# Patient Record
Sex: FEMALE | Race: WHITE | NOT HISPANIC OR LATINO | Employment: FULL TIME | URBAN - METROPOLITAN AREA
[De-identification: names, ages, dates, MRNs, and addresses within clinical notes are randomized per-mention and may not be internally consistent; named-entity substitution may affect disease eponyms.]

---

## 2017-01-30 ENCOUNTER — ALLSCRIPTS OFFICE VISIT (OUTPATIENT)
Dept: OTHER | Facility: OTHER | Age: 57
End: 2017-01-30

## 2017-02-08 DIAGNOSIS — E11.65 TYPE 2 DIABETES MELLITUS WITH HYPERGLYCEMIA (HCC): ICD-10-CM

## 2017-03-24 ENCOUNTER — ALLSCRIPTS OFFICE VISIT (OUTPATIENT)
Dept: OTHER | Facility: OTHER | Age: 57
End: 2017-03-24

## 2017-03-24 DIAGNOSIS — E78.5 HYPERLIPIDEMIA: ICD-10-CM

## 2017-05-25 ENCOUNTER — GENERIC CONVERSION - ENCOUNTER (OUTPATIENT)
Dept: OTHER | Facility: OTHER | Age: 57
End: 2017-05-25

## 2018-01-10 NOTE — MISCELLANEOUS
History of Present Illness  Communication  A message was left on voicemail requesting a return phone call  A call was made to the patient/patient's family  Future Appointments    Date/Time Provider Specialty Site   01/20/2017 02:30 PM [facility]     Signatures   Electronically signed by : Daniel Beltrán, ; Dec 21 2016  4:00PM EST                       (Author)

## 2018-01-12 VITALS
HEART RATE: 84 BPM | BODY MASS INDEX: 50.02 KG/M2 | RESPIRATION RATE: 16 BRPM | TEMPERATURE: 98.4 F | HEIGHT: 64 IN | SYSTOLIC BLOOD PRESSURE: 120 MMHG | WEIGHT: 293 LBS | DIASTOLIC BLOOD PRESSURE: 80 MMHG

## 2018-01-16 NOTE — MISCELLANEOUS
Provider Comments  Provider Comments:   Patient NO SHOW on 5/25/17 at 2:45pm letter sent to patient        Signatures   Electronically signed by : SHAHID Tobar ; Jun 1 2017  4:31PM EST                       (Author)

## 2018-01-16 NOTE — MISCELLANEOUS
History of Present Illness  Communication  There was no answer and no voicemail was available  Future Appointments    Date/Time Provider Specialty Site   12/12/2016 01:40 PM SHAHID Ball  Cardiology  CARDIOLOGY  [place]   12/09/2016 03:15 PM SHAHID Gaston   Internal Medicine [facility]     Signatures   Electronically signed by : Luis Welch, ; Dec  2 2016  2:48PM EST                       (Author)

## 2018-01-17 NOTE — MISCELLANEOUS
Assessment    1  Chronic obstructive pulmonary disease (496) (J44 9)   2  Atrial fibrillation (427 31) (I48 91)   3  Type 2 diabetes mellitus with hyperglycemia (250 00) (E11 65)   4  Hypertension (401 9) (I10)   5  Peripheral neuropathy (356 9) (G62 9)   6  Chronic diastolic CHF (congestive heart failure) (428 32,428 0) (I50 32)   7  Obesity (278 00) (E66 9)    Plan  Need for pneumococcal vaccination    · Prevnar 13 Intramuscular Suspension   For: Need for pneumococcal vaccination; Ordered By:Ame Hart; Effective Date:09Dec2016; Administered by: Fran Oar: 12/9/2016 4:00:00 PM  Peripheral neuropathy    · Follow-up visit in 6 weeks Evaluation and Treatment  Follow-up  Status: Complete  Done:  29HMV2437   Ordered; For: Peripheral neuropathy; Ordered By: Shaun Cunha Performed:  Due: 18EXM3931; Last Updated By: Naz Hernandez; 12/9/2016 4:38:45 PM    Discussion/Summary  Discussion Summary:   The patient is much improved after her hospitalization  Her heart failure and respiratory status are much better compensated  Her blood pressure is stable  She is on rate control and anticoagulation for atrial fibrillation  I urged her to start back on furosemide  She has an appointment with cardiology in the near future  She will continue with her other medications as they are  We did talk at some length about her weight  I don't believe that there are any weight loss drugs that would be appropriate for her right at the moment  I did broach the topic bariatric surgery with her  At the moment she is not enthusiastic  I told her that in any case, her cardiac and respiratory status would require further optimization before this could proceed  I asked her to return here in 6 weeks        Chief Complaint  Chief Complaint Free Text Note Form: d/c [facility] 11/29/16  Toprol XL increased to 100 mg q12h, magnesium 2 daily, Furosemide 20mg 3 daily  c/o low back pain, Tramadol 50mg called to pharmacy  foot exam due, received Prevnar today  forgot to take Lasix for 1 week  to [facility] for eye exam recently      History of Present Illness  TCM Communication [facility]: The patient is being contacted for 12/9/16  She was hospitalized [facility]  The date of admission: 11/18/16, date of discharge: 11/29/16  Diagnosis: CHF, hypoxic & hypercarbic respirator failure  She was discharged to home  Medications reviewed and updated today  She scheduled a follow up appointment  Communication performed and completed by Denae Houston   HPI: The patient returned to the office after recent hospitalization at [facility] for acute diastolic congestive heart failure and respiratory failure  She underwent extensive diuresis and lost approximately 50 pounds  With this, her respiratory status improved significantly  However, she likely has sleep apnea and obesity hypoventilation syndrome  Since she's been home she's been doing reasonably well  She returned to work  Her breathing is much better  She does not have much edema  She has had no chest pain  Unfortunately, she forgot to take furosemide for the past week  Review of Systems  Complete-Female:   Constitutional: No fever, no chills, feels well, no tiredness, no recent weight gain or weight loss  Eyes: No complaints of eye pain, no red eyes, no eyesight problems, no discharge, no dry eyes, no itching of eyes  ENT: no complaints of earache, no loss of hearing, no nose bleeds, no nasal discharge, no sore throat, no hoarseness  Cardiovascular: as noted in HPI  Respiratory: No complaints of shortness of breath, no wheezing, no cough, no SOB on exertion, no orthopnea, no PND  Gastrointestinal: No complaints of abdominal pain, no constipation, no nausea or vomiting, no diarrhea, no bloody stools  Genitourinary: No complaints of dysuria, no incontinence, no pelvic pain, no dysmenorrhea, no vaginal discharge or bleeding  Musculoskeletal: as noted in HPI     Neurological: No complaints of headache, no confusion, no convulsions, no numbness, no dizziness or fainting, no tingling, no limb weakness, no difficulty walking  Psychiatric: Not suicidal, no sleep disturbance, no anxiety or depression, no change in personality, no emotional problems  Endocrine: no muscle weakness  Hematologic/Lymphatic: no tendency for easy bruising  Active Problems     1  Cervical cancer screening (V76 2) (Z12 4)   2  Chronic obstructive pulmonary disease (496) (J44 9)   3  Cutaneous candidiasis (112 3) (B37 2)   4  Depression (311) (F32 9)   5  Dyslipidemia (272 4) (E78 5)   6  Dyspnea on exertion (786 09) (R06 09)   7  Encounter for screening mammogram for malignant neoplasm of breast (V76 12)   (Z12 31)   8  Left ventricular hypertrophy (429 3) (I51 7)   9  Need for diphtheria-tetanus-pertussis (Tdap) vaccine (V06 1) (Z23)   10  Need for prophylactic vaccination and inoculation against influenza (V04 81) (Z23)   11  Obesity (278 00) (E66 9)   12  Peripheral neuropathy (356 9) (G62 9)   13  Primary 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) (F51 11)   14  Rhinitis (472 0) (J31 0)   15  Screening for colon cancer (V76 51) (Z12 11)   16  Screening for diabetic retinopathy (V80 2) (Z13 5)   17  Type 2 diabetes mellitus with hyperglycemia (250 00) (E11 65)   18  Varicose Veins Of Lower Extremities (454 9)   19  Venous insufficiency (459 81) (I87 2)   20  Vitamin D deficiency (268 9) (E55 9)    Hypertension (401 9) (I10)       Shortness of breath (786 05) (R06 02)       Congestive heart failure (428 0) (I50 9)       Atrial fibrillation (427 31) (I48 91)       Mitral regurgitation (424 0) (I34 0)          Past Medical History    1  History of Cellulitis (682 9) (L03 90)   2  History of Varicose Veins Of Lower Extremities With Ulcer (454 0)   3  History of Ventricular tachycardia (427 1) (I47 2)    Surgical History    1  History of Cholecystectomy Laparoscopic   2  History of Hernia Repair   3   History of Hysterectomy    Family History  Family History 1  Family history of Cancer   2  Family history of Diabetes Mellitus (V18 0)   3  Family history of Hypertension (V17 49)   4  Family history of Stroke Syndrome (V17 1)  Family History Reviewed: The family history was reviewed and updated today  Social History     · Working Full Time  Social History Reviewed: The social history was reviewed and is unchanged  Cigarette smoker (305 1) (F17 210)             Current Meds   1  BuPROPion HCl ER (XL) 150 MG Oral Tablet Extended Release 24 Hour; TAKE 1   TABLET DAILY; Therapy: 62IYH0144 to (Evaluate:30Oct2016)  Requested for: 47HJQ3297; Last   Rx:30Sep2016 Ordered   2  Clotrimazole 1 % External Cream; APPLY SPARINGLY TO AFFECTED AREA(S) TWICE   DAILY; Therapy: 33HGI8535 to (Last Rx:30Sep2016)  Requested for: 30VCE8433 Ordered   3  Eliquis 5 MG Oral Tablet; Take 1 tablet twice daily; Therapy: 45AZH0027 to (Evaluate:11Nov2016); Last Rx:14Oct2016 Ordered   4  Furosemide 20 MG Oral Tablet; TAKE 3 TABLET Daily or as directed; Therapy: (Recorded:30Nov2016) to Recorded   5  Furosemide 40 MG Oral Tablet; TAKE 1 TABLET TWICE DAILY; Therapy: 07Tkn3735 to (Evaluate:12Apr2017)  Requested for: 68FRA7698; Last   Rx:14Oct2016 Ordered   6  Lisinopril 40 MG Oral Tablet; take 1 tablet every day; Therapy: 02IUZ1675 to (Evaluate:30Oct2016)  Requested for: 25FKB5919; Last   Rx:36Pts1880 Ordered   7  Magnesium CAPS; 2 tabs daily; Therapy: (Recorded:30Nov2016) to Recorded   8  MetFORMIN HCl  MG Oral Tablet Extended Release 24 Hour; TAKE 1 TABLET   ONCE DAILY WITH THE EVENING MEAL; Therapy: 64FSV6465 to ([place])  Requested for: 62XSC0327; Last   Rx:67Bvh9309 Ordered   9  Metoprolol Succinate  MG Oral Tablet Extended Release 24 Hour; Take 1 tablet   every 12 hours; Therapy: (Recorded:30Nov2016) to Recorded   10  Metoprolol Succinate ER 50 MG Oral Tablet Extended Release 24 Hour; TAKE 1 TABLET    DAILY;     Therapy: 46XTW1693 to (Evaluate:13Mar2017)  Requested for: 54GHM2179; Last    Rx:49Nss3275 Ordered   11  TraZODone HCl - 50 MG Oral Tablet; TAKE 1 TABLET AT BEDTIME; Therapy: 85ICX4680 to (Last Rx:70Yfh1932)  Requested for: 80RYG0738 Ordered   12  Ventolin  (90 Base) MCG/ACT Inhalation Aerosol Solution; INHALE 1 TO 2 PUFFS    EVERY 4 TO 6 HOURS AS NEEDED; Therapy: 46PEI7786 to (Last Rx:23Lhp0152)  Requested for: 60LFF8982 Ordered   13  Vitamin D (Ergocalciferol) 12503 UNIT Oral Capsule; TAKE 1 CAPSULE WEEKLY; Therapy: 52JYZ1883 to (Evaluate:18Sep2015)  Requested for: 26KSK6036; Last    Rx:20Sqv6454 Ordered    Allergies    1  No Known Drug Allergies    Vitals  Signs   Recorded: 97FOB4145 03:54PM   Temperature: 98 1 F  Heart Rate: 88  Respiration: 16  Systolic: 849  Diastolic: 165  Height: 5 ft 4 in  Weight: 293 lb 4 00 oz  BMI Calculated: 50 34  BSA Calculated: 2 31    Physical Exam    Constitutional   General appearance: No acute distress, well appearing and well nourished  Ears, Nose, Mouth, and Throat   External inspection of ears and nose: Normal     Otoscopic examination: Tympanic membranes translucent with normal light reflex  Canals patent without erythema  Nasal mucosa, septum, and turbinates: Abnormal     Pulmonary   Respiratory effort: No increased work of breathing or signs of respiratory distress  Auscultation of lungs: Clear to auscultation  Cardiovascular   Palpation of heart: Normal PMI, no thrills  Auscultation of heart: Normal rate and rhythm, normal S1 and S2, without murmurs  Examination of extremities for edema and/or varicosities: Abnormal   +1 edema was noted on theright,+2 edema left  Carotid pulses: Normal     Abdomen   Abdomen: Non-tender, no masses  Liver and spleen: No hepatomegaly or splenomegaly  Lymphatic   Palpation of lymph nodes in neck: No lymphadenopathy      Musculoskeletal   Gait and station: Normal     Inspection/palpation of joints, bones, and muscles: Normal  Skin   Skin and subcutaneous tissue: Abnormal   There is some redness of the skin of the calves consistent with venous stasis  During her hospitalization she had some shallow ulcerations which are now healing  Psychiatric   Orientation to person, place, and time: Normal     Mood and affect: Normal          Health Management  Cervical cancer screening   (1) THIN PREP PAP WITH IMAGING; every 3 years; Next Due: 06Kby9904; Overdue  Chronic obstructive pulmonary disease   Spirometry w/out Bronchodilation; every 1 year; Next Due: 89VKI8827; Overdue  Encounter for screening mammogram for malignant neoplasm of breast   Digital Bilateral Screening Mammogram With CAD; every 1 year; Next Due: 12Bhm5603; Overdue  Screening for colon cancer   COLONOSCOPY; every 10 years; Next Due: 13Gag7292; Overdue  Screening for diabetic retinopathy   *VB - Eye Exam; every 1 year; Next Due: 17Hba3722; Overdue  Type 2 diabetes mellitus with hyperglycemia   (1) HEMOGLOBIN A1C; every 6 months; Last 44ZFI3577; Next Due: 29Xto9338; Overdue  (1) MICROALBUMIN CREATININE RATIO, RANDOM URINE; every 1 year; Last 85QPX0634; Next Due:  O1696112; Overdue  *VB - Foot Exam; every 1 year; Last 83QUX6220; Next Due: 81PAI1886; Overdue    Future Appointments    Date/Time Provider Specialty Site   01/20/2017 02:30 PM SHAHID Diaz   Internal Medicine [facility]     Signatures   Electronically signed by : SHAHID Pritchett ; Dec 12 2016  5:54PM EST                       (Author)

## 2018-02-13 ENCOUNTER — APPOINTMENT (OUTPATIENT)
Dept: LAB | Facility: HOSPITAL | Age: 58
End: 2018-02-13
Attending: INTERNAL MEDICINE
Payer: COMMERCIAL

## 2018-02-13 ENCOUNTER — OFFICE VISIT (OUTPATIENT)
Dept: INTERNAL MEDICINE CLINIC | Facility: CLINIC | Age: 58
End: 2018-02-13
Payer: COMMERCIAL

## 2018-02-13 VITALS
TEMPERATURE: 98.4 F | HEART RATE: 104 BPM | BODY MASS INDEX: 47.77 KG/M2 | WEIGHT: 279.8 LBS | HEIGHT: 64 IN | SYSTOLIC BLOOD PRESSURE: 128 MMHG | DIASTOLIC BLOOD PRESSURE: 82 MMHG | RESPIRATION RATE: 16 BRPM

## 2018-02-13 DIAGNOSIS — F32.89 OTHER DEPRESSION: ICD-10-CM

## 2018-02-13 DIAGNOSIS — J44.9 CHRONIC OBSTRUCTIVE PULMONARY DISEASE, UNSPECIFIED COPD TYPE (HCC): Primary | ICD-10-CM

## 2018-02-13 DIAGNOSIS — I50.33 ACUTE ON CHRONIC DIASTOLIC CONGESTIVE HEART FAILURE (HCC): ICD-10-CM

## 2018-02-13 DIAGNOSIS — I10 ESSENTIAL HYPERTENSION: ICD-10-CM

## 2018-02-13 DIAGNOSIS — E78.5 HYPERLIPIDEMIA: ICD-10-CM

## 2018-02-13 DIAGNOSIS — I48.91 ATRIAL FIBRILLATION WITH RVR (HCC): ICD-10-CM

## 2018-02-13 DIAGNOSIS — I10 HYPERTENSION, UNSPECIFIED TYPE: ICD-10-CM

## 2018-02-13 DIAGNOSIS — IMO0001 UNCONTROLLED TYPE 2 DIABETES MELLITUS WITHOUT COMPLICATION, WITHOUT LONG-TERM CURRENT USE OF INSULIN: ICD-10-CM

## 2018-02-13 LAB
CHOLEST SERPL-MCNC: 141 MG/DL (ref 50–200)
HDLC SERPL-MCNC: 36 MG/DL (ref 40–60)
LDLC SERPL CALC-MCNC: 86 MG/DL (ref 0–100)
TRIGL SERPL-MCNC: 93 MG/DL

## 2018-02-13 PROCEDURE — 80061 LIPID PANEL: CPT

## 2018-02-13 PROCEDURE — 36415 COLL VENOUS BLD VENIPUNCTURE: CPT

## 2018-02-13 PROCEDURE — 99214 OFFICE O/P EST MOD 30 MIN: CPT | Performed by: INTERNAL MEDICINE

## 2018-02-13 RX ORDER — ALBUTEROL SULFATE 90 UG/1
2 AEROSOL, METERED RESPIRATORY (INHALATION) EVERY 4 HOURS PRN
Qty: 1 INHALER | Refills: 3 | Status: SHIPPED | OUTPATIENT
Start: 2018-02-13 | End: 2018-03-28 | Stop reason: SDUPTHER

## 2018-02-13 RX ORDER — METOPROLOL SUCCINATE 100 MG/1
TABLET, EXTENDED RELEASE ORAL
COMMUNITY
Start: 2017-12-10 | End: 2018-02-19 | Stop reason: SDUPTHER

## 2018-02-13 RX ORDER — APIXABAN 5 MG/1
5 TABLET, FILM COATED ORAL 2 TIMES DAILY
COMMUNITY
Start: 2018-01-29 | End: 2018-02-19 | Stop reason: SDUPTHER

## 2018-02-18 PROBLEM — I34.0 NON-RHEUMATIC MITRAL REGURGITATION: Status: ACTIVE | Noted: 2018-02-18

## 2018-02-19 ENCOUNTER — OFFICE VISIT (OUTPATIENT)
Dept: CARDIOLOGY CLINIC | Facility: CLINIC | Age: 58
End: 2018-02-19
Payer: COMMERCIAL

## 2018-02-19 VITALS
DIASTOLIC BLOOD PRESSURE: 88 MMHG | HEIGHT: 64 IN | BODY MASS INDEX: 48.49 KG/M2 | WEIGHT: 284 LBS | RESPIRATION RATE: 18 BRPM | HEART RATE: 92 BPM | SYSTOLIC BLOOD PRESSURE: 152 MMHG

## 2018-02-19 DIAGNOSIS — I34.0 NON-RHEUMATIC MITRAL REGURGITATION: ICD-10-CM

## 2018-02-19 DIAGNOSIS — I48.21 PERMANENT ATRIAL FIBRILLATION (HCC): Primary | ICD-10-CM

## 2018-02-19 DIAGNOSIS — I10 ESSENTIAL HYPERTENSION: ICD-10-CM

## 2018-02-19 DIAGNOSIS — I50.32 CHRONIC DIASTOLIC CHF (CONGESTIVE HEART FAILURE) (HCC): ICD-10-CM

## 2018-02-19 PROCEDURE — 99214 OFFICE O/P EST MOD 30 MIN: CPT | Performed by: INTERNAL MEDICINE

## 2018-02-19 PROCEDURE — 4010F ACE/ARB THERAPY RXD/TAKEN: CPT | Performed by: INTERNAL MEDICINE

## 2018-02-19 RX ORDER — METOPROLOL SUCCINATE 100 MG/1
100 TABLET, EXTENDED RELEASE ORAL EVERY 12 HOURS
Qty: 60 TABLET | Refills: 11 | Status: SHIPPED | OUTPATIENT
Start: 2018-02-19 | End: 2018-06-25 | Stop reason: SDUPTHER

## 2018-02-19 RX ORDER — FUROSEMIDE 40 MG/1
40 TABLET ORAL DAILY
Qty: 30 TABLET | Refills: 11 | Status: SHIPPED | OUTPATIENT
Start: 2018-02-19 | End: 2018-03-28 | Stop reason: SDUPTHER

## 2018-02-19 RX ORDER — DILTIAZEM HYDROCHLORIDE 120 MG/1
120 CAPSULE, COATED, EXTENDED RELEASE ORAL DAILY
Qty: 30 CAPSULE | Refills: 11 | Status: SHIPPED | OUTPATIENT
Start: 2018-02-19 | End: 2018-06-25 | Stop reason: SDUPTHER

## 2018-02-19 RX ORDER — APIXABAN 5 MG/1
5 TABLET, FILM COATED ORAL 2 TIMES DAILY
Qty: 60 TABLET | Refills: 11 | Status: SHIPPED | OUTPATIENT
Start: 2018-02-19 | End: 2019-11-05 | Stop reason: SDUPTHER

## 2018-02-19 RX ORDER — LISINOPRIL 40 MG/1
40 TABLET ORAL DAILY
Qty: 30 TABLET | Refills: 11 | Status: SHIPPED | OUTPATIENT
Start: 2018-02-19 | End: 2019-03-19 | Stop reason: SDUPTHER

## 2018-02-19 NOTE — PROGRESS NOTES
Cardiology Follow Up    [facility]  1960  [id number]  [address] [address]    Reason for visit: overdue for 4 months visit for chronic AFIB, chronic diastolic CHF, HTN and moderate MR    1  [facility] )     2  Chronic diastolic CHF (congestive heart failure) ([facility] )     3  Essential hypertension     4  Non-rheumatic mitral regurgitation         Interval History: The patient is way overdue for FU  She does have ROMERO  She denies edema  She denies palpitations  She does get chest tightness  She is very depressed  She does get lightheadedness at times  She is only taking her BID meds once daily  Patient Active Problem List   Diagnosis    COPD (chronic obstructive pulmonary disease) ([place] [address] )    Essential hypertension    Permanent atrial fibrillation (HCC)    Chronic diastolic CHF (congestive heart failure) ([place] [address] )    Acute respiratory failure with hypoxia (HCC)    Hypoalbuminemia    Diabetes type 2, uncontrolled ([place] [address] )    Non-rheumatic mitral regurgitation     Past Medical History:   Diagnosis Date    Atrial fibrillation with RVR ([place] [address] )     COPD (chronic obstructive pulmonary disease) ([place] [address] )     Diabetes type 2, uncontrolled ([place] [address] )     History of cellulitis     History of varicose veins of lower extremity     WITH ULCER    History of ventricular tachycardia     Hypertension      Social History     Social History    Marital status:       Spouse name: N/A    Number of children: N/A    Years of education: N/A     Occupational History     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          WORKING FULL TIME     Social History Main Topics    Smoking status: Current Every Day Smoker     Packs/day: 1 00     Years: 43 00     Types: Cigarettes    Smokeless tobacco: Never Used      Comment: Currently half a pack a day, 6 CIGARETTES PER DAY     Alcohol use Yes      Comment: socially, NO ALCOHOL USE    Drug use: Yes     Types: Marijuana    Sexual activity: Not on file     Other Topics Concern    Not on file     Social History Narrative    NO LIVING WILL          Family History   Problem Relation Age of Onset    Diabetes type II Mother     No Known Problems Father      She reports not knowing much about her father   Cancer Family     Diabetes Family     Hypertension Family     Stroke Family      Past Surgical History:   Procedure Laterality Date    HERNIA REPAIR      HYSTERECTOMY      LAPAROSCOPIC CHOLECYSTECTOMY         Current Outpatient Prescriptions:     albuterol (PROVENTIL HFA,VENTOLIN HFA) 90 mcg/act inhaler, Inhale 2 puffs every 4 (four) hours as needed for wheezing, Disp: 1 Inhaler, Rfl: 3    diltiazem (CARDIZEM CD) 120 mg 24 hr capsule, 120 mg daily, Disp: , Rfl: 5    ELIQUIS 5 MG, Take 5 mg by mouth 2 (two) times a day , Disp: , Rfl:     furosemide (LASIX) 40 mg tablet, Take 40 mg by mouth daily, Disp: , Rfl: 0    lisinopril (ZESTRIL) 40 mg tablet, Take 40 mg by mouth daily, Disp: , Rfl:     metoprolol succinate (TOPROL-XL) 100 mg 24 hr tablet, , Disp: , Rfl:     sertraline (ZOLOFT) 50 mg tablet, Take 1 tablet (50 mg total) by mouth daily, Disp: 30 tablet, Rfl: 1  No Known Allergies      Review of Systems:  Review of Systems   Constitutional: Positive for fatigue  Negative for appetite change, fever and unexpected weight change  Respiratory: Positive for chest tightness and shortness of breath  Negative for cough, choking and wheezing  Cardiovascular: Positive for chest pain  Negative for palpitations and leg swelling  Gastrointestinal: Positive for constipation  Negative for abdominal pain, blood in stool, diarrhea and vomiting  Genitourinary: Positive for frequency  Negative for dysuria, hematuria and urgency  Musculoskeletal: Positive for arthralgias and back pain  Negative for gait problem and joint swelling     Neurological: Positive for weakness and light-headedness  Negative for syncope and headaches  Psychiatric/Behavioral: Positive for dysphoric mood  Negative for agitation, behavioral problems, confusion and decreased concentration  Physical Exam:  Vitals:    02/19/18 1414   BP: 152/88   BP Location: Left arm   Patient Position: Sitting   Cuff Size: Large   Pulse: 92   Resp: 18   Weight: 129 kg (284 lb)   Height: 5' 4" (1 626 m)     Physical Exam   Constitutional: She appears well-developed and well-nourished  No distress  Morbidly obese   HENT:   Head: Normocephalic and atraumatic  Mouth/Throat: No oropharyngeal exudate  Eyes: Conjunctivae are normal  No scleral icterus  Neck: Neck supple  Normal carotid pulses and no JVD present  Carotid bruit is not present  No thyromegaly present  Cardiovascular: Normal heart sounds  An irregularly irregular rhythm present  Tachycardia present  Exam reveals no gallop and no friction rub  No murmur heard  Pulses:       Dorsalis pedis pulses are 0 on the right side, and 0 on the left side  Posterior tibial pulses are 1+ on the right side, and 1+ on the left side  Pulmonary/Chest: She has wheezes (faint exp wheeze)  She has no rales  She exhibits no tenderness  Abdominal: Soft  There is no hepatosplenomegaly  There is no tenderness  There is no guarding  Musculoskeletal: She exhibits edema (2+ LE edema bilaterally)  She exhibits no tenderness or deformity  Neurological: She is alert  She has normal strength  No cranial nerve deficit or sensory deficit  Skin: Skin is warm and dry  No rash noted  No erythema  No pallor  Psychiatric: Her behavior is normal  Judgment and thought content normal    Somewhat depressed         Discussion/Summary:  1  Permanent atrial fibrillation  Rate is somewhat accelerated  She has only been taking metoprolol 100 mg daily  I increased this to 100 mg every 12 hours as previously prescribed  Continue diltiazem for this as well      2  Chronic diastolic heart failure  Perhaps slightly more edema although weight is not all that different from last year  Continue furosemide 40 mg daily  She had missed her dosages in the past and hopefully with daily dosing she will stay out of heart failure  3  Hypertension  Somewhat elevated today  Metoprolol increased  Continue diltiazem and lisinopril  4    Mitral regurgitation deemed to be moderate on echo in 2016  Not audible on exam today  Do not expect this to be problematic going forward      FU 6 months      Johnson Tran MD

## 2018-03-28 ENCOUNTER — OFFICE VISIT (OUTPATIENT)
Dept: INTERNAL MEDICINE CLINIC | Facility: CLINIC | Age: 58
End: 2018-03-28
Payer: COMMERCIAL

## 2018-03-28 VITALS
HEART RATE: 100 BPM | HEIGHT: 64 IN | SYSTOLIC BLOOD PRESSURE: 138 MMHG | DIASTOLIC BLOOD PRESSURE: 80 MMHG | WEIGHT: 293 LBS | RESPIRATION RATE: 18 BRPM | TEMPERATURE: 97.8 F | BODY MASS INDEX: 50.02 KG/M2

## 2018-03-28 DIAGNOSIS — J44.9 CHRONIC OBSTRUCTIVE PULMONARY DISEASE, UNSPECIFIED COPD TYPE (HCC): ICD-10-CM

## 2018-03-28 DIAGNOSIS — R60.9 EDEMA, UNSPECIFIED TYPE: ICD-10-CM

## 2018-03-28 DIAGNOSIS — I50.32 CHRONIC DIASTOLIC CHF (CONGESTIVE HEART FAILURE) (HCC): ICD-10-CM

## 2018-03-28 DIAGNOSIS — I48.21 PERMANENT ATRIAL FIBRILLATION (HCC): ICD-10-CM

## 2018-03-28 DIAGNOSIS — I10 ESSENTIAL HYPERTENSION: ICD-10-CM

## 2018-03-28 DIAGNOSIS — IMO0001 UNCONTROLLED TYPE 2 DIABETES MELLITUS WITHOUT COMPLICATION, WITHOUT LONG-TERM CURRENT USE OF INSULIN: Primary | ICD-10-CM

## 2018-03-28 PROCEDURE — 3079F DIAST BP 80-89 MM HG: CPT | Performed by: INTERNAL MEDICINE

## 2018-03-28 PROCEDURE — 3075F SYST BP GE 130 - 139MM HG: CPT | Performed by: INTERNAL MEDICINE

## 2018-03-28 PROCEDURE — 99214 OFFICE O/P EST MOD 30 MIN: CPT | Performed by: INTERNAL MEDICINE

## 2018-03-28 PROCEDURE — 3008F BODY MASS INDEX DOCD: CPT | Performed by: INTERNAL MEDICINE

## 2018-03-28 RX ORDER — ALBUTEROL SULFATE 90 UG/1
2 AEROSOL, METERED RESPIRATORY (INHALATION) EVERY 4 HOURS PRN
Qty: 1 INHALER | Refills: 3 | Status: SHIPPED | OUTPATIENT
Start: 2018-03-28 | End: 2019-11-05 | Stop reason: SDUPTHER

## 2018-03-28 RX ORDER — FUROSEMIDE 40 MG/1
40 TABLET ORAL 2 TIMES DAILY
Qty: 60 TABLET | Refills: 3 | Status: SHIPPED | OUTPATIENT
Start: 2018-03-28 | End: 2019-02-21 | Stop reason: HOSPADM

## 2018-03-28 RX ORDER — METOLAZONE 5 MG/1
5 TABLET ORAL DAILY
Qty: 30 TABLET | Refills: 0 | Status: SHIPPED | OUTPATIENT
Start: 2018-03-28 | End: 2018-06-25 | Stop reason: SDUPTHER

## 2018-03-28 NOTE — PROGRESS NOTES
[facility] [place]      NAME: Nevin Valdivia  AGE: 65 y o  SEX: female  : 1960   MRN: [id number]    DATE: 3/28/2018  TIME: 2:50 PM    Assessment and Plan   1  Chronic diastolic CHF (congestive heart failure) (HCC)  The patient's weight has gone up 30 lb since her last visit  Fortunately, she is in no respiratory distress  Her diuretic regimen will be intensified   - metolazone (ZAROXOLYN) 5 mg tablet; Take 1 tablet (5 mg total) by mouth daily  Dispense: 30 tablet; Refill: 0  - furosemide (LASIX) 40 mg tablet; Take 1 tablet (40 mg total) by mouth 2 (two) times a day  Dispense: 60 tablet; Refill: 3  - Basic metabolic panel; Future  - Magnesium; Future    2  Chronic obstructive pulmonary disease, unspecified COPD type ([facility] )    Stable at present  - albuterol (PROVENTIL HFA,VENTOLIN HFA) 90 mcg/act inhaler; Inhale 2 puffs every 4 (four) hours as needed for wheezing  Dispense: 1 Inhaler; Refill: 3    3  Uncontrolled type 2 diabetes mellitus without complication, without long-term current use of insulin ([facility])    Currently asymptomatic  Remains on diet therapy alone  4  Essential hypertension    Adequately controlled  5  Permanent atrial fibrillation ([facility] )    On rate control and anticoagulation  6  Edema, unspecified type    This is a manifestation of the patient's fluid retention  The patient is significantly edematous  She does not appear to be in overt heart failure  Furosemide will be increased and metolazone will be added  Her electrolytes will be Re checked in a week  The possibility of sleep apnea or obesity hypoventilation contributing to the patient's fluid retention was discussed with her  She does not wish to go for sleep study at the present time  She does not think that she could tolerate CPAP  Return to office in:   Two weeks  Chief Complaint     Edema      History of Present Illness       The patient came to the office for evaluation of increasing edema   Since her last visit her weight has gone up by 30 lb  Her legs and belly are swollen  She does not notice much deterioration in her breathing  She has little cough or wheezing  She has no sputum  She has no chest pain  She denies orthopnea or PND  The following portions of the patient's history were reviewed and updated as appropriate: allergies, current medications, past family history, past medical history, past social history, past surgical history and problem list     Review of Systems   Review of Systems   Constitutional: Negative  HENT: Negative for congestion, ear pain, postnasal drip, rhinorrhea, sore throat and trouble swallowing  Eyes: Negative for pain, discharge, redness and visual disturbance  Respiratory: Negative for cough, shortness of breath and wheezing  Cardiovascular: Positive for leg swelling  Gastrointestinal: Negative  Endocrine: Negative  Genitourinary: Negative for difficulty urinating, dysuria, frequency, hematuria and urgency  Musculoskeletal: Negative for arthralgias, gait problem, joint swelling and myalgias  Skin: Negative for rash  Neurological: Negative for dizziness, speech difficulty, weakness, light-headedness, numbness and headaches  Hematological: Negative  Psychiatric/Behavioral: Negative for confusion, decreased concentration, dysphoric mood and sleep disturbance  The patient is not nervous/anxious          Active Problem List     Patient Active Problem List   Diagnosis    COPD (chronic obstructive pulmonary disease) ([place] [address] )    Hypertension    Permanent atrial fibrillation ([facility])    Chronic diastolic CHF (congestive heart failure) ([place] [address] )    Acute respiratory failure with hypoxia ([facility])    Hypoalbuminemia    Diabetes type 2, uncontrolled ([place] [address] )    Non-rheumatic mitral regurgitation    Nonsustained ventricular tachycardia (HCC)    Obesity    Vitamin D deficiency    Edema    Depression    Dyslipidemia    Left ventricular hypertrophy       Objective   /80 (BP Location: Right arm, Patient Position: Sitting, Cuff Size: Standard)   Pulse 100   Temp 97 8 °F (36 6 °C) (Tympanic)   Resp 18   Ht 5' 4" (1 626 m)   Wt (!) 141 kg (310 lb)   BMI 53 21 kg/m²     Physical Exam   Constitutional: She is oriented to person, place, and time  She appears well-developed  No distress  Obese   HENT:   Head: Normocephalic and atraumatic  Neck: Neck supple  No JVD present  No tracheal deviation present  No thyromegaly present  Cardiovascular: Normal rate, regular rhythm, normal heart sounds and intact distal pulses  Exam reveals no gallop and no friction rub  No murmur heard  Pulmonary/Chest: Effort normal and breath sounds normal  She has no wheezes  She has no rales  She exhibits no tenderness  Abdominal: Soft  Bowel sounds are normal  She exhibits no distension and no mass  There is no tenderness  There is no rebound  Musculoskeletal: Normal range of motion  She exhibits edema  She exhibits no tenderness  4+ edema of the legs is noted  There is edema of the abdominal wall  Lymphadenopathy:     She has no cervical adenopathy  Neurological: She is alert and oriented to person, place, and time  Skin: Skin is warm and dry  There is erythema  There is erythema of both calves related to the significant edema that is present  Psychiatric: She has a normal mood and affect         Pertinent Laboratory/Diagnostic Studies:  Appointment on 02/13/2018   Component Date Value Ref Range Status    Cholesterol 02/13/2018 141  50 - 200 mg/dL Final    Triglycerides 02/13/2018 93  <=150 mg/dL Final    HDL, Direct 02/13/2018 36* 40 - 60 mg/dL Final    LDL Calculated 02/13/2018 86  0 - 100 mg/dL Final           Current Medications     Current Outpatient Prescriptions:     albuterol (PROVENTIL HFA,VENTOLIN HFA) 90 mcg/act inhaler, Inhale 2 puffs every 4 (four) hours as needed for wheezing, Disp: 1 Inhaler, Rfl: 3    diltiazem (CARDIZEM CD) 120 mg 24 hr capsule, Take 1 capsule (120 mg total) by mouth daily, Disp: 30 capsule, Rfl: 11    ELIQUIS 5 MG, Take 1 tablet (5 mg total) by mouth 2 (two) times a day, Disp: 60 tablet, Rfl: 11    furosemide (LASIX) 40 mg tablet, Take 1 tablet (40 mg total) by mouth 2 (two) times a day, Disp: 60 tablet, Rfl: 3    lisinopril (ZESTRIL) 40 mg tablet, Take 1 tablet (40 mg total) by mouth daily, Disp: 30 tablet, Rfl: 11    metoprolol succinate (TOPROL-XL) 100 mg 24 hr tablet, Take 1 tablet (100 mg total) by mouth every 12 (twelve) hours, Disp: 60 tablet, Rfl: 11    metolazone (ZAROXOLYN) 5 mg tablet, Take 1 tablet (5 mg total) by mouth daily, Disp: 30 tablet, Rfl: 0    sertraline (ZOLOFT) 50 mg tablet, Take 1 tablet (50 mg total) by mouth daily, Disp: 30 tablet, Rfl: 1    Shamika Lopez MD

## 2018-06-25 DIAGNOSIS — I10 ESSENTIAL HYPERTENSION: ICD-10-CM

## 2018-06-25 DIAGNOSIS — I50.32 CHRONIC DIASTOLIC CHF (CONGESTIVE HEART FAILURE) (HCC): ICD-10-CM

## 2018-06-25 DIAGNOSIS — I48.21 PERMANENT ATRIAL FIBRILLATION (HCC): ICD-10-CM

## 2018-06-26 RX ORDER — METOLAZONE 5 MG/1
5 TABLET ORAL DAILY
Qty: 30 TABLET | Refills: 1 | Status: SHIPPED | OUTPATIENT
Start: 2018-06-26 | End: 2018-10-17 | Stop reason: SDUPTHER

## 2018-06-26 RX ORDER — METOPROLOL SUCCINATE 100 MG/1
100 TABLET, EXTENDED RELEASE ORAL EVERY 12 HOURS
Qty: 60 TABLET | Refills: 1 | Status: SHIPPED | OUTPATIENT
Start: 2018-06-26 | End: 2019-12-10 | Stop reason: SDUPTHER

## 2018-06-26 RX ORDER — DILTIAZEM HYDROCHLORIDE 120 MG/1
120 CAPSULE, COATED, EXTENDED RELEASE ORAL DAILY
Qty: 30 CAPSULE | Refills: 4 | Status: SHIPPED | OUTPATIENT
Start: 2018-06-26 | End: 2019-04-11 | Stop reason: SDUPTHER

## 2018-10-17 DIAGNOSIS — I50.32 CHRONIC DIASTOLIC CHF (CONGESTIVE HEART FAILURE) (HCC): ICD-10-CM

## 2018-10-17 RX ORDER — METOLAZONE 5 MG/1
TABLET ORAL
Qty: 30 TABLET | Refills: 1 | Status: ON HOLD | OUTPATIENT
Start: 2018-10-17 | End: 2019-02-21 | Stop reason: SDUPTHER

## 2019-02-07 ENCOUNTER — OFFICE VISIT (OUTPATIENT)
Dept: INTERNAL MEDICINE CLINIC | Facility: CLINIC | Age: 59
End: 2019-02-07
Payer: COMMERCIAL

## 2019-02-07 VITALS
DIASTOLIC BLOOD PRESSURE: 88 MMHG | TEMPERATURE: 97.3 F | HEIGHT: 64 IN | HEART RATE: 100 BPM | OXYGEN SATURATION: 86 % | WEIGHT: 293 LBS | SYSTOLIC BLOOD PRESSURE: 142 MMHG | BODY MASS INDEX: 50.02 KG/M2 | RESPIRATION RATE: 24 BRPM

## 2019-02-07 DIAGNOSIS — E88.09 HYPOALBUMINEMIA: ICD-10-CM

## 2019-02-07 DIAGNOSIS — E11.65 UNCONTROLLED TYPE 2 DIABETES MELLITUS WITH HYPERGLYCEMIA (HCC): ICD-10-CM

## 2019-02-07 DIAGNOSIS — N18.30 STAGE 3 CHRONIC KIDNEY DISEASE (HCC): ICD-10-CM

## 2019-02-07 DIAGNOSIS — I50.33 ACUTE ON CHRONIC DIASTOLIC (CONGESTIVE) HEART FAILURE (HCC): Primary | ICD-10-CM

## 2019-02-07 DIAGNOSIS — J44.9 CHRONIC OBSTRUCTIVE PULMONARY DISEASE, UNSPECIFIED COPD TYPE (HCC): ICD-10-CM

## 2019-02-07 DIAGNOSIS — I50.32 CHRONIC DIASTOLIC CHF (CONGESTIVE HEART FAILURE) (HCC): ICD-10-CM

## 2019-02-07 DIAGNOSIS — I48.21 PERMANENT ATRIAL FIBRILLATION (HCC): ICD-10-CM

## 2019-02-07 DIAGNOSIS — I10 ESSENTIAL HYPERTENSION: ICD-10-CM

## 2019-02-07 PROCEDURE — 99215 OFFICE O/P EST HI 40 MIN: CPT | Performed by: INTERNAL MEDICINE

## 2019-02-07 PROCEDURE — 3066F NEPHROPATHY DOC TX: CPT | Performed by: INTERNAL MEDICINE

## 2019-02-07 NOTE — PROGRESS NOTES
Assessment/Plan:  I discussed with her in detail and her son  She is tachypneic, tachycardic with a pulse ox 86% on room air  Last blood work in November of 2016 showed GFR of 37  Given her intensity of symptoms and lack of medication compliance, I recommended evaluation in the emergency room and admission to the hospital for treatment of acute diastolic heart failure  She was last admitted to the hospital in November of 2016 with similar symptoms and treated with IV diuretics with diuresis of 50  lb  She declined to go to the hospital today since she has to take care of her dogs and arrange for some other things  She is asking me if I could just prescribe her a muscle relaxant and stronger water pills she will be fine  I explained to her that I do not know her kidney function, her electrolyte status  She would need close monitoring while on diuretics  It is also unclear if she would be able to  her medications today because she relies on the pharmacy to deliver them  She does not think she can go for blood work today either due to lack of transportation  Advised her to take Tylenol, advised against using ibuprofen or Aleve    She agrees to go to the ER tomorrow for evaluation and admission to the hospital       Total time spent on the visit today was 50 minutes with greater than 50% time counseling with the patient and son       Diagnoses and all orders for this visit:    Acute on chronic diastolic (congestive) heart failure (HCC)  -     CBC and differential  -     Comprehensive metabolic panel  -     Microalbumin / creatinine urine ratio  -     Magnesium  -     TSH, 3rd generation with Free T4 reflex  -     NT-BNP PRO    Permanent atrial fibrillation (HCC)  -     CBC and differential  -     Comprehensive metabolic panel  -     Microalbumin / creatinine urine ratio  -     Magnesium  -     TSH, 3rd generation with Free T4 reflex  -     NT-BNP PRO    Essential hypertension  -     Magnesium  -     TSH, 3rd generation with Free T4 reflex  -     NT-BNP PRO    Chronic obstructive pulmonary disease, unspecified COPD type ([facility] )    Uncontrolled type 2 diabetes mellitus with hyperglycemia (HCC)  -     Hemoglobin A1C    Hypoalbuminemia    Stage 3 chronic kidney disease (HCC)  -     Microalbumin / creatinine urine ratio    Chronic diastolic CHF (congestive heart failure) ([facility])          Subjective:   Chief Complaint   Patient presents with    Back Pain     stopped Furosemide, fluid retention since mid january        Patient ID: Raudel Murdock is a 63 y o  female  She comes in for an acute appointment  She has had increased back pain, fluid retention for the last several weeks to months  She notes that her back has been hurting since January  She stopped taking her water pills because it was uncomfortable for her to go the bathroom  She has had some difficulty breathing and mobilizing  No chest pain  When asked about her Eliquis and other medications, she states that she takes it when she takes her pills  She was not able to give me an exact date when she last took her medications  I discussed with her that her Lasix was last filled in March of 2018 and she has not seen Dr Luisa Obrien or got any blood work since then  Back Pain   Pertinent negatives include no abdominal pain, chest pain, dysuria, fever or headaches  The following portions of the patient's history were reviewed and updated as appropriate: current medications, past medical history, past social history and past surgical history      PHQ-9 Depression Screening    PHQ-9:    Frequency of the following problems over the past two weeks:                Current Outpatient Prescriptions:     ELIQUIS 5 MG, Take 1 tablet (5 mg total) by mouth 2 (two) times a day, Disp: 60 tablet, Rfl: 11    lisinopril (ZESTRIL) 40 mg tablet, Take 1 tablet (40 mg total) by mouth daily, Disp: 30 tablet, Rfl: 11    metoprolol succinate (TOPROL-XL) 100 mg 24 hr tablet, Take 1 tablet (100 mg total) by mouth every 12 (twelve) hours, Disp: 60 tablet, Rfl: 1    albuterol (PROVENTIL HFA,VENTOLIN HFA) 90 mcg/act inhaler, Inhale 2 puffs every 4 (four) hours as needed for wheezing, Disp: 1 Inhaler, Rfl: 3    diltiazem (CARDIZEM CD) 120 mg 24 hr capsule, Take 1 capsule (120 mg total) by mouth daily, Disp: 30 capsule, Rfl: 4    furosemide (LASIX) 40 mg tablet, Take 1 tablet (40 mg total) by mouth 2 (two) times a day (Patient not taking: Reported on 2/7/2019 ), Disp: 60 tablet, Rfl: 3    metolazone (ZAROXOLYN) 5 mg tablet, TAKE 1 TABLET BY MOUTH EVERY DAY (Patient not taking: Reported on 2/7/2019), Disp: 30 tablet, Rfl: 1    sertraline (ZOLOFT) 50 mg tablet, Take 1 tablet (50 mg total) by mouth daily (Patient not taking: Reported on 2/7/2019 ), Disp: 30 tablet, Rfl: 1    Review of Systems   Constitutional: Positive for fatigue  Negative for fever and unexpected weight change  HENT: Negative for ear pain, hearing loss and sore throat  Eyes: Negative for pain and discharge  Respiratory: Positive for cough and shortness of breath  Negative for chest tightness  Cardiovascular: Positive for leg swelling  Negative for chest pain and palpitations  Gastrointestinal: Negative for abdominal pain, blood in stool, constipation, diarrhea and nausea  Genitourinary: Negative for dysuria, frequency and hematuria  Musculoskeletal: Positive for back pain  Negative for arthralgias and joint swelling  Skin: Negative for rash  Allergic/Immunologic: Negative for immunocompromised state  Neurological: Negative for dizziness and headaches  Hematological: Negative for adenopathy  Psychiatric/Behavioral: Negative for confusion and sleep disturbance           Objective:  /88 (BP Location: Left arm, Patient Position: Sitting, Cuff Size: Large)   Pulse 100   Temp (!) 97 3 °F (36 3 °C) (Tympanic)   Resp (!) 24   Ht 5' 4" (1 626 m)   Wt (!) 147 kg (323 lb)   SpO2 (!) 86% BMI 55 44 kg/m²      Physical Exam   Constitutional: She appears well-developed and well-nourished  HENT:   Head: Normocephalic and atraumatic  Right Ear: Tympanic membrane normal    Left Ear: Tympanic membrane normal    Nose: Nose normal    Mouth/Throat: Oropharynx is clear and moist  No posterior oropharyngeal edema or posterior oropharyngeal erythema  Eyes: Pupils are equal, round, and reactive to light  Conjunctivae are normal  Right eye exhibits no discharge  Neck: Normal range of motion  Neck supple  No thyromegaly present  Cardiovascular: S1 normal, S2 normal and normal heart sounds  An irregular rhythm present  Tachycardia present  PMI is not displaced  No murmur heard  Pulmonary/Chest: No accessory muscle usage  Tachypnea noted  No apnea  She is in respiratory distress  She has no rhonchi  She has rales in the right lower field and the left lower field  Abdominal: Soft  She exhibits distension  She exhibits no shifting dullness  There is no hepatosplenomegaly  There is no tenderness  There is no rebound and no CVA tenderness  Musculoskeletal: Normal range of motion  She exhibits no edema  Lumbar back: She exhibits tenderness  Lymphadenopathy:     She has no cervical adenopathy  Neurological: She is alert  Skin: Skin is warm and intact  No rash noted  Psychiatric: She has a normal mood and affect  Her speech is normal    Nursing note and vitals reviewed  No results found for this or any previous visit (from the past 1008 hour(s))  ]    No results found

## 2019-02-08 ENCOUNTER — APPOINTMENT (EMERGENCY)
Dept: ULTRASOUND IMAGING | Facility: HOSPITAL | Age: 59
DRG: 291 | End: 2019-02-08
Payer: COMMERCIAL

## 2019-02-08 ENCOUNTER — HOSPITAL ENCOUNTER (INPATIENT)
Facility: HOSPITAL | Age: 59
LOS: 13 days | Discharge: HOME | DRG: 291 | End: 2019-02-21
Attending: EMERGENCY MEDICINE | Admitting: INTERNAL MEDICINE
Payer: COMMERCIAL

## 2019-02-08 ENCOUNTER — APPOINTMENT (EMERGENCY)
Dept: RADIOLOGY | Facility: HOSPITAL | Age: 59
DRG: 291 | End: 2019-02-08
Payer: COMMERCIAL

## 2019-02-08 DIAGNOSIS — E16.2 HYPOGLYCEMIA: ICD-10-CM

## 2019-02-08 DIAGNOSIS — J96.21 ACUTE ON CHRONIC RESPIRATORY FAILURE WITH HYPOXIA AND HYPERCAPNIA (HCC): ICD-10-CM

## 2019-02-08 DIAGNOSIS — I48.21 PERMANENT ATRIAL FIBRILLATION (HCC): ICD-10-CM

## 2019-02-08 DIAGNOSIS — M53.3 SACRAL LESION: ICD-10-CM

## 2019-02-08 DIAGNOSIS — J44.9 CHRONIC OBSTRUCTIVE PULMONARY DISEASE, UNSPECIFIED COPD TYPE (HCC): ICD-10-CM

## 2019-02-08 DIAGNOSIS — S81.801D MULTIPLE OPEN WOUNDS OF RIGHT LOWER EXTREMITY, SUBSEQUENT ENCOUNTER: ICD-10-CM

## 2019-02-08 DIAGNOSIS — I50.33 ACUTE ON CHRONIC DIASTOLIC CONGESTIVE HEART FAILURE (HCC): ICD-10-CM

## 2019-02-08 DIAGNOSIS — L03.90 CELLULITIS: ICD-10-CM

## 2019-02-08 DIAGNOSIS — J96.22 ACUTE ON CHRONIC RESPIRATORY FAILURE WITH HYPOXIA AND HYPERCAPNIA (HCC): ICD-10-CM

## 2019-02-08 DIAGNOSIS — G89.29 CHRONIC LOW BACK PAIN: ICD-10-CM

## 2019-02-08 DIAGNOSIS — I50.32 CHRONIC DIASTOLIC CHF (CONGESTIVE HEART FAILURE) (HCC): ICD-10-CM

## 2019-02-08 DIAGNOSIS — S81.809A MULTIPLE OPENS WOUND OF LOWER EXTREMITY, UNSPECIFIED LATERALITY, INITIAL ENCOUNTER: ICD-10-CM

## 2019-02-08 DIAGNOSIS — J96.11 CHRONIC RESPIRATORY FAILURE WITH HYPOXIA AND HYPERCAPNIA (HCC): ICD-10-CM

## 2019-02-08 DIAGNOSIS — M54.50 CHRONIC LOW BACK PAIN: ICD-10-CM

## 2019-02-08 DIAGNOSIS — Z72.0 TOBACCO USE: ICD-10-CM

## 2019-02-08 DIAGNOSIS — J96.12 CHRONIC RESPIRATORY FAILURE WITH HYPOXIA AND HYPERCAPNIA (HCC): ICD-10-CM

## 2019-02-08 DIAGNOSIS — I10 ESSENTIAL HYPERTENSION: ICD-10-CM

## 2019-02-08 DIAGNOSIS — I50.9 CONGESTIVE HEART FAILURE (CHF) (HCC): Primary | ICD-10-CM

## 2019-02-08 LAB
ALBUMIN SERPL BCP-MCNC: 2.9 G/DL (ref 3.5–5)
ALP SERPL-CCNC: 124 U/L (ref 46–116)
ALT SERPL W P-5'-P-CCNC: 29 U/L (ref 12–78)
ANION GAP SERPL CALCULATED.3IONS-SCNC: 10 MMOL/L (ref 4–13)
APTT PPP: 33 SECONDS (ref 26–38)
AST SERPL W P-5'-P-CCNC: 39 U/L (ref 5–45)
BASOPHILS # BLD AUTO: 0.1 THOUSANDS/ΜL (ref 0–0.1)
BASOPHILS NFR BLD AUTO: 1 % (ref 0–1)
BILIRUB DIRECT SERPL-MCNC: 1.65 MG/DL (ref 0–0.2)
BILIRUB SERPL-MCNC: 4.12 MG/DL (ref 0.2–1)
BUN SERPL-MCNC: 41 MG/DL (ref 5–25)
CALCIUM SERPL-MCNC: 8.7 MG/DL (ref 8.3–10.1)
CHLORIDE SERPL-SCNC: 102 MMOL/L (ref 100–108)
CO2 SERPL-SCNC: 31 MMOL/L (ref 21–32)
CREAT SERPL-MCNC: 1.98 MG/DL (ref 0.6–1.3)
CREAT UR-MCNC: 37 MG/DL
EOSINOPHIL # BLD AUTO: 0.09 THOUSAND/ΜL (ref 0–0.61)
EOSINOPHIL NFR BLD AUTO: 1 % (ref 0–6)
ERYTHROCYTE [DISTWIDTH] IN BLOOD BY AUTOMATED COUNT: 17.2 % (ref 11.6–15.1)
GFR SERPL CREATININE-BSD FRML MDRD: 27 ML/MIN/1.73SQ M
GLUCOSE SERPL-MCNC: 118 MG/DL (ref 65–140)
GLUCOSE SERPL-MCNC: 87 MG/DL (ref 65–140)
HCT VFR BLD AUTO: 58.6 % (ref 34.8–46.1)
HGB BLD-MCNC: 18.1 G/DL (ref 11.5–15.4)
IMM GRANULOCYTES # BLD AUTO: 0.04 THOUSAND/UL (ref 0–0.2)
IMM GRANULOCYTES NFR BLD AUTO: 1 % (ref 0–2)
INR PPP: 1.37 (ref 0.86–1.17)
LACTATE SERPL-SCNC: 1.6 MMOL/L (ref 0.5–2)
LYMPHOCYTES # BLD AUTO: 0.62 THOUSANDS/ΜL (ref 0.6–4.47)
LYMPHOCYTES NFR BLD AUTO: 7 % (ref 14–44)
MCH RBC QN AUTO: 31.1 PG (ref 26.8–34.3)
MCHC RBC AUTO-ENTMCNC: 30.9 G/DL (ref 31.4–37.4)
MCV RBC AUTO: 101 FL (ref 82–98)
MICROALBUMIN UR-MCNC: 65.3 MG/L (ref 0–20)
MICROALBUMIN/CREAT 24H UR: 176 MG/G CREATININE (ref 0–30)
MONOCYTES # BLD AUTO: 1.36 THOUSAND/ΜL (ref 0.17–1.22)
MONOCYTES NFR BLD AUTO: 16 % (ref 4–12)
NEUTROPHILS # BLD AUTO: 6.16 THOUSANDS/ΜL (ref 1.85–7.62)
NEUTS SEG NFR BLD AUTO: 74 % (ref 43–75)
NRBC BLD AUTO-RTO: 0 /100 WBCS
NT-PROBNP SERPL-MCNC: 4595 PG/ML
PLATELET # BLD AUTO: 164 THOUSANDS/UL (ref 149–390)
PMV BLD AUTO: 11.1 FL (ref 8.9–12.7)
POTASSIUM SERPL-SCNC: 3.3 MMOL/L (ref 3.5–5.3)
PROT SERPL-MCNC: 6.9 G/DL (ref 6.4–8.2)
PROTHROMBIN TIME: 17 SECONDS (ref 11.8–14.2)
RBC # BLD AUTO: 5.82 MILLION/UL (ref 3.81–5.12)
SODIUM SERPL-SCNC: 143 MMOL/L (ref 136–145)
TROPONIN I SERPL-MCNC: 0.09 NG/ML
TSH SERPL DL<=0.05 MIU/L-ACNC: 1.52 UIU/ML (ref 0.36–3.74)
WBC # BLD AUTO: 8.37 THOUSAND/UL (ref 4.31–10.16)

## 2019-02-08 PROCEDURE — 80053 COMPREHEN METABOLIC PANEL: CPT | Performed by: PHYSICIAN ASSISTANT

## 2019-02-08 PROCEDURE — 84443 ASSAY THYROID STIM HORMONE: CPT | Performed by: EMERGENCY MEDICINE

## 2019-02-08 PROCEDURE — 3060F POS MICROALBUMINURIA REV: CPT | Performed by: INTERNAL MEDICINE

## 2019-02-08 PROCEDURE — 82570 ASSAY OF URINE CREATININE: CPT | Performed by: INTERNAL MEDICINE

## 2019-02-08 PROCEDURE — 83735 ASSAY OF MAGNESIUM: CPT | Performed by: INTERNAL MEDICINE

## 2019-02-08 PROCEDURE — 80076 HEPATIC FUNCTION PANEL: CPT | Performed by: EMERGENCY MEDICINE

## 2019-02-08 PROCEDURE — 96374 THER/PROPH/DIAG INJ IV PUSH: CPT

## 2019-02-08 PROCEDURE — 82043 UR ALBUMIN QUANTITATIVE: CPT | Performed by: INTERNAL MEDICINE

## 2019-02-08 PROCEDURE — 93005 ELECTROCARDIOGRAM TRACING: CPT

## 2019-02-08 PROCEDURE — 99285 EMERGENCY DEPT VISIT HI MDM: CPT

## 2019-02-08 PROCEDURE — 85730 THROMBOPLASTIN TIME PARTIAL: CPT | Performed by: EMERGENCY MEDICINE

## 2019-02-08 PROCEDURE — 83605 ASSAY OF LACTIC ACID: CPT | Performed by: EMERGENCY MEDICINE

## 2019-02-08 PROCEDURE — 80048 BASIC METABOLIC PNL TOTAL CA: CPT | Performed by: EMERGENCY MEDICINE

## 2019-02-08 PROCEDURE — 85610 PROTHROMBIN TIME: CPT | Performed by: EMERGENCY MEDICINE

## 2019-02-08 PROCEDURE — 99223 1ST HOSP IP/OBS HIGH 75: CPT | Performed by: INTERNAL MEDICINE

## 2019-02-08 PROCEDURE — 85025 COMPLETE CBC W/AUTO DIFF WBC: CPT | Performed by: EMERGENCY MEDICINE

## 2019-02-08 PROCEDURE — 82948 REAGENT STRIP/BLOOD GLUCOSE: CPT

## 2019-02-08 PROCEDURE — 83880 ASSAY OF NATRIURETIC PEPTIDE: CPT | Performed by: EMERGENCY MEDICINE

## 2019-02-08 PROCEDURE — 76705 ECHO EXAM OF ABDOMEN: CPT

## 2019-02-08 PROCEDURE — 36415 COLL VENOUS BLD VENIPUNCTURE: CPT | Performed by: EMERGENCY MEDICINE

## 2019-02-08 PROCEDURE — 71045 X-RAY EXAM CHEST 1 VIEW: CPT

## 2019-02-08 PROCEDURE — 84484 ASSAY OF TROPONIN QUANT: CPT | Performed by: EMERGENCY MEDICINE

## 2019-02-08 RX ORDER — POTASSIUM CHLORIDE 20 MEQ/1
40 TABLET, EXTENDED RELEASE ORAL 2 TIMES DAILY
Status: DISCONTINUED | OUTPATIENT
Start: 2019-02-09 | End: 2019-02-12

## 2019-02-08 RX ORDER — LIDOCAINE 50 MG/G
1 PATCH TOPICAL DAILY
Status: DISCONTINUED | OUTPATIENT
Start: 2019-02-09 | End: 2019-02-21 | Stop reason: HOSPADM

## 2019-02-08 RX ORDER — METFORMIN HYDROCHLORIDE 750 MG/1
750 TABLET, EXTENDED RELEASE ORAL
COMMUNITY
End: 2019-04-11 | Stop reason: SINTOL

## 2019-02-08 RX ORDER — METOPROLOL SUCCINATE 50 MG/1
100 TABLET, EXTENDED RELEASE ORAL 2 TIMES DAILY
Status: DISCONTINUED | OUTPATIENT
Start: 2019-02-08 | End: 2019-02-21 | Stop reason: HOSPADM

## 2019-02-08 RX ORDER — ONDANSETRON 2 MG/ML
4 INJECTION INTRAMUSCULAR; INTRAVENOUS EVERY 8 HOURS PRN
Status: DISCONTINUED | OUTPATIENT
Start: 2019-02-08 | End: 2019-02-21 | Stop reason: HOSPADM

## 2019-02-08 RX ORDER — TRAMADOL HYDROCHLORIDE 50 MG/1
50 TABLET ORAL EVERY 6 HOURS PRN
Status: DISCONTINUED | OUTPATIENT
Start: 2019-02-08 | End: 2019-02-09

## 2019-02-08 RX ORDER — FUROSEMIDE 10 MG/ML
40 INJECTION INTRAMUSCULAR; INTRAVENOUS ONCE
Status: COMPLETED | OUTPATIENT
Start: 2019-02-08 | End: 2019-02-08

## 2019-02-08 RX ORDER — POTASSIUM CHLORIDE 20 MEQ/1
40 TABLET, EXTENDED RELEASE ORAL ONCE
Status: COMPLETED | OUTPATIENT
Start: 2019-02-08 | End: 2019-02-08

## 2019-02-08 RX ORDER — ACETAMINOPHEN 325 MG/1
650 TABLET ORAL EVERY 4 HOURS PRN
Status: DISCONTINUED | OUTPATIENT
Start: 2019-02-08 | End: 2019-02-09

## 2019-02-08 RX ORDER — NICOTINE 21 MG/24HR
1 PATCH, TRANSDERMAL 24 HOURS TRANSDERMAL DAILY
Status: DISCONTINUED | OUTPATIENT
Start: 2019-02-09 | End: 2019-02-21 | Stop reason: HOSPADM

## 2019-02-08 RX ORDER — DILTIAZEM HYDROCHLORIDE 120 MG/1
120 CAPSULE, COATED, EXTENDED RELEASE ORAL DAILY
Status: DISCONTINUED | OUTPATIENT
Start: 2019-02-09 | End: 2019-02-21 | Stop reason: HOSPADM

## 2019-02-08 RX ORDER — ACETAMINOPHEN 325 MG/1
975 TABLET ORAL ONCE
Status: COMPLETED | OUTPATIENT
Start: 2019-02-08 | End: 2019-02-08

## 2019-02-08 RX ORDER — TRAZODONE HYDROCHLORIDE 50 MG/1
50 TABLET ORAL
Status: ON HOLD | COMMUNITY
End: 2019-02-08 | Stop reason: CLARIF

## 2019-02-08 RX ORDER — METOPROLOL TARTRATE 5 MG/5ML
5 INJECTION INTRAVENOUS ONCE
Status: COMPLETED | OUTPATIENT
Start: 2019-02-08 | End: 2019-02-08

## 2019-02-08 RX ORDER — FUROSEMIDE 10 MG/ML
60 INJECTION INTRAMUSCULAR; INTRAVENOUS
Status: DISCONTINUED | OUTPATIENT
Start: 2019-02-09 | End: 2019-02-09

## 2019-02-08 RX ORDER — ALBUTEROL SULFATE 90 UG/1
2 AEROSOL, METERED RESPIRATORY (INHALATION) EVERY 4 HOURS PRN
Status: DISCONTINUED | OUTPATIENT
Start: 2019-02-08 | End: 2019-02-21 | Stop reason: HOSPADM

## 2019-02-08 RX ADMIN — APIXABAN 5 MG: 5 TABLET, FILM COATED ORAL at 19:04

## 2019-02-08 RX ADMIN — CEFAZOLIN SODIUM 1000 MG: 10 INJECTION, POWDER, FOR SOLUTION INTRAVENOUS at 20:00

## 2019-02-08 RX ADMIN — FUROSEMIDE 40 MG: 10 INJECTION, SOLUTION INTRAMUSCULAR; INTRAVENOUS at 15:20

## 2019-02-08 RX ADMIN — POTASSIUM CHLORIDE 40 MEQ: 1500 TABLET, EXTENDED RELEASE ORAL at 21:21

## 2019-02-08 RX ADMIN — METOPROLOL TARTRATE 5 MG: 5 INJECTION, SOLUTION INTRAVENOUS at 21:20

## 2019-02-08 RX ADMIN — ACETAMINOPHEN 975 MG: 325 TABLET, FILM COATED ORAL at 21:21

## 2019-02-08 RX ADMIN — METOPROLOL SUCCINATE 100 MG: 50 TABLET, EXTENDED RELEASE ORAL at 19:04

## 2019-02-08 NOTE — H&P
History and Physical - [age] [facility] Internal Medicine    Patient Information: Tammy Thompson 63 y o  female MRN: [id number]  Unit/Bed#: E4 -01 Encounter: [id number]  Admitting Physician: [facility] IOAAN Isabel  PCP: Lalita Fernandez MD  Date of Admission:  02/08/19    Assessment/Plan:    Hospital Problem List:     Principal Problem:    Acute on chronic diastolic congestive heart failure ([facility] )  Active Problems:    COPD (chronic obstructive pulmonary disease) ([place] [address] )    Hypertension    Permanent atrial fibrillation ([place] [address] )    Acute respiratory failure with hypoxia ([place] [address] )    Depression    Dyslipidemia    Stage 3 chronic kidney disease ([place] [address] )      Primary Problem(s):  · Acute on chronic diastolic CHF  · Known to Dr Tuyet Lovelace of Cardiology  · Presenting with elevated BNP 4,595, increasing lower extremity edema, and tachypnea  · Chest x-ray with chronic moderate CHF with central fullness of pulmonary vasculature  · Echocardiogram from 11/2016 reveals mildly dilated left ventricle with systolic function normal LVEF 55%  · Pre-hospital home regimen of Lasix 40 mg twice daily and metolazone 5 mg daily  · Patient reports stopping these 3 weeks ago for unclear reasons  · Received IV Lasix 40 mg in ED  · Continue with IV Lasix 60 mg b i d   · Monitor I&Os and daily weights  · Consult Cardiology    Additional Problems:   · Acute respiratory failure with hypoxia:  Presenting with O2 saturation of 88% on room air  Subsequently placed on 2 L oxygen nasal cannula with improvement of saturations in the mid 90s  Secondary to acute volume overload  Denies any chest pain, chest pressure, pleuritic chest pain  · Elevated troponin:  Troponin 0 09  Likely secondary to acute volume overload  No chest pain or pressure  No further need to trend  · Open lower extremity wounds:  Open wound to right lower extremity with nausea and purulent material   Given increased warmth concern for lower extremity cellulitis    Will start on IV cefazolin q 8h     · GI:  Bilirubin elevated at 4 12 with alk-phos being elevated at 124  Received right upper quadrant ultrasound in ED which revealed a common bile duct measuring up to 8 mm however tapers to 4 mm distally  No choledocholithiasis present  Cholecystectomy noted  Will continue to trend levels  · Possible SAPNA on CKD stage 3:  Creatinine 1 98  Baseline appears to be slowly worsening  Hold lisinopril while on IV diuresis    · Atrial fibrillation:  Anticoagulated on Eliquis  Rate controlled on metoprolol and diltiazem  · Type 2 diabetes:  Hemoglobin A1c 7 4 from 2016  Obtain more recent here  Was suppose to be on metformin but she is currently not taking it  Place on sliding scale  · Essential hypertension:  Home regimen of metoprolol succinate 100 mg twice daily, diltiazem 120 mg daily, lisinopril 40 mg daily  Hold ACE in the setting of diuretics  · Anxiety/depression: Was suppose to be on zoloft but states she is not taking it  · Insomnia:  Continue home trazodone    · COPD:  Only has albuterol at home  No acute exacerbation  · Morbid obesity:  Would benefit from weight loss      VTE Prophylaxis: Apixaban (Eliquis)  / sequential compression device   Code Status: full code  Anticipated Length of Stay:  Patient will be admitted on an Inpatient basis with an anticipated length of stay of  Greater than 2 midnights  Justification for Hospital Stay: Acute on chronic chf with need for IV diuresis    Chief Complaint:   Fluid retention / shortness of breath    History of Present Illness:    Agustín Sagastume is a 63 y o  female with a PMH of chronic diastolic chf, hypertension, CKD stage 3, chronic atrial fibrillation anticoagulated on Eliquis, anxiety/depression, COPD, insomnia, type 2 diabetes, morbid obesity  She presents with a complaint of increasing lower extremity edema    Patient saw her PCP yesterday for low back pain and there was noted to have fluid retention, tachypnea, and increasing shortness of breath  It was recommended she go to the ER however patient decided she did not feel like going  Today her breathing became significantly more labored and she decided to go to the hospital   She reports having a weight gain of about 50 lbs since December  She is supposed to be on diuretics consisting of Lasix 40 mg twice daily and Zaroxolyn 5 mg daily  She reports still stopping these 3 weeks ago for unclear reasons  She also eats a diet that consists of salty foods  She will have fast food 3 times a week and admits to eating TV dinners, lunch meat, cans of soup-states she buys low-sodium  Denies any orthopnea, chest pain, chest pressure, cough  Not currently on any home O2  Additionally she is supposed to be taking metformin for her diabetes but does not  He also supposed to be on trazodone and Zoloft but is not taking these either  In the ER patient presented tachypneic, hypoxic with O2 sat of 88% on room air, elevated blood pressure of 171/96, elevated BNP 4595, increased vascular congestion on chest x-ray, and increased lower extremity edema  Her bilirubin was elevated at 4 12, potassium 3 3, creatinine 1 98, troponin 0 09  Lives at home alone with her 3 dogs  At times she will get assistance from her son  Still currently smoking 1 pack every 3 days  Denies alcohol consumption or other drug use  No assistive devices ambulate  Review of Systems:    General:   No Fever or chills; + weight gain   EENT:   No ear pain, facial swelling; No sneezing, sore throat  Skin:   No rashes, color changes  Respiratory:     +shortness of breath, cough, wheezing   Cardiovascular:     No chest pain, palpitations  Gastrointestinal:    No nausea, vomiting, diarrhea; No abdominal pain  Musculoskeletal:     No arthralgias, myalgias, + LE swelling  Neurologic:   No dizziness, numbness, weakness  No speech difficulties     Psych:   No agitation,    Otherwise, All other twelve-point review of systems normal      Past Medical and Surgical History:     Past Medical History:   Diagnosis Date    Atrial fibrillation with RVR ([address] )     CHF (congestive heart failure) (HCC)     COPD (chronic obstructive pulmonary disease) (HCC)     Diabetes type 2, uncontrolled ([facility])     History of cellulitis     History of varicose veins of lower extremity     WITH ULCER    History of ventricular tachycardia     Hypertension        Past Surgical History:   Procedure Laterality Date    HERNIA REPAIR      HYSTERECTOMY      LAPAROSCOPIC CHOLECYSTECTOMY         Meds/Allergies:    No current facility-administered medications for this encounter  No Known Allergies    Allergies: No Known Allergies    Social History:     Marital Status:      Substance Use History:   History   Alcohol Use    Yes     Comment: socially, NO ALCOHOL USE     History   Smoking Status    Current Every Day Smoker    Packs/day: 0 00    Years: 43 00    Types: Cigarettes   Smokeless Tobacco    Never Used     Comment: Currently half a pack a day, 6 CIGARETTES PER DAY      History   Drug Use    Types: Marijuana       Family History:    non-contributory    Physical Exam:     Vitals:   Blood Pressure: 145/80 (02/08/19 1630)  Pulse: 105 (02/08/19 1630)  Temperature: 98 °F (36 7 °C) (02/08/19 1215)  Temp Source: Temporal (02/08/19 1215)  Respirations: 22 (02/08/19 1630)  Height: 5' 4" (162 6 cm) (02/08/19 1352)  Weight - Scale: (!) 147 kg (324 lb 1 2 oz) (02/08/19 1352)  SpO2: 96 % (02/08/19 1630)    Physical Exam   Constitutional: She is oriented to person, place, and time  She appears well-developed  No distress  HENT:   Head: Normocephalic and atraumatic  Eyes: Conjunctivae are normal    Cardiovascular: Normal rate and normal heart sounds  An irregularly irregular rhythm present  Pulmonary/Chest: Effort normal  No respiratory distress  She has rales  She exhibits no tenderness  On 2 L NC   Scattered wheeze, crackles in bases bilaterally   Abdominal: Soft  Bowel sounds are normal  She exhibits no distension and no mass  There is no tenderness  There is no rebound and no guarding  Neurological: She is alert and oriented to person, place, and time  Skin: Skin is warm and dry  She is not diaphoretic  Erythema to lower extremities bilaterally  Open draining wound on the right lower extremity that has pus present in it  Also with scattered scabbed lesions throughout her lower extremities  Psychiatric: She has a normal mood and affect  Her behavior is normal    Nursing note and vitals reviewed  Additional Data:     Lab Results: I have personally reviewed pertinent reports  Results from last 7 days  Lab Units 02/08/19  1249   WBC Thousand/uL 8 37   HEMOGLOBIN g/dL 18 1*   HEMATOCRIT % 58 6*   PLATELETS Thousands/uL 164   NEUTROS PCT % 74   LYMPHS PCT % 7*   MONOS PCT % 16*   EOS PCT % 1       Results from last 7 days  Lab Units 02/08/19  1249   POTASSIUM mmol/L 3 3*   CHLORIDE mmol/L 102   CO2 mmol/L 31   BUN mg/dL 41*   CREATININE mg/dL 1 98*   CALCIUM mg/dL 8 7   ALK PHOS U/L 124*   ALT U/L 29   AST U/L 39       Results from last 7 days  Lab Units 02/08/19  1249   INR  1 37*       Imaging: I have personally reviewed pertinent reports  Xr Chest 1 View Portable    Result Date: 2/8/2019  Narrative: CHEST INDICATION:   SOB  COMPARISON:  Chest x-ray 11/18/2016 EXAM PERFORMED/VIEWS:  XR CHEST PORTABLE FINDINGS: There is stable cardiomegaly  Central fullness noted of the pulmonary vasculature similar to the prior exam  There is diffuse interstitial prominence similar to the prior exam   Findings suggest chronic moderate CHF  No pneumothorax or pleural effusion  Osseous structures appear within normal limits for patient age       Impression: Findings suggest chronic moderate CHF similar to the prior exam  Workstation performed: [id number]     Us Right Upper Quadrant    Result Date: 2019  Narrative: RIGHT UPPER QUADRANT ULTRASOUND INDICATION:     SOB, pain Pain, SOB  COMPARISON:  CT 16 TECHNIQUE:   Real-time ultrasound of the right upper quadrant was performed with a curvilinear transducer with both volumetric sweeps and still imaging techniques  FINDINGS: PANCREAS:  Portions of the pancreas are obscured by bowel gas  Visualized portions of the pancreas are unremarkable  AORTA AND IVC:  Visualized portions are normal for patient age  LIVER: Size:  Within normal range  The liver measures 21 1 cm in the midclavicular line  Contour:  Surface contour is smooth  Parenchyma:  Echogenicity and echotexture are within normal limits  No evidence of suspicious mass  Limited imaging of the main portal vein shows it to be patent and hepatopetal   BILIARY: Patient has undergone prior cholecystectomy  No intrahepatic biliary dilatation  CBD measures 8 mm proximally and tapers down to 4 4 mm distally  The distal most CBD is not visualized  No choledocholithiasis  KIDNEY: Right kidney measures 11 5 cm  Within normal limits  ASCITES:   None  Impression: Common bile duct measures up to 8 mm, however tapers to 4 mm distally  Workstation performed: [id number]       EKG, Pathology, and Other Studies Reviewed on Admission:   · EK atrial fibrillation    Allscripts Records Reviewed: Yes     Total Time for Visit, including Counseling / Coordination of Care: 45 minutes  Greater than 50% of this total time spent on direct patient counseling and coordination of care  ** Please Note: This note has been constructed using a voice recognition system   **

## 2019-02-08 NOTE — ED NOTES
Dr Dwayne Way at bedside   Pt placed on 2L via nasal cannula due to o2 sats between 89-90%     Bell Vu RN  02/08/19 5913

## 2019-02-08 NOTE — ED PROVIDER NOTES
History  Chief Complaint   Patient presents with    Edema     pt reports CHF hx taking diuretics, since January pt has had weightgain, BLE edema, abdominal distention  denies SOB, but appears SOB     64 YO female presents with worsening shortness of breath for the last 2-3 weeks  States this has been progressive, feels like she can't catch her breath  Pt denies chest pain, states she has had swelling in the B/L LE's, she has chronic edema but notes this seems to be worse  Pt denies fevers or chills, admits she does often feel cold  She has been having increased difficulty with ambulation  Pt saw her PCP yesterday, she was found to be tachypneic, hypoxic  Pt was instructed to go to the ED yesterday  Pt does take furosemide, states she has not been taking this lately as she has back pain at baseline and she has been having more difficulty getting to the bathroom  Pt states symptoms have not improved  Pt denies CP/F/C/N/V/D/C, no dysuria, burning on urination or blood in urine  History provided by:  Patient   used: No    Shortness of Breath   Severity:  Moderate  Onset quality:  Gradual  Duration: 2-3 weeks  Timing:  Constant  Progression:  Worsening  Chronicity:  Chronic  Relieved by:  Rest  Worsened by:  Exertion  Ineffective treatments:  None tried  Associated symptoms: no abdominal pain, no chest pain, no cough, no fever, no rash, no vomiting and no wheezing        Prior to Admission Medications   Prescriptions Last Dose Informant Patient Reported? Taking?    ELIQUIS 5 MG   No Yes   Sig: Take 1 tablet (5 mg total) by mouth 2 (two) times a day   albuterol (PROVENTIL HFA,VENTOLIN HFA) 90 mcg/act inhaler   No Yes   Sig: Inhale 2 puffs every 4 (four) hours as needed for wheezing   diltiazem (CARDIZEM CD) 120 mg 24 hr capsule   No Yes   Sig: Take 1 capsule (120 mg total) by mouth daily   furosemide (LASIX) 40 mg tablet   No Yes   Sig: Take 1 tablet (40 mg total) by mouth 2 (two) times a day   lisinopril (ZESTRIL) 40 mg tablet   No No   Sig: Take 1 tablet (40 mg total) by mouth daily   metFORMIN (GLUCOPHAGE-XR) 750 mg 24 hr tablet Not Taking at Unknown time  Yes No   Sig: Take 750 mg by mouth daily after dinner   metolazone (ZAROXOLYN) 5 mg tablet   No Yes   Sig: TAKE 1 TABLET BY MOUTH EVERY DAY   metoprolol succinate (TOPROL-XL) 100 mg 24 hr tablet   No Yes   Sig: Take 1 tablet (100 mg total) by mouth every 12 (twelve) hours   Patient taking differently: Take 100 mg by mouth 2 (two) times a day        Facility-Administered Medications: None       Past Medical History:   Diagnosis Date    Atrial fibrillation with RVR (HCC)     COPD (chronic obstructive pulmonary disease) ([facility] )     Diabetes type 2, uncontrolled ([facility] )     Hypertension        Past Surgical History:   Procedure Laterality Date    HERNIA REPAIR      HYSTERECTOMY      LAPAROSCOPIC CHOLECYSTECTOMY         Family History   Problem Relation Age of Onset    Diabetes type II Mother     No Known Problems Father         She reports not knowing much about her father   Cancer Family     Diabetes Family     Hypertension Family     Stroke Family      I have reviewed and agree with the history as documented  Social History   Substance Use Topics    Smoking status: Current Every Day Smoker     Packs/day: 0 00     Years: 43 00     Types: Cigarettes    Smokeless tobacco: Never Used      Comment: Currently half a pack a day, 6 CIGARETTES PER DAY     Alcohol use Yes      Comment: socially, NO ALCOHOL USE        Review of Systems   Constitutional: Negative for chills, fatigue and fever  HENT: Negative for dental problem  Eyes: Negative for visual disturbance  Respiratory: Positive for shortness of breath  Negative for cough and wheezing  Cardiovascular: Negative for chest pain  Gastrointestinal: Negative for abdominal pain, diarrhea and vomiting  Genitourinary: Negative for dysuria and frequency     Musculoskeletal: Negative for arthralgias  Skin: Negative for rash  Neurological: Negative for dizziness, weakness and light-headedness  Psychiatric/Behavioral: Negative for agitation, behavioral problems and confusion  All other systems reviewed and are negative  Physical Exam  Physical Exam   Constitutional: She is oriented to person, place, and time  She appears well-developed and well-nourished  HENT:   Head: Normocephalic and atraumatic  Eyes: EOM are normal    Neck: Normal range of motion  Cardiovascular: Normal rate, regular rhythm and normal heart sounds  Pulmonary/Chest:   Tachypnea, increase work of breathing, decreased breath sounds B/L  Abdominal: Soft  Musculoskeletal: Normal range of motion  Neurological: She is alert and oriented to person, place, and time  Skin: Skin is warm and dry  Psychiatric: She has a normal mood and affect  Her behavior is normal  Thought content normal    Nursing note and vitals reviewed        Vital Signs  ED Triage Vitals   Temperature Pulse Respirations Blood Pressure SpO2   02/08/19 1215 02/08/19 1215 02/08/19 1215 02/08/19 1215 02/08/19 1215   98 °F (36 7 °C) 102 (!) 24 (!) 171/96 (!) 88 %      Temp Source Heart Rate Source Patient Position - Orthostatic VS BP Location FiO2 (%)   02/08/19 1215 02/08/19 1215 02/08/19 1352 02/08/19 1352 --   Temporal Monitor Lying Left arm       Pain Score       02/08/19 1215       No Pain           Vitals:    02/08/19 2300 02/09/19 0223 02/09/19 0600 02/09/19 0745   BP: 118/76  133/98 116/81   Pulse: (!) 115 100 102 98   Patient Position - Orthostatic VS: Lying  Lying Sitting       Visual Acuity      ED Medications  Medications   influenza vaccine, recombinant, quadrivalent (FLUBLOK) IM injection 0 5 mL (not administered)   albuterol (PROVENTIL HFA,VENTOLIN HFA) inhaler 2 puff (not administered)   diltiazem (CARDIZEM CD) 24 hr capsule 120 mg (120 mg Oral Given 2/9/19 0833)   apixaban (ELIQUIS) tablet 5 mg (5 mg Oral Given 2/9/19 0834)   metoprolol succinate (TOPROL-XL) 24 hr tablet 100 mg (100 mg Oral Given 2/9/19 0833)   ondansetron (ZOFRAN) injection 4 mg (not administered)   nicotine (NICODERM CQ) 14 mg/24hr TD 24 hr patch 1 patch (1 patch Transdermal Not Given 2/9/19 0835)   acetaminophen (TYLENOL) tablet 650 mg (not administered)   ceFAZolin (ANCEF) 1 g in sodium chloride 0 9% 50 ml IVPB (0 mg Intravenous Stopped 2/9/19 0310)   insulin lispro (HumaLOG) 100 units/mL subcutaneous injection 2-12 Units (2 Units Subcutaneous Not Given 2/9/19 0803)   insulin lispro (HumaLOG) 100 units/mL subcutaneous injection 2-12 Units (2 Units Subcutaneous Not Given 2/8/19 2115)   potassium chloride (K-DUR,KLOR-CON) CR tablet 40 mEq (40 mEq Oral Given 2/9/19 0835)   lidocaine (LIDODERM) 5 % patch 1 patch (1 patch Topical Medication Applied 2/9/19 0835)   furosemide (LASIX) injection 80 mg (80 mg Intravenous Given 2/9/19 0834)   furosemide (LASIX) injection 40 mg (40 mg Intravenous Given 2/8/19 1520)   potassium chloride (K-DUR,KLOR-CON) CR tablet 40 mEq (40 mEq Oral Given 2/8/19 2121)   acetaminophen (TYLENOL) tablet 975 mg (975 mg Oral Given 2/8/19 2121)   metoprolol (LOPRESSOR) injection 5 mg (5 mg Intravenous Given 2/8/19 2120)       Diagnostic Studies  Results Reviewed     Procedure Component Value Units Date/Time    Hepatic function panel [[id number]]  (Abnormal) Collected:  02/08/19 1249    Lab Status:  Final result Specimen:  Blood from Arm, Right Updated:  02/08/19 1341     Total Bilirubin 4 12 (H) mg/dL      Bilirubin, Direct 1 65 (H) mg/dL      Alkaline Phosphatase 124 (H) U/L      AST 39 U/L      ALT 29 U/L      Total Protein 6 9 g/dL      Albumin 2 9 (L) g/dL     TSH [[id number]]  (Normal) Collected:  02/08/19 1249    Lab Status:  Final result Specimen:  Blood from Arm, Right Updated:  02/08/19 1341     TSH [address] [place] [address] 1 516 uIU/mL     Narrative:         Patients undergoing fluorescein dye angiography may retain small amounts of fluorescein in the body for 48-72 hours post procedure  Samples containing fluorescein can produce falsely depressed TSH values  If the patient had this procedure,a specimen should be resubmitted post fluorescein clearance  The recommended reference ranges for TSH during pregnancy are as follows:  First trimester 0 1 to 2 5 uIU/mL  Second trimester  0 2 to 3 0 uIU/mL  Third trimester 0 3 to 3 0 uIU/m      B-type natriuretic peptide [[id number]]  (Abnormal) Collected:  02/08/19 1249    Lab Status:  Final result Specimen:  Blood from Arm, Right Updated:  02/08/19 1341     NT-proBNP 4,595 (H) pg/mL     Basic metabolic panel [[id number]]  (Abnormal) Collected:  02/08/19 1249    Lab Status:  Final result Specimen:  Blood from Arm, Right Updated:  02/08/19 1329     Sodium 143 mmol/L      Potassium 3 3 (L) mmol/L      Chloride 102 mmol/L      CO2 31 mmol/L      ANION GAP 10 mmol/L      BUN 41 (H) mg/dL      Creatinine 1 98 (H) mg/dL      Glucose 87 mg/dL      Calcium 8 7 mg/dL      eGFR 27 ml/min/1 73sq m     Narrative:         National Kidney Disease Education Program recommendations are as follows:  GFR calculation is accurate only with a steady state creatinine  Chronic Kidney disease less than 60 ml/min/1 73 sq  meters  Kidney failure less than 15 ml/min/1 73 sq  meters  Troponin I [[id number]]  (Abnormal) Collected:  02/08/19 1249    Lab Status:  Final result Specimen:  Blood from Arm, Right Updated:  02/08/19 1323     Troponin I 0 09 (H) ng/mL     Lactic acid, plasma [[id number]]  (Normal) Collected:  02/08/19 1249    Lab Status:  Final result Specimen:  Blood from Arm, Right Updated:  02/08/19 1322     LACTIC ACID 1 6 mmol/L     Narrative:         Result may be elevated if tourniquet was used during collection      Protime-INR [[id number]]  (Abnormal) Collected:  02/08/19 1249    Lab Status:  Final result Specimen:  Blood from Arm, Right Updated:  02/08/19 1318     Protime 17 0 (H) seconds      INR 1 37 (H) APTT [[id number]]  (Normal) Collected:  02/08/19 1249    Lab Status:  Final result Specimen:  Blood from Arm, Right Updated:  02/08/19 1318     PTT 33 seconds     CBC and differential [[id number]]  (Abnormal) Collected:  02/08/19 1249    Lab Status:  Final result Specimen:  Blood from Arm, Right Updated:  02/08/19 1257     WBC 8 37 Thousand/uL      RBC 5 82 (H) Million/uL      Hemoglobin 18 1 (H) g/dL      Hematocrit 58 6 (H) %       (H) fL      MCH 31 1 pg      MCHC 30 9 (L) g/dL      RDW 17 2 (H) %      MPV 11 1 fL      Platelets 768 Thousands/uL      nRBC 0 /100 WBCs      Neutrophils Relative 74 %      Immat GRANS % 1 %      Lymphocytes Relative 7 (L) %      Monocytes Relative 16 (H) %      Eosinophils Relative 1 %      Basophils Relative 1 %      Neutrophils Absolute 6 16 Thousands/µL      Immature Grans Absolute 0 04 Thousand/uL      Lymphocytes Absolute 0 62 Thousands/µL      Monocytes Absolute 1 36 (H) Thousand/µL      Eosinophils Absolute 0 09 Thousand/µL      Basophils Absolute 0 10 Thousands/µL                  XR chest 1 view portable   Final Result by Jeanne Aleman MD (02/08 1556)      Findings suggest chronic moderate CHF similar to the prior exam             Workstation performed: [id number]         US right upper quadrant   Final Result by Sushila Hsu MD (02/08 1500)      Common bile duct measures up to 8 mm, however tapers to 4 mm distally        Workstation performed: [id number]                    Procedures  ECG 12 Lead Documentation  Date/Time: 2/8/2019 12:52 PM  Performed by: [place] by: Scout Kiran     ECG reviewed by me, the ED Provider: yes    Patient location:  ED  Previous ECG:     Previous ECG:  Compared to current    Comparison ECG info:  11/28/2016  Interpretation:     Interpretation: normal    Rate:     ECG rate:  103    ECG rate assessment: normal    Rhythm:     Rhythm: atrial fibrillation    QRS:     QRS axis:  Normal    QRS intervals: Normal  Conduction:     Conduction: normal    ST segments:     ST segments:  Normal  T waves:     T waves: normal             Phone Contacts  ED Phone Contact    ED Course       [id number] SCORE      Most Recent Value   [id number]   Age  0 Filed at: 02/08/2019 1627   Sex  1 Filed at: 02/08/2019 1627   CHF History  1 Filed at: 02/08/2019 1627   HTN History  1 Filed at: 02/08/2019 1627   Stroke or TIA Symptoms Previously  0 Filed at: 02/08/2019 1627   Vascular Disease History  0 Filed at: 02/08/2019 1627   Diabetes History  0 Filed at: 02/08/2019 1627   [id number] Score  3 Filed at: 02/08/2019 1627                              MDM  Number of Diagnoses or Management Options  Congestive heart failure (CHF) ([place] [address] ): new and requires workup  Diagnosis management comments: 1  Shortness of breath - Pt with worsening SOB/ROMERO for the last 2-3 weeks, hypoxic with saturation of ~88%  Will check ECG and troponin to rule out MI, BNP and CXR for CHF, electrolytes for kidney function          Amount and/or Complexity of Data Reviewed  Clinical lab tests: ordered and reviewed  Tests in the radiology section of CPT®: ordered and reviewed  Review and summarize past medical records: yes  Discuss the patient with other providers: yes  Independent visualization of images, tracings, or specimens: yes    Patient Progress  Patient progress: stable      Disposition  Final diagnoses:   Congestive heart failure (CHF) ([place] [address] )     Time reflects when diagnosis was documented in both MDM as applicable and the Disposition within this note     Time User Action Codes Description Comment    2/8/2019  3:18 PM Reza GONZALEZ Add [I50 9] Congestive heart failure (CHF) ([place] [address] )     2/8/2019  6:28 PM Piger, [facility] Add [F17 98] Chronic diastolic CHF (congestive heart failure) ([place] [address] )     2/8/2019  6:28 PM Piger, [facility] Modify [L63 22] Chronic diastolic CHF (congestive heart failure) ([place] [address] )     2/8/2019  6:28 PM Piger, [facility] Remove [E35 20] Chronic diastolic CHF (congestive heart failure) ([place] [address] )     2/8/2019  6:28 PM Piger, Truett Cruise Add [I50 33] Acute on chronic diastolic congestive heart failure ([place] [address] )     2/8/2019  6:28 PM Piger, Truett Cruise Modify [I50 33] Acute on chronic diastolic congestive heart failure ([place] [address] )     2/8/2019  8:48 PM Piger, Truett Cruise Add [S81 809A] Multiple opens wound of lower extremity, unspecified laterality, initial encounter     2/8/2019  8:48 PM Piger, [address] Multiple opens wound of lower extremity, unspecified laterality, initial encounter       ED Disposition     ED Disposition Condition Date/Time Comment    Admit  Fri Feb 8, 2019  3:19 PM Case was discussed with Dr Rudy Muse and the patient's admission status was agreed to be Admission Status: inpatient status to the service of Dr Rudy Muse   Follow-up Information    None         Current Discharge Medication List      CONTINUE these medications which have NOT CHANGED    Details   albuterol (PROVENTIL HFA,VENTOLIN HFA) 90 mcg/act inhaler Inhale 2 puffs every 4 (four) hours as needed for wheezing  Qty: 1 Inhaler, Refills: 3    Associated Diagnoses: Chronic obstructive pulmonary disease, unspecified COPD type ([facility])      diltiazem (CARDIZEM CD) 120 mg 24 hr capsule Take 1 capsule (120 mg total) by mouth daily  Qty: 30 capsule, Refills: 4    Associated Diagnoses: Permanent atrial fibrillation ([place] [address] );  Essential hypertension      ELIQUIS 5 MG Take 1 tablet (5 mg total) by mouth 2 (two) times a day  Qty: 60 tablet, Refills: 11    Associated Diagnoses: Permanent atrial fibrillation ([facility])      furosemide (LASIX) 40 mg tablet Take 1 tablet (40 mg total) by mouth 2 (two) times a day  Qty: 60 tablet, Refills: 3    Associated Diagnoses: Chronic diastolic CHF (congestive heart failure) ([facility])      metolazone (ZAROXOLYN) 5 mg tablet TAKE 1 TABLET BY MOUTH EVERY DAY  Qty: 30 tablet, Refills: 1    Associated Diagnoses: Chronic diastolic CHF (congestive heart failure) ([facility])      metoprolol succinate (TOPROL-XL) 100 mg 24 hr tablet Take 1 tablet (100 mg total) by mouth every 12 (twelve) hours  Qty: 60 tablet, Refills: 1    Associated Diagnoses: Permanent atrial fibrillation ([address] ); Essential hypertension      lisinopril (ZESTRIL) 40 mg tablet Take 1 tablet (40 mg total) by mouth daily  Qty: 30 tablet, Refills: 11    Associated Diagnoses: Essential hypertension      metFORMIN (GLUCOPHAGE-XR) 750 mg 24 hr tablet Take 750 mg by mouth daily after dinner           No discharge procedures on file      ED Provider  Electronically Signed by           Fernando Montes MD  02/09/19 6945

## 2019-02-08 NOTE — ED NOTES
Multiple sores noted throughout pt's body  Sores noted to pt's BLE draining purulent and serous drainage  Blue pads placed under pt's lower extremities  Dried scabs noted to pt's chest that pt states presented 2 weeks ago  Open wound to lower mid abdomen with slough at base of wound bed  All wounds open to air at this time        Agapito Guaman RN  02/08/19 5003

## 2019-02-09 LAB
ALBUMIN SERPL BCP-MCNC: 2.3 G/DL (ref 3.5–5)
ALP SERPL-CCNC: 117 U/L (ref 46–116)
ALT SERPL W P-5'-P-CCNC: 22 U/L (ref 12–78)
ANION GAP SERPL CALCULATED.3IONS-SCNC: 8 MMOL/L (ref 4–13)
AST SERPL W P-5'-P-CCNC: 27 U/L (ref 5–45)
BILIRUB SERPL-MCNC: 2.7 MG/DL (ref 0.2–1)
BUN SERPL-MCNC: 40 MG/DL (ref 5–25)
CALCIUM SERPL-MCNC: 8 MG/DL (ref 8.3–10.1)
CHLORIDE SERPL-SCNC: 107 MMOL/L (ref 100–108)
CHOLEST SERPL-MCNC: 76 MG/DL (ref 50–200)
CO2 SERPL-SCNC: 30 MMOL/L (ref 21–32)
CREAT SERPL-MCNC: 1.78 MG/DL (ref 0.6–1.3)
ERYTHROCYTE [DISTWIDTH] IN BLOOD BY AUTOMATED COUNT: 16.9 % (ref 11.6–15.1)
EST. AVERAGE GLUCOSE BLD GHB EST-MCNC: 134 MG/DL
GFR SERPL CREATININE-BSD FRML MDRD: 31 ML/MIN/1.73SQ M
GLUCOSE SERPL-MCNC: 112 MG/DL (ref 65–140)
GLUCOSE SERPL-MCNC: 112 MG/DL (ref 65–140)
GLUCOSE SERPL-MCNC: 133 MG/DL (ref 65–140)
GLUCOSE SERPL-MCNC: 148 MG/DL (ref 65–140)
GLUCOSE SERPL-MCNC: 84 MG/DL (ref 65–140)
HBA1C MFR BLD: 6.3 % (ref 4.2–6.3)
HCT VFR BLD AUTO: 56 % (ref 34.8–46.1)
HCV AB SER QL: NORMAL
HDLC SERPL-MCNC: 32 MG/DL (ref 40–60)
HGB BLD-MCNC: 16.9 G/DL (ref 11.5–15.4)
LDLC SERPL CALC-MCNC: 32 MG/DL (ref 0–100)
MAGNESIUM SERPL-MCNC: 2 MG/DL (ref 1.6–2.6)
MCH RBC QN AUTO: 31.2 PG (ref 26.8–34.3)
MCHC RBC AUTO-ENTMCNC: 30.2 G/DL (ref 31.4–37.4)
MCV RBC AUTO: 104 FL (ref 82–98)
PLATELET # BLD AUTO: 171 THOUSANDS/UL (ref 149–390)
PMV BLD AUTO: 11.3 FL (ref 8.9–12.7)
POTASSIUM SERPL-SCNC: 3.6 MMOL/L (ref 3.5–5.3)
PROT SERPL-MCNC: 5.9 G/DL (ref 6.4–8.2)
RBC # BLD AUTO: 5.41 MILLION/UL (ref 3.81–5.12)
SODIUM SERPL-SCNC: 145 MMOL/L (ref 136–145)
TRIGL SERPL-MCNC: 61 MG/DL
WBC # BLD AUTO: 9.03 THOUSAND/UL (ref 4.31–10.16)

## 2019-02-09 PROCEDURE — 99232 SBSQ HOSP IP/OBS MODERATE 35: CPT | Performed by: INTERNAL MEDICINE

## 2019-02-09 PROCEDURE — 85027 COMPLETE CBC AUTOMATED: CPT | Performed by: PHYSICIAN ASSISTANT

## 2019-02-09 PROCEDURE — 83036 HEMOGLOBIN GLYCOSYLATED A1C: CPT | Performed by: PHYSICIAN ASSISTANT

## 2019-02-09 PROCEDURE — 82948 REAGENT STRIP/BLOOD GLUCOSE: CPT

## 2019-02-09 PROCEDURE — 80061 LIPID PANEL: CPT | Performed by: PHYSICIAN ASSISTANT

## 2019-02-09 PROCEDURE — 86803 HEPATITIS C AB TEST: CPT | Performed by: PHYSICIAN ASSISTANT

## 2019-02-09 RX ORDER — ACETAMINOPHEN 325 MG/1
975 TABLET ORAL EVERY 8 HOURS PRN
Status: DISCONTINUED | OUTPATIENT
Start: 2019-02-09 | End: 2019-02-09

## 2019-02-09 RX ORDER — ACETAMINOPHEN 325 MG/1
975 TABLET ORAL EVERY 8 HOURS PRN
Status: DISCONTINUED | OUTPATIENT
Start: 2019-02-09 | End: 2019-02-21 | Stop reason: HOSPADM

## 2019-02-09 RX ORDER — FUROSEMIDE 10 MG/ML
80 INJECTION INTRAMUSCULAR; INTRAVENOUS
Status: DISCONTINUED | OUTPATIENT
Start: 2019-02-09 | End: 2019-02-18

## 2019-02-09 RX ADMIN — METOPROLOL SUCCINATE 100 MG: 50 TABLET, EXTENDED RELEASE ORAL at 08:33

## 2019-02-09 RX ADMIN — CEFAZOLIN SODIUM 1000 MG: 10 INJECTION, POWDER, FOR SOLUTION INTRAVENOUS at 02:21

## 2019-02-09 RX ADMIN — LIDOCAINE 1 PATCH: 50 PATCH CUTANEOUS at 08:35

## 2019-02-09 RX ADMIN — CEFAZOLIN SODIUM 1000 MG: 10 INJECTION, POWDER, FOR SOLUTION INTRAVENOUS at 11:59

## 2019-02-09 RX ADMIN — ACETAMINOPHEN 975 MG: 325 TABLET, FILM COATED ORAL at 22:21

## 2019-02-09 RX ADMIN — DILTIAZEM HYDROCHLORIDE 120 MG: 120 CAPSULE, EXTENDED RELEASE ORAL at 08:33

## 2019-02-09 RX ADMIN — CEFAZOLIN SODIUM 1000 MG: 10 INJECTION, POWDER, FOR SOLUTION INTRAVENOUS at 19:13

## 2019-02-09 RX ADMIN — METOPROLOL SUCCINATE 100 MG: 50 TABLET, EXTENDED RELEASE ORAL at 17:41

## 2019-02-09 RX ADMIN — APIXABAN 5 MG: 5 TABLET, FILM COATED ORAL at 17:41

## 2019-02-09 RX ADMIN — FUROSEMIDE 80 MG: 10 INJECTION, SOLUTION INTRAMUSCULAR; INTRAVENOUS at 08:34

## 2019-02-09 RX ADMIN — POTASSIUM CHLORIDE 40 MEQ: 1500 TABLET, EXTENDED RELEASE ORAL at 08:35

## 2019-02-09 RX ADMIN — TRAMADOL HYDROCHLORIDE 50 MG: 50 TABLET, COATED ORAL at 08:33

## 2019-02-09 RX ADMIN — APIXABAN 5 MG: 5 TABLET, FILM COATED ORAL at 08:34

## 2019-02-09 RX ADMIN — FUROSEMIDE 80 MG: 10 INJECTION, SOLUTION INTRAMUSCULAR; INTRAVENOUS at 17:41

## 2019-02-09 RX ADMIN — POTASSIUM CHLORIDE 40 MEQ: 1500 TABLET, EXTENDED RELEASE ORAL at 17:41

## 2019-02-09 NOTE — MALNUTRITION/BMI
This medical record reflects one or more clinical indicators suggestive of malnutrition and/or morbid obesity  Malnutrition Findings:              BMI Findings:  BMI Classifications: Morbid Obesity 50-59 9     Body mass index is 56 08 kg/m²  See Nutrition note dated 02/09/2019 for additional details  Completed nutrition assessment is viewable in the nutrition documentation

## 2019-02-09 NOTE — PROGRESS NOTES
Progress Note - Rosi Watts 63 y o  female MRN: [id number]    Unit/Bed#: E4 -01 Encounter: [id number]      Subjective: The patient feels reasonably well  She slept well  She is not have any chest pain  She does have some wheezing but does not feel particularly short of breath  She denies nausea or vomiting  Physical Exam:   Temp:  [96 9 °F (36 1 °C)-98 2 °F (36 8 °C)] 98 2 °F (36 8 °C)  HR:  [] 98  Resp:  [19-24] 24  BP: (116-171)/() 116/81    Gen:  Well-developed, obese, in no acute distress  Neck:  Supple  No lymphadenopathy, goiter, or bruit  Heart:  Irregular rhythm  I heard no murmur, gallop, or rub  Lungs:  Diminished breath sounds at the bases  Scattered wheezes  No rales or rhonchi were audible  Abd:  Soft with active bowel sounds  No mass, tenderness, or organomegaly  Extremities:  +3 edema with edema of the presacral area and abdominal pannus  Neuro:  Alert and oriented  No focal sign  Skin:  Both legs are wrapped        LABS:   CBC:   Lab Results   Component Value Date    WBC 9 03 02/09/2019    HGB 16 9 (H) 02/09/2019    HCT 56 0 (H) 02/09/2019     (H) 02/09/2019     02/09/2019    MCH 31 2 02/09/2019    MCHC 30 2 (L) 02/09/2019    RDW 16 9 (H) 02/09/2019    MPV 11 3 02/09/2019    NRBC 0 02/08/2019   , CMP:   Lab Results   Component Value Date    SODIUM 145 02/08/2019    K 3 6 02/08/2019     02/08/2019    CO2 30 02/08/2019    BUN 40 (H) 02/08/2019    CREATININE 1 78 (H) 02/08/2019    CALCIUM 8 0 (L) 02/08/2019    AST 27 02/08/2019    ALT 22 02/08/2019    ALKPHOS 117 (H) 02/08/2019    EGFR 31 02/08/2019           Patient Active Problem List   Diagnosis    COPD (chronic obstructive pulmonary disease) ([facility] )    Hypertension    Permanent atrial fibrillation (HCC)    Acute respiratory failure with hypoxia (HCC)    Wounds, multiple open, lower extremity    Hypoalbuminemia    Diabetes type 2, uncontrolled ([address] )    Non-rheumatic mitral regurgitation    Nonsustained ventricular tachycardia (HCC)    Obesity    Vitamin D deficiency    Depression    Dyslipidemia    Left ventricular hypertrophy    Stage 3 chronic kidney disease (HCC)    Acute on chronic diastolic congestive heart failure (HCC)       Assessment/Plan:  1  Acute on chronic diastolic congestive heart failure   2  Acute hypoxic respiratory failure  3  COPD  4  Hypertension   5  Permanent atrial fibrillation  6  Type 2 diabetes  7  Chronic kidney disease stage 3  8  Morbid obesity  9  Nonsustained ventricular tachycardia  10  Dyslipidemia  11  Leg ulcers with cellulitis    The patient did not diurese much over night  Furosemide has been increased  We will carefully follow her potassium and kidney function  Her blood sugars have been good  She is on antibiotics for cellulitis  Echocardiogram is planned        VTE Pharmacologic Prophylaxis:  Eliquis  VTE Mechanical Prophylaxis: reason for no mechanical VTE prophylaxis CHF

## 2019-02-09 NOTE — UTILIZATION REVIEW
Initial Clinical Review    Admission: Date/Time/Statement: 2/8/19 @ 1520   Orders Placed This Encounter   Procedures    Inpatient Admission (expected length of stay for this patient Order details is greater than two midnights)     Standing Status:   Standing     Number of Occurrences:   1     Order Specific Question:   Admitting Physician     Answer:   Macie Richter [[id number]]     Order Specific Question:   Level of Care     Answer:   Med Surg [[id number]]     Order Specific Question:   Estimated length of stay     Answer:   More than 2 Midnights     Order Specific Question:   Certification     Answer:   I certify that inpatient services are medically necessary for this patient for a duration of greater than two midnights  See H&P and MD Progress Notes for additional information about the patient's course of treatment  ED: Date/Time/Mode of Arrival:   ED Arrival Information     Expected Arrival Acuity Means of Arrival Escorted By Service Admission Type    - 2/8/2019 12:08 Emergent Wheelchair Family Member Hospitalist Emergency    Arrival Complaint    Fluid Retention        Chief Complaint:   Chief Complaint   Patient presents with    Edema     pt reports CHF hx taking diuretics, since January pt has had weightgain, BLE edema, abdominal distention  denies SOB, but appears SOB     History of Illness: Wanda Henry is a 63 y o  female with a PMH of chronic diastolic chf, hypertension, CKD stage 3, chronic atrial fibrillation anticoagulated on Eliquis, anxiety/depression, COPD, insomnia, type 2 diabetes, morbid obesity       She presents with a complaint of increasing lower extremity edema  Patient saw her PCP yesterday for low back pain and there was noted to have fluid retention, tachypnea, and increasing shortness of breath  It was recommended she go to the ER however patient decided she did not feel like going    Today her breathing became significantly more labored and she decided to go to the hospital   She reports having a weight gain of about 50 lbs since December  She is supposed to be on diuretics consisting of Lasix 40 mg twice daily and Zaroxolyn 5 mg daily  She reports still stopping these 3 weeks ago for unclear reasons  She also eats a diet that consists of salty foods  She will have fast food 3 times a week and admits to eating TV dinners, lunch meat, cans of soup-states she buys low-sodium  Denies any orthopnea, chest pain, chest pressure, cough  Not currently on any home O2  Additionally she is supposed to be taking metformin for her diabetes but does not  He also supposed to be on trazodone and Zoloft but is not taking these either      In the ER patient presented tachypneic, hypoxic with O2 sat of 88% on room air, elevated blood pressure of 171/96, elevated BNP 4595, increased vascular congestion on chest x-ray, and increased lower extremity edema  Her bilirubin was elevated at 4 12, potassium 3 3, creatinine 1 98, troponin 0 09      Lives at home alone with her 3 dogs  At times she will get assistance from her son  Still currently smoking 1 pack every 3 days  Denies alcohol consumption or other drug use  No assistive devices ambulate        ED Vital Signs:   ED Triage Vitals   Temperature Pulse Respirations Blood Pressure SpO2   02/08/19 1215 02/08/19 1215 02/08/19 1215 02/08/19 1215 02/08/19 1215   98 °F (36 7 °C) 102 (!) 24 (!) 171/96 (!) 88 %      Temp Source Heart Rate Source Patient Position - Orthostatic VS BP Location FiO2 (%)   02/08/19 1215 02/08/19 1215 02/08/19 1352 02/08/19 1352 --   Temporal Monitor Lying Left arm       Pain Score       02/08/19 1215       No Pain        Wt Readings from Last 1 Encounters:   02/09/19 (!) 148 kg (326 lb 11 6 oz)     Vital Signs (abnormal):    above    Pertinent Labs/Diagnostic Test Results:    Total  Bili   4 12  Bili, direct   1 65  Alk phos  124  Albumin   2 9  BNP   4,595  K  3 3  BUN/Creat    41/1 98  Troponin   0 09  PT  17    INR   1 37  H/H 18  1/58 6  U/S   RUQ:    Common bile duct measures up to 8 mm, however tapers to 4 mm distally  CXR:     Findings suggest chronic moderate CHF similar to the prior exam     ED Treatment:   Medication Administration from 02/08/2019 1208 to 02/08/2019 1637       Date/Time Order Dose Route Action Action by Comments     02/08/2019 0100 furosemide (LASIX) injection 40 mg 40 mg Intravenous Given Lynn Cruz RN         Past Medical/Surgical History: Active Ambulatory Problems     Diagnosis Date Noted    COPD (chronic obstructive pulmonary disease) ([place] [address] )     Hypertension     Permanent atrial fibrillation ([place] [address] ) 11/18/2016    Acute respiratory failure with hypoxia ([place] [address] ) 11/18/2016    Wounds, multiple open, lower extremity 11/18/2016    Hypoalbuminemia 11/18/2016    Diabetes type 2, uncontrolled ([place] [address] )     Non-rheumatic mitral regurgitation 02/18/2018    Nonsustained ventricular tachycardia ([place] [address] ) 12/12/2016    Obesity 11/30/2012    Vitamin D deficiency 05/21/2015    Depression 12/03/2012    Dyslipidemia 11/30/2012    Left ventricular hypertrophy 11/30/2012    Stage 3 chronic kidney disease ([place] [address] ) 02/07/2019     Resolved Ambulatory Problems     Diagnosis Date Noted    No Resolved Ambulatory Problems     Past Medical History:   Diagnosis Date    Atrial fibrillation with RVR ([place] [address] )     COPD (chronic obstructive pulmonary disease) ([place] [address] )     Diabetes type 2, uncontrolled ([place] [address] )     Hypertension      Admitting Diagnosis: Edema [R60 9]  Fluid retention [R60 9]  Congestive heart failure (CHF) (HCC) [I50 9]  Age/Sex: 63 y o  female     · Assessment/Plan: Acute on chronic diastolic CHF  ? Known to Dr Alisha Monge of Cardiology  ? Presenting with elevated BNP 4,595, increasing lower extremity edema, and tachypnea  ? Chest x-ray with chronic moderate CHF with central fullness of pulmonary vasculature  ?  Echocardiogram from 11/2016 reveals mildly dilated left ventricle with systolic function normal LVEF 55%  ? Pre-hospital home regimen of Lasix 40 mg twice daily and metolazone 5 mg daily  ? Patient reports stopping these 3 weeks ago for unclear reasons  ? Received IV Lasix 40 mg in ED  ? Continue with IV Lasix 60 mg b i d   ? Monitor I&Os and daily weights  ? Consult Cardiology     Additional Problems:   · Acute respiratory failure with hypoxia:  Presenting with O2 saturation of 88% on room air  Subsequently placed on 2 L oxygen nasal cannula with improvement of saturations in the mid 90s  Secondary to acute volume overload  Denies any chest pain, chest pressure, pleuritic chest pain      · Elevated troponin:  Troponin 0 09  Likely secondary to acute volume overload  No chest pain or pressure  No further need to trend      · Open lower extremity wounds:  Open wound to right lower extremity with nausea and purulent material   Given increased warmth concern for lower extremity cellulitis  Will start on IV cefazolin q 8h      · GI:  Bilirubin elevated at 4 12 with alk-phos being elevated at 124  Received right upper quadrant ultrasound in ED which revealed a common bile duct measuring up to 8 mm however tapers to 4 mm distally  No choledocholithiasis present  Cholecystectomy noted  Will continue to trend levels      · Possible SAPNA on CKD stage 3:  Creatinine 1 98  Baseline appears to be slowly worsening  Hold lisinopril while on IV diuresis     · Atrial fibrillation:  Anticoagulated on Eliquis  Rate controlled on metoprolol and diltiazem      · Type 2 diabetes:  Hemoglobin A1c 7 4 from 2016  Obtain more recent here  Was suppose to be on metformin but she is currently not taking it  Place on sliding scale      · Essential hypertension:  Home regimen of metoprolol succinate 100 mg twice daily, diltiazem 120 mg daily, lisinopril 40 mg daily  Hold ACE in the setting of diuretics      Anxiety/depression: Was suppose to be on zoloft but states she is not taking it    · Insomnia:  Continue home trazodone     · COPD:  Only has albuterol at home  No acute exacerbation      · Morbid obesity:  Would benefit from weight loss        VTE Prophylaxis: Apixaban (Eliquis)  / sequential compression device   Code Status: full code  Anticipated Length of Stay:  Patient will be admitted on an Inpatient basis with an anticipated length of stay of  Greater than 2 midnights  Justification for Hospital Stay: Acute on chronic chf with need for IV diuresis    Admission Orders:   IP  2/8  @  1520  Scheduled Meds:   Current Facility-Administered Medications:  acetaminophen 650 mg Oral Q4H PRN Katherine Dejesus PA-C    albuterol 2 puff Inhalation Q4H PRN Brielle Chanr, PA-C    apixaban 5 mg Oral BID LINDSAY Tafoya-EMERSON    cefazolin 1,000 mg Intravenous Q8H LINDSAY Tafoya-EMERSON Last Rate: 1,000 mg (02/09/19 1159)   diltiazem 120 mg Oral Daily LINDSAY Tafoya-C    furosemide 80 mg Intravenous BID (diuretic) Sanjeev Watts MD    influenza vaccine 0 5 mL Intramuscular Prior to discharge Sanjeev Watts MD    insulin lispro 2-12 Units Subcutaneous TID AC Brielle Yang PA-C    insulin lispro 2-12 Units Subcutaneous HS Brielle Yang PA-C    lidocaine 1 patch Topical Daily LINDSAY Tafoya-EMERSON    metoprolol succinate 100 mg Oral BID Katherine Dejesus PA-C    nicotine 1 patch Transdermal Daily Brielle Yang, PA-EMERSON    ondansetron 4 mg Intravenous Q8H PRN Brielle Yang, PA-C    potassium chloride 40 mEq Oral BID Brielle Yang PA-C      Continuous Infusions:    PRN Meds:   acetaminophen    albuterol    influenza vaccine    ondansetron     Tele  2 DE  CHF  teaching  CCD diet  Cons  Cardiology  Cons wound care  PT/OT  O2  2L    Network Utilization Review Department  Phone: [phone]; Fax [phone]  [email or web address]  ATTENTION: Please call with any questions or concerns to [phone]  and carefully listen to the prompts so that you are directed to the right person     Send all requests for admission clinical reviews, approved or denied determinations and any other requests to fax [phone]   All voicemails are confidential

## 2019-02-09 NOTE — PLAN OF CARE
CARDIOVASCULAR - ADULT     Maintains optimal cardiac output and hemodynamic stability Progressing     Absence of cardiac dysrhythmias or at baseline rhythm Progressing        DISCHARGE PLANNING     Discharge to home or other facility with appropriate resources Progressing        INFECTION - ADULT     Absence or prevention of progression during hospitalization Progressing     Absence of fever/infection during neutropenic period Progressing        Knowledge Deficit     Patient/family/caregiver demonstrates understanding of disease process, treatment plan, medications, and discharge instructions Progressing        METABOLIC, FLUID AND ELECTROLYTES - ADULT     Electrolytes maintained within normal limits Progressing     Fluid balance maintained Progressing     Glucose maintained within target range Progressing        Nutrition/Hydration-ADULT     Nutrient/Hydration intake appropriate for improving, restoring or maintaining nutritional needs Progressing        PAIN - ADULT     Verbalizes/displays adequate comfort level or baseline comfort level Progressing        Potential for Falls     Patient will remain free of falls Progressing        Prexisting or High Potential for Compromised Skin Integrity     Skin integrity is maintained or improved Progressing        SAFETY ADULT     Maintain or return to baseline ADL function Progressing     Maintain or return mobility status to optimal level Progressing        SKIN/TISSUE INTEGRITY - ADULT     Skin integrity remains intact Progressing     Incision(s), wounds(s) or drain site(s) healing without S/S of infection Progressing

## 2019-02-09 NOTE — NURSING NOTE
Pt noted to have a 39 beat run of V-tach on the telemetry monitor  Unable to assess if it was in 2 leads, as Left arm lead disconnected from the pt  Pt appears to be asymptomatic  On telemetry, pt continues to have uncontrolled A-fib with HR between 115 and 120  Nurse on prior shift administered metoprolol PO at 1904  Paged RRNP with no new orders  Will continue to closely monitor pt

## 2019-02-10 ENCOUNTER — APPOINTMENT (INPATIENT)
Dept: RADIOLOGY | Facility: HOSPITAL | Age: 59
DRG: 291 | End: 2019-02-10
Payer: COMMERCIAL

## 2019-02-10 LAB
ANION GAP SERPL CALCULATED.3IONS-SCNC: 7 MMOL/L (ref 4–13)
BUN SERPL-MCNC: 39 MG/DL (ref 5–25)
CALCIUM SERPL-MCNC: 7.8 MG/DL (ref 8.3–10.1)
CHLORIDE SERPL-SCNC: 104 MMOL/L (ref 100–108)
CO2 SERPL-SCNC: 30 MMOL/L (ref 21–32)
CREAT SERPL-MCNC: 1.76 MG/DL (ref 0.6–1.3)
GFR SERPL CREATININE-BSD FRML MDRD: 32 ML/MIN/1.73SQ M
GLUCOSE SERPL-MCNC: 103 MG/DL (ref 65–140)
GLUCOSE SERPL-MCNC: 104 MG/DL (ref 65–140)
GLUCOSE SERPL-MCNC: 111 MG/DL (ref 65–140)
GLUCOSE SERPL-MCNC: 116 MG/DL (ref 65–140)
GLUCOSE SERPL-MCNC: 124 MG/DL (ref 65–140)
POTASSIUM SERPL-SCNC: 3.9 MMOL/L (ref 3.5–5.3)
QRS AXIS: 133 DEGREES
QRSD INTERVAL: 82 MS
QT INTERVAL: 364 MS
QTC INTERVAL: 476 MS
SODIUM SERPL-SCNC: 141 MMOL/L (ref 136–145)
T WAVE AXIS: -19 DEGREES
VENTRICULAR RATE: 103 BPM

## 2019-02-10 PROCEDURE — G8979 MOBILITY GOAL STATUS: HCPCS

## 2019-02-10 PROCEDURE — G8978 MOBILITY CURRENT STATUS: HCPCS

## 2019-02-10 PROCEDURE — 97163 PT EVAL HIGH COMPLEX 45 MIN: CPT

## 2019-02-10 PROCEDURE — G8987 SELF CARE CURRENT STATUS: HCPCS

## 2019-02-10 PROCEDURE — 97166 OT EVAL MOD COMPLEX 45 MIN: CPT

## 2019-02-10 PROCEDURE — 80048 BASIC METABOLIC PNL TOTAL CA: CPT | Performed by: INTERNAL MEDICINE

## 2019-02-10 PROCEDURE — 72100 X-RAY EXAM L-S SPINE 2/3 VWS: CPT

## 2019-02-10 PROCEDURE — 87493 C DIFF AMPLIFIED PROBE: CPT | Performed by: INTERNAL MEDICINE

## 2019-02-10 PROCEDURE — 93010 ELECTROCARDIOGRAM REPORT: CPT | Performed by: INTERNAL MEDICINE

## 2019-02-10 PROCEDURE — 99232 SBSQ HOSP IP/OBS MODERATE 35: CPT | Performed by: INTERNAL MEDICINE

## 2019-02-10 PROCEDURE — G8988 SELF CARE GOAL STATUS: HCPCS

## 2019-02-10 PROCEDURE — 82948 REAGENT STRIP/BLOOD GLUCOSE: CPT

## 2019-02-10 RX ORDER — METHOCARBAMOL 500 MG/1
500 TABLET, FILM COATED ORAL EVERY 6 HOURS PRN
Status: DISCONTINUED | OUTPATIENT
Start: 2019-02-10 | End: 2019-02-11

## 2019-02-10 RX ADMIN — DILTIAZEM HYDROCHLORIDE 120 MG: 120 CAPSULE, EXTENDED RELEASE ORAL at 08:05

## 2019-02-10 RX ADMIN — METHOCARBAMOL 500 MG: 500 TABLET, FILM COATED ORAL at 15:00

## 2019-02-10 RX ADMIN — LIDOCAINE 1 PATCH: 50 PATCH CUTANEOUS at 07:53

## 2019-02-10 RX ADMIN — CEFAZOLIN SODIUM 1000 MG: 10 INJECTION, POWDER, FOR SOLUTION INTRAVENOUS at 02:22

## 2019-02-10 RX ADMIN — ACETAMINOPHEN 975 MG: 325 TABLET, FILM COATED ORAL at 07:51

## 2019-02-10 RX ADMIN — CEFAZOLIN SODIUM 1000 MG: 10 INJECTION, POWDER, FOR SOLUTION INTRAVENOUS at 19:40

## 2019-02-10 RX ADMIN — FUROSEMIDE 80 MG: 10 INJECTION, SOLUTION INTRAMUSCULAR; INTRAVENOUS at 08:05

## 2019-02-10 RX ADMIN — POTASSIUM CHLORIDE 40 MEQ: 1500 TABLET, EXTENDED RELEASE ORAL at 17:05

## 2019-02-10 RX ADMIN — METHOCARBAMOL 500 MG: 500 TABLET, FILM COATED ORAL at 21:09

## 2019-02-10 RX ADMIN — METOPROLOL SUCCINATE 100 MG: 50 TABLET, EXTENDED RELEASE ORAL at 08:05

## 2019-02-10 RX ADMIN — POTASSIUM CHLORIDE 40 MEQ: 1500 TABLET, EXTENDED RELEASE ORAL at 07:54

## 2019-02-10 RX ADMIN — APIXABAN 5 MG: 5 TABLET, FILM COATED ORAL at 07:52

## 2019-02-10 RX ADMIN — CEFAZOLIN SODIUM 1000 MG: 10 INJECTION, POWDER, FOR SOLUTION INTRAVENOUS at 11:58

## 2019-02-10 RX ADMIN — METOPROLOL SUCCINATE 100 MG: 50 TABLET, EXTENDED RELEASE ORAL at 17:04

## 2019-02-10 RX ADMIN — FUROSEMIDE 80 MG: 10 INJECTION, SOLUTION INTRAMUSCULAR; INTRAVENOUS at 16:58

## 2019-02-10 RX ADMIN — APIXABAN 5 MG: 5 TABLET, FILM COATED ORAL at 17:05

## 2019-02-10 NOTE — PROGRESS NOTES
Provided pt and thoroughly reviewed A-fib educational handout  Pt verbalized understanding as well as listed her own personal risk factors for A-fib

## 2019-02-10 NOTE — PLAN OF CARE
Problem: Potential for Falls  Goal: Patient will remain free of falls  Description  INTERVENTIONS:  - Assess patient frequently for physical needs  -  Identify cognitive and physical deficits and behaviors that affect risk of falls  -  [place] fall precautions as indicated by assessment   - Educate patient/family on patient safety including physical limitations  - Instruct patient to call for assistance with activity based on assessment  - Modify environment to reduce risk of injury  - Consider OT/PT consult to assist with strengthening/mobility   Outcome: Progressing     Problem: Nutrition/Hydration-ADULT  Goal: Nutrient/Hydration intake appropriate for improving, restoring or maintaining nutritional needs  Description  Monitor and assess patient's nutrition/hydration status for malnutrition (ex- brittle hair, bruises, dry skin, pale skin and conjunctiva, muscle wasting, smooth red tongue, and disorientation)  Collaborate with interdisciplinary team and initiate plan and interventions as ordered  Monitor patient's weight and dietary intake as ordered or per policy  Utilize nutrition screening tool and intervene per policy  Determine patient's food preferences and provide high-protein, high-caloric foods as appropriate       INTERVENTIONS:  - Monitor oral intake, urinary output, labs, and treatment plans  - Assess nutrition and hydration status and recommend course of action  - Evaluate amount of meals eaten  - Assist patient with eating if necessary   - Allow adequate time for meals  - Recommend/ encourage appropriate diets, oral nutritional supplements, and vitamin/mineral supplements  - Order, calculate, and assess calorie counts as needed  - Recommend, monitor, and adjust tube feedings and TPN/PPN based on assessed needs  - Assess need for intravenous fluids  - Provide specific nutrition/hydration education as appropriate  - Include patient/family/caregiver in decisions related to nutrition   Outcome: Progressing     Problem: Prexisting or High Potential for Compromised Skin Integrity  Goal: Skin integrity is maintained or improved  Description  INTERVENTIONS:  - Identify patients at risk for skin breakdown  - Assess and monitor skin integrity  - Assess and monitor nutrition and hydration status  - Monitor labs (i e  albumin)  - Assess for incontinence   - Turn and reposition patient  - Assist with mobility/ambulation  - Relieve pressure over bony prominences  - Avoid friction and shearing  - Provide appropriate hygiene as needed including keeping skin clean and dry  - Evaluate need for skin moisturizer/barrier cream  - Collaborate with interdisciplinary team (i e  Nutrition, Rehabilitation, etc )   - Patient/family teaching   Outcome: Progressing     Problem: PAIN - ADULT  Goal: Verbalizes/displays adequate comfort level or baseline comfort level  Description  Interventions:  - Encourage patient to monitor pain and request assistance  - Assess pain using appropriate pain scale  - Administer analgesics based on type and severity of pain and evaluate response  - Implement non-pharmacological measures as appropriate and evaluate response  - Consider cultural and social influences on pain and pain management  - Notify physician/advanced practitioner if interventions unsuccessful or patient reports new pain   Outcome: Progressing     Problem: INFECTION - ADULT  Goal: Absence or prevention of progression during hospitalization  Description  INTERVENTIONS:  - Assess and monitor for signs and symptoms of infection  - Monitor lab/diagnostic results  - Monitor all insertion sites, i e  indwelling lines, tubes, and drains  - Monitor endotracheal (as able) and nasal secretions for changes in amount and color  - [place] appropriate cooling/warming therapies per order  - Administer medications as ordered  - Instruct and encourage patient and family to use good hand hygiene technique  - Identify and instruct in appropriate isolation precautions for identified infection/condition   Outcome: Progressing  Goal: Absence of fever/infection during neutropenic period  Description  INTERVENTIONS:  - Monitor WBC  - Implement neutropenic guidelines   Outcome: Progressing     Problem: SAFETY ADULT  Goal: Maintain or return to baseline ADL function  Description  INTERVENTIONS:  -  Assess patient's ability to carry out ADLs; assess patient's baseline for ADL function and identify physical deficits which impact ability to perform ADLs (bathing, care of mouth/teeth, toileting, grooming, dressing, etc )  - Assess/evaluate cause of self-care deficits   - Assess range of motion  - Assess patient's mobility; develop plan if impaired  - Assess patient's need for assistive devices and provide as appropriate  - Encourage maximum independence but intervene and supervise when necessary  ¯ Involve family in performance of ADLs  ¯ Assess for home care needs following discharge   ¯ Request OT consult to assist with ADL evaluation and planning for discharge  ¯ Provide patient education as appropriate   Outcome: Progressing  Goal: Maintain or return mobility status to optimal level  Description  INTERVENTIONS:  - Assess patient's baseline mobility status (ambulation, transfers, stairs, etc )    - Identify cognitive and physical deficits and behaviors that affect mobility  - Identify mobility aids required to assist with transfers and/or ambulation (gait belt, sit-to-stand, lift, walker, cane, etc )  - [place] fall precautions as indicated by assessment  - Record patient progress and toleration of activity level on Mobility SBAR; progress patient to next Phase/Stage  - Instruct patient to call for assistance with activity based on assessment  - Request Rehabilitation consult to assist with strengthening/weightbearing, etc    Outcome: Progressing     Problem: DISCHARGE PLANNING  Goal: Discharge to home or other facility with appropriate resources  Description  INTERVENTIONS:  - Identify barriers to discharge w/patient and caregiver  - Arrange for needed discharge resources and transportation as appropriate  - Identify discharge learning needs (meds, wound care, etc )  - Arrange for interpretive services to assist at discharge as needed  - Refer to Case Management Department for coordinating discharge planning if the patient needs post-hospital services based on physician/advanced practitioner order or complex needs related to functional status, cognitive ability, or social support system   Outcome: Progressing     Problem: Knowledge Deficit  Goal: Patient/family/caregiver demonstrates understanding of disease process, treatment plan, medications, and discharge instructions  Description  Complete learning assessment and assess knowledge base  Interventions:  - Provide teaching at level of understanding  - Provide teaching via preferred learning methods   Outcome: Progressing     Problem: CARDIOVASCULAR - ADULT  Goal: Maintains optimal cardiac output and hemodynamic stability  Description  INTERVENTIONS:  - Monitor I/O, vital signs and rhythm  - Monitor for S/S and trends of decreased cardiac output i e  bleeding, hypotension  - Administer and titrate ordered vasoactive medications to optimize hemodynamic stability  - Assess quality of pulses, skin color and temperature  - Assess for signs of decreased coronary artery perfusion - ex   Angina  - Instruct patient to report change in severity of symptoms   Outcome: Progressing  Goal: Absence of cardiac dysrhythmias or at baseline rhythm  Description  INTERVENTIONS:  - Continuous cardiac monitoring, monitor vital signs, obtain 12 lead EKG if indicated  - Administer antiarrhythmic and heart rate control medications as ordered  - Monitor electrolytes and administer replacement therapy as ordered   Outcome: Progressing     Problem: METABOLIC, FLUID AND ELECTROLYTES - ADULT  Goal: Electrolytes maintained within normal limits  Description  INTERVENTIONS:  - Monitor labs and assess patient for signs and symptoms of electrolyte imbalances  - Administer electrolyte replacement as ordered  - Monitor response to electrolyte replacements, including repeat lab results as appropriate  - Instruct patient on fluid and nutrition as appropriate   Outcome: Progressing  Goal: Fluid balance maintained  Description  INTERVENTIONS:  - Monitor labs and assess for signs and symptoms of volume excess or deficit  - Monitor I/O and WT  - Instruct patient on fluid and nutrition as appropriate   Outcome: Progressing  Goal: Glucose maintained within target range  Description  INTERVENTIONS:  - Monitor Blood Glucose as ordered  - Assess for signs and symptoms of hyperglycemia and hypoglycemia  - Administer ordered medications to maintain glucose within target range  - Assess nutritional intake and initiate nutrition service referral as needed   Outcome: Progressing     Problem: SKIN/TISSUE INTEGRITY - ADULT  Goal: Skin integrity remains intact  Description  INTERVENTIONS  - Identify patients at risk for skin breakdown  - Assess and monitor skin integrity  - Assess and monitor nutrition and hydration status  - Monitor labs (i e  albumin)  - Assess for incontinence   - Turn and reposition patient  - Assist with mobility/ambulation  - Relieve pressure over bony prominences  - Avoid friction and shearing  - Provide appropriate hygiene as needed including keeping skin clean and dry  - Evaluate need for skin moisturizer/barrier cream  - Collaborate with interdisciplinary team (i e  Nutrition, Rehabilitation, etc )   - Patient/family teaching   Outcome: Progressing  Goal: Incision(s), wounds(s) or drain site(s) healing without S/S of infection  Description  INTERVENTIONS  - Assess and document risk factors for skin impairment   - Assess and document dressing, incision, wound bed, drain sites and surrounding tissue  - Initiate Nutrition services consult and/or wound management as needed   Outcome: Progressing

## 2019-02-10 NOTE — NURSING NOTE
Patient having 10/10 back spasms  Dr Mal Alvarez aware, ordered muscle relaxer (Robaxin) and x-ray of spine  Robaxin given  Patient taken down to x-ray but was unfortunately unable to tolerate laying on back or moving into positioning needed for x-ray views

## 2019-02-10 NOTE — NURSING NOTE
B/L leg dressings saturated over calf/ankel area  Dressings removed, legs washed with soap and water  Applied Maxorb Ag to weeping open wounds only  There are multiple dry scabbed areas, those were left open  Covered Maxorb with gauze, ABD and wrapped with gauze and Kerlix

## 2019-02-10 NOTE — NURSING NOTE
Pt has attempted multiple times to get up to use the bathroom without ringing for staff assistance, setting off the bed alarm  Continuously educated pt on importance of ringing to avoid falls, as pt is a high fall risk  Pt also continues to pull oxygen tubing off while attempting to ambulate out of bed  SpO2 on RA 87%  Pt continues to roll her eyes in regards to ringing the call bell as well as leaving the oxygen on

## 2019-02-10 NOTE — PLAN OF CARE
Problem: OCCUPATIONAL THERAPY ADULT  Goal: Performs self-care activities at highest level of function for planned discharge setting  See evaluation for individualized goals  Description  Treatment Interventions: ADL retraining, Functional transfer training, UE strengthening/ROM, Endurance training, Patient/family training, Equipment evaluation/education, Compensatory technique education, Energy conservation, Activityengagement          See flowsheet documentation for full assessment, interventions and recommendations  2/10/2019 0957 by Shante Comer OT  Outcome: Progressing  Note:   Limitation: Decreased ADL status, Decreased UE strength, Decreased Safe judgement during ADL, Decreased endurance, Decreased self-care trans, Decreased high-level ADLs  Prognosis: Good  Assessment: Pt is a 63 y o  female seen for OT evaluation s/p adm to [facility] on 2/8/2019 w/ worsening SOB for 2-3 weeks, B/L LE edema, and fluid retention and dx'd w/ acute on chronic CHF  Comorbidities affecting pt?s functional performance include a significant PMH of A-Fib with RVR, CHF, COPD, Diabetes type 2, Cellulitis, ventricular tachycardia, and HTN  Pt with active OT orders and activity orders for Up as tolerated  Pt lives alone in a two level house with 6 DENYS and FOS to 2nd floor bed/bath, however pt reports she can stay with son at D/C who lives in a two level house with 4 DENYS with rails and 1st floor setup  At baseline, pt was I w/ ADLs, IADLs, and functional mobility/transfers w/o use of AD, (+) , FT employed, and reports 1 fall PTA  Upon evaluation, pt currently requires Mod-Min A for LB ADLs, Mod A for toileting, Supervision for UB ADLs, and Min A for functional mobility/transfers 2* the following deficits impacting occupational performance: weakness, decreased strength, decreased balance, decreased tolerance and decreased safety awareness   These impairments, as well at pt?s steps to enter environment, limited home support, difficulty performing ADLS and difficulty performing IADLS  limit pt?s ability to safely engage in all baseline areas of occupation, including grooming, bathing, dressing, toileting, functional mobility/transfers, community mobility, laundry , house maintenance, meal prep, cleaning and leisure activities   Pt scored overall 55/100 on the Barthel Index  Based on the aforementioned OT evaluation, functional performance deficits, and assessments, pt has been identified as a Moderate complexity evaluation  Pt to continue to benefit from continued acute OT services during hospital stay to address defined deficits and to maximize level of functional independence in the following Occupational Performance areas: grooming, bathing/shower, toilet hygiene, dressing, medication management, socialization, health maintenance, functional mobility, community mobility, clothing management, cleaning, meal prep and household maintenance  From OT standpoint, recommend STR vs Home OT pending progress upon D/C  Pt prefers to return home to son's home  OT will continue to follow pt 3-5x/wk to address the following goals to  w/in 10-14 days:     OT Discharge Recommendation: Short Term Rehab(vs Home OT;  Pt prefers to return home to son's house)  OT - OK to Discharge: (yes to rehab; No to home at this time)    Sherice Guerrero OTR/L    2/10/2019 0947 by Sherice Guerrero OT  Outcome: Progressing  2/10/2019 0946 by Sherice Guerrero OT  Outcome: Progressing

## 2019-02-10 NOTE — PHYSICAL THERAPY NOTE
PT EVALUATION  Time-In: 8359  Time-Out: 0928  Total Time: 23 minutes    63 y o     [id number]    Edema [R60 9]  Fluid retention [R60 9]  Congestive heart failure (CHF) (HCC) [I50 9]    Length of Stay: 2    Past Medical History:   Diagnosis Date    Atrial fibrillation with RVR ([place] [address] )     COPD (chronic obstructive pulmonary disease) ([place] [address] )     Diabetes type 2, uncontrolled ([place] [address] )     Hypertension          Past Surgical History:   Procedure Laterality Date    HERNIA REPAIR      HYSTERECTOMY      LAPAROSCOPIC CHOLECYSTECTOMY            02/10/19 7370   Note Type   Note type Eval only   Pain Assessment   Pain Assessment FLACC   Pain Type Chronic pain   Pain Location Back   Effect of Pain on Daily Activities pt reports to always have back pain; pain in legs only occure with touch; pain limits overall activity   Hospital Pain Intervention(s) Repositioned; Ambulation/increased activity; Elevated; Emotional support; Rest   Pain Rating: FLACC (Rest) - Face 0   Pain Rating: FLACC (Rest) - Legs 0   Pain Rating: FLACC (Rest) - Activity 0   Pain Rating: FLACC (Rest) - Cry 1   Pain Rating: FLACC (Rest) - Consolability 0   Score: FLACC (Rest) 1   Pain Rating: FLACC (Activity) - Face 1   Pain Rating: FLACC (Activity) - Legs 0   Pain Rating: FLACC (Activity) - Activity 0   Pain Rating: FLACC (Activity) - Cry 1   Pain Rating: FLACC (Activity) - Consolability 1   Score: FLACC (Activity) 3   Home Living   Type of Home House   Home Layout Two level;Bed/bath upstairs;Stairs to enter without rails  (6 DENYS no railing; flight of stairs to 2nd floor w/ railing)   Bathroom Shower/Tub Tub/shower unit   Bathroom Toilet Standard   Bathroom Equipment   (no DME per patient report)   Home Equipment   (no DME equipment)   Additional Comments Pt reports that she could go stay with son at discharge who has a first floor set up and 4 DENYS w/ railing     Prior Function   Level of [place] Independent with ADLs and functional mobility   Lives With Alone Receives Help From Family  (local son in [facility])   ADL Assistance Independent   IADLs Independent   Falls in the last 6 months 1 to 4  (1 )   Vocational Full time employment  (Pt is a supervisor of cleaning service )   Comments At baseline pt was (I) w/ ADLS and IADLS  She did not use DME or AD for functional mobility and (+)   Pt does report 1 fall PTA  Restrictions/Precautions   Weight Bearing Precautions Per Order No   Other Precautions Fall Risk;O2;Contact/isolation;Multiple lines;Telemetry  (r/o C-diff)   General   Family/Caregiver Present No   Cognition   Overall Cognitive Status WFL   Arousal/Participation Alert   Orientation Level Oriented X4   Memory Decreased recall of precautions   Following Commands Follows one step commands without difficulty   Comments Pt needs cueing for safety awareness throughout evaluation  RUE Assessment   RUE Assessment WFL   LUE Assessment   LUE Assessment WFL   RLE Assessment   RLE Assessment WFL  (3+/5 limited testing 2* reported pain if touched)   LLE Assessment   LLE Assessment WFL  (3+/5 limited testing 2* reported pain if touched)   Coordination   Movements are Fluid and Coordinated 1   Sensation WFL   Light Touch   RLE Light Touch Grossly intact   LLE Light Touch Grossly intact   Proprioception   RLE Proprioception Grossly intact   LLE Proprioception Grossly Intact   Bed Mobility   Additional Comments Pt received sitting OOB in bedside recliner chair upon arrival; bed mobility not observed  Transfers   Sit to Stand 4  Minimal assistance   Additional items Assist x 1; Armrests; Increased time required;Verbal cues   Stand to Sit 4  Minimal assistance   Additional items Assist x 1; Increased time required;Verbal cues   Additional Comments Pt provided with verbal cueing for safety and appropriate hand placement  Increased time needed to perform transfer  Ambulation/Elevation   Gait pattern Short stride; Forward Flexion;Decreased foot clearance;Poor UE support   Gait Assistance 4  Minimal assist   Additional items Assist x 1;Verbal cues   Assistive Device Rolling walker   Distance Pt ambulated 30ftx2 provided with verbal cueing for safety, stepping technique, and posture/body mechanics  Stair Management Assistance Not tested  (not appropriate at this time 2* decreased safety and weaknes)   Balance   Static Sitting Fair +   Dynamic Sitting Fair   Static Standing Fair -   Dynamic Standing Fair -   Ambulatory Poor +   Endurance Deficit   Endurance Deficit Yes   Endurance Deficit Description pain, fatigue, weakness   Activity Tolerance   Activity Tolerance Patient limited by fatigue;Patient limited by pain   Medical Staff [address] Maryellen Saenz   Nurse Made Aware yes; Skinny Tucker RN   Assessment   Prognosis Fair   Problem List Decreased strength;Decreased endurance; Impaired balance;Decreased mobility; Decreased safety awareness; Obesity; Decreased skin integrity;Pain   Assessment PT consult received and evaluation complete  Pt is 63 y o  Female admitted from home alone for acute on chronic diastolic congestive heart failure  Pt agreeable to participate w/ therapy and identified by 2 patient identifiers: name and birth date  Precautions include: fall risk, pain, contact/isolation, multiple lines, O2 and telemetry  Pt presents sitting in bedside chair with O2 nasal cannula and telemetry monitoring  Pt has orders for up as tolerated  PTA pt was independent w/ all functional mobility w/ no use of DME or AD, lived in multi-level home and had 6 DENYS no railing, independent ADLS, and  independent w/ IADLS, yes driving, yes recent falls 1, and employed  Pt presents w/ RLE MMT 3+/5, LLE MMT 3+/5, limited testing 2* reported pain with any tactile touch, sit<>stand minAx1, gait training minAx1, and FLACC score for back pain  Pt tolerated treatment, but overall limited tolerance to activity 2* pain and fatigue  Pt's O2 sat post gait was reading 92%   At end of PT treatment pt left sitting in bedside recliner chair, lines intact, all needs in reach, call bell and phone in hand, and RN aware of patient status  Pt would benefit from continued skilled PT for deficits in strength, balance, locomotion, stair negotiation, functional endurance, functional mobility and safety awareness with mobility At this time recommend d/c optimally short term 2* decreased strength, decline from baseline function, impaired gait, impaired balance, and decreased cardiopulmonary functional endurance  Pt's preference is to go home to son's house where she would have first floor set up and only 4 DENYS  Continue to assess w/ progress made   History: co-morbidities, fall risk, lives alone, mult-level home, DENYS and multiple lines Exam: strength, balance, locomotion, functional endurance, functional mobility and safety awareness with mobility Barthel Index: 55 Modified Jason:4Clinical: unstable/unpredictable: fall risk, multiple lines, telemetry, ongoing management of medical status, pain, decreased safety awareness, use of AD and contact/isolation Complexity: high   Barriers to Discharge Inaccessible home environment;Decreased caregiver support   Barriers to Discharge Comments lives alone, 2 level home, 6 "scary" DENYS no railing   Goals   Patient Goals "to see her dogs"   LTG Expiration Date 02/24/19   Long Term Goal #1 In 14 days pt will demonstrate: bed mobility (I) for home function OOB, sit<>stand and functional transfers mod (I) w/ RW or least restrictive AD for home function, gait training 150ft mod (I) w/ RW or least restrictive AD for home distances w/ O2 sat >90%, steps mod (I)  least restrictive AD no railing for personal home entrance so she can see her dogs, steps mod (I) w/ railing for negotiation to 2nd floor at personal home and son's home entrance so she can see her dogs, improve BLE by 1/2 grade strength to optimize functional mobility and promote improved functional mobility in order to return home to see her dogs, improve balance by 1 grade to decrease fall risk, improve activity tolerance to >45 minutes w/o rest to optimize participation with PT to progress towards IP PT goals and pt's goals, pt and family education on PT risk, role, benefits, POC, goals, and recommendations to optimize patient outcomes, patient functional, optimize LOS and promote discharge to least restrictive environment  Treatment Day 0   Plan   Treatment/Interventions Functional transfer training;LE strengthening/ROM; Therapeutic exercise; Endurance training;Patient/family training;Equipment eval/education; Bed mobility;Gait training;Spoke to nursing;Family;OT   PT Frequency   (3-5x/wk)   Recommendation   Recommendation   (STR vs  home with son and continued PT pending progress)   Equipment Recommended Walker   PT - OK to Discharge Yes  (yes if rehab, no if home at this time)   Additional Comments Pending progress made with therapy in acute hospital setting PT recommending short term rehab optimally vs  home with son and continued PT  Pt would benefit from short term rehab at this time 2* decreased strength, impaired balance, impaired gait, multi-level home, and lives alone  Pt's preference would be to go to her son's     Modified [place] Scale   Modified Jason Scale 4   Barthel Index   Feeding 10   Bathing 0   Grooming Score 5   Dressing Score 5   Bladder Score 10   Bowels Score 10   Toilet Use Score 5   Transfers (Bed/Chair) Score 10   Mobility (Level Surface) Score 0   Stairs Score 0   Barthel Index Score 55     Calvin Jenkins, PT ,DPT

## 2019-02-10 NOTE — PLAN OF CARE
Problem: Potential for Falls  Goal: Patient will remain free of falls  Description  INTERVENTIONS:  - Assess patient frequently for physical needs  -  Identify cognitive and physical deficits and behaviors that affect risk of falls  -  [place] fall precautions as indicated by assessment   - Educate patient/family on patient safety including physical limitations  - Instruct patient to call for assistance with activity based on assessment  - Modify environment to reduce risk of injury  - Consider OT/PT consult to assist with strengthening/mobility   Outcome: Progressing     Problem: Nutrition/Hydration-ADULT  Goal: Nutrient/Hydration intake appropriate for improving, restoring or maintaining nutritional needs  Description  Monitor and assess patient's nutrition/hydration status for malnutrition (ex- brittle hair, bruises, dry skin, pale skin and conjunctiva, muscle wasting, smooth red tongue, and disorientation)  Collaborate with interdisciplinary team and initiate plan and interventions as ordered  Monitor patient's weight and dietary intake as ordered or per policy  Utilize nutrition screening tool and intervene per policy  Determine patient's food preferences and provide high-protein, high-caloric foods as appropriate       INTERVENTIONS:  - Monitor oral intake, urinary output, labs, and treatment plans  - Assess nutrition and hydration status and recommend course of action  - Evaluate amount of meals eaten  - Assist patient with eating if necessary   - Allow adequate time for meals  - Recommend/ encourage appropriate diets, oral nutritional supplements, and vitamin/mineral supplements  - Order, calculate, and assess calorie counts as needed  - Recommend, monitor, and adjust tube feedings and TPN/PPN based on assessed needs  - Assess need for intravenous fluids  - Provide specific nutrition/hydration education as appropriate  - Include patient/family/caregiver in decisions related to nutrition   Outcome: Progressing     Problem: Prexisting or High Potential for Compromised Skin Integrity  Goal: Skin integrity is maintained or improved  Description  INTERVENTIONS:  - Identify patients at risk for skin breakdown  - Assess and monitor skin integrity  - Assess and monitor nutrition and hydration status  - Monitor labs (i e  albumin)  - Assess for incontinence   - Turn and reposition patient  - Assist with mobility/ambulation  - Relieve pressure over bony prominences  - Avoid friction and shearing  - Provide appropriate hygiene as needed including keeping skin clean and dry  - Evaluate need for skin moisturizer/barrier cream  - Collaborate with interdisciplinary team (i e  Nutrition, Rehabilitation, etc )   - Patient/family teaching   Outcome: Progressing     Problem: PAIN - ADULT  Goal: Verbalizes/displays adequate comfort level or baseline comfort level  Description  Interventions:  - Encourage patient to monitor pain and request assistance  - Assess pain using appropriate pain scale  - Administer analgesics based on type and severity of pain and evaluate response  - Implement non-pharmacological measures as appropriate and evaluate response  - Consider cultural and social influences on pain and pain management  - Notify physician/advanced practitioner if interventions unsuccessful or patient reports new pain   Outcome: Progressing     Problem: INFECTION - ADULT  Goal: Absence or prevention of progression during hospitalization  Description  INTERVENTIONS:  - Assess and monitor for signs and symptoms of infection  - Monitor lab/diagnostic results  - Monitor all insertion sites, i e  indwelling lines, tubes, and drains  - Monitor endotracheal (as able) and nasal secretions for changes in amount and color  - [place] appropriate cooling/warming therapies per order  - Administer medications as ordered  - Instruct and encourage patient and family to use good hand hygiene technique  - Identify and instruct in appropriate isolation precautions for identified infection/condition   Outcome: Progressing  Goal: Absence of fever/infection during neutropenic period  Description  INTERVENTIONS:  - Monitor WBC  - Implement neutropenic guidelines   Outcome: Progressing     Problem: SAFETY ADULT  Goal: Maintain or return to baseline ADL function  Description  INTERVENTIONS:  -  Assess patient's ability to carry out ADLs; assess patient's baseline for ADL function and identify physical deficits which impact ability to perform ADLs (bathing, care of mouth/teeth, toileting, grooming, dressing, etc )  - Assess/evaluate cause of self-care deficits   - Assess range of motion  - Assess patient's mobility; develop plan if impaired  - Assess patient's need for assistive devices and provide as appropriate  - Encourage maximum independence but intervene and supervise when necessary  ¯ Involve family in performance of ADLs  ¯ Assess for home care needs following discharge   ¯ Request OT consult to assist with ADL evaluation and planning for discharge  ¯ Provide patient education as appropriate   Outcome: Progressing  Goal: Maintain or return mobility status to optimal level  Description  INTERVENTIONS:  - Assess patient's baseline mobility status (ambulation, transfers, stairs, etc )    - Identify cognitive and physical deficits and behaviors that affect mobility  - Identify mobility aids required to assist with transfers and/or ambulation (gait belt, sit-to-stand, lift, walker, cane, etc )  - [place] fall precautions as indicated by assessment  - Record patient progress and toleration of activity level on Mobility SBAR; progress patient to next Phase/Stage  - Instruct patient to call for assistance with activity based on assessment  - Request Rehabilitation consult to assist with strengthening/weightbearing, etc    Outcome: Progressing     Problem: DISCHARGE PLANNING  Goal: Discharge to home or other facility with appropriate resources  Description  INTERVENTIONS:  - Identify barriers to discharge w/patient and caregiver  - Arrange for needed discharge resources and transportation as appropriate  - Identify discharge learning needs (meds, wound care, etc )  - Arrange for interpretive services to assist at discharge as needed  - Refer to Case Management Department for coordinating discharge planning if the patient needs post-hospital services based on physician/advanced practitioner order or complex needs related to functional status, cognitive ability, or social support system   Outcome: Progressing     Problem: Knowledge Deficit  Goal: Patient/family/caregiver demonstrates understanding of disease process, treatment plan, medications, and discharge instructions  Description  Complete learning assessment and assess knowledge base  Interventions:  - Provide teaching at level of understanding  - Provide teaching via preferred learning methods   Outcome: Progressing     Problem: CARDIOVASCULAR - ADULT  Goal: Maintains optimal cardiac output and hemodynamic stability  Description  INTERVENTIONS:  - Monitor I/O, vital signs and rhythm  - Monitor for S/S and trends of decreased cardiac output i e  bleeding, hypotension  - Administer and titrate ordered vasoactive medications to optimize hemodynamic stability  - Assess quality of pulses, skin color and temperature  - Assess for signs of decreased coronary artery perfusion - ex   Angina  - Instruct patient to report change in severity of symptoms   Outcome: Progressing  Goal: Absence of cardiac dysrhythmias or at baseline rhythm  Description  INTERVENTIONS:  - Continuous cardiac monitoring, monitor vital signs, obtain 12 lead EKG if indicated  - Administer antiarrhythmic and heart rate control medications as ordered  - Monitor electrolytes and administer replacement therapy as ordered   Outcome: Progressing     Problem: METABOLIC, FLUID AND ELECTROLYTES - ADULT  Goal: Electrolytes maintained within normal limits  Description  INTERVENTIONS:  - Monitor labs and assess patient for signs and symptoms of electrolyte imbalances  - Administer electrolyte replacement as ordered  - Monitor response to electrolyte replacements, including repeat lab results as appropriate  - Instruct patient on fluid and nutrition as appropriate   Outcome: Progressing  Goal: Fluid balance maintained  Description  INTERVENTIONS:  - Monitor labs and assess for signs and symptoms of volume excess or deficit  - Monitor I/O and WT  - Instruct patient on fluid and nutrition as appropriate   Outcome: Progressing  Goal: Glucose maintained within target range  Description  INTERVENTIONS:  - Monitor Blood Glucose as ordered  - Assess for signs and symptoms of hyperglycemia and hypoglycemia  - Administer ordered medications to maintain glucose within target range  - Assess nutritional intake and initiate nutrition service referral as needed   Outcome: Progressing     Problem: SKIN/TISSUE INTEGRITY - ADULT  Goal: Skin integrity remains intact  Description  INTERVENTIONS  - Identify patients at risk for skin breakdown  - Assess and monitor skin integrity  - Assess and monitor nutrition and hydration status  - Monitor labs (i e  albumin)  - Assess for incontinence   - Turn and reposition patient  - Assist with mobility/ambulation  - Relieve pressure over bony prominences  - Avoid friction and shearing  - Provide appropriate hygiene as needed including keeping skin clean and dry  - Evaluate need for skin moisturizer/barrier cream  - Collaborate with interdisciplinary team (i e  Nutrition, Rehabilitation, etc )   - Patient/family teaching   Outcome: Progressing  Goal: Incision(s), wounds(s) or drain site(s) healing without S/S of infection  Description  INTERVENTIONS  - Assess and document risk factors for skin impairment   - Assess and document dressing, incision, wound bed, drain sites and surrounding tissue  - Initiate Nutrition services consult and/or wound management as needed   Outcome: Progressing

## 2019-02-10 NOTE — PLAN OF CARE
Problem: PHYSICAL THERAPY ADULT  Goal: Performs mobility at highest level of function for planned discharge setting  See evaluation for individualized goals  Description  Treatment/Interventions: Functional transfer training, LE strengthening/ROM, Therapeutic exercise, Endurance training, Patient/family training, Equipment eval/education, Bed mobility, Gait training, Spoke to nursing, Family, OT  Equipment Recommended: Prema Manzano       See flowsheet documentation for full assessment, interventions and recommendations  Note:   Prognosis: Fair  Problem List: Decreased strength, Decreased endurance, Impaired balance, Decreased mobility, Decreased safety awareness, Obesity, Decreased skin integrity, Pain  Assessment: PT consult received and evaluation complete  Pt is 63 y o  Female admitted from home alone for acute on chronic diastolic congestive heart failure  Pt agreeable to participate w/ therapy and identified by 2 patient identifiers: name and birth date  Precautions include: fall risk, pain, contact/isolation, multiple lines, O2 and telemetry  Pt presents sitting in bedside chair with O2 nasal cannula and telemetry monitoring  Pt has orders for up as tolerated  PTA pt was independent w/ all functional mobility w/ no use of DME or AD, lived in multi-level home and had 6 DENYS no railing, independent ADLS, and  independent w/ IADLS, yes driving, yes recent falls 1, and employed  Pt presents w/ RLE MMT 3+/5, LLE MMT 3+/5, limited testing 2* reported pain with any tactile touch, sit<>stand minAx1, gait training minAx1, and FLACC score for back pain  Pt tolerated treatment, but overall limited tolerance to activity 2* pain and fatigue  Pt's O2 sat post gait was reading 92%  At end of PT treatment pt left sitting in bedside recliner chair, lines intact, all needs in reach, call bell and phone in hand, and RN aware of patient status   Pt would benefit from continued skilled PT for deficits in strength, balance, locomotion, stair negotiation, functional endurance, functional mobility and safety awareness with mobility At this time recommend d/c optimally short term 2* decreased strength, decline from baseline function, impaired gait, impaired balance, and decreased cardiopulmonary functional endurance  Pt's preference is to go home to son's house where she would have first floor set up and only 4 DENYS  Continue to assess w/ progress made  History: co-morbidities, fall risk, lives alone, mult-level home, DENYS and multiple lines Exam: strength, balance, locomotion, functional endurance, functional mobility and safety awareness with mobility Barthel Index: 55 Modified [place]:4Clinical: unstable/unpredictable: fall risk, multiple lines, telemetry, ongoing management of medical status, pain, decreased safety awareness, use of AD and contact/isolation Complexity: high  Barriers to Discharge: Inaccessible home environment, Decreased caregiver support  Barriers to Discharge Comments: lives alone, 2 level home, 6 "scary" DENYS no railing  Recommendation: (STR vs  home with son and continued PT pending progress)     PT - OK to Discharge: Yes(yes if rehab, no if home at this time)    See flowsheet documentation for full assessment       Virginia Sierra, PT, DPT

## 2019-02-10 NOTE — OCCUPATIONAL THERAPY NOTE
[address]  Evaluation     Patient Name: Nevin THOMPSON Date: 2/10/2019  Problem List  Patient Active Problem List   Diagnosis    [facility]ch[facility] pulmonary disease) ([facility] )    Hypertension    Permanent atrial fibrillation (HCC)    [facility]    Wounds, multiple open, lower extremity    Hypoalbuminemia    Diabetes type 2, uncontrolled ([place] [address] )    Non-rheumatic mitral regurgitation    Nonsustained ventricular tachycardia ([facility] )    Obesity    Vitamin D deficiency    Depression    Dyslipidemia    Left ventricular hypertrophy    Stage 3 chronic kidney disease ([facility] )    Acute on chronic diastolic congestive heart failure (HCC)     Past Medical History  Past Medical History:   Diagnosis Date    Atrial fibrillation with RVR (HCC)     COPD (chronic obstructive pulmonary disease) ([place] [address] )     Diabetes type 2, uncontrolled ([place] [address] )     Hypertension      Past Surgical History  Past Surgical History:   Procedure Laterality Date    HERNIA REPAIR      HYSTERECTOMY      LAPAROSCOPIC CHOLECYSTECTOMY             02/10/19 9854   Note Type   Note type Eval only   Restrictions/Precautions   Weight Bearing Precautions Per Order No   Other Precautions Fall Risk;O2;Multiple lines;Telemetry;Contact/isolation  (r/o C-Diff)   Pain Assessment   Pain Assessment FLACC   Pain Type Chronic pain   Pain Location Back   Pain Orientation Bilateral   Patient's Stated Pain Goal No pain   Hospital Pain Intervention(s) Repositioned; Ambulation/increased activity; Emotional support; Rest   Pain Rating: FLACC (Rest) - Face 0   Pain Rating: FLACC (Rest) - Legs 0   Pain Rating: FLACC (Rest) - Activity 0   Pain Rating: FLACC (Rest) - Cry 1   Pain Rating: FLACC (Rest) - Consolability 0   Score: FLACC (Rest) 1   Pain Rating: FLACC (Activity) - Face 1   Pain Rating: FLACC (Activity) - Legs 0   Pain Rating: FLACC (Activity) - Activity 0   Pain Rating: FLACC (Activity) - Cry 1   Pain Rating: FLACC (Activity) - Consolability 1   Score: FLACC (Activity) 3   Home Living   Type of Home House   Home Layout Two level;Bed/bath upstairs;Stairs to enter without rails  (6 DENYS; FOS to 2nd floor with rails)   Bathroom Shower/Tub Tub/shower unit   Bathroom Toilet Standard   Bathroom Equipment Other (Comment)  (none per pt report)   [address] Other (Comment)  (none per pt report)   Additional Comments Pt lives alone in a two level house with 6 DENYS and FOS to 2nd floor bed/bath, however pt reports she can stay with son at D/C who lives in a two level house with 4 DENYS with rails and 1st floor setup  Prior Function   Level of [place] Independent with ADLs and functional mobility   Lives With Alone   Receives Help From Family  (local son)   ADL Assistance Independent   IADLs Independent   Falls in the last 6 months 1 to 4  (1 per pt report)   Vocational Full time employment   Comments At baseline, pt was I w/ ADLs, IADLs, and functional mobility/transfers w/o use of AD, (+) , FT employed, and reports 1 fall PTA  Lifestyle   Autonomy At baseline, pt was I w/ ADLs, IADLs, and functional mobility/transfers w/o use of AD, (+) , FT employed, and reports 1 fall PTA  Reciprocal Relationships Lives alone;  Local son   Service to Others FT employed   Intrinsic Gratification Spending time with 3 dogs   Psychosocial   Psychosocial (WDL) WDL   ADL   Where Assessed Chair   Eating Assistance 6  Modified independent   Grooming Assistance 5  Supervision/Setup   UB Bathing Assistance 5  Supervision/Setup   LB Bathing Assistance 4  Minimal Assistance   UB Dressing Assistance 5  Supervision/Setup   LB Dressing Assistance 3  Moderate Assistance   Toileting Assistance  3  Moderate Assistance   Functional Assistance 3  Moderate Assistance   Bed Mobility   Supine to Sit Unable to assess  (Pt seated OOB in chair at start/end of session)   Sit to Supine Unable to assess  (Pt seated OOB in chair at start/end of session)   Additional Comments Pt seated OOB in chair at end of session with call bell and phone within reach  All needs met and pt reports no further quesitons for OT at this time   Transfers   Sit to Stand 4  Minimal assistance   Additional items Assist x 1; Armrests; Increased time required;Verbal cues   Stand to Sit 4  Minimal assistance   Additional items Assist x 1; Armrests; Increased time required;Verbal cues   Toilet transfer 4  Minimal assistance   Additional items Assist x 1; Increased time required;Verbal cues;Standard toilet  (grab bar use on R)   Additional Comments Cues for safe technique and hand placement   Functional Mobility   Functional Mobility 4  Minimal assistance   Additional Comments Assist x1   Additional items Rolling walker   Balance   Static Sitting Fair +   Dynamic Sitting Fair   Static Standing Fair -   Dynamic Standing Fair -   Ambulatory Poor +   Activity Tolerance   Activity Tolerance Patient limited by fatigue   Medical Staff Made Aware Breanna Payne, PT   Nurse Made Aware Max Shukla, RN   RUE Assessment   RUE Assessment [facility]   RUE Strength   RUE Overall Strength Within Functional Limits - able to perform ADL tasks with strength  (4/5 throughout)   LUE Assessment   LUE Assessment WFL   LUE Strength   LUE Overall Strength Within Functional Limits - able to perform ADL tasks with strength  (4/5 throughout)   Hand Function   Gross Motor Coordination Functional   Fine Motor Coordination Functional   Sensation   Light Touch No apparent deficits   Proprioception   Proprioception No apparent deficits   Vision-Basic Assessment   Current Vision Wears glasses only for reading   Vision - Complex Assessment   Ocular Range of Motion [facility]   Acuity Able to read clock/calendar on wall without difficulty   Perception   Inattention/Neglect Appears intact   Cognition   Overall Cognitive Status [facility]   Arousal/Participation Alert; Cooperative   Attention Within functional limits Orientation Level Oriented X4   Memory Decreased recall of precautions   Following Commands Follows one step commands without difficulty   Comments Pt pleasant and cooperative; Decreased safety awareness at times   Assessment   Limitation Decreased ADL status; Decreased UE strength;Decreased Safe judgement during ADL;Decreased endurance;Decreased self-care trans;Decreased high-level ADLs   Prognosis Good   Assessment Pt is a 63 y o  female seen for OT evaluation s/p adm to [facility] on 2/8/2019 w/ worsening SOB for 2-3 weeks, B/L LE edema, and fluid retention and dx'd w/ acute on chronic CHF  Comorbidities affecting pts functional performance include a significant PMH of A-Fib with RVR, CHF, COPD, Diabetes type 2, Cellulitis, ventricular tachycardia, and HTN  Pt with active OT orders and activity orders for Up as tolerated  Pt lives alone in a two level house with 6 DENYS and FOS to 2nd floor bed/bath, however pt reports she can stay with son at D/C who lives in a two level house with 4 DENYS with rails and 1st floor setup  At baseline, pt was I w/ ADLs, IADLs, and functional mobility/transfers w/o use of AD, (+) , FT employed, and reports 1 fall PTA  Upon evaluation, pt currently requires Mod-Min A for LB ADLs, Mod A for toileting, Supervision for UB ADLs, and Min A for functional mobility/transfers 2* the following deficits impacting occupational performance: weakness, decreased strength, decreased balance, decreased tolerance and decreased safety awareness  These impairments, as well at pts steps to enter environment, limited home support, difficulty performing ADLS and difficulty performing IADLS  limit pts ability to safely engage in all baseline areas of occupation, including grooming, bathing, dressing, toileting, functional mobility/transfers, community mobility, laundry , house maintenance, meal prep, cleaning and leisure activities   Pt scored overall 55/100 on the Barthel Index   Based on the aforementioned OT evaluation, functional performance deficits, and assessments, pt has been identified as a Moderate complexity evaluation  Pt to continue to benefit from continued acute OT services during hospital stay to address defined deficits and to maximize level of functional independence in the following Occupational Performance areas: grooming, bathing/shower, toilet hygiene, dressing, medication management, socialization, health maintenance, functional mobility, community mobility, clothing management, cleaning, meal prep and household maintenance  From OT standpoint, recommend STR vs Home OT pending progress upon D/C  Pt prefers to return home to son's home  OT will continue to follow pt 3-5x/wk to address the following goals to  w/in 10-14 days:   Goals   Patient Goals "To see my dogs"   LTG Time Frame 10-   Long Term Goal Please refer to LTGs listed below   Plan   Treatment Interventions ADL retraining;Functional transfer training;UE strengthening/ROM; Endurance training;Patient/family training;Equipment evaluation/education; Compensatory technique education; Energy conservation; Activityengagement   Goal Expiration Date 19   Treatment Day 0   OT Frequency 3-5x/wk   Recommendation   OT Discharge Recommendation Short Term Rehab  (vs Home OT;  Pt prefers to return home to son's house)   OT - OK to Discharge   (yes to rehab; No to home at this time)   Barthel Index   Feeding 10   Bathing 0   Grooming Score 5   Dressing Score 5   Bladder Score 10   Bowels Score 10   Toilet Use Score 5   Transfers (Bed/Chair) Score 10   Mobility (Level Surface) Score 0   Stairs Score 0   Barthel Index Score 55   Modified [place] Scale   Modified Jason Scale 4      GOALS    1) Pt will improve activity tolerance to G for min 30 min txment sessions for increase engagement in functional tasks    2) Pt will complete UB/LB dressing/self care w/ mod I using adaptive device and DME as needed    3) Pt will complete bathing w/ Mod I w/ use of AE and DME as needed    4) Pt will complete toileting w/ mod I w/ G hygiene/thoroughness using DME as needed    5) Pt will improve functional transfers to Mod I on/off all surfaces using DME as needed w/ G balance/safety     6) Pt will improve functional mobility during ADL/IADL/leisure tasks to Mod I using DME as needed w/ G balance/safety     7) Pt will participate in simulated IADL management task to increase independence to Mod I w/ G safety and endurance    8) Pt will be attentive 100% of the time during ongoing cognitive assessment w/ G participation to assist w/ safe d/c planning/recommendations    9) Pt will demonstrate G carryover of pt/caregiver education and training as appropriate w/o cues w/ good tolerance to increase safety during functional tasks    10) Pt will demonstrate 100% carryover of energy conservation techniques t/o functional I/ADL/leisure tasks w/o cues s/p skilled education to increase endurance during functional tasks     11) Pt will increase BUE strength by 1 MM grade to increase independence in ADLs and transfers    12) Pt will be able to recall 6/6 CHF self-monitoring techniques at a Mod I level after education from therapist (ie: daily weights on scale, reducing sodium intake, etc ) w/o cues from therapist      BROOKE Baer/SAMIRA

## 2019-02-10 NOTE — NURSING NOTE
Pt continues to c/o lower back pain b/l and asked if she could receive any more pain medications  PRN toradol discontinued earlier today with only current order being 650mg Tylenol, which pt states she does not want, as she does not feel is it effective and is concerned for her kidney function  Explained to pt that the medication should not affect her kidney function, but rather her liver function and continued to deny Tylenol  Paged RRNP with a new order to increase Tylenol to 975mg q8hr PRN

## 2019-02-10 NOTE — PROGRESS NOTES
Progress Note - Mariela Hilliard 63 y o  female MRN: [id number]    Unit/Bed#: E4 -01 Encounter: [id number]      Subjective: The patient is breathing better  She denies chest pain  She has no nausea or vomiting  She did have loose bowels yesterday although this is improved  She is having back spasms  She has no sciatica  Physical Exam:   Temp:  [96 8 °F (36 °C)-97 3 °F (36 3 °C)] 97 3 °F (36 3 °C)  HR:  [75-78] 78  Resp:  [18-20] 18  BP: (106-123)/(59-95) 115/59    Gen:  Well-developed, obese, in no acute distress  Neck:  Supple  No lymphadenopathy, goiter, or bruit  Heart:  Irregular rhythm  No murmur, gallop, or rub  Lungs:  Clear to auscultation and percussion  No wheezing, rales, or rhonchi    Abd:  Soft with active bowel sounds  No mass, tenderness, organomegaly  Extremities:  Edema is somewhat better  Neuro:  Alert and oriented  No focal sign  Skin:  Warm and dry  The calves are wrapped  LABS:   CMP:   Lab Results   Component Value Date    SODIUM 141 02/10/2019    K 3 9 02/10/2019     02/10/2019    CO2 30 02/10/2019    BUN 39 (H) 02/10/2019    CREATININE 1 76 (H) 02/10/2019    CALCIUM 7 8 (L) 02/10/2019    EGFR 32 02/10/2019           Patient Active Problem List   Diagnosis    [facility]) [facility]    Hypertension    Permanent atrial fibrillation (HCC)    Acute respiratory failure with hypoxia (HCC)    Wounds, multiple open, lower extremity    Hypoalbuminemia    Diabetes type 2, uncontrolled ([address] )    Non-rheumatic mitral regurgitation    Nonsustained ventricular tachycardia ([address] )    Obesity    Vitamin D deficiency    Depression    Dyslipidemia    Left ventricular hypertrophy    Stage 3 chronic kidney disease (HCC)    Acute on chronic diastolic congestive heart failure (HCC)       Assessment/Plan:  1  Acute on chronic diastolic congestive heart failure  2  Acute hypoxic respiratory failure secondary to 1   3  COPD  4  Hypertension  5  Permanent atrial fibrillation  6  Type 2 diabetes  7  Chronic kidney disease stage 3  8  Morbid obesity  9  Nonsustained ventricular tachycardia  10  Dyslipidemia  11  Leg ulcers with cellulitis  12  Back pain    The patient diuresed well over night  We will continue with intravenous furosemide  The patient is still far from her dry weight  Her kidney function has remained stable thus far  She remains on antibiotics  Wound care has been requested to evaluate her legs  Because of chronic back pain, a spine x-ray will be obtained  Muscle relaxant will be added      VTE Pharmacologic Prophylaxis:  Eliquis  VTE Mechanical Prophylaxis: reason for no mechanical VTE prophylaxis CHF

## 2019-02-11 ENCOUNTER — APPOINTMENT (INPATIENT)
Dept: NON INVASIVE DIAGNOSTICS | Facility: HOSPITAL | Age: 59
DRG: 291 | End: 2019-02-11
Payer: COMMERCIAL

## 2019-02-11 PROBLEM — M62.838 MUSCLE SPASM: Status: ACTIVE | Noted: 2019-02-11

## 2019-02-11 PROBLEM — R79.89 ELEVATED LFTS: Status: ACTIVE | Noted: 2019-02-11

## 2019-02-11 LAB
ANION GAP SERPL CALCULATED.3IONS-SCNC: 7 MMOL/L (ref 4–13)
BUN SERPL-MCNC: 40 MG/DL (ref 5–25)
C DIFF TOX GENS STL QL NAA+PROBE: NORMAL
CALCIUM SERPL-MCNC: 7.5 MG/DL (ref 8.3–10.1)
CHLORIDE SERPL-SCNC: 103 MMOL/L (ref 100–108)
CO2 SERPL-SCNC: 31 MMOL/L (ref 21–32)
CREAT SERPL-MCNC: 1.75 MG/DL (ref 0.6–1.3)
GFR SERPL CREATININE-BSD FRML MDRD: 32 ML/MIN/1.73SQ M
GLUCOSE SERPL-MCNC: 111 MG/DL (ref 65–140)
GLUCOSE SERPL-MCNC: 117 MG/DL (ref 65–140)
GLUCOSE SERPL-MCNC: 129 MG/DL (ref 65–140)
GLUCOSE SERPL-MCNC: 86 MG/DL (ref 65–140)
GLUCOSE SERPL-MCNC: 99 MG/DL (ref 65–140)
POTASSIUM SERPL-SCNC: 4.4 MMOL/L (ref 3.5–5.3)
SODIUM SERPL-SCNC: 141 MMOL/L (ref 136–145)

## 2019-02-11 PROCEDURE — 93306 TTE W/DOPPLER COMPLETE: CPT

## 2019-02-11 PROCEDURE — 99233 SBSQ HOSP IP/OBS HIGH 50: CPT | Performed by: PHYSICIAN ASSISTANT

## 2019-02-11 PROCEDURE — 82948 REAGENT STRIP/BLOOD GLUCOSE: CPT

## 2019-02-11 PROCEDURE — 99254 IP/OBS CNSLTJ NEW/EST MOD 60: CPT | Performed by: INTERNAL MEDICINE

## 2019-02-11 PROCEDURE — 80048 BASIC METABOLIC PNL TOTAL CA: CPT | Performed by: INTERNAL MEDICINE

## 2019-02-11 PROCEDURE — 93306 TTE W/DOPPLER COMPLETE: CPT | Performed by: INTERNAL MEDICINE

## 2019-02-11 RX ORDER — METHOCARBAMOL 500 MG/1
750 TABLET, FILM COATED ORAL EVERY 6 HOURS PRN
Status: DISCONTINUED | OUTPATIENT
Start: 2019-02-11 | End: 2019-02-11

## 2019-02-11 RX ORDER — METHOCARBAMOL 500 MG/1
500 TABLET, FILM COATED ORAL EVERY 6 HOURS PRN
Status: DISCONTINUED | OUTPATIENT
Start: 2019-02-11 | End: 2019-02-14

## 2019-02-11 RX ORDER — NYSTATIN 100000 [USP'U]/G
1 POWDER TOPICAL 2 TIMES DAILY
Status: DISCONTINUED | OUTPATIENT
Start: 2019-02-11 | End: 2019-02-21 | Stop reason: HOSPADM

## 2019-02-11 RX ADMIN — APIXABAN 5 MG: 5 TABLET, FILM COATED ORAL at 17:03

## 2019-02-11 RX ADMIN — POTASSIUM CHLORIDE 40 MEQ: 1500 TABLET, EXTENDED RELEASE ORAL at 17:03

## 2019-02-11 RX ADMIN — METOPROLOL SUCCINATE 100 MG: 50 TABLET, EXTENDED RELEASE ORAL at 17:03

## 2019-02-11 RX ADMIN — DILTIAZEM HYDROCHLORIDE 120 MG: 120 CAPSULE, EXTENDED RELEASE ORAL at 10:19

## 2019-02-11 RX ADMIN — LIDOCAINE 1 PATCH: 50 PATCH CUTANEOUS at 10:21

## 2019-02-11 RX ADMIN — CEFAZOLIN SODIUM 1000 MG: 10 INJECTION, POWDER, FOR SOLUTION INTRAVENOUS at 10:22

## 2019-02-11 RX ADMIN — CEFAZOLIN SODIUM 1000 MG: 10 INJECTION, POWDER, FOR SOLUTION INTRAVENOUS at 19:38

## 2019-02-11 RX ADMIN — METHOCARBAMOL 500 MG: 500 TABLET, FILM COATED ORAL at 03:58

## 2019-02-11 RX ADMIN — POTASSIUM CHLORIDE 40 MEQ: 1500 TABLET, EXTENDED RELEASE ORAL at 10:19

## 2019-02-11 RX ADMIN — METHOCARBAMOL 500 MG: 500 TABLET, FILM COATED ORAL at 19:43

## 2019-02-11 RX ADMIN — CEFAZOLIN SODIUM 1000 MG: 10 INJECTION, POWDER, FOR SOLUTION INTRAVENOUS at 03:58

## 2019-02-11 RX ADMIN — FUROSEMIDE 80 MG: 10 INJECTION, SOLUTION INTRAMUSCULAR; INTRAVENOUS at 10:19

## 2019-02-11 RX ADMIN — NYSTATIN 1 APPLICATION: 100000 POWDER TOPICAL at 21:13

## 2019-02-11 RX ADMIN — APIXABAN 5 MG: 5 TABLET, FILM COATED ORAL at 10:19

## 2019-02-11 RX ADMIN — METHOCARBAMOL 500 MG: 500 TABLET, FILM COATED ORAL at 10:20

## 2019-02-11 RX ADMIN — ACETAMINOPHEN 975 MG: 325 TABLET, FILM COATED ORAL at 10:20

## 2019-02-11 RX ADMIN — FUROSEMIDE 80 MG: 10 INJECTION, SOLUTION INTRAMUSCULAR; INTRAVENOUS at 17:03

## 2019-02-11 RX ADMIN — METOPROLOL SUCCINATE 100 MG: 50 TABLET, EXTENDED RELEASE ORAL at 10:19

## 2019-02-11 NOTE — ASSESSMENT & PLAN NOTE
Creatinine improved with diuresis - 1 75 today, was 1 98 on admission - baseline is 1 4-1 7  Hold lisinopril

## 2019-02-11 NOTE — PLAN OF CARE
Problem: Potential for Falls  Goal: Patient will remain free of falls  Description  INTERVENTIONS:  - Assess patient frequently for physical needs  -  Identify cognitive and physical deficits and behaviors that affect risk of falls  -  [place] fall precautions as indicated by assessment   - Educate patient/family on patient safety including physical limitations  - Instruct patient to call for assistance with activity based on assessment  - Modify environment to reduce risk of injury  - Consider OT/PT consult to assist with strengthening/mobility   Outcome: Progressing     Problem: Nutrition/Hydration-ADULT  Goal: Nutrient/Hydration intake appropriate for improving, restoring or maintaining nutritional needs  Description  Monitor and assess patient's nutrition/hydration status for malnutrition (ex- brittle hair, bruises, dry skin, pale skin and conjunctiva, muscle wasting, smooth red tongue, and disorientation)  Collaborate with interdisciplinary team and initiate plan and interventions as ordered  Monitor patient's weight and dietary intake as ordered or per policy  Utilize nutrition screening tool and intervene per policy  Determine patient's food preferences and provide high-protein, high-caloric foods as appropriate       INTERVENTIONS:  - Monitor oral intake, urinary output, labs, and treatment plans  - Assess nutrition and hydration status and recommend course of action  - Evaluate amount of meals eaten  - Assist patient with eating if necessary   - Allow adequate time for meals  - Recommend/ encourage appropriate diets, oral nutritional supplements, and vitamin/mineral supplements  - Order, calculate, and assess calorie counts as needed  - Recommend, monitor, and adjust tube feedings and TPN/PPN based on assessed needs  - Assess need for intravenous fluids  - Provide specific nutrition/hydration education as appropriate  - Include patient/family/caregiver in decisions related to nutrition   Outcome: Progressing     Problem: Prexisting or High Potential for Compromised Skin Integrity  Goal: Skin integrity is maintained or improved  Description  INTERVENTIONS:  - Identify patients at risk for skin breakdown  - Assess and monitor skin integrity  - Assess and monitor nutrition and hydration status  - Monitor labs (i e  albumin)  - Assess for incontinence   - Turn and reposition patient  - Assist with mobility/ambulation  - Relieve pressure over bony prominences  - Avoid friction and shearing  - Provide appropriate hygiene as needed including keeping skin clean and dry  - Evaluate need for skin moisturizer/barrier cream  - Collaborate with interdisciplinary team (i e  Nutrition, Rehabilitation, etc )   - Patient/family teaching   Outcome: Progressing     Problem: PAIN - ADULT  Goal: Verbalizes/displays adequate comfort level or baseline comfort level  Description  Interventions:  - Encourage patient to monitor pain and request assistance  - Assess pain using appropriate pain scale  - Administer analgesics based on type and severity of pain and evaluate response  - Implement non-pharmacological measures as appropriate and evaluate response  - Consider cultural and social influences on pain and pain management  - Notify physician/advanced practitioner if interventions unsuccessful or patient reports new pain   Outcome: Progressing     Problem: INFECTION - ADULT  Goal: Absence or prevention of progression during hospitalization  Description  INTERVENTIONS:  - Assess and monitor for signs and symptoms of infection  - Monitor lab/diagnostic results  - Monitor all insertion sites, i e  indwelling lines, tubes, and drains  - Monitor endotracheal (as able) and nasal secretions for changes in amount and color  - [place] appropriate cooling/warming therapies per order  - Administer medications as ordered  - Instruct and encourage patient and family to use good hand hygiene technique  - Identify and instruct in appropriate isolation precautions for identified infection/condition   Outcome: Progressing  Goal: Absence of fever/infection during neutropenic period  Description  INTERVENTIONS:  - Monitor WBC  - Implement neutropenic guidelines   Outcome: Progressing     Problem: SAFETY ADULT  Goal: Maintain or return to baseline ADL function  Description  INTERVENTIONS:  -  Assess patient's ability to carry out ADLs; assess patient's baseline for ADL function and identify physical deficits which impact ability to perform ADLs (bathing, care of mouth/teeth, toileting, grooming, dressing, etc )  - Assess/evaluate cause of self-care deficits   - Assess range of motion  - Assess patient's mobility; develop plan if impaired  - Assess patient's need for assistive devices and provide as appropriate  - Encourage maximum independence but intervene and supervise when necessary  ¯ Involve family in performance of ADLs  ¯ Assess for home care needs following discharge   ¯ Request OT consult to assist with ADL evaluation and planning for discharge  ¯ Provide patient education as appropriate   Outcome: Progressing  Goal: Maintain or return mobility status to optimal level  Description  INTERVENTIONS:  - Assess patient's baseline mobility status (ambulation, transfers, stairs, etc )    - Identify cognitive and physical deficits and behaviors that affect mobility  - Identify mobility aids required to assist with transfers and/or ambulation (gait belt, sit-to-stand, lift, walker, cane, etc )  - [place] fall precautions as indicated by assessment  - Record patient progress and toleration of activity level on Mobility SBAR; progress patient to next Phase/Stage  - Instruct patient to call for assistance with activity based on assessment  - Request Rehabilitation consult to assist with strengthening/weightbearing, etc    Outcome: Progressing     Problem: DISCHARGE PLANNING  Goal: Discharge to home or other facility with appropriate resources  Description  INTERVENTIONS:  - Identify barriers to discharge w/patient and caregiver  - Arrange for needed discharge resources and transportation as appropriate  - Identify discharge learning needs (meds, wound care, etc )  - Arrange for interpretive services to assist at discharge as needed  - Refer to Case Management Department for coordinating discharge planning if the patient needs post-hospital services based on physician/advanced practitioner order or complex needs related to functional status, cognitive ability, or social support system   Outcome: Progressing     Problem: Knowledge Deficit  Goal: Patient/family/caregiver demonstrates understanding of disease process, treatment plan, medications, and discharge instructions  Description  Complete learning assessment and assess knowledge base  Interventions:  - Provide teaching at level of understanding  - Provide teaching via preferred learning methods   Outcome: Progressing     Problem: CARDIOVASCULAR - ADULT  Goal: Maintains optimal cardiac output and hemodynamic stability  Description  INTERVENTIONS:  - Monitor I/O, vital signs and rhythm  - Monitor for S/S and trends of decreased cardiac output i e  bleeding, hypotension  - Administer and titrate ordered vasoactive medications to optimize hemodynamic stability  - Assess quality of pulses, skin color and temperature  - Assess for signs of decreased coronary artery perfusion - ex   Angina  - Instruct patient to report change in severity of symptoms   Outcome: Progressing  Goal: Absence of cardiac dysrhythmias or at baseline rhythm  Description  INTERVENTIONS:  - Continuous cardiac monitoring, monitor vital signs, obtain 12 lead EKG if indicated  - Administer antiarrhythmic and heart rate control medications as ordered  - Monitor electrolytes and administer replacement therapy as ordered   Outcome: Progressing     Problem: METABOLIC, FLUID AND ELECTROLYTES - ADULT  Goal: Electrolytes maintained within normal limits  Description  INTERVENTIONS:  - Monitor labs and assess patient for signs and symptoms of electrolyte imbalances  - Administer electrolyte replacement as ordered  - Monitor response to electrolyte replacements, including repeat lab results as appropriate  - Instruct patient on fluid and nutrition as appropriate   Outcome: Progressing  Goal: Fluid balance maintained  Description  INTERVENTIONS:  - Monitor labs and assess for signs and symptoms of volume excess or deficit  - Monitor I/O and WT  - Instruct patient on fluid and nutrition as appropriate   Outcome: Progressing  Goal: Glucose maintained within target range  Description  INTERVENTIONS:  - Monitor Blood Glucose as ordered  - Assess for signs and symptoms of hyperglycemia and hypoglycemia  - Administer ordered medications to maintain glucose within target range  - Assess nutritional intake and initiate nutrition service referral as needed   Outcome: Progressing     Problem: SKIN/TISSUE INTEGRITY - ADULT  Goal: Skin integrity remains intact  Description  INTERVENTIONS  - Identify patients at risk for skin breakdown  - Assess and monitor skin integrity  - Assess and monitor nutrition and hydration status  - Monitor labs (i e  albumin)  - Assess for incontinence   - Turn and reposition patient  - Assist with mobility/ambulation  - Relieve pressure over bony prominences  - Avoid friction and shearing  - Provide appropriate hygiene as needed including keeping skin clean and dry  - Evaluate need for skin moisturizer/barrier cream  - Collaborate with interdisciplinary team (i e  Nutrition, Rehabilitation, etc )   - Patient/family teaching   Outcome: Progressing  Goal: Incision(s), wounds(s) or drain site(s) healing without S/S of infection  Description  INTERVENTIONS  - Assess and document risk factors for skin impairment   - Assess and document dressing, incision, wound bed, drain sites and surrounding tissue  - Initiate Nutrition services consult and/or wound management as needed   Outcome: Progressing

## 2019-02-11 NOTE — CONSULTS
Consult - Cardiology   Michelle Siddiqui 63 y o  female MRN: [id number]  Unit/Bed#: E4 -01 Encounter: [id number]        Reason For Consult:    Acute on chronic diastolic congestive heart failure  Essential hypertension  Permanent atrial fibrillation             Assessment and Plan:     1  Acute on chronic diastolic congestive heart failure  BNP 4,595  Trop 0 09  Weight on admission 148 kg (bed weight)  Standing weight today 2/11/19 is 141kg (312lbs)  From office visit February 2018 her weight was 184 lbs    2  Essential hypertension  Well controlled    3  Permanent atrial fibrillation  Rate controlled  AC with Eliquis     4  Diabetes mellitus type 2  5  Chronic kidney disease  Creatinine elevated but stable around 1 7  Last comparison is from 2016 which showed baseline 1 4-1 7  Continue to trend BMP    6  COPD    Plan:   CHF --> Continue IV diuresis Lasix 80 BID       May add Metolazone to speed things along       Continue Toprol  BID and Cardizem       ACE on hold due to renal fx       Repeat Echo pending      HTN --> Well controlled on Toprol and Cardizem     Afib --> Continue AC with Eliquis     May need to adjust dose of renal fx worsens     Rate controlled with Toprol and Cardizem       History Of Present Illness: This is a 81-year-old female with a past medical history including diastolic congestive heart failure, essential hypertension, permanent atrial fibrillation, diabetes, CKD, and COPD who presented to the hospital on 02/08/2019 secondary to lower extremity edema and shortness of breath  She is known to our group and has followed up with Dr Alla Beltran in the past (last visit 02/19/2018)  She has a known history of congestive heart failure but does not weigh herself at home  She is unsure of her baseline weight  She saw a primary care physician on 2/7/19 and was noted to be hypoxic, tachycardic, and tachypneic    It was advised that she come to the emergency department at this time however she declined  The next day 02/08/2019 she decided to come in for evaluation  About 3-4 weeks ago patient says that she stopped taking all of her diuretic medications completely  She did this secondary to low back pain  She says with her pain makes it very difficult to get up the stairs to the bathroom and her diuretics make her urinate quite frequently  Since stopping them completely she notes that she thinks she probably gained about 50 lb  She says her legs are very swollen and that the swelling extends up to her abdomen which she also states feels firm and distended  She does have some mild shortness of breath but this is only slightly worse than baseline  She notes she has 1 flight of stairs in the house and can still walk up some without significant dyspnea  She notes that she can probably walk the length of half a football field before getting short of breath  She denies chest pain or palpitations  She does not sleep lying flat due to chronic low back pain  Her main concern which brought her to the hospital is her lower extremity edema  Past Medical History:        Past Medical History:   Diagnosis Date    [facility] ([facility] )     [facility]     Diabetes type 2, uncontrolled ([place] [address] )     Hypertension       Past Surgical History:   Procedure Laterality Date    HERNIA REPAIR      HYSTERECTOMY      LAPAROSCOPIC CHOLECYSTECTOMY          Allergy:        No Known Allergies    Medications:       Prior to Admission medications    Medication Sig Start Date End Date Taking?  Authorizing Provider   albuterol (PROVENTIL HFA,VENTOLIN HFA) 90 mcg/act inhaler Inhale 2 puffs every 4 (four) hours as needed for wheezing 3/28/18  Yes Gilmar Helm MD   diltiazem [facility] CD) 120 mg 24 hr capsule Take 1 capsule (120 mg total) by mouth daily 6/26/18  Yes Gilmar Helm MD   ELIQUIS 5 MG Take 1 tablet (5 mg total) by mouth 2 (two) times a day 2/19/18 Yes Jaja Blount MD   furosemide (LASIX) 40 mg tablet Take 1 tablet (40 mg total) by mouth 2 (two) times a day 3/28/18  Yes Skinny Lund MD   metolazone (ZAROXOLYN) 5 mg tablet TAKE 1 TABLET BY MOUTH EVERY DAY 10/17/18  Yes Skinny Lund MD   metoprolol succinate (TOPROL-XL) 100 mg 24 hr tablet Take 1 tablet (100 mg total) by mouth every 12 (twelve) hours  Patient taking differently: Take 100 mg by mouth 2 (two) times a day   6/26/18  Yes Skinny Lund MD   lisinopril (ZESTRIL) 40 mg tablet Take 1 tablet (40 mg total) by mouth daily 2/19/18   Jaja Blount MD   metFORMIN (GLUCOPHAGE-XR) 750 mg 24 hr tablet Take 750 mg by mouth daily after dinner    Historical Provider, MD       Family History:     Family History   Problem Relation Age of Onset    Diabetes type II Mother     No Known Problems Father         She reports not knowing much about her father  [address] Diabetes Family     Hypertension Family     Stroke Family         Social History:       Social History     Socioeconomic History    Marital status:       Spouse name: None    Number of children: None    Years of education: None    Highest education level: None   Occupational History     Employer: [employer]    Occupation:      Comment: WORKING FULL TIME   Social Needs    Financial resource strain: None    Food insecurity:     Worry: None     Inability: None    Transportation needs:     Medical: None     Non-medical: None   Tobacco Use    Smoking status: Current Every Day Smoker     Packs/day: 0 00     Years: 43 00     Pack years: 0 00     Types: Cigarettes    Smokeless tobacco: Never Used    Tobacco comment: Currently half a pack a day, 6 CIGARETTES PER DAY    Substance and Sexual Activity    Alcohol use: Yes     Comment: socially, NO ALCOHOL USE    Drug use: Yes     Types: Marijuana    Sexual activity: None   Lifestyle    Physical activity:     Days per week: None     Minutes per session: None    Stress: None   Relationships    Social connections:     Talks on phone: None     Gets together: None     Attends Spiritism service: None     Active member of club or organization: None     Attends meetings of clubs or organizations: None     Relationship status: None    Intimate partner violence:     Fear of current or ex partner: None     Emotionally abused: None     Physically abused: None     Forced sexual activity: None   Other Topics Concern    None   Social History Narrative    NO LIVING WILL       ROS:  Symptoms her HPI  Remainder review of systems is negative    Exam:  General:  alert, oriented and in no distress, cooperative  Obese  Head: Normocephalic, atraumatic  Eyes:  EOMI  Pupils - equal, round, reactive to accomodation  No icterus  Normal Conjunctiva  Oropharynx: moist and normal-appearing mucosa  Neck: supple, symmetrical, trachea midline and no JVD  Heart:  Rate controlled, irregular rhythm,, No: murmer, rub or gallop, S1 & S2 normal   Respiratory effort / Chest Inspection: unlabored  Breathing comfortably on supplemental O2  Lungs: Fairly clear, possible scant rales at lung bases  Abdomen: distended: bowel sounds normal and soft, non-tender  Lower Limbs: Diffuse pitting edema at least 2+ extending above the knees              Dressing to b/l LE due to cellulitis and open wounds  Pulses[de-identified]  RLE - DP: present 1+                 LLE - DP: present 1+  Musculoskeletal: ROM grossly normal      DATA:      ECG:  Atrial fibrillation with rapid ventricular response  Ventricular rate 103  Incomplete right bundle-branch block                             Echocardiogram:  Repeat pending  From 2016:  SUMMARY     LEFT VENTRICLE:  The ventricle was mildly dilated  Systolic function was normal  Ejection fraction was estimated to be 55 %  There were no regional wall motion abnormalities    Wall thickness was moderately increased      LEFT ATRIUM:  The atrium was moderately dilated      RIGHT ATRIUM:  The atrium was moderately dilated      MITRAL VALVE:  There was moderate annular calcification  There was moderate regurgitation      TRICUSPID VALVE:  There was mild regurgitation      PULMONIC VALVE:  There was mild regurgitation      HISTORY: PRIOR HISTORY: Morbid obesity, atrial fibrillation, CHF, COPD,  hypertension, DM2, smoker     PROCEDURE: The procedure was performed at the bedside  This was a routine  study  The transthoracic approach was used  The study included complete 2D  imaging, M-mode, complete spectral Doppler, and color Doppler  The heart rate  was 94 bpm, at the start of the study  Images were obtained from the  parasternal, apical, subcostal, and suprasternal notch acoustic windows  Echocardiographic views were limited due to poor patient compliance, poor  acoustic window availability, decreased penetration, and lung interference  This was a technically difficult study      LEFT VENTRICLE: The ventricle was mildly dilated  Systolic function was normal   Ejection fraction was estimated to be 55 %  There were no regional wall motion  abnormalities  Wall thickness was moderately increased  DOPPLER: Transmitral  flow pattern: atrial fibrillation      RIGHT VENTRICLE: The size was normal  Systolic function was normal  Wall  thickness was normal      LEFT ATRIUM: The atrium was moderately dilated      RIGHT ATRIUM: The atrium was moderately dilated      MITRAL VALVE: There was moderate annular calcification  DOPPLER: There was no  evidence for stenosis  There was moderate regurgitation      AORTIC VALVE: The valve was trileaflet  Leaflets exhibited mildly to moderately  increased thickness  DOPPLER: There was no evidence for stenosis  There was no  regurgitation      TRICUSPID VALVE: The valve structure was normal  There was normal leaflet  separation  DOPPLER: The transtricuspid velocity was within the normal range  There was no evidence for stenosis   There was mild regurgitation      PULMONIC VALVE: Leaflets exhibited normal thickness, no calcification, and  normal cuspal separation  DOPPLER: The transpulmonic velocity was within the  normal range  There was mild regurgitation      PERICARDIUM: There was no pericardial effusion  The pericardium was normal in  appearance      AORTA: The root exhibited normal size      SYSTEMIC VEINS: IVC: The inferior vena cava was not well visualized      SYSTEM MEASUREMENT TABLES     2D  Ao Diam: 3 2 cm  IVSd: 1 5 cm  LA Diam: 5 3 cm  LVIDd: 5 7 cm  LVIDs: 4 cm  LVPWd: 1 4 cm       Ischemic Testing:         Weights: Wt Readings from Last 3 Encounters:   02/11/19 (!) 142 kg (312 lb 9 8 oz)   02/07/19 (!) 147 kg (323 lb)   03/28/18 (!) 141 kg (310 lb)   , Body mass index is 53 66 kg/m²           Lab Studies:    Results from last 7 days   Lab Units 02/08/19  1249   TROPONIN I ng/mL 0 09*        Results from last 7 days   Lab Units 02/09/19  0520   TRIGLYCERIDES mg/dL 61   HDL mg/dL 32*     Results from last 7 days   Lab Units 02/09/19  0520 02/08/19  1249   WBC Thousand/uL 9 03 8 37   HEMOGLOBIN g/dL 16 9* 18 1*   HEMATOCRIT % 56 0* 58 6*   PLATELETS Thousands/uL 171 164   ,   Results from last 7 days   Lab Units 02/11/19  0622 02/10/19  0557 02/08/19  2359 02/08/19  1249   POTASSIUM mmol/L 4 4 3 9 3 6 3 3*   CHLORIDE mmol/L 103 104 107 102   CO2 mmol/L 31 30 30 31   BUN mg/dL 40* 39* 40* 41*   CREATININE mg/dL 1 75* 1 76* 1 78* 1 98*   CALCIUM mg/dL 7 5* 7 8* 8 0* 8 7   ALK PHOS U/L  --   --  117* 124*   ALT U/L  --   --  22 29   AST U/L  --   --  27 39

## 2019-02-11 NOTE — ASSESSMENT & PLAN NOTE
Patient continues to desaturate when weaned from oxygen  Currently on 3 5 L nasal cannula  Patient does not use home O2  Will monitor with ongoing diuresis - patient also with COPD  May require home O2  Will add nebulizations at time

## 2019-02-11 NOTE — ASSESSMENT & PLAN NOTE
Patient with chronic muscle spasm  X-ray of lumbar spine showing L1 compression  Per nursing, Robaxin to 500 mg prn begins to make patient drowsy - will not increase this dose  Lidoderm patch in place  Will add heating pad

## 2019-02-11 NOTE — ASSESSMENT & PLAN NOTE
Appreciate wound care  Patient states she has chronic wounds of lower extremities bilaterally - now wounds are open and weeping  Patient on IV Ancef  Elevate lower extremities  Continue to keep wounds clean and dry  Would appreciate addition of Podiatry in Dermatology for input

## 2019-02-11 NOTE — ASSESSMENT & PLAN NOTE
Controlled by metoprolol 100 mg b i d , diltiazem 120 mg daily    Patient takes lisinopril 40 mg, held for now due to SAPNA

## 2019-02-11 NOTE — ASSESSMENT & PLAN NOTE
Lab Results   Component Value Date    HGBA1C 6 3 02/09/2019       Recent Labs     02/10/19  1526 02/10/19  2025 02/11/19  0708 02/11/19  1054   POCGLU 111 124 117 111       Blood Sugar Average: Last 72 hrs:  (P) 111 [id number]   Glucose has been well-controlled with ISS  Will continue  Patient takes metformin at home

## 2019-02-11 NOTE — ASSESSMENT & PLAN NOTE
Creatinine continuing to improve with diuresis - 1 61 day, was 1 98 on admission - baseline is 1 4-1 7  Hold lisinopril

## 2019-02-11 NOTE — PROGRESS NOTES
Progress Note - Bessy Brood 1960, 63 y o  female MRN: [id number]  Unit/Bed#: E4 -01 Encounter: [id number]  Primary Care Provider: Lisseth De La Rosa MD   Date and time admitted to hospital: 2/8/2019 12:18 PM    * Acute on chronic diastolic congestive heart failure ([facility] [address] )  Assessment & Plan  Ongoing diuresis weight loss  Per cards, continue IV Lasix 80 mg b i d  Pending repeat echo  I/Os /daily weights  Presenting BNP: 4,595  Will trend  Patient had stopped her Lasix before presenting  Muscle spasm  Assessment & Plan  Patient with chronic muscle spasm  Attempt to obtain x-ray of lumbar spine unsuccessful due to pain  Per nursing, Robaxin to 500 mg prn begins to make patient drowsy - will not increase this dose  Lidoderm patch in place  Will add heating pad  Stage 3 [facility] disease ([facility]  Assessment & Plan  Creatinine improved with diuresis - 1 75 today, was 1 98 on admission - baseline is 1 4-1 7  Hold lisinopril  Obesity  Assessment & Plan  Would benefit from weight loss  Wounds, multiple open, lower extremity  Assessment & Plan  Appreciate wound care  Patient states she has chronic wounds of lower extremities bilaterally - now wounds are open and weeping  Patient on IV Ancef  Elevate lower extremities  Continue to keep wounds clean and dry  Acute respiratory failure with hypoxia [facility])  Assessment & Plan  Patient continues to desaturate when weaned from oxygen  Currently on 3 5 L nasal cannula  Patient does not use home O2  Will monitor with ongoing diuresis - patient also with COPD  May require home O2  Elevated LFTs  Assessment & Plan  Found in ED  Bili 0 12  ALK-PHOS 124  Ultrasound of abdomen done in ED showing no choledocholithiasis, tapering common bile duct  Patient with prior cholecystectomy - not complaining of abdominal pain at this time  Will trend CMP      Depression  Assessment & Plan  Patient takes Zoloft at home, however, has not been taking recently  Diabetes type 2, uncontrolled [facility])  Assessment & Plan  Lab Results   Component Value Date    HGBA1C 6 3 02/09/2019       Recent Labs     02/10/19  1526 02/10/19  2025 02/11/19  0708 02/11/19  1054   POCGLU 111 124 117 111       Blood Sugar Average: Last 72 hrs:  (P) 111 [id number]   Glucose has been well-controlled with ISS  Will continue  Patient takes metformin at home  Permanent atrial fibrillation [facility])  Assessment & Plan  Patient on Eliquis 5 mg  Rate controlled  Hypertension  Assessment & Plan  Controlled by metoprolol 100 mg b i d , diltiazem 120 mg daily  Patient takes lisinopril 40 mg, held for now due to SAPNA    COPD (chronic obstructive pulmonary disease) ([facility] [address] )  Assessment & Plan  No exacerbation at this time  Continue albuterol p r n  Myrtle Organ

## 2019-02-11 NOTE — ASSESSMENT & PLAN NOTE
Patient with chronic muscle spasm  Attempt to obtain x-ray of lumbar spine unsuccessful due to pain  Increased Robaxin to 750 mg prn  Lidoderm patch in place  Will add heating pad

## 2019-02-11 NOTE — ASSESSMENT & PLAN NOTE
Ongoing diuresis weight loss  Per cards, continue IV Lasix 80 mg b i d  Pending repeat echo  I/Os /daily weights  Presenting BNP: 4,595  Will trend  Patient had stopped her Lasix before presenting

## 2019-02-11 NOTE — ASSESSMENT & PLAN NOTE
Ongoing diuresis and weight loss  Per cards, continue IV Lasix 80 mg b i d  Pending repeat echo  I/Os /daily weights  Presenting BNP: 4,595  Will trend  Patient's creatinine reduced from 1 75 to 1 61  Patient had stopped her Lasix before presenting

## 2019-02-11 NOTE — ASSESSMENT & PLAN NOTE
Patient continues to desaturate when weaned from oxygen  Currently on 3 5 L nasal cannula  Patient does not use home O2  Will monitor with ongoing diuresis - patient also with COPD  May require home O2

## 2019-02-11 NOTE — PLAN OF CARE
Problem: Potential for Falls  Goal: Patient will remain free of falls  Description  INTERVENTIONS:  - Assess patient frequently for physical needs  -  Identify cognitive and physical deficits and behaviors that affect risk of falls  -  [place] fall precautions as indicated by assessment   - Educate patient/family on patient safety including physical limitations  - Instruct patient to call for assistance with activity based on assessment  - Modify environment to reduce risk of injury  - Consider OT/PT consult to assist with strengthening/mobility   Outcome: Progressing     Problem: Nutrition/Hydration-ADULT  Goal: Nutrient/Hydration intake appropriate for improving, restoring or maintaining nutritional needs  Description  Monitor and assess patient's nutrition/hydration status for malnutrition (ex- brittle hair, bruises, dry skin, pale skin and conjunctiva, muscle wasting, smooth red tongue, and disorientation)  Collaborate with interdisciplinary team and initiate plan and interventions as ordered  Monitor patient's weight and dietary intake as ordered or per policy  Utilize nutrition screening tool and intervene per policy  Determine patient's food preferences and provide high-protein, high-caloric foods as appropriate       INTERVENTIONS:  - Monitor oral intake, urinary output, labs, and treatment plans  - Assess nutrition and hydration status and recommend course of action  - Evaluate amount of meals eaten  - Assist patient with eating if necessary   - Allow adequate time for meals  - Recommend/ encourage appropriate diets, oral nutritional supplements, and vitamin/mineral supplements  - Order, calculate, and assess calorie counts as needed  - Recommend, monitor, and adjust tube feedings and TPN/PPN based on assessed needs  - Assess need for intravenous fluids  - Provide specific nutrition/hydration education as appropriate  - Include patient/family/caregiver in decisions related to nutrition   Outcome: Progressing     Problem: Prexisting or High Potential for Compromised Skin Integrity  Goal: Skin integrity is maintained or improved  Description  INTERVENTIONS:  - Identify patients at risk for skin breakdown  - Assess and monitor skin integrity  - Assess and monitor nutrition and hydration status  - Monitor labs (i e  albumin)  - Assess for incontinence   - Turn and reposition patient  - Assist with mobility/ambulation  - Relieve pressure over bony prominences  - Avoid friction and shearing  - Provide appropriate hygiene as needed including keeping skin clean and dry  - Evaluate need for skin moisturizer/barrier cream  - Collaborate with interdisciplinary team (i e  Nutrition, Rehabilitation, etc )   - Patient/family teaching   Outcome: Progressing     Problem: PAIN - ADULT  Goal: Verbalizes/displays adequate comfort level or baseline comfort level  Description  Interventions:  - Encourage patient to monitor pain and request assistance  - Assess pain using appropriate pain scale  - Administer analgesics based on type and severity of pain and evaluate response  - Implement non-pharmacological measures as appropriate and evaluate response  - Consider cultural and social influences on pain and pain management  - Notify physician/advanced practitioner if interventions unsuccessful or patient reports new pain   Outcome: Progressing     Problem: INFECTION - ADULT  Goal: Absence or prevention of progression during hospitalization  Description  INTERVENTIONS:  - Assess and monitor for signs and symptoms of infection  - Monitor lab/diagnostic results  - Monitor all insertion sites, i e  indwelling lines, tubes, and drains  - Monitor endotracheal (as able) and nasal secretions for changes in amount and color  - [place] appropriate cooling/warming therapies per order  - Administer medications as ordered  - Instruct and encourage patient and family to use good hand hygiene technique  - Identify and instruct in appropriate isolation precautions for identified infection/condition   Outcome: Progressing  Goal: Absence of fever/infection during neutropenic period  Description  INTERVENTIONS:  - Monitor WBC  - Implement neutropenic guidelines   Outcome: Progressing     Problem: SAFETY ADULT  Goal: Maintain or return to baseline ADL function  Description  INTERVENTIONS:  -  Assess patient's ability to carry out ADLs; assess patient's baseline for ADL function and identify physical deficits which impact ability to perform ADLs (bathing, care of mouth/teeth, toileting, grooming, dressing, etc )  - Assess/evaluate cause of self-care deficits   - Assess range of motion  - Assess patient's mobility; develop plan if impaired  - Assess patient's need for assistive devices and provide as appropriate  - Encourage maximum independence but intervene and supervise when necessary  ¯ Involve family in performance of ADLs  ¯ Assess for home care needs following discharge   ¯ Request OT consult to assist with ADL evaluation and planning for discharge  ¯ Provide patient education as appropriate   Outcome: Progressing  Goal: Maintain or return mobility status to optimal level  Description  INTERVENTIONS:  - Assess patient's baseline mobility status (ambulation, transfers, stairs, etc )    - Identify cognitive and physical deficits and behaviors that affect mobility  - Identify mobility aids required to assist with transfers and/or ambulation (gait belt, sit-to-stand, lift, walker, cane, etc )  - [place] fall precautions as indicated by assessment  - Record patient progress and toleration of activity level on Mobility SBAR; progress patient to next Phase/Stage  - Instruct patient to call for assistance with activity based on assessment  - Request Rehabilitation consult to assist with strengthening/weightbearing, etc    Outcome: Progressing     Problem: DISCHARGE PLANNING  Goal: Discharge to home or other facility with appropriate resources  Description  INTERVENTIONS:  - Identify barriers to discharge w/patient and caregiver  - Arrange for needed discharge resources and transportation as appropriate  - Identify discharge learning needs (meds, wound care, etc )  - Arrange for interpretive services to assist at discharge as needed  - Refer to Case Management Department for coordinating discharge planning if the patient needs post-hospital services based on physician/advanced practitioner order or complex needs related to functional status, cognitive ability, or social support system   Outcome: Progressing     Problem: Knowledge Deficit  Goal: Patient/family/caregiver demonstrates understanding of disease process, treatment plan, medications, and discharge instructions  Description  Complete learning assessment and assess knowledge base  Interventions:  - Provide teaching at level of understanding  - Provide teaching via preferred learning methods   Outcome: Progressing     Problem: CARDIOVASCULAR - ADULT  Goal: Maintains optimal cardiac output and hemodynamic stability  Description  INTERVENTIONS:  - Monitor I/O, vital signs and rhythm  - Monitor for S/S and trends of decreased cardiac output i e  bleeding, hypotension  - Administer and titrate ordered vasoactive medications to optimize hemodynamic stability  - Assess quality of pulses, skin color and temperature  - Assess for signs of decreased coronary artery perfusion - ex   Angina  - Instruct patient to report change in severity of symptoms   Outcome: Progressing  Goal: Absence of cardiac dysrhythmias or at baseline rhythm  Description  INTERVENTIONS:  - Continuous cardiac monitoring, monitor vital signs, obtain 12 lead EKG if indicated  - Administer antiarrhythmic and heart rate control medications as ordered  - Monitor electrolytes and administer replacement therapy as ordered   Outcome: Progressing     Problem: METABOLIC, FLUID AND ELECTROLYTES - ADULT  Goal: Electrolytes maintained within normal limits  Description  INTERVENTIONS:  - Monitor labs and assess patient for signs and symptoms of electrolyte imbalances  - Administer electrolyte replacement as ordered  - Monitor response to electrolyte replacements, including repeat lab results as appropriate  - Instruct patient on fluid and nutrition as appropriate   Outcome: Progressing  Goal: Fluid balance maintained  Description  INTERVENTIONS:  - Monitor labs and assess for signs and symptoms of volume excess or deficit  - Monitor I/O and WT  - Instruct patient on fluid and nutrition as appropriate   Outcome: Progressing  Goal: Glucose maintained within target range  Description  INTERVENTIONS:  - Monitor Blood Glucose as ordered  - Assess for signs and symptoms of hyperglycemia and hypoglycemia  - Administer ordered medications to maintain glucose within target range  - Assess nutritional intake and initiate nutrition service referral as needed   Outcome: Progressing     Problem: SKIN/TISSUE INTEGRITY - ADULT  Goal: Skin integrity remains intact  Description  INTERVENTIONS  - Identify patients at risk for skin breakdown  - Assess and monitor skin integrity  - Assess and monitor nutrition and hydration status  - Monitor labs (i e  albumin)  - Assess for incontinence   - Turn and reposition patient  - Assist with mobility/ambulation  - Relieve pressure over bony prominences  - Avoid friction and shearing  - Provide appropriate hygiene as needed including keeping skin clean and dry  - Evaluate need for skin moisturizer/barrier cream  - Collaborate with interdisciplinary team (i e  Nutrition, Rehabilitation, etc )   - Patient/family teaching   Outcome: Progressing  Goal: Incision(s), wounds(s) or drain site(s) healing without S/S of infection  Description  INTERVENTIONS  - Assess and document risk factors for skin impairment   - Assess and document dressing, incision, wound bed, drain sites and surrounding tissue  - Initiate Nutrition services consult and/or wound management as needed   Outcome: Progressing

## 2019-02-11 NOTE — SOCIAL WORK
CM attempted to meet with pt 3 more times and all times, she was soundly sleeping  CM dept will follow tomorrow for CM open assessment and to discuss STR

## 2019-02-11 NOTE — SOCIAL WORK
CM attempted to meet with pt to complete open assessment  Pt was soundly sleeping and holding lunch menu  CM awoke pt, who reported that she was very sleepy  CM asked if she needed help ordering lunch and she refused  Pt kept falling asleep during CM conversation and then asked CM to return later this afternoon  CM will re-attempt open assessment this afternoon

## 2019-02-11 NOTE — SOCIAL WORK
HEART FAILURE CARE COORDINATOR: Attempted to meet with patient around 12:45 today but the patient was too sleepy to speak with  I did leave the Heart Failure education booklet and [place] Heart Failure number and will revisit another day

## 2019-02-11 NOTE — ASSESSMENT & PLAN NOTE
Appreciate wound care  Patient states she has chronic wounds of lower extremities bilaterally - now wounds are open and weeping  Patient on IV Ancef  Elevate lower extremities  Continue to keep wounds clean and dry

## 2019-02-11 NOTE — ASSESSMENT & PLAN NOTE
Found in ED  Bili 0 12  ALK-PHOS 124  Ultrasound of abdomen done in ED showing no choledocholithiasis, tapering common bile duct  Patient with prior cholecystectomy - not complaining of abdominal pain at this time  Will trend CMP

## 2019-02-11 NOTE — ASSESSMENT & PLAN NOTE
Lab Results   Component Value Date    HGBA1C 6 3 02/09/2019       Recent Labs     02/10/19  1526 02/10/19  2025 02/11/19  0708 02/11/19  1054   POCGLU 111 124 117 111       Blood Sugar Average: Last 72 hrs:  (P) 111 [id number]   Glucose has been well-controlled with ISS  Will continue  Patient takes metformin at home

## 2019-02-12 ENCOUNTER — APPOINTMENT (INPATIENT)
Dept: NON INVASIVE DIAGNOSTICS | Facility: HOSPITAL | Age: 59
DRG: 291 | End: 2019-02-12
Payer: COMMERCIAL

## 2019-02-12 DIAGNOSIS — Z71.89 COMPLEX CARE COORDINATION: Primary | ICD-10-CM

## 2019-02-12 LAB
ALBUMIN SERPL BCP-MCNC: 2.3 G/DL (ref 3.5–5)
ALP SERPL-CCNC: 97 U/L (ref 46–116)
ALT SERPL W P-5'-P-CCNC: 8 U/L (ref 12–78)
ANION GAP SERPL CALCULATED.3IONS-SCNC: 8 MMOL/L (ref 4–13)
AST SERPL W P-5'-P-CCNC: 19 U/L (ref 5–45)
BASOPHILS # BLD AUTO: 0.1 THOUSANDS/ΜL (ref 0–0.1)
BASOPHILS NFR BLD AUTO: 1 % (ref 0–1)
BILIRUB SERPL-MCNC: 1.28 MG/DL (ref 0.2–1)
BUN SERPL-MCNC: 40 MG/DL (ref 5–25)
CALCIUM SERPL-MCNC: 8 MG/DL (ref 8.3–10.1)
CHLORIDE SERPL-SCNC: 104 MMOL/L (ref 100–108)
CO2 SERPL-SCNC: 32 MMOL/L (ref 21–32)
CREAT SERPL-MCNC: 1.61 MG/DL (ref 0.6–1.3)
EOSINOPHIL # BLD AUTO: 0.22 THOUSAND/ΜL (ref 0–0.61)
EOSINOPHIL NFR BLD AUTO: 3 % (ref 0–6)
ERYTHROCYTE [DISTWIDTH] IN BLOOD BY AUTOMATED COUNT: 16.2 % (ref 11.6–15.1)
GFR SERPL CREATININE-BSD FRML MDRD: 35 ML/MIN/1.73SQ M
GLUCOSE SERPL-MCNC: 109 MG/DL (ref 65–140)
GLUCOSE SERPL-MCNC: 123 MG/DL (ref 65–140)
GLUCOSE SERPL-MCNC: 131 MG/DL (ref 65–140)
GLUCOSE SERPL-MCNC: 169 MG/DL (ref 65–140)
GLUCOSE SERPL-MCNC: 89 MG/DL (ref 65–140)
HCT VFR BLD AUTO: 59.7 % (ref 34.8–46.1)
HGB BLD-MCNC: 17.1 G/DL (ref 11.5–15.4)
IMM GRANULOCYTES # BLD AUTO: 0.02 THOUSAND/UL (ref 0–0.2)
IMM GRANULOCYTES NFR BLD AUTO: 0 % (ref 0–2)
LYMPHOCYTES # BLD AUTO: 0.62 THOUSANDS/ΜL (ref 0.6–4.47)
LYMPHOCYTES NFR BLD AUTO: 8 % (ref 14–44)
MCH RBC QN AUTO: 30.8 PG (ref 26.8–34.3)
MCHC RBC AUTO-ENTMCNC: 28.6 G/DL (ref 31.4–37.4)
MCV RBC AUTO: 107 FL (ref 82–98)
MONOCYTES # BLD AUTO: 1.4 THOUSAND/ΜL (ref 0.17–1.22)
MONOCYTES NFR BLD AUTO: 18 % (ref 4–12)
NEUTROPHILS # BLD AUTO: 5.45 THOUSANDS/ΜL (ref 1.85–7.62)
NEUTS SEG NFR BLD AUTO: 70 % (ref 43–75)
NRBC BLD AUTO-RTO: 0 /100 WBCS
PLATELET # BLD AUTO: 161 THOUSANDS/UL (ref 149–390)
PMV BLD AUTO: 11.4 FL (ref 8.9–12.7)
POTASSIUM SERPL-SCNC: 4.9 MMOL/L (ref 3.5–5.3)
PROT SERPL-MCNC: 6.6 G/DL (ref 6.4–8.2)
RBC # BLD AUTO: 5.56 MILLION/UL (ref 3.81–5.12)
SODIUM SERPL-SCNC: 144 MMOL/L (ref 136–145)
WBC # BLD AUTO: 7.81 THOUSAND/UL (ref 4.31–10.16)

## 2019-02-12 PROCEDURE — 97116 GAIT TRAINING THERAPY: CPT

## 2019-02-12 PROCEDURE — 94760 N-INVAS EAR/PLS OXIMETRY 1: CPT

## 2019-02-12 PROCEDURE — 87070 CULTURE OTHR SPECIMN AEROBIC: CPT | Performed by: DERMATOLOGY

## 2019-02-12 PROCEDURE — 99233 SBSQ HOSP IP/OBS HIGH 50: CPT | Performed by: PHYSICIAN ASSISTANT

## 2019-02-12 PROCEDURE — 97530 THERAPEUTIC ACTIVITIES: CPT

## 2019-02-12 PROCEDURE — 85025 COMPLETE CBC W/AUTO DIFF WBC: CPT | Performed by: PHYSICIAN ASSISTANT

## 2019-02-12 PROCEDURE — 97535 SELF CARE MNGMENT TRAINING: CPT

## 2019-02-12 PROCEDURE — 87077 CULTURE AEROBIC IDENTIFY: CPT | Performed by: DERMATOLOGY

## 2019-02-12 PROCEDURE — 93923 UPR/LXTR ART STDY 3+ LVLS: CPT

## 2019-02-12 PROCEDURE — 87147 CULTURE TYPE IMMUNOLOGIC: CPT | Performed by: DERMATOLOGY

## 2019-02-12 PROCEDURE — 87186 SC STD MICRODIL/AGAR DIL: CPT | Performed by: DERMATOLOGY

## 2019-02-12 PROCEDURE — 87205 SMEAR GRAM STAIN: CPT | Performed by: DERMATOLOGY

## 2019-02-12 PROCEDURE — 80053 COMPREHEN METABOLIC PANEL: CPT | Performed by: INTERNAL MEDICINE

## 2019-02-12 PROCEDURE — 82948 REAGENT STRIP/BLOOD GLUCOSE: CPT

## 2019-02-12 PROCEDURE — 99232 SBSQ HOSP IP/OBS MODERATE 35: CPT | Performed by: INTERNAL MEDICINE

## 2019-02-12 RX ORDER — POTASSIUM CHLORIDE 20 MEQ/1
20 TABLET, EXTENDED RELEASE ORAL 2 TIMES DAILY
Status: DISCONTINUED | OUTPATIENT
Start: 2019-02-12 | End: 2019-02-18

## 2019-02-12 RX ORDER — ACETIC ACID 0.25 G/100ML
IRRIGANT IRRIGATION 3 TIMES DAILY
Status: DISCONTINUED | OUTPATIENT
Start: 2019-02-12 | End: 2019-02-14

## 2019-02-12 RX ORDER — METOLAZONE 5 MG/1
5 TABLET ORAL ONCE
Status: COMPLETED | OUTPATIENT
Start: 2019-02-12 | End: 2019-02-12

## 2019-02-12 RX ORDER — SODIUM CHLORIDE FOR INHALATION 0.9 %
3 VIAL, NEBULIZER (ML) INHALATION EVERY 6 HOURS PRN
Status: DISCONTINUED | OUTPATIENT
Start: 2019-02-12 | End: 2019-02-21 | Stop reason: HOSPADM

## 2019-02-12 RX ORDER — LEVALBUTEROL 1.25 MG/.5ML
1.25 SOLUTION, CONCENTRATE RESPIRATORY (INHALATION) EVERY 6 HOURS PRN
Status: DISCONTINUED | OUTPATIENT
Start: 2019-02-12 | End: 2019-02-21 | Stop reason: HOSPADM

## 2019-02-12 RX ADMIN — LIDOCAINE 1 PATCH: 50 PATCH CUTANEOUS at 09:52

## 2019-02-12 RX ADMIN — CEFAZOLIN SODIUM 1000 MG: 10 INJECTION, POWDER, FOR SOLUTION INTRAVENOUS at 02:17

## 2019-02-12 RX ADMIN — METOLAZONE 5 MG: 5 TABLET ORAL at 16:59

## 2019-02-12 RX ADMIN — METHOCARBAMOL 500 MG: 500 TABLET, FILM COATED ORAL at 23:58

## 2019-02-12 RX ADMIN — NYSTATIN 1 APPLICATION: 100000 POWDER TOPICAL at 09:55

## 2019-02-12 RX ADMIN — METHOCARBAMOL 500 MG: 500 TABLET, FILM COATED ORAL at 16:58

## 2019-02-12 RX ADMIN — METOPROLOL SUCCINATE 100 MG: 50 TABLET, EXTENDED RELEASE ORAL at 09:54

## 2019-02-12 RX ADMIN — ACETIC ACID 1 APPLICATION: 250 IRRIGANT IRRIGATION at 18:34

## 2019-02-12 RX ADMIN — CEFAZOLIN SODIUM 1000 MG: 10 INJECTION, POWDER, FOR SOLUTION INTRAVENOUS at 10:46

## 2019-02-12 RX ADMIN — FUROSEMIDE 80 MG: 10 INJECTION, SOLUTION INTRAMUSCULAR; INTRAVENOUS at 16:59

## 2019-02-12 RX ADMIN — CEFAZOLIN SODIUM 1000 MG: 10 INJECTION, POWDER, FOR SOLUTION INTRAVENOUS at 18:31

## 2019-02-12 RX ADMIN — MUPIROCIN 1 APPLICATION: 20 OINTMENT TOPICAL at 18:34

## 2019-02-12 RX ADMIN — INSULIN LISPRO 2 UNITS: 100 INJECTION, SOLUTION INTRAVENOUS; SUBCUTANEOUS at 22:13

## 2019-02-12 RX ADMIN — MUPIROCIN: 20 OINTMENT TOPICAL at 23:06

## 2019-02-12 RX ADMIN — APIXABAN 5 MG: 5 TABLET, FILM COATED ORAL at 09:52

## 2019-02-12 RX ADMIN — NYSTATIN 1 APPLICATION: 100000 POWDER TOPICAL at 17:01

## 2019-02-12 RX ADMIN — APIXABAN 5 MG: 5 TABLET, FILM COATED ORAL at 17:00

## 2019-02-12 RX ADMIN — DILTIAZEM HYDROCHLORIDE 120 MG: 120 CAPSULE, EXTENDED RELEASE ORAL at 09:52

## 2019-02-12 RX ADMIN — METOPROLOL SUCCINATE 100 MG: 50 TABLET, EXTENDED RELEASE ORAL at 17:00

## 2019-02-12 RX ADMIN — METHOCARBAMOL 500 MG: 500 TABLET, FILM COATED ORAL at 09:52

## 2019-02-12 RX ADMIN — POTASSIUM CHLORIDE 20 MEQ: 1500 TABLET, EXTENDED RELEASE ORAL at 17:00

## 2019-02-12 RX ADMIN — FUROSEMIDE 80 MG: 10 INJECTION, SOLUTION INTRAMUSCULAR; INTRAVENOUS at 09:53

## 2019-02-12 RX ADMIN — ACETIC ACID: 250 IRRIGANT IRRIGATION at 22:12

## 2019-02-12 RX ADMIN — METHOCARBAMOL 500 MG: 500 TABLET, FILM COATED ORAL at 02:14

## 2019-02-12 NOTE — PROGRESS NOTES
Progress Note - Mami [facility] 1960, 63 y o  female MRN: [id number]  Unit/Bed#: E4 -01 Encounter: [id number]  Primary Care Provider: Jared Beckham MD   Date and time admitted to hospital: 2/8/2019 12:18 PM    * Acute on chronic diastolic congestive heart failure ([facility] [address] )  Assessment & Plan  Ongoing diuresis and weight loss  Per cards, continue IV Lasix 80 mg b i d  Pending repeat echo  I/Os /daily weights  Presenting BNP: 4,595  Will trend  Patient's creatinine reduced from 1 75 to 1 61  Patient had stopped her Lasix before presenting  Muscle spasm  Assessment & Plan  Patient with chronic muscle spasm  X-ray of lumbar spine showing L1 compression  Per nursing, Robaxin to 500 mg prn begins to make patient drowsy - will not increase this dose  Lidoderm patch in place  Will add heating pad  Stage 3 chronic kidney disease ([facility] [address] )  Assessment & Plan  Creatinine continuing to improve with diuresis - 1 61 day, was 1 98 on admission - baseline is 1 4-1 7  Hold lisinopril  Obesity  Assessment & Plan  Would benefit from weight loss  Wounds, multiple open, lower extremity  Assessment & Plan  Appreciate wound care  Patient states she has chronic wounds of lower extremities bilaterally - now wounds are open and weeping  Patient on IV Ancef  Elevate lower extremities  Continue to keep wounds clean and dry  Would appreciate addition of Podiatry in Dermatology for input  Acute respiratory failure with hypoxia [facility])  Assessment & Plan  Patient continues to desaturate when weaned from oxygen  Currently on 3 5 L nasal cannula  Patient does not use home O2  Will monitor with ongoing diuresis - patient also with COPD  May require home O2  Will add nebulizations at time  Elevated LFTs  Assessment & Plan  Found in ED  Bili 0 12  ALK-PHOS 124  Ultrasound of abdomen done in ED showing no choledocholithiasis, tapering common bile duct    Patient with prior cholecystectomy - not complaining of abdominal pain at this time  Will trend CMP  Depression  Assessment & Plan  Patient takes Zoloft at home, however, has not been taking recently  Diabetes type 2, uncontrolled [facility])  Assessment & Plan  Lab Results   Component Value Date    HGBA1C 6 3 02/09/2019       Recent Labs     02/10/19  1526 02/10/19  2025 02/11/19  0708 02/11/19  1054   POCGLU 111 124 117 111       Blood Sugar Average: Last 72 hrs:  (P) 111 [id number]   Glucose has been well-controlled with ISS  Will continue  Patient takes metformin at home  Permanent atrial fibrillation [facility])  Assessment & Plan  Patient on Eliquis 5 mg  Rate controlled  Hypertension  Assessment & Plan  Controlled by metoprolol 100 mg b i d , diltiazem 120 mg daily  Patient takes lisinopril 40 mg, held for now due to SAPNA    COPD (chronic obstructive pulmonary disease) [facility])  Assessment & Plan  Patient requiring NC  Outpatient takes albuterol p r n     Will add duo neb, respiratory protocol  VTE Pharmacologic Prophylaxis:   Pharmacologic: Apixaban (Eliquis)  Mechanical VTE Prophylaxis in Place: No    Patient Centered Rounds: I have performed bedside rounds with nursing staff today  Discussions with Specialists or Other Care Team Provider:  Nursing    Education and Discussions with Family / Patient:  Discussed with patient    Time Spent for Care: 45 minutes  More than 50% of total time spent on counseling and coordination of care as described above  Current Length of Stay: 4 day(s)    Current Patient Status: Inpatient   Certification Statement: The patient will continue to require additional inpatient hospital stay due to Ongoing diuresis    Discharge Plan:  Patient to be discharged home medical    Code Status: Level 1 - Full Code      Subjective:   Patient notes slightly better with breathing  She is able to sit up in chair  Denies pain in legs  Denies feeling feverish    States she has diarrhea "depending on what she eats "    Review of systems  Denies fever, chills, malaise  Denies sore throat or ear pain  Denies chest pain, palpitations, calf pain  Denies abdominal pain nausea vomiting constipation  Denies weakness, numbness, tingling  Objective:     Vitals:   Temp (24hrs), Av 3 °F (36 3 °C), Min:97 °F (36 1 °C), Max:97 8 °F (36 6 °C)    Temp:  [97 °F (36 1 °C)-97 8 °F (36 6 °C)] 97 2 °F (36 2 °C)  HR:  [69-97] 97  Resp:  [18] 18  BP: (115-128)/(75-89) 128/87  SpO2:  [95 %-96 %] 95 %  Body mass index is 53 36 kg/m²  Input and Output Summary (last 24 hours): Intake/Output Summary (Last 24 hours) at 2019 1440  Last data filed at 2019 1222  Gross per 24 hour   Intake 1140 ml   Output 1650 ml   Net -510 ml       Physical Exam:     Physical Exam   Constitutional: She is oriented to person, place, and time  She appears well-developed  No distress  Sitting up in chair - appears comfortable  HENT:   Head: Normocephalic and atraumatic  Eyes: Right eye exhibits no discharge  Left eye exhibits no discharge  No scleral icterus  Cardiovascular: Normal rate and regular rhythm  Exam reveals no gallop and no friction rub  No murmur heard  Pulmonary/Chest: Effort normal  No respiratory distress  She has rales  Abdominal: Soft  Bowel sounds are normal  She exhibits no distension  There is no tenderness  Obese abdomen  Neurological: She is alert and oriented to person, place, and time  Skin: Skin is warm and dry  Diffuse weeping wounds on bilateral lower extremities, erythematous shins - some wounds that apparently necrotic centers  Nursing note and vitals reviewed        Additional Data:     Labs:    Results from last 7 days   Lab Units 19  0502   WBC Thousand/uL 7 81   HEMOGLOBIN g/dL 17 1*   HEMATOCRIT % 59 7*   PLATELETS Thousands/uL 161   NEUTROS PCT % 70   LYMPHS PCT % 8*   MONOS PCT % 18*   EOS PCT % 3     Results from last 7 days   Lab Units 19  0502   SODIUM mmol/L 144 POTASSIUM mmol/L 4 9   CHLORIDE mmol/L 104   CO2 mmol/L 32   BUN mg/dL 40*   CREATININE mg/dL 1 61*   ANION GAP mmol/L 8   CALCIUM mg/dL 8 0*   ALBUMIN g/dL 2 3*   TOTAL BILIRUBIN mg/dL 1 28*   ALK PHOS U/L 97   ALT U/L 8*   AST U/L 19   GLUCOSE RANDOM mg/dL 89     Results from last 7 days   Lab Units 02/08/19  1249   INR  1 37*     Results from last 7 days   Lab Units 02/12/19  1211 02/12/19  0726 02/11/19  2055 02/11/19  1607 02/11/19  1054 02/11/19  0708 02/10/19  2025 02/10/19  1526 02/10/19  1057 02/10/19  0701 02/09/19  2208 02/09/19  1610   POC GLUCOSE mg/dl 131 109 99 86 111 117 124 111 104 103 112 112     Results from last 7 days   Lab Units 02/09/19  0520   HEMOGLOBIN A1C % 6 3     Results from last 7 days   Lab Units 02/08/19  1249   LACTIC ACID mmol/L 1 6           * I Have Reviewed All Lab Data Listed Above  * Additional Pertinent Lab Tests Reviewed: All Labs Within Last 24 Hours Reviewed      Recent Cultures (last 7 days):     Results from last 7 days   Lab Units 02/10/19  0900   C DIFF TOXIN B  NEGATIVE for C difficle toxin by PCR          Last 24 Hours Medication List:     Current Facility-Administered Medications:  acetaminophen 975 mg Oral Q8H PRN MAYNOR Damon    albuterol 2 puff Inhalation Q4H PRN Andrew Garay PA-C    apixaban 5 mg Oral BID Brielle Yang PA-C    cefazolin 1,000 mg Intravenous Q8H Brielle Yang PA-C Last Rate: Stopped (02/12/19 1120)   diltiazem 120 mg Oral Daily Brielle Yang PA-C    furosemide 80 mg Intravenous BID (diuretic) Aram Levy MD    influenza vaccine 0 5 mL Intramuscular Prior to discharge Aram Levy MD    insulin lispro 2-12 Units Subcutaneous TID AC Brielle Yang PA-C    insulin lispro 2-12 Units Subcutaneous HS Brielle Yang PA-C    lidocaine 1 patch Topical Daily Brielle Yang PA-C    methocarbamol 500 mg Oral Q6H PRN Mary Grace Sanchez PA-C    metolazone 5 mg Oral Once Truong Ross MD    metoprolol succinate 100 mg Oral BID [facility] IOANA Yang    nicotine 1 patch Transdermal Daily Brielle Yang PA-C    nystatin 1 application Topical BID Brielle Yang PA-C    ondansetron 4 mg Intravenous Q8H PRN Brielle Yang PA-C    potassium chloride 20 mEq Oral BID Melinda Ba MD         Today, Patient Was Seen By: Elijah Wooten PA-C    ** Please Note: Dictation voice to text software may have been used in the creation of this document   **

## 2019-02-12 NOTE — SOCIAL WORK
CM met with pt at bedside to introduce role and to perform the following SW assessment  Pt lives in alone in [facility] and has friends and family around that routinely come in to check on pt  Prior to admission, pt was independent with ADLs  Pt has hx with [facility]  Pt reports she does not want to go to [facility] and will be discharging to her son's home in [address] [place] [address] near [place] and requests home health at that address upon discharge  Cm will try to reach her son this afternoon to find out where his address is and what [facility] agencies that service that area  Cm following

## 2019-02-12 NOTE — OCCUPATIONAL THERAPY NOTE
[address] Progress Note     Patient Name: Wanda Henry  CCPCB'Z Date: 2/12/2019  Problem List  Patient Active Problem List   Diagnosis    COPD (chronic obstructive pulmonary disease) ([facility] )    Hypertension    Permanent atrial fibrillation (HCC)    Acute respiratory failure with hypoxia ([facility])    Wounds, multiple open, lower extremity    Hypoalbuminemia    Diabetes type 2, uncontrolled ([facility] )    Non-rheumatic mitral regurgitation    Nonsustained ventricular tachycardia ([facility] )    Obesity    Vitamin D deficiency    Depression    Dyslipidemia    Left ventricular hypertrophy    Stage 3 chronic kidney disease (HCC)    Acute on chronic diastolic congestive heart failure (HCC)    Muscle spasm    Elevated LFTs           02/12/19 1147   Restrictions/Precautions   Weight Bearing Precautions Per Order No   Other Precautions Fall Risk;O2;Multiple lines   General   Response to Previous Treatment Patient with no complaints from previous session   Lifestyle   Autonomy At baseline, pt was I w/ ADLs, IADLs, and functional mobility/transfers w/o use of AD, (+) , FT employed, and reports 1 fall PTA  Reciprocal Relationships Lives alone; Local son   Service to Others FT employed   Intrinsic Gratification Spending time with 3 dogs   Pain Assessment   Pain Assessment No/denies pain   Pain Score No Pain   ADL   Where Assessed Sitting at sink   Grooming Assistance 5  Supervision/Setup   Grooming Deficit Setup;Supervision/safety; Increased time to complete   Grooming Comments Light grooming tasks (wash/dry face and hands, teeth care) completed with Supervision 2* setup required and increased time to complete  UB Bathing Assistance 4  Minimal Assistance   UB Bathing Deficit Setup;Verbal cueing;Supervision/safety; Increased time to complete; Chest;Abdomen   UB Bathing Comments UB bathing completed with Min A 2* assist for thoroughness 2* increased body habitus     LB Bathing Assistance 2  Maximal Assistance   LB Bathing Deficit Setup;Verbal cueing;Supervision/safety; Increased time to complete;Perineal area; Buttocks;Right lower leg including foot; Left lower leg including foot   LB Bathing Comments LB bathing completed with Max A  Pt able to wash B/L upper legs, however required assist to wash B/L lower legs, B/L feet, buttocks, and perineal hygiene  UB Dressing Assistance 4  Minimal Assistance   UB Dressing Deficit Setup;Verbal cueing;Supervision/safety; Increased time to complete;Pull around back;Pull down in back   UB Dressing Comments Assist to bring gown down/around in back   LB Dressing Assistance 2  Maximal Assistance   LB Dressing Deficit Setup;Verbal cueing;Supervision/safety; Increased time to complete; Don/doff R sock; Don/doff L sock   LB Dressing Comments LB dressing completed with Max A to don/doff B/L socks 2* decreased functional reach demonstrated  Functional Standing Tolerance   Time 4-5 mins   Activity Self care tasks standing at sink   Comments No LOB noted   Bed Mobility   Supine to Sit Unable to assess  (Pt seated OOB in chair at start/end of session)   Sit to Supine Unable to assess  (Pt seated OOB in chair at start/end of session)   Additional Comments Pt seated OOB in chair at end of session with call bell and phone within reach  All needs met and pt reports no further questions for OT at this time  Transfers   Sit to Stand 4  Minimal assistance   Additional items Assist x 1; Armrests; Increased time required;Verbal cues   Stand to Sit 4  Minimal assistance   Additional items Assist x 1; Armrests; Increased time required;Verbal cues   Stand pivot 4  Minimal assistance   Additional items Assist x 1; Armrests; Increased time required;Verbal cues   Toilet transfer 4  Minimal assistance   Additional items Assist x 1; Increased time required;Verbal cues;Standard toilet  (grab bar use on R)   Additional Comments Cues for safe technique and hand placement   Functional Mobility   Functional Mobility 4  Minimal assistance   Additional Comments Assist x1   Additional items Rolling walker   Toilet Transfers   Toilet Transfer From Rolling walker   Toilet Transfer Type To and from   Toilet Transfer to Standard toilet   Toilet Transfer Technique Stand pivot; Ambulating   Toilet Transfers Minimal assistance   Toilet Transfers Comments Assist x1 with grab bar use on R   Cognition   Overall Cognitive Status [facility]   Arousal/Participation Alert; Cooperative   Attention Attends with cues to redirect   Orientation Level Oriented X4   Memory Decreased recall of precautions   Following Commands Follows one step commands without difficulty   Activity Tolerance   Activity Tolerance Patient limited by fatigue   Medical Staff Made Aware Pt appropriate to be seen per Marjan Vitale RN   Assessment   Assessment Pt seen for OT treatment session this AM focusing on functional activity tolerance, ADLs, and functional mobility/transfers  Pt alert and cooperative throughout session  Pt seated OOB in chair at start of session  Transfers (sit<>stand, RW<>Standard toilet) completed with Min A with cues for safe technique and hand placement 2* decreased safety awareness demonstrated  Min A required for functional mobility with use of RW 2* CGA required for balance/steadying and assist for line management  ADL routine completed while seated/standing at sink  Light grooming tasks (wash/dry face and hands, teeth care) completed with Supervision 2* setup required and increased time to complete  UB bathing completed with Min A 2* assist for thoroughness 2* increased body habitus  LB bathing completed with Max A  Pt able to wash B/L upper legs, however required assist to wash B/L lower legs, B/L feet, buttocks, and perineal hygiene  LB dressing completed with Max A to don/doff B/L socks 2* decreased functional reach demonstrated  Pt seated OOB in chair at end of session with call bell and phone within reach   All needs met and pt reports no further questions for OT at this time  Continue to recommend STR upon D/C at this time, however pt prefers to return home  If pt declines STR, recommend Home OT  OT to follow pt on caseload  OT to follow pt on caseload  Plan   Treatment Interventions ADL retraining;Functional transfer training;UE strengthening/ROM; Endurance training;Patient/family training;Equipment evaluation/education; Compensatory technique education; Energy conservation; Activityengagement   Goal Expiration Date 02/24/19   Treatment Day 1   OT Frequency 3-5x/wk   Recommendation   OT Discharge Recommendation Short Term Rehab   OT - OK to Discharge Yes  (when medically cleared to rehab)   Barthel Index   Feeding 10   Bathing 0   Grooming Score 5   Dressing Score 5   Bladder Score 10   Bowels Score 10   Toilet Use Score 5   Transfers (Bed/Chair) Score 10   Mobility (Level Surface) Score 0   Stairs Score 0   Barthel Index Score 55   Modified [place] Scale   Modified [place] Scale 4       Joslyn Granda, OTR/L

## 2019-02-12 NOTE — PHYSICAL THERAPY NOTE
Addendum to correct date of service time to actual date of service time  Neil Devries, PT,DPT                                                                                  PHYSICAL THERAPY NOTE          Patient Name: Jude Noe  XKWUP'Z Date: 2/12/2019  Time in: 1104 Time out: 1146  Total Time: 42 minutes     02/12/19 1246   Pain Assessment   Pain Assessment No/denies pain   Pain Score No Pain   Restrictions/Precautions   Weight Bearing Precautions Per Order No   Other Precautions Fall Risk;Multiple lines;O2   General   Chart Reviewed Yes   Response to Previous Treatment Patient reporting fatigue but able to participate  Family/Caregiver Present No   Cognition   Overall Cognitive Status WFL   Arousal/Participation Alert; Responsive; Cooperative   Attention Attends with cues to redirect   Orientation Level Oriented X4   Memory Decreased recall of precautions   Following Commands Follows one step commands without difficulty   Subjective   Subjective With encouragement pt agreeable to participate  Bed Mobility   Additional Comments Pt received sitting OOB in recliner chair upon arrival  Bed mobility not observed  Transfers   Sit to Stand 4  Minimal assistance   Additional items Assist x 1; Increased time required;Verbal cues;Armrests   Stand to Sit 4  Minimal assistance   Additional items Assist x 1; Increased time required;Verbal cues;Armrests   Stand pivot 4  Minimal assistance   Additional items Assist x 1; Increased time required;Verbal cues  (toilet to chair in bathroom)   Additional Comments Pt provided with verbal cueing for safe technique  Ambulation/Elevation   Gait pattern Short stride; Forward Flexion;Decreased foot clearance;Poor UE support   Gait Assistance 4  Minimal assist   Additional items Assist x 1;Verbal cues   Assistive Device Rolling walker   Distance Pt ambulated 30ftx2 with verbal cueing for safety, stepping technique, posture/body mechanics, and step length   Limited distance 2* fatigue  Balance   Static Sitting Fair +   Dynamic Sitting Fair   Static Standing Fair   Dynamic Standing Fair -   Ambulatory Fair -   Endurance Deficit   Endurance Deficit Yes   Endurance Deficit Description fatigue, weakness, and deconditioning   Activity Tolerance   Activity Tolerance Patient limited by fatigue   Medical Staff [address] Amaral, KARON   Nurse Made Aware yes; Qian Massey, RN   Assessment   Prognosis Fair   Problem List Decreased strength;Decreased endurance; Impaired balance;Decreased mobility; Decreased safety awareness; Obesity; Decreased skin integrity   Assessment PT treatment completed  Pt identified by 2 patient identifiers: name and birth date  With encouragement pt agreeable to participate and seen for mobility, gait, balance, activity tolerance/functional endurance and PT education  Precautions include: fall risk, multiple lines and O2  Pt presents sitting in bedside recliner chair upon arrival with O2  Pt noted to be sitting on heating pad and to have indentation marks from heating pad  RN notified and pt to take a break from heating pad at this time  Pt demonstrates sit<>stand minAx1, SPT toilet to chair minAx1, gait training minAx1 w/ RW  Pt worked on standing balance while performing ADLS with OT  Pt able to stand x12 minutes with BUE support on RW or on sink  Pt demonstrates forward flexed posture and poor UE support  Pt provided education and cueing for body mechanics and posture with dynamic movements when working on hygiene and per-icare  Pt denies pain at this time  Pt would benefit from continued skilled PT to continue progress with  mobility, gait, balance, activity tolerance/functional endurance and PT education  At this time recommend discharge to short term rehab  Pt would benefit from use of rolling walker  At end of PT treatment pt left sitting in bedside recliner chair with BLE elevated, lines intact, all needs in reach, call bell and phone in hand, and RN aware of patient status   No further questions or concerns for PT at this time  Barriers to Discharge Inaccessible home environment;Decreased caregiver support   Barriers to Discharge Comments lives alone, 2 level home, and DENYS   Goals   Patient Goals "to see her babies (dogs)"   LTG Expiration Date 02/24/19   Treatment Day 1   Plan   Treatment/Interventions Functional transfer training; Endurance training;Patient/family training;Gait training;Spoke to nursing;OT   Progress Slow progress, decreased activity tolerance   PT Frequency Other (Comment)  (3-5x/wk)   Recommendation   Recommendation Other (Comment)  (short term rehab)   Equipment Recommended Walker   PT - OK to Discharge Yes  (yes to rehab, no if home at this time)   Kayy Downs, PT,DPT

## 2019-02-12 NOTE — PROGRESS NOTES
Progress Note - Sarah Brown 63 y o  female MRN: [id number]    Unit/Bed#: E4 -01 Encounter: [id number]  Subjective:   C/o back pain    Diuresing    Breathing not bad but was not problematic on admit    Still with edema/weeping  Felicitas Vanda No chest pain or palpitations  No dizziness  Objective:   Vitals: Blood pressure 125/89, pulse 78, temperature (!) 97 °F (36 1 °C), temperature source Temporal, resp  rate 18, height 5' 4" (1 626 m), weight (!) 141 kg (310 lb 13 6 oz), SpO2 95 %  ,Body mass index is 53 36 kg/m²  CBC with diff:   Results from last 7 days   Lab Units 02/12/19  0502   WBC Thousand/uL 7 81   RBC Million/uL 5 56*   HEMOGLOBIN g/dL 17 1*   HEMATOCRIT % 59 7*   MCV fL 107*   MCH pg 30 8   MCHC g/dL 28 6*   RDW % 16 2*   MPV fL 11 4   PLATELETS Thousands/uL 161     CMP:   Results from last 7 days   Lab Units 02/12/19  0502   SODIUM mmol/L 144   POTASSIUM mmol/L 4 9   CHLORIDE mmol/L 104   CO2 mmol/L 32   BUN mg/dL 40*   CREATININE mg/dL 1 61*   CALCIUM mg/dL 8 0*   AST U/L 19   ALT U/L 8*   ALK PHOS U/L 97   EGFR ml/min/1 73sq m 35         Physical exam:  Lungs-decreased breath sounds with a few basilar rales  No wheezing or rhonchi  Heart-irregular without audible murmur rub or gallop  Abdomen-obese  Nontender without mass organomegaly  Extremities-at least 2+ edema  Legs wrapped    Assessment:  1  Acute on chronic diastolic heart failure due to omission of medications  Gradually improving with IV diuresis  2  Chronic atrial fibrillation  Patient on Eliquis  On metoprolol and diltiazem for rate control-rate adequate  3  CKD  4  MR-appears mild      5  HTN-decent control on beta blocker and CCB    Plan:   Will give metolazone x1 prior to PM lasix (on lasix 80 IV BID  Decrease KCL to 20 BID  Continue diltiazem and metoprolol  Continue eliquis  Repeat BMP in AM    Tran Keller MD

## 2019-02-12 NOTE — PLAN OF CARE
Problem: Potential for Falls  Goal: Patient will remain free of falls  Description  INTERVENTIONS:  - Assess patient frequently for physical needs  -  Identify cognitive and physical deficits and behaviors that affect risk of falls  -  [place] fall precautions as indicated by assessment   - Educate patient/family on patient safety including physical limitations  - Instruct patient to call for assistance with activity based on assessment  - Modify environment to reduce risk of injury  - Consider OT/PT consult to assist with strengthening/mobility   Outcome: Progressing     Problem: Nutrition/Hydration-ADULT  Goal: Nutrient/Hydration intake appropriate for improving, restoring or maintaining nutritional needs  Description  Monitor and assess patient's nutrition/hydration status for malnutrition (ex- brittle hair, bruises, dry skin, pale skin and conjunctiva, muscle wasting, smooth red tongue, and disorientation)  Collaborate with interdisciplinary team and initiate plan and interventions as ordered  Monitor patient's weight and dietary intake as ordered or per policy  Utilize nutrition screening tool and intervene per policy  Determine patient's food preferences and provide high-protein, high-caloric foods as appropriate       INTERVENTIONS:  - Monitor oral intake, urinary output, labs, and treatment plans  - Assess nutrition and hydration status and recommend course of action  - Evaluate amount of meals eaten  - Assist patient with eating if necessary   - Allow adequate time for meals  - Recommend/ encourage appropriate diets, oral nutritional supplements, and vitamin/mineral supplements  - Order, calculate, and assess calorie counts as needed  - Recommend, monitor, and adjust tube feedings and TPN/PPN based on assessed needs  - Assess need for intravenous fluids  - Provide specific nutrition/hydration education as appropriate  - Include patient/family/caregiver in decisions related to nutrition   Outcome: Progressing     Problem: Prexisting or High Potential for Compromised Skin Integrity  Goal: Skin integrity is maintained or improved  Description  INTERVENTIONS:  - Identify patients at risk for skin breakdown  - Assess and monitor skin integrity  - Assess and monitor nutrition and hydration status  - Monitor labs (i e  albumin)  - Assess for incontinence   - Turn and reposition patient  - Assist with mobility/ambulation  - Relieve pressure over bony prominences  - Avoid friction and shearing  - Provide appropriate hygiene as needed including keeping skin clean and dry  - Evaluate need for skin moisturizer/barrier cream  - Collaborate with interdisciplinary team (i e  Nutrition, Rehabilitation, etc )   - Patient/family teaching   Outcome: Progressing     Problem: PAIN - ADULT  Goal: Verbalizes/displays adequate comfort level or baseline comfort level  Description  Interventions:  - Encourage patient to monitor pain and request assistance  - Assess pain using appropriate pain scale  - Administer analgesics based on type and severity of pain and evaluate response  - Implement non-pharmacological measures as appropriate and evaluate response  - Consider cultural and social influences on pain and pain management  - Notify physician/advanced practitioner if interventions unsuccessful or patient reports new pain   Outcome: Progressing     Problem: INFECTION - ADULT  Goal: Absence or prevention of progression during hospitalization  Description  INTERVENTIONS:  - Assess and monitor for signs and symptoms of infection  - Monitor lab/diagnostic results  - Monitor all insertion sites, i e  indwelling lines, tubes, and drains  - Monitor endotracheal (as able) and nasal secretions for changes in amount and color  - [place] appropriate cooling/warming therapies per order  - Administer medications as ordered  - Instruct and encourage patient and family to use good hand hygiene technique  - Identify and instruct in appropriate isolation precautions for identified infection/condition   Outcome: Progressing  Goal: Absence of fever/infection during neutropenic period  Description  INTERVENTIONS:  - Monitor WBC  - Implement neutropenic guidelines   Outcome: Progressing     Problem: SAFETY ADULT  Goal: Maintain or return to baseline ADL function  Description  INTERVENTIONS:  -  Assess patient's ability to carry out ADLs; assess patient's baseline for ADL function and identify physical deficits which impact ability to perform ADLs (bathing, care of mouth/teeth, toileting, grooming, dressing, etc )  - Assess/evaluate cause of self-care deficits   - Assess range of motion  - Assess patient's mobility; develop plan if impaired  - Assess patient's need for assistive devices and provide as appropriate  - Encourage maximum independence but intervene and supervise when necessary  ¯ Involve family in performance of ADLs  ¯ Assess for home care needs following discharge   ¯ Request OT consult to assist with ADL evaluation and planning for discharge  ¯ Provide patient education as appropriate   Outcome: Progressing  Goal: Maintain or return mobility status to optimal level  Description  INTERVENTIONS:  - Assess patient's baseline mobility status (ambulation, transfers, stairs, etc )    - Identify cognitive and physical deficits and behaviors that affect mobility  - Identify mobility aids required to assist with transfers and/or ambulation (gait belt, sit-to-stand, lift, walker, cane, etc )  - [place] fall precautions as indicated by assessment  - Record patient progress and toleration of activity level on Mobility SBAR; progress patient to next Phase/Stage  - Instruct patient to call for assistance with activity based on assessment  - Request Rehabilitation consult to assist with strengthening/weightbearing, etc    Outcome: Progressing     Problem: DISCHARGE PLANNING  Goal: Discharge to home or other facility with appropriate resources  Description  INTERVENTIONS:  - Identify barriers to discharge w/patient and caregiver  - Arrange for needed discharge resources and transportation as appropriate  - Identify discharge learning needs (meds, wound care, etc )  - Arrange for interpretive services to assist at discharge as needed  - Refer to Case Management Department for coordinating discharge planning if the patient needs post-hospital services based on physician/advanced practitioner order or complex needs related to functional status, cognitive ability, or social support system   Outcome: Progressing     Problem: Knowledge Deficit  Goal: Patient/family/caregiver demonstrates understanding of disease process, treatment plan, medications, and discharge instructions  Description  Complete learning assessment and assess knowledge base  Interventions:  - Provide teaching at level of understanding  - Provide teaching via preferred learning methods   Outcome: Progressing     Problem: CARDIOVASCULAR - ADULT  Goal: Maintains optimal cardiac output and hemodynamic stability  Description  INTERVENTIONS:  - Monitor I/O, vital signs and rhythm  - Monitor for S/S and trends of decreased cardiac output i e  bleeding, hypotension  - Administer and titrate ordered vasoactive medications to optimize hemodynamic stability  - Assess quality of pulses, skin color and temperature  - Assess for signs of decreased coronary artery perfusion - ex   Angina  - Instruct patient to report change in severity of symptoms   Outcome: Progressing  Goal: Absence of cardiac dysrhythmias or at baseline rhythm  Description  INTERVENTIONS:  - Continuous cardiac monitoring, monitor vital signs, obtain 12 lead EKG if indicated  - Administer antiarrhythmic and heart rate control medications as ordered  - Monitor electrolytes and administer replacement therapy as ordered   Outcome: Progressing     Problem: METABOLIC, FLUID AND ELECTROLYTES - ADULT  Goal: Electrolytes maintained within normal limits  Description  INTERVENTIONS:  - Monitor labs and assess patient for signs and symptoms of electrolyte imbalances  - Administer electrolyte replacement as ordered  - Monitor response to electrolyte replacements, including repeat lab results as appropriate  - Instruct patient on fluid and nutrition as appropriate   Outcome: Progressing  Goal: Fluid balance maintained  Description  INTERVENTIONS:  - Monitor labs and assess for signs and symptoms of volume excess or deficit  - Monitor I/O and WT  - Instruct patient on fluid and nutrition as appropriate   Outcome: Progressing  Goal: Glucose maintained within target range  Description  INTERVENTIONS:  - Monitor Blood Glucose as ordered  - Assess for signs and symptoms of hyperglycemia and hypoglycemia  - Administer ordered medications to maintain glucose within target range  - Assess nutritional intake and initiate nutrition service referral as needed   Outcome: Progressing     Problem: SKIN/TISSUE INTEGRITY - ADULT  Goal: Skin integrity remains intact  Description  INTERVENTIONS  - Identify patients at risk for skin breakdown  - Assess and monitor skin integrity  - Assess and monitor nutrition and hydration status  - Monitor labs (i e  albumin)  - Assess for incontinence   - Turn and reposition patient  - Assist with mobility/ambulation  - Relieve pressure over bony prominences  - Avoid friction and shearing  - Provide appropriate hygiene as needed including keeping skin clean and dry  - Evaluate need for skin moisturizer/barrier cream  - Collaborate with interdisciplinary team (i e  Nutrition, Rehabilitation, etc )   - Patient/family teaching   Outcome: Progressing  Goal: Incision(s), wounds(s) or drain site(s) healing without S/S of infection  Description  INTERVENTIONS  - Assess and document risk factors for skin impairment   - Assess and document dressing, incision, wound bed, drain sites and surrounding tissue  - Initiate Nutrition services consult and/or wound management as needed   Outcome: Progressing

## 2019-02-12 NOTE — PLAN OF CARE
Problem: PHYSICAL THERAPY ADULT  Goal: Performs mobility at highest level of function for planned discharge setting  See evaluation for individualized goals  Description  Treatment/Interventions: Functional transfer training, LE strengthening/ROM, Therapeutic exercise, Endurance training, Patient/family training, Equipment eval/education, Bed mobility, Gait training, Spoke to nursing, Family, OT  Equipment Recommended: Steve Coates       See flowsheet documentation for full assessment, interventions and recommendations  Outcome: Progressing  Note:   Prognosis: Fair  Problem List: Decreased strength, Decreased endurance, Impaired balance, Decreased mobility, Decreased safety awareness, Obesity, Decreased skin integrity  Assessment: PT treatment completed  Pt identified by 2 patient identifiers: name and birth date  With encouragement pt agreeable to participate and seen for mobility, gait, balance, activity tolerance/functional endurance and PT education  Precautions include: fall risk, multiple lines and O2  Pt presents sitting in bedside recliner chair upon arrival with O2  Pt noted to be sitting on heating pad and to have indentation marks from heating pad  RN notified and pt to take a break from heating pad at this time  Pt demonstrates sit<>stand minAx1, SPT toilet to chair minAx1, gait training minAx1 w/ RW  Pt worked on standing balance while performing ADLS with OT  Pt able to stand x12 minutes with BUE support on RW or on sink  Pt demonstrates forward flexed posture and poor UE support  Pt provided education and cueing for body mechanics and posture with dynamic movements when working on hygiene and per-icare  Pt denies pain at this time  Pt would benefit from continued skilled PT to continue progress with  mobility, gait, balance, activity tolerance/functional endurance and PT education  At this time recommend discharge to short term rehab  Pt would benefit from use of rolling walker   At end of PT treatment pt left sitting in bedside recliner chair with BLE elevated, lines intact, all needs in reach, call bell and phone in hand, and RN aware of patient status  No further questions or concerns for PT at this time  Barriers to Discharge: Inaccessible home environment, Decreased caregiver support  Barriers to Discharge Comments: lives alone, 2 level home, and DENYS  Recommendation: Other (Comment)(short term rehab)     PT - OK to Discharge: Yes(yes to rehab, no if home at this time)    See flowsheet documentation for full assessment        Margie Hope, PT,DPT

## 2019-02-12 NOTE — DISCHARGE INSTR - OTHER ORDERS
Wound Care:  1-Hydraguard lotion to bilateral sacrum, buttock and heels BID and PRN  2-Elevate heels to offload pressure  3-Soft care cushion when out of bed  4-Moisturize skin daily with skin nourishing cream   5-Cleanse legs thoroughly with soap and water, pat dry  Apply adaptic, Maxorb Ag, and ABD to all open areas  Wrap bilateral lower extremities with [place]  Change dressing every other day and as needed with dressing saturation  6-Cleanse abdominal wound with soap and water, pat dry  Apply Triad paste to wound bed  Change dressing daily  7-Elevate bilateral lower extremities as often as possible

## 2019-02-12 NOTE — CONSULTS
Consult - Podiatry   Tammy Thompson 63 y o  female MRN: [id number]  Unit/Bed#: E4 -01 Encounter: [id number]    Assessment/Plan     Assessment:  1  B/L LE wounds likely mixed arterial/venous in etiology  2  CHF  3  CKD    Plan:  - Patient seen and examined, nontoxic in appearance with vitals sign stable and without leukocytosis  - wounds likely mix of arterial/venous insufficiency, low suspicion for cellulitis as discoloration appears dark/chronic  Unsure of fungal component, will discuss with attending  - wound care rendered to b/l LE with adaptic/maxorb/abd/dsd  - will order LEADs to assess perfusion and to obtain baseline  - once LEADs return may apply gentle compression with ace bandages  - will follow up on derm recommendations  - patient may WBAT  - encourage elevation of LE     Thank you for this consultation      History of Present Illness     HPI:  Tammy Thompson is a 63 y o  female who presents with b/l LE wounds x 4 weeks  Pt states they started off as itchy bumps that she scratched open  She does not see a wound care specialist  States that her wounds drain a lot and she has been taking care of them at home by herself with antibiotic ointment and bandages  Patient noted on nasal cannula  Admits to SOB but denies n/v/f/c  Inpatient consult to Podiatry     Performed by  Alexia Herring DPM     Authorized by Kaleb Villagomez MD            Review of Systems   Constitutional: Negative  HENT: Negative  Eyes: Negative  Respiratory: Negative  Cardiovascular: Negative  Gastrointestinal: Negative  Musculoskeletal: Negative  Skin:wounds   Neurological: Negative  Psych: negative         Historical Information   Past Medical History:   Diagnosis Date    Atrial fibrillation with RVR (RUST 75 )     COPD (chronic obstructive pulmonary disease) ([facility] 75 )     Diabetes type 2, uncontrolled ([facility] 75 )     Hypertension      Past Surgical History:   Procedure Laterality Date    HERNIA REPAIR      HYSTERECTOMY      LAPAROSCOPIC CHOLECYSTECTOMY       Social History   Social History     Substance and Sexual Activity   Alcohol Use Yes    Comment: socially, NO ALCOHOL USE     Social History     Substance and Sexual Activity   Drug Use Yes    Types: Marijuana     Social History     Tobacco Use   Smoking Status Current Every Day Smoker    Packs/day: 0 00    Years: 43 00    Pack years: 0 00    Types: Cigarettes   Smokeless Tobacco Never Used   Tobacco Comment    Currently half a pack a day, 6 CIGARETTES PER DAY      Family History:   Family History   Problem Relation Age of Onset    Diabetes type II Mother     No Known Problems Father         She reports not knowing much about her father      Cancer Family     Diabetes Family     Hypertension Family     Stroke Family        Meds/Allergies   Medications Prior to Admission   Medication    albuterol (PROVENTIL HFA,VENTOLIN HFA) 90 mcg/act inhaler    diltiazem (CARDIZEM CD) 120 mg 24 hr capsule    ELIQUIS 5 MG    furosemide (LASIX) 40 mg tablet    metolazone (ZAROXOLYN) 5 mg tablet    metoprolol succinate (TOPROL-XL) 100 mg 24 hr tablet    lisinopril (ZESTRIL) 40 mg tablet    metFORMIN (GLUCOPHAGE-XR) 750 mg 24 hr tablet     No Known Allergies    Objective   First Vitals:   Blood Pressure: (!) 171/96 (02/08/19 1215)  Pulse: 102 (02/08/19 1215)  Temperature: 98 °F (36 7 °C) (02/08/19 1215)  Temp Source: Temporal (02/08/19 1215)  Respirations: (!) 24 (02/08/19 1215)  Height: 5' 4" (162 6 cm) (02/08/19 1352)  Weight - Scale: (!) 147 kg (324 lb 1 2 oz) (02/08/19 1352)  SpO2: (!) 88 % (02/08/19 1215)    Current Vitals:   Blood Pressure: 128/87 (02/12/19 1222)  Pulse: 97 (02/12/19 1222)  Temperature: (!) 97 2 °F (36 2 °C) (02/12/19 1222)  Temp Source: Tympanic (02/12/19 1222)  Respirations: 18 (02/12/19 0724)  Height: 5' 4" (162 6 cm) (02/08/19 1700)  Weight - Scale: (!) 141 kg (310 lb 13 6 oz) (02/12/19 0556)  SpO2: 95 % (02/12/19 1222)        /87 (BP Location: Right arm)   Pulse 97   Temp (!) 97 2 °F (36 2 °C) (Tympanic)   Resp 18   Ht 5' 4" (1 626 m)   Wt (!) 141 kg (310 lb 13 6 oz)   SpO2 95%   BMI 53 36 kg/m²      General Appearance:    Alert, cooperative, no distress   Head:    Normocephalic, without obvious abnormality, atraumatic   Eyes:    PERRL, conjunctiva/corneas clear, EOM's intact        Nose:   Moist mucous membranes   Neck:   Supple, symmetrical, trachea midline   Back:     Symmetric   Lungs:     Respirations unlabored   Heart:    Regular rate and rhythm, S1 and S2 normal, no murmur, rub   or gallop   Abdomen:     Soft, non-tender   Extremities:   Edematous lower extremities noted   Pulses:   Nonpalpable however warm to touch  CRT is brisk   Skin:   Multiple wounds noted to b/l LE with discoloration  Fibrotic base noted with serous drainage, especially in posterior legs  Although erythematous base, does not appear infected and appear secondary to venous insufficiency  Neurologic:   Gross sensation is intact                             Lab Results:   Admission on 02/08/2019   Component Date Value    WBC 02/08/2019 8 37     RBC 02/08/2019 5 82*    Hemoglobin 02/08/2019 18 1*    Hematocrit 02/08/2019 58 6*    MCV 02/08/2019 101*    MCH 02/08/2019 31 1     MCHC 02/08/2019 30 9*    RDW 02/08/2019 17 2*    MPV 02/08/2019 11 1     Platelets 65/60/7344 164     nRBC 02/08/2019 0     Neutrophils Relative 02/08/2019 74     Immat GRANS % 02/08/2019 1     Lymphocytes Relative 02/08/2019 7*    Monocytes Relative 02/08/2019 16*    Eosinophils Relative 02/08/2019 1     Basophils Relative 02/08/2019 1     Neutrophils Absolute 02/08/2019 6 16     Immature Grans Absolute 02/08/2019 0 04     Lymphocytes Absolute 02/08/2019 0 62     Monocytes Absolute 02/08/2019 1 36*    Eosinophils Absolute 02/08/2019 0 09     Basophils Absolute 02/08/2019 0 10     Sodium 02/08/2019 143     Potassium 02/08/2019 3 3*    Chloride 02/08/2019 102  CO2 02/08/2019 31     ANION GAP 02/08/2019 10     BUN 02/08/2019 41*    Creatinine 02/08/2019 1 98*    Glucose 02/08/2019 87     Calcium 02/08/2019 8 7     eGFR 02/08/2019 27     Protime 02/08/2019 17 0*    INR 02/08/2019 1 37*    PTT 02/08/2019 33     Total Bilirubin 02/08/2019 4 12*    Bilirubin, Direct 02/08/2019 1 65*    Alkaline Phosphatase 02/08/2019 124*    AST 02/08/2019 39     ALT 02/08/2019 29     Total Protein 02/08/2019 6 9     Albumin 02/08/2019 2 9*    TSH 3RD GENERATON 02/08/2019 1 516     Troponin I 02/08/2019 0 09*    NT-proBNP 02/08/2019 4,595*    LACTIC ACID 02/08/2019 1 6     Creatinine, Ur 02/08/2019 37 0     Microalbum  ,U,Random 02/08/2019 65 3*    Microalb Creat Ratio 02/08/2019 176*    Sodium 02/08/2019 145     Potassium 02/08/2019 3 6     Chloride 02/08/2019 107     CO2 02/08/2019 30     ANION GAP 02/08/2019 8     BUN 02/08/2019 40*    Creatinine 02/08/2019 1 78*    Glucose 02/08/2019 148*    Calcium 02/08/2019 8 0*    AST 02/08/2019 27     ALT 02/08/2019 22     Alkaline Phosphatase 02/08/2019 117*    Total Protein 02/08/2019 5 9*    Albumin 02/08/2019 2 3*    Total Bilirubin 02/08/2019 2 70*    eGFR 02/08/2019 31     Magnesium 02/08/2019 2 0     POC Glucose 02/08/2019 118     Hepatitis C Ab 02/09/2019 Non-reactive     Hemoglobin A1C 02/09/2019 6 3     EAG 02/09/2019 134     WBC 02/09/2019 9 03     RBC 02/09/2019 5 41*    Hemoglobin 02/09/2019 16 9*    Hematocrit 02/09/2019 56 0*    MCV 02/09/2019 104*    MCH 02/09/2019 31 2     MCHC 02/09/2019 30 2*    RDW 02/09/2019 16 9*    Platelets 91/66/1313 171     MPV 02/09/2019 11 3     Cholesterol 02/09/2019 76     Triglycerides 02/09/2019 61     HDL, Direct 02/09/2019 32*    LDL Calculated 02/09/2019 32     POC Glucose 02/09/2019 84     POC Glucose 02/09/2019 133     POC Glucose 02/09/2019 112     POC Glucose 02/09/2019 112     C difficile toxin by PCR 02/10/2019 NEGATIVE for C difficle toxin by PCR        Sodium 02/10/2019 141     Potassium 02/10/2019 3 9     Chloride 02/10/2019 104     CO2 02/10/2019 30     ANION GAP 02/10/2019 7     BUN 02/10/2019 39*    Creatinine 02/10/2019 1 76*    Glucose 02/10/2019 116     Calcium 02/10/2019 7 8*    eGFR 02/10/2019 32     POC Glucose 02/10/2019 103     POC Glucose 02/10/2019 104     Ventricular Rate 02/08/2019 103     QRSD Interval 02/08/2019 82     QT Interval 02/08/2019 364     QTC Interval 02/08/2019 476     QRS Axis 02/08/2019 133     T Wave Axis 02/08/2019 -19     POC Glucose 02/10/2019 111     POC Glucose 02/10/2019 124     Sodium 02/11/2019 141     Potassium 02/11/2019 4 4     Chloride 02/11/2019 103     CO2 02/11/2019 31     ANION GAP 02/11/2019 7     BUN 02/11/2019 40*    Creatinine 02/11/2019 1 75*    Glucose 02/11/2019 129     Calcium 02/11/2019 7 5*    eGFR 02/11/2019 32     POC Glucose 02/11/2019 117     POC Glucose 02/11/2019 111     POC Glucose 02/11/2019 86     POC Glucose 02/11/2019 99     WBC 02/12/2019 7 81     RBC 02/12/2019 5 56*    Hemoglobin 02/12/2019 17 1*    Hematocrit 02/12/2019 59 7*    MCV 02/12/2019 107*    MCH 02/12/2019 30 8     MCHC 02/12/2019 28 6*    RDW 02/12/2019 16 2*    MPV 02/12/2019 11 4     Platelets 02/61/0318 161     nRBC 02/12/2019 0     Neutrophils Relative 02/12/2019 70     Immat GRANS % 02/12/2019 0     Lymphocytes Relative 02/12/2019 8*    Monocytes Relative 02/12/2019 18*    Eosinophils Relative 02/12/2019 3     Basophils Relative 02/12/2019 1     Neutrophils Absolute 02/12/2019 5 45     Immature Grans Absolute 02/12/2019 0 02     Lymphocytes Absolute 02/12/2019 0 62     Monocytes Absolute 02/12/2019 1 40*    Eosinophils Absolute 02/12/2019 0 22     Basophils Absolute 02/12/2019 0 10     Sodium 02/12/2019 144     Potassium 02/12/2019 4 9     Chloride 02/12/2019 104     CO2 02/12/2019 32     ANION GAP 02/12/2019 8     BUN 02/12/2019 40*  Creatinine 02/12/2019 1 61*    Glucose 02/12/2019 89     Calcium 02/12/2019 8 0*    AST 02/12/2019 19     ALT 02/12/2019 8*    Alkaline Phosphatase 02/12/2019 97     Total Protein 02/12/2019 6 6     Albumin 02/12/2019 2 3*    Total Bilirubin 02/12/2019 1 28*    eGFR 02/12/2019 35     POC Glucose 02/12/2019 109     POC Glucose 02/12/2019 131                    Invalid input(s): LABAEARO            Imaging: I have personally reviewed pertinent films in PACS  EKG, Pathology, and Other Studies: I have personally reviewed pertinent reports        Code Status: Level 1 - Full Code  Advance Directive and Living Will:      Power of :    POLST:

## 2019-02-12 NOTE — PLAN OF CARE
Problem: OCCUPATIONAL THERAPY ADULT  Goal: Performs self-care activities at highest level of function for planned discharge setting  See evaluation for individualized goals  Description  Treatment Interventions: ADL retraining, Functional transfer training, UE strengthening/ROM, Endurance training, Patient/family training, Equipment evaluation/education, Compensatory technique education, Energy conservation, Activityengagement          See flowsheet documentation for full assessment, interventions and recommendations  Outcome: Progressing  Note:   Limitation: Decreased ADL status, Decreased UE strength, Decreased Safe judgement during ADL, Decreased endurance, Decreased self-care trans, Decreased high-level ADLs  Prognosis: Good  Assessment: Pt seen for OT treatment session this AM focusing on functional activity tolerance, ADLs, and functional mobility/transfers  Pt alert and cooperative throughout session  Pt seated OOB in chair at start of session  Transfers (sit<>stand, RW<>Standard toilet) completed with Min A with cues for safe technique and hand placement 2* decreased safety awareness demonstrated  Min A required for functional mobility with use of RW 2* CGA required for balance/steadying and assist for line management  ADL routine completed while seated/standing at sink  Light grooming tasks (wash/dry face and hands, teeth care) completed with Supervision 2* setup required and increased time to complete  UB bathing completed with Min A 2* assist for thoroughness 2* increased body habitus  LB bathing completed with Max A  Pt able to wash B/L upper legs, however required assist to wash B/L lower legs, B/L feet, buttocks, and perineal hygiene  LB dressing completed with Max A to don/doff B/L socks 2* decreased functional reach demonstrated  Pt seated OOB in chair at end of session with call bell and phone within reach  All needs met and pt reports no further questions for OT at this time   Continue to recommend STR upon D/C at this time, however pt prefers to return home  If pt declines STR, recommend Home OT  OT to follow pt on caseload  OT to follow pt on caseload        OT Discharge Recommendation: Short Term Rehab  OT - OK to Discharge: Yes(when medically cleared to rehab)

## 2019-02-12 NOTE — CONSULTS
Consult Note - Wound   Neeraj Duran 63 y o  female MRN: [id number]  Unit/Bed#: E4 -01 Encounter: [id number]      History and Present Illness:  63year old female with a PMH of chronic diastolic chf, hypertension, CKD stage 3, chronic atrial fibrillation anticoagulated on Eliquis, anxiety/depression, COPD, insomnia, type 2 diabetes, morbid obesity  She presented to the hospital with a complaint of increasing lower extremity edema  Assessment Findings:   Patient reports chronic back pain  Able to stand for assessment with assist x 1 and walker  Denies incontinence  However, she has urgency with diarrhea today  Bilateral lower extremities are erythematous and blanchable, dry and flaky with scattered round areas with black eschar--patient states these areas begin as "bumps" and then she "picks them open "  Blanchable erythema to bilateral buttocks, heels, and posterior medial thighs with skin intact  Large pendulous abdomen with dry flaky skin  1  MASD/ITD with candidiasis to bilateral abdominal, groin, and left breast folds  2   Partial thickness wound to midline abdomen of unknown origin--open area along old, well healed wound  Possibly related to fluid overload  3   Bilateral lower extremity ulcerations (suspect mixed etiology of neuropathic and venous stasis with possible fungal infection and active cellulitis)--wounds predominantly to posterior aspect of legs  Left leg wound with yellow slough and black eschars  Right leg wound with yellow slough  [place] is red, warm, and macerated  There is moderate serous drainage from both legs  4   Rash to bilateral flank/lateral buttocks--pinpoint scabbed rash with surrounding erythema in patches  Possible fungal infection  Plan:   1-Hydraguard lotion to bilateral sacrum, buttock and heels BID and PRN  2-Elevate heels to offload pressure  3-Soft care cushion when out of bed    4-Moisturize skin daily with skin nourishing cream   5-Cleanse legs thoroughly with soap and water, pat dry  Apply adaptic, Maxorb Ag, and ABD to all open areas  Wrap bilateral lower extremities with [place]  Change dressing every other day and as needed with dressing saturation  6-Cleanse abdominal wound with soap and water, pat dry  Apply Maxorb Ag and top with 4x4  Change dressing daily  7-Elevate bilateral lower extremities as often as possible  8-Recommend dermatology consult for bilateral lower extremity round, black eschar areas and bilateral flank/buttock rash  9-Request podiatry consult for lower extremity wounds which will likely require debridement and extensive outpatient follow-up  10-Nystatin powder to bilateral abdominal, groin, and left breast folds BID  11-Wound care team will follow  Plan of care reviewed with primary RN, [facility]  Franklin Stewart PA-C made aware of patient's present on admission wounds and rash  Discussed recommendations for podiatry and dermatology consults--will discuss with attending in the morning  Wound 02/11/19 Leg Left;Posterior (Active)   Wound Description Yellow;Black;Pink;Fragile 2/11/2019  7:00 PM   Radha-wound Assessment Edema; Erythema; Maceration 2/11/2019  7:00 PM   Wound Length (cm) 16 5 cm 2/11/2019  7:00 PM   Wound Width (cm) 18 cm 2/11/2019  7:00 PM   Wound Depth (cm) 0 2 2/11/2019  7:00 PM   Calculated Wound Area (cm^2) 297 cm^2 2/11/2019  7:00 PM   Calculated Wound Volume (cm^3) 59 4 cm^3 2/11/2019  7:00 PM   Tunneling 0 cm 2/11/2019  7:00 PM   Undermining 0 2/11/2019  7:00 PM   Drainage Amount Moderate 2/11/2019  7:00 PM   Drainage Description Serous 2/11/2019  7:00 PM   Non-staged Wound Description Not applicable 5/80/3688  2:88 PM   Treatments Cleansed;Site care;Elevated 2/11/2019  7:00 PM   Dressing ABD; Non adherent; Other (Comment) 2/11/2019  7:00 PM   Wound packed?  No 2/11/2019  7:00 PM   Dressing Changed Changed 2/11/2019  7:00 PM   Patient Tolerance Tolerated well 2/11/2019  7:00 PM Dressing Status Clean;Dry; Intact 2/11/2019  7:00 PM       Wound 02/11/19 Leg Posterior;Right (Active)   Wound Description Yellow;Slough;Pink;Granulation tissue 2/11/2019  7:00 PM   Radha-wound Assessment Erythema;Edema;Fragile; Maceration 2/11/2019  7:00 PM   Wound Length (cm) 9 5 cm 2/11/2019  7:00 PM   Wound Width (cm) 10 5 cm 2/11/2019  7:00 PM   Wound Depth (cm) 0 2 2/11/2019  7:00 PM   Calculated Wound Area (cm^2) 99 75 cm^2 2/11/2019  7:00 PM   Calculated Wound Volume (cm^3) 19 95 cm^3 2/11/2019  7:00 PM   Tunneling 0 cm 2/11/2019  7:00 PM   Undermining 0 2/11/2019  7:00 PM   Drainage Amount Moderate 2/11/2019  7:00 PM   Drainage Description Serous 2/11/2019  7:00 PM   Non-staged Wound Description Full thickness 2/11/2019  7:00 PM   Treatments Cleansed;Elevated 2/11/2019  7:00 PM   Dressing ABD; Non adherent; Other (Comment) 2/11/2019  7:00 PM   Wound packed? No 2/11/2019  7:00 PM   Dressing Changed Changed 2/11/2019  7:00 PM   Patient Tolerance Tolerated well 2/11/2019  7:00 PM   Dressing Status Clean;Dry; Intact 2/11/2019  7:00 PM       Wound 02/11/19 Pretibial Distal;Right; Anterior (Active)   Wound Description Clean;Pink;Slough; Yellow 2/11/2019  7:00 PM   Radha-wound Assessment Edema; Erythema;Fragile 2/11/2019  7:00 PM   Wound Length (cm) 2 cm 2/11/2019  7:00 PM   Wound Width (cm) 2 cm 2/11/2019  7:00 PM   Wound Depth (cm) 0 2 2/11/2019  7:00 PM   Calculated Wound Area (cm^2) 4 cm^2 2/11/2019  7:00 PM   Calculated Wound Volume (cm^3) 0 8 cm^3 2/11/2019  7:00 PM   Tunneling 0 cm 2/11/2019  7:00 PM   Undermining 0 2/11/2019  7:00 PM   Drainage Amount Moderate 2/11/2019  7:00 PM   Drainage Description Serous 2/11/2019  7:00 PM   Non-staged Wound Description Full thickness 2/11/2019  7:00 PM   Treatments Cleansed;Elevated 2/11/2019  7:00 PM   Dressing ABD; Non adherent; Other (Comment) 2/11/2019  7:00 PM   Wound packed?  No 2/11/2019  7:00 PM   Patient Tolerance Tolerated well 2/11/2019  7:00 PM   Dressing Status Clean;Dry; Intact 2/11/2019  7:00 PM       Wound 02/11/19 Abdomen Mid (Active)   Wound Description Clean;Pink;Slough; Yellow 2/11/2019  7:00 PM   Radha-wound Assessment Intact 2/11/2019  7:00 PM   Wound Length (cm) 1 5 cm 2/11/2019  7:00 PM   Wound Width (cm) 0 6 cm 2/11/2019  7:00 PM   Wound Depth (cm) 0 1 2/11/2019  7:00 PM   Calculated Wound Area (cm^2) 0 9 cm^2 2/11/2019  7:00 PM   Calculated Wound Volume (cm^3) 0 09 cm^3 2/11/2019  7:00 PM   Tunneling 0 cm 2/11/2019  7:00 PM   Undermining 0 2/11/2019  7:00 PM   Drainage Amount Small 2/11/2019  7:00 PM   Drainage Description Serous 2/11/2019  7:00 PM   Non-staged Wound Description Partial thickness 2/11/2019  7:00 PM   Treatments Cleansed;Site care 2/11/2019  7:00 PM   Dressing Dry dressing; Other (Comment) 2/11/2019  7:00 PM   Wound packed? No 2/11/2019  7:00 PM   Dressing Changed New 2/11/2019  7:00 PM   Patient Tolerance Tolerated well 2/11/2019  7:00 PM   Dressing Status Clean;Dry; Intact 2/11/2019  7:00 PM     Loulou Baltazar BSN, RN, [facility]

## 2019-02-12 NOTE — PLAN OF CARE
Problem: Potential for Falls  Goal: Patient will remain free of falls  Description  INTERVENTIONS:  - Assess patient frequently for physical needs  -  Identify cognitive and physical deficits and behaviors that affect risk of falls  -  [place] fall precautions as indicated by assessment   - Educate patient/family on patient safety including physical limitations  - Instruct patient to call for assistance with activity based on assessment  - Modify environment to reduce risk of injury  - Consider OT/PT consult to assist with strengthening/mobility   Outcome: Progressing     Problem: Nutrition/Hydration-ADULT  Goal: Nutrient/Hydration intake appropriate for improving, restoring or maintaining nutritional needs  Description  Monitor and assess patient's nutrition/hydration status for malnutrition (ex- brittle hair, bruises, dry skin, pale skin and conjunctiva, muscle wasting, smooth red tongue, and disorientation)  Collaborate with interdisciplinary team and initiate plan and interventions as ordered  Monitor patient's weight and dietary intake as ordered or per policy  Utilize nutrition screening tool and intervene per policy  Determine patient's food preferences and provide high-protein, high-caloric foods as appropriate       INTERVENTIONS:  - Monitor oral intake, urinary output, labs, and treatment plans  - Assess nutrition and hydration status and recommend course of action  - Evaluate amount of meals eaten  - Assist patient with eating if necessary   - Allow adequate time for meals  - Recommend/ encourage appropriate diets, oral nutritional supplements, and vitamin/mineral supplements  - Order, calculate, and assess calorie counts as needed  - Recommend, monitor, and adjust tube feedings and TPN/PPN based on assessed needs  - Assess need for intravenous fluids  - Provide specific nutrition/hydration education as appropriate  - Include patient/family/caregiver in decisions related to nutrition   Outcome: Progressing     Problem: Prexisting or High Potential for Compromised Skin Integrity  Goal: Skin integrity is maintained or improved  Description  INTERVENTIONS:  - Identify patients at risk for skin breakdown  - Assess and monitor skin integrity  - Assess and monitor nutrition and hydration status  - Monitor labs (i e  albumin)  - Assess for incontinence   - Turn and reposition patient  - Assist with mobility/ambulation  - Relieve pressure over bony prominences  - Avoid friction and shearing  - Provide appropriate hygiene as needed including keeping skin clean and dry  - Evaluate need for skin moisturizer/barrier cream  - Collaborate with interdisciplinary team (i e  Nutrition, Rehabilitation, etc )   - Patient/family teaching   Outcome: Progressing     Problem: PAIN - ADULT  Goal: Verbalizes/displays adequate comfort level or baseline comfort level  Description  Interventions:  - Encourage patient to monitor pain and request assistance  - Assess pain using appropriate pain scale  - Administer analgesics based on type and severity of pain and evaluate response  - Implement non-pharmacological measures as appropriate and evaluate response  - Consider cultural and social influences on pain and pain management  - Notify physician/advanced practitioner if interventions unsuccessful or patient reports new pain   Outcome: Progressing     Problem: INFECTION - ADULT  Goal: Absence or prevention of progression during hospitalization  Description  INTERVENTIONS:  - Assess and monitor for signs and symptoms of infection  - Monitor lab/diagnostic results  - Monitor all insertion sites, i e  indwelling lines, tubes, and drains  - Monitor endotracheal (as able) and nasal secretions for changes in amount and color  - [place] appropriate cooling/warming therapies per order  - Administer medications as ordered  - Instruct and encourage patient and family to use good hand hygiene technique  - Identify and instruct in appropriate isolation precautions for identified infection/condition   Outcome: Progressing  Goal: Absence of fever/infection during neutropenic period  Description  INTERVENTIONS:  - Monitor WBC  - Implement neutropenic guidelines   Outcome: Progressing     Problem: SAFETY ADULT  Goal: Maintain or return to baseline ADL function  Description  INTERVENTIONS:  -  Assess patient's ability to carry out ADLs; assess patient's baseline for ADL function and identify physical deficits which impact ability to perform ADLs (bathing, care of mouth/teeth, toileting, grooming, dressing, etc )  - Assess/evaluate cause of self-care deficits   - Assess range of motion  - Assess patient's mobility; develop plan if impaired  - Assess patient's need for assistive devices and provide as appropriate  - Encourage maximum independence but intervene and supervise when necessary  ¯ Involve family in performance of ADLs  ¯ Assess for home care needs following discharge   ¯ Request OT consult to assist with ADL evaluation and planning for discharge  ¯ Provide patient education as appropriate   Outcome: Progressing  Goal: Maintain or return mobility status to optimal level  Description  INTERVENTIONS:  - Assess patient's baseline mobility status (ambulation, transfers, stairs, etc )    - Identify cognitive and physical deficits and behaviors that affect mobility  - Identify mobility aids required to assist with transfers and/or ambulation (gait belt, sit-to-stand, lift, walker, cane, etc )  - [place] fall precautions as indicated by assessment  - Record patient progress and toleration of activity level on Mobility SBAR; progress patient to next Phase/Stage  - Instruct patient to call for assistance with activity based on assessment  - Request Rehabilitation consult to assist with strengthening/weightbearing, etc    Outcome: Progressing     Problem: DISCHARGE PLANNING  Goal: Discharge to home or other facility with appropriate resources  Description  INTERVENTIONS:  - Identify barriers to discharge w/patient and caregiver  - Arrange for needed discharge resources and transportation as appropriate  - Identify discharge learning needs (meds, wound care, etc )  - Arrange for interpretive services to assist at discharge as needed  - Refer to Case Management Department for coordinating discharge planning if the patient needs post-hospital services based on physician/advanced practitioner order or complex needs related to functional status, cognitive ability, or social support system   Outcome: Progressing     Problem: Knowledge Deficit  Goal: Patient/family/caregiver demonstrates understanding of disease process, treatment plan, medications, and discharge instructions  Description  Complete learning assessment and assess knowledge base  Interventions:  - Provide teaching at level of understanding  - Provide teaching via preferred learning methods   Outcome: Progressing     Problem: CARDIOVASCULAR - ADULT  Goal: Maintains optimal cardiac output and hemodynamic stability  Description  INTERVENTIONS:  - Monitor I/O, vital signs and rhythm  - Monitor for S/S and trends of decreased cardiac output i e  bleeding, hypotension  - Administer and titrate ordered vasoactive medications to optimize hemodynamic stability  - Assess quality of pulses, skin color and temperature  - Assess for signs of decreased coronary artery perfusion - ex   Angina  - Instruct patient to report change in severity of symptoms   Outcome: Progressing  Goal: Absence of cardiac dysrhythmias or at baseline rhythm  Description  INTERVENTIONS:  - Continuous cardiac monitoring, monitor vital signs, obtain 12 lead EKG if indicated  - Administer antiarrhythmic and heart rate control medications as ordered  - Monitor electrolytes and administer replacement therapy as ordered   Outcome: Progressing     Problem: METABOLIC, FLUID AND ELECTROLYTES - ADULT  Goal: Electrolytes maintained within normal limits  Description  INTERVENTIONS:  - Monitor labs and assess patient for signs and symptoms of electrolyte imbalances  - Administer electrolyte replacement as ordered  - Monitor response to electrolyte replacements, including repeat lab results as appropriate  - Instruct patient on fluid and nutrition as appropriate   Outcome: Progressing  Goal: Fluid balance maintained  Description  INTERVENTIONS:  - Monitor labs and assess for signs and symptoms of volume excess or deficit  - Monitor I/O and WT  - Instruct patient on fluid and nutrition as appropriate   Outcome: Progressing  Goal: Glucose maintained within target range  Description  INTERVENTIONS:  - Monitor Blood Glucose as ordered  - Assess for signs and symptoms of hyperglycemia and hypoglycemia  - Administer ordered medications to maintain glucose within target range  - Assess nutritional intake and initiate nutrition service referral as needed   Outcome: Progressing     Problem: SKIN/TISSUE INTEGRITY - ADULT  Goal: Skin integrity remains intact  Description  INTERVENTIONS  - Identify patients at risk for skin breakdown  - Assess and monitor skin integrity  - Assess and monitor nutrition and hydration status  - Monitor labs (i e  albumin)  - Assess for incontinence   - Turn and reposition patient  - Assist with mobility/ambulation  - Relieve pressure over bony prominences  - Avoid friction and shearing  - Provide appropriate hygiene as needed including keeping skin clean and dry  - Evaluate need for skin moisturizer/barrier cream  - Collaborate with interdisciplinary team (i e  Nutrition, Rehabilitation, etc )   - Patient/family teaching   Outcome: Progressing  Goal: Incision(s), wounds(s) or drain site(s) healing without S/S of infection  Description  INTERVENTIONS  - Assess and document risk factors for skin impairment   - Assess and document dressing, incision, wound bed, drain sites and surrounding tissue  - Initiate Nutrition services consult and/or wound management as needed   Outcome: Progressing     Problem: DISCHARGE PLANNING - CARE MANAGEMENT  Goal: Discharge to post-acute care or home with appropriate resources  Description  INTERVENTIONS:  - Conduct assessment to determine patient/family and health care team treatment goals, and need for post-acute services based on payer coverage, community resources, and patient preferences, and barriers to discharge  - Address psychosocial, clinical, and financial barriers to discharge as identified in assessment in conjunction with the patient/family and health care team  - Arrange appropriate level of post-acute services according to patient's   needs and preference and payer coverage in collaboration with the physician and health care team  - Communicate with and update the patient/family, physician, and health care team regarding progress on the discharge plan  - Arrange appropriate transportation to post-acute venues  - Pt wishes to d/c with home health care at this time   Outcome: Progressing

## 2019-02-12 NOTE — CONSULTS
Consultation - Prashant Sanches 63 y o  female MRN: [id number]    Unit/Bed#: E4 -01 Encounter: [id number]      Assessment/Plan     Assessment:  1  Chronic lymphedema secondary to Morbid obesity      Woody edema of lower legs indicates this has been a chronic problem  2  Ecthyma (deep form of impetigo)  Sites may be secondary to trauma (dog leash wrapping around leg)  , picking  or flea bites(3 dogs)  Patient states dogs do not have any skin problems  3  Intertrigo abdominal fold secondary to obesity    Plan:  1  Recommend evaluation at lymphedema clinic after discharge  Unfortunately difficulty with compression treatment due to CHF  Patient will need weight loss and continued therapy to decrease lymphedema  Legs and abdomen skin conditions will not resolve without weight loss  Consider bariatric surgery  Emphasized need for patient to take diuretic medication as prescribed by cardiology  Barrier to care(medication): bathroom on second floor-suggested commode to use for urination to decrease need to climb stairs  L1 compression fracture seen on x-ray  Triamcinolone 0 1 % cream TID to red areas on legs to reduce inflammation and any areas of the buttock that she is scratching  2  Cultures taken from both legs  Recommend IV antibiotic therapy to treat for Staph aureus, Strep pyogenes  Need to soak lesions to remove crusts  Vinegar soak ( 1 cup white vinegar to 4 cups water) apply as compress 10 minutes 3 x per day  Also consider whirlpool to remove crusts  Mupirocin ointment to crusts 3 times a day  3  Agree with nystatin         History of Present Illness     HPI: Prashant Sanches is a 63y o  year old female who presented to the hospital with shortness of breath, back pain and swelling of legs with open sores  Patient fell 6 weeks ago  Her foot became tangled around dog chain  She fell to right side  Noted back spasms 2 weeks later  Patient has 3 dogs  Active at work, supervisor, walks buildings  Does not have a desk job  Due to back spasms she had difficulty climbing stairs at home to go to the bathroom  X-ray reveals L1 compression fracture  She stopped her diuretic to decrease her need to use the bathroom  She states she is inconsistent with using diuretic  Unaware of her diagnoses of diabetes  States her blood sugar is fine  Legs with swelling for over a year  Recently they  Have become more red and swollen and have developed open sores  2/13/18 weight 279,  Admission weight 310  Consults    Review of Systems   Constitutional: Positive for unexpected weight change  Negative for activity change and fatigue  HENT: Negative  Eyes: Negative  Respiratory: Positive for shortness of breath  Cardiovascular: Negative for chest pain  Gastrointestinal: Negative  Genitourinary: Negative  Musculoskeletal: Positive for back pain and myalgias  Neurological: Negative  Psychiatric/Behavioral: Positive for decreased concentration         Historical Information   Past Medical History:   Diagnosis Date    Atrial fibrillation with RVR ([facility] )     [facility]     Diabetes type 2, uncontrolled ([facility] )     Hypertension      Past Surgical History:   Procedure Laterality Date    HERNIA REPAIR      HYSTERECTOMY      LAPAROSCOPIC CHOLECYSTECTOMY       Social History   Social History     Substance and Sexual Activity   Alcohol Use Yes    Comment: socially, NO ALCOHOL USE     Social History     Substance and Sexual Activity   Drug Use Yes    Types: Marijuana     Social History     Tobacco Use   Smoking Status Current Every Day Smoker    Packs/day: 0 00    Years: 43 00    Pack years: 0 00    Types: Cigarettes   Smokeless Tobacco Never Used   Tobacco Comment    Currently half a pack a day, 6 CIGARETTES PER DAY      Family History:   Family History   Problem Relation Age of Onset    Diabetes type II Mother  No Known Problems Father         She reports not knowing much about her father   Cancer Family     Diabetes Family     Hypertension Family     Stroke Family        Meds/Allergies   all current active meds have been reviewed  No Known Allergies    Objective   Vitals: Blood pressure 133/82, pulse 82, temperature 98 °F (36 7 °C), temperature source Tympanic, resp  rate 18, height 5' 4" (1 626 m), weight (!) 141 kg (310 lb 13 6 oz), SpO2 92 %  Intake/Output Summary (Last 24 hours) at 2/12/2019 1523  Last data filed at 2/12/2019 1222  Gross per 24 hour   Intake 1140 ml   Output 1650 ml   Net -510 ml     Invasive Devices     Peripheral Intravenous Line            Peripheral IV 02/09/19 Right Forearm 3 days                Physical Exam   Constitutional: She is oriented to person, place, and time  She appears well-nourished  Eyes: Pupils are equal, round, and reactive to light  EOM are normal    Abdominal:   Pannus which extends to mid thigh when sitting  Neurological: She is alert and oriented to person, place, and time  Patient poor historian  Unaware of diabetes  Questions need to be repeated  Skin: Skin is warm  Rash noted  Multiple 2 cm deep ulcerations with black crusts of lower legs  Erosions of posterior calves  Patient declined to stand for evaluation of buttock  She leaned to the side:  shallow crusts seen  Pannus of abdomen with accentuation of inguinal fold with erythema  Woody edema of both lower legs with erythema,   Intact peripheral pulses  Lab Results: I have personally reviewed pertinent reports  Imaging Studies: I have personally reviewed pertinent reports  EKG, Pathology, and Other Studies: I have personally reviewed pertinent reports  Code Status: Level 1 - Full Code      Counseling / Coordination of Care  Total floor / unit time spent today 90 minutes   Greater than 50% of total time was spent with the patient and / or family counseling and / or coordination of care  A description of the counseling / coordination of care: discussion of diabetes  Need to take medications as directed  Need for weight loss  Possible impetigo like infection of lower legs  Need to take culture

## 2019-02-13 PROBLEM — S32.010A COMPRESSION FRACTURE OF FIRST LUMBAR VERTEBRA (HCC): Status: ACTIVE | Noted: 2019-02-11

## 2019-02-13 PROBLEM — R74.01 TRANSAMINITIS: Status: ACTIVE | Noted: 2019-02-11

## 2019-02-13 LAB
ANION GAP SERPL CALCULATED.3IONS-SCNC: 5 MMOL/L (ref 4–13)
BASOPHILS # BLD AUTO: 0.1 THOUSANDS/ΜL (ref 0–0.1)
BASOPHILS NFR BLD AUTO: 1 % (ref 0–1)
BUN SERPL-MCNC: 43 MG/DL (ref 5–25)
CALCIUM SERPL-MCNC: 8 MG/DL (ref 8.3–10.1)
CHLORIDE SERPL-SCNC: 102 MMOL/L (ref 100–108)
CO2 SERPL-SCNC: 36 MMOL/L (ref 21–32)
CREAT SERPL-MCNC: 1.47 MG/DL (ref 0.6–1.3)
EOSINOPHIL # BLD AUTO: 0.17 THOUSAND/ΜL (ref 0–0.61)
EOSINOPHIL NFR BLD AUTO: 2 % (ref 0–6)
ERYTHROCYTE [DISTWIDTH] IN BLOOD BY AUTOMATED COUNT: 15.9 % (ref 11.6–15.1)
GFR SERPL CREATININE-BSD FRML MDRD: 39 ML/MIN/1.73SQ M
GLUCOSE SERPL-MCNC: 112 MG/DL (ref 65–140)
GLUCOSE SERPL-MCNC: 120 MG/DL (ref 65–140)
GLUCOSE SERPL-MCNC: 148 MG/DL (ref 65–140)
GLUCOSE SERPL-MCNC: 156 MG/DL (ref 65–140)
GLUCOSE SERPL-MCNC: 21 MG/DL (ref 65–140)
GLUCOSE SERPL-MCNC: 34 MG/DL (ref 65–140)
GLUCOSE SERPL-MCNC: 62 MG/DL (ref 65–140)
GLUCOSE SERPL-MCNC: <20 MG/DL (ref 65–140)
HCT VFR BLD AUTO: 57.1 % (ref 34.8–46.1)
HGB BLD-MCNC: 16.5 G/DL (ref 11.5–15.4)
IMM GRANULOCYTES # BLD AUTO: 0.03 THOUSAND/UL (ref 0–0.2)
IMM GRANULOCYTES NFR BLD AUTO: 0 % (ref 0–2)
LYMPHOCYTES # BLD AUTO: 0.59 THOUSANDS/ΜL (ref 0.6–4.47)
LYMPHOCYTES NFR BLD AUTO: 7 % (ref 14–44)
MCH RBC QN AUTO: 30.8 PG (ref 26.8–34.3)
MCHC RBC AUTO-ENTMCNC: 28.9 G/DL (ref 31.4–37.4)
MCV RBC AUTO: 107 FL (ref 82–98)
MONOCYTES # BLD AUTO: 1.06 THOUSAND/ΜL (ref 0.17–1.22)
MONOCYTES NFR BLD AUTO: 13 % (ref 4–12)
NEUTROPHILS # BLD AUTO: 6.28 THOUSANDS/ΜL (ref 1.85–7.62)
NEUTS SEG NFR BLD AUTO: 77 % (ref 43–75)
NRBC BLD AUTO-RTO: 0 /100 WBCS
PLATELET # BLD AUTO: 150 THOUSANDS/UL (ref 149–390)
PMV BLD AUTO: 11.5 FL (ref 8.9–12.7)
POTASSIUM SERPL-SCNC: 4.4 MMOL/L (ref 3.5–5.3)
RBC # BLD AUTO: 5.36 MILLION/UL (ref 3.81–5.12)
SODIUM SERPL-SCNC: 143 MMOL/L (ref 136–145)
WBC # BLD AUTO: 8.23 THOUSAND/UL (ref 4.31–10.16)

## 2019-02-13 PROCEDURE — 99232 SBSQ HOSP IP/OBS MODERATE 35: CPT | Performed by: INTERNAL MEDICINE

## 2019-02-13 PROCEDURE — 85025 COMPLETE CBC W/AUTO DIFF WBC: CPT | Performed by: PHYSICIAN ASSISTANT

## 2019-02-13 PROCEDURE — 99232 SBSQ HOSP IP/OBS MODERATE 35: CPT | Performed by: PHYSICIAN ASSISTANT

## 2019-02-13 PROCEDURE — 80048 BASIC METABOLIC PNL TOTAL CA: CPT | Performed by: INTERNAL MEDICINE

## 2019-02-13 PROCEDURE — 93923 UPR/LXTR ART STDY 3+ LVLS: CPT | Performed by: SURGERY

## 2019-02-13 PROCEDURE — 82948 REAGENT STRIP/BLOOD GLUCOSE: CPT

## 2019-02-13 RX ORDER — DEXTROSE MONOHYDRATE 25 G/50ML
INJECTION, SOLUTION INTRAVENOUS
Status: COMPLETED
Start: 2019-02-13 | End: 2019-02-13

## 2019-02-13 RX ORDER — METOLAZONE 5 MG/1
5 TABLET ORAL ONCE
Status: COMPLETED | OUTPATIENT
Start: 2019-02-13 | End: 2019-02-13

## 2019-02-13 RX ORDER — DEXTROSE MONOHYDRATE 100 MG/ML
50 INJECTION, SOLUTION INTRAVENOUS CONTINUOUS
Status: DISCONTINUED | OUTPATIENT
Start: 2019-02-13 | End: 2019-02-14

## 2019-02-13 RX ADMIN — APIXABAN 5 MG: 5 TABLET, FILM COATED ORAL at 08:41

## 2019-02-13 RX ADMIN — FUROSEMIDE 80 MG: 10 INJECTION, SOLUTION INTRAMUSCULAR; INTRAVENOUS at 08:40

## 2019-02-13 RX ADMIN — METOPROLOL SUCCINATE 100 MG: 50 TABLET, EXTENDED RELEASE ORAL at 17:03

## 2019-02-13 RX ADMIN — METHOCARBAMOL 500 MG: 500 TABLET, FILM COATED ORAL at 18:30

## 2019-02-13 RX ADMIN — DEXTROSE MONOHYDRATE 50 ML: 25 INJECTION, SOLUTION INTRAVENOUS at 21:54

## 2019-02-13 RX ADMIN — DEXTROSE MONOHYDRATE 50 ML: 25 INJECTION, SOLUTION INTRAVENOUS at 21:26

## 2019-02-13 RX ADMIN — POTASSIUM CHLORIDE 20 MEQ: 1500 TABLET, EXTENDED RELEASE ORAL at 17:04

## 2019-02-13 RX ADMIN — CEFTRIAXONE SODIUM 1000 MG: 10 INJECTION, POWDER, FOR SOLUTION INTRAVENOUS at 13:46

## 2019-02-13 RX ADMIN — METOPROLOL SUCCINATE 100 MG: 50 TABLET, EXTENDED RELEASE ORAL at 08:41

## 2019-02-13 RX ADMIN — MUPIROCIN: 20 OINTMENT TOPICAL at 08:42

## 2019-02-13 RX ADMIN — DEXTROSE MONOHYDRATE 50 ML: 25 INJECTION, SOLUTION INTRAVENOUS at 23:10

## 2019-02-13 RX ADMIN — METOLAZONE 5 MG: 5 TABLET ORAL at 17:04

## 2019-02-13 RX ADMIN — ACETIC ACID: 250 IRRIGANT IRRIGATION at 08:41

## 2019-02-13 RX ADMIN — LIDOCAINE 1 PATCH: 50 PATCH CUTANEOUS at 08:41

## 2019-02-13 RX ADMIN — ACETIC ACID: 250 IRRIGANT IRRIGATION at 17:03

## 2019-02-13 RX ADMIN — CEFAZOLIN SODIUM 1000 MG: 10 INJECTION, POWDER, FOR SOLUTION INTRAVENOUS at 03:15

## 2019-02-13 RX ADMIN — ACETIC ACID: 250 IRRIGANT IRRIGATION at 21:29

## 2019-02-13 RX ADMIN — NYSTATIN 1 APPLICATION: 100000 POWDER TOPICAL at 17:05

## 2019-02-13 RX ADMIN — FUROSEMIDE 80 MG: 10 INJECTION, SOLUTION INTRAMUSCULAR; INTRAVENOUS at 17:03

## 2019-02-13 RX ADMIN — APIXABAN 5 MG: 5 TABLET, FILM COATED ORAL at 17:04

## 2019-02-13 RX ADMIN — NYSTATIN 1 APPLICATION: 100000 POWDER TOPICAL at 08:42

## 2019-02-13 RX ADMIN — MUPIROCIN: 20 OINTMENT TOPICAL at 17:04

## 2019-02-13 RX ADMIN — DEXTROSE 50 ML/HR: 10 SOLUTION INTRAVENOUS at 23:24

## 2019-02-13 RX ADMIN — DILTIAZEM HYDROCHLORIDE 120 MG: 120 CAPSULE, EXTENDED RELEASE ORAL at 08:40

## 2019-02-13 RX ADMIN — POTASSIUM CHLORIDE 20 MEQ: 1500 TABLET, EXTENDED RELEASE ORAL at 08:41

## 2019-02-13 NOTE — ASSESSMENT & PLAN NOTE
· Discussed with cardiology   Continue IV diuresis  · S/P metazolone yesterday  · -2 5L yesterday and 3 9L thus far  · Repeat ECHO without systolic dysfunction  · Weight 324-308 since admissiom  · Daily weights, I/O  · Needs lasix compliance on discharge      Intake/Output Summary (Last 24 hours) at 2/13/2019 1125  Last data filed at 2/13/2019 0919  Gross per 24 hour   Intake 1070 ml   Output 3400 ml   Net -2330 ml     Wt Readings from Last 3 Encounters:   02/13/19 (!) 140 kg (308 lb 13 8 oz)   02/07/19 (!) 147 kg (323 lb)   03/28/18 (!) 141 kg (310 lb)

## 2019-02-13 NOTE — UTILIZATION REVIEW
Continued Stay Review    Date:  2/13/2019    Vital Signs: /82 (BP Location: Right arm)   Pulse 95   Temp 99 2 °F (37 3 °C) (Tympanic)   Resp 20   Ht 5' 4" (1 626 m)   Wt (!) 140 kg (308 lb 13 8 oz)   SpO2 91%   BMI 53 02 kg/m²      Assessment/Plan:  Continue IV Diuresis for CHF  S/P metazolone yesterday - Repeat x 1 today    + ROMERO  Still requiring O2  @ 3 liters  - sat 91% with O2  Multiple LE wounds - superimposed cellulitis  Wound Cx 2/12: growing GNR and G+ cocci  Stop ancef, start ceftriaxone  Check procalcitonin  BLE elevation  Will need RA eval prior to DC for possible need for home O2    Medications:   Scheduled Meds:   Current Facility-Administered Medications:  acetaminophen 975 mg Oral Q8H PRN     acetic acid  Topical TID     albuterol 2 puff Inhalation Q4H PRN     apixaban 5 mg Oral BID     cefTRIAXone 1,000 mg Intravenous Q24H     diltiazem 120 mg Oral Daily     furosemide 80 mg Intravenous BID (diuretic)     influenza vaccine 0 5 mL Intramuscular Prior to discharge     insulin lispro 2-12 Units Subcutaneous TID AC     insulin lispro 2-12 Units Subcutaneous HS     levalbuterol 1 25 mg Nebulization Q6H PRN     lidocaine 1 patch Topical Daily     methocarbamol 500 mg Oral Q6H PRN     metolazone 5 mg Oral Once  2/13    metoprolol succinate 100 mg Oral BID     mupirocin  Topical TID     nicotine 1 patch Transdermal Daily     nystatin 1 application Topical BID     ondansetron 4 mg Intravenous Q8H PRN     potassium chloride 20 mEq Oral BID     sodium chloride 3 mL Nebulization Q6H PRN         Pertinent Labs/Diagnostic Results:   Co2  36,   BUN 43,  Cr 1 47,   Glu 156    Age/Sex: 63 y o  female     Discharge Plan: TBD  Not stable for Discharge      Network Utilization Review Department  Phone: [phone]; Fax [phone]  [email or web address]  ATTENTION: Please call with any questions or concerns to [phone]  and carefully listen to the prompts so that you are directed to the right person  Send all requests for admission clinical reviews, approved or denied determinations and any other requests to fax [phone]   All voicemails are confidential

## 2019-02-13 NOTE — PROGRESS NOTES
Progress Note - Cardiology   Trygve Host 63 y o  female MRN: [id number]  Unit/Bed#: E4 -01 Encounter: [id number]        Problem List:  Principal Problem:    Acute on chronic diastolic congestive heart failure ([facility] )  Active Problems:    COPD (chronic obstructive pulmonary disease) ([facility])    Hypertension    Permanent atrial fibrillation ([facility])    Acute respiratory failure with hypoxia ([facility])    Wounds, multiple open, lower extremity    Diabetes type 2, uncontrolled ([facility])    Obesity    Depression    Stage 3 chronic kidney disease ([facility] )    Compression fracture of first lumbar vertebra ([facility])    Transaminitis      Asessment/Plan:  1  Acute on chronic diastolic heart failure  2  Chronic atrial fibrillation  3  Chronic kidney disease  4  Mitral regurg   5  Hypertension    Plan:   Diuresing well   Standing weight today 140 1 Kg (308 lbs)   Continue IV lasix 80 BID   Beta blocker Toprol  BID   Cardizem    AC with Eliquis for afib   Creatinine is improving  Continue to trend BMP     Subjective:  Patient seen and examined at bedside  He reporting back pain which is chronic  He is that her abdominal swelling is better but still with significant swelling in her legs  Still feels as if she has extra water weight  No chest pain, palpitations, dizziness      Vitals:  Vitals:    02/12/19 0556 02/13/19 0600   Weight: (!) 141 kg (310 lb 13 6 oz) (!) 140 kg (308 lb 13 8 oz)   ,  Vitals:    02/12/19 2359 02/13/19 0600 02/13/19 0712 02/13/19 1136   BP: 130/92  131/85 127/82   BP Location: Left arm  Right arm Right arm   Pulse: 84  89 95   Resp: 20  20 20   Temp: 97 8 °F (36 6 °C)  98 2 °F (36 8 °C) 99 2 °F (37 3 °C)   TempSrc: Tympanic  Tympanic Tympanic   SpO2: 94%  93% 91%   Weight:  (!) 140 kg (308 lb 13 8 oz)     Height:           Exam:  General:  alert, oriented and in no distress  Heart: RRR, No: murmer, rub or gallop  Respiratory effort: unlabored, on supplemental O2 nasal cannula  Lungs:  Clear bilaterally  Lower Limbs:  Still with significant 2+ edema bilaterally    Lower extremity wounds wrapped           Telemetry:       Atrial fibrillation, Heart Rate 80-90s    Medications:    Current Facility-Administered Medications:     acetaminophen (TYLENOL) tablet 975 mg, 975 mg, Oral, Q8H PRN, MAYNOR Damon, 975 mg at 02/11/19 1020    acetic acid 0 25 % irrigation solution, , Topical, TID, Rolo Dry, DO    albuterol (PROVENTIL HFA,VENTOLIN HFA) inhaler 2 puff, 2 puff, Inhalation, Q4H PRN, Andrew Garay PA-C    apixaban (ELIQUIS) tablet 5 mg, 5 mg, Oral, BID, Brielle Yang PA-C, 5 mg at 02/13/19 0841    cefTRIAXone (ROCEPHIN) 1,000 mg in dextrose 5 % 50 mL IVPB, 1,000 mg, Intravenous, Q24H, Vickie Macias PA-C    diltiazem (CARDIZEM CD) 24 hr capsule 120 mg, 120 mg, Oral, Daily, Brielle Yang PA-C, 120 mg at 02/13/19 0840    furosemide (LASIX) injection 80 mg, 80 mg, Intravenous, BID (diuretic), Aram Levy MD, 80 mg at 02/13/19 0840    influenza vaccine, recombinant, quadrivalent (FLUBLOK) IM injection 0 5 mL, 0 5 mL, Intramuscular, Prior to discharge, Aram Levy MD    insulin lispro (HumaLOG) 100 units/mL subcutaneous injection 2-12 Units, 2-12 Units, Subcutaneous, TID AC **AND** Fingerstick Glucose (POCT), , , TID AC, Brielle Yang PA-C    insulin lispro (HumaLOG) 100 units/mL subcutaneous injection 2-12 Units, 2-12 Units, Subcutaneous, HS, Brielle Yang PA-C, 2 Units at 02/12/19 2213    levalbuterol (XOPENEX) inhalation solution 1 25 mg, 1 25 mg, Nebulization, Q6H PRN, Stephanie Witt MD    lidocaine (LIDODERM) 5 % patch 1 patch, 1 patch, Topical, Daily, Brielle Yang PA-C, 1 patch at 02/13/19 0841    methocarbamol (ROBAXIN) tablet 500 mg, 500 mg, Oral, Q6H PRN, Mary Grace Sanchez PA-C, 500 mg at 02/12/19 2358    metoprolol succinate (TOPROL-XL) 24 hr tablet 100 mg, 100 mg, Oral, BID, Brielle Yang PA-C, 100 mg at 02/13/19 0841    mupirocin (BACTROBAN) 2 % ointment, , Topical, TID, Sharon Mustafa DO    nicotine (NICODERM CQ) 14 mg/24hr TD 24 hr patch 1 patch, 1 patch, Transdermal, Daily, Itzel Hernandes PA-C, Stopped at 02/10/19 0900    nystatin (MYCOSTATIN) powder 1 application, 1 application, Topical, BID, Itzel Hernandes PA-C, 1 application at 54/01/50 0842    ondansetron (ZOFRAN) injection 4 mg, 4 mg, Intravenous, Q8H PRN, Itzel Hernandes PA-C    potassium chloride (K-DUR,KLOR-CON) CR tablet 20 mEq, 20 mEq, Oral, BID, Will Rodgers MD, 20 mEq at 02/13/19 0841    sodium chloride 0 9 % inhalation solution 3 mL, 3 mL, Nebulization, Q6H PRN, Willem So MD      Labs/Data:        Results from last 7 days   Lab Units 02/13/19  0607 02/12/19  0502 02/09/19  0520   WBC Thousand/uL 8 23 7 81 9 03   HEMOGLOBIN g/dL 16 5* 17 1* 16 9*   HEMATOCRIT % 57 1* 59 7* 56 0*   PLATELETS Thousands/uL 150 161 171     Results from last 7 days   Lab Units 02/13/19  0842 02/12/19  0502 02/11/19  0622  02/08/19  1249   POTASSIUM mmol/L 4 4 4 9 4 4   < > 3 3*   CHLORIDE mmol/L 102 104 103   < > 102   CO2 mmol/L 36* 32 31   < > 31   BUN mg/dL 43* 40* 40*   < > 41*   TROPONIN I ng/mL  --   --   --   --  0 09*    < > = values in this interval not displayed

## 2019-02-13 NOTE — ASSESSMENT & PLAN NOTE
· Patient noted previously to desaturate when weaned from oxygen  · Checking RA sat now  · Patient does not use home O2  · Will monitor with ongoing diuresis - patient also with COPD      · Will need home oxygen eval prior to d/c

## 2019-02-13 NOTE — ASSESSMENT & PLAN NOTE
Lab Results   Component Value Date    CREATININE 1 47 (H) 02/13/2019    CREATININE 1 61 (H) 02/12/2019    CREATININE 1 75 (H) 02/11/2019    CREATININE 1 76 (H) 02/10/2019     · Creatinine at baseline   Continue to monitor with IV diuresis

## 2019-02-13 NOTE — ASSESSMENT & PLAN NOTE
· Wound care, podiatry and dermatology evaluated  · Appears to have superimposed cellulitis  · S/p 5 days of IV ancef  · Wound cultures by derm yesterday growing GNR and G+ cocci    · Stop ancef, start ceftriaxone  · Check procalcitonin  · BLE elevation

## 2019-02-13 NOTE — ASSESSMENT & PLAN NOTE
Lab Results   Component Value Date    HGBA1C 6 3 02/09/2019       Recent Labs     02/12/19  1211 02/12/19  1613 02/12/19  2058 02/13/19  0732   POCGLU 131 123 169* 120       Blood Sugar Average: Last 72 hrs:  (P) 115 [id number]   · Glucose has been well-controlled with sliding scale  · Patient takes metformin at home    · A1C 6 3

## 2019-02-13 NOTE — PLAN OF CARE
Problem: Potential for Falls  Goal: Patient will remain free of falls  Description  INTERVENTIONS:  - Assess patient frequently for physical needs  -  Identify cognitive and physical deficits and behaviors that affect risk of falls  -  [place] fall precautions as indicated by assessment   - Educate patient/family on patient safety including physical limitations  - Instruct patient to call for assistance with activity based on assessment  - Modify environment to reduce risk of injury  - Consider OT/PT consult to assist with strengthening/mobility   Outcome: Progressing     Problem: Nutrition/Hydration-ADULT  Goal: Nutrient/Hydration intake appropriate for improving, restoring or maintaining nutritional needs  Description  Monitor and assess patient's nutrition/hydration status for malnutrition (ex- brittle hair, bruises, dry skin, pale skin and conjunctiva, muscle wasting, smooth red tongue, and disorientation)  Collaborate with interdisciplinary team and initiate plan and interventions as ordered  Monitor patient's weight and dietary intake as ordered or per policy  Utilize nutrition screening tool and intervene per policy  Determine patient's food preferences and provide high-protein, high-caloric foods as appropriate       INTERVENTIONS:  - Monitor oral intake, urinary output, labs, and treatment plans  - Assess nutrition and hydration status and recommend course of action  - Evaluate amount of meals eaten  - Assist patient with eating if necessary   - Allow adequate time for meals  - Recommend/ encourage appropriate diets, oral nutritional supplements, and vitamin/mineral supplements  - Order, calculate, and assess calorie counts as needed  - Recommend, monitor, and adjust tube feedings and TPN/PPN based on assessed needs  - Assess need for intravenous fluids  - Provide specific nutrition/hydration education as appropriate  - Include patient/family/caregiver in decisions related to nutrition   Outcome: Progressing     Problem: Prexisting or High Potential for Compromised Skin Integrity  Goal: Skin integrity is maintained or improved  Description  INTERVENTIONS:  - Identify patients at risk for skin breakdown  - Assess and monitor skin integrity  - Assess and monitor nutrition and hydration status  - Monitor labs (i e  albumin)  - Assess for incontinence   - Turn and reposition patient  - Assist with mobility/ambulation  - Relieve pressure over bony prominences  - Avoid friction and shearing  - Provide appropriate hygiene as needed including keeping skin clean and dry  - Evaluate need for skin moisturizer/barrier cream  - Collaborate with interdisciplinary team (i e  Nutrition, Rehabilitation, etc )   - Patient/family teaching   Outcome: Progressing     Problem: PAIN - ADULT  Goal: Verbalizes/displays adequate comfort level or baseline comfort level  Description  Interventions:  - Encourage patient to monitor pain and request assistance  - Assess pain using appropriate pain scale  - Administer analgesics based on type and severity of pain and evaluate response  - Implement non-pharmacological measures as appropriate and evaluate response  - Consider cultural and social influences on pain and pain management  - Notify physician/advanced practitioner if interventions unsuccessful or patient reports new pain   Outcome: Progressing     Problem: INFECTION - ADULT  Goal: Absence or prevention of progression during hospitalization  Description  INTERVENTIONS:  - Assess and monitor for signs and symptoms of infection  - Monitor lab/diagnostic results  - Monitor all insertion sites, i e  indwelling lines, tubes, and drains  - Monitor endotracheal (as able) and nasal secretions for changes in amount and color  - [place] appropriate cooling/warming therapies per order  - Administer medications as ordered  - Instruct and encourage patient and family to use good hand hygiene technique  - Identify and instruct in appropriate isolation precautions for identified infection/condition   Outcome: Progressing  Goal: Absence of fever/infection during neutropenic period  Description  INTERVENTIONS:  - Monitor WBC  - Implement neutropenic guidelines   Outcome: Progressing     Problem: SAFETY ADULT  Goal: Maintain or return to baseline ADL function  Description  INTERVENTIONS:  -  Assess patient's ability to carry out ADLs; assess patient's baseline for ADL function and identify physical deficits which impact ability to perform ADLs (bathing, care of mouth/teeth, toileting, grooming, dressing, etc )  - Assess/evaluate cause of self-care deficits   - Assess range of motion  - Assess patient's mobility; develop plan if impaired  - Assess patient's need for assistive devices and provide as appropriate  - Encourage maximum independence but intervene and supervise when necessary  ¯ Involve family in performance of ADLs  ¯ Assess for home care needs following discharge   ¯ Request OT consult to assist with ADL evaluation and planning for discharge  ¯ Provide patient education as appropriate   Outcome: Progressing  Goal: Maintain or return mobility status to optimal level  Description  INTERVENTIONS:  - Assess patient's baseline mobility status (ambulation, transfers, stairs, etc )    - Identify cognitive and physical deficits and behaviors that affect mobility  - Identify mobility aids required to assist with transfers and/or ambulation (gait belt, sit-to-stand, lift, walker, cane, etc )  - [place] fall precautions as indicated by assessment  - Record patient progress and toleration of activity level on Mobility SBAR; progress patient to next Phase/Stage  - Instruct patient to call for assistance with activity based on assessment  - Request Rehabilitation consult to assist with strengthening/weightbearing, etc    Outcome: Progressing     Problem: DISCHARGE PLANNING  Goal: Discharge to home or other facility with appropriate resources  Description  INTERVENTIONS:  - Identify barriers to discharge w/patient and caregiver  - Arrange for needed discharge resources and transportation as appropriate  - Identify discharge learning needs (meds, wound care, etc )  - Arrange for interpretive services to assist at discharge as needed  - Refer to Case Management Department for coordinating discharge planning if the patient needs post-hospital services based on physician/advanced practitioner order or complex needs related to functional status, cognitive ability, or social support system   Outcome: Progressing     Problem: Knowledge Deficit  Goal: Patient/family/caregiver demonstrates understanding of disease process, treatment plan, medications, and discharge instructions  Description  Complete learning assessment and assess knowledge base  Interventions:  - Provide teaching at level of understanding  - Provide teaching via preferred learning methods   Outcome: Progressing     Problem: CARDIOVASCULAR - ADULT  Goal: Maintains optimal cardiac output and hemodynamic stability  Description  INTERVENTIONS:  - Monitor I/O, vital signs and rhythm  - Monitor for S/S and trends of decreased cardiac output i e  bleeding, hypotension  - Administer and titrate ordered vasoactive medications to optimize hemodynamic stability  - Assess quality of pulses, skin color and temperature  - Assess for signs of decreased coronary artery perfusion - ex   Angina  - Instruct patient to report change in severity of symptoms   Outcome: Progressing  Goal: Absence of cardiac dysrhythmias or at baseline rhythm  Description  INTERVENTIONS:  - Continuous cardiac monitoring, monitor vital signs, obtain 12 lead EKG if indicated  - Administer antiarrhythmic and heart rate control medications as ordered  - Monitor electrolytes and administer replacement therapy as ordered   Outcome: Progressing     Problem: METABOLIC, FLUID AND ELECTROLYTES - ADULT  Goal: Electrolytes maintained within normal limits  Description  INTERVENTIONS:  - Monitor labs and assess patient for signs and symptoms of electrolyte imbalances  - Administer electrolyte replacement as ordered  - Monitor response to electrolyte replacements, including repeat lab results as appropriate  - Instruct patient on fluid and nutrition as appropriate   Outcome: Progressing  Goal: Fluid balance maintained  Description  INTERVENTIONS:  - Monitor labs and assess for signs and symptoms of volume excess or deficit  - Monitor I/O and WT  - Instruct patient on fluid and nutrition as appropriate   Outcome: Progressing  Goal: Glucose maintained within target range  Description  INTERVENTIONS:  - Monitor Blood Glucose as ordered  - Assess for signs and symptoms of hyperglycemia and hypoglycemia  - Administer ordered medications to maintain glucose within target range  - Assess nutritional intake and initiate nutrition service referral as needed   Outcome: Progressing     Problem: SKIN/TISSUE INTEGRITY - ADULT  Goal: Skin integrity remains intact  Description  INTERVENTIONS  - Identify patients at risk for skin breakdown  - Assess and monitor skin integrity  - Assess and monitor nutrition and hydration status  - Monitor labs (i e  albumin)  - Assess for incontinence   - Turn and reposition patient  - Assist with mobility/ambulation  - Relieve pressure over bony prominences  - Avoid friction and shearing  - Provide appropriate hygiene as needed including keeping skin clean and dry  - Evaluate need for skin moisturizer/barrier cream  - Collaborate with interdisciplinary team (i e  Nutrition, Rehabilitation, etc )   - Patient/family teaching   Outcome: Progressing  Goal: Incision(s), wounds(s) or drain site(s) healing without S/S of infection  Description  INTERVENTIONS  - Assess and document risk factors for skin impairment   - Assess and document dressing, incision, wound bed, drain sites and surrounding tissue  - Initiate Nutrition services consult and/or wound management as needed   Outcome: Progressing     Problem: DISCHARGE PLANNING - CARE MANAGEMENT  Goal: Discharge to post-acute care or home with appropriate resources  Description  INTERVENTIONS:  - Conduct assessment to determine patient/family and health care team treatment goals, and need for post-acute services based on payer coverage, community resources, and patient preferences, and barriers to discharge  - Address psychosocial, clinical, and financial barriers to discharge as identified in assessment in conjunction with the patient/family and health care team  - Arrange appropriate level of post-acute services according to patient's   needs and preference and payer coverage in collaboration with the physician and health care team  - Communicate with and update the patient/family, physician, and health care team regarding progress on the discharge plan  - Arrange appropriate transportation to post-acute venues  - Pt wishes to d/c with home health care at this time   Outcome: Progressing

## 2019-02-13 NOTE — ASSESSMENT & PLAN NOTE
Lab Results   Component Value Date    AST 19 02/12/2019    AST 27 02/08/2019    ALT 8 (L) 02/12/2019    ALT 22 02/08/2019    TBILI 1 28 (H) 02/12/2019    TBILI 2 70 (H) 02/08/2019     · Patient had hyperbilirubemia to 4 12 on admission  · US abdomen: CBD measures 8 mm proximally and tapers down to 4 4 mm distally  The distal most CBD is not visualized  · Bili trending down to 1 28, no evidence of abdominal pain to suggest passed stone  · Patient with prior cholecystectomy - not complaining of abdominal pain at this time    · Repeat LFTs in AM

## 2019-02-13 NOTE — PROGRESS NOTES
RN rounded with Marta Brunner (MAYNOR) new orders for pt    am labs, r/o on telemetry  Will check with Dermatology on frequency of dressing changes  RN will await follow up for that  Pt encouraged to perform adls including wiping herself, Marta Brunner and RN expressed the need to move to become more independent and take care of her personal needs    RN to continue to monitor

## 2019-02-13 NOTE — OCCUPATIONAL THERAPY NOTE
Occupational Therapy     Patient Name: Agustín Sagastume  JWAPH'W Date: 2/13/2019     Attempted to see pt for OT treatment session this PM, however pt declined at this time,  Pt reports being too tired today and wanting to rest at this time  Therefore, pt cx'd this PM  Marilyn Turner at later time  OT to follow as able to A w/ safe d/c planning/recommendations       BROOKE Wiley/SAMIRA

## 2019-02-13 NOTE — ASSESSMENT & PLAN NOTE
· X-ray of lumbar spine showing L1 compression  · Continue lidoderm patch, heating pad  · PRN robaxin  · No neuro deficits on exam  No paresthesias    · Outpatient follow up and vitamin D levels

## 2019-02-13 NOTE — PROGRESS NOTES
Pt to try and wipe herself and perform ADLS to increase strength  Pt refusing rehab, states shes living with son and he will wipe her

## 2019-02-13 NOTE — PHYSICAL THERAPY NOTE
PHYSICAL THERAPY NOTE          Patient Name: Tammy Thompson  PSKRW'F Date: 2/13/2019  PT attempted treatment  Upon arrival pt received sitting in bedside recliner chair sleeping  Pt arouse with verbal stimuli and arrival of physical therapist  Kristin Kothari declining participation with therapy 2* just too tired today and wants to rest  Pt requesting extra pillow for head  PT provided patient with extra pillow and provided education on the benefits and importance of participating with therapy  Pt continue to politely decline participation  PT will continue to follow as schedule permits      Danny Aguirre, PT,DPT

## 2019-02-13 NOTE — PROGRESS NOTES
[facility] Internal Medicine  Progress Note - May Jimmie 1960, 63 y o  female MRN: [id number]  Unit/Bed#: E4 -01 Encounter: [id number]  Primary Care Provider: Karen Kowalski MD   Date and time admitted to hospital: 2/8/2019 12:18 PM    * Acute on chronic diastolic congestive heart failure [facility])  Assessment & Plan  · Discussed with cardiology  Continue IV diuresis  · S/P metazolone yesterday  · -2 5L yesterday and 3 9L thus far  · Repeat ECHO without systolic dysfunction  · Weight 324-308 since admissiom  · Daily weights, I/O  · Needs lasix compliance on discharge      Intake/Output Summary (Last 24 hours) at 2/13/2019 1125  Last data filed at 2/13/2019 0919  Gross per 24 hour   Intake 1070 ml   Output 3400 ml   Net -2330 ml     Wt Readings from Last 3 Encounters:   02/13/19 (!) 140 kg (308 lb 13 8 oz)   02/07/19 (!) 147 kg (323 lb)   03/28/18 (!) 141 kg (310 lb)         Acute respiratory failure with hypoxia (HCC)  Assessment & Plan  · Patient noted previously to desaturate when weaned from oxygen  · Checking RA sat now  · Patient does not use home O2  · Will monitor with ongoing diuresis - patient also with COPD  · Will need home oxygen eval prior to d/c    Permanent atrial fibrillation (HCC)  Assessment & Plan  · Patient on Eliquis 5 mg BID  · Rate controlled with BB and CCB    Hypertension  Assessment & Plan  · Controlled by metoprolol 100 mg b i d , diltiazem 120 mg daily  · /85    Diabetes type 2, uncontrolled [facility])  Assessment & Plan  Lab Results   Component Value Date    HGBA1C 6 3 02/09/2019       Recent Labs     02/12/19  1211 02/12/19  1613 02/12/19  2058 02/13/19  0732   POCGLU 131 123 169* 120       Blood Sugar Average: Last 72 hrs:  (P) 115 [id number]   · Glucose has been well-controlled with sliding scale  · Patient takes metformin at home    · A1C 6 3    Wounds, multiple open, lower extremity  Assessment & Plan  · Wound care, podiatry and dermatology evaluated  · Appears to have superimposed cellulitis  · S/p 5 days of IV ancef  · Wound cultures by derm yesterday growing GNR and G+ cocci  · Stop ancef, start ceftriaxone  · Check procalcitonin  · BLE elevation    COPD (chronic obstructive pulmonary disease) ([facility])  Assessment & Plan  · Check room air saturation  · Does not use oxygen at home  · PRN xopenex  · No acute exac      Stage 3 chronic kidney disease [facility])  Assessment & Plan  Lab Results   Component Value Date    CREATININE 1 47 (H) 02/13/2019    CREATININE 1 61 (H) 02/12/2019    CREATININE 1 75 (H) 02/11/2019    CREATININE 1 76 (H) 02/10/2019     · Creatinine at baseline  Continue to monitor with IV diuresis    Transaminitis  Assessment & Plan  Lab Results   Component Value Date    AST 19 02/12/2019    AST 27 02/08/2019    ALT 8 (L) 02/12/2019    ALT 22 02/08/2019    TBILI 1 28 (H) 02/12/2019    TBILI 2 70 (H) 02/08/2019     · Patient had hyperbilirubemia to 4 12 on admission  · US abdomen: CBD measures 8 mm proximally and tapers down to 4 4 mm distally  The distal most CBD is not visualized  · Bili trending down to 1 28, no evidence of abdominal pain to suggest passed stone  · Patient with prior cholecystectomy - not complaining of abdominal pain at this time  · Repeat LFTs in AM    Depression  Assessment & Plan  · Patient previously took Zoloft at home, however, has not been taking recently  Compression fracture of first lumbar vertebra [facility])  Assessment & Plan  · X-ray of lumbar spine showing L1 compression  · Continue lidoderm patch, heating pad  · PRN robaxin  · No neuro deficits on exam  No paresthesias  · Outpatient follow up and vitamin D levels    Obesity  Assessment & Plan  · Would benefit from weight loss  · Body mass index is 53 02 kg/m²        VTE Pharmacologic Prophylaxis:   Pharmacologic: Apixaban (Eliquis)  Mechanical VTE Prophylaxis in Place: No    Patient Centered Rounds: I have performed bedside rounds with nursing staff today  Emma Perfect with Specialists or Other Care Team Provider: nursing, cardio    Education and Discussions with Family / Patient: patient    Time Spent for Care: 45 minutes  More than 50% of total time spent on counseling and coordination of care as described above  Current Length of Stay: 5 day(s)    Current Patient Status: Inpatient   Certification Statement: The patient will continue to require additional inpatient hospital stay due to CHF, cellulitis, leg wounds    Discharge Plan: not stable for d/c  Suspect 48 hrs    Code Status: Level 1 - Full Code      Subjective:   Feels okay- a little better today  Urinated a lot  Refuses rehab  Will agree to home VNA and PT/OT  No chest pain  No loss of bowel or bladder  No paresthesias    Objective:     Vitals:   Temp (24hrs), Av 8 °F (36 6 °C), Min:97 2 °F (36 2 °C), Max:98 2 °F (36 8 °C)    Temp:  [97 2 °F (36 2 °C)-98 2 °F (36 8 °C)] 98 2 °F (36 8 °C)  HR:  [82-97] 89  Resp:  [18-20] 20  BP: (128-137)/(78-92) 131/85  SpO2:  [91 %-95 %] 93 %  Body mass index is 53 02 kg/m²  Input and Output Summary (last 24 hours): Intake/Output Summary (Last 24 hours) at 2019 1128  Last data filed at 2019 0919  Gross per 24 hour   Intake 1070 ml   Output 3400 ml   Net -2330 ml       Physical Exam:     Physical Exam   Constitutional: No distress  Morbid obesity   HENT:   Head: Normocephalic and atraumatic  Eyes: No scleral icterus  Cardiovascular: Normal rate, normal heart sounds and intact distal pulses  No murmur heard  Pulmonary/Chest: Breath sounds normal  No accessory muscle usage  No respiratory distress  She has no wheezes  She has no rales  Decreased effort   Abdominal: Soft  Bowel sounds are normal  She exhibits no distension  There is no tenderness  There is no rigidity, no rebound and no guarding  Obese  Candidal intertrigo   Musculoskeletal: She exhibits no edema  Neurological: She is alert     Skin: Skin is warm and dry  Capillary refill takes 2 to 3 seconds  Feet warm but slightly purple in coloration with good capillary refill and sensation  No rash noted  She is not diaphoretic  No erythema  Legs wrapped  Right leg erythematous  Left leg with scattered ulcerations    Back with scattered, nonerythematous lesions   Psychiatric: She has a normal mood and affect  Her behavior is normal    Nursing note and vitals reviewed  Additional Data:     Labs:    Results from last 7 days   Lab Units 02/13/19  0607   WBC Thousand/uL 8 23   HEMOGLOBIN g/dL 16 5*   HEMATOCRIT % 57 1*   PLATELETS Thousands/uL 150   NEUTROS PCT % 77*   LYMPHS PCT % 7*   MONOS PCT % 13*   EOS PCT % 2     Results from last 7 days   Lab Units 02/13/19  0842 02/12/19  0502   SODIUM mmol/L 143 144   POTASSIUM mmol/L 4 4 4 9   CHLORIDE mmol/L 102 104   CO2 mmol/L 36* 32   BUN mg/dL 43* 40*   CREATININE mg/dL 1 47* 1 61*   ANION GAP mmol/L 5 8   CALCIUM mg/dL 8 0* 8 0*   ALBUMIN g/dL  --  2 3*   TOTAL BILIRUBIN mg/dL  --  1 28*   ALK PHOS U/L  --  97   ALT U/L  --  8*   AST U/L  --  19   GLUCOSE RANDOM mg/dL 156* 89     Results from last 7 days   Lab Units 02/08/19  1249   INR  1 37*     Results from last 7 days   Lab Units 02/13/19  0732 02/12/19  2058 02/12/19  1613 02/12/19  1211 02/12/19  0726 02/11/19  2055 02/11/19  1607 02/11/19  1054 02/11/19  0708 02/10/19  2025 02/10/19  1526 02/10/19  1057   POC GLUCOSE mg/dl 120 169* 123 131 109 99 86 111 117 124 111 104     Results from last 7 days   Lab Units 02/09/19  0520   HEMOGLOBIN A1C % 6 3     Results from last 7 days   Lab Units 02/08/19  1249   LACTIC ACID mmol/L 1 6           * I Have Reviewed All Lab Data Listed Above  * Additional Pertinent Lab Tests Reviewed:  All Labs Within Last 24 Hours Reviewed    Imaging:    Imaging Reports Reviewed Today Include: venous duplex, ELIN, US  Imaging Personally Reviewed by Myself Includes:  none    Recent Cultures (last 7 days):     Results from last 7 days   Lab Units 02/12/19  1603 02/10/19  0900   GRAM STAIN RESULT  3+ Gram negative rods*  2+ Gram positive cocci in clusters*  Rare Polys*  2+ Gram negative rods*  No Polys*  --    WOUND CULTURE  Culture too young- will reincubate  1+ Growth of Gram Negative Sam*  --    C DIFF TOXIN B   --  NEGATIVE for C difficle toxin by PCR  Last 24 Hours Medication List:     Current Facility-Administered Medications:  acetaminophen 975 mg Oral Q8H PRN MAYNOR Galloway   acetic acid  Topical TID Genoveva Cheng DO   albuterol 2 puff Inhalation Q4H PRN Nimesh Bland PA-C   apixaban 5 mg Oral BID Brielle Yang PA-C   cefTRIAXone 1,000 mg Intravenous Q24H Vickie Macias PA-C   diltiazem 120 mg Oral Daily Brielle Yang PA-C   furosemide 80 mg Intravenous BID (diuretic) Gilmar Helm MD   influenza vaccine 0 5 mL Intramuscular Prior to discharge Gilmar Helm MD   insulin lispro 2-12 Units Subcutaneous TID AC Brielle Yang PA-C   insulin lispro 2-12 Units Subcutaneous HS Brielle Yang PA-C   levalbuterol 1 25 mg Nebulization Q6H PRN Toribio Bo MD   lidocaine 1 patch Topical Daily Brielle Yang PA-C   methocarbamol 500 mg Oral Q6H PRN Mary Grace Sanchez PA-C   metoprolol succinate 100 mg Oral BID Nimesh Bland PA-C   mupirocin  Topical TID Genoveva Cheng DO   nicotine 1 patch Transdermal Daily Brielle Yang PA-C   nystatin 1 application Topical BID Brielle Yang PA-C   ondansetron 4 mg Intravenous Q8H PRN Brielle Yang PA-C   potassium chloride 20 mEq Oral BID Nuno Kennedy MD   sodium chloride 3 mL Nebulization Q6H PRN Toribio Bo MD        Today, Patient Was Seen By: Carlton Kim PA-C    ** Please Note: Dictation voice to text software may have been used in the creation of this document   **

## 2019-02-14 ENCOUNTER — APPOINTMENT (INPATIENT)
Dept: RADIOLOGY | Facility: HOSPITAL | Age: 59
DRG: 291 | End: 2019-02-14
Payer: COMMERCIAL

## 2019-02-14 LAB
ALBUMIN SERPL BCP-MCNC: 2.4 G/DL (ref 3.5–5)
ALP SERPL-CCNC: 102 U/L (ref 46–116)
ALT SERPL W P-5'-P-CCNC: <6 U/L (ref 12–78)
ANION GAP SERPL CALCULATED.3IONS-SCNC: 2 MMOL/L (ref 4–13)
ARTERIAL PATENCY WRIST A: YES
AST SERPL W P-5'-P-CCNC: 18 U/L (ref 5–45)
ATRIAL RATE: 105 BPM
BASE EXCESS BLDA CALC-SCNC: 8.4 MMOL/L
BASOPHILS # BLD AUTO: 0.09 THOUSANDS/ΜL (ref 0–0.1)
BASOPHILS NFR BLD AUTO: 1 % (ref 0–1)
BILIRUB SERPL-MCNC: 1.67 MG/DL (ref 0.2–1)
BUN SERPL-MCNC: 46 MG/DL (ref 5–25)
CALCIUM SERPL-MCNC: 8 MG/DL (ref 8.3–10.1)
CHLORIDE SERPL-SCNC: 101 MMOL/L (ref 100–108)
CO2 SERPL-SCNC: 40 MMOL/L (ref 21–32)
CORTIS SERPL-MCNC: 22.5 UG/DL
CREAT SERPL-MCNC: 1.42 MG/DL (ref 0.6–1.3)
EOSINOPHIL # BLD AUTO: 0.07 THOUSAND/ΜL (ref 0–0.61)
EOSINOPHIL NFR BLD AUTO: 1 % (ref 0–6)
ERYTHROCYTE [DISTWIDTH] IN BLOOD BY AUTOMATED COUNT: 15.7 % (ref 11.6–15.1)
EST. AVERAGE GLUCOSE BLD GHB EST-MCNC: 131 MG/DL
GFR SERPL CREATININE-BSD FRML MDRD: 41 ML/MIN/1.73SQ M
GLUCOSE SERPL-MCNC: 115 MG/DL (ref 65–140)
GLUCOSE SERPL-MCNC: 128 MG/DL (ref 65–140)
GLUCOSE SERPL-MCNC: 133 MG/DL (ref 65–140)
GLUCOSE SERPL-MCNC: 142 MG/DL (ref 65–140)
GLUCOSE SERPL-MCNC: 146 MG/DL (ref 65–140)
GLUCOSE SERPL-MCNC: 151 MG/DL (ref 65–140)
GLUCOSE SERPL-MCNC: 165 MG/DL (ref 65–140)
GLUCOSE SERPL-MCNC: 168 MG/DL (ref 65–140)
GLUCOSE SERPL-MCNC: 184 MG/DL (ref 65–140)
GLUCOSE SERPL-MCNC: 192 MG/DL (ref 65–140)
GLUCOSE SERPL-MCNC: 213 MG/DL (ref 65–140)
GLUCOSE SERPL-MCNC: 227 MG/DL (ref 65–140)
GLUCOSE SERPL-MCNC: 33 MG/DL (ref 65–140)
GLUCOSE SERPL-MCNC: 80 MG/DL (ref 65–140)
GLUCOSE SERPL-MCNC: 82 MG/DL (ref 65–140)
GLUCOSE SERPL-MCNC: 92 MG/DL (ref 65–140)
GLUCOSE SERPL-MCNC: 95 MG/DL (ref 65–140)
HBA1C MFR BLD: 6.2 % (ref 4.2–6.3)
HCO3 BLDA-SCNC: 39 MMOL/L (ref 22–28)
HCT VFR BLD AUTO: 59.8 % (ref 34.8–46.1)
HGB BLD-MCNC: 17.1 G/DL (ref 11.5–15.4)
IMM GRANULOCYTES # BLD AUTO: 0.03 THOUSAND/UL (ref 0–0.2)
IMM GRANULOCYTES NFR BLD AUTO: 0 % (ref 0–2)
INSULIN SERPL-ACNC: 44.6 MU/L (ref 3–25)
LYMPHOCYTES # BLD AUTO: 0.58 THOUSANDS/ΜL (ref 0.6–4.47)
LYMPHOCYTES NFR BLD AUTO: 7 % (ref 14–44)
MAGNESIUM SERPL-MCNC: 1.6 MG/DL (ref 1.6–2.6)
MCH RBC QN AUTO: 30.5 PG (ref 26.8–34.3)
MCHC RBC AUTO-ENTMCNC: 28.6 G/DL (ref 31.4–37.4)
MCV RBC AUTO: 107 FL (ref 82–98)
MONOCYTES # BLD AUTO: 1.22 THOUSAND/ΜL (ref 0.17–1.22)
MONOCYTES NFR BLD AUTO: 14 % (ref 4–12)
NASAL CANNULA: 3
NEUTROPHILS # BLD AUTO: 6.51 THOUSANDS/ΜL (ref 1.85–7.62)
NEUTS SEG NFR BLD AUTO: 77 % (ref 43–75)
NRBC BLD AUTO-RTO: 0 /100 WBCS
O2 CT BLDA-SCNC: 22.2 ML/DL (ref 16–23)
OXYHGB MFR BLDA: 88.5 % (ref 94–97)
PCO2 BLDA: 79.7 MM HG (ref 36–44)
PH BLDA: 7.31 [PH] (ref 7.35–7.45)
PLATELET # BLD AUTO: 131 THOUSANDS/UL (ref 149–390)
PMV BLD AUTO: 10.9 FL (ref 8.9–12.7)
PO2 BLDA: 61.9 MM HG (ref 75–129)
POTASSIUM SERPL-SCNC: 3.8 MMOL/L (ref 3.5–5.3)
PROCALCITONIN SERPL-MCNC: 0.18 NG/ML
PROT SERPL-MCNC: 6.5 G/DL (ref 6.4–8.2)
QRS AXIS: 118 DEGREES
QRSD INTERVAL: 90 MS
QT INTERVAL: 462 MS
QTC INTERVAL: 488 MS
RBC # BLD AUTO: 5.6 MILLION/UL (ref 3.81–5.12)
SODIUM SERPL-SCNC: 143 MMOL/L (ref 136–145)
SPECIMEN SOURCE: ABNORMAL
T WAVE AXIS: 69 DEGREES
TSH SERPL DL<=0.05 MIU/L-ACNC: 2.34 UIU/ML (ref 0.36–3.74)
VENTRICULAR RATE: 67 BPM
WBC # BLD AUTO: 8.5 THOUSAND/UL (ref 4.31–10.16)

## 2019-02-14 PROCEDURE — 80053 COMPREHEN METABOLIC PANEL: CPT | Performed by: INTERNAL MEDICINE

## 2019-02-14 PROCEDURE — 99232 SBSQ HOSP IP/OBS MODERATE 35: CPT | Performed by: INTERNAL MEDICINE

## 2019-02-14 PROCEDURE — 82948 REAGENT STRIP/BLOOD GLUCOSE: CPT

## 2019-02-14 PROCEDURE — 93005 ELECTROCARDIOGRAM TRACING: CPT

## 2019-02-14 PROCEDURE — 97110 THERAPEUTIC EXERCISES: CPT

## 2019-02-14 PROCEDURE — 99254 IP/OBS CNSLTJ NEW/EST MOD 60: CPT | Performed by: INTERNAL MEDICINE

## 2019-02-14 PROCEDURE — 83735 ASSAY OF MAGNESIUM: CPT | Performed by: INTERNAL MEDICINE

## 2019-02-14 PROCEDURE — 83525 ASSAY OF INSULIN: CPT | Performed by: INTERNAL MEDICINE

## 2019-02-14 PROCEDURE — 84443 ASSAY THYROID STIM HORMONE: CPT | Performed by: PHYSICIAN ASSISTANT

## 2019-02-14 PROCEDURE — 99253 IP/OBS CNSLTJ NEW/EST LOW 45: CPT | Performed by: INTERNAL MEDICINE

## 2019-02-14 PROCEDURE — 93010 ELECTROCARDIOGRAM REPORT: CPT | Performed by: INTERNAL MEDICINE

## 2019-02-14 PROCEDURE — 99233 SBSQ HOSP IP/OBS HIGH 50: CPT | Performed by: INTERNAL MEDICINE

## 2019-02-14 PROCEDURE — 82533 TOTAL CORTISOL: CPT | Performed by: PHYSICIAN ASSISTANT

## 2019-02-14 PROCEDURE — 85025 COMPLETE CBC W/AUTO DIFF WBC: CPT | Performed by: INTERNAL MEDICINE

## 2019-02-14 PROCEDURE — 71045 X-RAY EXAM CHEST 1 VIEW: CPT

## 2019-02-14 PROCEDURE — 84145 PROCALCITONIN (PCT): CPT | Performed by: INTERNAL MEDICINE

## 2019-02-14 PROCEDURE — 83036 HEMOGLOBIN GLYCOSYLATED A1C: CPT | Performed by: PHYSICIAN ASSISTANT

## 2019-02-14 PROCEDURE — 97530 THERAPEUTIC ACTIVITIES: CPT

## 2019-02-14 PROCEDURE — 82805 BLOOD GASES W/O2 SATURATION: CPT | Performed by: NURSE PRACTITIONER

## 2019-02-14 PROCEDURE — 97535 SELF CARE MNGMENT TRAINING: CPT

## 2019-02-14 PROCEDURE — 36600 WITHDRAWAL OF ARTERIAL BLOOD: CPT

## 2019-02-14 PROCEDURE — 97116 GAIT TRAINING THERAPY: CPT

## 2019-02-14 RX ORDER — MAGNESIUM SULFATE 1 G/100ML
1 INJECTION INTRAVENOUS ONCE
Status: COMPLETED | OUTPATIENT
Start: 2019-02-14 | End: 2019-02-14

## 2019-02-14 RX ORDER — METOLAZONE 5 MG/1
5 TABLET ORAL DAILY
Status: DISCONTINUED | OUTPATIENT
Start: 2019-02-15 | End: 2019-02-14

## 2019-02-14 RX ORDER — DEXTROSE MONOHYDRATE 25 G/50ML
INJECTION, SOLUTION INTRAVENOUS
Status: COMPLETED
Start: 2019-02-14 | End: 2019-02-14

## 2019-02-14 RX ORDER — METOLAZONE 5 MG/1
5 TABLET ORAL ONCE
Status: COMPLETED | OUTPATIENT
Start: 2019-02-14 | End: 2019-02-14

## 2019-02-14 RX ADMIN — APIXABAN 5 MG: 5 TABLET, FILM COATED ORAL at 09:02

## 2019-02-14 RX ADMIN — ACETAMINOPHEN 975 MG: 325 TABLET, FILM COATED ORAL at 11:19

## 2019-02-14 RX ADMIN — APIXABAN 5 MG: 5 TABLET, FILM COATED ORAL at 16:54

## 2019-02-14 RX ADMIN — MAGNESIUM SULFATE HEPTAHYDRATE 1 G: 1 INJECTION, SOLUTION INTRAVENOUS at 14:39

## 2019-02-14 RX ADMIN — FUROSEMIDE 80 MG: 10 INJECTION, SOLUTION INTRAMUSCULAR; INTRAVENOUS at 09:02

## 2019-02-14 RX ADMIN — METOLAZONE 5 MG: 5 TABLET ORAL at 16:55

## 2019-02-14 RX ADMIN — NYSTATIN 1 APPLICATION: 100000 POWDER TOPICAL at 17:11

## 2019-02-14 RX ADMIN — DILTIAZEM HYDROCHLORIDE 120 MG: 120 CAPSULE, EXTENDED RELEASE ORAL at 09:02

## 2019-02-14 RX ADMIN — NYSTATIN 1 APPLICATION: 100000 POWDER TOPICAL at 09:05

## 2019-02-14 RX ADMIN — POTASSIUM CHLORIDE 20 MEQ: 1500 TABLET, EXTENDED RELEASE ORAL at 16:57

## 2019-02-14 RX ADMIN — LIDOCAINE 1 PATCH: 50 PATCH CUTANEOUS at 09:02

## 2019-02-14 RX ADMIN — ACETAMINOPHEN 975 MG: 325 TABLET, FILM COATED ORAL at 21:57

## 2019-02-14 RX ADMIN — CEFTRIAXONE SODIUM 1000 MG: 10 INJECTION, POWDER, FOR SOLUTION INTRAVENOUS at 11:19

## 2019-02-14 RX ADMIN — POTASSIUM CHLORIDE 20 MEQ: 1500 TABLET, EXTENDED RELEASE ORAL at 09:02

## 2019-02-14 RX ADMIN — DEXTROSE MONOHYDRATE 50 ML: 25 INJECTION, SOLUTION INTRAVENOUS at 00:41

## 2019-02-14 RX ADMIN — ACETIC ACID: 250 IRRIGANT IRRIGATION at 09:05

## 2019-02-14 RX ADMIN — FUROSEMIDE 80 MG: 10 INJECTION, SOLUTION INTRAMUSCULAR; INTRAVENOUS at 16:54

## 2019-02-14 RX ADMIN — MUPIROCIN: 20 OINTMENT TOPICAL at 11:28

## 2019-02-14 RX ADMIN — METOPROLOL SUCCINATE 100 MG: 50 TABLET, EXTENDED RELEASE ORAL at 16:55

## 2019-02-14 RX ADMIN — METOPROLOL SUCCINATE 100 MG: 50 TABLET, EXTENDED RELEASE ORAL at 09:02

## 2019-02-14 NOTE — CONSULTS
Consultation - Nahun Escobar 63 y o  female MRN: [id number]    Unit/Bed#: E4 -01 Encounter: [id number]      Assessment/Plan     Assessment/Plan:  Hypoglycemia: It is unclear the cause of her severe hypoglycemia noted by point of care testing last night  Reviewing records indicates patient had lethargy and was alert awake and oriented to the speaks against a blood sugar of less than 20 which may suggest falls point of care readings  Dextrose containing intravenous fluids were held this morning and blood sugars have stabilized  My suggestion for now would be to continue to monitor blood sugars closely  I would check an a m  Cortisol, TSH, free T4, sulfonylurea screen with a m  Labs tomorrow  Given recent A1c I would not discharge on metformin at this time and would just suggest outpatient follow-up  If her blood sugar were to decrease to below 55 by point of care testing, I would draw the following labs before administering any treatment for hypoglycemia:  - basic metabolic panel (to confirm point of care blood glucose reading)  - insulin level  - proinsulin level  - C-peptide level  - beta hydroxybutyrate  - sulfonylurea screen  - cortisol  - insulin antibody    To treat the hypoglycemia, I would administer 1 mg of glucagon  Blood sugar should be read checked 20-30 minutes later  If there is no increase in blood sugar by over 25 after administrating glucagon, this because more to hypoglycemia as results of depleted glycogen stores  After above labs have been drawn, glucagon administer, and blood sugar recheck, dextrose containing fluids can be administered to treat hypoglycemia      Inpatient consult to Endocrinology  Consult performed by: Tina Roa DO  Consult ordered by: Maryjane Ortiz PA-C          CC: Hypoglycemia    History of Present Illness     HPI: Nahun Escobar is a 63y o  year old female with history of hypertension, atrial fibrillation, CHF, COPD, diabetes, hypertension who is admitted for fluid retention shortness of breath was found to have hypoglycemia last night after a period of lethargy  Around 9:20 p m  Last night her blood sugar was found to be less than 20 receive some dextrose  Repeat was 34 and after 2 more S of dextrose blood sugar was repeatedly 21  A then placed on dextrose infusion at blood sugar stabilized  She was prescribed metformin as an outpatient for diabetes but she never took this  She denies any other anti-hyperglycemic agents  She denies any history of insulin  She does not live anybody with the hyperglycemics her insulin  According to her chart her lab of sliding scale insulin and was  around 10:13 p m  The night before this occurred (almost 24 hr prior to hypoglycemia)  She denies history of hypoglycemia but does reports periods of lethargy the felt similar to what happened last night  She has never checked her blood sugar at home  She does not note any history of steroid exposure  Years ago she had a steroid injection in her ankle  She does report an unintentional weight gain recently  She denies history of liver or known history of kidney dysfunction  She did have a CT scan of the abdomen in 2016 in our records did not disclose any obvious pancreatic lesion  Review of Systems   Constitutional: Positive for appetite change (poor), fatigue and unexpected weight change (gain)  Negative for chills and fever  HENT: Negative for congestion and trouble swallowing  Eyes: Negative for visual disturbance  Respiratory: Negative for shortness of breath  Cardiovascular: Negative for palpitations and leg swelling  Gastrointestinal: Positive for nausea  Negative for abdominal pain and vomiting  Endocrine: Negative for polydipsia and polyuria  Genitourinary: Negative for difficulty urinating and frequency  Musculoskeletal: Negative for arthralgias  Skin: Negative for rash  Neurological: Positive for weakness  Psychiatric/Behavioral: Positive for decreased concentration  Negative for agitation  Historical Information   Past Medical History:   Diagnosis Date    [facility] fibrillation [facility] ([facility] )     [facility]     Diabetes type 2, uncontrolled ([facility] )     Hypertension      Past Surgical History:   Procedure Laterality Date    HERNIA REPAIR      HYSTERECTOMY      LAPAROSCOPIC CHOLECYSTECTOMY       Social History   Social History     Substance and Sexual Activity   Alcohol Use Yes    Comment: socially, NO ALCOHOL USE     Social History     Substance and Sexual Activity   Drug Use Yes    Types: Marijuana     Social History     Tobacco Use   Smoking Status Current Every Day Smoker    Packs/day: 0 00    Years: 43 00    Pack years: 0 00    Types: Cigarettes   Smokeless Tobacco Never Used   Tobacco Comment    Currently half a pack a day, 6 CIGARETTES PER DAY      Family History:   Family History   Problem Relation Age of Onset    Diabetes type II Mother     No Known Problems Father         She reports not knowing much about her father      Cancer Family     Diabetes Family     Hypertension Family     Stroke Family        Meds/Allergies   Current Facility-Administered Medications   Medication Dose Route Frequency Provider Last Rate Last Dose    acetaminophen (TYLENOL) tablet 975 mg  975 mg Oral Q8H PRN MAYNOR Mcintyre   975 mg at 02/14/19 1119    albuterol (PROVENTIL HFA,VENTOLIN HFA) inhaler 2 puff  2 puff Inhalation Q4H PRN Bernard Garcia PA-C        apixaban (ELIQUIS) tablet 5 mg  5 mg Oral BID Brielle Yang PA-C   5 mg at 02/14/19 1654    diltiazem (CARDIZEM CD) 24 hr capsule 120 mg  120 mg Oral Daily Brielle Yang PA-C   120 mg at 02/14/19 0902    furosemide (LASIX) injection 80 mg  80 mg Intravenous BID (diuretic) Jared Beckham MD   80 mg at 02/14/19 1654    influenza vaccine, recombinant, quadrivalent (FLUBLOK) IM injection 0 5 mL  0 5 mL Intramuscular Prior to discharge Erasmo Landin MD        levalbuterol Jose Sandoval) inhalation solution 1 25 mg  1 25 mg Nebulization Q6H PRN Lázaro Gonzales MD        lidocaine (LIDODERM) 5 % patch 1 patch  1 patch Topical Daily Aubrie Waterman PA-C   1 patch at 02/14/19 0902    metoprolol succinate (TOPROL-XL) 24 hr tablet 100 mg  100 mg Oral BID Brielle Yang PA-C   100 mg at 02/14/19 1655    mupirocin (BACTROBAN) 2 % ointment   Topical TID Tiera Bowen DO   Stopped at 02/14/19 1655    nicotine (NICODERM CQ) 14 mg/24hr TD 24 hr patch 1 patch  1 patch Transdermal Daily Brielle Yang PA-C   Stopped at 02/10/19 0900    nystatin (MYCOSTATIN) powder 1 application  1 application Topical BID DILLON VásquezC   1 application at 69/76/79 0905    ondansetron (ZOFRAN) injection 4 mg  4 mg Intravenous Q8H PRN Aubrie Waterman PA-C        potassium chloride (K-DUR,KLOR-CON) CR tablet 20 mEq  20 mEq Oral BID Susan Ruiz MD   20 mEq at 02/14/19 1657    sodium chloride 0 9 % inhalation solution 3 mL  3 mL Nebulization Q6H PRN Lázaro Gonzales MD         No Known Allergies    Objective   Vitals: Blood pressure 116/81, pulse 81, temperature 98 1 °F (36 7 °C), temperature source Temporal, resp  rate 20, height 5' 4" (1 626 m), weight (!) 137 kg (301 lb 5 9 oz), SpO2 93 %  Intake/Output Summary (Last 24 hours) at 2/14/2019 1658  Last data filed at 2/14/2019 0411  Gross per 24 hour   Intake 480 ml   Output 1737 ml   Net -1257 ml     Invasive Devices     Peripheral Intravenous Line            Peripheral IV 02/13/19 Left Forearm 1 day    Peripheral IV 02/14/19 Right Forearm less than 1 day                Physical Exam    The history was obtained from the review of the chart, patient      Lab Results:   Results from last 7 days   Lab Units 02/14/19  0947   HEMOGLOBIN A1C % 6 2     Lab Results   Component Value Date    WBC 8 50 02/14/2019    HGB 17 1 (H) 02/14/2019    HCT 59 8 (H) 02/14/2019     (H) 02/14/2019    PLT 131 (L) 02/14/2019     Lab Results   Component Value Date/Time    BUN 46 (H) 02/14/2019 03:50 AM    BUN 25 05/20/2015 05:28 PM     05/20/2015 05:28 PM    K 3 8 02/14/2019 03:50 AM    K 4 0 05/20/2015 05:28 PM     02/14/2019 03:50 AM     05/20/2015 05:28 PM    CO2 40 (H) 02/14/2019 03:50 AM    CO2 26 05/20/2015 05:28 PM    CREATININE 1 42 (H) 02/14/2019 03:50 AM    CREATININE 1 47 (H) 05/20/2015 05:28 PM    AST 18 02/14/2019 03:50 AM    AST 23 05/20/2015 05:28 PM    ALT <6 (L) 02/14/2019 03:50 AM    ALT 31 05/20/2015 05:28 PM    ALB 2 4 (L) 02/14/2019 03:50 AM    ALB 3 3 (L) 05/20/2015 05:28 PM     No results for input(s): CHOL, HDL, LDL, TRIG, VLDL in the last 72 hours  No results found for: Kathlen Coma  POC Glucose (mg/dl)   Date Value   02/14/2019 146 (H)   02/14/2019 213 (H)       Imaging Studies: I have personally reviewed pertinent reports  November of 2016:  CT ABDOMEN AND PELVIS WITHOUT IV CONTRAST     INDICATION:  Back pain and shortness of breath      COMPARISON: No prior abdominal CTs for comparison     TECHNIQUE:  CT examination of the abdomen and pelvis was performed without intravenous contrast   This examination, like all CT scans performed [facility], was performed utilizing techniques to minimize radiation dose exposure,   including the use of iterative reconstruction and automated exposure control  Axial, sagittal, and coronal reformatted images were submitted for interpretation  Intravenous contrast was not administered as part of this examination  As requested study   performed without oral or IV contrast     FINDINGS:     ABDOMEN     LOWER CHEST:  There is cardiomegaly    Central pulmonary vascular structures are prominent      LIVER/BILIARY TREE:  Limited secondary to body habitus and lack of IV contrast; no definite abnormality     GALLBLADDER:  Gallbladder is surgically absent      SPLEEN:  Unremarkable      PANCREAS: Unremarkable      ADRENAL GLANDS:  Unremarkable      KIDNEYS/URETERS:  6 mm right kidney lower pole nonobstructing calculus  3 mm left kidney mid region nonobstructing calculus      STOMACH AND BOWEL:  Sigmoid diverticula without evidence of diverticulitis  No bowel obstruction      APPENDIX:  No findings to suggest appendicitis      ABDOMINOPELVIC CAVITY:  No ascites or free intraperitoneal air  No lymphadenopathy      VESSELS:  Unremarkable for patient's age      PELVIS     REPRODUCTIVE ORGANS:  Status post hysterectomy  On image 2/59, there is a soft tissue nodule containing a focus of calcification measuring 2 8 x 1 5 cm in size  Similar smaller structure on the right  This suggests that the ovaries are still present,   correlate with ascites prior surgical history     URINARY BLADDER:  Unremarkable      ABDOMINAL WALL/INGUINAL REGIONS:  There is a marked degree of edema within the subcutaneous adipose tissue, particularly within the anterior abdominal and pelvic wall and left lateral abdominal wall      OSSEOUS STRUCTURES:  No acute fracture or destructive osseous lesion      IMPRESSION:        1  No acute intra-abdominal or intrapelvic inflammatory change  No bowel obstruction or hydronephrosis  2  Bilateral nonobstructing renal calculi  3  Cardiomegaly  4  A marked degree of edema is seen within the subcutaneous adipose tissue  Etiology unclear but this may be related to fluid overload  5   Small nodules anterior to the psoas muscles bilaterally suggesting the ovaries remain       Portions of the record may have been created with voice recognition software  Occasional wrong word or "sound a like" substitutions may have occurred due to the inherent limitations of voice recognition software  Read the chart carefully and recognize, using context, where substitutions have occurred

## 2019-02-14 NOTE — PLAN OF CARE
Problem: OCCUPATIONAL THERAPY ADULT  Goal: Performs self-care activities at highest level of function for planned discharge setting  See evaluation for individualized goals  Description  Treatment Interventions: ADL retraining, Functional transfer training, UE strengthening/ROM, Endurance training, Patient/family training, Equipment evaluation/education, Compensatory technique education, Energy conservation, Activityengagement          See flowsheet documentation for full assessment, interventions and recommendations  Outcome: Progressing  Note:   Limitation: Decreased ADL status, Decreased UE strength, Decreased Safe judgement during ADL, Decreased endurance, Decreased self-care trans, Decreased high-level ADLs  Prognosis: Good  Assessment: Pt seen for OT treatment session this PM focusing on functional activity tolerance, ADLs, functional mobility/transfers, and energy conservation education  Pt alert and cooperative throughout session  Pt seated OOB in chair at start of session  UB dressing completed while seated in chair with Min A to bring gown down/around in back 2* increased body habitus  Transfers (sit<>stand, RW<>standard toilet) completed with Min A with cues for safe technique and hand placement  Min A required for functional mobility with use of RW with cues for pacing and assist for line management (IV and O2)  Toileting tasks completed with Min A with assist for thoroughness during perineal hygiene  Light grooming tasks completed with Supervision 2* setup required and increased time to complete  Educated pt on energy conservation techniques/tips (ie: sitting during ADLs/IADLs when possible, pacing, compensatory breathing, etc ) with pt verbalizing understanding of education  Pt seated OOB in chair at end of session with call bell and phone within reach  All needs met and pt reports no further questions for OT at this time   Continue to recommend STR at D/C, however pt prefers to return home to son's home  If D/C home, recommend Home OT  OT to follow pt on caseload  OT Discharge Recommendation: Short Term Rehab(vs Home OT;  Pt prefers to return home to son's house)  OT - OK to Discharge: Yes(to rehab when medically cleared)

## 2019-02-14 NOTE — ASSESSMENT & PLAN NOTE
Lab Results   Component Value Date    CREATININE 1 42 (H) 02/14/2019    CREATININE 1 47 (H) 02/13/2019    CREATININE 1 61 (H) 02/12/2019    CREATININE 1 75 (H) 02/11/2019     · Creatinine at baseline   Continue to monitor with IV diuresis

## 2019-02-14 NOTE — SOCIAL WORK
Cm spoke with pt's son Сергей Gibson to introduce role and to gather address information where pt with be discharging to when she is medically stable  Son's address is:  [facility], [address]    Cm made referrals to [facility] agencies around [facility] for wound care, RN/PT/OT    Cm following

## 2019-02-14 NOTE — ASSESSMENT & PLAN NOTE
· Patient noted previously to desaturate when weaned from oxygen  · Room air saturation yesterday 72%  · Repeat CXR with worsening edema this AM  · ABG shows hypercapnia and the patient has hemoconcentration, elevated CO2 and hemoglobin chronically suggest degree of chronic hypercapnia  · Discussed with Pulmonary  Will consult them  Patient refusing BiPAP at this time  Is mentating appropriately    · Patient does not use home O2 or CPAP/BIPAP  · Will monitor with ongoing diuresis   · Will need home oxygen eval prior to d/c

## 2019-02-14 NOTE — ASSESSMENT & PLAN NOTE
· Wound care, podiatry and dermatology evaluated  · Appears to have superimposed cellulitis of left posterior leg and right anterior leg  · Wound cultures obtained by dermatology growing multiple organisms- unclear if colonization vs  True organisms  · ID consulted     · S/p 5 days of IV ancef, changed to ceftriaxone yesterday given failure to improve  · May be related to ecthymya as well  · BLE elevation, edema control is imperative

## 2019-02-14 NOTE — NURSING NOTE
PCA rechecked blood sugar at 0022 with result being 33  Paged RRNP with new orders to administer 2 amps IV dextrose, increase dextrose infusion to 75 ml/hr, and continue q1 hr blood sugar checks  Explained my concern for the pt and her lethargy

## 2019-02-14 NOTE — CONSULTS
Consultation - Pulmonary Medicine   Dar Moreland 63 y o  female MRN: [id number]  Unit/Bed#: E4 -01 Encounter: [id number]      Assessment/Plan:    1  Acute on chronic respiratory failure with hypoxia and hypercapnia secondary to volume overload  1  Currently requiring 3 L nasal cannula  No oxygen requirement at baseline  2  Continued titrate oxygen to maintain oxygen saturation greater than or equal to 88%  3  Repeat CXR today showed worsening pulmonary edema  4  Likely secondary to patient's noncompliance with her home diuretics  5  Patient is most likely always hypercapnic  Most recent ABG shows 7 3/79/62/39  Refuses to wear BiPAP  6  Pulmonary toilet:  Incentive spirometry increase activity as tolerated, out of bed as tolerated  7  Will need home oxygen eval prior to discharge if unable to wean oxygen  2  Suspected COPD without exacerbation  1  No PFTs  2  Does not require steroid therapy at this time  3  Continue albuterol HFA p r n   4  At discharge patient will need outpatient pulmonary follow-up with PFTs to follow  3  Acute on chronic diastolic CHF  1  Patient was not compliant with her home medications  2  Continue IV Lasix 80 mg b i d    3  Strict I/Os and daily weights  4  POA BNP 4,595  5  Repeat echo 02/11/2019 shows EF 55%  Moderately dilated RA and LA  TV mild regurgitation  4  Tobacco abuse  1  Smoking cessation urgent  2  Nicotine replacement therapy  3  Patient declines CT lung cancer screening  5  Permanent Afib  1  On Eliquis  2  Cardiology following  6  Morbid obesity with possible RADHA and OHVS   1  Patient declined further workup for RADHA    History of Present Illness   Physician Requesting Consult: Cecily Fragoso MD  Reason for Consult / Principal Problem:  Acute on chronic respiratory failure  Hx and PE limited by: n/a  Chief Complaint: fluid retention and back pain  HPI: Dar Moreland is a 63 y o    female who presented to [facility] [place] [address]  with complaints of worsening lower extremity edema and back pain  Patient has a past medical history of chronic diastolic CHF, hypertension, CKD stage 3, chronic atrial fibrillation on anticoagulation, COPD, and morbid obesity  She saw her PCP the day before admission and was noted to have significant lower extremity edema, tachypnea, and shortness of Breath  Her breathing became worse the next day which prompted her to go to the emergency room  Patient admits that she has been noncompliant with her home dose of diuretics  She also reports a 50 lb weight gain in the last month since mid January  She denies chest pain, cough, sputum production, wheeze  Denies fevers, chills, night sweats, nausea, vomiting  From pulmonary standpoint, patient has a suspected diagnosis of COPD with no PFTs to confirm  She does not follow with pulmonologist   She does not require oxygen at home  She takes albuterol HFA inhaler as needed  She has 3 dogs at home but do not bother her symptoms  She is a current everyday smoker of about a 1/3-1 ppd for 45 years  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  Denies allergies  Denies recent travel  Denies any sick contacts  No family history of lung cancer, COPD, or asthma  Consults    Review of Systems   Constitutional: Positive for unexpected weight change  Negative for fever  HENT: Negative  Eyes: Negative  Respiratory: Positive for shortness of breath  Cardiovascular: Positive for leg swelling  Negative for chest pain  Endocrine: Negative  Genitourinary: Negative  Musculoskeletal: Positive for back pain  Neurological: Negative  Hematological: Negative  Psychiatric/Behavioral: Negative          Historical Information   Past Medical History:   Diagnosis Date    Atrial fibrillation with RVR ([facility] )     COPD (chronic obstructive pulmonary disease) ([place] [address] )     Diabetes type 2, uncontrolled ([place] [address] )  Hypertension      Past Surgical History:   Procedure Laterality Date    HERNIA REPAIR      HYSTERECTOMY      LAPAROSCOPIC CHOLECYSTECTOMY       Social History   Social History     Substance and Sexual Activity   Alcohol Use Yes    Comment: socially, NO ALCOHOL USE     Social History     Substance and Sexual Activity   Drug Use Yes    Types: Marijuana     Social History     Tobacco Use   Smoking Status Current Every Day Smoker    Packs/day: 0 00    Years: 43 00    Pack years: 0 00    Types: Cigarettes   Smokeless Tobacco Never Used   Tobacco Comment    Currently half a pack a day, 6 CIGARETTES PER DAY      Occupational History:  Mild occupational exposure to chemicals    Family History:   Family History   Problem Relation Age of Onset    Diabetes type II Mother     No Known Problems Father         She reports not knowing much about her father      Cancer Family     Diabetes Family     Hypertension Family     Stroke Family        Meds/Allergies   all current active meds have been reviewed and current meds:   Current Facility-Administered Medications   Medication Dose Route Frequency    acetaminophen (TYLENOL) tablet 975 mg  975 mg Oral Q8H PRN    albuterol (PROVENTIL HFA,VENTOLIN HFA) inhaler 2 puff  2 puff Inhalation Q4H PRN    apixaban (ELIQUIS) tablet 5 mg  5 mg Oral BID    diltiazem (CARDIZEM CD) 24 hr capsule 120 mg  120 mg Oral Daily    furosemide (LASIX) injection 80 mg  80 mg Intravenous BID (diuretic)    influenza vaccine, recombinant, quadrivalent (FLUBLOK) IM injection 0 5 mL  0 5 mL Intramuscular Prior to discharge    levalbuterol (XOPENEX) inhalation solution 1 25 mg  1 25 mg Nebulization Q6H PRN    lidocaine (LIDODERM) 5 % patch 1 patch  1 patch Topical Daily    metolazone (ZAROXOLYN) tablet 5 mg  5 mg Oral Once    metoprolol succinate (TOPROL-XL) 24 hr tablet 100 mg  100 mg Oral BID    mupirocin (BACTROBAN) 2 % ointment   Topical TID    nicotine (NICODERM CQ) 14 mg/24hr TD 24 hr patch 1 patch  1 patch Transdermal Daily    nystatin (MYCOSTATIN) powder 1 application  1 application Topical BID    ondansetron (ZOFRAN) injection 4 mg  4 mg Intravenous Q8H PRN    potassium chloride (K-DUR,KLOR-CON) CR tablet 20 mEq  20 mEq Oral BID    sodium chloride 0 9 % inhalation solution 3 mL  3 mL Nebulization Q6H PRN       No Known Allergies    Objective   Vitals: Blood pressure 151/74, pulse 62, temperature (!) 97 °F (36 1 °C), temperature source Tympanic, resp  rate 20, height 5' 4" (1 626 m), weight (!) 137 kg (301 lb 5 9 oz), SpO2 96 %  3 L nasal cannula,Body mass index is 51 73 kg/m²  Intake/Output Summary (Last 24 hours) at 2/14/2019 1505  Last data filed at 2/14/2019 0411  Gross per 24 hour   Intake 480 ml   Output 1737 ml   Net -1257 ml     Invasive Devices     Peripheral Intravenous Line            Peripheral IV 02/13/19 Left Forearm 1 day    Peripheral IV 02/14/19 Right Forearm less than 1 day                Physical Exam   Constitutional: She is oriented to person, place, and time  She appears well-developed  No distress  HENT:   Head: Normocephalic and atraumatic  Nose: Nose normal    Mouth/Throat: Oropharynx is clear and moist    Eyes: Pupils are equal, round, and reactive to light  EOM are normal  No scleral icterus  Neck: Neck supple  No tracheal deviation present  Cardiovascular: Normal rate, regular rhythm, normal heart sounds and intact distal pulses  No murmur heard  Pulmonary/Chest: Effort normal  She has wheezes (Faint)  Abdominal: Soft  Bowel sounds are normal  She exhibits no distension  There is no tenderness  Musculoskeletal: Normal range of motion  She exhibits edema  She exhibits no tenderness or deformity  Neurological: She is alert and oriented to person, place, and time  No cranial nerve deficit  Skin: Skin is warm and dry  She is not diaphoretic  No pallor  Psychiatric: She has a normal mood and affect   Her behavior is normal  Judgment and thought content normal        Lab Results:   I have personally reviewed pertinent lab results  , ABG:   Lab Results   Component Value Date    PHART 7 308 (L) 02/14/2019    NJG4UQC 79 7 (HH) 02/14/2019    PO2ART 61 9 (L) 02/14/2019    FEB2MUL 39 0 (H) 02/14/2019    BEART 8 4 02/14/2019    SOURCE Radial, Right 02/14/2019   , BNP: No results found for: BNP, CBC:   Lab Results   Component Value Date    WBC 8 50 02/14/2019    HGB 17 1 (H) 02/14/2019    HCT 59 8 (H) 02/14/2019     (H) 02/14/2019     (L) 02/14/2019    MCH 30 5 02/14/2019    MCHC 28 6 (L) 02/14/2019    RDW 15 7 (H) 02/14/2019    MPV 10 9 02/14/2019    NRBC 0 02/14/2019   , CMP:   Lab Results   Component Value Date    SODIUM 143 02/14/2019    K 3 8 02/14/2019     02/14/2019    CO2 40 (H) 02/14/2019    BUN 46 (H) 02/14/2019    CREATININE 1 42 (H) 02/14/2019    CALCIUM 8 0 (L) 02/14/2019    AST 18 02/14/2019    ALT <6 (L) 02/14/2019    ALKPHOS 102 02/14/2019    EGFR 41 02/14/2019   , PT/INR: No results found for: PT, INR, Troponin: No results found for: TROPONINI    Imaging Studies: I have personally reviewed pertinent reports  and I have personally reviewed pertinent films in PACS    EKG, Pathology, and Other Studies: I have personally reviewed pertinent reports  Pulmonary Results (PFTs, PSG):   None to review    VTE Prophylaxis: Reason for no pharmacologic prophylaxis Eliquis    Code Status: Level 1 - Full Code    Portions of the record may have been created with voice recognition software  Occasional wrong word or "sound a like" substitutions may have occurred due to the inherent limitations of voice recognition software  Read the chart carefully and recognize, using context, where substitutions have occurred

## 2019-02-14 NOTE — PROGRESS NOTES
[facility] Internal Medicine  Progress Note - Dar Alu 1960, 63 y o  female MRN: [id number]  Unit/Bed#: E4 -01 Encounter: [id number]  Primary Care Provider: Elizabeth Talbot MD   Date and time admitted to hospital: 2/8/2019 12:18 PM    * Acute on chronic respiratory failure with hypoxia and hypercapnia ([address] )  Assessment & Plan  · Patient noted previously to desaturate when weaned from oxygen  · Room air saturation yesterday 72%  · Repeat CXR with worsening edema this AM  · ABG shows hypercapnia and the patient has hemoconcentration, elevated CO2 and hemoglobin chronically suggest degree of chronic hypercapnia  · Discussed with Pulmonary  Will consult them  Patient refusing BiPAP at this time  Is mentating appropriately  · Patient does not use home O2 or CPAP/BIPAP  · Will monitor with ongoing diuresis   · Will need home oxygen eval prior to d/c    Acute on chronic diastolic congestive heart failure [facility])  Assessment & Plan  · Discussed with cardiology   Continue IV diuresis- has an additional approximately 15-20 lb  · S/P metazolone yesterday and the day prior  · -1 0L yesterday and 5 1L thus far  · Repeat ECHO without systolic dysfunction  · Weight 324-301 since admissiom  · Daily weights, I/O  · Needs lasix compliance on discharge      Intake/Output Summary (Last 24 hours) at 2/14/2019 1240  Last data filed at 2/14/2019 0411  Gross per 24 hour   Intake 480 ml   Output 1737 ml   Net -1257 ml     Wt Readings from Last 3 Encounters:   02/14/19 (!) 137 kg (301 lb 5 9 oz)   02/07/19 (!) 147 kg (323 lb)   03/28/18 (!) 141 kg (310 lb)         Type 2 diabetes mellitus with hypoglycemia [facility])  Assessment & Plan  Lab Results   Component Value Date    HGBA1C 6 3 02/09/2019       Recent Labs     02/14/19  0602 02/14/19  0652 02/14/19  0752 02/14/19  0852   POCGLU 142* 168* 133 227*       Blood Sugar Average: Last 72 hrs:  (P) 262 [id number]   · Patient noted to be profoundly hypoglycemic overnight to less than 20 at 9:21 p m , received an amp of dextrose, repeat blood sugar 34, received another amp of dextrose, repeat blood sugar 62, received another amp of dextrose, repeat blood sugar dropped to 21  Patient was placed on D10 infusion at 50 mL/hour  Blood sugar currently 227  · Despite multiple doses of dextrose as well as initiation of D 10, the patient was noted to be significantly hypoglycemic  She had not had insulin for greater than 24 hours at which point she had had 2 units of Humalog  This is the only insulin she had required during her hospital stay  · Was noted to eat 100% of meals yesterday  Unclear she had a bedtime snack  · Discussed with Endocrinology via phone  Discontinue D10, repeat Accu-Cheks  If patient drops below 55 obtain BMP, insulin level, proinsulin level, C-peptide, cortisol, sulfylurea panel, insulin antibody and treat with 1 mg of glucagon  · Treat with glucagon and not dextrose and assess response 20 mins post glucagon  · Insulin level elevated this AM could be related to morbid obesity and insulin resistance- need to have this level when ALSO hypogylcemic at the same time  · No history of gastric bypass  · A1C 6 3  · Endocrine consulted  Stop ADA diet  Stop all insulin  Hypertension  Assessment & Plan  · Controlled by metoprolol 100 mg b i d , diltiazem 120 mg daily  · /74    Permanent atrial fibrillation (HCC)  Assessment & Plan  · Patient on Eliquis 5 mg BID  · Rate controlled with BB and CCB    Cellulitis of leg  Assessment & Plan  · Wound care, podiatry and dermatology evaluated  · Appears to have superimposed cellulitis of left posterior leg and right anterior leg  · Wound cultures obtained by dermatology growing multiple organisms- unclear if colonization vs  True organisms  · ID consulted     · S/p 5 days of IV ancef, changed to ceftriaxone yesterday given failure to improve  · May be related to ecthymya as well  · BLE elevation, edema control is imperative    COPD (chronic obstructive pulmonary disease) ([facility])  Assessment & Plan  · Does not use oxygen at home  · Spokes 1 pack every 3 days, smoked since age 16  · Suspect chronic hypercapnic resp failure  · PRN xopenex  · No acute exac      Stage 3 chronic kidney disease [facility])  Assessment & Plan  Lab Results   Component Value Date    CREATININE 1 42 (H) 02/14/2019    CREATININE 1 47 (H) 02/13/2019    CREATININE 1 61 (H) 02/12/2019    CREATININE 1 75 (H) 02/11/2019     · Creatinine at baseline  Continue to monitor with IV diuresis    Transaminitis  Assessment & Plan  Lab Results   Component Value Date    AST 18 02/14/2019    AST 19 02/12/2019    ALT <6 (L) 02/14/2019    ALT 8 (L) 02/12/2019    TBILI 1 67 (H) 02/14/2019    TBILI 1 28 (H) 02/12/2019     · Patient had hyperbilirubemia to 4 12 on admission  · US abdomen: CBD measures 8 mm proximally and tapers down to 4 4 mm distally  The distal most CBD is not visualized  · Bili trending down to 1 28, no evidence of abdominal pain to suggest passed stone  · Patient with prior cholecystectomy - not complaining of abdominal pain at this time  · Repeat LFTs in AM    Depression  Assessment & Plan  · Patient previously took Zoloft at home, however, has not been taking recently  Compression fracture of first lumbar vertebra [facility])  Assessment & Plan  · X-ray of lumbar spine showing L1 compression  · Continue lidoderm patch, heating pad  · PRN robaxin held given lethargy  · No neuro deficits on exam  No paresthesias  · Outpatient follow up and vitamin D levels    Obesity  Assessment & Plan  · Would benefit from weight loss  Body mass index is 51 73 kg/m²  Altered mental status:  Occurred last night when the patient was more lethargic  Was also noted to have happened intermittently with the use of higher amounts of robaxin  Last night this was felt to be related to hypoglycemia  Currently investigating etiology of hypoglycemia    Hold Robaxin at this time  May also be related to hypercapnia  Patient refusing BiPAP  Currently this time is awake, alert, oriented and following commands appropriately with no focal neurologic deficits therefore will hold on imaging  VTE Pharmacologic Prophylaxis:   Pharmacologic: Apixaban (Eliquis)  Mechanical VTE Prophylaxis in Place: no -wounds    Patient Centered Rounds: I have performed bedside rounds with nursing staff today  Anna Ha    Discussions with Specialists or Other Care Team Provider: pulmonary, attending, endo    Education and Discussions with Family / Patient: patient    Time Spent for Care: 1 hour  More than 50% of total time spent on counseling and coordination of care as described above  Current Length of Stay: 6 day(s)    Current Patient Status: Inpatient   Certification Statement: The patient will continue to require additional inpatient hospital stay due to Respiratory failure, hypoxia, infection, hypoglycemia of unclear etiology    Discharge Plan:  Not Stable for discharge  Suspect additional several days    Code Status: Level 1 - Full Code      Subjective:   Overnight patient was noted to be lethargic, hypoglycemic  She was noted to eat Lyndal Rick crackers last evening as a snack  She was noted to be breakfast lunch and dinner 100% yesterday  Never took metformin at home even when it was prescribed    Never used insulin    No gastric surgeries  No chest pain or SOB  No chills  Objective:     Vitals:   Temp (24hrs), Av 1 °F (36 7 °C), Min:97 °F (36 1 °C), Max:98 9 °F (37 2 °C)    Temp:  [97 °F (36 1 °C)-98 9 °F (37 2 °C)] 97 °F (36 1 °C)  HR:  [62-92] 62  Resp:  [20] 20  BP: (117-151)/(68-84) 151/74  SpO2:  [90 %-96 %] 96 %  Body mass index is 51 73 kg/m²  Input and Output Summary (last 24 hours):        Intake/Output Summary (Last 24 hours) at 2019 1248  Last data filed at 2019 0411  Gross per 24 hour   Intake 480 ml   Output 1737 ml   Net -1257 ml       Physical Exam: Physical Exam   Constitutional: She is oriented to person, place, and time  No distress  Awake, alert, conversive, oriented person place and time  Follows commands  HENT:   Head: Normocephalic and atraumatic  Eyes: No scleral icterus  Cardiovascular: Normal rate, regular rhythm and normal heart sounds  No murmur heard  Pulses obtained via Doppler  Faint but present in the bilateral DP area   Pulmonary/Chest: No accessory muscle usage  She has no wheezes  She has no rales  Decreased at bases  Decreased effort   Abdominal: Soft  Bowel sounds are normal  She exhibits no distension  There is no tenderness  There is no rigidity and no guarding  Morbid obesity  Pannus with erythema    Musculoskeletal: She exhibits no edema  Neurological: She is alert and oriented to person, place, and time  No cranial nerve deficit or sensory deficit  She exhibits normal muscle tone  Coordination normal    Skin: Rash noted  She is not diaphoretic  There is erythema  Discolored lips  Hands with scaling skin and slight erythema  BLE - pictures attached  Feet cool to touch but good cap refill, pulses faint on doppler  Sensation and motor function intact  Psychiatric: Her behavior is normal    Nursing note and vitals reviewed                        Additional Data:     Labs:    Results from last 7 days   Lab Units 02/14/19  0350   WBC Thousand/uL 8 50   HEMOGLOBIN g/dL 17 1*   HEMATOCRIT % 59 8*   PLATELETS Thousands/uL 131*   NEUTROS PCT % 77*   LYMPHS PCT % 7*   MONOS PCT % 14*   EOS PCT % 1     Results from last 7 days   Lab Units 02/14/19  0350   SODIUM mmol/L 143   POTASSIUM mmol/L 3 8   CHLORIDE mmol/L 101   CO2 mmol/L 40*   BUN mg/dL 46*   CREATININE mg/dL 1 42*   ANION GAP mmol/L 2*   CALCIUM mg/dL 8 0*   ALBUMIN g/dL 2 4*   TOTAL BILIRUBIN mg/dL 1 67*   ALK PHOS U/L 102   ALT U/L <6*   AST U/L 18   GLUCOSE RANDOM mg/dL 92     Results from last 7 days   Lab Units 02/08/19  1249   INR  1 37*     Results from last 7 days   Lab Units 02/14/19  1055 02/14/19  0852 02/14/19  0752 02/14/19  0652 02/14/19  0602 02/14/19  0500 02/14/19  0356 02/14/19  0300 02/14/19  0159 02/14/19  0102 02/14/19  0022 02/13/19  2325   POC GLUCOSE mg/dl 213* 227* 133 168* 142* 115 82 95 128 165* 33* 80     Results from last 7 days   Lab Units 02/09/19  0520   HEMOGLOBIN A1C % 6 3     Results from last 7 days   Lab Units 02/14/19  0350 02/08/19  1249   LACTIC ACID mmol/L  --  1 6   PROCALCITONIN ng/ml 0 18  --            * I Have Reviewed All Lab Data Listed Above  * Additional Pertinent Lab Tests Reviewed: All Labs Within Last 24 Hours Reviewed    Imaging:    Imaging Reports Reviewed Today Include: CXR  Imaging Personally Reviewed by Myself Includes:  CXR    Recent Cultures (last 7 days):     Results from last 7 days   Lab Units 02/12/19  1603 02/10/19  0900   GRAM STAIN RESULT  3+ Gram negative rods*  2+ Gram positive cocci in clusters*  Rare Polys*  2+ Gram negative rods*  No Polys*  --    WOUND CULTURE  3+ Growth of Oxidase Positive gram negative sam*  2+ Growth of Gram Negative Sam*  2+ Growth of Enterococcus faecalis*  --    C DIFF TOXIN B   --  NEGATIVE for C difficle toxin by PCR          Last 24 Hours Medication List:     Current Facility-Administered Medications:  acetaminophen 975 mg Oral Q8H PRN MAYNOR Bailey    albuterol 2 puff Inhalation Q4H PRN Nova Gupta PA-C    apixaban 5 mg Oral BID Brielle Yang PA-C    cefTRIAXone 1,000 mg Intravenous Q24H Vickie Macias PA-C Last Rate: 1,000 mg (02/14/19 1119)   diltiazem 120 mg Oral Daily Brielle Yang PA-C    furosemide 80 mg Intravenous BID (diuretic) Galina Richard MD    influenza vaccine 0 5 mL Intramuscular Prior to discharge Galina Richard MD    levalbuterol 1 25 mg Nebulization Q6H PRN Silverio Ellis MD    lidocaine 1 patch Topical Daily Brielle Yang PA-C    metoprolol succinate 100 mg Oral BID Daralerenetta Gupta PA-C    mupirocin  Topical TID Mason Santos DO nicotine 1 patch Transdermal Daily Brielle Yang PA-C    nystatin 1 application Topical BID Brielle Yang PA-C    ondansetron 4 mg Intravenous Q8H PRN Brielle Yang PA-C    potassium chloride 20 mEq Oral BID Leo Wilburn MD    sodium chloride 3 mL Nebulization Q6H PRN Kike Mckeon MD         Today, Patient Was Seen By: Karey Lyles PA-C    ** Please Note: Dictation voice to text software may have been used in the creation of this document   **

## 2019-02-14 NOTE — ASSESSMENT & PLAN NOTE
Lab Results   Component Value Date    AST 18 02/14/2019    AST 19 02/12/2019    ALT <6 (L) 02/14/2019    ALT 8 (L) 02/12/2019    TBILI 1 67 (H) 02/14/2019    TBILI 1 28 (H) 02/12/2019     · Patient had hyperbilirubemia to 4 12 on admission  · US abdomen: CBD measures 8 mm proximally and tapers down to 4 4 mm distally  The distal most CBD is not visualized  · Bili trending down to 1 28, no evidence of abdominal pain to suggest passed stone  · Patient with prior cholecystectomy - not complaining of abdominal pain at this time    · Repeat LFTs in AM

## 2019-02-14 NOTE — CONSULTS
Consultation - Infectious Disease   Tristian Justice 63 y o  female MRN: [id number]  Unit/Bed#: E4 -01 Encounter: [id number]      IMPRESSION & RECOMMENDATIONS:   1  Bilateral lower extremity venous stasis changes with ulcerations-no clear evidence of cellulitis  Patient has become rapidly volume overloaded, and has developed symmetric bilateral leg changes in the setting of significant edema  Patient has not had any fever or leukocytosis and the procalcitonin level is normal   The positive wound cultures reflect colonization of organisms on the surface of the wounds  The lack of significant improvement in the legs probably reflects the inadequate control of her volume overload and edema control measures   -discontinue cefazolin  -management of the patient's volume overload  -aggressive elevation of both legs  -pressure dressings  -local wound care  -close podiatry follow-up    2  Acute on chronic respiratory failure-appears to be secondary to congestive heart failure with ongoing radiographic changes suggestive of volume overload  Obesity hypoventilation syndrome COPD likely also playing a role  Patient has refused BiPAP  -O2 support  -await pulmonary evaluation  -BiPAP if patient will allow    3  Diabetes mellitus-type 2 with hyperglycemia     4  Chronic kidney disease-stage III  Has had some acute worsening on presentation and now some improvement  Suspect the changes are pre renal issues    -monitor creatinine clearance as needed  -volume management    Discussed the case in detail with the primary service stand podiatry    HISTORY OF PRESENT ILLNESS:  Reason for Consult:  Leg cellulitis with wounds  HPI: Tristian Justice is a 63y o  year old female with history of chronic diastolic congestive heart failure, chronic kidney disease, diabetes mellitus as well as morbid obesity admitted to [facility] in [facility] on the 8th of February with increasing edema and shortness of breath, who I am asked to assist with antibiotic management for possible leg wound infections  Of the past several weeks the patient developed increasing weight gain, edema, and shortness of breath  She apparently has had about a 50 lb weight gain since December  In the 3 weeks prior to this admission she apparently stopped her baseline diuretics  She also became less adherent with her diet  With the increasing edema and shortness of breath the patient came to the ER for further evaluation  Emergency department she was initially found to be with evidence of volume overload  She was also noted to have some bilateral leg erythema, associated with the edema and some ulcerations  She therefore had blood cultures obtained and was started on cefazolin for cellulitis admitted for further management  She has been started on diuretics with some improvement of her volume overload  However there was a concerned that her legs were not getting better  She denies having any fever chills or sweats, denies any nausea vomiting or diarrhea, denies any dysuria or hematuria  She states that her legs have been edematous and erythematous for quite some time without any acute changes  She does suffer from some pain in the posterior aspect of the [facility] when she lays them against anything  REVIEW OF SYSTEMS:  A complete 12 point system-based review of systems is negative other than that noted in the HPI      PAST MEDICAL HISTORY:  Past Medical History:   Diagnosis Date    Atrial fibrillation [facility] RVR [facility]     [facility]     Diabetes type 2, uncontrolled ([facility] )     Hypertension      Past Surgical History:   Procedure Laterality Date    HERNIA REPAIR      HYSTERECTOMY      LAPAROSCOPIC CHOLECYSTECTOMY         FAMILY HISTORY:  Non-contributory    SOCIAL HISTORY:  Social History   Social History     Substance and Sexual Activity   Alcohol Use Yes    Comment: socially, NO ALCOHOL USE     Social History Substance and Sexual Activity   Drug Use Yes    Types: Marijuana     Social History     Tobacco Use   Smoking Status Current Every Day Smoker    Packs/day: 0 00    Years: 43 00    Pack years: 0 00    Types: Cigarettes   Smokeless Tobacco Never Used   Tobacco Comment    Currently half a pack a day, 6 CIGARETTES PER DAY        ALLERGIES:  No Known Allergies    MEDICATIONS:  All current active medications have been reviewed  Antibiotics:  Ceftriaxone 2, total antibiotics 7    PHYSICAL EXAM:  Temp:  [97 °F (36 1 °C)-98 9 °F (37 2 °C)] 97 °F (36 1 °C)  HR:  [62-92] 62  Resp:  [20] 20  BP: (117-151)/(68-84) 151/74  SpO2:  [90 %-96 %] 96 %  Temp (24hrs), Av 1 °F (36 7 °C), Min:97 °F (36 1 °C), Max:98 9 °F (37 2 °C)  Current: Temperature: (!) 97 °F (36 1 °C)    Intake/Output Summary (Last 24 hours) at 2019 1326  Last data filed at 2019 0411  Gross per 24 hour   Intake 480 ml   Output 1737 ml   Net -1257 ml       General Appearance:  Appearing nontoxic, and in no distress   Head:  Normocephalic, without obvious abnormality, atraumatic   Eyes:  Conjunctiva pink and sclera anicteric, both eyes   Nose: Nares normal, mucosa normal, no drainage   Throat: Oropharynx moist without lesions   Neck: Supple, symmetrical, no adenopathy, no tenderness/mass/nodules   Back:   Symmetric, no curvature, ROM normal, no CVA tenderness   Lungs:   Clear to auscultation bilaterally, respirations unlabored   Chest Wall:  No tenderness or deformity   Heart:  Irregular; no murmur, rub or gallop   Abdomen:   Soft, non-tender, non-distended, positive bowel sounds    Extremities: No cyanosis, clubbing  Bilateral lower extremity edema with erythema with venous stasis changes and ulcerations  No purulence  Skin: No rashes or lesions  No draining wounds noted     Lymph nodes: Cervical, supraclavicular nodes normal   Neurologic: Alert and oriented times 3, extremity strength 5/5 and symmetric       LABS, IMAGING, & OTHER STUDIES:  Lab Results:  I have personally reviewed pertinent labs  Results from last 7 days   Lab Units 02/14/19  0350 02/13/19  0607 02/12/19  0502   WBC Thousand/uL 8 50 8 23 7 81   HEMOGLOBIN g/dL 17 1* 16 5* 17 1*   PLATELETS Thousands/uL 131* 150 161     Results from last 7 days   Lab Units 02/14/19  0350 02/13/19  0842 02/12/19  0502  02/08/19  2359   SODIUM mmol/L 143 143 144   < > 145   POTASSIUM mmol/L 3 8 4 4 4 9   < > 3 6   CHLORIDE mmol/L 101 102 104   < > 107   CO2 mmol/L 40* 36* 32   < > 30   BUN mg/dL 46* 43* 40*   < > 40*   CREATININE mg/dL 1 42* 1 47* 1 61*   < > 1 78*   EGFR ml/min/1 73sq m 41 39 35   < > 31   CALCIUM mg/dL 8 0* 8 0* 8 0*   < > 8 0*   AST U/L 18  --  19  --  27   ALT U/L <6*  --  8*  --  22   ALK PHOS U/L 102  --  97  --  117*    < > = values in this interval not displayed  Results from last 7 days   Lab Units 02/12/19  1603 02/10/19  0900   GRAM STAIN RESULT  3+ Gram negative rods*  2+ Gram positive cocci in clusters*  Rare Polys*  2+ Gram negative rods*  No Polys*  --    WOUND CULTURE  3+ Growth of Oxidase Positive gram negative sam*  2+ Growth of Gram Negative Sam*  2+ Growth of Enterococcus faecalis*  --    C DIFF TOXIN B   --  NEGATIVE for C difficle toxin by PCR  Procalcitonin level 0 18    Imaging Studies:   I have personally reviewed pertinent imaging study reports and images in PACS      Chest x-ray-interstitial and vascular congestion

## 2019-02-14 NOTE — ASSESSMENT & PLAN NOTE
· Does not use oxygen at home  · Spokes 1 pack every 3 days, smoked since age 16  · Suspect chronic hypercapnic resp failure  · PRN xopenex  · No acute exac

## 2019-02-14 NOTE — ASSESSMENT & PLAN NOTE
Lab Results   Component Value Date    HGBA1C 6 3 02/09/2019       Recent Labs     02/14/19  0602 02/14/19  0652 02/14/19  0752 02/14/19  0852   POCGLU 142* 168* 133 227*       Blood Sugar Average: Last 72 hrs:  (P) 906 [id number]   · Patient noted to be profoundly hypoglycemic overnight to less than 20 at 9:21 p m , received an amp of dextrose, repeat blood sugar 34, received another amp of dextrose, repeat blood sugar 62, received another amp of dextrose, repeat blood sugar dropped to 21  Patient was placed on D10 infusion at 50 mL/hour  Blood sugar currently 227  · Despite multiple doses of dextrose as well as initiation of D 10, the patient was noted to be significantly hypoglycemic  She had not had insulin for greater than 24 hours at which point she had had 2 units of Humalog  This is the only insulin she had required during her hospital stay  · Was noted to eat 100% of meals yesterday  Unclear she had a bedtime snack  · Discussed with Endocrinology via phone  Discontinue D10, repeat Accu-Cheks  If patient drops below 55 obtain BMP, insulin level, proinsulin level, C-peptide, cortisol, sulfylurea panel, insulin antibody and treat with 1 mg of glucagon  · Treat with glucagon and not dextrose and assess response 20 mins post glucagon  · Insulin level elevated this AM could be related to morbid obesity and insulin resistance- need to have this level when ALSO hypogylcemic at the same time  · No history of gastric bypass  · A1C 6 3  · Endocrine consulted  Stop ADA diet  Stop all insulin

## 2019-02-14 NOTE — ASSESSMENT & PLAN NOTE
· X-ray of lumbar spine showing L1 compression  · Continue lidoderm patch, heating pad  · PRN robaxin held given lethargy  · No neuro deficits on exam  No paresthesias    · Outpatient follow up and vitamin D levels

## 2019-02-14 NOTE — PROGRESS NOTES
Progress Note - Cardiology   Adleaida Fields 63 y o  female MRN: [id number]  Unit/Bed#: E4 -01 Encounter: [id number]          Asessment/Plan:  1  Chronic and acute diastolic CHF:  Still grossly volume overloaded   2  Permanent atrial fibrillation:  Rate controlled  Eliquis anticoagulation  3  Hypertension:  Controlled  4  Right ventricular dilatation and hypokinesia with moderate pulmonary hypertension:  Likely due to obesity and RADHA  5  CKD 3:  GFR improving with diuresis  6  Obesity, probable obesity hypoventilation syndrome  7  Diabetes    · Will reorder daily Zaroxolyn to be given with morning dose of Lasix  · Continue daily standing weights and I/O      Subjective:  No new complaints  Breathing comfortably with supplemental oxygen  Has had limited ambulation thus far      Vitals:  Vitals:    02/13/19 0600 02/14/19 0600   Weight: (!) 140 kg (308 lb 13 8 oz) (!) 137 kg (301 lb 5 9 oz)   ,  Vitals:    02/13/19 2300 02/14/19 0345 02/14/19 0600 02/14/19 0733   BP: 126/72 117/81  151/74   BP Location: Right arm Left arm  Right arm   Pulse: 81 68  62   Resp: 20 20  20   Temp: 98 3 °F (36 8 °C) 97 7 °F (36 5 °C)  (!) 97 °F (36 1 °C)   TempSrc: Tympanic Tympanic  Tympanic   SpO2: 91% 90%  96%   Weight:   (!) 137 kg (301 lb 5 9 oz)    Height:           Exam:  General:  Appears comfortable while seated out of bed  Normally conversant  Heart:  Irregular with controlled rate  No gross pathologic murmur  Respiratory effort:   Moderate excursion air  Lungs:  Bi-basilar dulling with few rales  Lower Limbs:  Pitting and nonpitting edema dependent thighs through the forefoot             Medications:    Current Facility-Administered Medications:     acetaminophen (TYLENOL) tablet 975 mg, 975 mg, Oral, Q8H PRN, MAYNOR Gerber, 975 mg at 02/14/19 1119    albuterol (PROVENTIL HFA,VENTOLIN HFA) inhaler 2 puff, 2 puff, Inhalation, Q4H PRN, Silvana Dowell PA-C    apixaban (ELIQUIS) tablet 5 mg, 5 mg, Oral, BID, Silverio Wong PA-C, 5 mg at 02/14/19 0902    cefTRIAXone (ROCEPHIN) 1,000 mg in dextrose 5 % 50 mL IVPB, 1,000 mg, Intravenous, Q24H, Vickie Macias PA-C, Last Rate: 100 mL/hr at 02/14/19 1119, 1,000 mg at 02/14/19 1119    diltiazem (CARDIZEM CD) 24 hr capsule 120 mg, 120 mg, Oral, Daily, Brielle Yang PA-C, 120 mg at 02/14/19 0902    furosemide (LASIX) injection 80 mg, 80 mg, Intravenous, BID (diuretic), Elizabeth Talbot MD, 80 mg at 02/14/19 0902    influenza vaccine, recombinant, quadrivalent (FLUBLOK) IM injection 0 5 mL, 0 5 mL, Intramuscular, Prior to discharge, Elizabeth Talbot MD    levalbuterol Kooskia Hedges) inhalation solution 1 25 mg, 1 25 mg, Nebulization, Q6H PRN, Cecily Fragoso MD    lidocaine (LIDODERM) 5 % patch 1 patch, 1 patch, Topical, Daily, Silverio Wong PA-C, 1 patch at 02/14/19 0902    magnesium sulfate IVPB (premix) SOLN 1 g, 1 g, Intravenous, Once, Vickie Macias PA-C    metoprolol succinate (TOPROL-XL) 24 hr tablet 100 mg, 100 mg, Oral, BID, Brielle Yang PA-C, 100 mg at 02/14/19 0902    mupirocin (BACTROBAN) 2 % ointment, , Topical, TID, Darlin Yap DO    nicotine (NICODERM CQ) 14 mg/24hr TD 24 hr patch 1 patch, 1 patch, Transdermal, Daily, Brielle Yang PA-C, Stopped at 02/10/19 0900    nystatin (MYCOSTATIN) powder 1 application, 1 application, Topical, BID, Brielle Yang PA-C, 1 application at 62/10/11 0905    ondansetron (ZOFRAN) injection 4 mg, 4 mg, Intravenous, Q8H PRN, Silverio Wong PA-C    potassium chloride (K-DUR,KLOR-CON) CR tablet 20 mEq, 20 mEq, Oral, BID, Pardeep Giordano MD, 20 mEq at 02/14/19 0902    sodium chloride 0 9 % inhalation solution 3 mL, 3 mL, Nebulization, Q6H PRN, Cecily Fragoso MD      Labs/Data:        Results from last 7 days   Lab Units 02/14/19  0350 02/13/19  0607 02/12/19  0502   WBC Thousand/uL 8 50 8 23 7 81   HEMOGLOBIN g/dL 17 1* 16 5* 17 1*   HEMATOCRIT % 59 8* 57 1* 59 7*   PLATELETS Thousands/uL 131* 150 161     Results from last 7 days Lab Units 02/14/19  0350 02/13/19  0842 02/12/19  0502  02/08/19  1249   POTASSIUM mmol/L 3 8 4 4 4 9   < > 3 3*   CHLORIDE mmol/L 101 102 104   < > 102   CO2 mmol/L 40* 36* 32   < > 31   BUN mg/dL 46* 43* 40*   < > 41*   TROPONIN I ng/mL  --   --   --   --  0 09*    < > = values in this interval not displayed

## 2019-02-14 NOTE — OCCUPATIONAL THERAPY NOTE
OccupationalTherapy Progress Note     Patient Name: Princess Vo  UNYSV'T Date: 2/14/2019  Problem List  Patient Active Problem List   Diagnosis    [facility]    Hypertension    Permanent atrial fibrillation (HCC)    Acute on chronic respiratory failure with hypoxia and hypercapnia (HCC)    Cellulitis of leg    Hypoalbuminemia    Type 2 diabetes mellitus with hypoglycemia ([facility] )    Non-rheumatic mitral regurgitation    Nonsustained ventricular tachycardia (HCC)    Obesity    Vitamin D deficiency    Depression    Dyslipidemia    Left ventricular hypertrophy    Stage 3 chronic kidney disease (HCC)    Acute on chronic diastolic congestive heart failure (HCC)    Compression fracture of first lumbar vertebra (HCC)    Transaminitis           02/14/19 1316   Restrictions/Precautions   Weight Bearing Precautions Per Order No   Other Precautions Fall Risk;O2;Multiple lines;Telemetry   General   Response to Previous Treatment Patient with no complaints from previous session   Lifestyle   Autonomy At baseline, pt was I w/ ADLs, IADLs, and functional mobility/transfers w/o use of AD, (+) , FT employed, and reports 1 fall PTA  Reciprocal Relationships Lives alone; Local son   Service to Others FT employed   Intrinsic Gratification Spending time with 3 dogs   Pain Assessment   Pain Assessment 0-10   Pain Score 4   Pain Location Leg   Pain Orientation Bilateral   Pain 10184 Qwilr Pain Intervention(s) Repositioned; Ambulation/increased activity; Elevated; Emotional support; Rest   Response to Interventions Tolerated OT   ADL   Grooming Assistance 5  Supervision/Setup   Grooming Deficit Setup;Supervision/safety; Increased time to complete   Grooming Comments Light grooming tasks completed with Supervision 2* setup required and increased time to complete     UB Dressing Assistance 4  Minimal Assistance   UB Dressing Deficit Setup;Verbal cueing;Supervision/safety; Increased time to complete;Pull around back;Pull down in back   UB Dressing Comments UB dressing completed while seated in chair with Min A to bring gown down/around in back 2* increased body habitus  Toileting Assistance  4  Minimal Assistance   Toileting Deficit Setup;Verbal cueing;Supervison/safety; Increased time to complete;Perineal hygiene   Toileting Comments Toileting tasks completed with Min A with assist for thoroughness during perineal hygiene  Functional Standing Tolerance   Time 5 mins   Activity Self care tasks   Comments No LOB noted   Bed Mobility   Supine to Sit Unable to assess  (Pt seated OOB in chair at start/end of session)   Sit to Supine Unable to assess  (Pt seated OOB in chair at start/end of session)   Additional Comments Pt seated OOB in chair at end of session with call bell and phone within reach  All needs met and pt reports no further questions for OT at this time  Transfers   Sit to Stand 4  Minimal assistance   Additional items Assist x 1; Armrests; Increased time required;Verbal cues   Stand to Sit 4  Minimal assistance   Additional items Assist x 1; Armrests; Increased time required;Verbal cues   Toilet transfer 4  Minimal assistance   Additional items Assist x 1; Increased time required;Verbal cues;Standard toilet  (grab bar use on R)   Additional Comments Cues for safe technique and hand placement   Functional Mobility   Functional Mobility 4  Minimal assistance   Additional Comments Assist x1   Additional items Rolling walker   Toilet Transfers   Toilet Transfer From Rolling walker   Toilet Transfer Type To and from   Toilet Transfer to Standard toilet   Toilet Transfer Technique Ambulating   Toilet Transfers Minimal assistance   Toilet Transfers Comments Assist x1   Cognition   Overall Cognitive Status [facility]   Arousal/Participation Alert; Cooperative   Attention Within functional limits   Orientation Level Oriented X4   Memory Within functional limits Following Commands Follows one step commands without difficulty   Activity Tolerance   Activity Tolerance Other (Comment)  (Energy conservation education)   Medical Staff Made Aware Educated pt on energy conservation techniques/tips (ie: sitting during ADLs/IADLs when possible, pacing, compensatory breathing, etc ) with pt verbalizing understanding of education  Assessment   Assessment Pt seen for OT treatment session this PM focusing on functional activity tolerance, ADLs, functional mobility/transfers, and energy conservation education  Pt alert and cooperative throughout session  Pt seated OOB in chair at start of session  UB dressing completed while seated in chair with Min A to bring gown down/around in back 2* increased body habitus  Transfers (sit<>stand, RW<>standard toilet) completed with Min A with cues for safe technique and hand placement  Min A required for functional mobility with use of RW with cues for pacing and assist for line management (IV and O2)  Toileting tasks completed with Min A with assist for thoroughness during perineal hygiene  Light grooming tasks completed with Supervision 2* setup required and increased time to complete  Educated pt on energy conservation techniques/tips (ie: sitting during ADLs/IADLs when possible, pacing, compensatory breathing, etc ) with pt verbalizing understanding of education  Pt seated OOB in chair at end of session with call bell and phone within reach  All needs met and pt reports no further questions for OT at this time  Continue to recommend STR at D/C, however pt prefers to return home to son's home  If D/C home, recommend Home OT  OT to follow pt on caseload  Plan   Treatment Interventions ADL retraining;Functional transfer training;UE strengthening/ROM; Endurance training;Patient/family training;Equipment evaluation/education; Compensatory technique education; Energy conservation; Activityengagement   Goal Expiration Date 02/24/19   Treatment Day 2 OT Frequency 3-5x/wk   Recommendation   OT Discharge Recommendation Short Term Rehab  (vs Home OT;  Pt prefers to return home to son's house)   OT - OK to Discharge Yes  (to rehab when medically cleared)   Barthel Index   Feeding 10   Bathing 0   Grooming Score 5   Dressing Score 5   Bladder Score 10   Bowels Score 10   Toilet Use Score 5   Transfers (Bed/Chair) Score 10   Mobility (Level Surface) Score 0   Stairs Score 0   Barthel Index Score 55   Modified [place] Scale   Modified [place] Scale 4       Joslyn Granda, OTR/L

## 2019-02-14 NOTE — ASSESSMENT & PLAN NOTE
· Discussed with cardiology   Continue IV diuresis- has an additional approximately 15-20 lb  · S/P metazolone yesterday and the day prior  · -1 0L yesterday and 5 1L thus far  · Repeat ECHO without systolic dysfunction  · Weight 324-301 since admissiom  · Daily weights, I/O  · Needs lasix compliance on discharge      Intake/Output Summary (Last 24 hours) at 2/14/2019 1240  Last data filed at 2/14/2019 0411  Gross per 24 hour   Intake 480 ml   Output 1737 ml   Net -1257 ml     Wt Readings from Last 3 Encounters:   02/14/19 (!) 137 kg (301 lb 5 9 oz)   02/07/19 (!) 147 kg (323 lb)   03/28/18 (!) 141 kg (310 lb)

## 2019-02-14 NOTE — NURSING NOTE
Have been caring for pt for multiple nights to have a general understanding of pt's baseline cognition  Pt is very lethargic  Pt opens eyes and nods; however, is in and out of speaking to me  Attempted to wake pt and ask her to stand at the bedside, as pt has had multiple accounts of urinary incontinence r/t hypoglycemia and lethargy  Pt is not speaking or following my commands at this time  Last blood sugar check was at 0300 with result being 95  Pt currently on dextrose 10% running at 75 ml/hr  She has consumed 120 ml orange juice in between her blood sugar check at 0100 and now  Blood sugar has been rapidly declining despite receiving IV dextrose  Paged RRNP to express my concerns regarding the change in status of my pt and to assess pt at bedside  RRNP came to assess pt and placed orders for ABG  Paged RRNP to state blood sugar dropped again to 82 and had a critical value on pCO2 of 79 7; however, pt more alert and moved to bedside commode  New orders placed to place pt on bipap  Pt Afib on the monitor and vitals WDL  Will continue to closely monitor the pt

## 2019-02-15 ENCOUNTER — APPOINTMENT (INPATIENT)
Dept: NON INVASIVE DIAGNOSTICS | Facility: HOSPITAL | Age: 59
DRG: 291 | End: 2019-02-15
Payer: COMMERCIAL

## 2019-02-15 LAB
ALBUMIN SERPL BCP-MCNC: 2.4 G/DL (ref 3.5–5)
ALP SERPL-CCNC: 116 U/L (ref 46–116)
ALT SERPL W P-5'-P-CCNC: 6 U/L (ref 12–78)
ANION GAP SERPL CALCULATED.3IONS-SCNC: 1 MMOL/L (ref 4–13)
AST SERPL W P-5'-P-CCNC: 18 U/L (ref 5–45)
BACTERIA WND AEROBE CULT: ABNORMAL
BACTERIA WND AEROBE CULT: ABNORMAL
BILIRUB SERPL-MCNC: 1.22 MG/DL (ref 0.2–1)
BUN SERPL-MCNC: 54 MG/DL (ref 5–25)
CALCIUM SERPL-MCNC: 8.1 MG/DL (ref 8.3–10.1)
CHLORIDE SERPL-SCNC: 100 MMOL/L (ref 100–108)
CO2 SERPL-SCNC: 40 MMOL/L (ref 21–32)
CORTIS SERPL-MCNC: 22.9 UG/DL
CREAT SERPL-MCNC: 1.6 MG/DL (ref 0.6–1.3)
GFR SERPL CREATININE-BSD FRML MDRD: 36 ML/MIN/1.73SQ M
GLUCOSE SERPL-MCNC: 101 MG/DL (ref 65–140)
GLUCOSE SERPL-MCNC: 105 MG/DL (ref 65–140)
GLUCOSE SERPL-MCNC: 108 MG/DL (ref 65–140)
GLUCOSE SERPL-MCNC: 119 MG/DL (ref 65–140)
GLUCOSE SERPL-MCNC: 125 MG/DL (ref 65–140)
GLUCOSE SERPL-MCNC: 132 MG/DL (ref 65–140)
GLUCOSE SERPL-MCNC: 163 MG/DL (ref 65–140)
GRAM STN SPEC: ABNORMAL
GRAM STN SPEC: ABNORMAL
POTASSIUM SERPL-SCNC: 4.3 MMOL/L (ref 3.5–5.3)
PROT SERPL-MCNC: 6.6 G/DL (ref 6.4–8.2)
SODIUM SERPL-SCNC: 141 MMOL/L (ref 136–145)
T4 FREE SERPL-MCNC: 0.95 NG/DL (ref 0.76–1.46)
TSH SERPL DL<=0.05 MIU/L-ACNC: 3.13 UIU/ML (ref 0.36–3.74)

## 2019-02-15 PROCEDURE — 99232 SBSQ HOSP IP/OBS MODERATE 35: CPT | Performed by: PHYSICIAN ASSISTANT

## 2019-02-15 PROCEDURE — 80053 COMPREHEN METABOLIC PANEL: CPT | Performed by: INTERNAL MEDICINE

## 2019-02-15 PROCEDURE — 82948 REAGENT STRIP/BLOOD GLUCOSE: CPT

## 2019-02-15 PROCEDURE — 93925 LOWER EXTREMITY STUDY: CPT

## 2019-02-15 PROCEDURE — 84439 ASSAY OF FREE THYROXINE: CPT | Performed by: INTERNAL MEDICINE

## 2019-02-15 PROCEDURE — 99232 SBSQ HOSP IP/OBS MODERATE 35: CPT | Performed by: INTERNAL MEDICINE

## 2019-02-15 PROCEDURE — 84443 ASSAY THYROID STIM HORMONE: CPT | Performed by: INTERNAL MEDICINE

## 2019-02-15 PROCEDURE — 93923 UPR/LXTR ART STDY 3+ LVLS: CPT

## 2019-02-15 PROCEDURE — G0480 DRUG TEST DEF 1-7 CLASSES: HCPCS | Performed by: INTERNAL MEDICINE

## 2019-02-15 PROCEDURE — 82533 TOTAL CORTISOL: CPT | Performed by: INTERNAL MEDICINE

## 2019-02-15 PROCEDURE — 80377 DRUG/SUBSTANCE NOS 7/MORE: CPT | Performed by: INTERNAL MEDICINE

## 2019-02-15 PROCEDURE — 94660 CPAP INITIATION&MGMT: CPT

## 2019-02-15 RX ORDER — METOLAZONE 5 MG/1
5 TABLET ORAL ONCE
Status: COMPLETED | OUTPATIENT
Start: 2019-02-15 | End: 2019-02-15

## 2019-02-15 RX ORDER — METHOCARBAMOL 500 MG/1
500 TABLET, FILM COATED ORAL EVERY 8 HOURS PRN
Status: DISCONTINUED | OUTPATIENT
Start: 2019-02-15 | End: 2019-02-21 | Stop reason: HOSPADM

## 2019-02-15 RX ADMIN — FUROSEMIDE 80 MG: 10 INJECTION, SOLUTION INTRAMUSCULAR; INTRAVENOUS at 17:09

## 2019-02-15 RX ADMIN — DILTIAZEM HYDROCHLORIDE 120 MG: 120 CAPSULE, EXTENDED RELEASE ORAL at 09:34

## 2019-02-15 RX ADMIN — METHOCARBAMOL TABLETS 500 MG: 500 TABLET, COATED ORAL at 18:19

## 2019-02-15 RX ADMIN — NYSTATIN 1 APPLICATION: 100000 POWDER TOPICAL at 17:10

## 2019-02-15 RX ADMIN — MUPIROCIN: 20 OINTMENT TOPICAL at 09:34

## 2019-02-15 RX ADMIN — METOPROLOL SUCCINATE 100 MG: 50 TABLET, EXTENDED RELEASE ORAL at 17:09

## 2019-02-15 RX ADMIN — METOLAZONE 5 MG: 5 TABLET ORAL at 13:11

## 2019-02-15 RX ADMIN — NYSTATIN 1 APPLICATION: 100000 POWDER TOPICAL at 09:34

## 2019-02-15 RX ADMIN — POTASSIUM CHLORIDE 20 MEQ: 1500 TABLET, EXTENDED RELEASE ORAL at 17:09

## 2019-02-15 RX ADMIN — METHOCARBAMOL TABLETS 500 MG: 500 TABLET, COATED ORAL at 09:34

## 2019-02-15 RX ADMIN — MUPIROCIN: 20 OINTMENT TOPICAL at 17:09

## 2019-02-15 RX ADMIN — POTASSIUM CHLORIDE 20 MEQ: 1500 TABLET, EXTENDED RELEASE ORAL at 09:34

## 2019-02-15 RX ADMIN — MUPIROCIN: 20 OINTMENT TOPICAL at 21:51

## 2019-02-15 RX ADMIN — FUROSEMIDE 80 MG: 10 INJECTION, SOLUTION INTRAMUSCULAR; INTRAVENOUS at 09:34

## 2019-02-15 RX ADMIN — APIXABAN 5 MG: 5 TABLET, FILM COATED ORAL at 09:34

## 2019-02-15 RX ADMIN — METOPROLOL SUCCINATE 100 MG: 50 TABLET, EXTENDED RELEASE ORAL at 09:34

## 2019-02-15 RX ADMIN — LIDOCAINE 1 PATCH: 50 PATCH CUTANEOUS at 09:34

## 2019-02-15 RX ADMIN — APIXABAN 5 MG: 5 TABLET, FILM COATED ORAL at 17:09

## 2019-02-15 NOTE — PROGRESS NOTES
Progress Note - Cardiology   Telma Amezquita 63 y o  female MRN: [id number]  Unit/Bed#: E4 -01 Encounter: [id number]      Assessment:     1  Acute on chronic diastolic heart failure with right ventricular systolic dysfunction  2  Permanent atrial fibrillation  3  Hypertension  4  CKD  5  Mild mitral regurgitation  6  Hypoalbuminemia    Discussion/Recommendations: Will continue current diuretics-Lasix 80 b i d  With Zaroxolyn x1  On beta-blocker/calcium channel blocker/Eliquis  Subjective:  No chest pain      Physical Exam:  GEN:  NAD  HEENT:  MMM, NCAT, pink conjunctiva, EOMI, nonicteric sclera  CV:   Moderate JVD, RR, NO M/R/G, +S1/S2, NO PARASTERNAL HEAVE/THRILL, trace to 1+ LE EDEMA, NO HEPATIC SYSTOLIC PULSATION, WARM EXTREMITIES  RESP:  CTAB/L  ABD:  SOFT, NT, NO GROSS ORGANOMEGALY        Vitals:   /90 (BP Location: Left arm)   Pulse 88   Temp (!) 97 4 °F (36 3 °C) (Tympanic)   Resp 22   Ht 5' 4" (1 626 m)   Wt 135 kg (297 lb 13 5 oz)   SpO2 94%   BMI 51 12 kg/m²   Vitals:    02/14/19 0600 02/15/19 0600   Weight: (!) 137 kg (301 lb 5 9 oz) 135 kg (297 lb 13 5 oz)       Intake/Output Summary (Last 24 hours) at 2/15/2019 1013  Last data filed at 2/15/2019 0626  Gross per 24 hour   Intake 720 ml   Output 2000 ml   Net -1280 ml       Lab Results:  Results from last 7 days   Lab Units 02/14/19  0350   WBC Thousand/uL 8 50   HEMOGLOBIN g/dL 17 1*   HEMATOCRIT % 59 8*   PLATELETS Thousands/uL 131*     Results from last 7 days   Lab Units 02/15/19  0522   POTASSIUM mmol/L 4 3   CHLORIDE mmol/L 100   CO2 mmol/L 40*   BUN mg/dL 54*   CREATININE mg/dL 1 60*   CALCIUM mg/dL 8 1*   ALK PHOS U/L 116   ALT U/L 6*   AST U/L 18     Results from last 7 days   Lab Units 02/15/19  0522   POTASSIUM mmol/L 4 3   CHLORIDE mmol/L 100   CO2 mmol/L 40*   BUN mg/dL 54*   CREATININE mg/dL 1 60*   CALCIUM mg/dL 8 1*             Medications:    Current Facility-Administered Medications:     acetaminophen (TYLENOL) tablet 975 mg, 975 mg, Oral, Q8H PRN, Dhiraj Beau, CRNP, 975 mg at 02/14/19 2157    albuterol (PROVENTIL HFA,VENTOLIN HFA) inhaler 2 puff, 2 puff, Inhalation, Q4H PRN, Ciro Torres PA-C    apixaban (ELIQUIS) tablet 5 mg, 5 mg, Oral, BID, Brielle Yang PA-C, 5 mg at 02/15/19 0934    diltiazem (CARDIZEM CD) 24 hr capsule 120 mg, 120 mg, Oral, Daily, Brielle Yang PA-C, 120 mg at 02/15/19 8960    furosemide (LASIX) injection 80 mg, 80 mg, Intravenous, BID (diuretic), Marya Laurent MD, 80 mg at 02/15/19 0424    influenza vaccine, recombinant, quadrivalent (FLUBLOK) IM injection 0 5 mL, 0 5 mL, Intramuscular, Prior to discharge, Marya Laurent MD    levalbuterol [facility]) inhalation solution 1 25 mg, 1 25 mg, Nebulization, Q6H PRN, Nell Villanueva MD    lidocaine (LIDODERM) 5 % patch 1 patch, 1 patch, Topical, Daily, Ciro Torres PA-C, 1 patch at 02/15/19 0934    methocarbamol (ROBAXIN) tablet 500 mg, 500 mg, Oral, Q8H PRN, Vickie Macias PA-C, 500 mg at 02/15/19 0934    metoprolol succinate (TOPROL-XL) 24 hr tablet 100 mg, 100 mg, Oral, BID, Brielle Yang PA-C, 100 mg at 02/15/19 2248    mupirocin (BACTROBAN) 2 % ointment, , Topical, TID, Jeremy Draper DO    nicotine (NICODERM CQ) 14 mg/24hr TD 24 hr patch 1 patch, 1 patch, Transdermal, Daily, Ciro Torres PA-C, Stopped at 02/10/19 0900    nystatin (MYCOSTATIN) powder 1 application, 1 application, Topical, BID, Brielle Yang PA-C, 1 application at 03/96/56 0934    ondansetron (ZOFRAN) injection 4 mg, 4 mg, Intravenous, Q8H PRN, Ciro Torres PA-C    potassium chloride (K-DUR,KLOR-CON) CR tablet 20 mEq, 20 mEq, Oral, BID, Carrol Angulo MD, 20 mEq at 02/15/19 0934    sodium chloride 0 9 % inhalation solution 3 mL, 3 mL, Nebulization, Q6H PRN, Nell Villanueva MD    Portions of the record may have been created with voice recognition software   Occasional wrong word or "sound a like" substitutions may have occurred due to the inherent limitations of voice recognition software  Read the chart carefully and recognize, using context, where substitutions have occurred

## 2019-02-15 NOTE — PHYSICAL THERAPY NOTE
Physical Therapy Treatment Note       02/14/19 9191   Pain Assessment   Pain Assessment 0-10   Pain Score 6   Pain Type Chronic pain   Pain Location Back;Leg   Pain Orientation Bilateral   Pain Descriptors Burning; [facility] Pain Intervention(s) Ambulation/increased activity; Emotional support; Rest   Response to Interventions tolerated fair   Restrictions/Precautions   Other Precautions Multiple lines;O2;Fall Risk;Pain   General   Chart Reviewed Yes   Response to Previous Treatment Patient reporting fatigue but able to participate  Family/Caregiver Present No   Cognition   Overall Cognitive Status WFL   Arousal/Participation Alert   Attention Within functional limits   Orientation Level Oriented X4   Memory Within functional limits   Following Commands Follows one step commands without difficulty   Subjective   Subjective " Can I have some ice- chips?"    Transfers   Sit to Stand 5  Supervision   Additional items Assist x 1; Armrests; Increased time required;Verbal cues   Stand to Sit 5  Supervision   Additional items Assist x 1; Armrests; Increased time required;Verbal cues   Stand pivot 5  Supervision   Additional items Assist x 1; Armrests; Increased time required;Verbal cues   Toilet transfer 5  Supervision   Additional items Assist x 1; Armrests; Increased time required;Commode;Verbal cues   Ambulation/Elevation   Gait pattern Forward Flexion; Poor UE support; Short stride; Excessively slow  (lateral sway)   Gait Assistance 5  Supervision   Additional items Assist x 1;Verbal cues   Assistive Device Rolling walker   Distance 200'x1   Balance   Static Sitting Fair +   Static Standing Fair   Dynamic Standing Fair -   Ambulatory Fair   Endurance Deficit   Endurance Deficit Yes   Endurance Deficit Description fatigue,weakness, deconditioning  Activity Tolerance   Activity Tolerance Patient limited by fatigue  (mild ROMERO, o2 sat 92% on 3l 02  )   Exercises   Quad Sets Sitting;10 reps;AROM; Bilateral  (longsitting) Glute Sets Sitting;10 reps;AROM; Bilateral   Hip Abduction Sitting;10 reps;Bilateral;AAROM  (longsitting)   Hip Adduction Sitting;10 reps;AROM; Bilateral  (isometric hip add   )   Knee AROM Long Arc Quad Sitting;10 reps;AROM; Bilateral   Ankle Pumps Sitting;10 reps;AROM; Bilateral  (CCW, Cw circles x 10 reps   )   Equipment Use   Comments pt performed spt transfers to and from bedside commode with supervision assist, pt demonstrates ability to perform roldan care with supervision with unilater ue support of arm rest of relciner chair  Verbal cues proved for safe techniques  Assessment   Prognosis Fair   Problem List Decreased strength;Decreased range of motion;Decreased endurance; Impaired balance;Decreased mobility; Decreased safety awareness; Impaired judgement;Obesity; Decreased skin integrity   Assessment Pt demonstrates decreased safety awareness with all aspects of mobility  Pt reaches far out of base of support for objects instead of using Rw for support with stand pivot transfers, sit to stand transfers and approach to chair  Pt pushes walker aside and reach for objects for support and balance vs maintaining use of rw and backing up to chair with Rw  Verbal cues required for reaching back for proper handplacement with sit to stand and stand to sit transfers  PT performs transfers to and from recliner chair, SPT to and from bedside commode with supervision, but max cues for safe techniques  PT ambulates 200' with supervision with cues to slow down, cues to maintain close distance to walker at all times and cues for improved posture  NO gross lob noted, Pt is limited by fatigue and ROMERO, verbal cues required for proper breathing techniques  Pt o2 sat on 3l 92%, HR 93 bpm  Much encouragement required for participation in PT session  OVerall less assistance required for mobility  Pt wants to return home to son's house at d/c   PT reports son is home all the time as he is in-between jobs    Pt ed on compliance with medical issues CHF, DM, seeing PCP regularly and keeping appointments as pt reports she would cancel and not reschedule  Recommend d/c to home with family support and assistance PRN , 24 hour supervision and HHPT, recommend RW and BSC for home use  IF unable to have 24 hour support or if pt unable to perform stair climbing or does not achieve PT goals then recommend d/c to STR  Goals   Patient Goals " to go home to my son;s house "     LTG Expiration Date 02/24/19   Treatment Day 2   Plan   Treatment/Interventions Functional transfer training;LE strengthening/ROM; Therapeutic exercise; Endurance training;Patient/family training;Equipment eval/education; Bed mobility;Gait training   Progress Progressing toward goals   PT Frequency   (3-5x/week)   Recommendation   Recommendation Home PT; Home with family support;24 hour supervision/assist;Short-term skilled PT  (pending achievement of PT goals and stairs,if unable rec STR)   PT - OK to Discharge Yes  (Must achieve PT goals if not STR)       Naeem Giles, PTA

## 2019-02-15 NOTE — ASSESSMENT & PLAN NOTE
Lab Results   Component Value Date    AST 18 02/15/2019    AST 18 02/14/2019    ALT 6 (L) 02/15/2019    ALT <6 (L) 02/14/2019    TBILI 1 22 (H) 02/15/2019    TBILI 1 67 (H) 02/14/2019     · Patient had hyperbilirubemia to 4 12 on admission  · US abdomen: CBD measures 8 mm proximally and tapers down to 4 4 mm distally  The distal most CBD is not visualized  · Bili trending down to 1 28, no evidence of abdominal pain to suggest passed stone  · Patient with prior cholecystectomy - not complaining of abdominal pain at this time

## 2019-02-15 NOTE — ASSESSMENT & PLAN NOTE
Lab Results   Component Value Date    HGBA1C 6 2 02/14/2019       Recent Labs     02/15/19  0127 02/15/19  0303 02/15/19  0525 02/15/19  0720   POCGLU 132 119 108 101       Blood Sugar Average: Last 72 hrs:  (P) 555 [id number]   · Patient noted to be profoundly hypoglycemic 2/13-2/14 to less than 20 at 9:21 p m , received an amp of dextrose, repeat blood sugar 34, received another amp of dextrose, repeat blood sugar 62, received another amp of dextrose, repeat blood sugar dropped to 21  Patient was placed on D10 infusion at 50 mL/hour  · Dextrose stopped yesterday and glucoses 100-200s  · She had not had insulin for greater than 24 hours at which point she had had 2 units of Humalog  · This is the only insulin she had required during her hospital stay  · Discussed with endo yesterday- unclear if real event vs  Inaccurate readings on POC glucose testing  · TSH WNL  Await AM cortisol, sulfyulurea screening  · Was noted to eat 100% of meals yesterday  Unclear she had a bedtime snack  · If patient drops below 55 obtain BMP, insulin level, proinsulin level, C-peptide, cortisol, sulfylurea panel, insulin antibody and treat with 1 mg of glucagon  · Treat with glucagon and not dextrose and assess response 20-25 mins post glucagon  · Insulin level elevated yesterday AM could be related to morbid obesity and insulin resistance- need to have this level when ALSO hypogylcemic at the same time  · No history of gastric bypass    · A1C 6 3

## 2019-02-15 NOTE — PROGRESS NOTES
Progress Note - Telma Amezquita 1960, 63 y o  female MRN: [id number]  Unit/Bed#: E4 -01 Encounter: [id number]  Primary Care Provider: Dileep Ellis MD   Date and time admitted to hospital: 2/8/2019 12:18 PM    * Acute on chronic diastolic congestive heart failure ([facility] [address] )  Assessment & Plan  Ongoing diuresis weight loss  Per cards, continue IV Lasix 80 mg b i d  Pending repeat echo  I/Os /daily weights  Presenting BNP: 4,595  Will trend      Patient had stopped her Lasix before presenting      Muscle spasm  Assessment & Plan  Patient with chronic muscle spasm  Attempt to obtain x-ray of lumbar spine unsuccessful due to pain  Per nursing, Robaxin to 500 mg prn begins to make patient drowsy - will not increase this dose  Lidoderm patch in place  Will add heating pad      Stage 3 chronic kidney disease ([facility] [address] )  Assessment & Plan  Creatinine improved with diuresis - 1 75 today, was 1 98 on admission - baseline is 1 4-1 7  Hold lisinopril      Obesity  Assessment & Plan  Would benefit from weight loss      Wounds, multiple open, lower extremity  Assessment & Plan  Appreciate wound care  Patient states she has chronic wounds of lower extremities bilaterally - now wounds are open and weeping  Patient on IV Ancef  Elevate lower extremities  Continue to keep wounds clean and dry      Acute respiratory failure with hypoxia [facility])  Assessment & Plan  Patient continues to desaturate when weaned from oxygen  Currently on 3 5 L nasal cannula  Patient does not use home O2  Will monitor with ongoing diuresis - patient also with COPD  May require home O2      Elevated LFTs  Assessment & Plan  Found in ED  Bili 0 12  ALK-PHOS 124  Ultrasound of abdomen done in ED showing no choledocholithiasis, tapering common bile duct  Patient with prior cholecystectomy - not complaining of abdominal pain at this time    Will trend CMP      Depression  Assessment & Plan  Patient takes Zoloft at home, however, has not been taking recently       Diabetes type 2, uncontrolled [facility])  Assessment & Plan        Lab Results   Component Value Date     HGBA1C 6 3 02/09/2019                Recent Labs     02/10/19  1526 02/10/19  2025 02/11/19  0708 02/11/19  1054   POCGLU 111 124 117 111         Blood Sugar Average: Last 72 hrs:  (P) 111 [id number]   Glucose has been well-controlled with ISS  Will continue  Patient takes metformin at home      Permanent atrial fibrillation [facility])  Assessment & Plan  Patient on Eliquis 5 mg  Rate controlled        Hypertension  Assessment & Plan  Controlled by metoprolol 100 mg b i d , diltiazem 120 mg daily  Patient takes lisinopril 40 mg, held for now due to SAPNA     COPD (chronic obstructive pulmonary disease) ([facility] [address] )  Assessment & Plan  No exacerbation at this time  Continue albuterol p r n  VTE Pharmacologic Prophylaxis:   Pharmacologic: Apixaban (Eliquis)  Mechanical VTE Prophylaxis in Place: No    Patient Centered Rounds: I have performed bedside rounds with nursing staff today  Discussions with Specialists or Other Care Team Provider: Nursing  Education and Discussions with Family / Patient: Discussed with patient     Time Spent for Care: 45 minutes  More than 50% of total time spent on counseling and coordination of care as described above  Current Length of Stay: 6 day(s)    Current Patient Status: Inpatient   Certification Statement: The patient will continue to require additional inpatient hospital stay due to ongoing diuresis  Discharge Plan: patient to be discharged home when medically cleared  Code Status: Level 1 - Full Code      Subjective:   Still with difficulty breathing  Denies pain in legs  Denies feeling feverish        Review of systems  Denies fever, chills, malaise  Denies sore throat or ear pain  Denies chest pain, palpitations, calf pain      Denies abdominal pain nausea vomiting constipation  Denies weakness, numbness, tingling  Objective:     Vitals:   Temp (24hrs), Av 8 °F (36 6 °C), Min:97 °F (36 1 °C), Max:98 3 °F (36 8 °C)    Temp:  [97 °F (36 1 °C)-98 3 °F (36 8 °C)] 98 1 °F (36 7 °C)  HR:  [62-81] 81  Resp:  [20] 20  BP: (116-151)/(72-81) 116/81  SpO2:  [90 %-96 %] 93 %  Body mass index is 51 73 kg/m²  Physical Exam:     Physical Exam   Constitutional: She is oriented to person, place, and time  She appears well-developed  No distress  HENT:   Head: Normocephalic and atraumatic  Eyes: Right eye exhibits no discharge  Left eye exhibits no discharge  No scleral icterus  Cardiovascular: Normal rate and regular rhythm  Exam reveals no gallop and no friction rub  No murmur heard  Pulmonary/Chest: Effort normal  No respiratory distress  She has no wheezes  She has rales  Abdominal: Soft  Bowel sounds are normal  She exhibits no distension  There is no tenderness  Obese abdomen   Neurological: She is alert and oriented to person, place, and time  Skin: Skin is warm and dry  Diffuse weeping wounds  Nursing note and vitals reviewed  * I Have Reviewed All Lab Data Listed Above  * Additional Pertinent Lab Tests Reviewed:  All Labs Within Last 24 Hours Reviewed    Last 24 Hours Medication List:     Current Facility-Administered Medications:  acetaminophen 975 mg Oral Q8H PRN Asif BALJIT MendozaNP   albuterol 2 puff Inhalation Q4H PRN Nimesh Bland PA-C   apixaban 5 mg Oral BID Brielle Yang PA-C   diltiazem 120 mg Oral Daily Brielle Yang PA-C   furosemide 80 mg Intravenous BID (diuretic) Gilmar Helm MD   influenza vaccine 0 5 mL Intramuscular Prior to discharge Gilmar Helm MD   levalbuterol 1 25 mg Nebulization Q6H PRN Toribio Bo MD   lidocaine 1 patch Topical Daily Brielle Yang PA-C   metoprolol succinate 100 mg Oral BID Nimesh Bland PA-C   mupirocin  Topical TID Genoveva Cheng DO   nicotine 1 patch Transdermal Daily Brielle Yang PA-C   nystatin 1 application Topical BID Brielle Yang PA-C   ondansetron 4 mg Intravenous Q8H PRN Nohemy Raya PA-C   potassium chloride 20 mEq Oral BID Tonia Mcintosh MD   sodium chloride 3 mL Nebulization Q6H PRN Sarah Parkinson MD        Today, Patient Was Seen By: Mars Gutierrez PA-C    ** Please Note: Dictation voice to text software may have been used in the creation of this document   **

## 2019-02-15 NOTE — PLAN OF CARE
Problem: Potential for Falls  Goal: Patient will remain free of falls  Description  INTERVENTIONS:  - Assess patient frequently for physical needs  -  Identify cognitive and physical deficits and behaviors that affect risk of falls  -  [place] fall precautions as indicated by assessment   - Educate patient/family on patient safety including physical limitations  - Instruct patient to call for assistance with activity based on assessment  - Modify environment to reduce risk of injury  - Consider OT/PT consult to assist with strengthening/mobility   Outcome: Progressing     Problem: Nutrition/Hydration-ADULT  Goal: Nutrient/Hydration intake appropriate for improving, restoring or maintaining nutritional needs  Description  Monitor and assess patient's nutrition/hydration status for malnutrition (ex- brittle hair, bruises, dry skin, pale skin and conjunctiva, muscle wasting, smooth red tongue, and disorientation)  Collaborate with interdisciplinary team and initiate plan and interventions as ordered  Monitor patient's weight and dietary intake as ordered or per policy  Utilize nutrition screening tool and intervene per policy  Determine patient's food preferences and provide high-protein, high-caloric foods as appropriate       INTERVENTIONS:  - Monitor oral intake, urinary output, labs, and treatment plans  - Assess nutrition and hydration status and recommend course of action  - Evaluate amount of meals eaten  - Assist patient with eating if necessary   - Allow adequate time for meals  - Recommend/ encourage appropriate diets, oral nutritional supplements, and vitamin/mineral supplements  - Order, calculate, and assess calorie counts as needed  - Recommend, monitor, and adjust tube feedings and TPN/PPN based on assessed needs  - Assess need for intravenous fluids  - Provide specific nutrition/hydration education as appropriate  - Include patient/family/caregiver in decisions related to nutrition   Outcome: Progressing     Problem: Prexisting or High Potential for Compromised Skin Integrity  Goal: Skin integrity is maintained or improved  Description  INTERVENTIONS:  - Identify patients at risk for skin breakdown  - Assess and monitor skin integrity  - Assess and monitor nutrition and hydration status  - Monitor labs (i e  albumin)  - Assess for incontinence   - Turn and reposition patient  - Assist with mobility/ambulation  - Relieve pressure over bony prominences  - Avoid friction and shearing  - Provide appropriate hygiene as needed including keeping skin clean and dry  - Evaluate need for skin moisturizer/barrier cream  - Collaborate with interdisciplinary team (i e  Nutrition, Rehabilitation, etc )   - Patient/family teaching   Outcome: Progressing     Problem: PAIN - ADULT  Goal: Verbalizes/displays adequate comfort level or baseline comfort level  Description  Interventions:  - Encourage patient to monitor pain and request assistance  - Assess pain using appropriate pain scale  - Administer analgesics based on type and severity of pain and evaluate response  - Implement non-pharmacological measures as appropriate and evaluate response  - Consider cultural and social influences on pain and pain management  - Notify physician/advanced practitioner if interventions unsuccessful or patient reports new pain   Outcome: Progressing     Problem: INFECTION - ADULT  Goal: Absence or prevention of progression during hospitalization  Description  INTERVENTIONS:  - Assess and monitor for signs and symptoms of infection  - Monitor lab/diagnostic results  - Monitor all insertion sites, i e  indwelling lines, tubes, and drains  - Monitor endotracheal (as able) and nasal secretions for changes in amount and color  - [place] appropriate cooling/warming therapies per order  - Administer medications as ordered  - Instruct and encourage patient and family to use good hand hygiene technique  - Identify and instruct in appropriate isolation precautions for identified infection/condition   Outcome: Progressing  Goal: Absence of fever/infection during neutropenic period  Description  INTERVENTIONS:  - Monitor WBC  - Implement neutropenic guidelines   Outcome: Progressing     Problem: SAFETY ADULT  Goal: Maintain or return to baseline ADL function  Description  INTERVENTIONS:  -  Assess patient's ability to carry out ADLs; assess patient's baseline for ADL function and identify physical deficits which impact ability to perform ADLs (bathing, care of mouth/teeth, toileting, grooming, dressing, etc )  - Assess/evaluate cause of self-care deficits   - Assess range of motion  - Assess patient's mobility; develop plan if impaired  - Assess patient's need for assistive devices and provide as appropriate  - Encourage maximum independence but intervene and supervise when necessary  ¯ Involve family in performance of ADLs  ¯ Assess for home care needs following discharge   ¯ Request OT consult to assist with ADL evaluation and planning for discharge  ¯ Provide patient education as appropriate   Outcome: Progressing  Goal: Maintain or return mobility status to optimal level  Description  INTERVENTIONS:  - Assess patient's baseline mobility status (ambulation, transfers, stairs, etc )    - Identify cognitive and physical deficits and behaviors that affect mobility  - Identify mobility aids required to assist with transfers and/or ambulation (gait belt, sit-to-stand, lift, walker, cane, etc )  - [place] fall precautions as indicated by assessment  - Record patient progress and toleration of activity level on Mobility SBAR; progress patient to next Phase/Stage  - Instruct patient to call for assistance with activity based on assessment  - Request Rehabilitation consult to assist with strengthening/weightbearing, etc    Outcome: Progressing     Problem: DISCHARGE PLANNING  Goal: Discharge to home or other facility with appropriate resources  Description  INTERVENTIONS:  - Identify barriers to discharge w/patient and caregiver  - Arrange for needed discharge resources and transportation as appropriate  - Identify discharge learning needs (meds, wound care, etc )  - Arrange for interpretive services to assist at discharge as needed  - Refer to Case Management Department for coordinating discharge planning if the patient needs post-hospital services based on physician/advanced practitioner order or complex needs related to functional status, cognitive ability, or social support system   Outcome: Progressing     Problem: Knowledge Deficit  Goal: Patient/family/caregiver demonstrates understanding of disease process, treatment plan, medications, and discharge instructions  Description  Complete learning assessment and assess knowledge base  Interventions:  - Provide teaching at level of understanding  - Provide teaching via preferred learning methods   Outcome: Progressing     Problem: CARDIOVASCULAR - ADULT  Goal: Maintains optimal cardiac output and hemodynamic stability  Description  INTERVENTIONS:  - Monitor I/O, vital signs and rhythm  - Monitor for S/S and trends of decreased cardiac output i e  bleeding, hypotension  - Administer and titrate ordered vasoactive medications to optimize hemodynamic stability  - Assess quality of pulses, skin color and temperature  - Assess for signs of decreased coronary artery perfusion - ex   Angina  - Instruct patient to report change in severity of symptoms   Outcome: Progressing  Goal: Absence of cardiac dysrhythmias or at baseline rhythm  Description  INTERVENTIONS:  - Continuous cardiac monitoring, monitor vital signs, obtain 12 lead EKG if indicated  - Administer antiarrhythmic and heart rate control medications as ordered  - Monitor electrolytes and administer replacement therapy as ordered   Outcome: Progressing     Problem: METABOLIC, FLUID AND ELECTROLYTES - ADULT  Goal: Electrolytes maintained within normal limits  Description  INTERVENTIONS:  - Monitor labs and assess patient for signs and symptoms of electrolyte imbalances  - Administer electrolyte replacement as ordered  - Monitor response to electrolyte replacements, including repeat lab results as appropriate  - Instruct patient on fluid and nutrition as appropriate   Outcome: Progressing  Goal: Fluid balance maintained  Description  INTERVENTIONS:  - Monitor labs and assess for signs and symptoms of volume excess or deficit  - Monitor I/O and WT  - Instruct patient on fluid and nutrition as appropriate   Outcome: Progressing  Goal: Glucose maintained within target range  Description  INTERVENTIONS:  - Monitor Blood Glucose as ordered  - Assess for signs and symptoms of hyperglycemia and hypoglycemia  - Administer ordered medications to maintain glucose within target range  - Assess nutritional intake and initiate nutrition service referral as needed   Outcome: Progressing     Problem: SKIN/TISSUE INTEGRITY - ADULT  Goal: Skin integrity remains intact  Description  INTERVENTIONS  - Identify patients at risk for skin breakdown  - Assess and monitor skin integrity  - Assess and monitor nutrition and hydration status  - Monitor labs (i e  albumin)  - Assess for incontinence   - Turn and reposition patient  - Assist with mobility/ambulation  - Relieve pressure over bony prominences  - Avoid friction and shearing  - Provide appropriate hygiene as needed including keeping skin clean and dry  - Evaluate need for skin moisturizer/barrier cream  - Collaborate with interdisciplinary team (i e  Nutrition, Rehabilitation, etc )   - Patient/family teaching   Outcome: Progressing  Goal: Incision(s), wounds(s) or drain site(s) healing without S/S of infection  Description  INTERVENTIONS  - Assess and document risk factors for skin impairment   - Assess and document dressing, incision, wound bed, drain sites and surrounding tissue  - Initiate Nutrition services consult and/or wound management as needed   Outcome: Progressing     Problem: DISCHARGE PLANNING - CARE MANAGEMENT  Goal: Discharge to post-acute care or home with appropriate resources  Description  INTERVENTIONS:  - Conduct assessment to determine patient/family and health care team treatment goals, and need for post-acute services based on payer coverage, community resources, and patient preferences, and barriers to discharge  - Address psychosocial, clinical, and financial barriers to discharge as identified in assessment in conjunction with the patient/family and health care team  - Arrange appropriate level of post-acute services according to patient's   needs and preference and payer coverage in collaboration with the physician and health care team  - Communicate with and update the patient/family, physician, and health care team regarding progress on the discharge plan  - Arrange appropriate transportation to post-acute venues  - Pt wishes to d/c with home health care at this time   Outcome: Progressing

## 2019-02-15 NOTE — PROGRESS NOTES
Progress Note - Pulmonary   Elly Rico 63 y o  female MRN: [id number]  Unit/Bed#: E4 -01 Encounter: [id number]    Assessment/Plan:    1  Acute on chronic respiratory failure with hypoxia and hypercapnia secondary to volume overload  1  Currently wearing 3 L nasal cannula  No oxygen requirement at baseline  2  Continue to titrate oxygen to maintain SpO2 greater than or equal to 88%  3  Likely secondary to patient's noncompliance with her home diuretics  4  Patient is most likely always hypercapnic  Most recent ABG shows 7 31/79/62/3 9  5  Patient continues to refuse BiPAP  6  Pulmonary toilet:  Incentive spirometry, increase activity as tolerated, out of bed as tolerated  7  Will need home oxygen eval prior to discharge if unable to wean oxygen  2  Suspected COPD without exacerbation  1  No PFTs  2  Does not require steroid therapy at this time  3  Continue albuterol HFA p r n   3  Acute on chronic diastolic CHF  1  Continue IV Lasix  2  Strict I/Os and daily weights  4  Tobacco abuse  1  Smoking cessation encouraged  2  Nicotine replacement therapy   3  Patient declines CT lung cancer screening  5  Morbid obesity with possible RADHA and [facility]VS  1  Patient declined outpatient sleep study    Will leave information for patient to make an outpatient pulmonary follow-up if she would like one  Patient is stable from a pulmonary standpoint  Will sign off  Call with any questions  Chief Complaint:    "I'm fine "    Subjective:    Patient was seen and examined today  Patient states that her breathing has improved  Her only complaint now is low back pain  Denies shortness of breath at rest, cough, wheeze, chest pain, dizziness, headaches, nausea, vomiting, sputum production, hemoptysis, abdominal pain  She states she feels like her legs have decreased in size  Objective:    Vitals: Blood pressure 118/86, pulse 85, temperature (!) 97 4 °F (36 3 °C), temperature source Tympanic, resp   rate 22, height 5' 4" (1 626 m), weight 135 kg (297 lb 13 5 oz), SpO2 94 %  3L NC,Body mass index is 51 12 kg/m²  Intake/Output Summary (Last 24 hours) at 2/15/2019 1310  Last data filed at 2/15/2019 1100  Gross per 24 hour   Intake 960 ml   Output 4000 ml   Net -3040 ml       Invasive Devices     Peripheral Intravenous Line            Peripheral IV 02/14/19 Right Forearm 1 day                Physical Exam:     Physical Exam   Constitutional: She is oriented to person, place, and time  She appears well-developed and well-nourished  No distress  HENT:   Head: Normocephalic and atraumatic  Nose: Nose normal    Mouth/Throat: Oropharynx is clear and moist    Eyes: Pupils are equal, round, and reactive to light  EOM are normal  No scleral icterus  Neck: Normal range of motion  Neck supple  No JVD present  No tracheal deviation present  Cardiovascular: Normal rate, normal heart sounds and intact distal pulses  An irregular rhythm present  Pulmonary/Chest: No accessory muscle usage  No respiratory distress  She has decreased breath sounds  She has no wheezes  She has no rhonchi  She has no rales  Abdominal: Soft  Bowel sounds are normal  She exhibits no distension  There is no tenderness  Musculoskeletal: She exhibits edema and tenderness  She exhibits no deformity  Neurological: She is alert and oriented to person, place, and time  Skin: Skin is warm and dry  She is not diaphoretic  There is erythema  Psychiatric: She has a normal mood and affect  Her behavior is normal  Judgment and thought content normal        Labs: I have personally reviewed pertinent lab results  , ABG: No results found for: PHART, HMI0DJQ, PO2ART, BXW8EVY, C7NHDXYS, BEART, SOURCE, BNP: No results found for: BNP, CBC: No results found for: WBC, HGB, HCT, MCV, PLT, ADJUSTEDWBC, MCH, MCHC, RDW, MPV, NRBC, CMP:   Lab Results   Component Value Date    SODIUM 141 02/15/2019    K 4 3 02/15/2019     02/15/2019    CO2 40 (H) 02/15/2019    BUN 54 (H) 02/15/2019    CREATININE 1 60 (H) 02/15/2019    CALCIUM 8 1 (L) 02/15/2019    AST 18 02/15/2019    ALT 6 (L) 02/15/2019    ALKPHOS 116 02/15/2019    EGFR 36 02/15/2019   , PT/INR: No results found for: PT, INR, Troponin: No results found for: TROPONINI    Imaging and other studies: None to review today

## 2019-02-15 NOTE — ASSESSMENT & PLAN NOTE
· Wound care, podiatry and dermatology evaluated  · Initially concern for cellulitis of left posterior leg and right anterior leg, now suspected not true cellulitis  · Wound cultures obtained by dermatology growing multiple organisms- felt to be skin colonization  · ID consulted and recommended stopping ABX  · S/p 5 days of IV ancef and 2 days ceftriaxone  · BLE elevation, edema control is imperative  · ELIN analysis shows within healing potential however given cool to touch and faint pulses obtain full arterial study

## 2019-02-15 NOTE — PROGRESS NOTES
[facility] Internal Medicine  Progress Note - Camden Seals 1960, 63 y o  female MRN: [id number]  Unit/Bed#: E4 -01 Encounter: [id number]  Primary Care Provider: Gina Ojeda MD   Date and time admitted to hospital: 2/8/2019 12:18 PM    * Acute on chronic respiratory failure with hypoxia and hypercapnia ([address] )  Assessment & Plan  · Patient noted previously to desaturate when weaned from oxygen  · Room air saturation during admission 72%  · Repeat CXR yesterday with increased edema  · ABG shows hypercapnia and the patient has hemoconcentration, elevated CO2 and hemoglobin chronically suggest degree of chronic hypercapnia  · Seen by Pulmonary  Refuses BiPAP  · Patient does not use home O2 or CPAP/BIPAP  · Will monitor with ongoing diuresis   · Will need home oxygen eval prior to d/c    Acute on chronic diastolic congestive heart failure [facility])  Assessment & Plan  · Discussed with cardiology  Continue IV diuresis- has an additional approximately 15-20 lb to lose with goal dry weight of 274  · S/P metazolone x3 days  · -1 28 L yesterday and net neg 6 4  · Repeat ECHO without systolic dysfunction  · Weight 324-297 since admissiom    Goal weight 274  · Daily weights, I/O  · Needs lasix compliance on discharge      Intake/Output Summary (Last 24 hours) at 2/15/2019 0924  Last data filed at 2/15/2019 0626  Gross per 24 hour   Intake 720 ml   Output 2000 ml   Net -1280 ml     Wt Readings from Last 3 Encounters:   02/15/19 135 kg (297 lb 13 5 oz)   02/07/19 (!) 147 kg (323 lb)   03/28/18 (!) 141 kg (310 lb)         Type 2 diabetes mellitus with hypoglycemia [facility])  Assessment & Plan  Lab Results   Component Value Date    HGBA1C 6 2 02/14/2019       Recent Labs     02/15/19  0127 02/15/19  0303 02/15/19  0525 02/15/19  0720   POCGLU 132 119 108 101       Blood Sugar Average: Last 72 hrs:  (P) 828 [id number]   · Patient noted to be profoundly hypoglycemic 2/13-2/14 to less than 20 at 9:21 p m , received an amp of dextrose, repeat blood sugar 34, received another amp of dextrose, repeat blood sugar 62, received another amp of dextrose, repeat blood sugar dropped to 21  Patient was placed on D10 infusion at 50 mL/hour  · Dextrose stopped yesterday and glucoses 100-200s  · She had not had insulin for greater than 24 hours at which point she had had 2 units of Humalog  · This is the only insulin she had required during her hospital stay  · Discussed with endo yesterday- unclear if real event vs  Inaccurate readings on POC glucose testing  · TSH WNL  Await AM cortisol, sulfyulurea screening  · Was noted to eat 100% of meals yesterday  Unclear she had a bedtime snack  · If patient drops below 55 obtain BMP, insulin level, proinsulin level, C-peptide, cortisol, sulfylurea panel, insulin antibody and treat with 1 mg of glucagon  · Treat with glucagon and not dextrose and assess response 20-25 mins post glucagon  · Insulin level elevated yesterday AM could be related to morbid obesity and insulin resistance- need to have this level when ALSO hypogylcemic at the same time  · No history of gastric bypass  · A1C 6 3    Hypertension  Assessment & Plan  · Controlled by metoprolol 100 mg b i d , diltiazem 120 mg daily    · /90    Permanent atrial fibrillation (HCC)  Assessment & Plan  · Patient on Eliquis 5 mg BID  · Rate controlled with BB and CCB    Chronic venous stasis dermatitis of both lower extremities  Assessment & Plan  · Wound care, podiatry and dermatology evaluated  · Initially concern for cellulitis of left posterior leg and right anterior leg, now suspected not true cellulitis  · Wound cultures obtained by dermatology growing multiple organisms- felt to be skin colonization  · ID consulted and recommended stopping ABX  · S/p 5 days of IV ancef and 2 days ceftriaxone  · BLE elevation, edema control is imperative  · ELIN analysis shows within healing potential however given cool to touch and faint pulses obtain full arterial study    COPD (chronic obstructive pulmonary disease) ([facility])  Assessment & Plan  · Does not use oxygen at home  · Spokes 1 pack every 3 days, smoked since age 16  · Suspect chronic hypercapnic resp failure  · PRN xopenex  · No acute exac      Stage 3 chronic kidney disease [facility])  Assessment & Plan  Lab Results   Component Value Date    CREATININE 1 60 (H) 02/15/2019    CREATININE 1 42 (H) 02/14/2019    CREATININE 1 47 (H) 02/13/2019    CREATININE 1 61 (H) 02/12/2019     · Creatinine at baseline  Continue to monitor with IV diuresis  · SAPNA (documented previously) was ruled out  Did not meet criteria- felt to represent renal insufficiency on chronic kidney disease    Transaminitis  Assessment & Plan  Lab Results   Component Value Date    AST 18 02/15/2019    AST 18 02/14/2019    ALT 6 (L) 02/15/2019    ALT <6 (L) 02/14/2019    TBILI 1 22 (H) 02/15/2019    TBILI 1 67 (H) 02/14/2019     · Patient had hyperbilirubemia to 4 12 on admission  · US abdomen: CBD measures 8 mm proximally and tapers down to 4 4 mm distally  The distal most CBD is not visualized  · Bili trending down to 1 28, no evidence of abdominal pain to suggest passed stone  · Patient with prior cholecystectomy - not complaining of abdominal pain at this time  Depression  Assessment & Plan  · Patient previously took Zoloft at home, however, has not been taking recently  Compression fracture of first lumbar vertebra [facility])  Assessment & Plan  · X-ray of lumbar spine showing L1 compression  · Continue lidoderm patch, heating pad  · PRN robaxin resumed  · No neuro deficits on exam  No paresthesias  · Outpatient follow up and vitamin D levels    Obesity  Assessment & Plan  · Would benefit from weight loss  Body mass index is 51 12 kg/m²        VTE Pharmacologic Prophylaxis:   Pharmacologic: Apixaban (Eliquis)  Mechanical VTE Prophylaxis in Place: no- wounds    Patient Centered Rounds: I have performed bedside rounds with nursing staff today  Bell    Discussions with Specialists or Other Care Team Provider: cardio    Education and Discussions with Family / Patient: patient, wants me to call son Robby Ruth [phone] this afternoon as he is sleeping now    Time Spent for Care: 45 minutes  More than 50% of total time spent on counseling and coordination of care as described above  Current Length of Stay: 7 day(s)    Current Patient Status: Inpatient   Certification Statement: The patient will continue to require additional inpatient hospital stay due to Heart failure requiring IV diuresis    Discharge Plan:  Suspect discharge in 3-4 days    Code Status: Level 1 - Full Code      Subjective:   No nausea or vomiting  No chest pain or shortness of breath  Feels better today  Appetite improving  No fever or chills  Reports that her legs are sore  Objective:     Vitals:   Temp (24hrs), Av 2 °F (36 8 °C), Min:97 4 °F (36 3 °C), Max:99 4 °F (37 4 °C)    Temp:  [97 4 °F (36 3 °C)-99 4 °F (37 4 °C)] 97 4 °F (36 3 °C)  HR:  [81-88] 88  Resp:  [20-22] 22  BP: (113-127)/(76-90) 127/90  SpO2:  [91 %-94 %] 94 %  Body mass index is 51 12 kg/m²  Input and Output Summary (last 24 hours): Intake/Output Summary (Last 24 hours) at 2/15/2019 0932  Last data filed at 2/15/2019 1239  Gross per 24 hour   Intake 720 ml   Output 2000 ml   Net -1280 ml       Physical Exam:     Physical Exam   Constitutional: No distress  Morbidly obese  Appears chronically ill   HENT:   Head: Normocephalic and atraumatic  Eyes: Pupils are equal, round, and reactive to light  No scleral icterus  Neck: JVD present  Cardiovascular: Normal heart sounds and intact distal pulses  No murmur heard  irregular   Pulmonary/Chest: No accessory muscle usage  No respiratory distress  She has no wheezes  She has no rales  Decreased at bases   Abdominal: Soft  Bowel sounds are normal  She exhibits no distension  There is no tenderness   There is no rigidity, no rebound and no guarding  Morbid obesity, moist and erythema in skin folds   Musculoskeletal: She exhibits edema (2+) and tenderness (BLE)  Chronic venous stasis changes noted  Erythematous however less today than prior  Feet are cool however more warm than yesterday  Good capillary refill  Pulses faint  Neurological: She is alert  No cranial nerve deficit  She exhibits normal muscle tone  Coordination normal    Skin: Skin is warm and dry  No rash noted  She is not diaphoretic  No erythema  Psychiatric: She has a normal mood and affect  Her behavior is normal    Nursing note and vitals reviewed  Additional Data:     Labs:    Results from last 7 days   Lab Units 02/14/19  0350   WBC Thousand/uL 8 50   HEMOGLOBIN g/dL 17 1*   HEMATOCRIT % 59 8*   PLATELETS Thousands/uL 131*   NEUTROS PCT % 77*   LYMPHS PCT % 7*   MONOS PCT % 14*   EOS PCT % 1     Results from last 7 days   Lab Units 02/15/19  0522   SODIUM mmol/L 141   POTASSIUM mmol/L 4 3   CHLORIDE mmol/L 100   CO2 mmol/L 40*   BUN mg/dL 54*   CREATININE mg/dL 1 60*   ANION GAP mmol/L 1*   CALCIUM mg/dL 8 1*   ALBUMIN g/dL 2 4*   TOTAL BILIRUBIN mg/dL 1 22*   ALK PHOS U/L 116   ALT U/L 6*   AST U/L 18   GLUCOSE RANDOM mg/dL 105     Results from last 7 days   Lab Units 02/08/19  1249   INR  1 37*     Results from last 7 days   Lab Units 02/15/19  0720 02/15/19  0525 02/15/19  0303 02/15/19  0127 02/14/19  2313 02/14/19  2057 02/14/19  1704 02/14/19  1306 02/14/19  1055 02/14/19  0852 02/14/19  0752 02/14/19  0652   POC GLUCOSE mg/dl 101 108 119 132 151* 192* 184* 146* 213* 227* 133 168*     Results from last 7 days   Lab Units 02/14/19  0947 02/09/19  0520   HEMOGLOBIN A1C % 6 2 6 3     Results from last 7 days   Lab Units 02/14/19  0350 02/08/19  1249   LACTIC ACID mmol/L  --  1 6   PROCALCITONIN ng/ml 0 18  --            * I Have Reviewed All Lab Data Listed Above  * Additional Pertinent Lab Tests Reviewed:  All Labs Within Last 24 Hours Reviewed    Imaging:    Imaging Reports Reviewed Today Include: CXR  Imaging Personally Reviewed by Myself Includes:  none    Recent Cultures (last 7 days):     Results from last 7 days   Lab Units 02/12/19  1603 02/10/19  0900   GRAM STAIN RESULT  3+ Gram negative rods*  2+ Gram positive cocci in clusters*  Rare Polys*  2+ Gram negative rods*  No Polys*  --    WOUND CULTURE  3+ Growth of Pseudomonas aeruginosa*  2+ Growth of Staphylococcus aureus*  2+ Growth of Enterococcus species*  2+ Growth of Pseudomonas aeruginosa*  2+ Growth of Enterococcus faecalis*  --    C DIFF TOXIN B   --  NEGATIVE for C difficle toxin by PCR  Last 24 Hours Medication List:     Current Facility-Administered Medications:  acetaminophen 975 mg Oral Q8H PRN MAYNOR Washington   albuterol 2 puff Inhalation Q4H PRN Dolph Aschoff, PA-C   apixaban 5 mg Oral BID Brielle Yang PA-C   diltiazem 120 mg Oral Daily Brielle Yang PA-C   furosemide 80 mg Intravenous BID (diuretic) Luís Perkins MD   influenza vaccine 0 5 mL Intramuscular Prior to discharge Luís Perkins MD   levalbuterol 1 25 mg Nebulization Q6H PRN Andie Dodd MD   lidocaine 1 patch Topical Daily Brielle Yang PA-C   methocarbamol 500 mg Oral Q8H PRN Vickie Macias PA-C   metoprolol succinate 100 mg Oral BID Dolph Aschoff, PA-C   mupirocin  Topical TID Jose De La Rosa, DO   nicotine 1 patch Transdermal Daily Brielle Yang PA-C   nystatin 1 application Topical BID Brielle Yang PA-C   ondansetron 4 mg Intravenous Q8H PRN Brielle Yang PA-C   potassium chloride 20 mEq Oral BID Kelechi Echols MD   sodium chloride 3 mL Nebulization Q6H PRN Andie Dodd MD        Today, Patient Was Seen By: Nori Urena PA-C    ** Please Note: Dictation voice to text software may have been used in the creation of this document   **

## 2019-02-15 NOTE — PLAN OF CARE
Problem: PHYSICAL THERAPY ADULT  Goal: Performs mobility at highest level of function for planned discharge setting  See evaluation for individualized goals  Description  Treatment/Interventions: Functional transfer training, LE strengthening/ROM, Therapeutic exercise, Endurance training, Patient/family training, Equipment eval/education, Bed mobility, Gait training, Spoke to nursing, Family, OT  Equipment Recommended: Shakeel Abbasi       See flowsheet documentation for full assessment, interventions and recommendations  2/14/2019 1902 by Mehdi Ford PTA  Outcome: Progressing  Note:   Prognosis: Fair  Problem List: Decreased strength, Decreased range of motion, Decreased endurance, Impaired balance, Decreased mobility, Decreased safety awareness, Impaired judgement, Obesity, Decreased skin integrity  Assessment: Pt demonstrates decreased safety awareness with all aspects of mobility  Pt reaches far out of base of support for objects instead of using Rw for support with stand pivot transfers, sit to stand transfers and approach to chair  Pt pushes walker aside and reach for objects for support and balance vs maintaining use of rw and backing up to chair with Rw  Verbal cues required for reaching back for proper handplacement with sit to stand and stand to sit transfers  PT performs transfers to and from recliner chair, SPT to and from bedside commode with supervision, but max cues for safe techniques  PT ambulates 200' with supervision with cues to slow down, cues to maintain close distance to walker at all times and cues for improved posture  NO gross lob noted, Pt is limited by fatigue and ROMERO, verbal cues required for proper breathing techniques  Pt o2 sat on 3l 92%, HR 93 bpm  Much encouragement required for participation in PT session  OVerall less assistance required for mobility  Pt wants to return home to son's house at d/c   PT reports son is home all the time as he is in-between jobs    Pt ed on compliance with medical issues CHF, DM, seeing PCP regularly and keeping appointments as pt reports she would cancel and not reschedule  Recommend d/c to home with family support and assistance PRN , 24 hour supervision and HHPT, recommend RW and BSC for home use  IF unable to have 24 hour support or if pt unable to perform stair climbing or does not achieve PT goals then recommend d/c to STR  Barriers to Discharge: Inaccessible home environment, Decreased caregiver support  Barriers to Discharge Comments: lives alone, 2 level home, and DENYS  Recommendation: Home PT, Home with family support, 24 hour supervision/assist, Short-term skilled PT(pending achievement of PT goals and stairs,if unable rec STR)     PT - OK to Discharge: Yes(Must achieve PT goals if not STR)    See flowsheet documentation for full assessment  2/14/2019 1902 by Miroslava Hall PTA  Outcome: Progressing  Note:   Prognosis: Fair  Problem List: Decreased strength, Decreased range of motion, Decreased endurance, Impaired balance, Decreased mobility, Decreased safety awareness, Impaired judgement, Obesity, Decreased skin integrity  Assessment: Pt demonstrates decreased safety awareness with all aspects of mobility  Pt reaches far out of base of support for objects instead of using Rw for support with stand pivot transfers, sit to stand transfers and approach to chair  Pt pushes walker aside and reach for objects for support and balance vs maintaining use of rw and backing up to chair with Rw  Verbal cues required for reaching back for proper handplacement with sit to stand and stand to sit transfers  PT performs transfers to and from recliner chair, SPT to and from bedside commode with supervision, but max cues for safe techniques  PT ambulates 200' with supervision with cues to slow down, cues to maintain close distance to walker at all times and cues for improved posture   NO gross lob noted, Pt is limited by fatigue and ROMERO, verbal cues required for proper breathing techniques  Pt o2 sat on 3l 92%, HR 93 bpm  Much encouragement required for participation in PT session  OVerall less assistance required for mobility  Pt wants to return home to son's house at d/c   PT reports son is home all the time as he is in-between jobs  Pt ed on compliance with medical issues CHF, DM, seeing PCP regularly and keeping appointments as pt reports she would cancel and not reschedule  Recommend d/c to home with family support and assistance PRN , 24 hour supervision and HHPT, recommend RW and BSC for home use  IF unable to have 24 hour support or if pt unable to perform stair climbing or does not achieve PT goals then recommend d/c to STR  Barriers to Discharge: Inaccessible home environment, Decreased caregiver support  Barriers to Discharge Comments: lives alone, 2 level home, and DENYS  Recommendation: Home PT, Home with family support, 24 hour supervision/assist, Short-term skilled PT(pending achievement of PT goals and stairs,if unable rec STR)     PT - OK to Discharge: Yes(Must achieve PT goals if not STR)    See flowsheet documentation for full assessment

## 2019-02-15 NOTE — ASSESSMENT & PLAN NOTE
Lab Results   Component Value Date    CREATININE 1 60 (H) 02/15/2019    CREATININE 1 42 (H) 02/14/2019    CREATININE 1 47 (H) 02/13/2019    CREATININE 1 61 (H) 02/12/2019     · Creatinine at baseline  Continue to monitor with IV diuresis  · SAPNA (documented previously) was ruled out   Did not meet criteria- felt to represent renal insufficiency on chronic kidney disease

## 2019-02-15 NOTE — ASSESSMENT & PLAN NOTE
· Patient noted previously to desaturate when weaned from oxygen  · Room air saturation during admission 72%  · Repeat CXR yesterday with increased edema  · ABG shows hypercapnia and the patient has hemoconcentration, elevated CO2 and hemoglobin chronically suggest degree of chronic hypercapnia  · Seen by Pulmonary  Refuses BiPAP    · Patient does not use home O2 or CPAP/BIPAP  · Will monitor with ongoing diuresis   · Will need home oxygen eval prior to d/c

## 2019-02-15 NOTE — ASSESSMENT & PLAN NOTE
· X-ray of lumbar spine showing L1 compression  · Continue lidoderm patch, heating pad  · PRN robaxin resumed  · No neuro deficits on exam  No paresthesias    · Outpatient follow up and vitamin D levels

## 2019-02-15 NOTE — ASSESSMENT & PLAN NOTE
· Discussed with cardiology  Continue IV diuresis- has an additional approximately 15-20 lb to lose with goal dry weight of 274  · S/P metazolone x3 days  · -1 28 L yesterday and net neg 6 4  · Repeat ECHO without systolic dysfunction  · Weight 324-297 since admissiom    Goal weight 274  · Daily weights, I/O  · Needs lasix compliance on discharge      Intake/Output Summary (Last 24 hours) at 2/15/2019 0924  Last data filed at 2/15/2019 0626  Gross per 24 hour   Intake 720 ml   Output 2000 ml   Net -1280 ml     Wt Readings from Last 3 Encounters:   02/15/19 135 kg (297 lb 13 5 oz)   02/07/19 (!) 147 kg (323 lb)   03/28/18 (!) 141 kg (310 lb)

## 2019-02-15 NOTE — PROGRESS NOTES
Progress Note - Infectious Disease   Sarah Brown 63 y o  female MRN: [id number]  Unit/Bed#: E4 -01 Encounter: [id number]      Impression/Plan:  1  Bilateral lower extremity venous stasis changes with ulcerations-no clear evidence of cellulitis  Patient has become rapidly volume overloaded, and has developed symmetric bilateral leg changes in the setting of significant edema  Patient has not had any fever or leukocytosis and the procalcitonin level is normal   The positive wound cultures reflect colonization of organisms on the surface of the wounds  Legs have improved despite the patient being off all antibiotics  -no additional antibiotics  -management of the patient's volume overload  -aggressive elevation of both legs  -pressure dressings  -local wound care  -close podiatry follow-up     2  Acute on chronic respiratory failure-appears to be secondary to congestive heart failure with ongoing radiographic changes suggestive of volume overload  Obesity hypoventilation syndrome COPD likely also playing a role  Patient has refused BiPAP  -O2 support  -Pulmonary follow-up     3  Diabetes mellitus-type 2 with hyperglycemia      4  Chronic kidney disease-stage III  Has had some acute worsening on presentation and now some improvement  Suspect the changes are pre renal issues  -monitor creatinine clearance as needed  -volume management     Discussed the case in detail with the primary service     No active infectious disease issues  We will sign off  Please call if questions  Antibiotics:  None    Subjective:  Patient has no fever, chills, sweats; no nausea, vomiting, diarrhea; no cough, shortness of breath; no pain  No new symptoms      Objective:  Vitals:  Temp:  [97 4 °F (36 3 °C)-99 4 °F (37 4 °C)] 97 4 °F (36 3 °C)  HR:  [81-88] 88  Resp:  [20-22] 22  BP: (113-127)/(76-90) 127/90  SpO2:  [91 %-94 %] 94 %  Temp (24hrs), Av 2 °F (36 8 °C), Min:97 4 °F (36 3 °C), Max:99 4 °F (37 4 °C)  Current: Temperature: (!) 97 4 °F (36 3 °C)    Physical Exam:   General Appearance:  Alert, interactive, nontoxic, no acute distress  Throat: Oropharynx moist without lesions  Lungs:   Clear to auscultation bilaterally; no wheezes, rhonchi or rales; respirations unlabored   Heart:  RRR; no murmur, rub or gallop   Abdomen:   Soft, non-tender, non-distended, positive bowel sounds  Extremities: No clubbing, cyanosis  Decreased lower extremity edema and erythema bilaterally   Skin: No new rashes or lesions  No draining wounds noted  Labs, Imaging, & Other studies:   All pertinent labs and imaging studies were personally reviewed  Results from last 7 days   Lab Units 02/14/19  0350 02/13/19  0607 02/12/19  0502   WBC Thousand/uL 8 50 8 23 7 81   HEMOGLOBIN g/dL 17 1* 16 5* 17 1*   PLATELETS Thousands/uL 131* 150 161     Results from last 7 days   Lab Units 02/15/19  0522 02/14/19  0350 02/13/19  0842 02/12/19  0502   SODIUM mmol/L 141 143 143 144   POTASSIUM mmol/L 4 3 3 8 4 4 4 9   CHLORIDE mmol/L 100 101 102 104   CO2 mmol/L 40* 40* 36* 32   BUN mg/dL 54* 46* 43* 40*   CREATININE mg/dL 1 60* 1 42* 1 47* 1 61*   EGFR ml/min/1 73sq m 36 41 39 35   CALCIUM mg/dL 8 1* 8 0* 8 0* 8 0*   AST U/L 18 18  --  19   ALT U/L 6* <6*  --  8*   ALK PHOS U/L 116 102  --  97     Results from last 7 days   Lab Units 02/12/19  1603 02/10/19  0900   GRAM STAIN RESULT  3+ Gram negative rods*  2+ Gram positive cocci in clusters*  Rare Polys*  2+ Gram negative rods*  No Polys*  --    WOUND CULTURE  3+ Growth of Pseudomonas aeruginosa*  2+ Growth of Staphylococcus aureus*  2+ Growth of Enterococcus species*  2+ Growth of Pseudomonas aeruginosa*  2+ Growth of Enterococcus faecalis*  --    C DIFF TOXIN B   --  NEGATIVE for C difficle toxin by PCR

## 2019-02-15 NOTE — PLAN OF CARE
Problem: Potential for Falls  Goal: Patient will remain free of falls  Description  INTERVENTIONS:  - Assess patient frequently for physical needs  -  Identify cognitive and physical deficits and behaviors that affect risk of falls  -  [place] fall precautions as indicated by assessment   - Educate patient/family on patient safety including physical limitations  - Instruct patient to call for assistance with activity based on assessment  - Modify environment to reduce risk of injury  - Consider OT/PT consult to assist with strengthening/mobility   Outcome: Progressing     Problem: Nutrition/Hydration-ADULT  Goal: Nutrient/Hydration intake appropriate for improving, restoring or maintaining nutritional needs  Description  Monitor and assess patient's nutrition/hydration status for malnutrition (ex- brittle hair, bruises, dry skin, pale skin and conjunctiva, muscle wasting, smooth red tongue, and disorientation)  Collaborate with interdisciplinary team and initiate plan and interventions as ordered  Monitor patient's weight and dietary intake as ordered or per policy  Utilize nutrition screening tool and intervene per policy  Determine patient's food preferences and provide high-protein, high-caloric foods as appropriate       INTERVENTIONS:  - Monitor oral intake, urinary output, labs, and treatment plans  - Assess nutrition and hydration status and recommend course of action  - Evaluate amount of meals eaten  - Assist patient with eating if necessary   - Allow adequate time for meals  - Recommend/ encourage appropriate diets, oral nutritional supplements, and vitamin/mineral supplements  - Order, calculate, and assess calorie counts as needed  - Recommend, monitor, and adjust tube feedings and TPN/PPN based on assessed needs  - Assess need for intravenous fluids  - Provide specific nutrition/hydration education as appropriate  - Include patient/family/caregiver in decisions related to nutrition   Outcome: Progressing     Problem: Prexisting or High Potential for Compromised Skin Integrity  Goal: Skin integrity is maintained or improved  Description  INTERVENTIONS:  - Identify patients at risk for skin breakdown  - Assess and monitor skin integrity  - Assess and monitor nutrition and hydration status  - Monitor labs (i e  albumin)  - Assess for incontinence   - Turn and reposition patient  - Assist with mobility/ambulation  - Relieve pressure over bony prominences  - Avoid friction and shearing  - Provide appropriate hygiene as needed including keeping skin clean and dry  - Evaluate need for skin moisturizer/barrier cream  - Collaborate with interdisciplinary team (i e  Nutrition, Rehabilitation, etc )   - Patient/family teaching   Outcome: Progressing     Problem: PAIN - ADULT  Goal: Verbalizes/displays adequate comfort level or baseline comfort level  Description  Interventions:  - Encourage patient to monitor pain and request assistance  - Assess pain using appropriate pain scale  - Administer analgesics based on type and severity of pain and evaluate response  - Implement non-pharmacological measures as appropriate and evaluate response  - Consider cultural and social influences on pain and pain management  - Notify physician/advanced practitioner if interventions unsuccessful or patient reports new pain   Outcome: Progressing     Problem: INFECTION - ADULT  Goal: Absence or prevention of progression during hospitalization  Description  INTERVENTIONS:  - Assess and monitor for signs and symptoms of infection  - Monitor lab/diagnostic results  - Monitor all insertion sites, i e  indwelling lines, tubes, and drains  - Monitor endotracheal (as able) and nasal secretions for changes in amount and color  - [place] appropriate cooling/warming therapies per order  - Administer medications as ordered  - Instruct and encourage patient and family to use good hand hygiene technique  - Identify and instruct in appropriate isolation precautions for identified infection/condition   Outcome: Progressing  Goal: Absence of fever/infection during neutropenic period  Description  INTERVENTIONS:  - Monitor WBC  - Implement neutropenic guidelines   Outcome: Progressing     Problem: SAFETY ADULT  Goal: Maintain or return to baseline ADL function  Description  INTERVENTIONS:  -  Assess patient's ability to carry out ADLs; assess patient's baseline for ADL function and identify physical deficits which impact ability to perform ADLs (bathing, care of mouth/teeth, toileting, grooming, dressing, etc )  - Assess/evaluate cause of self-care deficits   - Assess range of motion  - Assess patient's mobility; develop plan if impaired  - Assess patient's need for assistive devices and provide as appropriate  - Encourage maximum independence but intervene and supervise when necessary  ¯ Involve family in performance of ADLs  ¯ Assess for home care needs following discharge   ¯ Request OT consult to assist with ADL evaluation and planning for discharge  ¯ Provide patient education as appropriate   Outcome: Progressing  Goal: Maintain or return mobility status to optimal level  Description  INTERVENTIONS:  - Assess patient's baseline mobility status (ambulation, transfers, stairs, etc )    - Identify cognitive and physical deficits and behaviors that affect mobility  - Identify mobility aids required to assist with transfers and/or ambulation (gait belt, sit-to-stand, lift, walker, cane, etc )  - [place] fall precautions as indicated by assessment  - Record patient progress and toleration of activity level on Mobility SBAR; progress patient to next Phase/Stage  - Instruct patient to call for assistance with activity based on assessment  - Request Rehabilitation consult to assist with strengthening/weightbearing, etc    Outcome: Progressing     Problem: DISCHARGE PLANNING  Goal: Discharge to home or other facility with appropriate resources  Description  INTERVENTIONS:  - Identify barriers to discharge w/patient and caregiver  - Arrange for needed discharge resources and transportation as appropriate  - Identify discharge learning needs (meds, wound care, etc )  - Arrange for interpretive services to assist at discharge as needed  - Refer to Case Management Department for coordinating discharge planning if the patient needs post-hospital services based on physician/advanced practitioner order or complex needs related to functional status, cognitive ability, or social support system   Outcome: Progressing     Problem: Knowledge Deficit  Goal: Patient/family/caregiver demonstrates understanding of disease process, treatment plan, medications, and discharge instructions  Description  Complete learning assessment and assess knowledge base  Interventions:  - Provide teaching at level of understanding  - Provide teaching via preferred learning methods   Outcome: Progressing     Problem: CARDIOVASCULAR - ADULT  Goal: Maintains optimal cardiac output and hemodynamic stability  Description  INTERVENTIONS:  - Monitor I/O, vital signs and rhythm  - Monitor for S/S and trends of decreased cardiac output i e  bleeding, hypotension  - Administer and titrate ordered vasoactive medications to optimize hemodynamic stability  - Assess quality of pulses, skin color and temperature  - Assess for signs of decreased coronary artery perfusion - ex   Angina  - Instruct patient to report change in severity of symptoms   Outcome: Progressing  Goal: Absence of cardiac dysrhythmias or at baseline rhythm  Description  INTERVENTIONS:  - Continuous cardiac monitoring, monitor vital signs, obtain 12 lead EKG if indicated  - Administer antiarrhythmic and heart rate control medications as ordered  - Monitor electrolytes and administer replacement therapy as ordered   Outcome: Progressing     Problem: METABOLIC, FLUID AND ELECTROLYTES - ADULT  Goal: Electrolytes maintained within normal limits  Description  INTERVENTIONS:  - Monitor labs and assess patient for signs and symptoms of electrolyte imbalances  - Administer electrolyte replacement as ordered  - Monitor response to electrolyte replacements, including repeat lab results as appropriate  - Instruct patient on fluid and nutrition as appropriate   Outcome: Progressing  Goal: Fluid balance maintained  Description  INTERVENTIONS:  - Monitor labs and assess for signs and symptoms of volume excess or deficit  - Monitor I/O and WT  - Instruct patient on fluid and nutrition as appropriate   Outcome: Progressing  Goal: Glucose maintained within target range  Description  INTERVENTIONS:  - Monitor Blood Glucose as ordered  - Assess for signs and symptoms of hyperglycemia and hypoglycemia  - Administer ordered medications to maintain glucose within target range  - Assess nutritional intake and initiate nutrition service referral as needed   Outcome: Progressing     Problem: SKIN/TISSUE INTEGRITY - ADULT  Goal: Skin integrity remains intact  Description  INTERVENTIONS  - Identify patients at risk for skin breakdown  - Assess and monitor skin integrity  - Assess and monitor nutrition and hydration status  - Monitor labs (i e  albumin)  - Assess for incontinence   - Turn and reposition patient  - Assist with mobility/ambulation  - Relieve pressure over bony prominences  - Avoid friction and shearing  - Provide appropriate hygiene as needed including keeping skin clean and dry  - Evaluate need for skin moisturizer/barrier cream  - Collaborate with interdisciplinary team (i e  Nutrition, Rehabilitation, etc )   - Patient/family teaching   Outcome: Progressing  Goal: Incision(s), wounds(s) or drain site(s) healing without S/S of infection  Description  INTERVENTIONS  - Assess and document risk factors for skin impairment   - Assess and document dressing, incision, wound bed, drain sites and surrounding tissue  - Initiate Nutrition services consult and/or wound management as needed   Outcome: Progressing     Problem: DISCHARGE PLANNING - CARE MANAGEMENT  Goal: Discharge to post-acute care or home with appropriate resources  Description  INTERVENTIONS:  - Conduct assessment to determine patient/family and health care team treatment goals, and need for post-acute services based on payer coverage, community resources, and patient preferences, and barriers to discharge  - Address psychosocial, clinical, and financial barriers to discharge as identified in assessment in conjunction with the patient/family and health care team  - Arrange appropriate level of post-acute services according to patient's   needs and preference and payer coverage in collaboration with the physician and health care team  - Communicate with and update the patient/family, physician, and health care team regarding progress on the discharge plan  - Arrange appropriate transportation to post-acute venues  - Pt wishes to d/c with home health care at this time   Outcome: Progressing

## 2019-02-16 LAB
ANION GAP SERPL CALCULATED.3IONS-SCNC: 0 MMOL/L (ref 4–13)
BACTERIA WND AEROBE CULT: ABNORMAL
BUN SERPL-MCNC: 57 MG/DL (ref 5–25)
CALCIUM SERPL-MCNC: 8.3 MG/DL (ref 8.3–10.1)
CHLORIDE SERPL-SCNC: 99 MMOL/L (ref 100–108)
CO2 SERPL-SCNC: 44 MMOL/L (ref 21–32)
CREAT SERPL-MCNC: 1.42 MG/DL (ref 0.6–1.3)
GFR SERPL CREATININE-BSD FRML MDRD: 41 ML/MIN/1.73SQ M
GLUCOSE SERPL-MCNC: 108 MG/DL (ref 65–140)
GLUCOSE SERPL-MCNC: 108 MG/DL (ref 65–140)
GLUCOSE SERPL-MCNC: 119 MG/DL (ref 65–140)
GLUCOSE SERPL-MCNC: 120 MG/DL (ref 65–140)
GLUCOSE SERPL-MCNC: 129 MG/DL (ref 65–140)
GLUCOSE SERPL-MCNC: 130 MG/DL (ref 65–140)
GRAM STN SPEC: ABNORMAL
MAGNESIUM SERPL-MCNC: 1.9 MG/DL (ref 1.6–2.6)
POTASSIUM SERPL-SCNC: 4.4 MMOL/L (ref 3.5–5.3)
SODIUM SERPL-SCNC: 143 MMOL/L (ref 136–145)

## 2019-02-16 PROCEDURE — 93922 UPR/L XTREMITY ART 2 LEVELS: CPT | Performed by: SURGERY

## 2019-02-16 PROCEDURE — 93925 LOWER EXTREMITY STUDY: CPT | Performed by: SURGERY

## 2019-02-16 PROCEDURE — 80048 BASIC METABOLIC PNL TOTAL CA: CPT | Performed by: PHYSICIAN ASSISTANT

## 2019-02-16 PROCEDURE — 99232 SBSQ HOSP IP/OBS MODERATE 35: CPT | Performed by: INTERNAL MEDICINE

## 2019-02-16 PROCEDURE — 83735 ASSAY OF MAGNESIUM: CPT | Performed by: PHYSICIAN ASSISTANT

## 2019-02-16 PROCEDURE — 82948 REAGENT STRIP/BLOOD GLUCOSE: CPT

## 2019-02-16 PROCEDURE — 90682 RIV4 VACC RECOMBINANT DNA IM: CPT | Performed by: INTERNAL MEDICINE

## 2019-02-16 RX ADMIN — NYSTATIN 1 APPLICATION: 100000 POWDER TOPICAL at 09:11

## 2019-02-16 RX ADMIN — METHOCARBAMOL TABLETS 500 MG: 500 TABLET, COATED ORAL at 08:50

## 2019-02-16 RX ADMIN — FUROSEMIDE 80 MG: 10 INJECTION, SOLUTION INTRAMUSCULAR; INTRAVENOUS at 08:30

## 2019-02-16 RX ADMIN — ACETAMINOPHEN 975 MG: 325 TABLET, FILM COATED ORAL at 20:03

## 2019-02-16 RX ADMIN — NYSTATIN 1 APPLICATION: 100000 POWDER TOPICAL at 17:34

## 2019-02-16 RX ADMIN — METHOCARBAMOL TABLETS 500 MG: 500 TABLET, COATED ORAL at 17:39

## 2019-02-16 RX ADMIN — METOPROLOL SUCCINATE 100 MG: 50 TABLET, EXTENDED RELEASE ORAL at 17:33

## 2019-02-16 RX ADMIN — POTASSIUM CHLORIDE 20 MEQ: 1500 TABLET, EXTENDED RELEASE ORAL at 17:33

## 2019-02-16 RX ADMIN — METOPROLOL SUCCINATE 100 MG: 50 TABLET, EXTENDED RELEASE ORAL at 09:10

## 2019-02-16 RX ADMIN — APIXABAN 5 MG: 5 TABLET, FILM COATED ORAL at 17:33

## 2019-02-16 RX ADMIN — INFLUENZA A VIRUS A/MICHIGAN/45/2015 (H1N1) RECOMBINANT HEMAGGLUTININ ANTIGEN, INFLUENZA A VIRUS A/SINGAPORE/INFIMH-16-0019/2016 (H3N2) RECOMBINANT HEMAGGLUTININ ANTIGEN, INFLUENZA B VIRUS B/MARYLAND/15/2016 RECOMBINANT HEMAGGLUTININ ANTIGEN, AND INFLUENZA B VIRUS B/PHUKET/3073/2013 RECOMBINANT HEMAGGLUTININ ANTIGEN 0.5 ML: 45; 45; 45; 45 INJECTION INTRAMUSCULAR at 09:45

## 2019-02-16 RX ADMIN — DILTIAZEM HYDROCHLORIDE 120 MG: 120 CAPSULE, EXTENDED RELEASE ORAL at 09:08

## 2019-02-16 RX ADMIN — MUPIROCIN: 20 OINTMENT TOPICAL at 21:53

## 2019-02-16 RX ADMIN — MUPIROCIN: 20 OINTMENT TOPICAL at 17:33

## 2019-02-16 RX ADMIN — APIXABAN 5 MG: 5 TABLET, FILM COATED ORAL at 09:07

## 2019-02-16 RX ADMIN — LIDOCAINE 1 PATCH: 50 PATCH CUTANEOUS at 09:09

## 2019-02-16 RX ADMIN — FUROSEMIDE 80 MG: 10 INJECTION, SOLUTION INTRAMUSCULAR; INTRAVENOUS at 15:52

## 2019-02-16 RX ADMIN — POTASSIUM CHLORIDE 20 MEQ: 1500 TABLET, EXTENDED RELEASE ORAL at 09:11

## 2019-02-16 NOTE — ASSESSMENT & PLAN NOTE
Lab Results   Component Value Date    CREATININE 1 42 (H) 02/16/2019    CREATININE 1 60 (H) 02/15/2019    CREATININE 1 42 (H) 02/14/2019    CREATININE 1 47 (H) 02/13/2019     · Creatinine at baseline  Continue to monitor with IV diuresis  · SAPNA (documented previously) was ruled out   Did not meet criteria- felt to represent renal insufficiency on chronic kidney disease

## 2019-02-16 NOTE — PROGRESS NOTES
Progress Note - Neeraj Duran 1960, 63 y o  female MRN: [id number]    Unit/Bed#: E4 -01 Encounter: [id number]    Primary Care Provider: Luís Perkins MD   Date and time admitted to hospital: 2/8/2019 12:18 PM        * Acute on chronic respiratory failure with hypoxia and hypercapnia ([facility] )  Assessment & Plan  · Patient noted previously to desaturate when weaned from oxygen  · Room air saturation during admission 72%  · Repeat CXR yesterday with increased edema  · ABG shows hypercapnia and the patient has hemoconcentration, elevated CO2 and hemoglobin chronically suggest degree of chronic hypercapnia  · Seen by Pulmonary  Refuses BiPAP  · Patient does not use home O2 or CPAP/BIPAP  · Will monitor with ongoing diuresis   · Will need home oxygen eval prior to d/c    Acute on chronic diastolic congestive heart failure [facility])  Assessment & Plan  · Discussed with cardiology  Continue IV diuresis- has an additional approximately 15-20 lb to lose with goal dry weight of 274  · S/P metazolone x3 days  · -1 28 L yesterday and net neg 6 4  · Repeat ECHO without systolic dysfunction  · Weight 324-297 since admissiom  Goal weight 274  · Daily weights, I/O  · Needs lasix compliance on discharge      Intake/Output Summary (Last 24 hours) at 2/16/2019 1813  Last data filed at 2/16/2019 1601  Gross per 24 hour   Intake 80 ml   Output 2300 ml   Net -2220 ml     Wt Readings from Last 3 Encounters:   02/16/19 127 kg (279 lb)   02/07/19 (!) 147 kg (323 lb)   03/28/18 (!) 141 kg (310 lb)         Stage 3 chronic kidney disease ([facility] )  Assessment & Plan  Lab Results   Component Value Date    CREATININE 1 42 (H) 02/16/2019    CREATININE 1 60 (H) 02/15/2019    CREATININE 1 42 (H) 02/14/2019    CREATININE 1 47 (H) 02/13/2019     · Creatinine at baseline  Continue to monitor with IV diuresis  · SAPNA (documented previously) was ruled out   Did not meet criteria- felt to represent renal insufficiency on chronic kidney disease    COPD (chronic obstructive pulmonary disease) ([facility])  Assessment & Plan  · Does not use oxygen at home  · Spokes 1 pack every 3 days, smoked since age 16  · Suspect chronic hypercapnic resp failure  · PRN xopenex  · No acute exac      Chronic venous stasis dermatitis of both lower extremities  Assessment & Plan  · Wound care, podiatry and dermatology evaluated  · Initially concern for cellulitis of left posterior leg and right anterior leg, now suspected not true cellulitis  · Wound cultures obtained by dermatology growing multiple organisms- felt to be skin colonization  · ID consulted and recommended stopping ABX  · S/p 5 days of IV ancef and 2 days ceftriaxone  · BLE elevation, edema control is imperative  · ELIN analysis shows within healing potential however given cool to touch and faint pulses obtain full arterial study    Permanent atrial fibrillation ([facility])  Assessment & Plan  · Patient on Eliquis 5 mg BID  · Rate controlled with BB and CCB    Type 2 diabetes mellitus with hypoglycemia [facility])  Assessment & Plan  Lab Results   Component Value Date    HGBA1C 6 2 02/14/2019       Recent Labs     02/16/19  0015 02/16/19  0450 02/16/19  1110 02/16/19  1533   POCGLU 129 119 130 108       Blood Sugar Average: Last 72 hrs:  (P) 845 [id number]   · Patient noted to be profoundly hypoglycemic 2/13-2/14 to less than 20 at 9:21 p m , received an amp of dextrose, repeat blood sugar 34, received another amp of dextrose, repeat blood sugar 62, received another amp of dextrose, repeat blood sugar dropped to 21  Patient was placed on D10 infusion at 50 mL/hour  · Dextrose stopped yesterday and glucoses 100-200s  · She had not had insulin for greater than 24 hours at which point she had had 2 units of Humalog  · This is the only insulin she had required during her hospital stay  · Discussed with endo yesterday- unclear if real event vs  Inaccurate readings on POC glucose testing  · TSH WNL   Await AM cortisol, sulfyulurea screening  · Was noted to eat 100% of meals yesterday  Unclear she had a bedtime snack  · If patient drops below 55 obtain BMP, insulin level, proinsulin level, C-peptide, cortisol, sulfylurea panel, insulin antibody and treat with 1 mg of glucagon  · Treat with glucagon and not dextrose and assess response 20-25 mins post glucagon  · Insulin level elevated yesterday AM could be related to morbid obesity and insulin resistance- need to have this level when ALSO hypogylcemic at the same time  · No history of gastric bypass  · A1C 6 3    Transaminitis  Assessment & Plan  Lab Results   Component Value Date    AST 18 02/15/2019    AST 18 02/14/2019    ALT 6 (L) 02/15/2019    ALT <6 (L) 02/14/2019    TBILI 1 22 (H) 02/15/2019    TBILI 1 67 (H) 02/14/2019     · Patient had hyperbilirubemia to 4 12 on admission  · US abdomen: CBD measures 8 mm proximally and tapers down to 4 4 mm distally  The distal most CBD is not visualized  · Bili trending down to 1 28, no evidence of abdominal pain to suggest passed stone  · Patient with prior cholecystectomy - not complaining of abdominal pain at this time  Compression fracture of first lumbar vertebra [facility])  Assessment & Plan  · X-ray of lumbar spine showing L1 compression  · Continue lidoderm patch, heating pad  · PRN robaxin resumed  · No neuro deficits on exam  No paresthesias  · Outpatient follow up and vitamin D levels    Depression  Assessment & Plan  · Patient previously took Zoloft at home, however, has not been taking recently  Obesity  Assessment & Plan  · Would benefit from weight loss  Body mass index is 47 89 kg/m²  Hypertension  Assessment & Plan  · Controlled by metoprolol 100 mg b i d , diltiazem 120 mg daily  VTE Pharmacologic Prophylaxis:   Pharmacologic:  Eliquis    Patient Centered Rounds: I have performed bedside rounds with nursing staff today  Time Spent for Care: 20 minutes    More than 50% of total time spent on counseling and coordination of care as described above  Current Length of Stay: 8 day(s)    Current Patient Status: Inpatient   Certification Statement: The patient will continue to require additional inpatient hospital stay due to CHF exacerbation, IV diuresis    Discharge Plan / Estimated Discharge Date:  3-4 days    Code Status: Level 1 - Full Code      Subjective:   Patient seen and examined at bedside, currently denies any complaints    Objective:     Vitals:   Temp (24hrs), Av °F (36 7 °C), Min:97 5 °F (36 4 °C), Max:98 3 °F (36 8 °C)    Temp:  [97 5 °F (36 4 °C)-98 3 °F (36 8 °C)] 98 3 °F (36 8 °C)  HR:  [74-78] 78  Resp:  [19-20] 20  BP: (118-134)/(77-83) 134/78  SpO2:  [92 %-97 %] 97 %  Body mass index is 47 89 kg/m²  Input and Output Summary (last 24 hours): Intake/Output Summary (Last 24 hours) at 2019 1816  Last data filed at 2019 1601  Gross per 24 hour   Intake 80 ml   Output 2300 ml   Net -2220 ml       Physical Exam:    Constitutional: Patient is oriented to person, place and time, no acute distress  HEENT:  Normocephalic, atraumatic, EOMI, PERRLA, no scleral icterus, no pallor, moist oral mucosa  Neck:  Supple, no masses, no thyromegaly, no bruits Normal range of motion  Lymph nodes:  No lymphadenopathy  Cardiovascular: Normal S1S2, RRR, No murmurs/rubs/gallops appreciated  Pulmonary:  Bilateral air entry, decreased breath sounds at bilateral bases  Abdominal: Soft, Bowel sounds present, Non-tender, Non-distended, No rebound/guarding, no hepatomegaly   Musculoskeletal: No tenderness/abnormality   Extremities:  Bilateral lower extremity edema  Peripheral pulses palpable and equal bilaterally  Neurological: Cranial nerves II-XII grossly intact, sensation intact, otherwise no focal neurological symptoms     Skin:  Lower extremity chronic venous stasis changes    Additional Data:     Labs:    Results from last 7 days   Lab Units 19  0350   WBC Thousand/uL 8 50 HEMOGLOBIN g/dL 17 1*   HEMATOCRIT % 59 8*   PLATELETS Thousands/uL 131*   NEUTROS PCT % 77*   LYMPHS PCT % 7*   MONOS PCT % 14*   EOS PCT % 1     Results from last 7 days   Lab Units 02/16/19  0455 02/15/19  0522   POTASSIUM mmol/L 4 4 4 3   CHLORIDE mmol/L 99* 100   CO2 mmol/L 44* 40*   BUN mg/dL 57* 54*   CREATININE mg/dL 1 42* 1 60*   CALCIUM mg/dL 8 3 8 1*   ALK PHOS U/L  --  116   ALT U/L  --  6*   AST U/L  --  18            I Have Reviewed All Lab Data Listed Above  Recent Cultures (last 7 days):     Results from last 7 days   Lab Units 02/12/19  1603 02/10/19  0900   GRAM STAIN RESULT  3+ Gram negative rods*  2+ Gram positive cocci in clusters*  Rare Polys*  2+ Gram negative rods*  No Polys*  --    WOUND CULTURE  3+ Growth of Pseudomonas aeruginosa*  2+ Growth of Staphylococcus aureus*  2+ Growth of Enterococcus faecalis*  2+ Growth of Pseudomonas aeruginosa*  2+ Growth of Enterococcus faecalis*  --    C DIFF TOXIN B   --  NEGATIVE for C difficle toxin by PCR          Last 24 Hours Medication List:     Current Facility-Administered Medications:  acetaminophen 975 mg Oral Q8H PRN MAYNOR Collins   albuterol 2 puff Inhalation Q4H PRN Rita Fung PA-C   apixaban 5 mg Oral BID Brielle Rocior, PA-C   diltiazem 120 mg Oral Daily Brielle Yang, PA-C   furosemide 80 mg Intravenous BID (diuretic) Mariellen Moritz, MD   levalbuterol 1 25 mg Nebulization Q6H PRN Linda Dixon MD   lidocaine 1 patch Topical Daily Briellealejandro Yang, PA-C   methocarbamol 500 mg Oral Q8H PRN Vickie Macias PA-C   metoprolol succinate 100 mg Oral BID RitaLINDSAY Mcgarry-EMERSON   mupirocin  Topical TID Jefferson Osorio DO   nicotine 1 patch Transdermal Daily Brielle Pigemathew, PA-C   nystatin 1 application Topical BID Brielle Piger, PA-C   ondansetron 4 mg Intravenous Q8H PRN Brielle Yang, PA-C   potassium chloride 20 mEq Oral BID Nelly Gibbons MD   sodium chloride 3 mL Nebulization Q6H PRN Linda Dixon MD        Today, Patient Was Seen By: Trini Anderson MD

## 2019-02-16 NOTE — PLAN OF CARE
Problem: Potential for Falls  Goal: Patient will remain free of falls  Description  INTERVENTIONS:  - Assess patient frequently for physical needs  -  Identify cognitive and physical deficits and behaviors that affect risk of falls  -  [place] fall precautions as indicated by assessment   - Educate patient/family on patient safety including physical limitations  - Instruct patient to call for assistance with activity based on assessment  - Modify environment to reduce risk of injury  - Consider OT/PT consult to assist with strengthening/mobility   Outcome: Progressing     Problem: Nutrition/Hydration-ADULT  Goal: Nutrient/Hydration intake appropriate for improving, restoring or maintaining nutritional needs  Description  Monitor and assess patient's nutrition/hydration status for malnutrition (ex- brittle hair, bruises, dry skin, pale skin and conjunctiva, muscle wasting, smooth red tongue, and disorientation)  Collaborate with interdisciplinary team and initiate plan and interventions as ordered  Monitor patient's weight and dietary intake as ordered or per policy  Utilize nutrition screening tool and intervene per policy  Determine patient's food preferences and provide high-protein, high-caloric foods as appropriate       INTERVENTIONS:  - Monitor oral intake, urinary output, labs, and treatment plans  - Assess nutrition and hydration status and recommend course of action  - Evaluate amount of meals eaten  - Assist patient with eating if necessary   - Allow adequate time for meals  - Recommend/ encourage appropriate diets, oral nutritional supplements, and vitamin/mineral supplements  - Order, calculate, and assess calorie counts as needed  - Recommend, monitor, and adjust tube feedings and TPN/PPN based on assessed needs  - Assess need for intravenous fluids  - Provide specific nutrition/hydration education as appropriate  - Include patient/family/caregiver in decisions related to nutrition   Outcome: Progressing     Problem: Prexisting or High Potential for Compromised Skin Integrity  Goal: Skin integrity is maintained or improved  Description  INTERVENTIONS:  - Identify patients at risk for skin breakdown  - Assess and monitor skin integrity  - Assess and monitor nutrition and hydration status  - Monitor labs (i e  albumin)  - Assess for incontinence   - Turn and reposition patient  - Assist with mobility/ambulation  - Relieve pressure over bony prominences  - Avoid friction and shearing  - Provide appropriate hygiene as needed including keeping skin clean and dry  - Evaluate need for skin moisturizer/barrier cream  - Collaborate with interdisciplinary team (i e  Nutrition, Rehabilitation, etc )   - Patient/family teaching   Outcome: Progressing     Problem: PAIN - ADULT  Goal: Verbalizes/displays adequate comfort level or baseline comfort level  Description  Interventions:  - Encourage patient to monitor pain and request assistance  - Assess pain using appropriate pain scale  - Administer analgesics based on type and severity of pain and evaluate response  - Implement non-pharmacological measures as appropriate and evaluate response  - Consider cultural and social influences on pain and pain management  - Notify physician/advanced practitioner if interventions unsuccessful or patient reports new pain   Outcome: Progressing     Problem: INFECTION - ADULT  Goal: Absence or prevention of progression during hospitalization  Description  INTERVENTIONS:  - Assess and monitor for signs and symptoms of infection  - Monitor lab/diagnostic results  - Monitor all insertion sites, i e  indwelling lines, tubes, and drains  - Monitor endotracheal (as able) and nasal secretions for changes in amount and color  - [place] appropriate cooling/warming therapies per order  - Administer medications as ordered  - Instruct and encourage patient and family to use good hand hygiene technique  - Identify and instruct in appropriate isolation precautions for identified infection/condition   Outcome: Progressing  Goal: Absence of fever/infection during neutropenic period  Description  INTERVENTIONS:  - Monitor WBC  - Implement neutropenic guidelines   Outcome: Progressing     Problem: SAFETY ADULT  Goal: Maintain or return to baseline ADL function  Description  INTERVENTIONS:  -  Assess patient's ability to carry out ADLs; assess patient's baseline for ADL function and identify physical deficits which impact ability to perform ADLs (bathing, care of mouth/teeth, toileting, grooming, dressing, etc )  - Assess/evaluate cause of self-care deficits   - Assess range of motion  - Assess patient's mobility; develop plan if impaired  - Assess patient's need for assistive devices and provide as appropriate  - Encourage maximum independence but intervene and supervise when necessary  ¯ Involve family in performance of ADLs  ¯ Assess for home care needs following discharge   ¯ Request OT consult to assist with ADL evaluation and planning for discharge  ¯ Provide patient education as appropriate   Outcome: Progressing  Goal: Maintain or return mobility status to optimal level  Description  INTERVENTIONS:  - Assess patient's baseline mobility status (ambulation, transfers, stairs, etc )    - Identify cognitive and physical deficits and behaviors that affect mobility  - Identify mobility aids required to assist with transfers and/or ambulation (gait belt, sit-to-stand, lift, walker, cane, etc )  - [place] fall precautions as indicated by assessment  - Record patient progress and toleration of activity level on Mobility SBAR; progress patient to next Phase/Stage  - Instruct patient to call for assistance with activity based on assessment  - Request Rehabilitation consult to assist with strengthening/weightbearing, etc    Outcome: Progressing     Problem: DISCHARGE PLANNING  Goal: Discharge to home or other facility with appropriate resources  Description  INTERVENTIONS:  - Identify barriers to discharge w/patient and caregiver  - Arrange for needed discharge resources and transportation as appropriate  - Identify discharge learning needs (meds, wound care, etc )  - Arrange for interpretive services to assist at discharge as needed  - Refer to Case Management Department for coordinating discharge planning if the patient needs post-hospital services based on physician/advanced practitioner order or complex needs related to functional status, cognitive ability, or social support system   Outcome: Progressing     Problem: Knowledge Deficit  Goal: Patient/family/caregiver demonstrates understanding of disease process, treatment plan, medications, and discharge instructions  Description  Complete learning assessment and assess knowledge base  Interventions:  - Provide teaching at level of understanding  - Provide teaching via preferred learning methods   Outcome: Progressing     Problem: CARDIOVASCULAR - ADULT  Goal: Maintains optimal cardiac output and hemodynamic stability  Description  INTERVENTIONS:  - Monitor I/O, vital signs and rhythm  - Monitor for S/S and trends of decreased cardiac output i e  bleeding, hypotension  - Administer and titrate ordered vasoactive medications to optimize hemodynamic stability  - Assess quality of pulses, skin color and temperature  - Assess for signs of decreased coronary artery perfusion - ex   Angina  - Instruct patient to report change in severity of symptoms   Outcome: Progressing  Goal: Absence of cardiac dysrhythmias or at baseline rhythm  Description  INTERVENTIONS:  - Continuous cardiac monitoring, monitor vital signs, obtain 12 lead EKG if indicated  - Administer antiarrhythmic and heart rate control medications as ordered  - Monitor electrolytes and administer replacement therapy as ordered   Outcome: Progressing     Problem: METABOLIC, FLUID AND ELECTROLYTES - ADULT  Goal: Electrolytes maintained within normal limits  Description  INTERVENTIONS:  - Monitor labs and assess patient for signs and symptoms of electrolyte imbalances  - Administer electrolyte replacement as ordered  - Monitor response to electrolyte replacements, including repeat lab results as appropriate  - Instruct patient on fluid and nutrition as appropriate   Outcome: Progressing  Goal: Fluid balance maintained  Description  INTERVENTIONS:  - Monitor labs and assess for signs and symptoms of volume excess or deficit  - Monitor I/O and WT  - Instruct patient on fluid and nutrition as appropriate   Outcome: Progressing  Goal: Glucose maintained within target range  Description  INTERVENTIONS:  - Monitor Blood Glucose as ordered  - Assess for signs and symptoms of hyperglycemia and hypoglycemia  - Administer ordered medications to maintain glucose within target range  - Assess nutritional intake and initiate nutrition service referral as needed   Outcome: Progressing     Problem: SKIN/TISSUE INTEGRITY - ADULT  Goal: Skin integrity remains intact  Description  INTERVENTIONS  - Identify patients at risk for skin breakdown  - Assess and monitor skin integrity  - Assess and monitor nutrition and hydration status  - Monitor labs (i e  albumin)  - Assess for incontinence   - Turn and reposition patient  - Assist with mobility/ambulation  - Relieve pressure over bony prominences  - Avoid friction and shearing  - Provide appropriate hygiene as needed including keeping skin clean and dry  - Evaluate need for skin moisturizer/barrier cream  - Collaborate with interdisciplinary team (i e  Nutrition, Rehabilitation, etc )   - Patient/family teaching   Outcome: Progressing  Goal: Incision(s), wounds(s) or drain site(s) healing without S/S of infection  Description  INTERVENTIONS  - Assess and document risk factors for skin impairment   - Assess and document dressing, incision, wound bed, drain sites and surrounding tissue  - Initiate Nutrition services consult and/or wound management as needed   Outcome: Progressing     Problem: DISCHARGE PLANNING - CARE MANAGEMENT  Goal: Discharge to post-acute care or home with appropriate resources  Description  INTERVENTIONS:  - Conduct assessment to determine patient/family and health care team treatment goals, and need for post-acute services based on payer coverage, community resources, and patient preferences, and barriers to discharge  - Address psychosocial, clinical, and financial barriers to discharge as identified in assessment in conjunction with the patient/family and health care team  - Arrange appropriate level of post-acute services according to patient's   needs and preference and payer coverage in collaboration with the physician and health care team  - Communicate with and update the patient/family, physician, and health care team regarding progress on the discharge plan  - Arrange appropriate transportation to post-acute venues  - Pt wishes to d/c with home health care at this time   Outcome: Progressing

## 2019-02-16 NOTE — ASSESSMENT & PLAN NOTE
· Discussed with cardiology  Continue IV diuresis- has an additional approximately 15-20 lb to lose with goal dry weight of 274  · S/P metazolone x3 days  · -1 28 L yesterday and net neg 6 4  · Repeat ECHO without systolic dysfunction  · Weight 324-297 since admissiom    Goal weight 274  · Daily weights, I/O  · Needs lasix compliance on discharge      Intake/Output Summary (Last 24 hours) at 2/16/2019 1813  Last data filed at 2/16/2019 1601  Gross per 24 hour   Intake 80 ml   Output 2300 ml   Net -2220 ml     Wt Readings from Last 3 Encounters:   02/16/19 127 kg (279 lb)   02/07/19 (!) 147 kg (323 lb)   03/28/18 (!) 141 kg (310 lb)

## 2019-02-16 NOTE — PLAN OF CARE
Problem: Potential for Falls  Goal: Patient will remain free of falls  Description  INTERVENTIONS:  - Assess patient frequently for physical needs  -  Identify cognitive and physical deficits and behaviors that affect risk of falls  -  [place] fall precautions as indicated by assessment   - Educate patient/family on patient safety including physical limitations  - Instruct patient to call for assistance with activity based on assessment  - Modify environment to reduce risk of injury  - Consider OT/PT consult to assist with strengthening/mobility   Outcome: Progressing     Problem: Nutrition/Hydration-ADULT  Goal: Nutrient/Hydration intake appropriate for improving, restoring or maintaining nutritional needs  Description  Monitor and assess patient's nutrition/hydration status for malnutrition (ex- brittle hair, bruises, dry skin, pale skin and conjunctiva, muscle wasting, smooth red tongue, and disorientation)  Collaborate with interdisciplinary team and initiate plan and interventions as ordered  Monitor patient's weight and dietary intake as ordered or per policy  Utilize nutrition screening tool and intervene per policy  Determine patient's food preferences and provide high-protein, high-caloric foods as appropriate       INTERVENTIONS:  - Monitor oral intake, urinary output, labs, and treatment plans  - Assess nutrition and hydration status and recommend course of action  - Evaluate amount of meals eaten  - Assist patient with eating if necessary   - Allow adequate time for meals  - Recommend/ encourage appropriate diets, oral nutritional supplements, and vitamin/mineral supplements  - Order, calculate, and assess calorie counts as needed  - Recommend, monitor, and adjust tube feedings and TPN/PPN based on assessed needs  - Assess need for intravenous fluids  - Provide specific nutrition/hydration education as appropriate  - Include patient/family/caregiver in decisions related to nutrition   Outcome: Progressing     Problem: Prexisting or High Potential for Compromised Skin Integrity  Goal: Skin integrity is maintained or improved  Description  INTERVENTIONS:  - Identify patients at risk for skin breakdown  - Assess and monitor skin integrity  - Assess and monitor nutrition and hydration status  - Monitor labs (i e  albumin)  - Assess for incontinence   - Turn and reposition patient  - Assist with mobility/ambulation  - Relieve pressure over bony prominences  - Avoid friction and shearing  - Provide appropriate hygiene as needed including keeping skin clean and dry  - Evaluate need for skin moisturizer/barrier cream  - Collaborate with interdisciplinary team (i e  Nutrition, Rehabilitation, etc )   - Patient/family teaching   Outcome: Progressing     Problem: PAIN - ADULT  Goal: Verbalizes/displays adequate comfort level or baseline comfort level  Description  Interventions:  - Encourage patient to monitor pain and request assistance  - Assess pain using appropriate pain scale  - Administer analgesics based on type and severity of pain and evaluate response  - Implement non-pharmacological measures as appropriate and evaluate response  - Consider cultural and social influences on pain and pain management  - Notify physician/advanced practitioner if interventions unsuccessful or patient reports new pain   Outcome: Progressing     Problem: INFECTION - ADULT  Goal: Absence or prevention of progression during hospitalization  Description  INTERVENTIONS:  - Assess and monitor for signs and symptoms of infection  - Monitor lab/diagnostic results  - Monitor all insertion sites, i e  indwelling lines, tubes, and drains  - Monitor endotracheal (as able) and nasal secretions for changes in amount and color  - [place] appropriate cooling/warming therapies per order  - Administer medications as ordered  - Instruct and encourage patient and family to use good hand hygiene technique  - Identify and instruct in appropriate isolation precautions for identified infection/condition   Outcome: Progressing  Goal: Absence of fever/infection during neutropenic period  Description  INTERVENTIONS:  - Monitor WBC  - Implement neutropenic guidelines   Outcome: Progressing     Problem: SAFETY ADULT  Goal: Maintain or return to baseline ADL function  Description  INTERVENTIONS:  -  Assess patient's ability to carry out ADLs; assess patient's baseline for ADL function and identify physical deficits which impact ability to perform ADLs (bathing, care of mouth/teeth, toileting, grooming, dressing, etc )  - Assess/evaluate cause of self-care deficits   - Assess range of motion  - Assess patient's mobility; develop plan if impaired  - Assess patient's need for assistive devices and provide as appropriate  - Encourage maximum independence but intervene and supervise when necessary  ¯ Involve family in performance of ADLs  ¯ Assess for home care needs following discharge   ¯ Request OT consult to assist with ADL evaluation and planning for discharge  ¯ Provide patient education as appropriate   Outcome: Progressing  Goal: Maintain or return mobility status to optimal level  Description  INTERVENTIONS:  - Assess patient's baseline mobility status (ambulation, transfers, stairs, etc )    - Identify cognitive and physical deficits and behaviors that affect mobility  - Identify mobility aids required to assist with transfers and/or ambulation (gait belt, sit-to-stand, lift, walker, cane, etc )  - [place] fall precautions as indicated by assessment  - Record patient progress and toleration of activity level on Mobility SBAR; progress patient to next Phase/Stage  - Instruct patient to call for assistance with activity based on assessment  - Request Rehabilitation consult to assist with strengthening/weightbearing, etc    Outcome: Progressing     Problem: DISCHARGE PLANNING  Goal: Discharge to home or other facility with appropriate resources  Description  INTERVENTIONS:  - Identify barriers to discharge w/patient and caregiver  - Arrange for needed discharge resources and transportation as appropriate  - Identify discharge learning needs (meds, wound care, etc )  - Arrange for interpretive services to assist at discharge as needed  - Refer to Case Management Department for coordinating discharge planning if the patient needs post-hospital services based on physician/advanced practitioner order or complex needs related to functional status, cognitive ability, or social support system   Outcome: Progressing     Problem: Knowledge Deficit  Goal: Patient/family/caregiver demonstrates understanding of disease process, treatment plan, medications, and discharge instructions  Description  Complete learning assessment and assess knowledge base  Interventions:  - Provide teaching at level of understanding  - Provide teaching via preferred learning methods   Outcome: Progressing     Problem: CARDIOVASCULAR - ADULT  Goal: Maintains optimal cardiac output and hemodynamic stability  Description  INTERVENTIONS:  - Monitor I/O, vital signs and rhythm  - Monitor for S/S and trends of decreased cardiac output i e  bleeding, hypotension  - Administer and titrate ordered vasoactive medications to optimize hemodynamic stability  - Assess quality of pulses, skin color and temperature  - Assess for signs of decreased coronary artery perfusion - ex   Angina  - Instruct patient to report change in severity of symptoms   Outcome: Progressing  Goal: Absence of cardiac dysrhythmias or at baseline rhythm  Description  INTERVENTIONS:  - Continuous cardiac monitoring, monitor vital signs, obtain 12 lead EKG if indicated  - Administer antiarrhythmic and heart rate control medications as ordered  - Monitor electrolytes and administer replacement therapy as ordered   Outcome: Progressing     Problem: METABOLIC, FLUID AND ELECTROLYTES - ADULT  Goal: Electrolytes maintained within normal limits  Description  INTERVENTIONS:  - Monitor labs and assess patient for signs and symptoms of electrolyte imbalances  - Administer electrolyte replacement as ordered  - Monitor response to electrolyte replacements, including repeat lab results as appropriate  - Instruct patient on fluid and nutrition as appropriate   Outcome: Progressing  Goal: Fluid balance maintained  Description  INTERVENTIONS:  - Monitor labs and assess for signs and symptoms of volume excess or deficit  - Monitor I/O and WT  - Instruct patient on fluid and nutrition as appropriate   Outcome: Progressing  Goal: Glucose maintained within target range  Description  INTERVENTIONS:  - Monitor Blood Glucose as ordered  - Assess for signs and symptoms of hyperglycemia and hypoglycemia  - Administer ordered medications to maintain glucose within target range  - Assess nutritional intake and initiate nutrition service referral as needed   Outcome: Progressing     Problem: SKIN/TISSUE INTEGRITY - ADULT  Goal: Skin integrity remains intact  Description  INTERVENTIONS  - Identify patients at risk for skin breakdown  - Assess and monitor skin integrity  - Assess and monitor nutrition and hydration status  - Monitor labs (i e  albumin)  - Assess for incontinence   - Turn and reposition patient  - Assist with mobility/ambulation  - Relieve pressure over bony prominences  - Avoid friction and shearing  - Provide appropriate hygiene as needed including keeping skin clean and dry  - Evaluate need for skin moisturizer/barrier cream  - Collaborate with interdisciplinary team (i e  Nutrition, Rehabilitation, etc )   - Patient/family teaching   Outcome: Progressing  Goal: Incision(s), wounds(s) or drain site(s) healing without S/S of infection  Description  INTERVENTIONS  - Assess and document risk factors for skin impairment   - Assess and document dressing, incision, wound bed, drain sites and surrounding tissue  - Initiate Nutrition services consult and/or wound management as needed   Outcome: Progressing     Problem: DISCHARGE PLANNING - CARE MANAGEMENT  Goal: Discharge to post-acute care or home with appropriate resources  Description  INTERVENTIONS:  - Conduct assessment to determine patient/family and health care team treatment goals, and need for post-acute services based on payer coverage, community resources, and patient preferences, and barriers to discharge  - Address psychosocial, clinical, and financial barriers to discharge as identified in assessment in conjunction with the patient/family and health care team  - Arrange appropriate level of post-acute services according to patient's   needs and preference and payer coverage in collaboration with the physician and health care team  - Communicate with and update the patient/family, physician, and health care team regarding progress on the discharge plan  - Arrange appropriate transportation to post-acute venues  - Pt wishes to d/c with home health care at this time   Outcome: Progressing

## 2019-02-16 NOTE — ASSESSMENT & PLAN NOTE
Lab Results   Component Value Date    HGBA1C 6 2 02/14/2019       Recent Labs     02/16/19  0015 02/16/19  0450 02/16/19  1110 02/16/19  1533   POCGLU 129 119 130 108       Blood Sugar Average: Last 72 hrs:  (P) 133 [id number]   · Patient noted to be profoundly hypoglycemic 2/13-2/14 to less than 20 at 9:21 p m , received an amp of dextrose, repeat blood sugar 34, received another amp of dextrose, repeat blood sugar 62, received another amp of dextrose, repeat blood sugar dropped to 21  Patient was placed on D10 infusion at 50 mL/hour  · Dextrose stopped yesterday and glucoses 100-200s  · She had not had insulin for greater than 24 hours at which point she had had 2 units of Humalog  · This is the only insulin she had required during her hospital stay  · Discussed with endo yesterday- unclear if real event vs  Inaccurate readings on POC glucose testing  · TSH WNL  Await AM cortisol, sulfyulurea screening  · Was noted to eat 100% of meals yesterday  Unclear she had a bedtime snack  · If patient drops below 55 obtain BMP, insulin level, proinsulin level, C-peptide, cortisol, sulfylurea panel, insulin antibody and treat with 1 mg of glucagon  · Treat with glucagon and not dextrose and assess response 20-25 mins post glucagon  · Insulin level elevated yesterday AM could be related to morbid obesity and insulin resistance- need to have this level when ALSO hypogylcemic at the same time  · No history of gastric bypass    · A1C 6 3

## 2019-02-17 LAB
BASOPHILS # BLD AUTO: 0.07 THOUSANDS/ΜL (ref 0–0.1)
BASOPHILS NFR BLD AUTO: 1 % (ref 0–1)
BUN SERPL-MCNC: 53 MG/DL (ref 5–25)
CALCIUM SERPL-MCNC: 8.6 MG/DL (ref 8.3–10.1)
CHLORIDE SERPL-SCNC: 96 MMOL/L (ref 100–108)
CO2 SERPL-SCNC: >45 MMOL/L (ref 21–32)
CREAT SERPL-MCNC: 1.29 MG/DL (ref 0.6–1.3)
EOSINOPHIL # BLD AUTO: 0.15 THOUSAND/ΜL (ref 0–0.61)
EOSINOPHIL NFR BLD AUTO: 2 % (ref 0–6)
ERYTHROCYTE [DISTWIDTH] IN BLOOD BY AUTOMATED COUNT: 16 % (ref 11.6–15.1)
GFR SERPL CREATININE-BSD FRML MDRD: 46 ML/MIN/1.73SQ M
GLUCOSE SERPL-MCNC: 104 MG/DL (ref 65–140)
GLUCOSE SERPL-MCNC: 106 MG/DL (ref 65–140)
GLUCOSE SERPL-MCNC: 147 MG/DL (ref 65–140)
GLUCOSE SERPL-MCNC: 174 MG/DL (ref 65–140)
GLUCOSE SERPL-MCNC: 190 MG/DL (ref 65–140)
GLUCOSE SERPL-MCNC: 233 MG/DL (ref 65–140)
HCT VFR BLD AUTO: 60.1 % (ref 34.8–46.1)
HGB BLD-MCNC: 17.4 G/DL (ref 11.5–15.4)
IMM GRANULOCYTES # BLD AUTO: 0.03 THOUSAND/UL (ref 0–0.2)
IMM GRANULOCYTES NFR BLD AUTO: 0 % (ref 0–2)
LYMPHOCYTES # BLD AUTO: 0.22 THOUSANDS/ΜL (ref 0.6–4.47)
LYMPHOCYTES NFR BLD AUTO: 3 % (ref 14–44)
MCH RBC QN AUTO: 30.7 PG (ref 26.8–34.3)
MCHC RBC AUTO-ENTMCNC: 29 G/DL (ref 31.4–37.4)
MCV RBC AUTO: 106 FL (ref 82–98)
MONOCYTES # BLD AUTO: 0.72 THOUSAND/ΜL (ref 0.17–1.22)
MONOCYTES NFR BLD AUTO: 10 % (ref 4–12)
NEUTROPHILS # BLD AUTO: 6.1 THOUSANDS/ΜL (ref 1.85–7.62)
NEUTS SEG NFR BLD AUTO: 84 % (ref 43–75)
NRBC BLD AUTO-RTO: 0 /100 WBCS
PLATELET # BLD AUTO: 114 THOUSANDS/UL (ref 149–390)
PMV BLD AUTO: 12.5 FL (ref 8.9–12.7)
POTASSIUM SERPL-SCNC: 4.3 MMOL/L (ref 3.5–5.3)
RBC # BLD AUTO: 5.67 MILLION/UL (ref 3.81–5.12)
SODIUM SERPL-SCNC: 145 MMOL/L (ref 136–145)
WBC # BLD AUTO: 7.29 THOUSAND/UL (ref 4.31–10.16)

## 2019-02-17 PROCEDURE — 80048 BASIC METABOLIC PNL TOTAL CA: CPT | Performed by: INTERNAL MEDICINE

## 2019-02-17 PROCEDURE — 99232 SBSQ HOSP IP/OBS MODERATE 35: CPT | Performed by: INTERNAL MEDICINE

## 2019-02-17 PROCEDURE — 85025 COMPLETE CBC W/AUTO DIFF WBC: CPT | Performed by: INTERNAL MEDICINE

## 2019-02-17 PROCEDURE — 82948 REAGENT STRIP/BLOOD GLUCOSE: CPT

## 2019-02-17 RX ADMIN — MUPIROCIN: 20 OINTMENT TOPICAL at 22:21

## 2019-02-17 RX ADMIN — DILTIAZEM HYDROCHLORIDE 120 MG: 120 CAPSULE, EXTENDED RELEASE ORAL at 08:38

## 2019-02-17 RX ADMIN — METHOCARBAMOL TABLETS 500 MG: 500 TABLET, COATED ORAL at 08:33

## 2019-02-17 RX ADMIN — NYSTATIN 1 APPLICATION: 100000 POWDER TOPICAL at 08:33

## 2019-02-17 RX ADMIN — APIXABAN 5 MG: 5 TABLET, FILM COATED ORAL at 08:33

## 2019-02-17 RX ADMIN — LIDOCAINE 1 PATCH: 50 PATCH CUTANEOUS at 08:33

## 2019-02-17 RX ADMIN — POTASSIUM CHLORIDE 20 MEQ: 1500 TABLET, EXTENDED RELEASE ORAL at 08:33

## 2019-02-17 RX ADMIN — METOPROLOL SUCCINATE 100 MG: 50 TABLET, EXTENDED RELEASE ORAL at 08:33

## 2019-02-17 RX ADMIN — METOPROLOL SUCCINATE 100 MG: 50 TABLET, EXTENDED RELEASE ORAL at 17:26

## 2019-02-17 RX ADMIN — METHOCARBAMOL TABLETS 500 MG: 500 TABLET, COATED ORAL at 17:37

## 2019-02-17 RX ADMIN — FUROSEMIDE 80 MG: 10 INJECTION, SOLUTION INTRAMUSCULAR; INTRAVENOUS at 08:33

## 2019-02-17 RX ADMIN — MUPIROCIN: 20 OINTMENT TOPICAL at 17:03

## 2019-02-17 RX ADMIN — FUROSEMIDE 80 MG: 10 INJECTION, SOLUTION INTRAMUSCULAR; INTRAVENOUS at 17:26

## 2019-02-17 RX ADMIN — NYSTATIN 1 APPLICATION: 100000 POWDER TOPICAL at 17:27

## 2019-02-17 RX ADMIN — APIXABAN 5 MG: 5 TABLET, FILM COATED ORAL at 17:26

## 2019-02-17 RX ADMIN — POTASSIUM CHLORIDE 20 MEQ: 1500 TABLET, EXTENDED RELEASE ORAL at 17:26

## 2019-02-17 NOTE — PLAN OF CARE
Problem: Potential for Falls  Goal: Patient will remain free of falls  Description  INTERVENTIONS:  - Assess patient frequently for physical needs  -  Identify cognitive and physical deficits and behaviors that affect risk of falls  -  [place] fall precautions as indicated by assessment   - Educate patient/family on patient safety including physical limitations  - Instruct patient to call for assistance with activity based on assessment  - Modify environment to reduce risk of injury  - Consider OT/PT consult to assist with strengthening/mobility   Outcome: Progressing     Problem: Nutrition/Hydration-ADULT  Goal: Nutrient/Hydration intake appropriate for improving, restoring or maintaining nutritional needs  Description  Monitor and assess patient's nutrition/hydration status for malnutrition (ex- brittle hair, bruises, dry skin, pale skin and conjunctiva, muscle wasting, smooth red tongue, and disorientation)  Collaborate with interdisciplinary team and initiate plan and interventions as ordered  Monitor patient's weight and dietary intake as ordered or per policy  Utilize nutrition screening tool and intervene per policy  Determine patient's food preferences and provide high-protein, high-caloric foods as appropriate       INTERVENTIONS:  - Monitor oral intake, urinary output, labs, and treatment plans  - Assess nutrition and hydration status and recommend course of action  - Evaluate amount of meals eaten  - Assist patient with eating if necessary   - Allow adequate time for meals  - Recommend/ encourage appropriate diets, oral nutritional supplements, and vitamin/mineral supplements  - Order, calculate, and assess calorie counts as needed  - Recommend, monitor, and adjust tube feedings and TPN/PPN based on assessed needs  - Assess need for intravenous fluids  - Provide specific nutrition/hydration education as appropriate  - Include patient/family/caregiver in decisions related to nutrition   Outcome: Progressing     Problem: Prexisting or High Potential for Compromised Skin Integrity  Goal: Skin integrity is maintained or improved  Description  INTERVENTIONS:  - Identify patients at risk for skin breakdown  - Assess and monitor skin integrity  - Assess and monitor nutrition and hydration status  - Monitor labs (i e  albumin)  - Assess for incontinence   - Turn and reposition patient  - Assist with mobility/ambulation  - Relieve pressure over bony prominences  - Avoid friction and shearing  - Provide appropriate hygiene as needed including keeping skin clean and dry  - Evaluate need for skin moisturizer/barrier cream  - Collaborate with interdisciplinary team (i e  Nutrition, Rehabilitation, etc )   - Patient/family teaching   Outcome: Progressing     Problem: PAIN - ADULT  Goal: Verbalizes/displays adequate comfort level or baseline comfort level  Description  Interventions:  - Encourage patient to monitor pain and request assistance  - Assess pain using appropriate pain scale  - Administer analgesics based on type and severity of pain and evaluate response  - Implement non-pharmacological measures as appropriate and evaluate response  - Consider cultural and social influences on pain and pain management  - Notify physician/advanced practitioner if interventions unsuccessful or patient reports new pain   Outcome: Progressing     Problem: INFECTION - ADULT  Goal: Absence or prevention of progression during hospitalization  Description  INTERVENTIONS:  - Assess and monitor for signs and symptoms of infection  - Monitor lab/diagnostic results  - Monitor all insertion sites, i e  indwelling lines, tubes, and drains  - Monitor endotracheal (as able) and nasal secretions for changes in amount and color  - [place] appropriate cooling/warming therapies per order  - Administer medications as ordered  - Instruct and encourage patient and family to use good hand hygiene technique  - Identify and instruct in appropriate isolation precautions for identified infection/condition   Outcome: Progressing  Goal: Absence of fever/infection during neutropenic period  Description  INTERVENTIONS:  - Monitor WBC  - Implement neutropenic guidelines   Outcome: Progressing     Problem: SAFETY ADULT  Goal: Maintain or return to baseline ADL function  Description  INTERVENTIONS:  -  Assess patient's ability to carry out ADLs; assess patient's baseline for ADL function and identify physical deficits which impact ability to perform ADLs (bathing, care of mouth/teeth, toileting, grooming, dressing, etc )  - Assess/evaluate cause of self-care deficits   - Assess range of motion  - Assess patient's mobility; develop plan if impaired  - Assess patient's need for assistive devices and provide as appropriate  - Encourage maximum independence but intervene and supervise when necessary  ¯ Involve family in performance of ADLs  ¯ Assess for home care needs following discharge   ¯ Request OT consult to assist with ADL evaluation and planning for discharge  ¯ Provide patient education as appropriate   Outcome: Progressing  Goal: Maintain or return mobility status to optimal level  Description  INTERVENTIONS:  - Assess patient's baseline mobility status (ambulation, transfers, stairs, etc )    - Identify cognitive and physical deficits and behaviors that affect mobility  - Identify mobility aids required to assist with transfers and/or ambulation (gait belt, sit-to-stand, lift, walker, cane, etc )  - [place] fall precautions as indicated by assessment  - Record patient progress and toleration of activity level on Mobility SBAR; progress patient to next Phase/Stage  - Instruct patient to call for assistance with activity based on assessment  - Request Rehabilitation consult to assist with strengthening/weightbearing, etc    Outcome: Progressing     Problem: DISCHARGE PLANNING  Goal: Discharge to home or other facility with appropriate resources  Description  INTERVENTIONS:  - Identify barriers to discharge w/patient and caregiver  - Arrange for needed discharge resources and transportation as appropriate  - Identify discharge learning needs (meds, wound care, etc )  - Arrange for interpretive services to assist at discharge as needed  - Refer to Case Management Department for coordinating discharge planning if the patient needs post-hospital services based on physician/advanced practitioner order or complex needs related to functional status, cognitive ability, or social support system   Outcome: Progressing     Problem: Knowledge Deficit  Goal: Patient/family/caregiver demonstrates understanding of disease process, treatment plan, medications, and discharge instructions  Description  Complete learning assessment and assess knowledge base  Interventions:  - Provide teaching at level of understanding  - Provide teaching via preferred learning methods   Outcome: Progressing     Problem: CARDIOVASCULAR - ADULT  Goal: Maintains optimal cardiac output and hemodynamic stability  Description  INTERVENTIONS:  - Monitor I/O, vital signs and rhythm  - Monitor for S/S and trends of decreased cardiac output i e  bleeding, hypotension  - Administer and titrate ordered vasoactive medications to optimize hemodynamic stability  - Assess quality of pulses, skin color and temperature  - Assess for signs of decreased coronary artery perfusion - ex   Angina  - Instruct patient to report change in severity of symptoms   Outcome: Progressing  Goal: Absence of cardiac dysrhythmias or at baseline rhythm  Description  INTERVENTIONS:  - Continuous cardiac monitoring, monitor vital signs, obtain 12 lead EKG if indicated  - Administer antiarrhythmic and heart rate control medications as ordered  - Monitor electrolytes and administer replacement therapy as ordered   Outcome: Progressing     Problem: METABOLIC, FLUID AND ELECTROLYTES - ADULT  Goal: Electrolytes maintained within normal limits  Description  INTERVENTIONS:  - Monitor labs and assess patient for signs and symptoms of electrolyte imbalances  - Administer electrolyte replacement as ordered  - Monitor response to electrolyte replacements, including repeat lab results as appropriate  - Instruct patient on fluid and nutrition as appropriate   Outcome: Progressing  Goal: Fluid balance maintained  Description  INTERVENTIONS:  - Monitor labs and assess for signs and symptoms of volume excess or deficit  - Monitor I/O and WT  - Instruct patient on fluid and nutrition as appropriate   Outcome: Progressing  Goal: Glucose maintained within target range  Description  INTERVENTIONS:  - Monitor Blood Glucose as ordered  - Assess for signs and symptoms of hyperglycemia and hypoglycemia  - Administer ordered medications to maintain glucose within target range  - Assess nutritional intake and initiate nutrition service referral as needed   Outcome: Progressing     Problem: SKIN/TISSUE INTEGRITY - ADULT  Goal: Skin integrity remains intact  Description  INTERVENTIONS  - Identify patients at risk for skin breakdown  - Assess and monitor skin integrity  - Assess and monitor nutrition and hydration status  - Monitor labs (i e  albumin)  - Assess for incontinence   - Turn and reposition patient  - Assist with mobility/ambulation  - Relieve pressure over bony prominences  - Avoid friction and shearing  - Provide appropriate hygiene as needed including keeping skin clean and dry  - Evaluate need for skin moisturizer/barrier cream  - Collaborate with interdisciplinary team (i e  Nutrition, Rehabilitation, etc )   - Patient/family teaching   Outcome: Progressing  Goal: Incision(s), wounds(s) or drain site(s) healing without S/S of infection  Description  INTERVENTIONS  - Assess and document risk factors for skin impairment   - Assess and document dressing, incision, wound bed, drain sites and surrounding tissue  - Initiate Nutrition services consult and/or wound management as needed   Outcome: Progressing     Problem: DISCHARGE PLANNING - CARE MANAGEMENT  Goal: Discharge to post-acute care or home with appropriate resources  Description  INTERVENTIONS:  - Conduct assessment to determine patient/family and health care team treatment goals, and need for post-acute services based on payer coverage, community resources, and patient preferences, and barriers to discharge  - Address psychosocial, clinical, and financial barriers to discharge as identified in assessment in conjunction with the patient/family and health care team  - Arrange appropriate level of post-acute services according to patient's   needs and preference and payer coverage in collaboration with the physician and health care team  - Communicate with and update the patient/family, physician, and health care team regarding progress on the discharge plan  - Arrange appropriate transportation to post-acute venues  - Pt wishes to d/c with home health care at this time   Outcome: Progressing

## 2019-02-17 NOTE — PROGRESS NOTES
Patient discouraged this morning due to not losing as much weight as she anticipated  Patient admits to drinking a regular soda and eating crackers  Patient re weighed to verify, and weight has been accurate this morning, I spoke with patient and since she lost almost 8 pounds the night before I told her this weight may in fact have been inaccurate  Will chart current weight appropriately   Will CTM

## 2019-02-17 NOTE — ASSESSMENT & PLAN NOTE
· Discussed with cardiology  Continue IV diuresis- has an additional approximately 15-20 lb to lose with goal dry weight of 274  · S/P metazolone x3 days  · -1 28 L yesterday and net neg 6 4  · Repeat ECHO without systolic dysfunction  · Weight 324-297 since admissiom    Goal weight 274  · Daily weights, I/O  · Needs lasix compliance on discharge  · Patient with incresed CO2 on BMP likely combination of contraction alkalosis and compensation for respiratory acidosis as patient is also noncompliant      Intake/Output Summary (Last 24 hours) at 2/17/2019 0854  Last data filed at 2/17/2019 0526  Gross per 24 hour   Intake 40 ml   Output 2300 ml   Net -2260 ml     Wt Readings from Last 3 Encounters:   02/17/19 130 kg (286 lb 9 6 oz)   02/07/19 (!) 147 kg (323 lb)   03/28/18 (!) 141 kg (310 lb)

## 2019-02-17 NOTE — ASSESSMENT & PLAN NOTE
· Patient noted previously to desaturate when weaned from oxygen  · Room air saturation during admission 72%  · ABG shows hypercapnia and the patient has hemoconcentration, elevated CO2 and hemoglobin chronically suggest degree of chronic hypercapnia  · Seen by Pulmonary  Refuses BiPAP    · Patient does not use home O2 or CPAP/BIPAP  · Will monitor with ongoing diuresis   · Will need home oxygen eval prior to d/c  · Patient did not want to wear BIPAP at night, explained that she is risking becoming hypercapnic and risking having to be intubated  · States she does not like the mask, will discuss with RT

## 2019-02-17 NOTE — ASSESSMENT & PLAN NOTE
Lab Results   Component Value Date    CREATININE 1 29 02/17/2019    CREATININE 1 42 (H) 02/16/2019    CREATININE 1 60 (H) 02/15/2019    CREATININE 1 42 (H) 02/14/2019     · Creatinine at baseline  Continue to monitor with IV diuresis  · SAPNA (documented previously) was ruled out   Did not meet criteria- felt to represent renal insufficiency on chronic kidney disease

## 2019-02-17 NOTE — PROGRESS NOTES
Progress Note - Ana Lopez 1960, 63 y o  female MRN: [id number]    Unit/Bed#: E4 -01 Encounter: [id number]    Primary Care Provider: Shamika Lopez MD   Date and time admitted to hospital: 2/8/2019 12:18 PM        * Acute on chronic respiratory failure with hypoxia and hypercapnia ([facility] )  Assessment & Plan  · Patient noted previously to desaturate when weaned from oxygen  · Room air saturation during admission 72%  · ABG shows hypercapnia and the patient has hemoconcentration, elevated CO2 and hemoglobin chronically suggest degree of chronic hypercapnia  · Seen by Pulmonary  Refuses BiPAP  · Patient does not use home O2 or CPAP/BIPAP  · Will monitor with ongoing diuresis   · Will need home oxygen eval prior to d/c  · Patient did not want to wear BIPAP at night, explained that she is risking becoming hypercapnic and risking having to be intubated  · States she does not like the mask, will discuss with RT    Acute on chronic diastolic congestive heart failure ([facility] )  Assessment & Plan  · Discussed with cardiology  Continue IV diuresis- has an additional approximately 15-20 lb to lose with goal dry weight of 274  · S/P metazolone x3 days  · -1 28 L yesterday and net neg 6 4  · Repeat ECHO without systolic dysfunction  · Weight 324-297 since admissiom    Goal weight 274  · Daily weights, I/O  · Needs lasix compliance on discharge  · Patient with incresed CO2 on BMP likely combination of contraction alkalosis and compensation for respiratory acidosis as patient is also noncompliant      Intake/Output Summary (Last 24 hours) at 2/17/2019 0854  Last data filed at 2/17/2019 0526  Gross per 24 hour   Intake 40 ml   Output 2300 ml   Net -2260 ml     Wt Readings from Last 3 Encounters:   02/17/19 130 kg (286 lb 9 6 oz)   02/07/19 (!) 147 kg (323 lb)   03/28/18 (!) 141 kg (310 lb)         COPD (chronic obstructive pulmonary disease) ([facility])  Assessment & Plan  · Does not use oxygen at home  · Spokes 1 pack every 3 days, smoked since age 16  · Suspect chronic hypercapnic resp failure  · PRN xopenex  · No acute exac      Chronic venous stasis dermatitis of both lower extremities  Assessment & Plan  · Wound care, podiatry and dermatology evaluated  · Initially concern for cellulitis of left posterior leg and right anterior leg, now suspected not true cellulitis  · Wound cultures obtained by dermatology growing multiple organisms- felt to be skin colonization  · ID consulted and recommended stopping ABX  · ID consultation appreciated, no further antibiotics recommended  · BLE elevation, edema control is imperative  · ELIN analysis shows within healing potential however given cool to touch and faint pulses obtain full arterial study    Permanent atrial fibrillation (HCC)  Assessment & Plan  · Patient on Eliquis 5 mg BID  · Rate controlled with BB and CCB    Type 2 diabetes mellitus with hypoglycemia [facility])  Assessment & Plan  Lab Results   Component Value Date    HGBA1C 6 2 02/14/2019       Recent Labs     02/16/19  1110 02/16/19  1533 02/16/19  2101 02/17/19  0721   POCGLU 130 108 120 104       Blood Sugar Average: Last 72 hrs:  (P) 134 [id number]   · Patient noted to be profoundly hypoglycemic 2/13-2/14 to less than 20 at 9:21 p m , received an amp of dextrose, repeat blood sugar 34, received another amp of dextrose, repeat blood sugar 62, received another amp of dextrose, repeat blood sugar dropped to 21  Patient was placed on D10 infusion at 50 mL/hour  · Dextrose stopped yesterday and glucoses 100-200s  · She had not had insulin for greater than 24 hours at which point she had had 2 units of Humalog  · This is the only insulin she had required during her hospital stay  · Discussed with endo yesterday- unclear if real event vs  Inaccurate readings on POC glucose testing  · TSH WNL  Await AM cortisol, sulfyulurea screening  · Was noted to eat 100% of meals yesterday  Unclear she had a bedtime snack    · If patient drops below 55 obtain BMP, insulin level, proinsulin level, C-peptide, cortisol, sulfylurea panel, insulin antibody and treat with 1 mg of glucagon  · Treat with glucagon and not dextrose and assess response 20-25 mins post glucagon  · Insulin level elevated yesterday AM could be related to morbid obesity and insulin resistance- need to have this level when ALSO hypogylcemic at the same time  · No history of gastric bypass  · A1C 6 3    Stage 3 chronic kidney disease [facility])  Assessment & Plan  Lab Results   Component Value Date    CREATININE 1 29 2019    CREATININE 1 42 (H) 2019    CREATININE 1 60 (H) 02/15/2019    CREATININE 1 42 (H) 2019     · Creatinine at baseline  Continue to monitor with IV diuresis  · SAPNA (documented previously) was ruled out  Did not meet criteria- felt to represent renal insufficiency on chronic kidney disease    Obesity  Assessment & Plan  · Would benefit from weight loss  Body mass index is 49 19 kg/m²  Hypertension  Assessment & Plan  · Controlled by metoprolol 100 mg b i d , diltiazem 120 mg daily  ·           VTE Pharmacologic Prophylaxis:   Pharmacologic:  Eliquis    Patient Centered Rounds: I have performed bedside rounds with nursing staff today  Time Spent for Care: 20 minutes  More than 50% of total time spent on counseling and coordination of care as described above  Current Length of Stay: 9 day(s)    Current Patient Status: Inpatient   Certification Statement: The patient will continue to require additional inpatient hospital stay due to IV diuresis    Discharge Plan / Estimated Discharge Date: 2-3 days    Code Status: Level 1 - Full Code      Subjective:   Patient seen and examined at bedside, denies any chest pain, vision, dyspnea  Refuses BiPAP stating she does not like the mask      Objective:     Vitals:   Temp (24hrs), Av 9 °F (36 6 °C), Min:96 9 °F (36 1 °C), Max:98 7 °F (37 1 °C)    Temp:  [96 9 °F (36 1 °C)-98 7 °F (37 1 °C)] 96 9 °F (36 1 °C)  HR:  [78-89] 80  Resp:  [20-22] 20  BP: (116-134)/(72-91) 131/91  SpO2:  [92 %-97 %] 93 %  Body mass index is 49 19 kg/m²  Input and Output Summary (last 24 hours): Intake/Output Summary (Last 24 hours) at 2/17/2019 0900  Last data filed at 2/17/2019 0856  Gross per 24 hour   Intake 40 ml   Output 2900 ml   Net -2860 ml       Physical Exam:    Constitutional: Patient is oriented to person, place and time, no acute distress  HEENT:  Normocephalic, atraumatic, EOMI, PERRLA, no scleral icterus, no pallor, moist oral mucosa  Neck:  Supple, no masses, no thyromegaly, no bruits Normal range of motion  Lymph nodes:  No lymphadenopathy  Cardiovascular: Normal S1S2, RRR, No murmurs/rubs/gallops appreciated  Pulmonary:  Bilateral air entry, No rhonchi/rales/wheezing appreciated  Abdominal: Soft, Bowel sounds present, Non-tender, Non-distended, No rebound/guarding, no hepatomegaly   Musculoskeletal: No tenderness/abnormality   Extremities:  Bilateral lower extremity edema  Peripheral pulses palpable and equal bilaterally  Neurological: Cranial nerves II-XII grossly intact, sensation intact, otherwise no focal neurological symptoms  Skin:  Bilateral lower extremity venous stasis in dressing    Additional Data:     Labs:    Results from last 7 days   Lab Units 02/17/19  0524   WBC Thousand/uL 7 29   HEMOGLOBIN g/dL 17 4*   HEMATOCRIT % 60 1*   PLATELETS Thousands/uL 114*   NEUTROS PCT % 84*   LYMPHS PCT % 3*   MONOS PCT % 10   EOS PCT % 2     Results from last 7 days   Lab Units 02/17/19  0524  02/15/19  0522   POTASSIUM mmol/L 4 3   < > 4 3   CHLORIDE mmol/L 96*   < > 100   CO2 mmol/L >45*   < > 40*   BUN mg/dL 53*   < > 54*   CREATININE mg/dL 1 29   < > 1 60*   CALCIUM mg/dL 8 6   < > 8 1*   ALK PHOS U/L  --   --  116   ALT U/L  --   --  6*   AST U/L  --   --  18    < > = values in this interval not displayed  I Have Reviewed All Lab Data Listed Above          Recent Cultures (last 7 days):     Results from last 7 days   Lab Units 02/12/19  1603 02/10/19  0900   GRAM STAIN RESULT  3+ Gram negative rods*  2+ Gram positive cocci in clusters*  Rare Polys*  2+ Gram negative rods*  No Polys*  --    WOUND CULTURE  3+ Growth of Pseudomonas aeruginosa*  2+ Growth of Staphylococcus aureus*  2+ Growth of Enterococcus faecalis*  2+ Growth of Pseudomonas aeruginosa*  2+ Growth of Enterococcus faecalis*  --    C DIFF TOXIN B   --  NEGATIVE for C difficle toxin by PCR          Last 24 Hours Medication List:     Current Facility-Administered Medications:  acetaminophen 975 mg Oral Q8H PRN MAYNOR Green   albuterol 2 puff Inhalation Q4H PRN Nohemy Raya, PA-C   apixaban 5 mg Oral BID Brielle Piger, PA-C   diltiazem 120 mg Oral Daily Brielle Chanr, PA-C   furosemide 80 mg Intravenous BID (diuretic) Marti Huynh MD   levalbuterol 1 25 mg Nebulization Q6H PRN Sarah Parkinson MD   lidocaine 1 patch Topical Daily Brielle Yang, PA-C   methocarbamol 500 mg Oral Q8H PRN LINDSAY Uriostegui-C   metoprolol succinate 100 mg Oral BID Nohemy Raya, PA-C   mupirocin  Topical TID Breana Morales DO   nicotine 1 patch Transdermal Daily Brielle Yang, PA-C   nystatin 1 application Topical BID Brielle Piger, PA-C   ondansetron 4 mg Intravenous Q8H PRN Brielle Yang, PA-C   potassium chloride 20 mEq Oral BID Tonia Mcintosh MD   sodium chloride 3 mL Nebulization Q6H PRN Sarah Parkinson MD        Today, Patient Was Seen By: Sarah Parkinson MD

## 2019-02-17 NOTE — PROGRESS NOTES
Throughout the day, patient has been non compliant with her fluid restriction and diet insisting she needs more to eat and drink  Patient has been educated numerous times throughout the day about the importance to maintain this diet while she's here and at home to avoid readmission  Patient has been more incontinent throughout the day stating "its the lasix that I cant help but go, this is why I dont take it at home " Patient refusing rehab and insistent on going home  Patient also made a comment about leaving to smoke a cigarette  Patient has visitors today who brought her in cookies and crackers, patient told about her diet instructions and still insisted to eat the snack brought in  Patient also stated to me that she doesn't believe the doctor about her CO2 levels, labs have been pulled up in front of patient and shown to her  Patient educated on importance of BIPAP at HS

## 2019-02-17 NOTE — ASSESSMENT & PLAN NOTE
· Wound care, podiatry and dermatology evaluated  · Initially concern for cellulitis of left posterior leg and right anterior leg, now suspected not true cellulitis  · Wound cultures obtained by dermatology growing multiple organisms- felt to be skin colonization  · ID consulted and recommended stopping ABX  · ID consultation appreciated, no further antibiotics recommended  · BLE elevation, edema control is imperative  · ELIN analysis shows within healing potential however given cool to touch and faint pulses obtain full arterial study

## 2019-02-17 NOTE — PLAN OF CARE
Problem: Potential for Falls  Goal: Patient will remain free of falls  Description  INTERVENTIONS:  - Assess patient frequently for physical needs  -  Identify cognitive and physical deficits and behaviors that affect risk of falls  -  [place] fall precautions as indicated by assessment   - Educate patient/family on patient safety including physical limitations  - Instruct patient to call for assistance with activity based on assessment  - Modify environment to reduce risk of injury  - Consider OT/PT consult to assist with strengthening/mobility   Outcome: Progressing     Problem: Nutrition/Hydration-ADULT  Goal: Nutrient/Hydration intake appropriate for improving, restoring or maintaining nutritional needs  Description  Monitor and assess patient's nutrition/hydration status for malnutrition (ex- brittle hair, bruises, dry skin, pale skin and conjunctiva, muscle wasting, smooth red tongue, and disorientation)  Collaborate with interdisciplinary team and initiate plan and interventions as ordered  Monitor patient's weight and dietary intake as ordered or per policy  Utilize nutrition screening tool and intervene per policy  Determine patient's food preferences and provide high-protein, high-caloric foods as appropriate       INTERVENTIONS:  - Monitor oral intake, urinary output, labs, and treatment plans  - Assess nutrition and hydration status and recommend course of action  - Evaluate amount of meals eaten  - Assist patient with eating if necessary   - Allow adequate time for meals  - Recommend/ encourage appropriate diets, oral nutritional supplements, and vitamin/mineral supplements  - Order, calculate, and assess calorie counts as needed  - Recommend, monitor, and adjust tube feedings and TPN/PPN based on assessed needs  - Assess need for intravenous fluids  - Provide specific nutrition/hydration education as appropriate  - Include patient/family/caregiver in decisions related to nutrition   Outcome: Progressing     Problem: Prexisting or High Potential for Compromised Skin Integrity  Goal: Skin integrity is maintained or improved  Description  INTERVENTIONS:  - Identify patients at risk for skin breakdown  - Assess and monitor skin integrity  - Assess and monitor nutrition and hydration status  - Monitor labs (i e  albumin)  - Assess for incontinence   - Turn and reposition patient  - Assist with mobility/ambulation  - Relieve pressure over bony prominences  - Avoid friction and shearing  - Provide appropriate hygiene as needed including keeping skin clean and dry  - Evaluate need for skin moisturizer/barrier cream  - Collaborate with interdisciplinary team (i e  Nutrition, Rehabilitation, etc )   - Patient/family teaching   Outcome: Progressing     Problem: PAIN - ADULT  Goal: Verbalizes/displays adequate comfort level or baseline comfort level  Description  Interventions:  - Encourage patient to monitor pain and request assistance  - Assess pain using appropriate pain scale  - Administer analgesics based on type and severity of pain and evaluate response  - Implement non-pharmacological measures as appropriate and evaluate response  - Consider cultural and social influences on pain and pain management  - Notify physician/advanced practitioner if interventions unsuccessful or patient reports new pain   Outcome: Progressing     Problem: INFECTION - ADULT  Goal: Absence or prevention of progression during hospitalization  Description  INTERVENTIONS:  - Assess and monitor for signs and symptoms of infection  - Monitor lab/diagnostic results  - Monitor all insertion sites, i e  indwelling lines, tubes, and drains  - Monitor endotracheal (as able) and nasal secretions for changes in amount and color  - [place] appropriate cooling/warming therapies per order  - Administer medications as ordered  - Instruct and encourage patient and family to use good hand hygiene technique  - Identify and instruct in appropriate isolation precautions for identified infection/condition   Outcome: Progressing  Goal: Absence of fever/infection during neutropenic period  Description  INTERVENTIONS:  - Monitor WBC  - Implement neutropenic guidelines   Outcome: Progressing     Problem: SAFETY ADULT  Goal: Maintain or return to baseline ADL function  Description  INTERVENTIONS:  -  Assess patient's ability to carry out ADLs; assess patient's baseline for ADL function and identify physical deficits which impact ability to perform ADLs (bathing, care of mouth/teeth, toileting, grooming, dressing, etc )  - Assess/evaluate cause of self-care deficits   - Assess range of motion  - Assess patient's mobility; develop plan if impaired  - Assess patient's need for assistive devices and provide as appropriate  - Encourage maximum independence but intervene and supervise when necessary  ¯ Involve family in performance of ADLs  ¯ Assess for home care needs following discharge   ¯ Request OT consult to assist with ADL evaluation and planning for discharge  ¯ Provide patient education as appropriate   Outcome: Progressing  Goal: Maintain or return mobility status to optimal level  Description  INTERVENTIONS:  - Assess patient's baseline mobility status (ambulation, transfers, stairs, etc )    - Identify cognitive and physical deficits and behaviors that affect mobility  - Identify mobility aids required to assist with transfers and/or ambulation (gait belt, sit-to-stand, lift, walker, cane, etc )  - [place] fall precautions as indicated by assessment  - Record patient progress and toleration of activity level on Mobility SBAR; progress patient to next Phase/Stage  - Instruct patient to call for assistance with activity based on assessment  - Request Rehabilitation consult to assist with strengthening/weightbearing, etc    Outcome: Progressing     Problem: DISCHARGE PLANNING  Goal: Discharge to home or other facility with appropriate resources  Description  INTERVENTIONS:  - Identify barriers to discharge w/patient and caregiver  - Arrange for needed discharge resources and transportation as appropriate  - Identify discharge learning needs (meds, wound care, etc )  - Arrange for interpretive services to assist at discharge as needed  - Refer to Case Management Department for coordinating discharge planning if the patient needs post-hospital services based on physician/advanced practitioner order or complex needs related to functional status, cognitive ability, or social support system   Outcome: Progressing     Problem: Knowledge Deficit  Goal: Patient/family/caregiver demonstrates understanding of disease process, treatment plan, medications, and discharge instructions  Description  Complete learning assessment and assess knowledge base  Interventions:  - Provide teaching at level of understanding  - Provide teaching via preferred learning methods   Outcome: Progressing     Problem: CARDIOVASCULAR - ADULT  Goal: Maintains optimal cardiac output and hemodynamic stability  Description  INTERVENTIONS:  - Monitor I/O, vital signs and rhythm  - Monitor for S/S and trends of decreased cardiac output i e  bleeding, hypotension  - Administer and titrate ordered vasoactive medications to optimize hemodynamic stability  - Assess quality of pulses, skin color and temperature  - Assess for signs of decreased coronary artery perfusion - ex   Angina  - Instruct patient to report change in severity of symptoms   Outcome: Progressing  Goal: Absence of cardiac dysrhythmias or at baseline rhythm  Description  INTERVENTIONS:  - Continuous cardiac monitoring, monitor vital signs, obtain 12 lead EKG if indicated  - Administer antiarrhythmic and heart rate control medications as ordered  - Monitor electrolytes and administer replacement therapy as ordered   Outcome: Progressing     Problem: METABOLIC, FLUID AND ELECTROLYTES - ADULT  Goal: Electrolytes maintained within normal limits  Description  INTERVENTIONS:  - Monitor labs and assess patient for signs and symptoms of electrolyte imbalances  - Administer electrolyte replacement as ordered  - Monitor response to electrolyte replacements, including repeat lab results as appropriate  - Instruct patient on fluid and nutrition as appropriate   Outcome: Progressing  Goal: Fluid balance maintained  Description  INTERVENTIONS:  - Monitor labs and assess for signs and symptoms of volume excess or deficit  - Monitor I/O and WT  - Instruct patient on fluid and nutrition as appropriate   Outcome: Progressing  Goal: Glucose maintained within target range  Description  INTERVENTIONS:  - Monitor Blood Glucose as ordered  - Assess for signs and symptoms of hyperglycemia and hypoglycemia  - Administer ordered medications to maintain glucose within target range  - Assess nutritional intake and initiate nutrition service referral as needed   Outcome: Progressing     Problem: SKIN/TISSUE INTEGRITY - ADULT  Goal: Skin integrity remains intact  Description  INTERVENTIONS  - Identify patients at risk for skin breakdown  - Assess and monitor skin integrity  - Assess and monitor nutrition and hydration status  - Monitor labs (i e  albumin)  - Assess for incontinence   - Turn and reposition patient  - Assist with mobility/ambulation  - Relieve pressure over bony prominences  - Avoid friction and shearing  - Provide appropriate hygiene as needed including keeping skin clean and dry  - Evaluate need for skin moisturizer/barrier cream  - Collaborate with interdisciplinary team (i e  Nutrition, Rehabilitation, etc )   - Patient/family teaching   Outcome: Progressing  Goal: Incision(s), wounds(s) or drain site(s) healing without S/S of infection  Description  INTERVENTIONS  - Assess and document risk factors for skin impairment   - Assess and document dressing, incision, wound bed, drain sites and surrounding tissue  - Initiate Nutrition services consult and/or wound management as needed   Outcome: Progressing     Problem: DISCHARGE PLANNING - CARE MANAGEMENT  Goal: Discharge to post-acute care or home with appropriate resources  Description  INTERVENTIONS:  - Conduct assessment to determine patient/family and health care team treatment goals, and need for post-acute services based on payer coverage, community resources, and patient preferences, and barriers to discharge  - Address psychosocial, clinical, and financial barriers to discharge as identified in assessment in conjunction with the patient/family and health care team  - Arrange appropriate level of post-acute services according to patient's   needs and preference and payer coverage in collaboration with the physician and health care team  - Communicate with and update the patient/family, physician, and health care team regarding progress on the discharge plan  - Arrange appropriate transportation to post-acute venues  - Pt wishes to d/c with home health care at this time   Outcome: Progressing

## 2019-02-17 NOTE — ASSESSMENT & PLAN NOTE
Lab Results   Component Value Date    HGBA1C 6 2 02/14/2019       Recent Labs     02/16/19  1110 02/16/19  1533 02/16/19  2101 02/17/19  0721   POCGLU 130 108 120 104       Blood Sugar Average: Last 72 hrs:  (P) 134 [id number]   · Patient noted to be profoundly hypoglycemic 2/13-2/14 to less than 20 at 9:21 p m , received an amp of dextrose, repeat blood sugar 34, received another amp of dextrose, repeat blood sugar 62, received another amp of dextrose, repeat blood sugar dropped to 21  Patient was placed on D10 infusion at 50 mL/hour  · Dextrose stopped yesterday and glucoses 100-200s  · She had not had insulin for greater than 24 hours at which point she had had 2 units of Humalog  · This is the only insulin she had required during her hospital stay  · Discussed with endo yesterday- unclear if real event vs  Inaccurate readings on POC glucose testing  · TSH WNL  Await AM cortisol, sulfyulurea screening  · Was noted to eat 100% of meals yesterday  Unclear she had a bedtime snack  · If patient drops below 55 obtain BMP, insulin level, proinsulin level, C-peptide, cortisol, sulfylurea panel, insulin antibody and treat with 1 mg of glucagon  · Treat with glucagon and not dextrose and assess response 20-25 mins post glucagon  · Insulin level elevated yesterday AM could be related to morbid obesity and insulin resistance- need to have this level when ALSO hypogylcemic at the same time  · No history of gastric bypass    · A1C 6 3

## 2019-02-18 LAB
BASOPHILS # BLD AUTO: 0.09 THOUSANDS/ΜL (ref 0–0.1)
BASOPHILS NFR BLD AUTO: 2 % (ref 0–1)
BUN SERPL-MCNC: 52 MG/DL (ref 5–25)
CALCIUM SERPL-MCNC: 8.6 MG/DL (ref 8.3–10.1)
CHLORIDE SERPL-SCNC: 94 MMOL/L (ref 100–108)
CO2 SERPL-SCNC: >45 MMOL/L (ref 21–32)
CREAT SERPL-MCNC: 1.18 MG/DL (ref 0.6–1.3)
EOSINOPHIL # BLD AUTO: 0.18 THOUSAND/ΜL (ref 0–0.61)
EOSINOPHIL NFR BLD AUTO: 3 % (ref 0–6)
ERYTHROCYTE [DISTWIDTH] IN BLOOD BY AUTOMATED COUNT: 16.3 % (ref 11.6–15.1)
GFR SERPL CREATININE-BSD FRML MDRD: 51 ML/MIN/1.73SQ M
GLUCOSE SERPL-MCNC: 117 MG/DL (ref 65–140)
GLUCOSE SERPL-MCNC: 128 MG/DL (ref 65–140)
GLUCOSE SERPL-MCNC: 137 MG/DL (ref 65–140)
GLUCOSE SERPL-MCNC: 147 MG/DL (ref 65–140)
GLUCOSE SERPL-MCNC: 154 MG/DL (ref 65–140)
GLUCOSE SERPL-MCNC: 203 MG/DL (ref 65–140)
HCT VFR BLD AUTO: 59.9 % (ref 34.8–46.1)
HGB BLD-MCNC: 17.5 G/DL (ref 11.5–15.4)
IMM GRANULOCYTES # BLD AUTO: 0.02 THOUSAND/UL (ref 0–0.2)
IMM GRANULOCYTES NFR BLD AUTO: 0 % (ref 0–2)
LYMPHOCYTES # BLD AUTO: 0.45 THOUSANDS/ΜL (ref 0.6–4.47)
LYMPHOCYTES NFR BLD AUTO: 7 % (ref 14–44)
MCH RBC QN AUTO: 31 PG (ref 26.8–34.3)
MCHC RBC AUTO-ENTMCNC: 29.2 G/DL (ref 31.4–37.4)
MCV RBC AUTO: 106 FL (ref 82–98)
MONOCYTES # BLD AUTO: 0.84 THOUSAND/ΜL (ref 0.17–1.22)
MONOCYTES NFR BLD AUTO: 14 % (ref 4–12)
NEUTROPHILS # BLD AUTO: 4.51 THOUSANDS/ΜL (ref 1.85–7.62)
NEUTS SEG NFR BLD AUTO: 74 % (ref 43–75)
NRBC BLD AUTO-RTO: 0 /100 WBCS
PLATELET # BLD AUTO: 104 THOUSANDS/UL (ref 149–390)
PMV BLD AUTO: 11.8 FL (ref 8.9–12.7)
POTASSIUM SERPL-SCNC: 5.6 MMOL/L (ref 3.5–5.3)
RBC # BLD AUTO: 5.64 MILLION/UL (ref 3.81–5.12)
SODIUM SERPL-SCNC: 142 MMOL/L (ref 136–145)
WBC # BLD AUTO: 6.09 THOUSAND/UL (ref 4.31–10.16)

## 2019-02-18 PROCEDURE — 94660 CPAP INITIATION&MGMT: CPT

## 2019-02-18 PROCEDURE — 85025 COMPLETE CBC W/AUTO DIFF WBC: CPT | Performed by: INTERNAL MEDICINE

## 2019-02-18 PROCEDURE — 94760 N-INVAS EAR/PLS OXIMETRY 1: CPT

## 2019-02-18 PROCEDURE — 82948 REAGENT STRIP/BLOOD GLUCOSE: CPT

## 2019-02-18 PROCEDURE — 80048 BASIC METABOLIC PNL TOTAL CA: CPT | Performed by: INTERNAL MEDICINE

## 2019-02-18 PROCEDURE — 99232 SBSQ HOSP IP/OBS MODERATE 35: CPT | Performed by: INTERNAL MEDICINE

## 2019-02-18 PROCEDURE — 99232 SBSQ HOSP IP/OBS MODERATE 35: CPT | Performed by: HOSPITALIST

## 2019-02-18 RX ORDER — FUROSEMIDE 10 MG/ML
40 INJECTION INTRAMUSCULAR; INTRAVENOUS DAILY
Status: DISCONTINUED | OUTPATIENT
Start: 2019-02-19 | End: 2019-02-20

## 2019-02-18 RX ORDER — ACETAZOLAMIDE 250 MG/1
250 TABLET ORAL EVERY 12 HOURS SCHEDULED
Status: DISCONTINUED | OUTPATIENT
Start: 2019-02-18 | End: 2019-02-21 | Stop reason: HOSPADM

## 2019-02-18 RX ADMIN — FUROSEMIDE 80 MG: 10 INJECTION, SOLUTION INTRAMUSCULAR; INTRAVENOUS at 09:00

## 2019-02-18 RX ADMIN — APIXABAN 5 MG: 5 TABLET, FILM COATED ORAL at 17:30

## 2019-02-18 RX ADMIN — DILTIAZEM HYDROCHLORIDE 120 MG: 120 CAPSULE, EXTENDED RELEASE ORAL at 08:59

## 2019-02-18 RX ADMIN — APIXABAN 5 MG: 5 TABLET, FILM COATED ORAL at 08:59

## 2019-02-18 RX ADMIN — ACETAZOLAMIDE 250 MG: 250 TABLET ORAL at 12:42

## 2019-02-18 RX ADMIN — METOPROLOL SUCCINATE 100 MG: 50 TABLET, EXTENDED RELEASE ORAL at 08:59

## 2019-02-18 RX ADMIN — POTASSIUM CHLORIDE 20 MEQ: 1500 TABLET, EXTENDED RELEASE ORAL at 08:59

## 2019-02-18 RX ADMIN — NYSTATIN 1 APPLICATION: 100000 POWDER TOPICAL at 17:32

## 2019-02-18 RX ADMIN — METOPROLOL SUCCINATE 100 MG: 50 TABLET, EXTENDED RELEASE ORAL at 17:30

## 2019-02-18 RX ADMIN — MUPIROCIN: 20 OINTMENT TOPICAL at 17:31

## 2019-02-18 RX ADMIN — LIDOCAINE 1 PATCH: 50 PATCH CUTANEOUS at 08:59

## 2019-02-18 RX ADMIN — MUPIROCIN: 20 OINTMENT TOPICAL at 09:00

## 2019-02-18 RX ADMIN — ACETAZOLAMIDE 250 MG: 250 TABLET ORAL at 20:44

## 2019-02-18 RX ADMIN — MUPIROCIN: 20 OINTMENT TOPICAL at 20:44

## 2019-02-18 RX ADMIN — NYSTATIN 1 APPLICATION: 100000 POWDER TOPICAL at 09:00

## 2019-02-18 NOTE — PROGRESS NOTES
Progress Note - Podiatry  Aubrie Oneal 63 y o  female MRN: [id number]  Unit/Bed#: E4 -01 Encounter: [id number]    Assessment:  3  62 y/o female with chronic venous stasis wounds of B/L LE   2  DM type 2, HBA1c 6 2% (2/14/19)  3  Acute on chronic respiratory failure   4  Stage 3 chronic kidney disease   5  A fib     Plan:   -afebrile with VSS, no leukocytosis   -no anbx per ID   -LEADs (2/15) reviewed which shows normal circulation with no occlusion  -b/l LE wound drainage improving  No acute signs of cellulitis   -c/w local care, elevation of LEs, and compression with ACE bandages   -patient non compliant with her low sodium diet and fluid restriction  -diuresis per primary service ([facility])  -podiatry will continue with weekly checks, nursing to change dressings every other day in interim  -recommended to the patient to wear briefs in order to prevent leakage from her urinary incontinence to the legs  She verbalized understanding  Subjective/Objective   Chief Complaint:   Chief Complaint   Patient presents with    Edema     pt reports CHF hx taking diuretics, since January pt has had weightgain, BLE edema, abdominal distention  denies SOB, but appears SOB       Subjective: 63 y o  y/o female was seen and evaluated at bedside  NAD  Denies any complains  Blood pressure 134/77, pulse 89, temperature 97 8 °F (36 6 °C), temperature source Tympanic, resp  rate 20, height 5' 4" (1 626 m), weight 128 kg (281 lb 4 9 oz), SpO2 91 %  ,Body mass index is 48 29 kg/m²  Invasive Devices     Peripheral Intravenous Line            Peripheral IV 02/14/19 Right Forearm 4 days                Physical Exam:   General: Alert, cooperative and no distress  Lungs: Non labored breathing  Heart: Positive S1, S2  Abdomen: Soft, non-tender  Extremity:   NVS and motor function at baseline to b/l LE    RLE: posterior superficial wounds dry and stable with some scabbing  No acute signs of infection   Wound anterior medially fibrogranular in nature and superficial as well  LLE: posterior leg superficial wounds are dry with some scabbing  No acute signs of infection  LLE wounds are larged in comparison to the RLE  See pictures below  RLE             LLE     Lab, Imaging and other studies:   I have personally reviewed pertinent lab results  , CBC:   Lab Results   Component Value Date    WBC 6 09 02/18/2019    HGB 17 5 (H) 02/18/2019    HCT 59 9 (H) 02/18/2019     (H) 02/18/2019     (L) 02/18/2019    MCH 31 0 02/18/2019    MCHC 29 2 (L) 02/18/2019    RDW 16 3 (H) 02/18/2019    MPV 11 8 02/18/2019    NRBC 0 02/18/2019   , CMP:   Lab Results   Component Value Date    SODIUM 142 02/18/2019    K 5 6 (H) 02/18/2019    CL 94 (L) 02/18/2019    CO2 >45 (HH) 02/18/2019    BUN 52 (H) 02/18/2019    CREATININE 1 18 02/18/2019    CALCIUM 8 6 02/18/2019    EGFR 51 02/18/2019       Imaging: I have personally reviewed pertinent films in PACS  EKG, Pathology, and Other Studies: I have personally reviewed pertinent reports

## 2019-02-18 NOTE — SOCIAL WORK
HEART FAILURE CARE COORDINATOR: Met with patient to discuss barriers to self management at home  She stated she has a hard time remembering to take her medications  She stated she is presently employed but had her care repossessed  There may now also be transportation issues  She stated she would be staying with her son for a brief period before returning to her own home  I will meet with the patient again as she was agreeable to discussing social and medical issues  We discussed how she could remember to take her medications as she does have a "pill planner"  We will revisit this topic also tomorrow

## 2019-02-18 NOTE — ASSESSMENT & PLAN NOTE
Lab Results   Component Value Date    CREATININE 1 18 02/18/2019    CREATININE 1 29 02/17/2019    CREATININE 1 42 (H) 02/16/2019    CREATININE 1 60 (H) 02/15/2019     · Creatinine at baseline  Continue to monitor with IV diuresis  · SANPA (documented previously) was ruled out   Did not meet criteria- felt to represent renal insufficiency on chronic kidney disease

## 2019-02-18 NOTE — ASSESSMENT & PLAN NOTE
· Patient noted  to desaturate when weaned from oxygen  · Room air saturation during admission 72%  · ABG shows hypercapnia and the patient has hemoconcentration, elevated CO2 and hemoglobin chronically suggest degree of chronic hypercapnia  · Seen by Pulmonary  Refuses BiPAP    · Patient does not use home O2 or CPAP/BIPAP  · Will monitor with ongoing diuresis   · Will need home oxygen eval prior to d/c  · Patient did not want to wear BIPAP at night, explained that she is risking becoming hypercapnic and risking having to

## 2019-02-18 NOTE — ASSESSMENT & PLAN NOTE
· Wound care, podiatry and dermatology evaluated  · Wound cultures obtained by dermatology growing multiple organisms- felt to be skin colonization  · ID consulted and recommended stopping ABX  · ID consultation appreciated, no further antibiotics recommended  · BLE elevation, edema control is imperative  · Stable for discharge per podiatry-will follow up with the [address] on discharge

## 2019-02-18 NOTE — WOUND OSTOMY CARE
Progress Note - Wound   Raudel Murdock 63 y o  female MRN: [id number]  Unit/Bed#: E4 -01 Encounter: [id number]        Assessment:   Patient seen for wound care follow-up  Denies pain  Sitting up in recliner--states she cannot tolerate the bed  Denies incontinence  Bilateral lower extremities with dressings dry and intact per podiatry team   Blanchable erythema to lower extremities, buttocks  Unable to assess heels at this time  Large pendulous abdomen and breasts  1  MASD/ITD with candidiasis to bilateral abdominal, groin, and breast folds  2   Partial thickness wound to midline abdomen of unknown origin--open area along old, well healed incision  Possibly related to fluid overload  Periwound is edematous  3   Bilateral lower extremity ulcerations (suspect mixed etiology of neuropathic and venous stasis with possible fungal infection and active cellulitis)--podiatry managing  Dermatology has also seen the patient and made recommendations  Lower extremity wound cultures positive for pseudomonas aeruginosa, enterococcus faecalis, and staph aureus--considered "colonization of organisms on the surface of wounds" by infectious disease  Patient is off antibiotics  Plan:   1-Hydraguard lotion to bilateral sacrum, buttock and heels BID and PRN  2-Elevate heels to offload pressure  3-Soft care cushion when out of bed  4-Moisturize skin daily with skin nourishing cream   5-Lower extremity local wound care per podiatry team   6-Cleanse abdominal wound with soap and water, pat dry  Apply Triad paste to wound bed  Change dressing daily  7-Elevate bilateral lower extremities as often as possible  8-Nystatin powder to bilateral abdominal, groin, and left breast folds BID  9-Wound care team will follow  Plan of care reviewed with primary RNShae      Wound 02/11/19 Abdomen Mid (Active)   Wound Description Clean;Pink 2/18/2019  5:00 PM   Radha-wound Assessment Edema;Fragile;Erythema 2/18/2019  5:00 PM   Wound Length (cm) 1 cm 2/18/2019  5:00 PM   Wound Width (cm) 0 4 cm 2/18/2019  5:00 PM   Wound Depth (cm) 0 1 2/18/2019  5:00 PM   Calculated Wound Area (cm^2) 0 4 cm^2 2/18/2019  5:00 PM   Calculated Wound Volume (cm^3) 0 04 cm^3 2/18/2019  5:00 PM   Change in Wound Size % 55 56 2/18/2019  5:00 PM   Tunneling 0 cm 2/18/2019  5:00 PM   Undermining 0 2/18/2019  5:00 PM   Drainage Amount Scant 2/18/2019  5:00 PM   Drainage Description Serosanguineous 2/18/2019  5:00 PM   Non-staged Wound Description Full thickness 2/18/2019  5:00 PM   Treatments Cleansed;Irrigation with NSS;Site care 2/18/2019  5:00 PM   Dressing Other (Comment) 2/18/2019  5:00 PM   Wound packed? No 2/18/2019  5:00 PM   Dressing Changed New 2/14/2019  2:47 PM   Patient Tolerance Tolerated well 2/18/2019  5:00 PM   Dressing Status Clean;Dry; Intact 2/18/2019  5:00 PM     Diana PAGEN, RN, [employer]

## 2019-02-18 NOTE — RESPIRATORY THERAPY NOTE
Attempted to place patient on BIPAP for HS using a Nasal Mask  Patient tolerated the nasal mask for one minute and decided that she could not tolerate it  Explained the importance of the use of the BIPAP, but the patient firmly declined       Silvana Armendariz, RT

## 2019-02-18 NOTE — PROGRESS NOTES
Progress Note - Hilary Samuel 1960, 63 y o  female MRN: [id number]    Unit/Bed#: E4 -01 Encounter: [id number]    Primary Care Provider: Karen Kowalski MD   Date and time admitted to hospital: 2/8/2019 12:18 PM        * Acute on chronic respiratory failure with hypoxia and hypercapnia ([address] )  Assessment & Plan  · Patient noted  to desaturate when weaned from oxygen  · Room air saturation during admission 72%  · ABG shows hypercapnia and the patient has hemoconcentration, elevated CO2 and hemoglobin chronically suggest degree of chronic hypercapnia  · Seen by Pulmonary  Refuses BiPAP  · Patient does not use home O2 or CPAP/BIPAP  · Will monitor with ongoing diuresis   · Will need home oxygen eval prior to d/c  · Patient did not want to wear BIPAP at night, explained that she is risking becoming hypercapnic and risking having to     Acute on chronic diastolic congestive heart failure [facility])  Assessment & Plan  · cardiology input appreciated  Continue IV diuresis-dose of Lasix reduced to 40 mg IV daily almost at dry weight of 274  · S/P metazolone x3 days  · Repeat ECHO without systolic dysfunction  · Weight today to 81  Goal weight 274  · Daily weights, I/O  · Needs lasix compliance on discharge  · Patient with incresed CO2 on BMP likely combination of contraction alkalosis and compensation for respiratory acidosis as patient is also noncompliant    Plan to add Diamox      Intake/Output Summary (Last 24 hours) at 2/18/2019 1138  Last data filed at 2/18/2019 0901  Gross per 24 hour   Intake 290 ml   Output 3000 ml   Net -2710 ml     Wt Readings from Last 3 Encounters:   02/18/19 128 kg (281 lb 4 9 oz)   02/07/19 (!) 147 kg (323 lb)   03/28/18 (!) 141 kg (310 lb)         COPD (chronic obstructive pulmonary disease) ([facility])  Assessment & Plan  · Does not use oxygen at home  · Spokes 1 pack every 3 days, smoked since age 16  · Suspect chronic hypercapnic resp failure  · PRN xopenex  · No acute exac      Chronic venous stasis dermatitis of both lower extremities  Assessment & Plan  · Wound care, podiatry and dermatology evaluated  · Wound cultures obtained by dermatology growing multiple organisms- felt to be skin colonization  · ID consulted and recommended stopping ABX  · ID consultation appreciated, no further antibiotics recommended  · BLE elevation, edema control is imperative  · Stable for discharge per podiatry-will follow up with the [address] on discharge    Permanent atrial fibrillation [facility])  Assessment & Plan  · Patient on Eliquis 5 mg BID  · Rate controlled with BB and CCB    Type 2 diabetes mellitus with hypoglycemia [facility])  Assessment & Plan  Lab Results   Component Value Date    HGBA1C 6 2 02/14/2019       Recent Labs     02/17/19  2049 02/18/19  0029 02/18/19  0414 02/18/19  1126   POCGLU 147* 154* 137 147*       Blood Sugar Average: Last 72 hrs:  (P) 138 [id number]   · Patient noted to be profoundly hypoglycemic on admission which has now resolved  Dextrose stopped yesterday and glucoses 100-200s  · She had not had insulin for greater than 24 hours at which point she had had 2 units of Humalog  · This is the only insulin she had required during her hospital stay  · Discussed with endo yesterday- unclear if real event vs  Inaccurate readings on POC glucose testing  · TSH WNL  Await AM cortisol, sulfyulurea screening  · Was noted to eat 100% of meals yesterday  Unclear she had a bedtime snack  · If patient drops below 55 obtain BMP, insulin level, proinsulin level, C-peptide, cortisol, sulfylurea panel, insulin antibody and treat with 1 mg of glucagon  · Treat with glucagon and not dextrose and assess response 20-25 mins post glucagon  · Insulin level elevated yesterday AM could be related to morbid obesity and insulin resistance- need to have this level when ALSO hypogylcemic at the same time  · No history of gastric bypass    · A1C 6 3    Hypertension  Assessment & Plan  · Controlled by metoprolol 100 mg b i d , diltiazem 120 mg daily  ·     Obesity  Assessment & Plan  · Would benefit from weight loss  As well as  evaluation in a gastric bypass Clinic  Body mass index is 48 29 kg/m²  Stage 3 chronic kidney disease [facility])  Assessment & Plan  Lab Results   Component Value Date    CREATININE 1 18 02/18/2019    CREATININE 1 29 02/17/2019    CREATININE 1 42 (H) 02/16/2019    CREATININE 1 60 (H) 02/15/2019     · Creatinine at baseline  Continue to monitor with IV diuresis  · SAPNA (documented previously) was ruled out  Did not meet criteria- felt to represent renal insufficiency on chronic kidney disease            Subjective:  Patient denies any complaints  However very noncompliant with her diet as well as medical regimen  Furosemide has now been decreased to 40 mg IV once a day  Podiatry input appreciated  Patient is stable for discharge from their point of view  Patient eager to be discharged home in the next day or 2  Physical Exam:   Vitals: Blood pressure 122/78, pulse 88, temperature 98 5 °F (36 9 °C), temperature source Tympanic, resp  rate 20, height 5' 4" (1 626 m), weight 128 kg (281 lb 4 9 oz), SpO2 94 %  ,Body mass index is 48 29 kg/m²  Gen:  Pleasant, non-tachypnic, non-dyspnic  Conversant  Morbidly obese  Heart: regular rate and rhythm, S1S2 present, no murmur, rub or gallop  Lungs: clear to ausculatation bilaterally  No wheezing, crackless, or rhonchi  No accessory muscle use or respiratory distress  Abd: soft, non-tender, non-distended  NABS, no guarding, rebound or peritoneal signs  Extremities:  Bilateral lower extremity venostasis with superficial ulcerations  Neuro: awake, alert and oriented  Cranial nerves 2-12 intact  Strength and sensation grossly intact  Skin: warm and dry: no petechiae, purpura and rash      LABS:   Results from last 7 days   Lab Units 02/18/19  0515 02/17/19  0524 02/14/19  0350   WBC Thousand/uL 6 09 7 29 8 50 HEMOGLOBIN g/dL 17 5* 17 4* 17 1*   HEMATOCRIT % 59 9* 60 1* 59 8*   PLATELETS Thousands/uL 104* 114* 131*     Results from last 7 days   Lab Units 02/18/19  0515 02/17/19  0524 02/16/19  0455   POTASSIUM mmol/L 5 6* 4 3 4 4   CHLORIDE mmol/L 94* 96* 99*   CO2 mmol/L >45* >45* 44*   BUN mg/dL 52* 53* 57*   CREATININE mg/dL 1 18 1 29 1 42*   CALCIUM mg/dL 8 6 8 6 8 3       Intake/Output Summary (Last 24 hours) at 2/18/2019 1147  Last data filed at 2/18/2019 0901  Gross per 24 hour   Intake 290 ml   Output 3000 ml   Net -2710 ml           Current Facility-Administered Medications:  acetaminophen 975 mg Oral Q8H PRN MAYNOR Damon   acetaZOLAMIDE 250 mg Oral Q12H [address] Truong Ross MD   albuterol 2 puff Inhalation Q4H PRN Andrew Garay PA-C   apixaban 5 mg Oral BID Brielle Yang PA-C   diltiazem 120 mg Oral Daily Andrew Garay PA-C   [START ON 2/19/2019] furosemide 40 mg Intravenous Daily Truong Ross MD   levalbuterol 1 25 mg Nebulization Q6H PRN Stephanie Witt MD   lidocaine 1 patch Topical Daily Brielle Yang PA-C   methocarbamol 500 mg Oral Q8H PRN Vickie Macias PA-C   metoprolol succinate 100 mg Oral BID Andrew Garay PA-C   mupirocin  Topical TID Rolo Dry, DO   nicotine 1 patch Transdermal Daily Brielle Yang PA-C   nystatin 1 application Topical BID Brielle Yang PA-C   ondansetron 4 mg Intravenous Q8H PRN Brielle Yang PA-C   sodium chloride 3 mL Nebulization Q6H PRN Stephanie Witt MD       Family update- offered to update family-patient declined

## 2019-02-18 NOTE — PROGRESS NOTES
Progress Note - Denman Saint 63 y o  female MRN: [id number]    Unit/Bed#: E4 -01 Encounter: [id number]  Subjective:   Denies dyspnea    Still on oxygen    Edema better  No palpitations, CP or lightheadedness  Objective:   Vitals: Blood pressure 122/78, pulse 88, temperature 98 5 °F (36 9 °C), temperature source Tympanic, resp  rate 20, height 5' 4" (1 626 m), weight 128 kg (281 lb 4 9 oz), SpO2 94 %  ,Body mass index is 48 29 kg/m²  CBC with diff:   Results from last 7 days   Lab Units 02/18/19  0515   WBC Thousand/uL 6 09   RBC Million/uL 5 64*   HEMOGLOBIN g/dL 17 5*   HEMATOCRIT % 59 9*   MCV fL 106*   MCH pg 31 0   MCHC g/dL 29 2*   RDW % 16 3*   MPV fL 11 8   PLATELETS Thousands/uL 104*     CMP:   Results from last 7 days   Lab Units 02/18/19  0515  02/15/19  0522   SODIUM mmol/L 142   < > 141   POTASSIUM mmol/L 5 6*   < > 4 3   CHLORIDE mmol/L 94*   < > 100   CO2 mmol/L >45*   < > 40*   BUN mg/dL 52*   < > 54*   CREATININE mg/dL 1 18   < > 1 60*   CALCIUM mg/dL 8 6   < > 8 1*   AST U/L  --   --  18   ALT U/L  --   --  6*   ALK PHOS U/L  --   --  116   EGFR ml/min/1 73sq m 51   < > 36    < > = values in this interval not displayed  Physical exam:  Lungs-decreased breath sounds throughout  Heart-Irregular w/o murmur rub or banda  Abd-obese  NT w/o mass or OM  Ext-1-2+ LE edema          Assessment:  1  Acute on chronic diastolic CHF    Massive diuresis    This was due to noncompliance with regimen at home    Near "dry wt" of 275 to 280 lbs    BUN up, bicarb up, K up today  2  Chronic AFIB  Rate well controlled on diltiazem and metoprolol  On Eliquis for stroke prophylaxis  3  Chronic kidney disease-BUN up due to element intravascular volume depletion from diuresis  4  Mild mitral regurgitation  5  Hypertension well controlled on beta-blocker and calcium channel blocker     Plan:  Continue metoprolol and diltiazem at the current dosages  Will reduce furosemide to 40 mg IV daily    40 mg daily was her usual dosage at home  Continue Eliquis  Will stop potassium with high potassium today  Will add Diamox in short term      Dustin Michel MD

## 2019-02-18 NOTE — ASSESSMENT & PLAN NOTE
Lab Results   Component Value Date    HGBA1C 6 2 02/14/2019       Recent Labs     02/17/19 2049 02/18/19  0029 02/18/19  0414 02/18/19  1126   POCGLU 147* 154* 137 147*       Blood Sugar Average: Last 72 hrs:  (P) 138 [id number]   · Patient noted to be profoundly hypoglycemic on admission which has now resolved  Dextrose stopped yesterday and glucoses 100-200s  · She had not had insulin for greater than 24 hours at which point she had had 2 units of Humalog  · This is the only insulin she had required during her hospital stay  · Discussed with endo yesterday- unclear if real event vs  Inaccurate readings on POC glucose testing  · TSH WNL  Await AM cortisol, sulfyulurea screening  · Was noted to eat 100% of meals yesterday  Unclear she had a bedtime snack  · If patient drops below 55 obtain BMP, insulin level, proinsulin level, C-peptide, cortisol, sulfylurea panel, insulin antibody and treat with 1 mg of glucagon  · Treat with glucagon and not dextrose and assess response 20-25 mins post glucagon  · Insulin level elevated yesterday AM could be related to morbid obesity and insulin resistance- need to have this level when ALSO hypogylcemic at the same time  · No history of gastric bypass    · A1C 6 3

## 2019-02-18 NOTE — ASSESSMENT & PLAN NOTE
· Would benefit from weight loss  As well as  evaluation in a gastric bypass Clinic  Body mass index is 48 29 kg/m²

## 2019-02-18 NOTE — UTILIZATION REVIEW
Continued Stay Review    Date: 02/18/19  Vital Signs: /78 (BP Location: Left arm)   Pulse 88   Temp 98 5 °F (36 9 °C) (Tympanic)   Resp 20   Ht 5' 4" (1 626 m)   Wt 128 kg (281 lb 4 9 oz)   SpO2 94%   BMI 48 29 kg/m²   Assessment/Plan:   * Acute on chronic respiratory failure with hypoxia and hypercapnia ([facility])  Assessment & Plan  · Patient noted  to desaturate when weaned from oxygen  · Room air saturation during admission 72%  · ABG shows hypercapnia and the patient has hemoconcentration, elevated CO2 and hemoglobin chronically suggest degree of chronic hypercapnia  · Seen by Pulmonary  Refuses BiPAP  · Patient does not use home O2 or CPAP/BIPAP  · Will monitor with ongoing diuresis   · Will need home oxygen eval prior to d/c  · Patient did not want to wear BIPAP at night, explained that she is risking becoming hypercapnic  Acute on chronic diastolic congestive heart failure [facility])  Assessment & Plan  · cardiology input appreciated  Continue IV diuresis-dose of Lasix reduced to 40 mg IV daily almost at dry weight of 274  · S/P metazolone x3 days  · Repeat ECHO without systolic dysfunction  · Weight today to 81  Goal weight 274  · Daily weights, I/O  · Needs lasix compliance on discharge  · Patient with incresed CO2 on BMP likely combination of contraction alkalosis and compensation for respiratory acidosis as patient is also noncompliant  Plan to add Diamox  COPD (chronic obstructive pulmonary disease) ([facility])  Assessment & Plan  · Does not use oxygen at home  · Spokes 1 pack every 3 days, smoked since age 16  · Suspect chronic hypercapnic resp failure  · PRN xopenex  · No acute exac  Chronic venous stasis dermatitis of both lower extremities  Assessment & Plan  · Wound care, podiatry and dermatology evaluated  ? Wound cultures obtained by dermatology growing multiple organisms- felt to be skin colonization  ?  ID consulted and recommended stopping ABX  · ID consultation appreciated, no further antibiotics recommended  · BLE elevation, edema control is imperative  · Stable for discharge per podiatry-will follow up with the [address] on discharge  Permanent atrial fibrillation [facility])  Assessment & Plan  · Patient on Eliquis 5 mg BID  · Rate controlled with BB and CCB  Type 2 diabetes mellitus with hypoglycemia ([address] )  Assessment & Plan   Blood Sugar Average: Last 72 hrs:  (P) 228 [id number]   ? Patient noted to be profoundly hypoglycemic on admission which has now resolved  Dextrose stopped yesterday and glucoses 100-200s  ? She had not had insulin for greater than 24 hours at which point she had had 2 units of Humalog  ? This is the only insulin she had required during her hospital stay  ? Discussed with endo yesterday- unclear if real event vs  Inaccurate readings on POC glucose testing  ? TSH WNL  Await AM cortisol, sulfyulurea screening  · Was noted to eat 100% of meals yesterday  Unclear she had a bedtime snack  · If patient drops below 55 obtain BMP, insulin level, proinsulin level, C-peptide, cortisol, sulfylurea panel, insulin antibody and treat with 1 mg of glucagon  ? Treat with glucagon and not dextrose and assess response 20-25 mins post glucagon  ? Insulin level elevated yesterday AM could be related to morbid obesity and insulin resistance- need to have this level when ALSO hypogylcemic at the same time  · No history of gastric bypass  · A1C 6 3  Hypertension  Assessment & Plan  · Controlled by metoprolol 100 mg b i d , diltiazem 120 mg daily  Obesity  Assessment & Plan  · Would benefit from weight loss  As well as  evaluation in a gastric bypass Clinic  · Body mass index is 48 29 kg/m²  Stage 3 chronic kidney disease ([facility])  Assessment & Plan   · Creatinine at baseline  Continue to monitor with IV diuresis  · SAPNA (documented previously) was ruled out   Did not meet criteria- felt to represent renal insufficiency on chronic kidney disease  Medications:   Scheduled Meds: Current Facility-Administered Medications:  acetaminophen 975 mg Oral Q8H PRN   acetaZOLAMIDE 250 mg Oral Q12H NEREYDA   albuterol 2 puff Inhalation Q4H PRN   apixaban 5 mg Oral BID   diltiazem 120 mg Oral Daily   [START ON 2/19/2019] furosemide 40 mg Intravenous Daily   levalbuterol 1 25 mg Nebulization Q6H PRN   lidocaine 1 patch Topical Daily   methocarbamol 500 mg Oral Q8H PRN   metoprolol succinate 100 mg Oral BID   mupirocin  Topical TID   nicotine 1 patch Transdermal Daily   nystatin 1 application Topical BID   ondansetron 4 mg Intravenous Q8H PRN   sodium chloride 3 mL Nebulization Q6H PRN     Continuous Infusions:    PRN Meds:   acetaminophen    albuterol    levalbuterol    methocarbamol    ondansetron    sodium chloride  Pertinent Labs/Diagnostic Results: HGB 17 5, HCT 59 9, PLTS 104, K 5 6, CL 94, CO2 >45, BUN 52  Age/Sex: 63 y o  female   Discharge Plan: D    Network Utilization Review Department  Phone: [phone]; Fax [phone]  [email or web address]  ATTENTION: Please call with any questions or concerns to [phone]  and carefully listen to the prompts so that you are directed to the right person  Send all requests for admission clinical reviews, approved or denied determinations and any other requests to fax [phone]   All voicemails are confidential

## 2019-02-18 NOTE — ASSESSMENT & PLAN NOTE
· cardiology input appreciated  Continue IV diuresis-dose of Lasix reduced to 40 mg IV daily almost at dry weight of 274  · S/P metazolone x3 days  · Repeat ECHO without systolic dysfunction  · Weight today to 81  Goal weight 274  · Daily weights, I/O  · Needs lasix compliance on discharge  · Patient with incresed CO2 on BMP likely combination of contraction alkalosis and compensation for respiratory acidosis as patient is also noncompliant    Plan to add Diamox      Intake/Output Summary (Last 24 hours) at 2/18/2019 1138  Last data filed at 2/18/2019 0901  Gross per 24 hour   Intake 290 ml   Output 3000 ml   Net -2710 ml     Wt Readings from Last 3 Encounters:   02/18/19 128 kg (281 lb 4 9 oz)   02/07/19 (!) 147 kg (323 lb)   03/28/18 (!) 141 kg (310 lb)

## 2019-02-19 LAB
ANION GAP SERPL CALCULATED.3IONS-SCNC: 5 MMOL/L (ref 4–13)
BUN SERPL-MCNC: 51 MG/DL (ref 5–25)
CALCIUM SERPL-MCNC: 8.4 MG/DL (ref 8.3–10.1)
CHLORIDE SERPL-SCNC: 95 MMOL/L (ref 100–108)
CO2 SERPL-SCNC: 43 MMOL/L (ref 21–32)
CREAT SERPL-MCNC: 1.28 MG/DL (ref 0.6–1.3)
GFR SERPL CREATININE-BSD FRML MDRD: 47 ML/MIN/1.73SQ M
GLUCOSE SERPL-MCNC: 116 MG/DL (ref 65–140)
GLUCOSE SERPL-MCNC: 146 MG/DL (ref 65–140)
GLUCOSE SERPL-MCNC: 172 MG/DL (ref 65–140)
GLUCOSE SERPL-MCNC: 173 MG/DL (ref 65–140)
GLUCOSE SERPL-MCNC: 185 MG/DL (ref 65–140)
GLUCOSE SERPL-MCNC: 191 MG/DL (ref 65–140)
POTASSIUM SERPL-SCNC: 3.8 MMOL/L (ref 3.5–5.3)
SODIUM SERPL-SCNC: 143 MMOL/L (ref 136–145)

## 2019-02-19 PROCEDURE — 99232 SBSQ HOSP IP/OBS MODERATE 35: CPT | Performed by: INTERNAL MEDICINE

## 2019-02-19 PROCEDURE — 97530 THERAPEUTIC ACTIVITIES: CPT

## 2019-02-19 PROCEDURE — 97535 SELF CARE MNGMENT TRAINING: CPT

## 2019-02-19 PROCEDURE — 80048 BASIC METABOLIC PNL TOTAL CA: CPT | Performed by: INTERNAL MEDICINE

## 2019-02-19 PROCEDURE — 82948 REAGENT STRIP/BLOOD GLUCOSE: CPT

## 2019-02-19 PROCEDURE — 99232 SBSQ HOSP IP/OBS MODERATE 35: CPT | Performed by: HOSPITALIST

## 2019-02-19 RX ADMIN — METOPROLOL SUCCINATE 100 MG: 50 TABLET, EXTENDED RELEASE ORAL at 08:48

## 2019-02-19 RX ADMIN — LIDOCAINE 1 PATCH: 50 PATCH CUTANEOUS at 08:49

## 2019-02-19 RX ADMIN — APIXABAN 5 MG: 5 TABLET, FILM COATED ORAL at 08:48

## 2019-02-19 RX ADMIN — APIXABAN 5 MG: 5 TABLET, FILM COATED ORAL at 17:43

## 2019-02-19 RX ADMIN — NYSTATIN 1 APPLICATION: 100000 POWDER TOPICAL at 17:44

## 2019-02-19 RX ADMIN — MUPIROCIN: 20 OINTMENT TOPICAL at 16:00

## 2019-02-19 RX ADMIN — ACETAZOLAMIDE 250 MG: 250 TABLET ORAL at 08:49

## 2019-02-19 RX ADMIN — ACETAZOLAMIDE 250 MG: 250 TABLET ORAL at 21:36

## 2019-02-19 RX ADMIN — MUPIROCIN: 20 OINTMENT TOPICAL at 08:50

## 2019-02-19 RX ADMIN — DILTIAZEM HYDROCHLORIDE 120 MG: 120 CAPSULE, EXTENDED RELEASE ORAL at 08:48

## 2019-02-19 RX ADMIN — FUROSEMIDE 40 MG: 10 INJECTION, SOLUTION INTRAMUSCULAR; INTRAVENOUS at 08:49

## 2019-02-19 RX ADMIN — NYSTATIN 1 APPLICATION: 100000 POWDER TOPICAL at 08:50

## 2019-02-19 NOTE — ASSESSMENT & PLAN NOTE
· Patient noted  to desaturate when weaned from oxygen  · Room air saturation during admission 72%  · ABG shows hypercapnia and the patient has hemoconcentration, elevated CO2 and hemoglobin chronically suggest degree of chronic hypercapnia  · Seen by Pulmonary  Refuses BiPAP  · Patient does not use home O2 or CPAP/BIPAP  · Will monitor with ongoing diuresis   · Will assess for home O2  Plan to DC home tomorrow    · Patient did not want to wear BIPAP at night, explained that she is risking becoming hypercapnic and risking having to

## 2019-02-19 NOTE — ASSESSMENT & PLAN NOTE
Lab Results   Component Value Date    HGBA1C 6 2 02/14/2019       Recent Labs     02/18/19  1532 02/18/19  2117 02/19/19  0007 02/19/19  0715   POCGLU 117 203* 172* 116       Blood Sugar Average: Last 72 hrs:  (P) 452 [id number]   · Patient noted to be hypoglycemic on admission which has now resolved  Dextrose stopped yesterday and glucoses 100-200s  · She had not had insulin for greater than 24 hours at which point she had had 2 units of Humalog  · This is the only insulin she had required during her hospital stay  · Discussed with endo - unclear if real event vs  Inaccurate readings on POC glucose testing  · TSH WNL  Await AM cortisol level of 22  · If patient drops below 55 obtain BMP, insulin level, proinsulin level, C-peptide, cortisol, sulfylurea panel, insulin antibody and treat with 1 mg of glucagon  · Treat with glucagon ssess response 20-25 mins post glucagon  · No history of gastric bypass  · A1C 6 3  · Blood sugars now stable

## 2019-02-19 NOTE — PROGRESS NOTES
Progress Note - Cardiology   Jose De Jesus Arellano 63 y o  female MRN: [id number]  Unit/Bed#: E4 -01 Encounter: [id number]        Problem List:  Principal Problem:    Acute on chronic respiratory failure with hypoxia and hypercapnia (HCC)  Active Problems:    COPD (chronic obstructive pulmonary disease) (HCC)    Hypertension    Permanent atrial fibrillation (HCC)    Chronic venous stasis dermatitis of both lower extremities    Type 2 diabetes mellitus with hypoglycemia (HCC)    Obesity    Depression    Stage 3 chronic kidney disease (HCC)    Acute on chronic diastolic congestive heart failure (HCC)    Compression fracture of first lumbar vertebra (HCC)    Transaminitis      Asessment/Plan:  1  Acute on chronic diastolic heart failure  2  Chronic atrial fibrillation  3  Chronic kidney disease  4  Mitral regurg   5  Hypertension    Plan:   Can likely transition to p o  Lasix tomorrow   Patient is near her baseline weight 277 lb today, baseline to 275-280   Continue metoprolol   On Diamox will d/w attending for how long to continue   Repeat BMP pending tomorrow to check CO2 levels   Will likely need home O2    Not on lisinopril secondary to elevated creatinine     Would continue Lasix 40 p o  Daily, metoprolol succinate 100 b i d , Cardizem 120 daily  and Eliquis 5 mg b i d  Can follow up in our office within the next month            Subjective:  Patient seen examined at bedside  Feeling closer to baseline in terms of breathing and lower extremity swelling  Her legs are however quite tender to palpation secondary to chronic wounds  No chest pain or coughing  No palpitations  Anxious to go home      Weight today 125 kg (277 lb)    Vitals:  Vitals:    02/18/19 0600 02/19/19 0600   Weight: 128 kg (281 lb 4 9 oz) 126 kg (277 lb)   ,  Vitals:    02/18/19 1500 02/18/19 2300 02/19/19 0600 02/19/19 0711   BP: 132/68 107/69  112/74   BP Location: Left arm Left arm  Left arm   Pulse: 89 77  83   Resp: 20 20  20 Temp: 97 7 °F (36 5 °C) 97 5 °F (36 4 °C)  (!) 96 1 °F (35 6 °C)   TempSrc: Tympanic Tympanic  Tympanic   SpO2: 92% 93%  93%   Weight:   126 kg (277 lb)    Height:           Exam:  General:  alert, oriented and in no distress  Heart:  Irregular rhythm but rate controlled  Respiratory effort:  Breathing comfortably on supplemental oxygen  Blue lips  Lungs:  Poor air exchange but largely clear to auscultation  Lower Limbs:  Bilateral lower extremities wrapped    Do not appear grossly edematous               Medications:    Current Facility-Administered Medications:     acetaminophen (TYLENOL) tablet 975 mg, 975 mg, Oral, Q8H PRN, MAYNOR Green, 975 mg at 02/16/19 2003    acetaZOLAMIDE (DIAMOX) tablet 250 mg, 250 mg, Oral, Q12H [address], Tonia Mcintosh MD, 250 mg at 02/19/19 0849    albuterol (PROVENTIL HFA,VENTOLIN HFA) inhaler 2 puff, 2 puff, Inhalation, Q4H PRN, Nohemy Raya PA-C    apixaban (ELIQUIS) tablet 5 mg, 5 mg, Oral, BID, Brielle Yang PA-C, 5 mg at 02/19/19 0848    diltiazem (CARDIZEM CD) 24 hr capsule 120 mg, 120 mg, Oral, Daily, Brielle Yang PA-C, 120 mg at 02/19/19 0848    furosemide (LASIX) injection 40 mg, 40 mg, Intravenous, Daily, Tonia Mcintosh MD, 40 mg at 02/19/19 0849    levalbuterol (XOPENEX) inhalation solution 1 25 mg, 1 25 mg, Nebulization, Q6H PRN, Sarah Parkinson MD    lidocaine (LIDODERM) 5 % patch 1 patch, 1 patch, Topical, Daily, Brielle Yang PA-C, 1 patch at 02/19/19 0849    methocarbamol (ROBAXIN) tablet 500 mg, 500 mg, Oral, Q8H PRN, Vickie Macias PA-C, 500 mg at 02/17/19 1737    metoprolol succinate (TOPROL-XL) 24 hr tablet 100 mg, 100 mg, Oral, BID, Brielle Yang PA-C 100 mg at 02/19/19 0848    mupirocin (BACTROBAN) 2 % ointment, , Topical, TID, Breana Morales DO    nicotine (NICODERM CQ) 14 mg/24hr TD 24 hr patch 1 patch, 1 patch, Transdermal, Daily, Brielle Yang PA-C, Stopped at 02/10/19 0900    nystatin (MYCOSTATIN) powder 1 application, 1 application, Topical, BID, Andrew Garay PA-C, 1 application at 77/60/23 0850    ondansetron (ZOFRAN) injection 4 mg, 4 mg, Intravenous, Q8H PRN, Brielle Yang PA-C    sodium chloride 0 9 % inhalation solution 3 mL, 3 mL, Nebulization, Q6H PRN, Stephanie Witt MD      Labs/Data:        Results from last 7 days   Lab Units 02/18/19  0515 02/17/19  0524 02/14/19  0350   WBC Thousand/uL 6 09 7 29 8 50   HEMOGLOBIN g/dL 17 5* 17 4* 17 1*   HEMATOCRIT % 59 9* 60 1* 59 8*   PLATELETS Thousands/uL 104* 114* 131*     Results from last 7 days   Lab Units 02/19/19  0441 02/18/19  0515 02/17/19  0524   POTASSIUM mmol/L 3 8 5 6* 4 3   CHLORIDE mmol/L 95* 94* 96*   CO2 mmol/L 43* >45* >45*   BUN mg/dL 51* 52* 53*

## 2019-02-19 NOTE — ASSESSMENT & PLAN NOTE
· Does not use oxygen at home  · Spokes 1 pack every 3 days, smoked since age 16  · Suspect chronic hypercapnic resp failure  · PRN xopenex  · No acute exac  Will assess for home O2 needs today

## 2019-02-19 NOTE — PLAN OF CARE
Problem: OCCUPATIONAL THERAPY ADULT  Goal: Performs self-care activities at highest level of function for planned discharge setting  See evaluation for individualized goals  Description  Treatment Interventions: ADL retraining, Functional transfer training, UE strengthening/ROM, Endurance training, Patient/family training, Equipment evaluation/education, Compensatory technique education, Energy conservation, Activityengagement          See flowsheet documentation for full assessment, interventions and recommendations  Outcome: Progressing  Note:   Limitation: Decreased ADL status, Decreased UE strength, Decreased Safe judgement during ADL, Decreased endurance, Decreased self-care trans, Decreased high-level ADLs  Prognosis: Good  Assessment: Pt was seen for skilled OT with focus on completion of self care tasks, functional mobility, review of fall prevention and review of current plan of care  Pt reports incontinence of bladder upon greeting this tx session  Extended time provided to complete self care routine with focus on Pt's engagement in activities with frequent rest breaks provided  Pt with increased SOB noted with attempts to complete UE bathing activity  Increased A provided with cues to take rest breaks with use of compensatory breathing techs  Max A overall for LE self care due to increased fatigue levels and limited functional reach  Poor hygiene noted following self toileting warranting need for increased A  Pt reports enjoying the company of her dogs and hopes to return to her son's house upon discharge  Educated Pt on current need for increased A and need for strengthening as tolerated to promote independence with self care tasks and daily routine  Pt reports having some difficulty taking medications and may benefit from daily assistance and further cognitive retraining due to noted deficits with personal hygiene and tending to her medical needs in the home environment   Pt may benefit from further rehab with focus on achieving optimal performance levels with all functional tasks   Continue to recommend Inpatient rehab     OT Discharge Recommendation: Short Term Rehab(Pt prefers home at her son's house  )  OT - OK to Discharge: Yes(to rehab when medically cleared)

## 2019-02-19 NOTE — PROGRESS NOTES
Progress Note - Jazmyne Cox 1960, 63 y o  female MRN: [id number]    Unit/Bed#: E4 -01 Encounter: [id number]    Primary Care Provider: Rocio Bermudez MD   Date and time admitted to hospital: 2/8/2019 12:18 PM        * Acute on chronic respiratory failure with hypoxia and hypercapnia ([address] )  Assessment & Plan  · Patient noted  to desaturate when weaned from oxygen  · Room air saturation during admission 72%  · ABG shows hypercapnia and the patient has hemoconcentration, elevated CO2 and hemoglobin chronically suggest degree of chronic hypercapnia  · Seen by Pulmonary  Refuses BiPAP  · Patient does not use home O2 or CPAP/BIPAP  · Will monitor with ongoing diuresis   · Will assess for home O2  Plan to DC home tomorrow  · Patient did not want to wear BIPAP at night, explained that she is risking becoming hypercapnic and risking having to     Acute on chronic diastolic congestive heart failure [facility])  Assessment & Plan  · cardiology input appreciated  Continue IV diuresis-dose of Lasix reduced to 40 mg IV daily almost at dry weight of 274  Current weight of 277  · S/P metazolone x3 days  · Repeat ECHO without systolic dysfunction  · Weight today to 81  Goal weight 274  · Daily weights, I/O  · Needs lasix compliance on discharge  · Patient with incresed CO2 on BMP likely combination of contraction alkalosis and compensation for respiratory acidosis as patient is also noncompliant    Plan to add Diamox      Intake/Output Summary (Last 24 hours) at 2/19/2019 1135  Last data filed at 2/19/2019 0932  Gross per 24 hour   Intake 720 ml   Output 2453 ml   Net -1733 ml     Wt Readings from Last 3 Encounters:   02/19/19 126 kg (277 lb)   02/07/19 (!) 147 kg (323 lb)   03/28/18 (!) 141 kg (310 lb)         COPD (chronic obstructive pulmonary disease) ([facility])  Assessment & Plan  · Does not use oxygen at home  · Spokes 1 pack every 3 days, smoked since age 16  · Suspect chronic hypercapnic resp failure  · PRN xopenex  · No acute exac  Will assess for home O2 needs today  Chronic venous stasis dermatitis of both lower extremities  Assessment & Plan  · Wound care, podiatry and dermatology evaluated  · Wound cultures obtained by dermatology growing multiple organisms- felt to be skin colonization  · ID consulted and recommended stopping ABX  · ID consultation appreciated, no further antibiotics recommended  · BLE elevation, edema control is imperative  · Stable for discharge per podiatry-will follow up with the [address] on discharge    Permanent atrial fibrillation [facility])  Assessment & Plan  · Patient on Eliquis 5 mg BID  · Rate controlled with BB and CCB    Type 2 diabetes mellitus with hypoglycemia [facility])  Assessment & Plan  Lab Results   Component Value Date    HGBA1C 6 2 02/14/2019       Recent Labs     02/18/19  1532 02/18/19  2117 02/19/19  0007 02/19/19  0715   POCGLU 117 203* 172* 116       Blood Sugar Average: Last 72 hrs:  (P) 996 [id number]   · Patient noted to be hypoglycemic on admission which has now resolved  Dextrose stopped yesterday and glucoses 100-200s  · She had not had insulin for greater than 24 hours at which point she had had 2 units of Humalog  · This is the only insulin she had required during her hospital stay  · Discussed with endo - unclear if real event vs  Inaccurate readings on POC glucose testing  · TSH WNL  Await AM cortisol level of 22  · If patient drops below 55 obtain BMP, insulin level, proinsulin level, C-peptide, cortisol, sulfylurea panel, insulin antibody and treat with 1 mg of glucagon  · Treat with glucagon ssess response 20-25 mins post glucagon  · No history of gastric bypass  · A1C 6 3  · Blood sugars now stable  Hypertension  Assessment & Plan  · Controlled by metoprolol 100 mg b i d , diltiazem 120 mg daily  ·     Obesity  Assessment & Plan  · Would benefit from weight loss    As well as  evaluation in a gastric bypass Clinic  Body mass index is 47 55 kg/m²  Depression  Assessment & Plan  · Patient previously took Zoloft at home, however, has not been taking recently  Home O2 eval  DC planning  DC once changed to oral Lasix by Cardiology    Subjective:  Patient wants her fluid restriction relaxed  Asked her to talk to Cardiology  Noncompliance with followups as well as medication is going to be a major issue with her recovery  Physical Exam:   Vitals: Blood pressure 112/74, pulse 83, temperature (!) 96 1 °F (35 6 °C), temperature source Tympanic, resp  rate 20, height 5' 4" (1 626 m), weight 126 kg (277 lb), SpO2 93 %  ,Body mass index is 47 55 kg/m²  Gen:  Pleasant, non-tachypnic, non-dyspnic  Conversant  Obese  Heart: regular rate and rhythm, S1S2 present, no murmur, rub or gallop  Lungs: clear to ausculatation bilaterally  No wheezing, crackless, or rhonchi  No accessory muscle use or respiratory distress  Abd: soft, non-tender, non-distended  NABS, no guarding, rebound or peritoneal signs  Extremities: no clubbing, cyanosis or edema  2+pedal pulses bilaterally  Full range of motion  Neuro: awake, alert and oriented  Cranial nerves 2-12 intact  Strength and sensation grossly intact  Skin: warm and dry: no petechiae, purpura and rash      LABS:   Results from last 7 days   Lab Units 02/18/19  0515 02/17/19  0524 02/14/19  0350   WBC Thousand/uL 6 09 7 29 8 50   HEMOGLOBIN g/dL 17 5* 17 4* 17 1*   HEMATOCRIT % 59 9* 60 1* 59 8*   PLATELETS Thousands/uL 104* 114* 131*     Results from last 7 days   Lab Units 02/19/19  0441 02/18/19  0515 02/17/19  0524   POTASSIUM mmol/L 3 8 5 6* 4 3   CHLORIDE mmol/L 95* 94* 96*   CO2 mmol/L 43* >45* >45*   BUN mg/dL 51* 52* 53*   CREATININE mg/dL 1 28 1 18 1 29   CALCIUM mg/dL 8 4 8 6 8 6       Intake/Output Summary (Last 24 hours) at 2/19/2019 1139  Last data filed at 2/19/2019 0932  Gross per 24 hour   Intake 720 ml   Output 2453 ml   Net -1733 ml           Current Facility-Administered Medications:  acetaminophen 975 mg Oral Q8H PRN MAYNOR Collins   acetaZOLAMIDE 250 mg Oral Q12H [address] Nelly Gibbons MD   albuterol 2 puff Inhalation Q4H PRN Rita Fung PA-C   apixaban 5 mg Oral BID Rita Fung PA-C   diltiazem 120 mg Oral Daily Brielle Yang PA-C   furosemide 40 mg Intravenous Daily Nelly Gibbons MD   levalbuterol 1 25 mg Nebulization Q6H PRN Linda Dixon MD   lidocaine 1 patch Topical Daily Brielle Yang PA-C   methocarbamol 500 mg Oral Q8H PRN Vickie Macias PA-C   metoprolol succinate 100 mg Oral BID Rita Fung PA-C   mupirocin  Topical TID Jefferson Osorio DO   nicotine 1 patch Transdermal Daily Brielle Yang PA-C   nystatin 1 application Topical BID Brielle Yang PA-C   ondansetron 4 mg Intravenous Q8H PRN Brielle Yang PA-C   sodium chloride 3 mL Nebulization Q6H PRN Linda Dixon MD

## 2019-02-19 NOTE — ASSESSMENT & PLAN NOTE
· cardiology input appreciated  Continue IV diuresis-dose of Lasix reduced to 40 mg IV daily almost at dry weight of 274  Current weight of 277  · S/P metazolone x3 days  · Repeat ECHO without systolic dysfunction  · Weight today to 81  Goal weight 274  · Daily weights, I/O  · Needs lasix compliance on discharge  · Patient with incresed CO2 on BMP likely combination of contraction alkalosis and compensation for respiratory acidosis as patient is also noncompliant    Plan to add Diamox      Intake/Output Summary (Last 24 hours) at 2/19/2019 1135  Last data filed at 2/19/2019 0932  Gross per 24 hour   Intake 720 ml   Output 2453 ml   Net -1733 ml     Wt Readings from Last 3 Encounters:   02/19/19 126 kg (277 lb)   02/07/19 (!) 147 kg (323 lb)   03/28/18 (!) 141 kg (310 lb)

## 2019-02-19 NOTE — OCCUPATIONAL THERAPY NOTE
Occupational Therapy Treatment Note:         02/19/19 1450   Restrictions/Precautions   Weight Bearing Precautions Per Order No   Other Precautions Pain; Fall Risk;O2;Multiple lines   Pain Assessment   Pain Assessment 0-10   Pain Score 4   Pain Type Chronic pain   Pain Location Back   Pain Orientation Right   Pain Descriptors Aching   ADL   Where Assessed Sitting at sink   Grooming Assistance 5  Supervision/Setup   Grooming Deficit Setup   UB Bathing Assistance 4  Minimal Assistance   UB Bathing Deficit Setup;Supervision/safety; Increased time to complete;Verbal cueing; Chest;Right arm;Left arm   LB Bathing Assistance 2  Maximal Assistance   LB Bathing Deficit Steadying;Setup;Verbal cueing;Supervision/safety; Increased time to complete;Perineal area; Buttocks; Left lower leg including foot;Right lower leg including foot   LB Bathing Comments Pt with limited functional reach  Poor quality noted with attempts to complete roldan care  UB Dressing Assistance 4  Minimal Assistance   UB Dressing Deficit Setup   LB Dressing Assistance 2  Maximal Assistance   LB Dressing Deficit Setup;Steadying; Requires assistive device for steadying;Verbal cueing;Supervision/safety; Increased time to complete; Don/doff R sock; Don/doff L sock   LB Dressing Comments Limited functional reach noted  Increased fatigue with activity  Toileting Assistance  3  Moderate Assistance   Toileting Deficit Setup;Steadying;Supervison/safety;Verbal cueing; Increased time to complete;Perineal hygiene; Bedside commode   Toileting Comments Pt will benefit from further review of clothing management instance and roldan hygiene due to increased weakness and poor quality noted  Functional Standing Tolerance   Time 3 mins   Activity self care routine  Comments Pt with no LOB with activity  Increased fatigue noted      Transfers   Sit to Stand 5  Supervision   Additional items Assist x 1   Stand to Sit 5  Supervision   Additional items Assist x 1   Stand pivot 5 Supervision   Additional items Assist x 1   Toilet transfer 5  Supervision   Additional items Assist x 1   Additional Comments Pt will benefit from continued use of bedside extra wide commode  Functional Mobility   Functional Mobility 4  Minimal assistance   Additional Comments x1   Additional items Rolling walker   Toilet Transfers   Toilet Transfer From Rolling walker   Toilet Transfer Type To and from   Toilet Transfer to Standard bedside commode   Toilet Transfer Technique Stand pivot; Ambulating   Toilet Transfers Verbal cues; Moderate assistance   Toilet Transfers Comments cues for safety required  Cognition   Overall Cognitive Status Impaired   Arousal/Participation Alert   Attention Within functional limits   Orientation Level Oriented X4   Memory Decreased recall of precautions   Following Commands Follows one step commands without difficulty   Comments Poor insight to personal needs noted  Pt frequently spoke of her husbands passing through out tx session  Additional Activities   Additional Activities Other (Comment)  (reviewed current plan of care, CHF precautions)   Additional Activities Comments Pt reports understanding need for daily weights but stated, "I really wasnt doing that", regarding previous habits at home  Activity Tolerance   Activity Tolerance Patient limited by pain; Patient limited by fatigue   Medical Staff Made Aware Reported all findings to nursing staff  Assessment   Assessment Pt was seen for skilled OT with focus on completion of self care tasks, functional mobility, review of fall prevention and review of current plan of care  Pt reports incontinence of bladder upon greeting this tx session  Extended time provided to complete self care routine with focus on Pt's engagement in activities with frequent rest breaks provided  Pt with increased SOB noted with attempts to complete UE bathing activity   Increased A provided with cues to take rest breaks with use of compensatory breathing techs  Max A overall for LE self care due to increased fatigue levels and limited functional reach  Poor hygiene noted following self toileting warranting need for increased A  Pt reports enjoying the company of her dogs and hopes to return to her son's house upon discharge  Educated Pt on current need for increased A and need for strengthening as tolerated to promote independence with self care tasks and daily routine  Pt reports having some difficulty taking medications and may benefit from daily assistance and further cognitive retraining due to noted deficits with personal hygiene and tending to her medical needs in the home environment  Pt may benefit from further rehab with focus on achieving optimal performance levels with all functional tasks  Continue to recommend Inpatient rehab   Plan   Treatment Interventions ADL retraining;Functional transfer training; Endurance training;Cognitive reorientation;UE strengthening/ROM   Goal Expiration Date 02/24/19   Treatment Day 3   OT Frequency 3-5x/wk   Recommendation   OT Discharge Recommendation Short Term Rehab  (Pt prefers home at her son's house  )   Barthel Index   Feeding 10   Bathing 0   Grooming Score 5   Dressing Score 5   Bladder Score 10   Bowels Score 10   Toilet Use Score 5   Transfers (Bed/Chair) Score 10   Mobility (Level Surface) Score 0   Stairs Score 0   [place] Index Score 55   Modified [place] Scale   Modified [place] Scale 4   [place], [address]

## 2019-02-19 NOTE — PLAN OF CARE
Problem: Potential for Falls  Goal: Patient will remain free of falls  Description  INTERVENTIONS:  - Assess patient frequently for physical needs  -  Identify cognitive and physical deficits and behaviors that affect risk of falls  -  [place] fall precautions as indicated by assessment   - Educate patient/family on patient safety including physical limitations  - Instruct patient to call for assistance with activity based on assessment  - Modify environment to reduce risk of injury  - Consider OT/PT consult to assist with strengthening/mobility   Outcome: Progressing     Problem: Nutrition/Hydration-ADULT  Goal: Nutrient/Hydration intake appropriate for improving, restoring or maintaining nutritional needs  Description  Monitor and assess patient's nutrition/hydration status for malnutrition (ex- brittle hair, bruises, dry skin, pale skin and conjunctiva, muscle wasting, smooth red tongue, and disorientation)  Collaborate with interdisciplinary team and initiate plan and interventions as ordered  Monitor patient's weight and dietary intake as ordered or per policy  Utilize nutrition screening tool and intervene per policy  Determine patient's food preferences and provide high-protein, high-caloric foods as appropriate       INTERVENTIONS:  - Monitor oral intake, urinary output, labs, and treatment plans  - Assess nutrition and hydration status and recommend course of action  - Evaluate amount of meals eaten  - Assist patient with eating if necessary   - Allow adequate time for meals  - Recommend/ encourage appropriate diets, oral nutritional supplements, and vitamin/mineral supplements  - Order, calculate, and assess calorie counts as needed  - Recommend, monitor, and adjust tube feedings and TPN/PPN based on assessed needs  - Assess need for intravenous fluids  - Provide specific nutrition/hydration education as appropriate  - Include patient/family/caregiver in decisions related to nutrition   Outcome: Progressing     Problem: Prexisting or High Potential for Compromised Skin Integrity  Goal: Skin integrity is maintained or improved  Description  INTERVENTIONS:  - Identify patients at risk for skin breakdown  - Assess and monitor skin integrity  - Assess and monitor nutrition and hydration status  - Monitor labs (i e  albumin)  - Assess for incontinence   - Turn and reposition patient  - Assist with mobility/ambulation  - Relieve pressure over bony prominences  - Avoid friction and shearing  - Provide appropriate hygiene as needed including keeping skin clean and dry  - Evaluate need for skin moisturizer/barrier cream  - Collaborate with interdisciplinary team (i e  Nutrition, Rehabilitation, etc )   - Patient/family teaching   Outcome: Progressing     Problem: PAIN - ADULT  Goal: Verbalizes/displays adequate comfort level or baseline comfort level  Description  Interventions:  - Encourage patient to monitor pain and request assistance  - Assess pain using appropriate pain scale  - Administer analgesics based on type and severity of pain and evaluate response  - Implement non-pharmacological measures as appropriate and evaluate response  - Consider cultural and social influences on pain and pain management  - Notify physician/advanced practitioner if interventions unsuccessful or patient reports new pain   Outcome: Progressing     Problem: INFECTION - ADULT  Goal: Absence or prevention of progression during hospitalization  Description  INTERVENTIONS:  - Assess and monitor for signs and symptoms of infection  - Monitor lab/diagnostic results  - Monitor all insertion sites, i e  indwelling lines, tubes, and drains  - Monitor endotracheal (as able) and nasal secretions for changes in amount and color  - [place] appropriate cooling/warming therapies per order  - Administer medications as ordered  - Instruct and encourage patient and family to use good hand hygiene technique  - Identify and instruct in appropriate isolation precautions for identified infection/condition   Outcome: Progressing  Goal: Absence of fever/infection during neutropenic period  Description  INTERVENTIONS:  - Monitor WBC  - Implement neutropenic guidelines   Outcome: Progressing     Problem: SAFETY ADULT  Goal: Maintain or return to baseline ADL function  Description  INTERVENTIONS:  -  Assess patient's ability to carry out ADLs; assess patient's baseline for ADL function and identify physical deficits which impact ability to perform ADLs (bathing, care of mouth/teeth, toileting, grooming, dressing, etc )  - Assess/evaluate cause of self-care deficits   - Assess range of motion  - Assess patient's mobility; develop plan if impaired  - Assess patient's need for assistive devices and provide as appropriate  - Encourage maximum independence but intervene and supervise when necessary  ¯ Involve family in performance of ADLs  ¯ Assess for home care needs following discharge   ¯ Request OT consult to assist with ADL evaluation and planning for discharge  ¯ Provide patient education as appropriate   Outcome: Progressing  Goal: Maintain or return mobility status to optimal level  Description  INTERVENTIONS:  - Assess patient's baseline mobility status (ambulation, transfers, stairs, etc )    - Identify cognitive and physical deficits and behaviors that affect mobility  - Identify mobility aids required to assist with transfers and/or ambulation (gait belt, sit-to-stand, lift, walker, cane, etc )  - [place] fall precautions as indicated by assessment  - Record patient progress and toleration of activity level on Mobility SBAR; progress patient to next Phase/Stage  - Instruct patient to call for assistance with activity based on assessment  - Request Rehabilitation consult to assist with strengthening/weightbearing, etc    Outcome: Progressing     Problem: DISCHARGE PLANNING  Goal: Discharge to home or other facility with appropriate resources  Description  INTERVENTIONS:  - Identify barriers to discharge w/patient and caregiver  - Arrange for needed discharge resources and transportation as appropriate  - Identify discharge learning needs (meds, wound care, etc )  - Arrange for interpretive services to assist at discharge as needed  - Refer to Case Management Department for coordinating discharge planning if the patient needs post-hospital services based on physician/advanced practitioner order or complex needs related to functional status, cognitive ability, or social support system   Outcome: Progressing     Problem: Knowledge Deficit  Goal: Patient/family/caregiver demonstrates understanding of disease process, treatment plan, medications, and discharge instructions  Description  Complete learning assessment and assess knowledge base  Interventions:  - Provide teaching at level of understanding  - Provide teaching via preferred learning methods   Outcome: Progressing     Problem: CARDIOVASCULAR - ADULT  Goal: Maintains optimal cardiac output and hemodynamic stability  Description  INTERVENTIONS:  - Monitor I/O, vital signs and rhythm  - Monitor for S/S and trends of decreased cardiac output i e  bleeding, hypotension  - Administer and titrate ordered vasoactive medications to optimize hemodynamic stability  - Assess quality of pulses, skin color and temperature  - Assess for signs of decreased coronary artery perfusion - ex   Angina  - Instruct patient to report change in severity of symptoms   Outcome: Progressing  Goal: Absence of cardiac dysrhythmias or at baseline rhythm  Description  INTERVENTIONS:  - Continuous cardiac monitoring, monitor vital signs, obtain 12 lead EKG if indicated  - Administer antiarrhythmic and heart rate control medications as ordered  - Monitor electrolytes and administer replacement therapy as ordered   Outcome: Progressing     Problem: METABOLIC, FLUID AND ELECTROLYTES - ADULT  Goal: Electrolytes maintained within normal limits  Description  INTERVENTIONS:  - Monitor labs and assess patient for signs and symptoms of electrolyte imbalances  - Administer electrolyte replacement as ordered  - Monitor response to electrolyte replacements, including repeat lab results as appropriate  - Instruct patient on fluid and nutrition as appropriate   Outcome: Progressing  Goal: Fluid balance maintained  Description  INTERVENTIONS:  - Monitor labs and assess for signs and symptoms of volume excess or deficit  - Monitor I/O and WT  - Instruct patient on fluid and nutrition as appropriate   Outcome: Progressing  Goal: Glucose maintained within target range  Description  INTERVENTIONS:  - Monitor Blood Glucose as ordered  - Assess for signs and symptoms of hyperglycemia and hypoglycemia  - Administer ordered medications to maintain glucose within target range  - Assess nutritional intake and initiate nutrition service referral as needed   Outcome: Progressing     Problem: SKIN/TISSUE INTEGRITY - ADULT  Goal: Skin integrity remains intact  Description  INTERVENTIONS  - Identify patients at risk for skin breakdown  - Assess and monitor skin integrity  - Assess and monitor nutrition and hydration status  - Monitor labs (i e  albumin)  - Assess for incontinence   - Turn and reposition patient  - Assist with mobility/ambulation  - Relieve pressure over bony prominences  - Avoid friction and shearing  - Provide appropriate hygiene as needed including keeping skin clean and dry  - Evaluate need for skin moisturizer/barrier cream  - Collaborate with interdisciplinary team (i e  Nutrition, Rehabilitation, etc )   - Patient/family teaching   Outcome: Progressing  Goal: Incision(s), wounds(s) or drain site(s) healing without S/S of infection  Description  INTERVENTIONS  - Assess and document risk factors for skin impairment   - Assess and document dressing, incision, wound bed, drain sites and surrounding tissue  - Initiate Nutrition services consult and/or wound management as needed   Outcome: Progressing     Problem: DISCHARGE PLANNING - CARE MANAGEMENT  Goal: Discharge to post-acute care or home with appropriate resources  Description  INTERVENTIONS:  - Conduct assessment to determine patient/family and health care team treatment goals, and need for post-acute services based on payer coverage, community resources, and patient preferences, and barriers to discharge  - Address psychosocial, clinical, and financial barriers to discharge as identified in assessment in conjunction with the patient/family and health care team  - Arrange appropriate level of post-acute services according to patient's   needs and preference and payer coverage in collaboration with the physician and health care team  - Communicate with and update the patient/family, physician, and health care team regarding progress on the discharge plan  - Arrange appropriate transportation to post-acute venues  - Pt wishes to d/c with home health care at this time   Outcome: Progressing

## 2019-02-19 NOTE — ASSESSMENT & PLAN NOTE
· Would benefit from weight loss  As well as  evaluation in a gastric bypass Clinic  Body mass index is 47 55 kg/m²

## 2019-02-20 LAB
ACETOHEXAMIDE SERPL-MCNC: NEGATIVE UG/ML (ref 20–60)
ANION GAP SERPL CALCULATED.3IONS-SCNC: 2 MMOL/L (ref 4–13)
BUN SERPL-MCNC: 54 MG/DL (ref 5–25)
CALCIUM SERPL-MCNC: 8.6 MG/DL (ref 8.3–10.1)
CHLORIDE SERPL-SCNC: 97 MMOL/L (ref 100–108)
CHLORPROPAMIDE SERPL-MCNC: NEGATIVE UG/ML (ref 75–250)
CO2 SERPL-SCNC: 43 MMOL/L (ref 21–32)
CREAT SERPL-MCNC: 1.26 MG/DL (ref 0.6–1.3)
GFR SERPL CREATININE-BSD FRML MDRD: 47 ML/MIN/1.73SQ M
GLIMEPIRIDE SERPL-MCNC: NEGATIVE NG/ML (ref 80–250)
GLIPIZIDE SERPL-MCNC: NEGATIVE NG/ML (ref 200–1000)
GLUCOSE SERPL-MCNC: 114 MG/DL (ref 65–140)
GLUCOSE SERPL-MCNC: 117 MG/DL (ref 65–140)
GLUCOSE SERPL-MCNC: 129 MG/DL (ref 65–140)
GLUCOSE SERPL-MCNC: 139 MG/DL (ref 65–140)
GLUCOSE SERPL-MCNC: 203 MG/DL (ref 65–140)
GLUCOSE SERPL-MCNC: 243 MG/DL (ref 65–140)
GLYBURIDE SERPL-MCNC: NEGATIVE NG/ML
NATEGLINIDE SERPL-MCNC: NEGATIVE NG/ML
POTASSIUM SERPL-SCNC: 4 MMOL/L (ref 3.5–5.3)
REPAGLINIDE SERPL-MCNC: NEGATIVE NG/ML
SODIUM SERPL-SCNC: 142 MMOL/L (ref 136–145)
TOLAZAMIDE SERPL-MCNC: NEGATIVE UG/ML
TOLBUTAMIDE SERPL-MCNC: NEGATIVE UG/ML (ref 40–100)

## 2019-02-20 PROCEDURE — 94660 CPAP INITIATION&MGMT: CPT

## 2019-02-20 PROCEDURE — 94761 N-INVAS EAR/PLS OXIMETRY MLT: CPT

## 2019-02-20 PROCEDURE — 80048 BASIC METABOLIC PNL TOTAL CA: CPT | Performed by: HOSPITALIST

## 2019-02-20 PROCEDURE — 82948 REAGENT STRIP/BLOOD GLUCOSE: CPT

## 2019-02-20 PROCEDURE — 99232 SBSQ HOSP IP/OBS MODERATE 35: CPT | Performed by: HOSPITALIST

## 2019-02-20 RX ORDER — FUROSEMIDE 10 MG/ML
40 INJECTION INTRAMUSCULAR; INTRAVENOUS
Status: DISCONTINUED | OUTPATIENT
Start: 2019-02-20 | End: 2019-02-21 | Stop reason: HOSPADM

## 2019-02-20 RX ADMIN — APIXABAN 5 MG: 5 TABLET, FILM COATED ORAL at 08:49

## 2019-02-20 RX ADMIN — NYSTATIN 1 APPLICATION: 100000 POWDER TOPICAL at 17:39

## 2019-02-20 RX ADMIN — FUROSEMIDE 40 MG: 10 INJECTION, SOLUTION INTRAMUSCULAR; INTRAVENOUS at 17:39

## 2019-02-20 RX ADMIN — FUROSEMIDE 40 MG: 10 INJECTION, SOLUTION INTRAMUSCULAR; INTRAVENOUS at 08:49

## 2019-02-20 RX ADMIN — METOPROLOL SUCCINATE 100 MG: 50 TABLET, EXTENDED RELEASE ORAL at 17:39

## 2019-02-20 RX ADMIN — LIDOCAINE 1 PATCH: 50 PATCH CUTANEOUS at 08:50

## 2019-02-20 RX ADMIN — DILTIAZEM HYDROCHLORIDE 120 MG: 120 CAPSULE, EXTENDED RELEASE ORAL at 08:49

## 2019-02-20 RX ADMIN — NYSTATIN 1 APPLICATION: 100000 POWDER TOPICAL at 08:50

## 2019-02-20 RX ADMIN — ACETAZOLAMIDE 250 MG: 250 TABLET ORAL at 08:50

## 2019-02-20 RX ADMIN — APIXABAN 5 MG: 5 TABLET, FILM COATED ORAL at 17:39

## 2019-02-20 RX ADMIN — ACETAZOLAMIDE 250 MG: 250 TABLET ORAL at 22:00

## 2019-02-20 RX ADMIN — METOPROLOL SUCCINATE 100 MG: 50 TABLET, EXTENDED RELEASE ORAL at 08:49

## 2019-02-20 NOTE — PHYSICAL THERAPY NOTE
PHYSICAL THERAPY NOTE          Patient Name: Jose De Jesus JEFFRIES Date: 2/20/2019  PT attempted treatment  Pt politely declined participation with physical therapy treatment at this time 2* "I'm beat " Pt reports that she was up walking earlier with respiratory therapy for home O2 assessment  Pt reports that she is going home tomorrow to her son's house and does not have any concerns  PT educated pt on the benefits of participating with therapy and the benefit from trialing stair training for entrance into son's home  Pt reports that she has a railing and there will be no problems with that  Pt continued to decline physical therapy treatment participation  PT continue to follow as schedule permits      Alicia Goldstein, PT,DPT

## 2019-02-20 NOTE — RESPIRATORY THERAPY NOTE
Home Oxygen Qualifying Test       Patient name: Jossie Shaikh        : 1960   Date of Test:  2019  Diagnosis:      Home Oxygen Test:    **Medicare Guidelines require item(s) 1-5 on all ambulatory patients or 1 and 2 on non-ambulatory patients  1   Baseline SPO2 on Room Air at rest 71 %  2   SPO2 during exercise on Room Air  %  During exercise monitor SpO2  If SPO2 increases >=89% with ambulation do not add supplemental             oxygen  If <= 88% on room air add O2 via NC and titrate patient  Patient must be ambulated with O2 and titrated to > 88% with exertion  3   SPO2 on Oxygen at rest 92 % 3 lpm     4   SPO2 during exercise on Oxygen  90% a liter flow of 5 lpm     5   Exercise performed: pt walked with walker for 6 min;  straight and talking instructed pursed- lipped breathing                   [x]  Supplemental Home Oxygen is indicated  []  Client does not qualify for home oxygen        Respiratory Additional Notes-   Kee Reis, RT

## 2019-02-20 NOTE — PLAN OF CARE
Problem: Potential for Falls  Goal: Patient will remain free of falls  Description  INTERVENTIONS:  - Assess patient frequently for physical needs  -  Identify cognitive and physical deficits and behaviors that affect risk of falls  -  [place] fall precautions as indicated by assessment   - Educate patient/family on patient safety including physical limitations  - Instruct patient to call for assistance with activity based on assessment  - Modify environment to reduce risk of injury  - Consider OT/PT consult to assist with strengthening/mobility   Outcome: Progressing     Problem: Nutrition/Hydration-ADULT  Goal: Nutrient/Hydration intake appropriate for improving, restoring or maintaining nutritional needs  Description  Monitor and assess patient's nutrition/hydration status for malnutrition (ex- brittle hair, bruises, dry skin, pale skin and conjunctiva, muscle wasting, smooth red tongue, and disorientation)  Collaborate with interdisciplinary team and initiate plan and interventions as ordered  Monitor patient's weight and dietary intake as ordered or per policy  Utilize nutrition screening tool and intervene per policy  Determine patient's food preferences and provide high-protein, high-caloric foods as appropriate       INTERVENTIONS:  - Monitor oral intake, urinary output, labs, and treatment plans  - Assess nutrition and hydration status and recommend course of action  - Evaluate amount of meals eaten  - Assist patient with eating if necessary   - Allow adequate time for meals  - Recommend/ encourage appropriate diets, oral nutritional supplements, and vitamin/mineral supplements  - Order, calculate, and assess calorie counts as needed  - Recommend, monitor, and adjust tube feedings and TPN/PPN based on assessed needs  - Assess need for intravenous fluids  - Provide specific nutrition/hydration education as appropriate  - Include patient/family/caregiver in decisions related to nutrition   Outcome: Progressing     Problem: Prexisting or High Potential for Compromised Skin Integrity  Goal: Skin integrity is maintained or improved  Description  INTERVENTIONS:  - Identify patients at risk for skin breakdown  - Assess and monitor skin integrity  - Assess and monitor nutrition and hydration status  - Monitor labs (i e  albumin)  - Assess for incontinence   - Turn and reposition patient  - Assist with mobility/ambulation  - Relieve pressure over bony prominences  - Avoid friction and shearing  - Provide appropriate hygiene as needed including keeping skin clean and dry  - Evaluate need for skin moisturizer/barrier cream  - Collaborate with interdisciplinary team (i e  Nutrition, Rehabilitation, etc )   - Patient/family teaching   Outcome: Progressing     Problem: PAIN - ADULT  Goal: Verbalizes/displays adequate comfort level or baseline comfort level  Description  Interventions:  - Encourage patient to monitor pain and request assistance  - Assess pain using appropriate pain scale  - Administer analgesics based on type and severity of pain and evaluate response  - Implement non-pharmacological measures as appropriate and evaluate response  - Consider cultural and social influences on pain and pain management  - Notify physician/advanced practitioner if interventions unsuccessful or patient reports new pain   Outcome: Progressing     Problem: INFECTION - ADULT  Goal: Absence or prevention of progression during hospitalization  Description  INTERVENTIONS:  - Assess and monitor for signs and symptoms of infection  - Monitor lab/diagnostic results  - Monitor all insertion sites, i e  indwelling lines, tubes, and drains  - Monitor endotracheal (as able) and nasal secretions for changes in amount and color  - [place] appropriate cooling/warming therapies per order  - Administer medications as ordered  - Instruct and encourage patient and family to use good hand hygiene technique  - Identify and instruct in appropriate isolation precautions for identified infection/condition   Outcome: Progressing  Goal: Absence of fever/infection during neutropenic period  Description  INTERVENTIONS:  - Monitor WBC  - Implement neutropenic guidelines   Outcome: Progressing     Problem: SAFETY ADULT  Goal: Maintain or return to baseline ADL function  Description  INTERVENTIONS:  -  Assess patient's ability to carry out ADLs; assess patient's baseline for ADL function and identify physical deficits which impact ability to perform ADLs (bathing, care of mouth/teeth, toileting, grooming, dressing, etc )  - Assess/evaluate cause of self-care deficits   - Assess range of motion  - Assess patient's mobility; develop plan if impaired  - Assess patient's need for assistive devices and provide as appropriate  - Encourage maximum independence but intervene and supervise when necessary  ¯ Involve family in performance of ADLs  ¯ Assess for home care needs following discharge   ¯ Request OT consult to assist with ADL evaluation and planning for discharge  ¯ Provide patient education as appropriate   Outcome: Progressing  Goal: Maintain or return mobility status to optimal level  Description  INTERVENTIONS:  - Assess patient's baseline mobility status (ambulation, transfers, stairs, etc )    - Identify cognitive and physical deficits and behaviors that affect mobility  - Identify mobility aids required to assist with transfers and/or ambulation (gait belt, sit-to-stand, lift, walker, cane, etc )  - [place] fall precautions as indicated by assessment  - Record patient progress and toleration of activity level on Mobility SBAR; progress patient to next Phase/Stage  - Instruct patient to call for assistance with activity based on assessment  - Request Rehabilitation consult to assist with strengthening/weightbearing, etc    Outcome: Progressing     Problem: DISCHARGE PLANNING  Goal: Discharge to home or other facility with appropriate resources  Description  INTERVENTIONS:  - Identify barriers to discharge w/patient and caregiver  - Arrange for needed discharge resources and transportation as appropriate  - Identify discharge learning needs (meds, wound care, etc )  - Arrange for interpretive services to assist at discharge as needed  - Refer to Case Management Department for coordinating discharge planning if the patient needs post-hospital services based on physician/advanced practitioner order or complex needs related to functional status, cognitive ability, or social support system   Outcome: Progressing     Problem: Knowledge Deficit  Goal: Patient/family/caregiver demonstrates understanding of disease process, treatment plan, medications, and discharge instructions  Description  Complete learning assessment and assess knowledge base  Interventions:  - Provide teaching at level of understanding  - Provide teaching via preferred learning methods   Outcome: Progressing     Problem: CARDIOVASCULAR - ADULT  Goal: Maintains optimal cardiac output and hemodynamic stability  Description  INTERVENTIONS:  - Monitor I/O, vital signs and rhythm  - Monitor for S/S and trends of decreased cardiac output i e  bleeding, hypotension  - Administer and titrate ordered vasoactive medications to optimize hemodynamic stability  - Assess quality of pulses, skin color and temperature  - Assess for signs of decreased coronary artery perfusion - ex   Angina  - Instruct patient to report change in severity of symptoms   Outcome: Progressing  Goal: Absence of cardiac dysrhythmias or at baseline rhythm  Description  INTERVENTIONS:  - Continuous cardiac monitoring, monitor vital signs, obtain 12 lead EKG if indicated  - Administer antiarrhythmic and heart rate control medications as ordered  - Monitor electrolytes and administer replacement therapy as ordered   Outcome: Progressing     Problem: METABOLIC, FLUID AND ELECTROLYTES - ADULT  Goal: Electrolytes maintained within normal limits  Description  INTERVENTIONS:  - Monitor labs and assess patient for signs and symptoms of electrolyte imbalances  - Administer electrolyte replacement as ordered  - Monitor response to electrolyte replacements, including repeat lab results as appropriate  - Instruct patient on fluid and nutrition as appropriate   Outcome: Progressing  Goal: Fluid balance maintained  Description  INTERVENTIONS:  - Monitor labs and assess for signs and symptoms of volume excess or deficit  - Monitor I/O and WT  - Instruct patient on fluid and nutrition as appropriate   Outcome: Progressing  Goal: Glucose maintained within target range  Description  INTERVENTIONS:  - Monitor Blood Glucose as ordered  - Assess for signs and symptoms of hyperglycemia and hypoglycemia  - Administer ordered medications to maintain glucose within target range  - Assess nutritional intake and initiate nutrition service referral as needed   Outcome: Progressing     Problem: SKIN/TISSUE INTEGRITY - ADULT  Goal: Skin integrity remains intact  Description  INTERVENTIONS  - Identify patients at risk for skin breakdown  - Assess and monitor skin integrity  - Assess and monitor nutrition and hydration status  - Monitor labs (i e  albumin)  - Assess for incontinence   - Turn and reposition patient  - Assist with mobility/ambulation  - Relieve pressure over bony prominences  - Avoid friction and shearing  - Provide appropriate hygiene as needed including keeping skin clean and dry  - Evaluate need for skin moisturizer/barrier cream  - Collaborate with interdisciplinary team (i e  Nutrition, Rehabilitation, etc )   - Patient/family teaching   Outcome: Progressing  Goal: Incision(s), wounds(s) or drain site(s) healing without S/S of infection  Description  INTERVENTIONS  - Assess and document risk factors for skin impairment   - Assess and document dressing, incision, wound bed, drain sites and surrounding tissue  - Initiate Nutrition services consult and/or wound management as needed   Outcome: Progressing     Problem: DISCHARGE PLANNING - CARE MANAGEMENT  Goal: Discharge to post-acute care or home with appropriate resources  Description  INTERVENTIONS:  - Conduct assessment to determine patient/family and health care team treatment goals, and need for post-acute services based on payer coverage, community resources, and patient preferences, and barriers to discharge  - Address psychosocial, clinical, and financial barriers to discharge as identified in assessment in conjunction with the patient/family and health care team  - Arrange appropriate level of post-acute services according to patient's   needs and preference and payer coverage in collaboration with the physician and health care team  - Communicate with and update the patient/family, physician, and health care team regarding progress on the discharge plan  - Arrange appropriate transportation to post-acute venues  - Pt wishes to d/c with home health care at this time   Outcome: Progressing

## 2019-02-20 NOTE — ASSESSMENT & PLAN NOTE
· X-ray of lumbar spine showing L1 compression  · Continue lidoderm patch, heating pad  · PRN robaxin resumed  · No neuro deficits on exam  No paresthesias    ·

## 2019-02-20 NOTE — ASSESSMENT & PLAN NOTE
Lab Results   Component Value Date    CREATININE 1 26 02/20/2019    CREATININE 1 28 02/19/2019    CREATININE 1 18 02/18/2019    CREATININE 1 29 02/17/2019     · Creatinine at baseline  Continue to monitor with IV diuresis  · SAPNA (documented previously) was ruled out   Did not meet criteria- felt to represent renal insufficiency on chronic kidney disease

## 2019-02-20 NOTE — ASSESSMENT & PLAN NOTE
· cardiology input appreciated  Continue IV diuresis-dose of Lasix reduced to 40 mg IV daily almost at dry weight of 274  Current weight of 275-plan to switch to oral diuretics today  · S/P metazolone x3 days  ·  ECHO without systolic dysfunction  · Weight today to 275  Goal weight 274  · Daily weights, I/O  · Needs lasix compliance on discharge  · Patient with incresed CO2 on BMP likely combination of contraction alkalosis and compensation for respiratory acidosis as patient is also noncompliant    Plan to add Diamox      Intake/Output Summary (Last 24 hours) at 2/20/2019 1019  Last data filed at 2/20/2019 0902  Gross per 24 hour   Intake 1200 ml   Output 885 ml   Net 315 ml     Wt Readings from Last 3 Encounters:   02/20/19 125 kg (275 lb 9 2 oz)   02/07/19 (!) 147 kg (323 lb)   03/28/18 (!) 141 kg (310 lb)

## 2019-02-20 NOTE — PROGRESS NOTES
Progress Note - Elly Rico 1960, 63 y o  female MRN: [id number]    Unit/Bed#: E4 -01 Encounter: [id number]    Primary Care Provider: Marya Laurent MD   Date and time admitted to hospital: 2/8/2019 12:18 PM        * Acute on chronic respiratory failure with hypoxia and hypercapnia (HCC)  Assessment & Plan  · Improving  · Currently on 2 L of nasal cannula  · Room air saturation during admission 72%  · ABG shows hypercapnia and the patient has hemoconcentration, elevated CO2 and hemoglobin chronically suggest degree of chronic hypercapnia  · Seen by Pulmonary  Refuses BiPAP  · Patient does not use home O2 or CPAP/BIPAP  · Plan to change to oral diuretics from today  · Will assess for home O2  Plan to DC home today if home O2 eval and transition to oral diuretics completed  · Patient did not want to wear BIPAP at night, explained that she is risking becoming hypercapnic and risking having to     Acute on chronic diastolic congestive heart failure [facility])  Assessment & Plan  · cardiology input appreciated  Continue IV diuresis-dose of Lasix reduced to 40 mg IV daily almost at dry weight of 274  Current weight of 275-plan to switch to oral diuretics today  · S/P metazolone x3 days  ·  ECHO without systolic dysfunction  · Weight today to 275  Goal weight 274  · Daily weights, I/O  · Needs lasix compliance on discharge  · Patient with incresed CO2 on BMP likely combination of contraction alkalosis and compensation for respiratory acidosis as patient is also noncompliant    Plan to add Diamox      Intake/Output Summary (Last 24 hours) at 2/20/2019 1019  Last data filed at 2/20/2019 0902  Gross per 24 hour   Intake 1200 ml   Output 885 ml   Net 315 ml     Wt Readings from Last 3 Encounters:   02/20/19 125 kg (275 lb 9 2 oz)   02/07/19 (!) 147 kg (323 lb)   03/28/18 (!) 141 kg (310 lb)         COPD (chronic obstructive pulmonary disease) ([facility] [address] )  Assessment & Plan  · Does not use oxygen at home  · Spokes 1 pack every 3 days, smoked since age 16  · Suspect chronic hypercapnic resp failure  · PRN xopenex  · No acute exac  Will assess for home O2 needs today  Chronic venous stasis dermatitis of both lower extremities  Assessment & Plan  · Wound care, podiatry and dermatology evaluated  · Wound cultures obtained by dermatology growing multiple organisms- felt to be skin colonization  · ID consulted and recommended stopping ABX  · ID consultation appreciated, no further antibiotics recommended  · BLE elevation, edema control is imperative  · Stable for discharge per podiatry-will follow up with the [address] on discharge    Permanent atrial fibrillation [facility])  Assessment & Plan  · Patient on Eliquis 5 mg BID  · Rate controlled with BB and CCB    Type 2 diabetes mellitus with hypoglycemia [facility])  Assessment & Plan  Lab Results   Component Value Date    HGBA1C 6 2 02/14/2019       Recent Labs     02/19/19  1536 02/19/19  2046 02/20/19  0050 02/20/19  0714   POCGLU 146* 191* 203* 117       Blood Sugar Average: Last 72 hrs:  (P) 335 [id number]   · Patient noted to be hypoglycemic on admission which has now resolved  Dextrose stopped yesterday and glucoses 100-200s  · She had not had insulin for greater than 24 hours at which point she had had 2 units of Humalog  · This is the only insulin she had required during her hospital stay  · Discussed with endo - unclear if real event vs  Inaccurate readings on POC glucose testing  · TSH WNL  Await AM cortisol level of 22  · If patient drops below 55 obtain BMP, insulin level, proinsulin level, C-peptide, cortisol, sulfylurea panel, insulin antibody and treat with 1 mg of glucagon  · Treat with glucagon ssess response 20-25 mins post glucagon  · No history of gastric bypass  · A1C 6 3  · Blood sugars now stable  Hypertension  Assessment & Plan  · Controlled by metoprolol 100 mg b i d , diltiazem 120 mg daily    ·     Obesity  Assessment & Plan  · Would benefit from weight loss  As well as  evaluation in a gastric bypass Clinic  Body mass index is 47 3 kg/m²  Stage 3 chronic kidney disease [facility])  Assessment & Plan  Lab Results   Component Value Date    CREATININE 1 26 02/20/2019    CREATININE 1 28 02/19/2019    CREATININE 1 18 02/18/2019    CREATININE 1 29 02/17/2019     · Creatinine at baseline  Continue to monitor with IV diuresis  · SAPNA (documented previously) was ruled out  Did not meet criteria- felt to represent renal insufficiency on chronic kidney disease    Transaminitis  Assessment & Plan  Lab Results   Component Value Date    AST 18 02/15/2019    AST 18 02/14/2019    ALT 6 (L) 02/15/2019    ALT <6 (L) 02/14/2019    TBILI 1 22 (H) 02/15/2019    TBILI 1 67 (H) 02/14/2019     · Patient had hyperbilirubemia to 4 12 on admission  · US abdomen: CBD measures 8 mm proximally and tapers down to 4 4 mm distally  The distal most CBD is not visualized  · Bili trending down to 1 28, no evidence of abdominal pain to suggest passed stone  · Patient with prior cholecystectomy - not complaining of abdominal pain at this time  Compression fracture of first lumbar vertebra [facility])  Assessment & Plan  · X-ray of lumbar spine showing L1 compression  · Continue lidoderm patch, heating pad  · PRN robaxin resumed  · No neuro deficits on exam  No paresthesias  ·     Depression  Assessment & Plan  · Patient previously took Zoloft at home, however, has not been taking it       Plan to DC home today once home O2 eval done    Subjective:  Patient very keen to go home today  My legs have not looked this good since I was 31  Will be assessed for home O2 prior to DC  Physical Exam:   Vitals: Blood pressure 120/79, pulse 86, temperature (!) 96 4 °F (35 8 °C), temperature source Tympanic, resp  rate 20, height 5' 4" (1 626 m), weight 125 kg (275 lb 9 2 oz), SpO2 92 %  ,Body mass index is 47 3 kg/m²  Gen:  Pleasant, non-tachypnic, non-dyspnic  Conversant  Obese  Heart: regular rate and rhythm, S1S2 present, no murmur, rub or gallop  Lungs: clear to ausculatation bilaterally  No wheezing, crackless, or rhonchi  No accessory muscle use or respiratory distress  Abd: soft, non-tender, non-distended  NABS, no guarding, rebound or peritoneal signs  Extremities: no clubbing, cyanosis , 2 +edema  2+pedal pulses bilaterally  Full range of motion  Neuro: awake, alert and oriented  Cranial nerves 2-12 intact  Strength and sensation grossly intact  Skin: warm and dry: no petechiae, purpura and rash      LABS:   Results from last 7 days   Lab Units 02/18/19  0515 02/17/19  0524 02/14/19  0350   WBC Thousand/uL 6 09 7 29 8 50   HEMOGLOBIN g/dL 17 5* 17 4* 17 1*   HEMATOCRIT % 59 9* 60 1* 59 8*   PLATELETS Thousands/uL 104* 114* 131*     Results from last 7 days   Lab Units 02/20/19  0641 02/19/19  0441 02/18/19  0515   POTASSIUM mmol/L 4 0 3 8 5 6*   CHLORIDE mmol/L 97* 95* 94*   CO2 mmol/L 43* 43* >45*   BUN mg/dL 54* 51* 52*   CREATININE mg/dL 1 26 1 28 1 18   CALCIUM mg/dL 8 6 8 4 8 6       Intake/Output Summary (Last 24 hours) at 2/20/2019 1023  Last data filed at 2/20/2019 0902  Gross per 24 hour   Intake 1200 ml   Output 885 ml   Net 315 ml           Current Facility-Administered Medications:  acetaminophen 975 mg Oral Q8H PRN MAYNOR Altamirano   acetaZOLAMIDE 250 mg Oral Q12H [address] Cristian Dickey MD   albuterol 2 puff Inhalation Q4H PRN Ajay Vega PA-C   apixaban 5 mg Oral BID Brielle Yang PA-C   diltiazem 120 mg Oral Daily Brielle Yang PA-C   furosemide 40 mg Intravenous Daily Cristian Dickey MD   levalbuterol 1 25 mg Nebulization Q6H PRN Kaleb Villagomez MD   lidocaine 1 patch Topical Daily Brielle Yang PA-C   methocarbamol 500 mg Oral Q8H PRN Vickie Diasio, PA-C   metoprolol succinate 100 mg Oral BID Ajay Vega PA-C   [START ON 2/21/2019] mupirocin  Topical Every Other Day Lion Lai MD   nicotine 1 patch Transdermal Daily Alejandra Miller IOANA Yang   nystatin 1 application Topical BID Brielle Yang PA-C   ondansetron 4 mg Intravenous Q8H PRN Brielle Yang PA-C   sodium chloride 3 mL Nebulization Q6H PRN Roberto Carlos Waddell MD

## 2019-02-20 NOTE — SOCIAL WORK
Pt qualifies for home oxygen  Cm faxed home oxygen eval and oxygen order to [employer] to deliver oxygen by tomorrow morning so pt can be transported by her son  Pt does not wish to take BLS and feels as long as someone teaches her how to use portable tank she will be fine  Pt also communicated with Josselin Muñiz pt's OP , Neda Oneil stated she will set pt up with [facility] once she is back in [facility] after a few days at son's home, RN/PT/OT    Cm following

## 2019-02-20 NOTE — ASSESSMENT & PLAN NOTE
· Would benefit from weight loss  As well as  evaluation in a gastric bypass Clinic  Body mass index is 47 3 kg/m²

## 2019-02-20 NOTE — ASSESSMENT & PLAN NOTE
· Improving  · Currently on 2 L of nasal cannula  · Room air saturation during admission 72%  · ABG shows hypercapnia and the patient has hemoconcentration, elevated CO2 and hemoglobin chronically suggest degree of chronic hypercapnia  · Seen by Pulmonary  Refuses BiPAP  · Patient does not use home O2 or CPAP/BIPAP  · Plan to change to oral diuretics from today  · Will assess for home O2    Plan to DC home today if home O2 eval and transition to oral diuretics completed  · Patient did not want to wear BIPAP at night, explained that she is risking becoming hypercapnic and risking having to

## 2019-02-20 NOTE — ASSESSMENT & PLAN NOTE
Lab Results   Component Value Date    HGBA1C 6 2 02/14/2019       Recent Labs     02/19/19  1536 02/19/19  2046 02/20/19  0050 02/20/19  0714   POCGLU 146* 191* 203* 117       Blood Sugar Average: Last 72 hrs:  (P) 528 [id number]   · Patient noted to be hypoglycemic on admission which has now resolved  Dextrose stopped yesterday and glucoses 100-200s  · She had not had insulin for greater than 24 hours at which point she had had 2 units of Humalog  · This is the only insulin she had required during her hospital stay  · Discussed with endo - unclear if real event vs  Inaccurate readings on POC glucose testing  · TSH WNL  Await AM cortisol level of 22  · If patient drops below 55 obtain BMP, insulin level, proinsulin level, C-peptide, cortisol, sulfylurea panel, insulin antibody and treat with 1 mg of glucagon  · Treat with glucagon ssess response 20-25 mins post glucagon  · No history of gastric bypass  · A1C 6 3  · Blood sugars now stable

## 2019-02-21 ENCOUNTER — TRANSITIONAL CARE MANAGEMENT (OUTPATIENT)
Dept: INTERNAL MEDICINE CLINIC | Facility: CLINIC | Age: 59
End: 2019-02-21

## 2019-02-21 ENCOUNTER — PATIENT OUTREACH (OUTPATIENT)
Dept: INTERNAL MEDICINE CLINIC | Facility: CLINIC | Age: 59
End: 2019-02-21

## 2019-02-21 VITALS
TEMPERATURE: 96.6 F | BODY MASS INDEX: 46.93 KG/M2 | HEART RATE: 88 BPM | SYSTOLIC BLOOD PRESSURE: 112 MMHG | HEIGHT: 64 IN | RESPIRATION RATE: 20 BRPM | OXYGEN SATURATION: 96 % | WEIGHT: 274.91 LBS | DIASTOLIC BLOOD PRESSURE: 82 MMHG

## 2019-02-21 PROBLEM — I50.33 ACUTE ON CHRONIC DIASTOLIC CONGESTIVE HEART FAILURE (HCC): Status: RESOLVED | Noted: 2019-02-08 | Resolved: 2019-02-21

## 2019-02-21 LAB
GLUCOSE SERPL-MCNC: 148 MG/DL (ref 65–140)
GLUCOSE SERPL-MCNC: 170 MG/DL (ref 65–140)

## 2019-02-21 PROCEDURE — 99232 SBSQ HOSP IP/OBS MODERATE 35: CPT | Performed by: INTERNAL MEDICINE

## 2019-02-21 PROCEDURE — 99239 HOSP IP/OBS DSCHRG MGMT >30: CPT | Performed by: HOSPITALIST

## 2019-02-21 PROCEDURE — 82948 REAGENT STRIP/BLOOD GLUCOSE: CPT

## 2019-02-21 RX ORDER — ACETAZOLAMIDE 250 MG/1
250 TABLET ORAL EVERY 12 HOURS SCHEDULED
Qty: 60 TABLET | Refills: 0 | Status: SHIPPED | OUTPATIENT
Start: 2019-02-21 | End: 2019-03-19 | Stop reason: ALTCHOICE

## 2019-02-21 RX ORDER — TORSEMIDE 20 MG/1
40 TABLET ORAL
Qty: 120 TABLET | Refills: 0 | Status: SHIPPED | OUTPATIENT
Start: 2019-02-21 | End: 2019-04-11 | Stop reason: SINTOL

## 2019-02-21 RX ORDER — NICOTINE 21 MG/24HR
1 PATCH, TRANSDERMAL 24 HOURS TRANSDERMAL DAILY
Qty: 28 PATCH | Refills: 0 | Status: SHIPPED | OUTPATIENT
Start: 2019-02-22 | End: 2020-02-25

## 2019-02-21 RX ORDER — METHOCARBAMOL 500 MG/1
500 TABLET, FILM COATED ORAL EVERY 8 HOURS PRN
Qty: 30 TABLET | Refills: 0 | Status: SHIPPED | OUTPATIENT
Start: 2019-02-21 | End: 2019-03-08 | Stop reason: SDUPTHER

## 2019-02-21 RX ORDER — METOLAZONE 5 MG/1
5 TABLET ORAL 2 TIMES WEEKLY
Qty: 30 TABLET | Refills: 0 | Status: SHIPPED | OUTPATIENT
Start: 2019-02-21 | End: 2019-03-19 | Stop reason: ALTCHOICE

## 2019-02-21 RX ORDER — ACETAMINOPHEN 325 MG/1
975 TABLET ORAL EVERY 8 HOURS PRN
Qty: 30 TABLET | Refills: 0 | Status: SHIPPED | OUTPATIENT
Start: 2019-02-21 | End: 2019-02-27 | Stop reason: ALTCHOICE

## 2019-02-21 RX ORDER — TORSEMIDE 20 MG/1
40 TABLET ORAL
Status: DISCONTINUED | OUTPATIENT
Start: 2019-02-21 | End: 2019-02-21 | Stop reason: HOSPADM

## 2019-02-21 RX ADMIN — DILTIAZEM HYDROCHLORIDE 120 MG: 120 CAPSULE, EXTENDED RELEASE ORAL at 09:12

## 2019-02-21 RX ADMIN — APIXABAN 5 MG: 5 TABLET, FILM COATED ORAL at 09:12

## 2019-02-21 RX ADMIN — LIDOCAINE 1 PATCH: 50 PATCH CUTANEOUS at 09:12

## 2019-02-21 RX ADMIN — MUPIROCIN: 20 OINTMENT TOPICAL at 09:13

## 2019-02-21 RX ADMIN — ACETAZOLAMIDE 250 MG: 250 TABLET ORAL at 09:12

## 2019-02-21 RX ADMIN — METOPROLOL SUCCINATE 100 MG: 50 TABLET, EXTENDED RELEASE ORAL at 09:12

## 2019-02-21 RX ADMIN — NYSTATIN 1 APPLICATION: 100000 POWDER TOPICAL at 09:13

## 2019-02-21 RX ADMIN — FUROSEMIDE 40 MG: 10 INJECTION, SOLUTION INTRAMUSCULAR; INTRAVENOUS at 09:12

## 2019-02-21 NOTE — PLAN OF CARE
Problem: Potential for Falls  Goal: Patient will remain free of falls  Description  INTERVENTIONS:  - Assess patient frequently for physical needs  -  Identify cognitive and physical deficits and behaviors that affect risk of falls  -  [place] fall precautions as indicated by assessment   - Educate patient/family on patient safety including physical limitations  - Instruct patient to call for assistance with activity based on assessment  - Modify environment to reduce risk of injury  - Consider OT/PT consult to assist with strengthening/mobility   Outcome: Progressing     Problem: Nutrition/Hydration-ADULT  Goal: Nutrient/Hydration intake appropriate for improving, restoring or maintaining nutritional needs  Description  Monitor and assess patient's nutrition/hydration status for malnutrition (ex- brittle hair, bruises, dry skin, pale skin and conjunctiva, muscle wasting, smooth red tongue, and disorientation)  Collaborate with interdisciplinary team and initiate plan and interventions as ordered  Monitor patient's weight and dietary intake as ordered or per policy  Utilize nutrition screening tool and intervene per policy  Determine patient's food preferences and provide high-protein, high-caloric foods as appropriate       INTERVENTIONS:  - Monitor oral intake, urinary output, labs, and treatment plans  - Assess nutrition and hydration status and recommend course of action  - Evaluate amount of meals eaten  - Assist patient with eating if necessary   - Allow adequate time for meals  - Recommend/ encourage appropriate diets, oral nutritional supplements, and vitamin/mineral supplements  - Order, calculate, and assess calorie counts as needed  - Recommend, monitor, and adjust tube feedings and TPN/PPN based on assessed needs  - Assess need for intravenous fluids  - Provide specific nutrition/hydration education as appropriate  - Include patient/family/caregiver in decisions related to nutrition   Outcome: Progressing     Problem: Prexisting or High Potential for Compromised Skin Integrity  Goal: Skin integrity is maintained or improved  Description  INTERVENTIONS:  - Identify patients at risk for skin breakdown  - Assess and monitor skin integrity  - Assess and monitor nutrition and hydration status  - Monitor labs (i e  albumin)  - Assess for incontinence   - Turn and reposition patient  - Assist with mobility/ambulation  - Relieve pressure over bony prominences  - Avoid friction and shearing  - Provide appropriate hygiene as needed including keeping skin clean and dry  - Evaluate need for skin moisturizer/barrier cream  - Collaborate with interdisciplinary team (i e  Nutrition, Rehabilitation, etc )   - Patient/family teaching   Outcome: Progressing     Problem: PAIN - ADULT  Goal: Verbalizes/displays adequate comfort level or baseline comfort level  Description  Interventions:  - Encourage patient to monitor pain and request assistance  - Assess pain using appropriate pain scale  - Administer analgesics based on type and severity of pain and evaluate response  - Implement non-pharmacological measures as appropriate and evaluate response  - Consider cultural and social influences on pain and pain management  - Notify physician/advanced practitioner if interventions unsuccessful or patient reports new pain   Outcome: Progressing     Problem: INFECTION - ADULT  Goal: Absence or prevention of progression during hospitalization  Description  INTERVENTIONS:  - Assess and monitor for signs and symptoms of infection  - Monitor lab/diagnostic results  - Monitor all insertion sites, i e  indwelling lines, tubes, and drains  - Monitor endotracheal (as able) and nasal secretions for changes in amount and color  - [place] appropriate cooling/warming therapies per order  - Administer medications as ordered  - Instruct and encourage patient and family to use good hand hygiene technique  - Identify and instruct in appropriate isolation precautions for identified infection/condition   Outcome: Progressing  Goal: Absence of fever/infection during neutropenic period  Description  INTERVENTIONS:  - Monitor WBC  - Implement neutropenic guidelines   Outcome: Progressing     Problem: SAFETY ADULT  Goal: Maintain or return to baseline ADL function  Description  INTERVENTIONS:  -  Assess patient's ability to carry out ADLs; assess patient's baseline for ADL function and identify physical deficits which impact ability to perform ADLs (bathing, care of mouth/teeth, toileting, grooming, dressing, etc )  - Assess/evaluate cause of self-care deficits   - Assess range of motion  - Assess patient's mobility; develop plan if impaired  - Assess patient's need for assistive devices and provide as appropriate  - Encourage maximum independence but intervene and supervise when necessary  ¯ Involve family in performance of ADLs  ¯ Assess for home care needs following discharge   ¯ Request OT consult to assist with ADL evaluation and planning for discharge  ¯ Provide patient education as appropriate   Outcome: Progressing  Goal: Maintain or return mobility status to optimal level  Description  INTERVENTIONS:  - Assess patient's baseline mobility status (ambulation, transfers, stairs, etc )    - Identify cognitive and physical deficits and behaviors that affect mobility  - Identify mobility aids required to assist with transfers and/or ambulation (gait belt, sit-to-stand, lift, walker, cane, etc )  - [place] fall precautions as indicated by assessment  - Record patient progress and toleration of activity level on Mobility SBAR; progress patient to next Phase/Stage  - Instruct patient to call for assistance with activity based on assessment  - Request Rehabilitation consult to assist with strengthening/weightbearing, etc    Outcome: Progressing     Problem: DISCHARGE PLANNING  Goal: Discharge to home or other facility with appropriate resources  Description  INTERVENTIONS:  - Identify barriers to discharge w/patient and caregiver  - Arrange for needed discharge resources and transportation as appropriate  - Identify discharge learning needs (meds, wound care, etc )  - Arrange for interpretive services to assist at discharge as needed  - Refer to Case Management Department for coordinating discharge planning if the patient needs post-hospital services based on physician/advanced practitioner order or complex needs related to functional status, cognitive ability, or social support system   Outcome: Progressing     Problem: Knowledge Deficit  Goal: Patient/family/caregiver demonstrates understanding of disease process, treatment plan, medications, and discharge instructions  Description  Complete learning assessment and assess knowledge base  Interventions:  - Provide teaching at level of understanding  - Provide teaching via preferred learning methods   Outcome: Progressing     Problem: CARDIOVASCULAR - ADULT  Goal: Maintains optimal cardiac output and hemodynamic stability  Description  INTERVENTIONS:  - Monitor I/O, vital signs and rhythm  - Monitor for S/S and trends of decreased cardiac output i e  bleeding, hypotension  - Administer and titrate ordered vasoactive medications to optimize hemodynamic stability  - Assess quality of pulses, skin color and temperature  - Assess for signs of decreased coronary artery perfusion - ex   Angina  - Instruct patient to report change in severity of symptoms   Outcome: Progressing  Goal: Absence of cardiac dysrhythmias or at baseline rhythm  Description  INTERVENTIONS:  - Continuous cardiac monitoring, monitor vital signs, obtain 12 lead EKG if indicated  - Administer antiarrhythmic and heart rate control medications as ordered  - Monitor electrolytes and administer replacement therapy as ordered   Outcome: Progressing     Problem: METABOLIC, FLUID AND ELECTROLYTES - ADULT  Goal: Electrolytes maintained within normal limits  Description  INTERVENTIONS:  - Monitor labs and assess patient for signs and symptoms of electrolyte imbalances  - Administer electrolyte replacement as ordered  - Monitor response to electrolyte replacements, including repeat lab results as appropriate  - Instruct patient on fluid and nutrition as appropriate   Outcome: Progressing  Goal: Fluid balance maintained  Description  INTERVENTIONS:  - Monitor labs and assess for signs and symptoms of volume excess or deficit  - Monitor I/O and WT  - Instruct patient on fluid and nutrition as appropriate   Outcome: Progressing  Goal: Glucose maintained within target range  Description  INTERVENTIONS:  - Monitor Blood Glucose as ordered  - Assess for signs and symptoms of hyperglycemia and hypoglycemia  - Administer ordered medications to maintain glucose within target range  - Assess nutritional intake and initiate nutrition service referral as needed   Outcome: Progressing     Problem: SKIN/TISSUE INTEGRITY - ADULT  Goal: Skin integrity remains intact  Description  INTERVENTIONS  - Identify patients at risk for skin breakdown  - Assess and monitor skin integrity  - Assess and monitor nutrition and hydration status  - Monitor labs (i e  albumin)  - Assess for incontinence   - Turn and reposition patient  - Assist with mobility/ambulation  - Relieve pressure over bony prominences  - Avoid friction and shearing  - Provide appropriate hygiene as needed including keeping skin clean and dry  - Evaluate need for skin moisturizer/barrier cream  - Collaborate with interdisciplinary team (i e  Nutrition, Rehabilitation, etc )   - Patient/family teaching   Outcome: Progressing  Goal: Incision(s), wounds(s) or drain site(s) healing without S/S of infection  Description  INTERVENTIONS  - Assess and document risk factors for skin impairment   - Assess and document dressing, incision, wound bed, drain sites and surrounding tissue  - Initiate Nutrition services consult and/or wound management as needed   Outcome: Progressing     Problem: DISCHARGE PLANNING - CARE MANAGEMENT  Goal: Discharge to post-acute care or home with appropriate resources  Description  INTERVENTIONS:  - Conduct assessment to determine patient/family and health care team treatment goals, and need for post-acute services based on payer coverage, community resources, and patient preferences, and barriers to discharge  - Address psychosocial, clinical, and financial barriers to discharge as identified in assessment in conjunction with the patient/family and health care team  - Arrange appropriate level of post-acute services according to patient's   needs and preference and payer coverage in collaboration with the physician and health care team  - Communicate with and update the patient/family, physician, and health care team regarding progress on the discharge plan  - Arrange appropriate transportation to post-acute venues  - Pt wishes to d/c with home health care at this time   Outcome: Progressing

## 2019-02-21 NOTE — DISCHARGE INSTR - AVS FIRST PAGE
You qualify for home oxygen--3 L at rest and 6 L on ambulation  Please use oxygen at all times  Your also been started on Diamox tablet which she will take twice a day      Please to not stop any medications without consulting with your primary care physician    Your furosemide has been discontinued  You have been started on torsemide 40 mg twice a day  You will take you metolazone twice a week instead of daily on Mondays and Thursdays

## 2019-02-21 NOTE — ASSESSMENT & PLAN NOTE
Lab Results   Component Value Date    CREATININE 1 26 02/20/2019    CREATININE 1 28 02/19/2019    CREATININE 1 18 02/18/2019    CREATININE 1 29 02/17/2019     · Creatinine at baseline     ·

## 2019-02-21 NOTE — ASSESSMENT & PLAN NOTE
· Does not use oxygen at home  · Spokes 1 pack every 3 days, smoked since age 16  · Suspect chronic hypercapnic resp failure  · PRN xopenex  · No acute exac  Qualifies for home O2   6 L on ambulation and 3 L at rest   Patient strongly advised to use it all the time

## 2019-02-21 NOTE — ASSESSMENT & PLAN NOTE
· Would benefit from weight loss  As well as  evaluation in a gastric bypass Clinic  Body mass index is 47 19 kg/m²

## 2019-02-21 NOTE — ASSESSMENT & PLAN NOTE
· cardiology input appreciated  dry weight of 274  Current weight of 275- switched to oral diuretics today  ECHO without systolic dysfunction  · Weight today to 275  Goal weight 274  · Daily weights, I/O  · Needs lasix compliance on discharge  · Patient with incresed CO2 on BMP likely combination of contraction alkalosis and compensation for respiratory acidosis as patient is also noncompliant    Plan to add Diamox      Intake/Output Summary (Last 24 hours) at 2/21/2019 0922  Last data filed at 2/21/2019 0900  Gross per 24 hour   Intake 960 ml   Output 1555 ml   Net -595 ml     Wt Readings from Last 3 Encounters:   02/21/19 125 kg (274 lb 14 6 oz)   02/07/19 (!) 147 kg (323 lb)   03/28/18 (!) 141 kg (310 lb)

## 2019-02-21 NOTE — ASSESSMENT & PLAN NOTE
Lab Results   Component Value Date    HGBA1C 6 2 02/14/2019       Recent Labs     02/20/19  1109 02/20/19  1514 02/20/19  2041 02/21/19  0723   POCGLU 139 243* 129 170*       Blood Sugar Average: Last 72 hrs:  (P) 160 5625   · Patient noted to be hypoglycemic on admission which has now resolved  Dextrose stopped yesterday and glucoses 100-200s  · TSH WNL  AM cortisol level of 22  · If patient drops below 55 obtain BMP, insulin level, proinsulin level, C-peptide, cortisol, sulfylurea panel, insulin antibody and treat with 1 mg of glucagon  · Treat with glucagon ssess response 20-25 mins post glucagon  · No history of gastric bypass  · A1C 6 3  · Blood sugars now stable

## 2019-02-21 NOTE — PROGRESS NOTES
Progress Note - Telma Amezquita 1960, 63 y o  female MRN: [id number]    Unit/Bed#: E4 -01 Encounter: [id number]    Primary Care Provider: Dileep Ellis MD   Date and time admitted to hospital: 2/8/2019 12:18 PM        * Acute on chronic respiratory failure with hypoxia and hypercapnia ([address] )  Assessment & Plan  · Improving  · Room air saturation during admission 72%  · ABG shows hypercapnia and the patient has hemoconcentration, elevated CO2 and hemoglobin chronically suggest degree of chronic hypercapnia  · Seen by Pulmonary  Refuses BiPAP  changed to oral diuretics from today  · Qualifies for home O2   3 L at rest and 6 L on ambulation  ·   Patient did not want to wear BIPAP at night, explained that she is risking becoming hypercapnic and risking  going into respiratory failure    Acute on chronic diastolic congestive heart failure [facility])  Assessment & Plan  · cardiology input appreciated  dry weight of 274  Current weight of 275- switched to oral diuretics today  ECHO without systolic dysfunction  · Weight today to 275  Goal weight 274  · Daily weights, I/O  · Needs lasix compliance on discharge  · Patient with incresed CO2 on BMP likely combination of contraction alkalosis and compensation for respiratory acidosis as patient is also noncompliant    Plan to add Diamox      Intake/Output Summary (Last 24 hours) at 2/21/2019 6310  Last data filed at 2/21/2019 0900  Gross per 24 hour   Intake 960 ml   Output 1555 ml   Net -595 ml     Wt Readings from Last 3 Encounters:   02/21/19 125 kg (274 lb 14 6 oz)   02/07/19 (!) 147 kg (323 lb)   03/28/18 (!) 141 kg (310 lb)         COPD (chronic obstructive pulmonary disease) ([facility])  Assessment & Plan  · Does not use oxygen at home  · Spokes 1 pack every 3 days, smoked since age 16  · Suspect chronic hypercapnic resp failure  · PRN xopenex  · No acute exac  Qualifies for home O2   6 L on ambulation and 3 L at rest   Patient strongly advised to use it all the time  Chronic venous stasis dermatitis of both lower extremities  Assessment & Plan  · Wound care, podiatry and dermatology evaluated  · Wound cultures obtained by dermatology growing multiple organisms- felt to be skin colonization  · ID consulted and recommended stopping ABX  · ID consultation appreciated, no further antibiotics recommended  · BLE elevation, edema control is imperative  · Stable for discharge per podiatry-will follow up with the [address] on discharge    Permanent atrial fibrillation [facility])  Assessment & Plan  · Patient on Eliquis 5 mg BID  · Rate controlled with BB and CCB    Type 2 diabetes mellitus with hypoglycemia [facility])  Assessment & Plan  Lab Results   Component Value Date    HGBA1C 6 2 02/14/2019       Recent Labs     02/20/19  1109 02/20/19  1514 02/20/19  2041 02/21/19  0723   POCGLU 139 243* 129 170*       Blood Sugar Average: Last 72 hrs:  (P) 160 5625   · Patient noted to be hypoglycemic on admission which has now resolved  Dextrose stopped yesterday and glucoses 100-200s  · TSH WNL  AM cortisol level of 22  · If patient drops below 55 obtain BMP, insulin level, proinsulin level, C-peptide, cortisol, sulfylurea panel, insulin antibody and treat with 1 mg of glucagon  · Treat with glucagon ssess response 20-25 mins post glucagon  · No history of gastric bypass  · A1C 6 3  · Blood sugars now stable  Hypertension  Assessment & Plan  · Controlled by metoprolol 100 mg b i d , diltiazem 120 mg daily  ·     Obesity  Assessment & Plan  · Would benefit from weight loss  As well as  evaluation in a gastric bypass Clinic  Body mass index is 47 19 kg/m²  Stage 3 chronic kidney disease [facility])  Assessment & Plan  Lab Results   Component Value Date    CREATININE 1 26 02/20/2019    CREATININE 1 28 02/19/2019    CREATININE 1 18 02/18/2019    CREATININE 1 29 02/17/2019     · Creatinine at baseline     ·     Transaminitis  Assessment & Plan  Lab Results   Component Value Date    AST 18 02/15/2019    AST 18 02/14/2019    ALT 6 (L) 02/15/2019    ALT <6 (L) 02/14/2019    TBILI 1 22 (H) 02/15/2019    TBILI 1 67 (H) 02/14/2019     · Patient had hyperbilirubemia to 4 12 on admission  · US abdomen: CBD measures 8 mm proximally and tapers down to 4 4 mm distally  The distal most CBD is not visualized  · Bili trending down to 1 28, no evidence of abdominal pain to suggest passed stone  · Patient with prior cholecystectomy - not complaining of abdominal pain at this time  Compression fracture of first lumbar vertebra [facility])  Assessment & Plan  · X-ray of lumbar spine showing L1 compression  · Continue lidoderm patch, heating pad  · PRN robaxin resumed  · No neuro deficits on exam  No paresthesias  ·      Addendum--patient will be discharged home on torsemide 40 mg twice a day instead of Lasix and metolazone 5 mg twice a week  On Mondays and Thursdays    Case Management making arrangements for home O2  Stable for discharge home but will need to ensure compliance with her medications  Subjective:  Patient without any complaints  Qualifies for home O2  See notes above  Physical Exam:   Vitals: Blood pressure 112/82, pulse 88, temperature (!) 96 6 °F (35 9 °C), temperature source Tympanic, resp  rate 20, height 5' 4" (1 626 m), weight 125 kg (274 lb 14 6 oz), SpO2 96 %  ,Body mass index is 47 19 kg/m²  Gen:  Pleasant, non-tachypnic, non-dyspnic  Conversant  Obese  Heart: regular rate and rhythm, S1S2 present, no murmur, rub or gallop  Lungs: clear to ausculatation bilaterally  No wheezing, crackless, or rhonchi  No accessory muscle use or respiratory distress  Abd: soft, non-tender, non-distended  NABS, no guarding, rebound or peritoneal signs  Extremities: no clubbing, cyanosis or edema  2+pedal pulses bilaterally  Full range of motion  Neuro: awake, alert and oriented  Cranial nerves 2-12 intact  Strength and sensation grossly intact       Skin: warm and dry: no petechiae, purpura and rash      LABS:   Results from last 7 days   Lab Units 02/18/19  0515 02/17/19  0524   WBC Thousand/uL 6 09 7 29   HEMOGLOBIN g/dL 17 5* 17 4*   HEMATOCRIT % 59 9* 60 1*   PLATELETS Thousands/uL 104* 114*     Results from last 7 days   Lab Units 02/20/19  0641 02/19/19  0441 02/18/19  0515   POTASSIUM mmol/L 4 0 3 8 5 6*   CHLORIDE mmol/L 97* 95* 94*   CO2 mmol/L 43* 43* >45*   BUN mg/dL 54* 51* 52*   CREATININE mg/dL 1 26 1 28 1 18   CALCIUM mg/dL 8 6 8 4 8 6       Intake/Output Summary (Last 24 hours) at 2/21/2019 7625  Last data filed at 2/21/2019 0900  Gross per 24 hour   Intake 960 ml   Output 1555 ml   Net -595 ml           Current Facility-Administered Medications:  acetaminophen 975 mg Oral Q8H PRN MAYNOR [place]   acetaZOLAMIDE 250 mg Oral Q12H [address] Johnson Tran MD   albuterol 2 puff Inhalation Q4H PRN Jenni Park PA-C   apixaban 5 mg Oral BID Brielle Piger, PA-C   diltiazem 120 mg Oral Daily Brielle Rocior, PA-C   furosemide 40 mg Intravenous BID (diuretic) Claude March DO   levalbuterol 1 25 mg Nebulization Q6H PRN Ariel Valverde MD   lidocaine 1 patch Topical Daily Brielle Yang PA-C   methocarbamol 500 mg Oral Q8H PRN Vickie Macias PA-C   metoprolol succinate 100 mg Oral BID Brielle Trey, PA-C   mupirocin  Topical Every Other Day Sebastian Becerril MD   nicotine 1 patch Transdermal Daily Brielle Yang PA-C   nystatin 1 application Topical BID Briellealejandro Yang, PA-C   ondansetron 4 mg Intravenous Q8H PRN LINDSAY Tafoya-C   sodium chloride 3 mL Nebulization Q6H PRN Ariel Valverde MD

## 2019-02-21 NOTE — DISCHARGE SUMMARY
Discharge Summary - Medical Maikel Melvin 63 y o  female MRN: [id number]    [facility]  [address]/E4 -* Encounter: [id number]    BRIEF OVERVIEW      Admission Date: 2/8/2019       Date of discharge 2/21/2019      Admitting Diagnosis:  Acute on chronic hypoxic and hypercapnic respiratory failure  Acute on chronic diastolic heart failure    Primary Discharge Diagnosis  Principal Problem:    Acute on chronic respiratory failure with hypoxia and hypercapnia ([facility])  Active Problems:    COPD (chronic obstructive pulmonary disease) (HCC)    Chronic venous stasis dermatitis of both lower extremities    Permanent atrial fibrillation (HCC)    Type 2 diabetes mellitus with hypoglycemia (HCC)    Hypertension    Obesity    Stage 3 chronic kidney disease (HCC)    Depression    Compression fracture of first lumbar vertebra (HCC)    Transaminitis    Acute on chronic diastolic congestive heart failure ([facility] )      Service:  [facility]    Consulting Providers   Cardiology Dr Dhruv Heart and plan on date of discharge  Acute on chronic respiratory failure with hypoxia and hypercapnia ([facility] [address] )  Assessment & Plan  · Improving  · Room air saturation during admission 72%  · ABG shows hypercapnia and the patient has hemoconcentration, elevated CO2 and hemoglobin chronically suggest degree of chronic hypercapnia  · Seen by Pulmonary  Refuses BiPAP  changed to oral diuretics from today  · Qualifies for home O2   3 L at rest and 6 L on ambulation  ·   Patient did not want to wear BIPAP at night, explained that she is risking becoming hypercapnic and risking  going into respiratory failure     Acute on chronic diastolic congestive heart failure [facility])  Assessment & Plan  · cardiology input appreciated  dry weight of 274  Current weight of 275- switched to oral diuretics today  ECHO without systolic dysfunction  · Weight today to 275    Goal weight 274  · Daily weights, I/O           -patient will be discharged home on torsemide 40 mg twice a day instead of Lasix and metolazone 5 mg twice           a week  On Mondays and Thursdays         Patient with incresed CO2 on BMP likely combination of contraction alkalosis and compensation for respiratory acidosis as       patient is also noncompliant  Diamox added        Intake/Output Summary (Last 24 hours) at 2/21/2019 2103  Last data filed at 2/21/2019 0900      Gross per 24 hour   Intake 960 ml   Output 1555 ml   Net -595 ml          Wt Readings from Last 3 Encounters:   02/21/19 125 kg (274 lb 14 6 oz)   02/07/19 (!) 147 kg (323 lb)   03/28/18 (!) 141 kg (310 lb)            COPD (chronic obstructive pulmonary disease) ([facility] [address] )  Assessment & Plan  · Does not use oxygen at home  · Spokes 1 pack every 3 days, smoked since age 16  · Suspect chronic hypercapnic resp failure  · PRN xopenex  · No acute exac  Qualifies for home O2   6 L on ambulation and 3 L at rest   Patient strongly advised to use it all the time      Chronic venous stasis dermatitis of both lower extremities  Assessment & Plan  · Wound care, podiatry and dermatology evaluated  ? Wound cultures obtained by dermatology growing multiple organisms- felt to be skin colonization  ?  ID consulted and recommended stopping ABX  · ID consultation appreciated, no further antibiotics recommended  · BLE elevation, edema control is imperative  · Stable for discharge per podiatry-will follow up with the [address] on discharge     Permanent atrial fibrillation [facility])  Assessment & Plan  · Patient on Eliquis 5 mg BID  · Rate controlled with BB and CCB     Type 2 diabetes mellitus with hypoglycemia [facility])  Assessment & Plan        Lab Results   Component Value Date     HGBA1C 6 2 02/14/2019                Recent Labs     02/20/19  1109 02/20/19  1514 02/20/19  2041 02/21/19  0723   POCGLU 139 243* 129 170*         Blood Sugar Average: Last 72 hrs:  (P) 321 1715 Patient noted to be hypoglycemic on admission which has now resolved       Hypertension  Assessment & Plan  · Controlled by metoprolol 100 mg b i d , diltiazem 120 mg daily  ·       Obesity  Assessment & Plan  · Would benefit from weight loss  As well as  evaluation in a gastric bypass Clinic  · Body mass index is 47 19 kg/m²      Stage 3 chronic kidney disease [facility])  Assessment & Plan        Lab Results   Component Value Date     CREATININE 1 26 02/20/2019     CREATININE 1 28 02/19/2019     CREATININE 1 18 02/18/2019     CREATININE 1 29 02/17/2019      · Creatinine at baseline  ·       Transaminitis  Assessment & Plan        Lab Results   Component Value Date     AST 18 02/15/2019     AST 18 02/14/2019     ALT 6 (L) 02/15/2019     ALT <6 (L) 02/14/2019     TBILI 1 22 (H) 02/15/2019     TBILI 1 67 (H) 02/14/2019      · Patient had hyperbilirubemia to 4 12 on admission  · US abdomen: CBD measures 8 mm proximally and tapers down to 4 4 mm distally   The distal most CBD is not visualized  · Bili trending down to 1 28, no evidence of abdominal pain to suggest passed stone  · Patient with prior cholecystectomy - not complaining of abdominal pain at this time      Compression fracture of first lumbar vertebra [facility])  Assessment & Plan  · X-ray of lumbar spine showing L1 compression  · Continue lidoderm patch, heating pad  · PRN robaxin resumed  · No neuro deficits on exam  No paresthesias  ·        --patient will be discharged home on torsemide 40 mg twice a day instead of Lasix and metolazone 5 mg twice a week  On Mondays and Thursdays       Patient declined rehab and wanted to go home    Will be set up with visiting nurses      Discharge Condition: Improved    Discharge Disposition: Home/Self Care    Discharge summary:     Beryle Grayer is a 46-year-old female with a past medical history of chronic diastolic heart failure, noncompliance with her medical regimen, essential hypertension, permanent atrial fibrillation on Eliquis, diabetes mellitus type 2, chronic kidney disease 3 and COPD noncompliant with BiPAP who was admitted to [facility] on 02/08/2019 with worsening shortness of breath and lower extremity edema  3-4 weeks prior to admission she discontinued all her diuretic medication secondary to low back pain which made it difficult for her to get to the bathroom  She then gained 50 lbs and was in acute diastolic heart failure on admission  She was started on IV Lasix  With slow improvement in her edema  She was also noted to be in acute hypoxic respiratory failure  She was evaluated for home O2 prior to discharge and required 3 L at rest and 6 L on ambulation to maintain a saturation of around 90%  She will be discharged home on home O2 and has been advised to use it constantly  Once she reached her baseline weight of 274 lb she was transition to torsemide 40 mg twice a day and metolazone 5 mg twice a week by Cardiology instead of the Lasix that she was on at home  Podiatry evaluated the patient for bilateral lower extremity venostasis with superficial ulcerations and will follow her as an outpatient  Her atrial fibrillation was rate controlled on Eliquis  She did have an episode of hypoglycemia during her admission which resolved on its own  Patient has been restarted back on her oral hypoglycemic medication on discharge  She will be discharged home today in a stable condition and will have visiting nurses follow her on discharge      Discharge Medications   Please see Medical Reconciliation Discharge Form    Discharge Follow Up Appointments:   PCP  Cardiology  Podiatry  Discharge  Statement   Total Time Spent today including physical exam, discussion with patient and family, and discharge arrangements/care 45 minutes

## 2019-02-21 NOTE — PROGRESS NOTES
Progress Note - Cardiology   Bessy Turcios 63 y o  female MRN: [id number]  Unit/Bed#: E4 -01 Encounter: [id number]      Assessment/Recommendations/Discussion:   1  Acute on chronic diastolic heart failure  2  Chronic permanent atrial fibrillation  3  Chronic kidney disease  4  Mitral regurgitation  5  Hypertension  6  Diabetes  7  Chronic hypoxic and hypercapnic respiratory failure  8  Morbid obesity  9  Probable COPD with obesity hypoventilation and sleep apnea    · Weight significantly decreased, but has significant residual edema  Patient states she desperately wants to go home, states will be compliant with dietary recommendations, medications, follow-up  Transition to torsemide 40 mg p o  B i d   · Start metolazone 5 mg twice weekly  · One-week follow-up with our heart failure clinic  · Strongly advised low-sodium diet, patient counseled  · Advise compliance with follow-up visits and medications  · Discussed with Dr Damion Otero and Awilda Sánchez OP f/u needed, high risk readmission        Subjective: Pt seen/examined  Displayed wants to go home    Denies chest pain, shortness of breath      Physical Exam:  GEN:  NAD  HEENT:  MMM, NCAT, pink conjunctiva, EOMI, nonicteric sclera  CV:  NO JVD/HJR, RR, NO M/R/G, +S1/S2, NO PARASTERNAL HEAVE/THRILL, +++LE EDEMA, NO HEPATIC SYSTOLIC PULSATION, WARM EXTREMITIES  RESP:  CTAB/L  ABD:  SOFT, NT, NO GROSS ORGANOMEGALY        Vitals:   /82 (BP Location: Left arm)   Pulse 88   Temp (!) 96 6 °F (35 9 °C) (Tympanic)   Resp 20   Ht 5' 4" (1 626 m)   Wt 125 kg (274 lb 14 6 oz)   SpO2 96%   BMI 47 19 kg/m²   Vitals:    02/20/19 0600 02/21/19 0600   Weight: 125 kg (275 lb 9 2 oz) 125 kg (274 lb 14 6 oz)       Intake/Output Summary (Last 24 hours) at 2/21/2019 1205  Last data filed at 2/21/2019 0900  Gross per 24 hour   Intake 960 ml   Output 1555 ml   Net -595 ml         Lab Results:  Results from last 7 days   Lab Units 02/18/19  0515   WBC Thousand/uL 6 09   HEMOGLOBIN g/dL 17 5*   HEMATOCRIT % 59 9*   PLATELETS Thousands/uL 104*     Results from last 7 days   Lab Units 02/20/19  0641  02/15/19  0522   POTASSIUM mmol/L 4 0   < > 4 3   CHLORIDE mmol/L 97*   < > 100   CO2 mmol/L 43*   < > 40*   BUN mg/dL 54*   < > 54*   CREATININE mg/dL 1 26   < > 1 60*   CALCIUM mg/dL 8 6   < > 8 1*   ALK PHOS U/L  --   --  116   ALT U/L  --   --  6*   AST U/L  --   --  18    < > = values in this interval not displayed       Results from last 7 days   Lab Units 02/20/19  0641   POTASSIUM mmol/L 4 0   CHLORIDE mmol/L 97*   CO2 mmol/L 43*   BUN mg/dL 54*   CREATININE mg/dL 1 26   CALCIUM mg/dL 8 6           Medications:    Current Facility-Administered Medications:     acetaminophen (TYLENOL) tablet 975 mg, 975 mg, Oral, Q8H PRN, MAYNOR Daniel, 975 mg at 02/16/19 2003    acetaZOLAMIDE (DIAMOX) tablet 250 mg, 250 mg, Oral, Q12H [address], Nguyen Booker MD, 250 mg at 02/21/19 0912    albuterol (PROVENTIL HFA,VENTOLIN HFA) inhaler 2 puff, 2 puff, Inhalation, Q4H PRN, Janee Kocher, PA-C    apixaban (ELIQUIS) tablet 5 mg, 5 mg, Oral, BID, Brielle Yang PA-C, 5 mg at 02/21/19 0912    diltiazem (CARDIZEM CD) 24 hr capsule 120 mg, 120 mg, Oral, Daily, Brielle Yang PA-C, 120 mg at 02/21/19 0912    furosemide (LASIX) injection 40 mg, 40 mg, Intravenous, BID (diuretic), Rujul DO Joaquim, 40 mg at 02/21/19 0912    levalbuterol (XOPENEX) inhalation solution 1 25 mg, 1 25 mg, Nebulization, Q6H PRN, Gabo Barlow MD    lidocaine (LIDODERM) 5 % patch 1 patch, 1 patch, Topical, Daily, Brielle Yang PA-C, 1 patch at 02/21/19 0912    methocarbamol (ROBAXIN) tablet 500 mg, 500 mg, Oral, Q8H PRN, Vickie Macias PA-C, 500 mg at 02/17/19 1737    metoprolol succinate (TOPROL-XL) 24 hr tablet 100 mg, 100 mg, Oral, BID, Brielle Yang PA-C, 100 mg at 02/21/19 0912    mupirocin (BACTROBAN) 2 % ointment, , Topical, Every Other Day, Feliz Corado MD    nicotine (Sandra Enrique) 14 mg/24hr TD 24 hr patch 1 patch, 1 patch, Transdermal, Daily, LINDSAY Tafoya-C, Stopped at 02/10/19 0900    nystatin (MYCOSTATIN) powder 1 application, 1 application, Topical, BID, LINDSAY Tafoya-C, 1 application at 00/12/21 0913    ondansetron (ZOFRAN) injection 4 mg, 4 mg, Intravenous, Q8H PRN, LINDSAY Tafoya-EMERSON    sodium chloride 0 9 % inhalation solution 3 mL, 3 mL, Nebulization, Q6H PRN, Brina Alvarez MD    This note was completed in part utilizing [employer] Software  Grammatical errors, random word insertions, spelling mistakes, and incomplete sentences may be an occasional consequence of this system secondary to software limitations, ambient noise, and hardware issues  If you have any questions or concerns about the content, text, or information contained within the body of this dictation, please contact the provider for clarification

## 2019-02-21 NOTE — PLAN OF CARE
Problem: Potential for Falls  Goal: Patient will remain free of falls  Description  INTERVENTIONS:  - Assess patient frequently for physical needs  -  Identify cognitive and physical deficits and behaviors that affect risk of falls  -  [place] fall precautions as indicated by assessment   - Educate patient/family on patient safety including physical limitations  - Instruct patient to call for assistance with activity based on assessment  - Modify environment to reduce risk of injury  - Consider OT/PT consult to assist with strengthening/mobility   Outcome: Progressing     Problem: Nutrition/Hydration-ADULT  Goal: Nutrient/Hydration intake appropriate for improving, restoring or maintaining nutritional needs  Description  Monitor and assess patient's nutrition/hydration status for malnutrition (ex- brittle hair, bruises, dry skin, pale skin and conjunctiva, muscle wasting, smooth red tongue, and disorientation)  Collaborate with interdisciplinary team and initiate plan and interventions as ordered  Monitor patient's weight and dietary intake as ordered or per policy  Utilize nutrition screening tool and intervene per policy  Determine patient's food preferences and provide high-protein, high-caloric foods as appropriate       INTERVENTIONS:  - Monitor oral intake, urinary output, labs, and treatment plans  - Assess nutrition and hydration status and recommend course of action  - Evaluate amount of meals eaten  - Assist patient with eating if necessary   - Allow adequate time for meals  - Recommend/ encourage appropriate diets, oral nutritional supplements, and vitamin/mineral supplements  - Order, calculate, and assess calorie counts as needed  - Recommend, monitor, and adjust tube feedings and TPN/PPN based on assessed needs  - Assess need for intravenous fluids  - Provide specific nutrition/hydration education as appropriate  - Include patient/family/caregiver in decisions related to nutrition   Outcome: Progressing     Problem: Prexisting or High Potential for Compromised Skin Integrity  Goal: Skin integrity is maintained or improved  Description  INTERVENTIONS:  - Identify patients at risk for skin breakdown  - Assess and monitor skin integrity  - Assess and monitor nutrition and hydration status  - Monitor labs (i e  albumin)  - Assess for incontinence   - Turn and reposition patient  - Assist with mobility/ambulation  - Relieve pressure over bony prominences  - Avoid friction and shearing  - Provide appropriate hygiene as needed including keeping skin clean and dry  - Evaluate need for skin moisturizer/barrier cream  - Collaborate with interdisciplinary team (i e  Nutrition, Rehabilitation, etc )   - Patient/family teaching   Outcome: Progressing     Problem: PAIN - ADULT  Goal: Verbalizes/displays adequate comfort level or baseline comfort level  Description  Interventions:  - Encourage patient to monitor pain and request assistance  - Assess pain using appropriate pain scale  - Administer analgesics based on type and severity of pain and evaluate response  - Implement non-pharmacological measures as appropriate and evaluate response  - Consider cultural and social influences on pain and pain management  - Notify physician/advanced practitioner if interventions unsuccessful or patient reports new pain   Outcome: Progressing     Problem: INFECTION - ADULT  Goal: Absence or prevention of progression during hospitalization  Description  INTERVENTIONS:  - Assess and monitor for signs and symptoms of infection  - Monitor lab/diagnostic results  - Monitor all insertion sites, i e  indwelling lines, tubes, and drains  - Monitor endotracheal (as able) and nasal secretions for changes in amount and color  - [place] appropriate cooling/warming therapies per order  - Administer medications as ordered  - Instruct and encourage patient and family to use good hand hygiene technique  - Identify and instruct in appropriate isolation precautions for identified infection/condition   Outcome: Progressing  Goal: Absence of fever/infection during neutropenic period  Description  INTERVENTIONS:  - Monitor WBC  - Implement neutropenic guidelines   Outcome: Progressing     Problem: SAFETY ADULT  Goal: Maintain or return to baseline ADL function  Description  INTERVENTIONS:  -  Assess patient's ability to carry out ADLs; assess patient's baseline for ADL function and identify physical deficits which impact ability to perform ADLs (bathing, care of mouth/teeth, toileting, grooming, dressing, etc )  - Assess/evaluate cause of self-care deficits   - Assess range of motion  - Assess patient's mobility; develop plan if impaired  - Assess patient's need for assistive devices and provide as appropriate  - Encourage maximum independence but intervene and supervise when necessary  ¯ Involve family in performance of ADLs  ¯ Assess for home care needs following discharge   ¯ Request OT consult to assist with ADL evaluation and planning for discharge  ¯ Provide patient education as appropriate   Outcome: Progressing  Goal: Maintain or return mobility status to optimal level  Description  INTERVENTIONS:  - Assess patient's baseline mobility status (ambulation, transfers, stairs, etc )    - Identify cognitive and physical deficits and behaviors that affect mobility  - Identify mobility aids required to assist with transfers and/or ambulation (gait belt, sit-to-stand, lift, walker, cane, etc )  - [place] fall precautions as indicated by assessment  - Record patient progress and toleration of activity level on Mobility SBAR; progress patient to next Phase/Stage  - Instruct patient to call for assistance with activity based on assessment  - Request Rehabilitation consult to assist with strengthening/weightbearing, etc    Outcome: Progressing     Problem: DISCHARGE PLANNING  Goal: Discharge to home or other facility with appropriate resources  Description  INTERVENTIONS:  - Identify barriers to discharge w/patient and caregiver  - Arrange for needed discharge resources and transportation as appropriate  - Identify discharge learning needs (meds, wound care, etc )  - Arrange for interpretive services to assist at discharge as needed  - Refer to Case Management Department for coordinating discharge planning if the patient needs post-hospital services based on physician/advanced practitioner order or complex needs related to functional status, cognitive ability, or social support system   Outcome: Progressing     Problem: Knowledge Deficit  Goal: Patient/family/caregiver demonstrates understanding of disease process, treatment plan, medications, and discharge instructions  Description  Complete learning assessment and assess knowledge base  Interventions:  - Provide teaching at level of understanding  - Provide teaching via preferred learning methods   Outcome: Progressing     Problem: CARDIOVASCULAR - ADULT  Goal: Maintains optimal cardiac output and hemodynamic stability  Description  INTERVENTIONS:  - Monitor I/O, vital signs and rhythm  - Monitor for S/S and trends of decreased cardiac output i e  bleeding, hypotension  - Administer and titrate ordered vasoactive medications to optimize hemodynamic stability  - Assess quality of pulses, skin color and temperature  - Assess for signs of decreased coronary artery perfusion - ex   Angina  - Instruct patient to report change in severity of symptoms   Outcome: Progressing  Goal: Absence of cardiac dysrhythmias or at baseline rhythm  Description  INTERVENTIONS:  - Continuous cardiac monitoring, monitor vital signs, obtain 12 lead EKG if indicated  - Administer antiarrhythmic and heart rate control medications as ordered  - Monitor electrolytes and administer replacement therapy as ordered   Outcome: Progressing     Problem: METABOLIC, FLUID AND ELECTROLYTES - ADULT  Goal: Electrolytes maintained within normal limits  Description  INTERVENTIONS:  - Monitor labs and assess patient for signs and symptoms of electrolyte imbalances  - Administer electrolyte replacement as ordered  - Monitor response to electrolyte replacements, including repeat lab results as appropriate  - Instruct patient on fluid and nutrition as appropriate   Outcome: Progressing  Goal: Fluid balance maintained  Description  INTERVENTIONS:  - Monitor labs and assess for signs and symptoms of volume excess or deficit  - Monitor I/O and WT  - Instruct patient on fluid and nutrition as appropriate   Outcome: Progressing  Goal: Glucose maintained within target range  Description  INTERVENTIONS:  - Monitor Blood Glucose as ordered  - Assess for signs and symptoms of hyperglycemia and hypoglycemia  - Administer ordered medications to maintain glucose within target range  - Assess nutritional intake and initiate nutrition service referral as needed   Outcome: Progressing     Problem: SKIN/TISSUE INTEGRITY - ADULT  Goal: Skin integrity remains intact  Description  INTERVENTIONS  - Identify patients at risk for skin breakdown  - Assess and monitor skin integrity  - Assess and monitor nutrition and hydration status  - Monitor labs (i e  albumin)  - Assess for incontinence   - Turn and reposition patient  - Assist with mobility/ambulation  - Relieve pressure over bony prominences  - Avoid friction and shearing  - Provide appropriate hygiene as needed including keeping skin clean and dry  - Evaluate need for skin moisturizer/barrier cream  - Collaborate with interdisciplinary team (i e  Nutrition, Rehabilitation, etc )   - Patient/family teaching   Outcome: Progressing  Goal: Incision(s), wounds(s) or drain site(s) healing without S/S of infection  Description  INTERVENTIONS  - Assess and document risk factors for skin impairment   - Assess and document dressing, incision, wound bed, drain sites and surrounding tissue  - Initiate Nutrition services consult and/or wound management as needed   Outcome: Progressing     Problem: DISCHARGE PLANNING - CARE MANAGEMENT  Goal: Discharge to post-acute care or home with appropriate resources  Description  INTERVENTIONS:  - Conduct assessment to determine patient/family and health care team treatment goals, and need for post-acute services based on payer coverage, community resources, and patient preferences, and barriers to discharge  - Address psychosocial, clinical, and financial barriers to discharge as identified in assessment in conjunction with the patient/family and health care team  - Arrange appropriate level of post-acute services according to patient's   needs and preference and payer coverage in collaboration with the physician and health care team  - Communicate with and update the patient/family, physician, and health care team regarding progress on the discharge plan  - Arrange appropriate transportation to post-acute venues  - Pt wishes to d/c with home health care at this time   Outcome: Progressing

## 2019-02-26 ENCOUNTER — PATIENT OUTREACH (OUTPATIENT)
Dept: INTERNAL MEDICINE CLINIC | Facility: CLINIC | Age: 59
End: 2019-02-26

## 2019-02-27 ENCOUNTER — OFFICE VISIT (OUTPATIENT)
Dept: CARDIOLOGY CLINIC | Facility: CLINIC | Age: 59
End: 2019-02-27
Payer: COMMERCIAL

## 2019-02-27 ENCOUNTER — OFFICE VISIT (OUTPATIENT)
Dept: INTERNAL MEDICINE CLINIC | Facility: CLINIC | Age: 59
End: 2019-02-27
Payer: COMMERCIAL

## 2019-02-27 VITALS
SYSTOLIC BLOOD PRESSURE: 100 MMHG | HEART RATE: 80 BPM | HEIGHT: 64 IN | WEIGHT: 249.8 LBS | DIASTOLIC BLOOD PRESSURE: 70 MMHG | BODY MASS INDEX: 42.65 KG/M2

## 2019-02-27 VITALS
TEMPERATURE: 97.7 F | SYSTOLIC BLOOD PRESSURE: 120 MMHG | WEIGHT: 249 LBS | BODY MASS INDEX: 42.51 KG/M2 | HEIGHT: 64 IN | HEART RATE: 88 BPM | RESPIRATION RATE: 15 BRPM | DIASTOLIC BLOOD PRESSURE: 80 MMHG

## 2019-02-27 DIAGNOSIS — E78.5 DYSLIPIDEMIA: ICD-10-CM

## 2019-02-27 DIAGNOSIS — N18.30 STAGE 3 CHRONIC KIDNEY DISEASE (HCC): ICD-10-CM

## 2019-02-27 DIAGNOSIS — E11.649 TYPE 2 DIABETES MELLITUS WITH HYPOGLYCEMIA WITHOUT COMA, UNSPECIFIED WHETHER LONG TERM INSULIN USE (HCC): ICD-10-CM

## 2019-02-27 DIAGNOSIS — I48.21 PERMANENT ATRIAL FIBRILLATION (HCC): ICD-10-CM

## 2019-02-27 DIAGNOSIS — J96.21 ACUTE ON CHRONIC RESPIRATORY FAILURE WITH HYPOXIA AND HYPERCAPNIA (HCC): ICD-10-CM

## 2019-02-27 DIAGNOSIS — I50.32 CHRONIC DIASTOLIC HEART FAILURE (HCC): Primary | ICD-10-CM

## 2019-02-27 DIAGNOSIS — S32.010A CLOSED COMPRESSION FRACTURE OF FIRST LUMBAR VERTEBRA, INITIAL ENCOUNTER: ICD-10-CM

## 2019-02-27 DIAGNOSIS — J96.22 ACUTE ON CHRONIC RESPIRATORY FAILURE WITH HYPOXIA AND HYPERCAPNIA (HCC): ICD-10-CM

## 2019-02-27 DIAGNOSIS — I50.33 ACUTE ON CHRONIC DIASTOLIC CONGESTIVE HEART FAILURE (HCC): Primary | ICD-10-CM

## 2019-02-27 DIAGNOSIS — I10 ESSENTIAL HYPERTENSION: ICD-10-CM

## 2019-02-27 DIAGNOSIS — I34.0 NON-RHEUMATIC MITRAL REGURGITATION: ICD-10-CM

## 2019-02-27 DIAGNOSIS — J44.9 CHRONIC OBSTRUCTIVE PULMONARY DISEASE, UNSPECIFIED COPD TYPE (HCC): ICD-10-CM

## 2019-02-27 DIAGNOSIS — E55.9 VITAMIN D DEFICIENCY: ICD-10-CM

## 2019-02-27 PROCEDURE — 99496 TRANSJ CARE MGMT HIGH F2F 7D: CPT | Performed by: INTERNAL MEDICINE

## 2019-02-27 PROCEDURE — 1111F DSCHRG MED/CURRENT MED MERGE: CPT | Performed by: INTERNAL MEDICINE

## 2019-02-27 PROCEDURE — 99215 OFFICE O/P EST HI 40 MIN: CPT | Performed by: NURSE PRACTITIONER

## 2019-02-27 RX ORDER — TOPIRAMATE 25 MG/1
25 CAPSULE, COATED PELLETS ORAL 2 TIMES DAILY
Qty: 60 CAPSULE | Refills: 0 | Status: SHIPPED | OUTPATIENT
Start: 2019-02-27 | End: 2019-03-19

## 2019-02-27 NOTE — PATIENT INSTRUCTIONS
Maintain a 2 gram daily sodium diet and 1500 ml daily fluid restriction  Check daily weights  If you gain3 pounds in one day, 5 pounds in one week, or experience worsening shortness of breath or increasing lower leg swelling  Please call the heart failure office at [phone]  Please bring a  list of your current medications and daily weights to the office visit    Do not stop your medications without contacting your primary physician or cardiologist  Use oxygen around the clock 6LPM with ambulation and 3LPM with rest as ordered when discharged

## 2019-02-27 NOTE — PROGRESS NOTES
Heart Failure Office Visit   Jelani Puentes 63 y o  female MRN: [id number]    HPI  Ms Eriberto Dove is a 63year old female with a known past medical history of   Non compliance  Tobacco abuse  Permanent atrial fibrillation on Eliquis for stroke prevention  CKD III baseline creat 1 4 - 1 7  Patient Active Problem List   Diagnosis    COPD (chronic obstructive pulmonary disease) ([facility] [address] )    Hypertension    Permanent atrial fibrillation ([facility] )    Acute on chronic respiratory failure with hypoxia and hypercapnia (HCC)    Chronic venous stasis dermatitis of both lower extremities    Hypoalbuminemia    Type 2 diabetes mellitus with hypoglycemia (HCC)    Non-rheumatic mitral regurgitation    Nonsustained ventricular tachycardia (HCC)    Obesity    Vitamin D deficiency    Depression    Dyslipidemia    Left ventricular hypertrophy    Stage 3 chronic kidney disease (HCC)    Compression fracture of first lumbar vertebra (HCC)    Transaminitis     Ms Eriberto Dove was hospitalized at [place] [address] on 2/08 - 2/21/19 with acute on chronic respiratory failure with hypoxia and hypercapnia and acute on chronic diastolic heart failure  Ronny Ladd presented to [facility] ED with worsening shortness of breath and lower leg swelling  Ronny Ladd has stopped her diuretic secondary to low back pain and difficulty ambulating to the bathroom  Her weight was 325 6 lbs on admission  + 50 pounds  On admission NT pro BNP 4595  She was diuresed with IV Lasix  TTE showed LVEF 55%, wall thickness mildly increased, RV mild to mod dilated, systolic function was reduced, LA mod dilated, RA mod dilated, mild TR, PAP 50- 60mmHg  Aje Ingles Her weight improved to 275 pounds  She was transitioned to oral Torsemide  Ronny Ladd qualified for home oxygen 3LPM with rest and 6LPM with ambulation  She was instructed to follow up in the wound center for sultana LE wounds  Lab studies at discharge sodium 142, potassium 4 0, BUN 54, creat 1 26  GFR 47  Lipid profile cholesterol 76, Trig 61, HDL 32, LDL 32  Today Orville Cain presents to our office for a recent hospitalization follow up visit  She is accompanied by her son who did not talk during this office visit  Orville Cain did not bring oxygen with her to the office today  She looked puzzled when I told her she was instructed to use oxygen 3LNC with rest and 6LNC with ambulation  She is not weighting herself at home  She needs a scale  Orville Cain has not been taking Eliquis since prior to her hospitalization  According to Orville Cain she ran out of the medication  She denies worsening dyspnea, palpitations, lightheadedness or dizziness  No recent lab studies done  I have re enforced a 2 gm sodium diet       Review of Systems   Musculoskeletal: Positive for back pain  Objective:   Vitals:   Vitals:    02/27/19 1329   BP: 100/70   BP Location: Left arm   Patient Position: Sitting   Cuff Size: Adult   Pulse: 80   Weight: 113 kg (249 lb 12 8 oz)   Height: 5' 4" (1 626 m)     Body mass index is 42 88 kg/m²      Wt Readings from Last 3 Encounters:   02/27/19 113 kg (249 lb 12 8 oz)   02/21/19 125 kg (274 lb 14 6 oz)   02/07/19 (!) 147 kg (323 lb)         Physical Exam:  GEN: Bambi Dunne appears well, alert and oriented x 3, pleasant and cooperative   HEENT: pupils equal, round, and reactive to light; extraocular muscles intact  NECK: supple, no carotid bruits   HEART: irregular rate and rhythm, normal S1 and S2, no murmurs, clicks, gallops or rubs, JVP is flat   LUNGS: clear to auscultation bilaterally; no wheezes, rales, or rhonchi   ABDOMEN: normal bowel sounds, soft, no tenderness, no distention  EXTREMITIES: peripheral pulses normal; no clubbing, cyanosis, venous stasis changes, thickened skin  NEURO: no focal findings   SKIN: normal without suspicious lesions on exposed skin      Current Outpatient Medications:     acetaZOLAMIDE (DIAMOX) 250 mg tablet, Take 1 tablet (250 mg total) by mouth every 12 (twelve) hours, Disp: 60 tablet, Rfl: 0    albuterol (PROVENTIL HFA,VENTOLIN HFA) 90 mcg/act inhaler, Inhale 2 puffs every 4 (four) hours as needed for wheezing, Disp: 1 Inhaler, Rfl: 3    diltiazem (CARDIZEM CD) 120 mg 24 hr capsule, Take 1 capsule (120 mg total) by mouth daily, Disp: 30 capsule, Rfl: 4    lisinopril (ZESTRIL) 40 mg tablet, Take 1 tablet (40 mg total) by mouth daily, Disp: 30 tablet, Rfl: 11    metFORMIN (GLUCOPHAGE-XR) 750 mg 24 hr tablet, Take 750 mg by mouth daily after dinner, Disp: , Rfl:     methocarbamol (ROBAXIN) 500 mg tablet, Take 1 tablet (500 mg total) by mouth every 8 (eight) hours as needed for muscle spasms for up to 10 days, Disp: 30 tablet, Rfl: 0    metolazone (ZAROXOLYN) 5 mg tablet, Take 1 tablet (5 mg total) by mouth 2 (two) times a week 5 mg 2 times weekly on Mondays and Thursdays, Disp: 30 tablet, Rfl: 0    metoprolol succinate (TOPROL-XL) 100 mg 24 hr tablet, Take 1 tablet (100 mg total) by mouth every 12 (twelve) hours (Patient taking differently: Take 100 mg by mouth 2 (two) times a day  ), Disp: 60 tablet, Rfl: 1    nicotine (NICODERM CQ) 14 mg/24hr TD 24 hr patch, Place 1 patch on the skin daily, Disp: 28 patch, Rfl: 0    torsemide (DEMADEX) 20 mg tablet, Take 2 tablets (40 mg total) by mouth 2 (two) times a day, Disp: 120 tablet, Rfl: 0    acetaminophen (TYLENOL) 325 mg tablet, Take 3 tablets (975 mg total) by mouth every 8 (eight) hours as needed for mild pain, moderate pain or severe pain (Patient not taking: Reported on 2/27/2019), Disp: 30 tablet, Rfl: 0    ELIQUIS 5 MG, Take 1 tablet (5 mg total) by mouth 2 (two) times a day (Patient not taking: Reported on 2/27/2019), Disp: 60 tablet, Rfl: 11      Labs & Results:                    Invalid input(s): LABALBU        Assessment/Plan:   1  Chronic RV systolic/ LV diastolic heart failure NYHA Class III stage C, (RV systolic function reduced)- On PE appears eu volemic and compensated    HR and BP controlled, oxygen saturation 90% on RA, not using oxygen as instructed at discharge  Refusing BIPAP at night  Continue on Cardizem 120mg daily, Lisinopril 40mg daily,  Metoprolol succinate 100mg BID, Torsemide 40mg BID, Metolazone 5mg twice a week 30 min prior to Torsemide dose  Scale given to Edward Morales in th office today  Maintain a 2 gram daily sodium diet and 1500 ml daily fluid restriction  Check daily weights  If you gain 3 pounds in one day, 5 pounds in one week, or experience worsening shortness of breath or increasing lower leg swelling  Please call the heart failure office at [phone]  Please bring a  list of your current medications and daily weights to the office visit  BMP and mag in near future  2  Permanent atrial fibrillation- Rate controlled on Metoprolol succinate 100mg BID and Cardizem CD 120mg daily, Edward Morales has not taken Eliquis prior to her recent hospitalization  She ran out of this medication  5 week sample of Eliquis given to Edward Morales today  3  Chronic hypoxic, hypercapnic respiratory failure- non compliant with oxygen use as instructed and BIPAP at night  4  CKD III baseline creat 1 4 - 1 7 BMP in near future   5  Tobacco abuse- continues to smoke one cigarette daily, not using Nicotine Patch  6  Compression fracture of L1- pain in lumbar, suggest referral to spine surgery,  She will follow up with her PCP   7  COPD  6  HTN- controlled on current medical regimen  8  DM2 - per PCP   9  Transmanitis - per PCP   10  RADHA non compliant with BIPAP  11  Medical Non compliance- non compliant with weights, Eliquis, oxygen or use of BIPAP  12   PHTN PAP 50-60 mmHg- WHO Group II ( LVDD) Group III  (COPD)  MAYNOR Amaro  2/27/2019  1:36 PM

## 2019-03-04 ENCOUNTER — PATIENT OUTREACH (OUTPATIENT)
Dept: INTERNAL MEDICINE CLINIC | Facility: CLINIC | Age: 59
End: 2019-03-04

## 2019-03-04 NOTE — PROGRESS NOTES
Shania Cherelle declined services despite my two calls explaining how I could be of assistance  Encouraged her to let Dr Quang Ward know if she should change her mind  Of note, she has resumed taking her Eliquis

## 2019-03-08 DIAGNOSIS — M54.50 CHRONIC LOW BACK PAIN: ICD-10-CM

## 2019-03-08 DIAGNOSIS — G89.29 CHRONIC LOW BACK PAIN: ICD-10-CM

## 2019-03-10 RX ORDER — METHOCARBAMOL 500 MG/1
500 TABLET, FILM COATED ORAL EVERY 8 HOURS PRN
Qty: 30 TABLET | Refills: 0 | OUTPATIENT
Start: 2019-03-10 | End: 2019-04-11 | Stop reason: ALTCHOICE

## 2019-03-19 ENCOUNTER — OFFICE VISIT (OUTPATIENT)
Dept: INTERNAL MEDICINE CLINIC | Facility: CLINIC | Age: 59
End: 2019-03-19
Payer: COMMERCIAL

## 2019-03-19 VITALS
HEIGHT: 64 IN | WEIGHT: 230 LBS | BODY MASS INDEX: 39.27 KG/M2 | SYSTOLIC BLOOD PRESSURE: 82 MMHG | TEMPERATURE: 96.1 F | DIASTOLIC BLOOD PRESSURE: 52 MMHG

## 2019-03-19 DIAGNOSIS — S32.010A CLOSED COMPRESSION FRACTURE OF FIRST LUMBAR VERTEBRA, INITIAL ENCOUNTER: ICD-10-CM

## 2019-03-19 DIAGNOSIS — I48.21 PERMANENT ATRIAL FIBRILLATION (HCC): ICD-10-CM

## 2019-03-19 DIAGNOSIS — J96.22 ACUTE ON CHRONIC RESPIRATORY FAILURE WITH HYPOXIA AND HYPERCAPNIA (HCC): Primary | ICD-10-CM

## 2019-03-19 DIAGNOSIS — N18.30 STAGE 3 CHRONIC KIDNEY DISEASE (HCC): ICD-10-CM

## 2019-03-19 DIAGNOSIS — E66.2 CLASS 2 OBESITY WITH ALVEOLAR HYPOVENTILATION, SERIOUS COMORBIDITY, AND BODY MASS INDEX (BMI) OF 39.0 TO 39.9 IN ADULT (HCC): ICD-10-CM

## 2019-03-19 DIAGNOSIS — I50.32 CHRONIC DIASTOLIC CHF (CONGESTIVE HEART FAILURE) (HCC): ICD-10-CM

## 2019-03-19 DIAGNOSIS — I10 ESSENTIAL HYPERTENSION: ICD-10-CM

## 2019-03-19 DIAGNOSIS — E11.649 TYPE 2 DIABETES MELLITUS WITH HYPOGLYCEMIA WITHOUT COMA, UNSPECIFIED WHETHER LONG TERM INSULIN USE (HCC): ICD-10-CM

## 2019-03-19 DIAGNOSIS — J96.21 ACUTE ON CHRONIC RESPIRATORY FAILURE WITH HYPOXIA AND HYPERCAPNIA (HCC): Primary | ICD-10-CM

## 2019-03-19 DIAGNOSIS — I10 ESSENTIAL HYPERTENSION: Primary | ICD-10-CM

## 2019-03-19 PROCEDURE — 3066F NEPHROPATHY DOC TX: CPT | Performed by: INTERNAL MEDICINE

## 2019-03-19 PROCEDURE — 99214 OFFICE O/P EST MOD 30 MIN: CPT | Performed by: INTERNAL MEDICINE

## 2019-03-19 RX ORDER — TOPIRAMATE 25 MG/1
25 CAPSULE, COATED PELLETS ORAL DAILY
Qty: 60 CAPSULE | Refills: 0
Start: 2019-03-19 | End: 2019-04-18 | Stop reason: ALTCHOICE

## 2019-03-20 RX ORDER — LISINOPRIL 40 MG/1
40 TABLET ORAL DAILY
Qty: 30 TABLET | Refills: 11 | Status: SHIPPED | OUTPATIENT
Start: 2019-03-20 | End: 2019-04-11 | Stop reason: SINTOL

## 2019-03-20 NOTE — PROGRESS NOTES
[facility] [place]      NAME: Rosi Watts  AGE: 63 y o  SEX: female  : 1960   MRN: [id number]    DATE: 3/20/2019  TIME: 1:09 PM    Assessment and Plan   1  Acute on chronic respiratory failure with hypoxia and hypercapnia (HCC)  Resolved after diuresis    2  Essential hypertension  The patient's blood pressure is too low today  I believe that this reflects hypovolemia  Her diuretics were adjusted  3  Permanent atrial fibrillation (HCC)  Rate control is adequate  She is on anticoagulation for stroke prophylaxis  4  Type 2 diabetes mellitus with hypoglycemia without coma, unspecified whether long term insulin use ([facility] )  Adequately controlled  5  Closed compression fracture of first lumbar vertebra, initial encounter ([place] [address] )  The patient's pain is improving slowly  Her functional status has improved  - topiramate (TOPAMAX) 25 mg sprinkle capsule; Take 1 capsule (25 mg total) by mouth daily  Dispense: 60 capsule; Refill: 0    6  Stage 3 chronic kidney disease ([facility] )  The patient needs to get a BMP repeated    7  Class 2 obesity with alveolar hypoventilation, serious comorbidity, and body mass index (BMI) of 39 0 to 39 9 in [facility])  The patient's weight continues to diminished  Much of this is fluid  8  Chronic diastolic CHF (congestive heart failure) ([facility] )  Well compensated  The patient needs labs to evaluate over diuresis  Return to office in:  3 weeks    Chief Complaint     Chief Complaint   Patient presents with    Follow-up     2 weeks, back pain persists, patient never started the Topiramate given at last visit       History of Present Illness     The patient returned to the office for re-evaluation of acute on chronic diastolic congestive heart failure complicated by respiratory failure, type 2 diabetes, atrial fibrillation, hypertension, hypercholesterolemia, and lumbar compression fracture  Since her last visit she has been feeling better    She does have back pain but this is slowly improving  She did not yet start Topamax  She has continued to lose weight  She has no chest pain, shortness of breath, orthopnea, PND, or edema  She is having lightheadedness when she stands up  She has not passed out  She has not fallen  She thinks that she will be well enough to go back to work next week  She denies specific problems with her medications  The following portions of the patient's history were reviewed and updated as appropriate: allergies, current medications, past family history, past medical history, past social history, past surgical history and problem list     Review of Systems   Review of Systems   Constitutional: Negative  HENT: Negative for congestion, ear pain, postnasal drip, rhinorrhea, sore throat and trouble swallowing  Eyes: Negative for pain, discharge, redness and visual disturbance  Respiratory: Negative for cough, shortness of breath and wheezing  Cardiovascular: Negative  Gastrointestinal: Negative  Endocrine: Negative  Genitourinary: Negative for difficulty urinating, dysuria, frequency, hematuria and urgency  Musculoskeletal: Positive for back pain  Negative for arthralgias, gait problem, joint swelling and myalgias  Skin: Negative for rash  Neurological: Positive for light-headedness  Negative for dizziness, speech difficulty, weakness, numbness and headaches  Hematological: Negative  Psychiatric/Behavioral: Negative for confusion, decreased concentration, dysphoric mood and sleep disturbance  The patient is not nervous/anxious          Active Problem List     Patient Active Problem List   Diagnosis    COPD (chronic obstructive pulmonary disease) ([facility] )    Hypertension    Permanent atrial fibrillation (HCC)    Acute on chronic respiratory failure with hypoxia and hypercapnia (HCC)    Chronic venous stasis dermatitis of both lower extremities    Hypoalbuminemia    Type 2 diabetes mellitus with hypoglycemia ([address] )    Non-rheumatic mitral regurgitation    Nonsustained ventricular tachycardia (HCC)    Obesity    Vitamin D deficiency    Depression    Dyslipidemia    Left ventricular hypertrophy    Stage 3 chronic kidney disease (HCC)    Compression fracture of first lumbar vertebra (HCC)    Transaminitis       Objective   BP (!) 82/52 (BP Location: Left arm, Patient Position: Sitting, Cuff Size: Standard)   Temp (!) 96 1 °F (35 6 °C) (Tympanic)   Ht 5' 4" (1 626 m)   Wt 104 kg (230 lb)   BMI 39 48 kg/m²     Physical Exam   Constitutional: She is oriented to person, place, and time  She appears well-developed  No distress  Obese   HENT:   Head: Normocephalic and atraumatic  Neck: Neck supple  No JVD present  No tracheal deviation present  No thyromegaly present  Cardiovascular: Normal rate and intact distal pulses  Exam reveals no gallop and no friction rub  The cardiac rhythm is irregular  There is a grade 2/6 systolic murmur at the left sternal border  Pulmonary/Chest: Effort normal and breath sounds normal  She has no wheezes  She has no rales  She exhibits no tenderness  Abdominal: Soft  Bowel sounds are normal  She exhibits no distension and no mass  There is no tenderness  There is no rebound  Musculoskeletal: Normal range of motion  She exhibits no edema or tenderness  Lymphadenopathy:     She has no cervical adenopathy  Neurological: She is alert and oriented to person, place, and time  Skin: Skin is warm and dry  Psychiatric: She has a normal mood and affect  Pertinent Laboratory/Diagnostic Studies:  Lab work from the patient's recent hospitalization was reviewed          Current Medications     Current Outpatient Medications:     diltiazem (CARDIZEM CD) 120 mg 24 hr capsule, Take 1 capsule (120 mg total) by mouth daily, Disp: 30 capsule, Rfl: 4    ELIQUIS 5 MG, Take 1 tablet (5 mg total) by mouth 2 (two) times a day, Disp: 60 tablet, Rfl: 11    metFORMIN (GLUCOPHAGE-XR) 750 mg 24 hr tablet, Take 750 mg by mouth daily after dinner, Disp: , Rfl:     metoprolol succinate (TOPROL-XL) 100 mg 24 hr tablet, Take 1 tablet (100 mg total) by mouth every 12 (twelve) hours (Patient taking differently: Take 100 mg by mouth 2 (two) times a day  ), Disp: 60 tablet, Rfl: 1    torsemide (DEMADEX) 20 mg tablet, Take 2 tablets (40 mg total) by mouth 2 (two) times a day, Disp: 120 tablet, Rfl: 0    albuterol (PROVENTIL HFA,VENTOLIN HFA) 90 mcg/act inhaler, Inhale 2 puffs every 4 (four) hours as needed for wheezing, Disp: 1 Inhaler, Rfl: 3    lisinopril (ZESTRIL) 40 mg tablet, Take 1 tablet (40 mg total) by mouth daily, Disp: 30 tablet, Rfl: 11    methocarbamol (ROBAXIN) 500 mg tablet, Take 1 tablet (500 mg total) by mouth every 8 (eight) hours as needed for muscle spasms, Disp: 30 tablet, Rfl: 0    nicotine (NICODERM CQ) 14 mg/24hr TD 24 hr patch, Place 1 patch on the skin daily (Patient not taking: Reported on 2/27/2019), Disp: 28 patch, Rfl: 0    topiramate (TOPAMAX) 25 mg sprinkle capsule, Take 1 capsule (25 mg total) by mouth daily, Disp: 60 capsule, Rfl: 0      Ernesto Hunter MD

## 2019-03-26 DIAGNOSIS — S32.010A CLOSED COMPRESSION FRACTURE OF FIRST LUMBAR VERTEBRA, INITIAL ENCOUNTER: ICD-10-CM

## 2019-03-26 RX ORDER — TOPIRAMATE 25 MG/1
25 CAPSULE, COATED PELLETS ORAL 2 TIMES DAILY
Qty: 60 CAPSULE | Refills: 0 | Status: SHIPPED | OUTPATIENT
Start: 2019-03-26 | End: 2019-04-11 | Stop reason: DRUGHIGH

## 2019-04-03 ENCOUNTER — TELEPHONE (OUTPATIENT)
Dept: INTERNAL MEDICINE CLINIC | Facility: CLINIC | Age: 59
End: 2019-04-03

## 2019-04-11 ENCOUNTER — APPOINTMENT (OUTPATIENT)
Dept: LAB | Facility: HOSPITAL | Age: 59
End: 2019-04-11
Payer: COMMERCIAL

## 2019-04-11 ENCOUNTER — OFFICE VISIT (OUTPATIENT)
Dept: INTERNAL MEDICINE CLINIC | Facility: CLINIC | Age: 59
End: 2019-04-11
Payer: COMMERCIAL

## 2019-04-11 VITALS
SYSTOLIC BLOOD PRESSURE: 118 MMHG | OXYGEN SATURATION: 96 % | RESPIRATION RATE: 15 BRPM | HEIGHT: 64 IN | WEIGHT: 224 LBS | BODY MASS INDEX: 38.24 KG/M2 | DIASTOLIC BLOOD PRESSURE: 70 MMHG | HEART RATE: 68 BPM | TEMPERATURE: 97.8 F

## 2019-04-11 DIAGNOSIS — S32.010A CLOSED COMPRESSION FRACTURE OF FIRST LUMBAR VERTEBRA, INITIAL ENCOUNTER: ICD-10-CM

## 2019-04-11 DIAGNOSIS — N18.30 STAGE 3 CHRONIC KIDNEY DISEASE (HCC): ICD-10-CM

## 2019-04-11 DIAGNOSIS — I50.32 CHRONIC DIASTOLIC CHF (CONGESTIVE HEART FAILURE) (HCC): Primary | ICD-10-CM

## 2019-04-11 DIAGNOSIS — I10 ESSENTIAL HYPERTENSION: ICD-10-CM

## 2019-04-11 DIAGNOSIS — E78.5 DYSLIPIDEMIA: ICD-10-CM

## 2019-04-11 DIAGNOSIS — J44.9 CHRONIC OBSTRUCTIVE PULMONARY DISEASE, UNSPECIFIED COPD TYPE (HCC): ICD-10-CM

## 2019-04-11 DIAGNOSIS — E11.649 TYPE 2 DIABETES MELLITUS WITH HYPOGLYCEMIA WITHOUT COMA, UNSPECIFIED WHETHER LONG TERM INSULIN USE (HCC): ICD-10-CM

## 2019-04-11 DIAGNOSIS — I48.21 PERMANENT ATRIAL FIBRILLATION (HCC): ICD-10-CM

## 2019-04-11 LAB
ALBUMIN SERPL BCP-MCNC: 3.4 G/DL (ref 3.5–5)
ALP SERPL-CCNC: 106 U/L (ref 46–116)
ALT SERPL W P-5'-P-CCNC: 28 U/L (ref 12–78)
ANION GAP SERPL CALCULATED.3IONS-SCNC: 11 MMOL/L (ref 4–13)
AST SERPL W P-5'-P-CCNC: 26 U/L (ref 5–45)
BASOPHILS # BLD AUTO: 0.11 THOUSANDS/ΜL (ref 0–0.1)
BASOPHILS NFR BLD AUTO: 2 % (ref 0–1)
BILIRUB SERPL-MCNC: 1.11 MG/DL (ref 0.2–1)
BUN SERPL-MCNC: 75 MG/DL (ref 5–25)
CALCIUM SERPL-MCNC: 9.1 MG/DL (ref 8.3–10.1)
CHLORIDE SERPL-SCNC: 107 MMOL/L (ref 100–108)
CO2 SERPL-SCNC: 25 MMOL/L (ref 21–32)
CREAT SERPL-MCNC: 2.15 MG/DL (ref 0.6–1.3)
CREAT UR-MCNC: 159 MG/DL
EOSINOPHIL # BLD AUTO: 0.21 THOUSAND/ΜL (ref 0–0.61)
EOSINOPHIL NFR BLD AUTO: 3 % (ref 0–6)
ERYTHROCYTE [DISTWIDTH] IN BLOOD BY AUTOMATED COUNT: 15.6 % (ref 11.6–15.1)
EST. AVERAGE GLUCOSE BLD GHB EST-MCNC: 120 MG/DL
GFR SERPL CREATININE-BSD FRML MDRD: 25 ML/MIN/1.73SQ M
GLUCOSE SERPL-MCNC: 85 MG/DL (ref 65–140)
HBA1C MFR BLD: 5.8 % (ref 4.2–6.3)
HCT VFR BLD AUTO: 54.8 % (ref 34.8–46.1)
HGB BLD-MCNC: 17.6 G/DL (ref 11.5–15.4)
IMM GRANULOCYTES # BLD AUTO: 0.02 THOUSAND/UL (ref 0–0.2)
IMM GRANULOCYTES NFR BLD AUTO: 0 % (ref 0–2)
LYMPHOCYTES # BLD AUTO: 1.62 THOUSANDS/ΜL (ref 0.6–4.47)
LYMPHOCYTES NFR BLD AUTO: 23 % (ref 14–44)
MAGNESIUM SERPL-MCNC: 2.5 MG/DL (ref 1.6–2.6)
MCH RBC QN AUTO: 31 PG (ref 26.8–34.3)
MCHC RBC AUTO-ENTMCNC: 32.1 G/DL (ref 31.4–37.4)
MCV RBC AUTO: 97 FL (ref 82–98)
MICROALBUMIN UR-MCNC: 121 MG/L (ref 0–20)
MICROALBUMIN/CREAT 24H UR: 76 MG/G CREATININE (ref 0–30)
MONOCYTES # BLD AUTO: 0.92 THOUSAND/ΜL (ref 0.17–1.22)
MONOCYTES NFR BLD AUTO: 13 % (ref 4–12)
NEUTROPHILS # BLD AUTO: 4.29 THOUSANDS/ΜL (ref 1.85–7.62)
NEUTS SEG NFR BLD AUTO: 59 % (ref 43–75)
NRBC BLD AUTO-RTO: 0 /100 WBCS
NT-PROBNP SERPL-MCNC: 2910 PG/ML
PLATELET # BLD AUTO: 159 THOUSANDS/UL (ref 149–390)
PMV BLD AUTO: 11.9 FL (ref 8.9–12.7)
POTASSIUM SERPL-SCNC: 4.4 MMOL/L (ref 3.5–5.3)
PROT SERPL-MCNC: 7.3 G/DL (ref 6.4–8.2)
RBC # BLD AUTO: 5.68 MILLION/UL (ref 3.81–5.12)
SODIUM SERPL-SCNC: 143 MMOL/L (ref 136–145)
TSH SERPL DL<=0.05 MIU/L-ACNC: 2.09 UIU/ML (ref 0.36–3.74)
WBC # BLD AUTO: 7.17 THOUSAND/UL (ref 4.31–10.16)

## 2019-04-11 PROCEDURE — 83880 ASSAY OF NATRIURETIC PEPTIDE: CPT | Performed by: INTERNAL MEDICINE

## 2019-04-11 PROCEDURE — 84443 ASSAY THYROID STIM HORMONE: CPT | Performed by: INTERNAL MEDICINE

## 2019-04-11 PROCEDURE — 3060F POS MICROALBUMINURIA REV: CPT | Performed by: INTERNAL MEDICINE

## 2019-04-11 PROCEDURE — 80053 COMPREHEN METABOLIC PANEL: CPT | Performed by: INTERNAL MEDICINE

## 2019-04-11 PROCEDURE — 3074F SYST BP LT 130 MM HG: CPT | Performed by: INTERNAL MEDICINE

## 2019-04-11 PROCEDURE — 36415 COLL VENOUS BLD VENIPUNCTURE: CPT | Performed by: INTERNAL MEDICINE

## 2019-04-11 PROCEDURE — 99214 OFFICE O/P EST MOD 30 MIN: CPT | Performed by: INTERNAL MEDICINE

## 2019-04-11 PROCEDURE — 82043 UR ALBUMIN QUANTITATIVE: CPT | Performed by: INTERNAL MEDICINE

## 2019-04-11 PROCEDURE — 3008F BODY MASS INDEX DOCD: CPT | Performed by: INTERNAL MEDICINE

## 2019-04-11 PROCEDURE — 3078F DIAST BP <80 MM HG: CPT | Performed by: INTERNAL MEDICINE

## 2019-04-11 PROCEDURE — 82570 ASSAY OF URINE CREATININE: CPT | Performed by: INTERNAL MEDICINE

## 2019-04-11 PROCEDURE — 3044F HG A1C LEVEL LT 7.0%: CPT | Performed by: INTERNAL MEDICINE

## 2019-04-11 PROCEDURE — 83735 ASSAY OF MAGNESIUM: CPT | Performed by: INTERNAL MEDICINE

## 2019-04-11 PROCEDURE — 83036 HEMOGLOBIN GLYCOSYLATED A1C: CPT | Performed by: INTERNAL MEDICINE

## 2019-04-11 PROCEDURE — 85025 COMPLETE CBC W/AUTO DIFF WBC: CPT | Performed by: INTERNAL MEDICINE

## 2019-04-11 RX ORDER — METFORMIN HYDROCHLORIDE 750 MG/1
750 TABLET, EXTENDED RELEASE ORAL
Qty: 30 TABLET | Refills: 4 | Status: CANCELLED | OUTPATIENT
Start: 2019-04-11

## 2019-04-11 RX ORDER — DILTIAZEM HYDROCHLORIDE 120 MG/1
120 CAPSULE, COATED, EXTENDED RELEASE ORAL DAILY
Qty: 30 CAPSULE | Refills: 4 | Status: SHIPPED | OUTPATIENT
Start: 2019-04-11 | End: 2019-12-24 | Stop reason: SDUPTHER

## 2019-04-11 RX ORDER — OXYCODONE HYDROCHLORIDE 5 MG/1
5 TABLET ORAL EVERY 4 HOURS PRN
Qty: 30 TABLET | Refills: 0 | Status: ON HOLD | OUTPATIENT
Start: 2019-04-11 | End: 2020-01-30 | Stop reason: SDUPTHER

## 2019-04-11 RX ORDER — TORSEMIDE 20 MG/1
40 TABLET ORAL
Qty: 120 TABLET | Refills: 0 | Status: CANCELLED | OUTPATIENT
Start: 2019-04-11

## 2019-04-18 ENCOUNTER — OFFICE VISIT (OUTPATIENT)
Dept: INTERNAL MEDICINE CLINIC | Facility: CLINIC | Age: 59
End: 2019-04-18
Payer: COMMERCIAL

## 2019-04-18 ENCOUNTER — APPOINTMENT (OUTPATIENT)
Dept: LAB | Facility: HOSPITAL | Age: 59
End: 2019-04-18
Attending: INTERNAL MEDICINE
Payer: COMMERCIAL

## 2019-04-18 VITALS
DIASTOLIC BLOOD PRESSURE: 104 MMHG | TEMPERATURE: 98.6 F | BODY MASS INDEX: 38.93 KG/M2 | WEIGHT: 228 LBS | HEIGHT: 64 IN | RESPIRATION RATE: 16 BRPM | HEART RATE: 86 BPM | SYSTOLIC BLOOD PRESSURE: 139 MMHG

## 2019-04-18 DIAGNOSIS — J44.9 CHRONIC OBSTRUCTIVE PULMONARY DISEASE, UNSPECIFIED COPD TYPE (HCC): ICD-10-CM

## 2019-04-18 DIAGNOSIS — I50.32 CHRONIC DIASTOLIC CHF (CONGESTIVE HEART FAILURE) (HCC): ICD-10-CM

## 2019-04-18 DIAGNOSIS — E11.9 TYPE 2 DIABETES MELLITUS WITHOUT COMPLICATION, WITHOUT LONG-TERM CURRENT USE OF INSULIN (HCC): ICD-10-CM

## 2019-04-18 DIAGNOSIS — I10 ESSENTIAL HYPERTENSION: Primary | ICD-10-CM

## 2019-04-18 DIAGNOSIS — I48.21 PERMANENT ATRIAL FIBRILLATION (HCC): ICD-10-CM

## 2019-04-18 DIAGNOSIS — J96.21 ACUTE ON CHRONIC RESPIRATORY FAILURE WITH HYPOXIA AND HYPERCAPNIA (HCC): ICD-10-CM

## 2019-04-18 DIAGNOSIS — S32.010A CLOSED COMPRESSION FRACTURE OF FIRST LUMBAR VERTEBRA, INITIAL ENCOUNTER: ICD-10-CM

## 2019-04-18 DIAGNOSIS — J96.22 ACUTE ON CHRONIC RESPIRATORY FAILURE WITH HYPOXIA AND HYPERCAPNIA (HCC): ICD-10-CM

## 2019-04-18 DIAGNOSIS — N18.30 STAGE 3 CHRONIC KIDNEY DISEASE (HCC): ICD-10-CM

## 2019-04-18 LAB
ANION GAP SERPL CALCULATED.3IONS-SCNC: 8 MMOL/L (ref 4–13)
BUN SERPL-MCNC: 29 MG/DL (ref 5–25)
CALCIUM SERPL-MCNC: 9 MG/DL (ref 8.3–10.1)
CHLORIDE SERPL-SCNC: 110 MMOL/L (ref 100–108)
CO2 SERPL-SCNC: 26 MMOL/L (ref 21–32)
CREAT SERPL-MCNC: 1.58 MG/DL (ref 0.6–1.3)
GFR SERPL CREATININE-BSD FRML MDRD: 36 ML/MIN/1.73SQ M
GLUCOSE SERPL-MCNC: 79 MG/DL (ref 65–140)
POTASSIUM SERPL-SCNC: 4.3 MMOL/L (ref 3.5–5.3)
SODIUM SERPL-SCNC: 144 MMOL/L (ref 136–145)

## 2019-04-18 PROCEDURE — 4010F ACE/ARB THERAPY RXD/TAKEN: CPT | Performed by: INTERNAL MEDICINE

## 2019-04-18 PROCEDURE — 36415 COLL VENOUS BLD VENIPUNCTURE: CPT | Performed by: INTERNAL MEDICINE

## 2019-04-18 PROCEDURE — 3066F NEPHROPATHY DOC TX: CPT | Performed by: INTERNAL MEDICINE

## 2019-04-18 PROCEDURE — 3008F BODY MASS INDEX DOCD: CPT | Performed by: INTERNAL MEDICINE

## 2019-04-18 PROCEDURE — 99214 OFFICE O/P EST MOD 30 MIN: CPT | Performed by: INTERNAL MEDICINE

## 2019-04-18 PROCEDURE — 80048 BASIC METABOLIC PNL TOTAL CA: CPT | Performed by: INTERNAL MEDICINE

## 2019-04-18 RX ORDER — TORSEMIDE 20 MG/1
20 TABLET ORAL DAILY
Qty: 60 TABLET | Refills: 3 | Status: SHIPPED | OUTPATIENT
Start: 2019-04-18 | End: 2019-11-05

## 2019-04-18 RX ORDER — LISINOPRIL 40 MG/1
40 TABLET ORAL DAILY
Qty: 30 TABLET | Refills: 5 | Status: SHIPPED | OUTPATIENT
Start: 2019-04-18 | End: 2020-02-25 | Stop reason: SDUPTHER

## 2019-05-29 DIAGNOSIS — I48.91 ATRIAL FIBRILLATION, UNSPECIFIED TYPE (HCC): Primary | ICD-10-CM

## 2019-06-27 ENCOUNTER — TELEPHONE (OUTPATIENT)
Dept: CARDIOLOGY CLINIC | Facility: CLINIC | Age: 59
End: 2019-06-27

## 2019-10-28 ENCOUNTER — OFFICE VISIT (OUTPATIENT)
Dept: URGENT CARE | Facility: MEDICAL CENTER | Age: 59
End: 2019-10-28
Payer: COMMERCIAL

## 2019-10-28 VITALS
RESPIRATION RATE: 20 BRPM | DIASTOLIC BLOOD PRESSURE: 100 MMHG | BODY MASS INDEX: 40.04 KG/M2 | TEMPERATURE: 98.5 F | HEART RATE: 90 BPM | OXYGEN SATURATION: 90 % | SYSTOLIC BLOOD PRESSURE: 150 MMHG | HEIGHT: 65 IN | WEIGHT: 240.3 LBS

## 2019-10-28 DIAGNOSIS — L03.113 CELLULITIS OF RIGHT UPPER EXTREMITY: Primary | ICD-10-CM

## 2019-10-28 PROCEDURE — 99213 OFFICE O/P EST LOW 20 MIN: CPT | Performed by: FAMILY MEDICINE

## 2019-10-28 RX ORDER — AMOXICILLIN AND CLAVULANATE POTASSIUM 875; 125 MG/1; MG/1
1 TABLET, FILM COATED ORAL EVERY 12 HOURS SCHEDULED
Qty: 14 TABLET | Refills: 0 | Status: SHIPPED | OUTPATIENT
Start: 2019-10-28 | End: 2019-11-04

## 2019-10-28 NOTE — PATIENT INSTRUCTIONS
I placed the patient on Augmentin 875 mg twice a day for 7 days  Advised patient apply warm moist compress to right arm and elbow 15 20 minutes as often as possible, keep right arm elevated  Redness or swelling worsens, patient is to contact primary care provider or go to the emergency room  Patient also strongly advised to resume oxygen at home  She expressed understanding  Cellulitis   WHAT YOU NEED TO KNOW:   Cellulitis is a skin infection caused by bacteria  Cellulitis may go away on its own or you may need treatment  Your healthcare provider may draw a [place] around the outside edges of your cellulitis  If your cellulitis spreads, your healthcare provider will see it outside of the [place]  DISCHARGE INSTRUCTIONS:   Call 911 if:   · You have sudden trouble breathing or chest pain  Return to the emergency department if:   · Your wound gets larger and more painful  · You feel a crackling under your skin when you touch it  · You have purple dots or bumps on your skin, or you see bleeding under your skin  · You have new swelling and pain in your legs  · The red, warm, swollen area gets larger  · You see red streaks coming from the infected area  Contact your healthcare provider if:   · You have a fever  · Your fever or pain does not go away or gets worse  · The area does not get smaller after 2 days of antibiotics  · Your skin is flaking or peeling off  · You have questions or concerns about your condition or care  Medicines:   · Antibiotics  help treat the bacterial infection  · NSAIDs , such as ibuprofen, help decrease swelling, pain, and fever  NSAIDs can cause stomach bleeding or kidney problems in certain people  If you take blood thinner medicine, always ask if NSAIDs are safe for you  Always read the medicine label and follow directions  Do not give these medicines to children under 10months of age without direction from your child's healthcare provider  · Acetaminophen  decreases pain and fever  It is available without a doctor's order  Ask how much to take and how often to take it  Follow directions  Read the labels of all other medicines you are using to see if they also contain acetaminophen, or ask your doctor or pharmacist  Acetaminophen can cause liver damage if not taken correctly  Do not use more than 4 grams (4,000 milligrams) total of acetaminophen in one day  · Take your medicine as directed  Contact your healthcare provider if you think your medicine is not helping or if you have side effects  Tell him or her if you are allergic to any medicine  Keep a list of the medicines, vitamins, and herbs you take  Include the amounts, and when and why you take them  Bring the list or the pill bottles to follow-up visits  Carry your medicine list with you in case of an emergency  Self-care:   · Elevate the area above the level of your heart  as often as you can  This will help decrease swelling and pain  Prop the area on pillows or blankets to keep it elevated comfortably  · Clean the area daily until the wound scabs over  Gently wash the area with soap and water  Pat dry  Use dressings as directed  · Place cool or warm, wet cloths on the area as directed  Use clean cloths and clean water  Leave it on the area until the cloth is room temperature  Pat the area dry with a clean, dry cloth  The cloths may help decrease pain  Prevent cellulitis:   · Do not scratch bug bites or areas of injury  You increase your risk for cellulitis by scratching these areas  · Do not share personal items, such as towels, clothing, and razors  · Clean exercise equipment  with germ-killing  before and after you use it  · Wash your hands often  Use soap and water  Wash your hands after you use the bathroom, change a child's diapers, or sneeze  Wash your hands before you prepare or eat food  Use lotion to prevent dry, cracked skin             · Wear pressure stockings as directed  You may be told to wear the stockings if you have peripheral edema  The stockings improve blood flow and decrease swelling  · Treat athlete's foot  This can help prevent the spread of a bacterial skin infection  Follow up with your healthcare provider within 3 days, or as directed: Your healthcare provider will check if your cellulitis is getting better  You may need different medicine  Write down your questions so you remember to ask them during your visits  © 2017 [facility] Information is for End User's use only and may not be sold, redistributed or otherwise used for commercial purposes  All illustrations and images included in CareNotes® are the copyrighted property of A D A M [employer] Inc  or Milind Laughlin  The above information is an  only  It is not intended as medical advice for individual conditions or treatments  Talk to your doctor, nurse or pharmacist before following any medical regimen to see if it is safe and effective for you

## 2019-10-28 NOTE — PROGRESS NOTES
[facility]        NAME: Mara Agustin is a 63 y o  female  : 1960    MRN: [id number]  DATE: 2019  TIME: 6:54 PM    Assessment and Plan   Cellulitis of right upper extremity [L03 113]  1  Cellulitis of right upper extremity  amoxicillin-clavulanate (AUGMENTIN) 875-125 mg per tablet         Patient Instructions       Follow up with PCP in 3-5 days  Proceed to  ER if symptoms worsen  Chief Complaint     Chief Complaint   Patient presents with    Cellulitis     Skin wound on left elbow  Pt states that she bumped it twice in the last few weeks  C/o pain and redness at site  History of Present Illness       15-year-old female here today with complaint of right elbow pain and swelling which got worse yesterday  Upon further questioning, patient has had right elbow infection for up to 3 months before  Seem to be getting better  However she has scabs that she has been picking at over the last several weeks possibly 3 weeks  Denies any purulent drainage from scabs  Denies any fever  Right elbow seems to be warm to touch  She has been applying antibiotic ointment to the wound  Review of Systems   Review of Systems   Constitutional: Negative  Skin: Positive for rash and wound           Current Medications       Current Outpatient Medications:     albuterol (PROVENTIL HFA,VENTOLIN HFA) 90 mcg/act inhaler, Inhale 2 puffs every 4 (four) hours as needed for wheezing, Disp: 1 Inhaler, Rfl: 3    amoxicillin-clavulanate (AUGMENTIN) 875-125 mg per tablet, Take 1 tablet by mouth every 12 (twelve) hours for 7 days, Disp: 14 tablet, Rfl: 0    apixaban (ELIQUIS) 5 mg, Take 1 tablet (5 mg total) by mouth 2 (two) times a day, Disp: 60 tablet, Rfl: 4    diltiazem (CARDIZEM CD) 120 mg 24 hr capsule, Take 1 capsule (120 mg total) by mouth daily, Disp: 30 capsule, Rfl: 4    ELIQUIS 5 MG, Take 1 tablet (5 mg total) by mouth 2 (two) times a day, Disp: 60 tablet, Rfl: 11   lisinopril (ZESTRIL) 40 mg tablet, Take 1 tablet (40 mg total) by mouth daily, Disp: 30 tablet, Rfl: 5    metoprolol succinate (TOPROL-XL) 100 mg 24 hr tablet, Take 1 tablet (100 mg total) by mouth every 12 (twelve) hours (Patient taking differently: Take 100 mg by mouth 2 (two) times a day  ), Disp: 60 tablet, Rfl: 1    nicotine (NICODERM CQ) 14 mg/24hr TD 24 hr patch, Place 1 patch on the skin daily (Patient not taking: Reported on 2/27/2019), Disp: 28 patch, Rfl: 0    oxyCODONE (ROXICODONE) 5 mg immediate release tablet, Take 1 tablet (5 mg total) by mouth every 4 (four) hours as needed for moderate painMax Daily Amount: 30 mg, Disp: 30 tablet, Rfl: 0    torsemide (DEMADEX) 20 mg tablet, Take 1 tablet (20 mg total) by mouth daily, Disp: 60 tablet, Rfl: 3    Current Allergies     Allergies as of 10/28/2019    (No Known Allergies)            The following portions of the patient's history were reviewed and updated as appropriate: allergies, current medications, past family history, past medical history, past social history, past surgical history and problem list      Past Medical History:   Diagnosis Date    Atrial fibrillation with RVR ([facility] )     COPD (chronic obstructive pulmonary disease) ([facility] )     Diabetes type 2, uncontrolled ([facility] )     Hypertension        Past Surgical History:   Procedure Laterality Date    HERNIA REPAIR      HYSTERECTOMY      LAPAROSCOPIC CHOLECYSTECTOMY         Family History   Problem Relation Age of Onset    Diabetes type II Mother     No Known Problems Father         She reports not knowing much about her father   Cancer Family     Diabetes Family     Hypertension Family     Stroke Family          Medications have been verified  Objective   /100   Pulse 90   Temp 98 5 °F (36 9 °C)   Resp 20   Ht 5' 5" (1 651 m)   Wt 109 kg (240 lb 4 8 oz)   SpO2 90% Comment: pt states that she should be wearing O2    BMI 39 99 kg/m²        Physical Exam     Physical Exam   Pulmonary/Chest: Effort normal    Decreased breath sounds bilaterally  Skin: Skin is warm  Right forearm reveals erythema and edema extending up to the distal right upper arm  There seems to be in effusion of the right elbow which is tender to touch  Cervical hyperkeratotic lesion located on the extensor aspect  Findings are consistent with cellulitis  Vitals reviewed

## 2019-11-05 ENCOUNTER — OFFICE VISIT (OUTPATIENT)
Dept: INTERNAL MEDICINE CLINIC | Facility: CLINIC | Age: 59
End: 2019-11-05
Payer: COMMERCIAL

## 2019-11-05 VITALS
WEIGHT: 243.6 LBS | HEIGHT: 65 IN | TEMPERATURE: 99.5 F | DIASTOLIC BLOOD PRESSURE: 80 MMHG | HEART RATE: 118 BPM | SYSTOLIC BLOOD PRESSURE: 144 MMHG | OXYGEN SATURATION: 66 % | BODY MASS INDEX: 40.59 KG/M2

## 2019-11-05 DIAGNOSIS — J96.21 ACUTE ON CHRONIC RESPIRATORY FAILURE WITH HYPOXIA AND HYPERCAPNIA (HCC): ICD-10-CM

## 2019-11-05 DIAGNOSIS — N18.30 STAGE 3 CHRONIC KIDNEY DISEASE (HCC): ICD-10-CM

## 2019-11-05 DIAGNOSIS — J44.9 CHRONIC OBSTRUCTIVE PULMONARY DISEASE, UNSPECIFIED COPD TYPE (HCC): ICD-10-CM

## 2019-11-05 DIAGNOSIS — E11.9 TYPE 2 DIABETES MELLITUS WITHOUT COMPLICATION, WITHOUT LONG-TERM CURRENT USE OF INSULIN (HCC): ICD-10-CM

## 2019-11-05 DIAGNOSIS — J96.22 ACUTE ON CHRONIC RESPIRATORY FAILURE WITH HYPOXIA AND HYPERCAPNIA (HCC): ICD-10-CM

## 2019-11-05 DIAGNOSIS — I50.32 CHRONIC DIASTOLIC CHF (CONGESTIVE HEART FAILURE) (HCC): ICD-10-CM

## 2019-11-05 DIAGNOSIS — I10 ESSENTIAL HYPERTENSION: Primary | ICD-10-CM

## 2019-11-05 DIAGNOSIS — I48.21 PERMANENT ATRIAL FIBRILLATION (HCC): ICD-10-CM

## 2019-11-05 PROCEDURE — 99214 OFFICE O/P EST MOD 30 MIN: CPT | Performed by: INTERNAL MEDICINE

## 2019-11-05 PROCEDURE — 3008F BODY MASS INDEX DOCD: CPT | Performed by: INTERNAL MEDICINE

## 2019-11-05 RX ORDER — PREDNISONE 10 MG/1
TABLET ORAL
Qty: 20 TABLET | Refills: 0 | Status: SHIPPED | OUTPATIENT
Start: 2019-11-05 | End: 2020-01-30 | Stop reason: HOSPADM

## 2019-11-05 RX ORDER — TORSEMIDE 20 MG/1
20 TABLET ORAL 2 TIMES DAILY
Qty: 60 TABLET | Refills: 3 | Status: SHIPPED | OUTPATIENT
Start: 2019-11-05 | End: 2020-02-25 | Stop reason: SDUPTHER

## 2019-11-05 RX ORDER — ALBUTEROL SULFATE 90 UG/1
2 AEROSOL, METERED RESPIRATORY (INHALATION) EVERY 4 HOURS PRN
Qty: 1 INHALER | Refills: 3 | Status: SHIPPED | OUTPATIENT
Start: 2019-11-05

## 2019-11-06 NOTE — PROGRESS NOTES
[facility]'s Internal Medicine John      NAME: Marci Posey  AGE: 63 y o  SEX: female  : 1960   MRN: [id number]    DATE: 2019  TIME: 10:11 AM    Assessment and Plan   1  Essential hypertension  Adequately controlled  2  Permanent atrial fibrillation  Somewhat tachycardic related to her acute illness  Continue metoprolol and apixaban  3  Stage 3 chronic kidney disease ([facility] )  This has been generally stable  Her creatinine will be repeated  - CBC  - Comprehensive metabolic panel    4  Chronic diastolic CHF (congestive heart failure) ([facility] )  The patient is bowel 15 lb overweight  Torsemide has been increased  She was asked to get a chest x-ray and BNP  - torsemide (DEMADEX) 20 mg tablet; Take 1 tablet (20 mg total) by mouth 2 (two) times a day  Dispense: 60 tablet; Refill: 3  - NT-BNP PRO  - XR chest pa & lateral; Future    5  Acute on chronic respiratory failure with hypoxia and hypercapnia (HCC)  The patient has supplemental oxygen available  She was advised to use this  6  Chronic obstructive pulmonary disease, unspecified COPD type ([facility] )  I think that the initial problem was bronchitis with exacerbation of COPD  I think that this precipitated an exacerbation of her heart failure  She will continue bronchodilators  Short course of prednisone will be prescribed  - predniSONE 10 mg tablet; 4 for 2 days,3 for 2 days,2 for 2 days,1 for 2 days  Dispense: 20 tablet; Refill: 0  - albuterol (PROVENTIL HFA,VENTOLIN HFA) 90 mcg/act inhaler; Inhale 2 puffs every 4 (four) hours as needed for wheezing  Dispense: 1 Inhaler; Refill: 3    7  Type 2 diabetes mellitus without complication, without long-term current use of insulin (HCC)  Due for hemoglobin A1c   - Hemoglobin A1C    8  Obesity  BMI Counseling: Body mass index is 40 54 kg/m²  The BMI is above normal   I reviewed with the patient the need for calorie restriction and salt restriction    Increasing exercise is not currently feasible because of her medical condition  At the moment, the patient does not believe that hospitalization is needed  I cautioned her that should she feel worse before her next office visit she should call or go to the emergency room  Return to office in:  1 week    Chief Complaint     Chief Complaint   Patient presents with    Cough     cough that began sunday with green and yellow mucus     Fatigue       History of Present Illness     The patient came to the office for evaluation of shortness of breath cough  She has been sick for several days with cough  Initially, she had discolored sputum and wheezing  She was seen at urgent care was treated with Augmentin  Her mucous has cleared  She has had no fever  She had no chest pain or hemoptysis  She does have some edema and has orthopnea  She has not been using oxygen although she has it  The following portions of the patient's history were reviewed and updated as appropriate: allergies, current medications, past family history, past medical history, past social history, past surgical history and problem list     Review of Systems   Review of Systems   Constitutional: Positive for fatigue  HENT: Negative for congestion, ear pain, postnasal drip, rhinorrhea, sore throat and trouble swallowing  Eyes: Negative for pain, discharge, redness and visual disturbance  Respiratory: Positive for cough, shortness of breath and wheezing  Cardiovascular: Positive for leg swelling  Negative for chest pain and palpitations  Gastrointestinal: Negative  Endocrine: Negative  Genitourinary: Negative for difficulty urinating, dysuria, frequency, hematuria and urgency  Musculoskeletal: Negative for arthralgias, gait problem, joint swelling and myalgias  Skin: Negative for rash  Neurological: Negative for dizziness, speech difficulty, weakness, light-headedness, numbness and headaches  Hematological: Negative      Psychiatric/Behavioral: Negative for confusion, decreased concentration, dysphoric mood and sleep disturbance  The patient is not nervous/anxious  Active Problem List     Patient Active Problem List   Diagnosis    Chronic obstructive pulmonary disease with acute exacerbation (HCC)    Hypertension    Permanent atrial fibrillation    Acute on chronic respiratory failure with hypoxia and hypercapnia (HCC)    Chronic venous stasis dermatitis of both lower extremities    Hypoalbuminemia    Type 2 diabetes mellitus without complication (HCC)    Non-rheumatic mitral regurgitation    Nonsustained ventricular tachycardia (HCC)    Obesity    Vitamin D deficiency    Depression    Dyslipidemia    Left ventricular hypertrophy    Stage 3 chronic kidney disease (HCC)    Chronic diastolic CHF (congestive heart failure) (HCC)    Compression fracture of first lumbar vertebra (HCC)       Objective   /80 (BP Location: Left arm, Patient Position: Sitting, Cuff Size: Large)   Pulse (!) 118   Temp 99 5 °F (37 5 °C) (Tympanic)   Ht 5' 5" (1 651 m)   Wt 110 kg (243 lb 9 6 oz)   SpO2 (!) 66%   BMI 40 54 kg/m²     Physical Exam   Constitutional: She is oriented to person, place, and time  She appears well-developed  No distress  Obese   HENT:   Head: Normocephalic and atraumatic  Neck: Neck supple  No JVD present  No tracheal deviation present  No thyromegaly present  Cardiovascular: Normal rate, normal heart sounds and intact distal pulses  Exam reveals no gallop and no friction rub  No murmur heard  Irregular rhythm   Pulmonary/Chest: Effort normal  She has wheezes  She has no rales  She exhibits no tenderness  Breath sounds are diminished bilaterally   Abdominal: Soft  Bowel sounds are normal  She exhibits no distension and no mass  There is no tenderness  There is no rebound  Musculoskeletal: Normal range of motion  She exhibits no tenderness  There is +2 leg edema    There is also edema of the presacral area and lower abdominal wall  Lymphadenopathy:     She has no cervical adenopathy  Neurological: She is alert and oriented to person, place, and time  Skin: Skin is warm and dry  Psychiatric: She has a normal mood and affect  Pertinent Laboratory/Diagnostic Studies:  No visits with results within 3 Month(s) from this visit     Latest known visit with results is:   Office Visit on 04/11/2019   Component Date Value Ref Range Status    Sodium 04/18/2019 144  136 - 145 mmol/L Final    Potassium 04/18/2019 4 3  3 5 - 5 3 mmol/L Final    Chloride 04/18/2019 110* 100 - 108 mmol/L Final    CO2 04/18/2019 26  21 - 32 mmol/L Final    ANION GAP 04/18/2019 8  4 - 13 mmol/L Final    BUN 04/18/2019 29* 5 - 25 mg/dL Final    Creatinine 04/18/2019 1 58* 0 60 - 1 30 mg/dL Final    Glucose 04/18/2019 79  65 - 140 mg/dL Final    Calcium 04/18/2019 9 0  8 3 - 10 1 mg/dL Final    eGFR 04/18/2019 36  ml/min/1 73sq m Final           Current Medications     Current Outpatient Medications:     albuterol (PROVENTIL HFA,VENTOLIN HFA) 90 mcg/act inhaler, Inhale 2 puffs every 4 (four) hours as needed for wheezing, Disp: 1 Inhaler, Rfl: 3    apixaban (ELIQUIS) 5 mg, Take 1 tablet (5 mg total) by mouth 2 (two) times a day, Disp: 60 tablet, Rfl: 4    diltiazem (CARDIZEM CD) 120 mg 24 hr capsule, Take 1 capsule (120 mg total) by mouth daily, Disp: 30 capsule, Rfl: 4    lisinopril (ZESTRIL) 40 mg tablet, Take 1 tablet (40 mg total) by mouth daily, Disp: 30 tablet, Rfl: 5    metoprolol succinate (TOPROL-XL) 100 mg 24 hr tablet, Take 1 tablet (100 mg total) by mouth every 12 (twelve) hours (Patient taking differently: Take 100 mg by mouth 2 (two) times a day  ), Disp: 60 tablet, Rfl: 1    nicotine (NICODERM CQ) 14 mg/24hr TD 24 hr patch, Place 1 patch on the skin daily, Disp: 28 patch, Rfl: 0    oxyCODONE (ROXICODONE) 5 mg immediate release tablet, Take 1 tablet (5 mg total) by mouth every 4 (four) hours as needed for moderate painMax Daily Amount: 30 mg, Disp: 30 tablet, Rfl: 0    torsemide (DEMADEX) 20 mg tablet, Take 1 tablet (20 mg total) by mouth 2 (two) times a day, Disp: 60 tablet, Rfl: 3    predniSONE 10 mg tablet, 4 for 2 days,3 for 2 days,2 for 2 days,1 for 2 days, Disp: 20 tablet, Rfl: 0      Patrcia Simmonds, MD

## 2019-12-10 DIAGNOSIS — I10 ESSENTIAL HYPERTENSION: ICD-10-CM

## 2019-12-10 DIAGNOSIS — I48.21 PERMANENT ATRIAL FIBRILLATION (HCC): ICD-10-CM

## 2019-12-10 RX ORDER — METOPROLOL SUCCINATE 100 MG/1
100 TABLET, EXTENDED RELEASE ORAL EVERY 12 HOURS
Qty: 60 TABLET | Refills: 0 | Status: SHIPPED | OUTPATIENT
Start: 2019-12-10 | End: 2020-01-30 | Stop reason: HOSPADM

## 2019-12-24 DIAGNOSIS — I10 ESSENTIAL HYPERTENSION: ICD-10-CM

## 2019-12-24 DIAGNOSIS — I48.21 PERMANENT ATRIAL FIBRILLATION (HCC): ICD-10-CM

## 2019-12-31 RX ORDER — DILTIAZEM HYDROCHLORIDE 120 MG/1
CAPSULE, COATED, EXTENDED RELEASE ORAL
Qty: 90 CAPSULE | Refills: 1 | Status: SHIPPED | OUTPATIENT
Start: 2019-12-31 | End: 2020-02-25 | Stop reason: ALTCHOICE

## 2020-01-06 ENCOUNTER — HOSPITAL ENCOUNTER (INPATIENT)
Facility: HOSPITAL | Age: 60
LOS: 15 days | DRG: 291 | End: 2020-01-21
Attending: EMERGENCY MEDICINE | Admitting: INTERNAL MEDICINE
Payer: COMMERCIAL

## 2020-01-06 ENCOUNTER — APPOINTMENT (EMERGENCY)
Dept: RADIOLOGY | Facility: HOSPITAL | Age: 60
DRG: 291 | End: 2020-01-06
Payer: COMMERCIAL

## 2020-01-06 DIAGNOSIS — F32.A DEPRESSION, UNSPECIFIED DEPRESSION TYPE: ICD-10-CM

## 2020-01-06 DIAGNOSIS — I87.2 CHRONIC VENOUS STASIS DERMATITIS OF BOTH LOWER EXTREMITIES: ICD-10-CM

## 2020-01-06 DIAGNOSIS — I50.9 ACUTE CONGESTIVE HEART FAILURE, UNSPECIFIED HEART FAILURE TYPE (HCC): ICD-10-CM

## 2020-01-06 DIAGNOSIS — J96.22 ACUTE ON CHRONIC RESPIRATORY FAILURE WITH HYPOXIA AND HYPERCAPNIA (HCC): ICD-10-CM

## 2020-01-06 DIAGNOSIS — N17.9 ARF (ACUTE RENAL FAILURE) (HCC): ICD-10-CM

## 2020-01-06 DIAGNOSIS — R19.7 DIARRHEA: ICD-10-CM

## 2020-01-06 DIAGNOSIS — R09.02 HYPOXIA: ICD-10-CM

## 2020-01-06 DIAGNOSIS — J96.21 ACUTE ON CHRONIC RESPIRATORY FAILURE WITH HYPOXIA AND HYPERCAPNIA (HCC): ICD-10-CM

## 2020-01-06 DIAGNOSIS — I50.9 ACUTE CONGESTIVE HEART FAILURE (HCC): Primary | ICD-10-CM

## 2020-01-06 DIAGNOSIS — R77.8 ELEVATED TROPONIN: ICD-10-CM

## 2020-01-06 LAB
ALBUMIN SERPL BCP-MCNC: 3.3 G/DL (ref 3.5–5)
ALP SERPL-CCNC: 162 U/L (ref 46–116)
ALT SERPL W P-5'-P-CCNC: 38 U/L (ref 12–78)
ANION GAP SERPL CALCULATED.3IONS-SCNC: 13 MMOL/L (ref 4–13)
AST SERPL W P-5'-P-CCNC: 43 U/L (ref 5–45)
BASOPHILS # BLD AUTO: 0.15 THOUSANDS/ΜL (ref 0–0.1)
BASOPHILS NFR BLD AUTO: 2 % (ref 0–1)
BILIRUB SERPL-MCNC: 3.07 MG/DL (ref 0.2–1)
BUN SERPL-MCNC: 46 MG/DL (ref 5–25)
CALCIUM SERPL-MCNC: 9.1 MG/DL (ref 8.3–10.1)
CHLORIDE SERPL-SCNC: 103 MMOL/L (ref 100–108)
CO2 SERPL-SCNC: 30 MMOL/L (ref 21–32)
CREAT SERPL-MCNC: 3.32 MG/DL (ref 0.6–1.3)
EOSINOPHIL # BLD AUTO: 0.08 THOUSAND/ΜL (ref 0–0.61)
EOSINOPHIL NFR BLD AUTO: 1 % (ref 0–6)
ERYTHROCYTE [DISTWIDTH] IN BLOOD BY AUTOMATED COUNT: 21.4 % (ref 11.6–15.1)
GFR SERPL CREATININE-BSD FRML MDRD: 15 ML/MIN/1.73SQ M
GLUCOSE SERPL-MCNC: 72 MG/DL (ref 65–140)
HCT VFR BLD AUTO: 65.6 % (ref 34.8–46.1)
HGB BLD-MCNC: 19.5 G/DL (ref 11.5–15.4)
IMM GRANULOCYTES # BLD AUTO: 0.03 THOUSAND/UL (ref 0–0.2)
IMM GRANULOCYTES NFR BLD AUTO: 0 % (ref 0–2)
LACTATE SERPL-SCNC: 2.5 MMOL/L (ref 0.5–2)
LYMPHOCYTES # BLD AUTO: 0.69 THOUSANDS/ΜL (ref 0.6–4.47)
LYMPHOCYTES NFR BLD AUTO: 9 % (ref 14–44)
MCH RBC QN AUTO: 31.4 PG (ref 26.8–34.3)
MCHC RBC AUTO-ENTMCNC: 29.7 G/DL (ref 31.4–37.4)
MCV RBC AUTO: 106 FL (ref 82–98)
MONOCYTES # BLD AUTO: 0.81 THOUSAND/ΜL (ref 0.17–1.22)
MONOCYTES NFR BLD AUTO: 10 % (ref 4–12)
NEUTROPHILS # BLD AUTO: 6.06 THOUSANDS/ΜL (ref 1.85–7.62)
NEUTS SEG NFR BLD AUTO: 78 % (ref 43–75)
NRBC BLD AUTO-RTO: 1 /100 WBCS
NT-PROBNP SERPL-MCNC: 8799 PG/ML
PLATELET # BLD AUTO: 156 THOUSANDS/UL (ref 149–390)
PMV BLD AUTO: 11 FL (ref 8.9–12.7)
POTASSIUM SERPL-SCNC: 4 MMOL/L (ref 3.5–5.3)
PROT SERPL-MCNC: 7.5 G/DL (ref 6.4–8.2)
RBC # BLD AUTO: 6.22 MILLION/UL (ref 3.81–5.12)
SODIUM SERPL-SCNC: 146 MMOL/L (ref 136–145)
TROPONIN I SERPL-MCNC: 0.07 NG/ML
WBC # BLD AUTO: 7.82 THOUSAND/UL (ref 4.31–10.16)

## 2020-01-06 PROCEDURE — 87040 BLOOD CULTURE FOR BACTERIA: CPT | Performed by: EMERGENCY MEDICINE

## 2020-01-06 PROCEDURE — 99291 CRITICAL CARE FIRST HOUR: CPT

## 2020-01-06 PROCEDURE — 80053 COMPREHEN METABOLIC PANEL: CPT | Performed by: EMERGENCY MEDICINE

## 2020-01-06 PROCEDURE — 3066F NEPHROPATHY DOC TX: CPT | Performed by: INTERNAL MEDICINE

## 2020-01-06 PROCEDURE — 84484 ASSAY OF TROPONIN QUANT: CPT | Performed by: EMERGENCY MEDICINE

## 2020-01-06 PROCEDURE — 83605 ASSAY OF LACTIC ACID: CPT | Performed by: EMERGENCY MEDICINE

## 2020-01-06 PROCEDURE — 71046 X-RAY EXAM CHEST 2 VIEWS: CPT

## 2020-01-06 PROCEDURE — 36415 COLL VENOUS BLD VENIPUNCTURE: CPT | Performed by: EMERGENCY MEDICINE

## 2020-01-06 PROCEDURE — 96374 THER/PROPH/DIAG INJ IV PUSH: CPT

## 2020-01-06 PROCEDURE — 85025 COMPLETE CBC W/AUTO DIFF WBC: CPT | Performed by: EMERGENCY MEDICINE

## 2020-01-06 PROCEDURE — 96365 THER/PROPH/DIAG IV INF INIT: CPT

## 2020-01-06 PROCEDURE — 83880 ASSAY OF NATRIURETIC PEPTIDE: CPT | Performed by: EMERGENCY MEDICINE

## 2020-01-06 PROCEDURE — 99291 CRITICAL CARE FIRST HOUR: CPT | Performed by: EMERGENCY MEDICINE

## 2020-01-06 PROCEDURE — 93005 ELECTROCARDIOGRAM TRACING: CPT

## 2020-01-06 RX ORDER — NITROGLYCERIN 20 MG/100ML
5-200 INJECTION INTRAVENOUS
Status: DISCONTINUED | OUTPATIENT
Start: 2020-01-06 | End: 2020-01-07

## 2020-01-06 RX ORDER — FUROSEMIDE 10 MG/ML
40 INJECTION INTRAMUSCULAR; INTRAVENOUS ONCE
Status: COMPLETED | OUTPATIENT
Start: 2020-01-06 | End: 2020-01-06

## 2020-01-06 RX ADMIN — FUROSEMIDE 40 MG: 10 INJECTION, SOLUTION INTRAMUSCULAR; INTRAVENOUS at 23:35

## 2020-01-06 RX ADMIN — NITROGLYCERIN 50 MCG/MIN: 20 INJECTION INTRAVENOUS at 23:47

## 2020-01-06 RX ADMIN — CEFEPIME HYDROCHLORIDE 2000 MG: 2 INJECTION, POWDER, FOR SOLUTION INTRAVENOUS at 23:10

## 2020-01-07 PROBLEM — R65.10 SIRS (SYSTEMIC INFLAMMATORY RESPONSE SYNDROME) (HCC): Status: ACTIVE | Noted: 2020-01-07

## 2020-01-07 PROBLEM — N17.9 AKI (ACUTE KIDNEY INJURY) (HCC): Status: ACTIVE | Noted: 2020-01-07

## 2020-01-07 LAB
AMMONIA PLAS-SCNC: 55 UMOL/L (ref 11–35)
ANION GAP SERPL CALCULATED.3IONS-SCNC: 11 MMOL/L (ref 4–13)
APTT PPP: 34 SECONDS (ref 23–37)
APTT PPP: 51 SECONDS (ref 23–37)
ARTERIAL PATENCY WRIST A: YES
ATRIAL RATE: 125 BPM
BASE EXCESS BLDA CALC-SCNC: -3.9 MMOL/L
BASE EXCESS BLDA CALC-SCNC: 1.1 MMOL/L
BASE EXCESS BLDA CALC-SCNC: 2.6 MMOL/L
BASOPHILS # BLD AUTO: 0.1 THOUSANDS/ΜL (ref 0–0.1)
BASOPHILS NFR BLD AUTO: 2 % (ref 0–1)
BUN SERPL-MCNC: 42 MG/DL (ref 5–25)
CALCIUM SERPL-MCNC: 7.2 MG/DL (ref 8.3–10.1)
CHLORIDE SERPL-SCNC: 109 MMOL/L (ref 100–108)
CO2 SERPL-SCNC: 28 MMOL/L (ref 21–32)
CREAT SERPL-MCNC: 2.78 MG/DL (ref 0.6–1.3)
EOSINOPHIL # BLD AUTO: 0.11 THOUSAND/ΜL (ref 0–0.61)
EOSINOPHIL NFR BLD AUTO: 2 % (ref 0–6)
ERYTHROCYTE [DISTWIDTH] IN BLOOD BY AUTOMATED COUNT: 20.7 % (ref 11.6–15.1)
ERYTHROCYTE [DISTWIDTH] IN BLOOD BY AUTOMATED COUNT: 20.7 % (ref 11.6–15.1)
FLUAV RNA NPH QL NAA+PROBE: NORMAL
FLUBV RNA NPH QL NAA+PROBE: NORMAL
GFR SERPL CREATININE-BSD FRML MDRD: 18 ML/MIN/1.73SQ M
GLUCOSE SERPL-MCNC: 104 MG/DL (ref 65–140)
GLUCOSE SERPL-MCNC: 59 MG/DL (ref 65–140)
GLUCOSE SERPL-MCNC: 65 MG/DL (ref 65–140)
GLUCOSE SERPL-MCNC: 65 MG/DL (ref 65–140)
GLUCOSE SERPL-MCNC: 67 MG/DL (ref 65–140)
GLUCOSE SERPL-MCNC: 69 MG/DL (ref 65–140)
GLUCOSE SERPL-MCNC: 70 MG/DL (ref 65–140)
GLUCOSE SERPL-MCNC: 70 MG/DL (ref 65–140)
GLUCOSE SERPL-MCNC: 76 MG/DL (ref 65–140)
GLUCOSE SERPL-MCNC: 92 MG/DL (ref 65–140)
HCO3 BLDA-SCNC: 26.5 MMOL/L (ref 22–28)
HCO3 BLDA-SCNC: 29 MMOL/L (ref 22–28)
HCO3 BLDA-SCNC: 30.9 MMOL/L (ref 22–28)
HCT VFR BLD AUTO: 55.4 % (ref 34.8–46.1)
HCT VFR BLD AUTO: 59 % (ref 34.8–46.1)
HGB BLD-MCNC: 16.2 G/DL (ref 11.5–15.4)
HGB BLD-MCNC: 17.4 G/DL (ref 11.5–15.4)
IMM GRANULOCYTES # BLD AUTO: 0.03 THOUSAND/UL (ref 0–0.2)
IMM GRANULOCYTES NFR BLD AUTO: 1 % (ref 0–2)
INR PPP: 2.24 (ref 0.84–1.19)
IPAP: 20
IPAP: 20
LACTATE SERPL-SCNC: 1.8 MMOL/L (ref 0.5–2)
LYMPHOCYTES # BLD AUTO: 0.58 THOUSANDS/ΜL (ref 0.6–4.47)
LYMPHOCYTES NFR BLD AUTO: 9 % (ref 14–44)
MAGNESIUM SERPL-MCNC: 2 MG/DL (ref 1.6–2.6)
MCH RBC QN AUTO: 31.2 PG (ref 26.8–34.3)
MCH RBC QN AUTO: 31.4 PG (ref 26.8–34.3)
MCHC RBC AUTO-ENTMCNC: 29.2 G/DL (ref 31.4–37.4)
MCHC RBC AUTO-ENTMCNC: 29.5 G/DL (ref 31.4–37.4)
MCV RBC AUTO: 107 FL (ref 82–98)
MCV RBC AUTO: 107 FL (ref 82–98)
MONOCYTES # BLD AUTO: 0.64 THOUSAND/ΜL (ref 0.17–1.22)
MONOCYTES NFR BLD AUTO: 10 % (ref 4–12)
NEUTROPHILS # BLD AUTO: 5.03 THOUSANDS/ΜL (ref 1.85–7.62)
NEUTS SEG NFR BLD AUTO: 76 % (ref 43–75)
NON VENT TYPE- NRB: 60
NON VENT- BIPAP: ABNORMAL
NON VENT- BIPAP: ABNORMAL
NRBC BLD AUTO-RTO: 0 /100 WBCS
O2 CT BLDA-SCNC: 22.5 ML/DL (ref 16–23)
O2 CT BLDA-SCNC: 23.7 ML/DL (ref 16–23)
O2 CT BLDA-SCNC: 96.7 ML/DL (ref 16–23)
OXYHGB MFR BLDA: 92.9 % (ref 94–97)
OXYHGB MFR BLDA: 93.5 % (ref 94–97)
OXYHGB MFR BLDA: 95.1 % (ref 94–97)
P AXIS: 0 DEGREES
PCO2 BLDA: 57.9 MM HG (ref 36–44)
PCO2 BLDA: 61.6 MM HG (ref 36–44)
PCO2 BLDA: 69.5 MM HG (ref 36–44)
PEEP MAX SETTING VENT: 8 CM[H2O]
PEEP MAX SETTING VENT: 8 CM[H2O]
PH BLDA: 7.2 [PH] (ref 7.35–7.45)
PH BLDA: 7.32 [PH] (ref 7.35–7.45)
PH BLDA: 7.32 [PH] (ref 7.35–7.45)
PLATELET # BLD AUTO: 122 THOUSANDS/UL (ref 149–390)
PLATELET # BLD AUTO: 125 THOUSANDS/UL (ref 149–390)
PMV BLD AUTO: 10.5 FL (ref 8.9–12.7)
PMV BLD AUTO: 10.5 FL (ref 8.9–12.7)
PO2 BLDA: 100.5 MM HG (ref 75–129)
PO2 BLDA: 110.8 MM HG (ref 75–129)
PO2 BLDA: 85.1 MM HG (ref 75–129)
POTASSIUM SERPL-SCNC: 3.9 MMOL/L (ref 3.5–5.3)
PROCALCITONIN SERPL-MCNC: 0.17 NG/ML
PROTHROMBIN TIME: 25.2 SECONDS (ref 11.6–14.5)
QRS AXIS: 157 DEGREES
QRSD INTERVAL: 78 MS
QT INTERVAL: 294 MS
QTC INTERVAL: 410 MS
RBC # BLD AUTO: 5.2 MILLION/UL (ref 3.81–5.12)
RBC # BLD AUTO: 5.54 MILLION/UL (ref 3.81–5.12)
RSV RNA NPH QL NAA+PROBE: NORMAL
SODIUM SERPL-SCNC: 148 MMOL/L (ref 136–145)
SPECIMEN SOURCE: ABNORMAL
T WAVE AXIS: 79 DEGREES
TSH SERPL DL<=0.05 MIU/L-ACNC: 1.69 UIU/ML (ref 0.36–3.74)
VENT BIPAP FIO2: 50 %
VENT BIPAP FIO2: 50 %
VENTRICULAR RATE: 117 BPM
WBC # BLD AUTO: 6.49 THOUSAND/UL (ref 4.31–10.16)
WBC # BLD AUTO: 7.16 THOUSAND/UL (ref 4.31–10.16)

## 2020-01-07 PROCEDURE — 85025 COMPLETE CBC W/AUTO DIFF WBC: CPT | Performed by: NURSE PRACTITIONER

## 2020-01-07 PROCEDURE — 82140 ASSAY OF AMMONIA: CPT | Performed by: NURSE PRACTITIONER

## 2020-01-07 PROCEDURE — 93010 ELECTROCARDIOGRAM REPORT: CPT | Performed by: INTERNAL MEDICINE

## 2020-01-07 PROCEDURE — 82533 TOTAL CORTISOL: CPT | Performed by: NURSE PRACTITIONER

## 2020-01-07 PROCEDURE — 84443 ASSAY THYROID STIM HORMONE: CPT | Performed by: NURSE PRACTITIONER

## 2020-01-07 PROCEDURE — 36600 WITHDRAWAL OF ARTERIAL BLOOD: CPT

## 2020-01-07 PROCEDURE — 80048 BASIC METABOLIC PNL TOTAL CA: CPT | Performed by: NURSE PRACTITIONER

## 2020-01-07 PROCEDURE — 87631 RESP VIRUS 3-5 TARGETS: CPT | Performed by: EMERGENCY MEDICINE

## 2020-01-07 PROCEDURE — 82805 BLOOD GASES W/O2 SATURATION: CPT | Performed by: PHYSICIAN ASSISTANT

## 2020-01-07 PROCEDURE — 82805 BLOOD GASES W/O2 SATURATION: CPT | Performed by: NURSE PRACTITIONER

## 2020-01-07 PROCEDURE — 85027 COMPLETE CBC AUTOMATED: CPT | Performed by: NURSE PRACTITIONER

## 2020-01-07 PROCEDURE — 99292 CRITICAL CARE ADDL 30 MIN: CPT | Performed by: INTERNAL MEDICINE

## 2020-01-07 PROCEDURE — 82805 BLOOD GASES W/O2 SATURATION: CPT | Performed by: INTERNAL MEDICINE

## 2020-01-07 PROCEDURE — 85730 THROMBOPLASTIN TIME PARTIAL: CPT | Performed by: NURSE PRACTITIONER

## 2020-01-07 PROCEDURE — 84145 PROCALCITONIN (PCT): CPT | Performed by: NURSE PRACTITIONER

## 2020-01-07 PROCEDURE — 36415 COLL VENOUS BLD VENIPUNCTURE: CPT | Performed by: EMERGENCY MEDICINE

## 2020-01-07 PROCEDURE — 85730 THROMBOPLASTIN TIME PARTIAL: CPT | Performed by: INTERNAL MEDICINE

## 2020-01-07 PROCEDURE — 85610 PROTHROMBIN TIME: CPT | Performed by: NURSE PRACTITIONER

## 2020-01-07 PROCEDURE — 83735 ASSAY OF MAGNESIUM: CPT | Performed by: NURSE PRACTITIONER

## 2020-01-07 PROCEDURE — 94640 AIRWAY INHALATION TREATMENT: CPT

## 2020-01-07 PROCEDURE — 94660 CPAP INITIATION&MGMT: CPT

## 2020-01-07 PROCEDURE — 82948 REAGENT STRIP/BLOOD GLUCOSE: CPT

## 2020-01-07 PROCEDURE — 99291 CRITICAL CARE FIRST HOUR: CPT | Performed by: NURSE PRACTITIONER

## 2020-01-07 PROCEDURE — 83605 ASSAY OF LACTIC ACID: CPT | Performed by: EMERGENCY MEDICINE

## 2020-01-07 RX ORDER — DILTIAZEM HYDROCHLORIDE 120 MG/1
120 CAPSULE, COATED, EXTENDED RELEASE ORAL DAILY
Status: DISCONTINUED | OUTPATIENT
Start: 2020-01-07 | End: 2020-01-07

## 2020-01-07 RX ORDER — HEPARIN SODIUM 1000 [USP'U]/ML
4000 INJECTION, SOLUTION INTRAVENOUS; SUBCUTANEOUS ONCE
Status: COMPLETED | OUTPATIENT
Start: 2020-01-07 | End: 2020-01-07

## 2020-01-07 RX ORDER — DEXTROSE MONOHYDRATE 25 G/50ML
INJECTION, SOLUTION INTRAVENOUS
Status: COMPLETED
Start: 2020-01-07 | End: 2020-01-07

## 2020-01-07 RX ORDER — LEVALBUTEROL 1.25 MG/.5ML
1.25 SOLUTION, CONCENTRATE RESPIRATORY (INHALATION) EVERY 4 HOURS PRN
Status: DISCONTINUED | OUTPATIENT
Start: 2020-01-07 | End: 2020-01-17

## 2020-01-07 RX ORDER — DEXTROSE MONOHYDRATE 50 MG/ML
50 INJECTION, SOLUTION INTRAVENOUS CONTINUOUS
Status: DISCONTINUED | OUTPATIENT
Start: 2020-01-07 | End: 2020-01-07

## 2020-01-07 RX ORDER — FUROSEMIDE 10 MG/ML
60 INJECTION INTRAMUSCULAR; INTRAVENOUS
Status: DISCONTINUED | OUTPATIENT
Start: 2020-01-07 | End: 2020-01-07

## 2020-01-07 RX ORDER — ALBUMIN (HUMAN) 12.5 G/50ML
50 SOLUTION INTRAVENOUS ONCE
Status: COMPLETED | OUTPATIENT
Start: 2020-01-07 | End: 2020-01-08

## 2020-01-07 RX ORDER — HEPARIN SODIUM 1000 [USP'U]/ML
2000 INJECTION, SOLUTION INTRAVENOUS; SUBCUTANEOUS AS NEEDED
Status: DISCONTINUED | OUTPATIENT
Start: 2020-01-07 | End: 2020-01-08

## 2020-01-07 RX ORDER — DILTIAZEM HYDROCHLORIDE 5 MG/ML
15 INJECTION INTRAVENOUS ONCE
Status: COMPLETED | OUTPATIENT
Start: 2020-01-07 | End: 2020-01-07

## 2020-01-07 RX ORDER — METOPROLOL SUCCINATE 50 MG/1
100 TABLET, EXTENDED RELEASE ORAL DAILY
Status: DISCONTINUED | OUTPATIENT
Start: 2020-01-07 | End: 2020-01-12

## 2020-01-07 RX ORDER — NICOTINE 21 MG/24HR
1 PATCH, TRANSDERMAL 24 HOURS TRANSDERMAL DAILY
Status: DISCONTINUED | OUTPATIENT
Start: 2020-01-07 | End: 2020-01-20

## 2020-01-07 RX ORDER — PREDNISONE 2.5 MG
5 TABLET ORAL DAILY
Status: DISCONTINUED | OUTPATIENT
Start: 2020-01-07 | End: 2020-01-07

## 2020-01-07 RX ORDER — DEXTROSE MONOHYDRATE 100 MG/ML
20 INJECTION, SOLUTION INTRAVENOUS CONTINUOUS
Status: DISCONTINUED | OUTPATIENT
Start: 2020-01-07 | End: 2020-01-08

## 2020-01-07 RX ORDER — LEVALBUTEROL 1.25 MG/.5ML
1.25 SOLUTION, CONCENTRATE RESPIRATORY (INHALATION)
Status: DISCONTINUED | OUTPATIENT
Start: 2020-01-07 | End: 2020-01-21 | Stop reason: HOSPADM

## 2020-01-07 RX ORDER — ALBUTEROL SULFATE 90 UG/1
2 AEROSOL, METERED RESPIRATORY (INHALATION) EVERY 4 HOURS PRN
Status: DISCONTINUED | OUTPATIENT
Start: 2020-01-07 | End: 2020-01-17

## 2020-01-07 RX ORDER — HEPARIN SODIUM 1000 [USP'U]/ML
4000 INJECTION, SOLUTION INTRAVENOUS; SUBCUTANEOUS AS NEEDED
Status: DISCONTINUED | OUTPATIENT
Start: 2020-01-07 | End: 2020-01-08

## 2020-01-07 RX ORDER — HEPARIN SODIUM 10000 [USP'U]/100ML
3-20 INJECTION, SOLUTION INTRAVENOUS
Status: DISCONTINUED | OUTPATIENT
Start: 2020-01-07 | End: 2020-01-08

## 2020-01-07 RX ORDER — FUROSEMIDE 10 MG/ML
60 INJECTION INTRAMUSCULAR; INTRAVENOUS
Status: DISCONTINUED | OUTPATIENT
Start: 2020-01-07 | End: 2020-01-08

## 2020-01-07 RX ORDER — DEXTROSE MONOHYDRATE 25 G/50ML
50 INJECTION, SOLUTION INTRAVENOUS ONCE
Status: COMPLETED | OUTPATIENT
Start: 2020-01-07 | End: 2020-01-07

## 2020-01-07 RX ORDER — CHLORHEXIDINE GLUCONATE 0.12 MG/ML
15 RINSE ORAL EVERY 12 HOURS SCHEDULED
Status: DISCONTINUED | OUTPATIENT
Start: 2020-01-07 | End: 2020-01-07

## 2020-01-07 RX ORDER — SODIUM CHLORIDE, SODIUM LACTATE, POTASSIUM CHLORIDE, CALCIUM CHLORIDE 600; 310; 30; 20 MG/100ML; MG/100ML; MG/100ML; MG/100ML
100 INJECTION, SOLUTION INTRAVENOUS CONTINUOUS
Status: DISCONTINUED | OUTPATIENT
Start: 2020-01-07 | End: 2020-01-07

## 2020-01-07 RX ORDER — HEPARIN SODIUM 5000 [USP'U]/ML
7500 INJECTION, SOLUTION INTRAVENOUS; SUBCUTANEOUS EVERY 8 HOURS SCHEDULED
Status: DISCONTINUED | OUTPATIENT
Start: 2020-01-07 | End: 2020-01-07

## 2020-01-07 RX ADMIN — HEPARIN SODIUM AND DEXTROSE 11.1 UNITS/KG/HR: 10000; 5 INJECTION INTRAVENOUS at 11:42

## 2020-01-07 RX ADMIN — LEVALBUTEROL HYDROCHLORIDE 1.25 MG: 1.25 SOLUTION, CONCENTRATE RESPIRATORY (INHALATION) at 13:15

## 2020-01-07 RX ADMIN — HEPARIN SODIUM 4000 UNITS: 1000 INJECTION, SOLUTION INTRAVENOUS; SUBCUTANEOUS at 11:42

## 2020-01-07 RX ADMIN — SODIUM CHLORIDE 1000 ML: 0.9 INJECTION, SOLUTION INTRAVENOUS at 01:09

## 2020-01-07 RX ADMIN — IPRATROPIUM BROMIDE 0.5 MG: 0.5 SOLUTION RESPIRATORY (INHALATION) at 19:03

## 2020-01-07 RX ADMIN — DEXTROSE 20 ML/HR: 10 SOLUTION INTRAVENOUS at 18:33

## 2020-01-07 RX ADMIN — DEXTROSE MONOHYDRATE 25 ML: 25 INJECTION, SOLUTION INTRAVENOUS at 02:20

## 2020-01-07 RX ADMIN — HEPARIN SODIUM 2000 UNITS: 1000 INJECTION, SOLUTION INTRAVENOUS; SUBCUTANEOUS at 22:45

## 2020-01-07 RX ADMIN — NICOTINE 1 PATCH: 14 PATCH TRANSDERMAL at 09:34

## 2020-01-07 RX ADMIN — DILTIAZEM HYDROCHLORIDE 10 MG/HR: 5 INJECTION INTRAVENOUS at 01:31

## 2020-01-07 RX ADMIN — IPRATROPIUM BROMIDE 0.5 MG: 0.5 SOLUTION RESPIRATORY (INHALATION) at 13:14

## 2020-01-07 RX ADMIN — DEXTROSE 50 ML/HR: 5 SOLUTION INTRAVENOUS at 11:06

## 2020-01-07 RX ADMIN — LEVALBUTEROL HYDROCHLORIDE 1.25 MG: 1.25 SOLUTION, CONCENTRATE RESPIRATORY (INHALATION) at 07:31

## 2020-01-07 RX ADMIN — LEVALBUTEROL HYDROCHLORIDE 1.25 MG: 1.25 SOLUTION, CONCENTRATE RESPIRATORY (INHALATION) at 19:03

## 2020-01-07 RX ADMIN — FUROSEMIDE 60 MG: 10 INJECTION, SOLUTION INTRAMUSCULAR; INTRAVENOUS at 14:26

## 2020-01-07 RX ADMIN — DEXTROSE MONOHYDRATE 50 ML: 25 INJECTION, SOLUTION INTRAVENOUS at 22:48

## 2020-01-07 RX ADMIN — ALBUMIN (HUMAN) 50 G: 0.25 INJECTION, SOLUTION INTRAVENOUS at 23:42

## 2020-01-07 RX ADMIN — DEXTROSE MONOHYDRATE 50 ML: 500 INJECTION PARENTERAL at 07:59

## 2020-01-07 RX ADMIN — SODIUM CHLORIDE, SODIUM LACTATE, POTASSIUM CHLORIDE, AND CALCIUM CHLORIDE 100 ML/HR: .6; .31; .03; .02 INJECTION, SOLUTION INTRAVENOUS at 02:50

## 2020-01-07 RX ADMIN — DILTIAZEM HYDROCHLORIDE 2.5 MG/HR: 5 INJECTION INTRAVENOUS at 17:11

## 2020-01-07 RX ADMIN — DILTIAZEM HYDROCHLORIDE 15 MG: 5 INJECTION INTRAVENOUS at 01:13

## 2020-01-07 RX ADMIN — IPRATROPIUM BROMIDE 0.5 MG: 0.5 SOLUTION RESPIRATORY (INHALATION) at 07:31

## 2020-01-07 NOTE — ASSESSMENT & PLAN NOTE
· Given the patients initial hypoxia, she will be admitted to the step down unit for evaluation and monitoring  This will likely require greater then a 2 midnight stay, therefore the patient will be placed in inpatient status  · We will continue oxygen therapy for now with a goal to maintain her saturations greater then 88%   It is noted that the patient states she is on 10 liters at home  · She may benefit form bipap as she likely has a component of RADHA/OHV which may be a contributing factor

## 2020-01-07 NOTE — ASSESSMENT & PLAN NOTE
· Her lactic acid level was mildly elevated but she does not have other signs of infection  · Will check a procalcitonin level  · Will hold on additional antibiotics for now

## 2020-01-07 NOTE — UTILIZATION REVIEW
Initial Clinical Review    Admission: Date/Time/Statement: Inpatient Admission Orders (From admission, onward)     Ordered        01/07/20 0118  Inpatient Admission (expected length of stay for this patient Order details is greater than two midnights)  Once                   Orders Placed This Encounter   Procedures    Inpatient Admission (expected length of stay for this patient Order details is greater than two midnights)     Standing Status:   Standing     Number of Occurrences:   1     Order Specific Question:   Admitting Physician     Answer:   Mary Ramsey     Order Specific Question:   Level of Care     Answer:   Level 1 Stepdown [13]     Order Specific Question:   Estimated length of stay     Answer:   More than 2 Midnights     Order Specific Question:   Certification     Answer:   I certify that inpatient services are medically necessary for this patient for a duration of greater than two midnights  See H&P and MD Progress Notes for additional information about the patient's course of treatment  ED Arrival Information     Expected Arrival Acuity Means of Arrival Escorted By Service Admission Type    - 1/6/2020 22:25 Emergent Ambulance [facility] ([address]) Critical Care/ICU Emergency    Arrival Complaint    weakness        Chief Complaint   Patient presents with    Weakness - Generalized     Patient arrives to ED via EMS reporting being "sick" since December  Hypoxic, profound weakness, perioral and peripheral cyanosis  Coughing  Assessment/Plan: 63 Y  O female  Presents to ED from home  Via  EMS  With chest pain and shortness of breath  On ED arrival,  She was hypoxic  With sats  In the  Low  80's  Became increasingly  Lethargic in ED  States   She has  Not taken any  Home meds  PMH is   Hypertension, DM2, permanent atrial fibrillation, COPD and chronic venous dermatitis of  Both lower extremities    Admit  Ip with  Acute/chronic respiratory failure with hypoxia and hypercapnia, COPD with acute exacerbation, SIRS,  and  Rachna and plan is   Monitor labs,  Blood cultures, bolus  cardizem  Followed by  Infusion and Bipap  ED Triage Vitals [01/06/20 2238]   Temperature Pulse Respirations Blood Pressure SpO2   97 7 °F (36 5 °C) (!) 122 (!) 24 153/96 (!) 81 %      Temp Source Heart Rate Source Patient Position - Orthostatic VS BP Location FiO2 (%)   Oral Monitor Sitting Right arm --      Pain Score       No Pain        Wt Readings from Last 1 Encounters:   01/07/20 135 kg (298 lb 1 oz)     Additional Vital Signs:   01/07/20 1200    90  25Abnormal   97/70  85  96 %       01/07/20 1100    90  19  110/79  92  100 %       01/07/20 1000    82  20  110/74  92  100 %       01/07/20 0900    84  19  106/73  87  97 %       01/07/20 0800    80  17  104/66  76  98 %       01/07/20 0745  97 5 °F (36 4 °C)                 01/07/20 0700    86  20  106/76  87  97 %       01/07/20 0200                    O2 Device: BiPap at 01/07/20 0200   01/07/20 0135    90  24Abnormal   131/75    92 %     Lying   O2 Device: Bipap at 01/07/20 0135   01/07/20 0121            98 %       01/07/20 0118    101  20  128/68    94 %  Non-rebreather mask  Lying   01/06/20 2358    120Abnormal   20  134/93    98 %  Non-rebreather mask  Sitting   01/06/20 2340    104  22  141/100    88 %Abnormal   Nasal cannula  Sitting   01/06/20 2238  97 7 °F (36 5 °C)  122Abnormal   24Abnormal   153/96    81 %Abnormal   None (Room air)           Pertinent Labs/Diagnostic Test Results:   EKG      Atrial fibrillation          Tachycardic  Heart rate  117  CXR  ( 1/7)     Enlargement of the cardiac silhouette and pulmonary interstitial edema     Results from last 7 days   Lab Units 01/07/20  1137 01/07/20  0524 01/06/20  2241   WBC Thousand/uL 7 16 6 49 7 82   HEMOGLOBIN g/dL 17 4* 16 2* 19 5*   HEMATOCRIT % 59 0* 55 4* 65 6*   PLATELETS Thousands/uL 125* 122* 156   NEUTROS ABS Thousands/µL --  5 03 6 06         Results from last 7 days   Lab Units 01/07/20  0524 01/06/20  2241   SODIUM mmol/L 148* 146*   POTASSIUM mmol/L 3 9 4 0   CHLORIDE mmol/L 109* 103   CO2 mmol/L 28 30   ANION GAP mmol/L 11 13   BUN mg/dL 42* 46*   CREATININE mg/dL 2 78* 3 32*   EGFR ml/min/1 73sq m 18 15   CALCIUM mg/dL 7 2* 9 1   MAGNESIUM mg/dL 2 0  --      Results from last 7 days   Lab Units 01/06/20  2241   AST U/L 43   ALT U/L 38   ALK PHOS U/L 162*   TOTAL PROTEIN g/dL 7 5   ALBUMIN g/dL 3 3*   TOTAL BILIRUBIN mg/dL 3 07*     Results from last 7 days   Lab Units 01/07/20  1134 01/07/20  0852 01/07/20  0753 01/07/20  0216   POC GLUCOSE mg/dl 76 104 59* 67     Results from last 7 days   Lab Units 01/07/20  0524 01/06/20  2241   GLUCOSE RANDOM mg/dL 70 72          Results from last 7 days   Lab Units 01/07/20  1050 01/07/20  0048   PH ART  7 318* 7 199*   PCO2 ART mm Hg 57 9* 69 5*   PO2 ART mm Hg 100 5 110 8   HCO3 ART mmol/L 29 0* 26 5   BASE EXC ART mmol/L 1 1 -3 9   O2 CONTENT ART mL/dL 23 7* 96 7*   O2 HGB, ARTERIAL % 95 1 92 9*   ABG SOURCE  Radial, Right Radial, Right                 Results from last 7 days   Lab Units 01/06/20  2241   TROPONIN I ng/mL 0 07*         Results from last 7 days   Lab Units 01/07/20  1137   PROTIME seconds 25 2*   INR  2 24*   PTT seconds 34         Results from last 7 days   Lab Units 01/07/20  0629   PROCALCITONIN ng/ml 0 17     Results from last 7 days   Lab Units 01/07/20  0047 01/06/20  2235   LACTIC ACID mmol/L 1 8 2 5*             Results from last 7 days   Lab Units 01/06/20  2241   NT-PRO BNP pg/mL 8,799*           Results from last 7 days   Lab Units 01/07/20  0051   INFLUENZA A PCR  None Detected   RSV PCR  None Detected           Results from last 7 days   Lab Units 01/06/20  2235 01/06/20  2230   BLOOD CULTURE  Received in Microbiology Lab  Culture in Progress  Received in Microbiology Lab  Culture in Progress                 ED Treatment:   Medication Administration from 01/06/2020 2225 to 01/07/2020 0153       Date/Time Order Dose Route Action Comments     01/07/2020 0009 cefepime (MAXIPIME) 2 g/50 mL dextrose IVPB 0 mg Intravenous Stopped      01/06/2020 2310 cefepime (MAXIPIME) 2 g/50 mL dextrose IVPB 2,000 mg Intravenous New Bag      01/06/2020 2335 furosemide (LASIX) injection 40 mg 40 mg Intravenous Given      01/07/2020 0107 nitroGLYcerin (TRIDIL) 50 mg in 250 mL infusion (premix) 0 mcg/min Intravenous Stopped      01/06/2020 2347 nitroGLYcerin (TRIDIL) 50 mg in 250 mL infusion (premix) 50 mcg/min Intravenous New Bag      01/07/2020 0109 sodium chloride 0 9 % bolus 1,000 mL 1,000 mL Intravenous New Bag      01/07/2020 0113 diltiazem (CARDIZEM) injection 15 mg 15 mg Intravenous Given      01/07/2020 0131 diltiazem (CARDIZEM) 125 mg in sodium chloride 0 9 % 125 mL infusion 10 mg/hr Intravenous New Bag         Past Medical History:   Diagnosis Date    Atrial fibrillation with RVR ([facility])     COPD (chronic obstructive pulmonary disease) ([place] [address] )     Diabetes type 2, uncontrolled ([place] [address] )     Hypertension      Present on Admission:   Chronic obstructive pulmonary disease with acute exacerbation ([facility])   Hypertension   Permanent atrial fibrillation   Acute on chronic respiratory failure with hypoxia and hypercapnia ([facility])   Chronic venous stasis dermatitis of both lower extremities   Type 2 diabetes mellitus without complication ([facility])   Obesity      Admitting Diagnosis: Diarrhea [R19 7]  Acute congestive heart failure (HCC) [I50 9]  ARF (acute renal failure) ([facility] ) [N17 9]  Weakness [R53 1]  Hypoxia [R09 02]  Elevated troponin [R79 89]  Age/Sex: 63 y o  female  Admission Orders:  Scheduled Medications:    Medications:  furosemide 60 mg Intravenous BID (diuretic)   insulin lispro 1-5 Units Subcutaneous HS   insulin lispro 1-6 Units Subcutaneous TID AC   ipratropium 0 5 mg Nebulization TID   levalbuterol 1 25 mg Nebulization TID   metoprolol succinate 100 mg Oral Daily   nicotine 1 patch Transdermal Daily     Continuous IV Infusions:    dextrose 50 mL/hr Intravenous Continuous   diltiazem 1-15 mg/hr Intravenous Titrated   heparin (porcine) 3-20 Units/kg/hr (Order-Specific) Intravenous Titrated     PRN Meds:    albuterol 2 puff Inhalation Q4H PRN   heparin (porcine) 2,000 Units Intravenous PRN   heparin (porcine) 4,000 Units Intravenous PRN   levalbuterol 1 25 mg Nebulization Q4H PRN       IP CONSULT TO CASE MANAGEMENT  IP CONSULT TO WOUND CARE     Fall precautions  Neuro checks Q 4 hrs  Dysphagia  eval  2 DE  BiPap  O2  2L  tele    Network Utilization Review Department  [email or web address]  ATTENTION: Please call with any questions or concerns to [phone] and carefully listen to the prompts so that you are directed to the right person  All voicemails are confidential   Bowen Best all requests for admission clinical reviews, approved or denied determinations and any other requests to dedicated fax number below belonging to the campus where the patient is receiving treatment   List of dedicated fax numbers for the Facilities:  [address] [place] [address] DENIALS (Administrative/Medical Necessity) [phone]   [address] (Maternity/NICU/Pediatrics) [phone]   Halima Brown [phone]   Piyush Grooms [phone]   Sulma Torie [phone]   Mera Finger [phone]   [facility] [address] [phone]   [facility]  [phone]   [address] [facility]  [facility] 1000 [facility] [phone]

## 2020-01-07 NOTE — ASSESSMENT & PLAN NOTE
· This is acute on chronic disease as her baseline creatinine appears to be around 1 5 to 2 5  · Will give a fluid bolus followed by bhakti hydration  · Will monitor her renal indices and urine output closely

## 2020-01-07 NOTE — H&P
H&P- Christiano  1960, 63 y o  female MRN: [id number]    Unit/Bed#: ED 17 Encounter: [id number]    Primary Care Provider: Margo Dillon MD   Date and time admitted to hospital: 1/6/2020 10:25 PM        * Acute on chronic respiratory failure with hypoxia and hypercapnia ([facility] )  Assessment & Plan  · Given the patients initial hypoxia, she will be admitted to the step down unit for evaluation and monitoring  This will likely require greater then a 2 midnight stay, therefore the patient will be placed in inpatient status  · We will continue oxygen therapy for now with a goal to maintain her saturations greater then 88%  It is noted that the patient states she is on 10 liters at home  · She may benefit form bipap as she likely has a component of RADHA/OHV which may be a contributing factor    Chronic obstructive pulmonary disease with acute exacerbation ([facility] )  Assessment & Plan  · Will place on nebulizers for now  · Will continue her outpatient prednisone    SIRS (systemic inflammatory response syndrome) ([facility] )  Assessment & Plan  · Her lactic acid level was mildly elevated but she does not have other signs of infection  · Will check a procalcitonin level  · Will hold on additional antibiotics for now    SAPNA (acute kidney injury) ([facility] )  Assessment & Plan  · This is acute on chronic disease as her baseline creatinine appears to be around 1 5 to 2 5  · Will give a fluid bolus followed by bhakti hydration  · Will monitor her renal indices and urine output closely    Permanent atrial fibrillation  Assessment & Plan  · She is currently not rate controlled with a heart rate of 120    · Will bolus her with cardizem and then initiate a cardizem infusion with a goal to control her heart rate < 110    Hypertension  Assessment & Plan  · Will resume her outpatient metoprolol and cardizem    Chronic venous stasis dermatitis of both lower extremities  Assessment & Plan  · She has a history of chronic venous ulceration with some warmth to the upper part of the lower right leg  The foot however is cool to touch  · Blood cultures were obtained  · Will monitor her exam  If her procalcitonin level is elevated, will resume antibiotics for possile cellulitis    Type 2 diabetes mellitus without complication [facility])  Assessment & Plan  Lab Results   Component Value Date    HGBA1C 5 8 04/11/2019       No results for input(s): POCGLU in the last 72 hours  Blood Sugar Average: Last 72 hrs:  · Will place the patient on sliding sclae insulin with a goal to maintain her blood glucose between 140 and 180    Obesity  Assessment & Plan  · She would benefit from weight loss  · There may b a component of OHV that is contributing to her acute on chronic pulmonary issues      -------------------------------------------------------------------------------------------------------------  Chief Complaint: "my afib"    History of Present Illness   HX and PE limited by: patient is poor historian  Jaky Anne is a 63 y o  female who presents with a complaint of her atrial fibrillation being a problem  The patient arrived to the emergency department via EMS after she called them with a complaint of chest pain and shortness of breath  She was found to be hypoxic with oxygen saturations in the low 80s  During her emergency department evaluation she was noted to have increasing lethargy  The patient states that she has not been taking her medications at home which would include Cardizem, metoprolol, Eliquis, and albuterol to name a few  She denies fevers or chills  She currently denies chest pain  She denies nausea or vomiting  She does complain of shortness of breath but does not feel like it is significantly worse than her baseline at present  The patient is a poor historian so most information is provided by the review of her medical records       History obtained from chart review and the patient   -------------------------------------------------------------------------------------------------------------  Dispo: Continue Stepdown Level 1 level of care     Code Status: Prior  --------------------------------------------------------------------------------------------------------------  Review of Systems    A 12-point, complete review of systems was reviewed and negative except as stated above     Physical Exam   Constitutional: She appears well-developed  No distress  HENT:   Head: Normocephalic  Eyes: Pupils are equal, round, and reactive to light  Neck: Neck supple  Cardiovascular: An irregular rhythm present  Tachycardia present  Pulmonary/Chest: Accessory muscle usage present  No respiratory distress  She has decreased breath sounds  She has rales in the right middle field, the right lower field, the left middle field and the left lower field  Abdominal: Soft  She exhibits distension  There is tenderness  There is no guarding  Obese with abdominal wall edema   Musculoskeletal: She exhibits edema  Her bilateral lower extremities reveal chronic venous stasis changes  There is erythema bilaterally in the tib/fib area  The toes however appear to have ischemic changes and purple discoloration  There is warmth in the right lower extremity near the knee  Pulses are obtained bilaterally with the doppler   Lymphadenopathy:     She has no cervical adenopathy  Neurological:   Sleepy but easily arousable then falls asleep   Able to JUNG's   Skin: Skin is warm and dry      --------------------------------------------------------------------------------------------------------------  Historical Information   Past Medical History:   Diagnosis Date    Atrial fibrillation with RVR ([facility] )     COPD (chronic obstructive pulmonary disease) (HCC)     Diabetes type 2, uncontrolled ([facility] )     Hypertension      Past Surgical History:   Procedure Laterality Date    HERNIA REPAIR      HYSTERECTOMY  LAPAROSCOPIC CHOLECYSTECTOMY       Social History   Social History     Substance and Sexual Activity   Alcohol Use Not Currently    Comment: socially, NO ALCOHOL USE     Social History     Substance and Sexual Activity   Drug Use Not Currently    Types: Marijuana     Social History     Tobacco Use   Smoking Status Current Every Day Smoker    Packs/day: 0 00    Years: 43 00    Pack years: 0 00    Types: Cigarettes   Smokeless Tobacco Never Used   Tobacco Comment    Currently half a pack a day, 6 CIGARETTES PER DAY      Exercise History:   Family History:   Family History   Problem Relation Age of Onset    Diabetes type II Mother     No Known Problems Father         She reports not knowing much about her father   Cancer Family     Diabetes Family     Hypertension Family     Stroke Family      I have reviewed this patient's family history and commented on sigificant items within the HPI    Vitals:   Vitals:    01/06/20 2340 01/06/20 2358 01/07/20 0118 01/07/20 0121   BP: 141/100 134/93 128/68    BP Location: Other (Comment) Right arm Left arm    Pulse: 104 (!) 120 101    Resp: 22 20 20    Temp:       TempSrc:       SpO2: (!) 88% 98% 94% 98%   Weight:         Temp  Min: 97 7 °F (36 5 °C)  Max: 97 7 °F (36 5 °C)  IBW: -92 5 kg     Body mass index is 49 16 kg/m²  Medications:  Current Facility-Administered Medications   Medication Dose Route Frequency    diltiazem (CARDIZEM) 125 mg in sodium chloride 0 9 % 125 mL infusion  1-15 mg/hr Intravenous Titrated    sodium chloride 0 9 % bolus 1,000 mL  1,000 mL Intravenous Once     Home medications:  Prior to Admission Medications   Prescriptions Last Dose Informant Patient Reported? Taking?    albuterol (PROVENTIL HFA,VENTOLIN HFA) 90 mcg/act inhaler   No No   Sig: Inhale 2 puffs every 4 (four) hours as needed for wheezing   apixaban (ELIQUIS) 5 mg   No No   Sig: Take 1 tablet (5 mg total) by mouth 2 (two) times a day   diltiazem (CARDIZEM CD) 120 mg 24 hr capsule   No No   Sig: TAKE 1 CAPSULE BY MOUTH EVERY DAY   lisinopril (ZESTRIL) 40 mg tablet   No No   Sig: Take 1 tablet (40 mg total) by mouth daily   metoprolol succinate (TOPROL-XL) 100 mg 24 hr tablet   No No   Sig: Take 1 tablet (100 mg total) by mouth every 12 (twelve) hours   nicotine (NICODERM CQ) 14 mg/24hr TD 24 hr patch   No No   Sig: Place 1 patch on the skin daily   oxyCODONE (ROXICODONE) 5 mg immediate release tablet   No No   Sig: Take 1 tablet (5 mg total) by mouth every 4 (four) hours as needed for moderate painMax Daily Amount: 30 mg   predniSONE 10 mg tablet   No No   Si for 2 days,3 for 2 days,2 for 2 days,1 for 2 days   torsemide (DEMADEX) 20 mg tablet   No No   Sig: Take 1 tablet (20 mg total) by mouth 2 (two) times a day      Facility-Administered Medications: None     Allergies:  No Known Allergies      Laboratory and Diagnostics:  Results from last 7 days   Lab Units 20  2241   WBC Thousand/uL 7 82   HEMOGLOBIN g/dL 19 5*   HEMATOCRIT % 65 6*   PLATELETS Thousands/uL 156   NEUTROS PCT % 78*   MONOS PCT % 10     Results from last 7 days   Lab Units 20  2241   SODIUM mmol/L 146*   POTASSIUM mmol/L 4 0   CHLORIDE mmol/L 103   CO2 mmol/L 30   ANION GAP mmol/L 13   BUN mg/dL 46*   CREATININE mg/dL 3 32*   CALCIUM mg/dL 9 1   GLUCOSE RANDOM mg/dL 72   ALT U/L 38   AST U/L 43   ALK PHOS U/L 162*   ALBUMIN g/dL 3 3*   TOTAL BILIRUBIN mg/dL 3 07*               Results from last 7 days   Lab Units 20  2241   TROPONIN I ng/mL 0 07*     Results from last 7 days   Lab Units 20  0047 20  2235   LACTIC ACID mmol/L 1 8 2 5*     ABG:  Results from last 7 days   Lab Units 20  0048   PH ART  7 199*   PCO2 ART mm Hg 69 5*   PO2 ART mm Hg 110 8   HCO3 ART mmol/L 26 5   BASE EXC ART mmol/L -3 9   ABG SOURCE  Radial, Right     VBG:  Results from last 7 days   Lab Units 20  0048   ABG SOURCE  Radial, Right           Micro:          EKG:   Imaging: I have personally reviewed pertinent reports  and I have personally reviewed pertinent films in PACS    ------------------------------------------------------------------------------------------------------------  Advance Directive and Living Will:      Power of :    POLST:    ------------------------------------------------------------------------------------------------------------  Anticipated Length of Stay is > 2 midnights    Counseling / Coordination of Care  Total Critical Care time spent 38 minutes excluding procedures, teaching and family updates  MAYNOR Colmenares        Portions of the record may have been created with voice recognition software  Occasional wrong word or "sound a like" substitutions may have occurred due to the inherent limitations of voice recognition software    Read the chart carefully and recognize, using context, where substitutions have occurred

## 2020-01-07 NOTE — ED PROVIDER NOTES
History  Chief Complaint   Patient presents with    Weakness - Generalized     Patient arrives to ED via EMS reporting being "sick" since December  Hypoxic, profound weakness, perioral and peripheral cyanosis  Coughing  52-year-old female presents for evaluation of multiple complaints  Patient states that over the past 2 weeks she has had a cough  It is productive of clear mucus, unchanged without modifying factors  Addition to this she complains of diarrhea  It is a watery diarrhea  Patient denies being short of breath, chest pain, fevers, chills, abdominal pain, urinary complaints, calf pain  Patient states she is noncompliant with her medications  History provided by:  Patient      Prior to Admission Medications   Prescriptions Last Dose Informant Patient Reported? Taking?    albuterol (PROVENTIL HFA,VENTOLIN HFA) 90 mcg/act inhaler   No No   Sig: Inhale 2 puffs every 4 (four) hours as needed for wheezing   apixaban (ELIQUIS) 5 mg   No No   Sig: Take 1 tablet (5 mg total) by mouth 2 (two) times a day   diltiazem (CARDIZEM CD) 120 mg 24 hr capsule   No No   Sig: TAKE 1 CAPSULE BY MOUTH EVERY DAY   lisinopril (ZESTRIL) 40 mg tablet   No No   Sig: Take 1 tablet (40 mg total) by mouth daily   metoprolol succinate (TOPROL-XL) 100 mg 24 hr tablet   No No   Sig: Take 1 tablet (100 mg total) by mouth every 12 (twelve) hours   nicotine (NICODERM CQ) 14 mg/24hr TD 24 hr patch   No No   Sig: Place 1 patch on the skin daily   oxyCODONE (ROXICODONE) 5 mg immediate release tablet   No No   Sig: Take 1 tablet (5 mg total) by mouth every 4 (four) hours as needed for moderate painMax Daily Amount: 30 mg   predniSONE 10 mg tablet   No No   Si for 2 days,3 for 2 days,2 for 2 days,1 for 2 days   torsemide (DEMADEX) 20 mg tablet   No No   Sig: Take 1 tablet (20 mg total) by mouth 2 (two) times a day      Facility-Administered Medications: None       Past Medical History:   Diagnosis Date    At[facility] fibrillation with RVR ([facility] )     COPD (chronic obstructive pulmonary disease) ([facility] )     Diabetes type 2, uncontrolled ([place] [id number] )     Hypertension        Past Surgical History:   Procedure Laterality Date    HERNIA REPAIR      HYSTERECTOMY      LAPAROSCOPIC CHOLECYSTECTOMY         Family History   Problem Relation Age of Onset    Diabetes type II Mother     No Known Problems Father         She reports not knowing much about her father   Cancer Family     Diabetes Family     Hypertension Family     Stroke Family      I have reviewed and agree with the history as documented  Social History     Tobacco Use    Smoking status: Current Every Day Smoker     Packs/day: 0 00     Years: 43 00     Pack years: 0 00     Types: Cigarettes    Smokeless tobacco: Never Used    Tobacco comment: Currently half a pack a day, 6 CIGARETTES PER DAY    Substance Use Topics    Alcohol use: Not Currently     Comment: socially, NO ALCOHOL USE    Drug use: Not Currently     Types: Marijuana        Review of Systems   Constitutional: Negative for activity change, appetite change, fatigue and fever  HENT: Negative for congestion, dental problem, ear pain, rhinorrhea and sore throat  Eyes: Negative for pain and redness  Respiratory: Positive for cough  Negative for chest tightness, shortness of breath and wheezing  Cardiovascular: Negative for chest pain and palpitations  Gastrointestinal: Positive for diarrhea  Negative for abdominal pain, blood in stool, constipation, nausea and vomiting  Endocrine: Negative for cold intolerance and heat intolerance  Genitourinary: Negative for dysuria, frequency and hematuria  Musculoskeletal: Negative for arthralgias and myalgias  Skin: Negative for color change, pallor and rash  Neurological: Negative for weakness and numbness  Hematological: Does not bruise/bleed easily  Psychiatric/Behavioral: Negative for agitation, hallucinations and suicidal ideas         Physical Exam  Physical Exam   Constitutional: She is oriented to person, place, and time  She appears well-developed and well-nourished  She appears distressed  HENT:   Mouth/Throat: No oropharyngeal exudate  TMs normal bilaterally no pharyngeal erythema no rhinorrhea nontender palpation of sinuses, normal looking turbinates   Eyes: Conjunctivae and EOM are normal    Neck: Normal range of motion  Neck supple  No meningeal signs   Cardiovascular: Normal rate, regular rhythm, normal heart sounds and intact distal pulses  Pulmonary/Chest: Effort normal  No respiratory distress  She has no wheezes  She exhibits no tenderness  Abdominal: Soft  Bowel sounds are normal  She exhibits no distension and no mass  There is no tenderness  No hernia  No cvat   Musculoskeletal: Normal range of motion  She exhibits edema  Lymphadenopathy:     She has no cervical adenopathy  Neurological: She is alert and oriented to person, place, and time  No cranial nerve deficit  Skin:   Chronic venous stasis changes noted bilateral   Patient with erythema over her right chin which is nontender palpation, not warm to the touch there is no crepitus fluctuance or induration  Palpable distal pulses  Peripheral cyanosis   Psychiatric: She has a normal mood and affect  Her behavior is normal    Nursing note and vitals reviewed        Vital Signs  ED Triage Vitals [01/06/20 2238]   Temperature Pulse Respirations Blood Pressure SpO2   97 7 °F (36 5 °C) (!) 122 (!) 24 153/96 (!) 81 %      Temp Source Heart Rate Source Patient Position - Orthostatic VS BP Location FiO2 (%)   Oral Monitor Sitting Right arm --      Pain Score       No Pain           Vitals:    01/06/20 2340 01/06/20 2358 01/07/20 0118 01/07/20 0135   BP: 141/100 134/93 128/68 131/75   Pulse: 104 (!) 120 101 90   Patient Position - Orthostatic VS: Sitting Sitting Lying Lying         Visual Acuity      ED Medications  Medications   sodium chloride 0 9 % bolus 1,000 mL (1,000 mL Intravenous New Bag 1/7/20 0109)   diltiazem (CARDIZEM) 125 mg in sodium chloride 0 9 % 125 mL infusion (10 mg/hr Intravenous New Bag 1/7/20 0131)   cefepime (MAXIPIME) 2 g/50 mL dextrose IVPB (0 mg Intravenous Stopped 1/7/20 0009)   furosemide (LASIX) injection 40 mg (40 mg Intravenous Given 1/6/20 2335)   diltiazem (CARDIZEM) injection 15 mg (15 mg Intravenous Given 1/7/20 0113)       Diagnostic Studies  Results Reviewed     Procedure Component Value Units Date/Time    Procalcitonin [[id number]]     Lab Status:  No result Specimen:  Blood     Lactic acid, plasma x2 [[id number]]  (Normal) Collected:  01/07/20 0047    Lab Status:  Final result Specimen:  Blood from Arm, Right Updated:  01/07/20 0122     LACTIC ACID 1 8 mmol/L     Narrative:       Result may be elevated if tourniquet was used during collection  Blood gas, arterial [[id number]]  (Abnormal) Collected:  01/07/20 0048    Lab Status:  Final result Specimen:  Blood, Arterial from Radial, Right Updated:  01/07/20 0117     pH, Arterial 7 199     pCO2, Arterial 69 5 mm Hg      pO2, Arterial 110 8 mm Hg      HCO3, Arterial 26 5 mmol/L      Base Excess, Arterial -3 9 mmol/L      O2 Content, Arterial 96 7 mL/dL      O2 HGB,Arterial  92 9 %      SOURCE Radial, Right     NALLELY TEST Yes     Non Vent type- NRB 60    Influenza A/B and RSV PCR [[id number]] Collected:  01/07/20 0051    Lab Status: In process Specimen:  Nose Updated:  01/07/20 0103    Lactic acid, plasma x2 [[id number]]  (Abnormal) Collected:  01/06/20 2235    Lab Status:  Final result Specimen:  Blood from Arm, Left Updated:  01/06/20 2340     LACTIC ACID 2 5 mmol/L     Narrative:       Result may be elevated if tourniquet was used during collection      NT-BNP PRO [[id number]]  (Abnormal) Collected:  01/06/20 2241    Lab Status:  Final result Specimen:  Blood from Arm, Left Updated:  01/06/20 2317     NT-proBNP 8,799 pg/mL     Troponin I [[id number]]  (Abnormal) Collected:  01/06/20 1750    Lab Status: Final result Specimen:  Blood from Arm, Left Updated:  01/06/20 2312     Troponin I 0 07 ng/mL     Comprehensive metabolic panel [[id number]]  (Abnormal) Collected:  01/06/20 2241    Lab Status:  Final result Specimen:  Blood from Arm, Left Updated:  01/06/20 2310     Sodium 146 mmol/L      Potassium 4 0 mmol/L      Chloride 103 mmol/L      CO2 30 mmol/L      ANION GAP 13 mmol/L      BUN 46 mg/dL      Creatinine 3 32 mg/dL      Glucose 72 mg/dL      Calcium 9 1 mg/dL      AST 43 U/L      ALT 38 U/L      Alkaline Phosphatase 162 U/L      Total Protein 7 5 g/dL      Albumin 3 3 g/dL      Total Bilirubin 3 07 mg/dL      eGFR 15 ml/min/1 73sq m     Narrative:       [place] guidelines for Chronic Kidney Disease (CKD):     Stage 1 with normal or high GFR (GFR > 90 mL/min/1 73 square meters)    Stage 2 Mild CKD (GFR = 60-89 mL/min/1 73 square meters)    Stage 3A Moderate CKD (GFR = 45-59 mL/min/1 73 square meters)    Stage 3B Moderate CKD (GFR = 30-44 mL/min/1 73 square meters)    Stage 4 Severe CKD (GFR = 15-29 mL/min/1 73 square meters)    Stage 5 End Stage CKD (GFR <15 mL/min/1 73 square meters)  Note: GFR calculation is accurate only with a steady state creatinine    Blood culture #1 [[id number]] Collected:  01/06/20 2230    Lab Status: In process Specimen:  Blood from Arm, Right Updated:  01/06/20 2310    Blood culture #2 [[id number]] Collected:  01/06/20 2235    Lab Status:   In process Specimen:  Blood from Arm, Right Updated:  01/06/20 2310    POCT urinalysis dipstick [[id number]]     Lab Status:  No result Specimen:  Urine     CBC and differential [[id number]]  (Abnormal) Collected:  01/06/20 2241    Lab Status:  Final result Specimen:  Blood from Arm, Left Updated:  01/06/20 2250     WBC 7 82 Thousand/uL      RBC 6 22 Million/uL      Hemoglobin 19 5 g/dL      Hematocrit 65 6 %       fL      MCH 31 4 pg      MCHC 29 7 g/dL      RDW 21 4 %      MPV 11 0 fL      Platelets 218 Thousands/uL      nRBC 1 /100 WBCs      Neutrophils Relative 78 %      Immat GRANS % 0 %      Lymphocytes Relative 9 %      Monocytes Relative 10 %      Eosinophils Relative 1 %      Basophils Relative 2 %      Neutrophils Absolute 6 06 Thousands/µL      Immature Grans Absolute 0 03 Thousand/uL      Lymphocytes Absolute 0 69 Thousands/µL      Monocytes Absolute 0 81 Thousand/µL      Eosinophils Absolute 0 08 Thousand/µL      Basophils Absolute 0 15 Thousands/µL                  XR chest 2 views   ED Interpretation by Sammi Blackwell MD (01/06 9467)   Primary reviewed  Increased interstitial markings                   Procedures  ECG 12 Lead Documentation Only  Date/Time: 1/7/2020 12:30 AM  Performed by: Sammi Blackwell MD  Authorized by: Sammi Blackwell MD     ECG reviewed by me, the ED Provider: yes    Patient location:  ED  Rate:     ECG rate:  117    ECG rate assessment: tachycardic    Rhythm:     Rhythm: atrial fibrillation    Ectopy:     Ectopy: none    QRS:     QRS axis:  Normal    QRS intervals:  Normal  Conduction:     Conduction: normal    ST segments:     ST segments:  Normal  T waves:     T waves: normal    CriticalCare Time  Performed by: Sammi Blackwell MD  Authorized by: Sammi Blackwell MD     Critical care provider statement:     Critical care time (minutes):  35    Critical care time was exclusive of:  Separately billable procedures and treating other patients and teaching time    Critical care was necessary to treat or prevent imminent or life-threatening deterioration of the following conditions:  Respiratory failure    Critical care was time spent personally by me on the following activities:  Blood draw for specimens, obtaining history from patient or surrogate, development of treatment plan with patient or surrogate, discussions with consultants, evaluation of patient's response to treatment, examination of patient, interpretation of cardiac output measurements, ordering and performing treatments and interventions, ordering and review of laboratory studies, ordering and review of radiographic studies, re-evaluation of patient's condition and review of old charts             ED Course  ED Course as of Jan 07 0146   Mon Jan 06, 2020   2323 ARF   Creatinine(!): 3 32   2325 Patient's SIRS criteria likely secondary to congestive heart failure  She does have an elevated troponin, elevated BNP  Her sats are decreased  This may have been brought on by a flu-like illness  This point time patient will not be given 30 cc/kilos of IV fluids secondary to heart failure  Initial Sepsis Screening     Row Name 01/06/20 2324                Is the patient's history suggestive of a new or worsening infection? (!) Yes (Proceed)  -        Suspected source of infection  pneumonia  -        Are two or more of the following signs & symptoms of infection both present and new to the patient? (!) Yes (Proceed)  -        Indicate SIRS criteria  Tachypnea > 20 resp per min; Tachycardia > 90 bpm  -        If the answer is yes to both questions, suspicion of sepsis is present          If severe sepsis is present AND tissue hypoperfusion perists in the hour after fluid resuscitation or lactate > 4, the patient meets criteria for SEPTIC SHOCK          Are any of the following organ dysfunction criteria present within 6 hours of suspected infection and SIRS criteria that are NOT considered to be chronic conditions? (!) Yes  -        Organ dysfunction  Creatinine > 2 0 mg/dL  -        Date of presentation of severe sepsis          Time of presentation of severe sepsis          Tissue hypoperfusion persists in the hour after crystalloid fluid administration, evidenced, by either:          Was hypotension present within one hour of the conclusion of crystalloid fluid administration?           Date of presentation of septic shock          Time of presentation of septic shock            User Key  (r) = Recorded By, (t) = Taken By, (c) = Cosigned By    [address] Name Provider Type    Armando Jimenez MD Physician                  MDM  Number of Diagnoses or Management Options  Acute congestive heart failure [facility]):   ARF (acute renal failure) ([place] [address] ):   Diarrhea:   Elevated troponin:   Hypoxia:   Diagnosis management comments: Hypoxia, diarrhea-will do cardiac/septic workup, treat for congestive heart failure        Disposition  Final diagnoses:   Acute congestive heart failure (HCC)   Elevated troponin   ARF (acute renal failure) ([place] [address] )   Diarrhea   Hypoxia     Time reflects when diagnosis was documented in both MDM as applicable and the Disposition within this note     Time User Action Codes Description Comment    1/6/2020 11:38 PM Mary Ann Carmelo J Add [I50 9] Acute congestive heart failure ([place] [address] )     1/6/2020 11:38 PM Veverly Willowbrook Add [R79 89] Elevated troponin     1/6/2020 11:38 PM Lolis Marquez Add [N17 9] ARF (acute renal failure) ([place] [address] )     1/6/2020 11:38 PM Mary Ann Carmelo J Add [R19 7] Diarrhea     1/6/2020 11:38 PM Veverly Willowbrook Add [R09 02] Hypoxia       ED Disposition     ED Disposition Condition Date/Time Comment    Admit Stable Mon Jan 6, 2020 11:37 PM Case was discussed with Dr Rajani Vizcarra and the patient's admission status was agreed to be Admission Status: inpatient status to the service of Dr Rajani Vizcarra   Follow-up Information    None         Patient's Medications   Discharge Prescriptions    No medications on file     No discharge procedures on file      ED Provider  Electronically Signed by           Sabi Jacobo MD  01/07/20 4601

## 2020-01-07 NOTE — ED NOTES
Report given to [facility] RN ICU     Danny Herrmann RN  01/07/20 0127

## 2020-01-07 NOTE — ASSESSMENT & PLAN NOTE
Lab Results   Component Value Date    HGBA1C 5 8 04/11/2019       No results for input(s): POCGLU in the last 72 hours      Blood Sugar Average: Last 72 hrs:  · Will place the patient on sliding sclae insulin with a goal to maintain her blood glucose between 140 and 180

## 2020-01-07 NOTE — ASSESSMENT & PLAN NOTE
· She has a history of chronic venous ulceration with some warmth to the upper part of the lower right leg  The foot however is cool to touch  · Blood cultures were obtained    · Will monitor her exam  If her procalcitonin level is elevated, will resume antibiotics for possile cellulitis

## 2020-01-07 NOTE — ASSESSMENT & PLAN NOTE
· She would benefit from weight loss  · There may b a component of OHV that is contributing to her acute on chronic pulmonary issues

## 2020-01-07 NOTE — PLAN OF CARE
Problem: Prexisting or High Potential for Compromised Skin Integrity  Goal: Skin integrity is maintained or improved  Description  INTERVENTIONS:  - Identify patients at risk for skin breakdown  - Assess and monitor skin integrity  - Assess and monitor nutrition and hydration status  - Monitor labs   - Assess for incontinence   - Turn and reposition patient  - Assist with mobility/ambulation  - Relieve pressure over bony prominences  - Avoid friction and shearing  - Provide appropriate hygiene as needed including keeping skin clean and dry  - Evaluate need for skin moisturizer/barrier cream  - Collaborate with interdisciplinary team   - Patient/family teaching  - Consider wound care consult   Outcome: Progressing     Problem: Potential for Falls  Goal: Patient will remain free of falls  Description  INTERVENTIONS:  - Assess patient frequently for physical needs  -  Identify cognitive and physical deficits and behaviors that affect risk of falls    -  [employer] fall precautions as indicated by assessment   - Educate patient/family on patient safety including physical limitations  - Instruct patient to call for assistance with activity based on assessment  - Modify environment to reduce risk of injury  - Consider OT/PT consult to assist with strengthening/mobility  Outcome: Progressing     Problem: PAIN - ADULT  Goal: Verbalizes/displays adequate comfort level or baseline comfort level  Description  Interventions:  - Encourage patient to monitor pain and request assistance  - Assess pain using appropriate pain scale  - Administer analgesics based on type and severity of pain and evaluate response  - Implement non-pharmacological measures as appropriate and evaluate response  - Consider cultural and social influences on pain and pain management  - Notify physician/advanced practitioner if interventions unsuccessful or patient reports new pain  Outcome: Progressing     Problem: INFECTION - ADULT  Goal: Absence or prevention of progression during hospitalization  Description  INTERVENTIONS:  - Assess and monitor for signs and symptoms of infection  - Monitor lab/diagnostic results  - Monitor all insertion sites, i e  indwelling lines, tubes, and drains  - Monitor endotracheal if appropriate and nasal secretions for changes in amount and color  - [place] appropriate cooling/warming therapies per order  - Administer medications as ordered  - Instruct and encourage patient and family to use good hand hygiene technique  - Identify and instruct in appropriate isolation precautions for identified infection/condition  Outcome: Progressing  Goal: Absence of fever/infection during neutropenic period  Description  INTERVENTIONS:  - Monitor WBC    Outcome: Progressing     Problem: SAFETY ADULT  Goal: Maintain or return to baseline ADL function  Description  INTERVENTIONS:  -  Assess patient's ability to carry out ADLs; assess patient's baseline for ADL function and identify physical deficits which impact ability to perform ADLs (bathing, care of mouth/teeth, toileting, grooming, dressing, etc )  - Assess/evaluate cause of self-care deficits   - Assess range of motion  - Assess patient's mobility; develop plan if impaired  - Assess patient's need for assistive devices and provide as appropriate  - Encourage maximum independence but intervene and supervise when necessary  - Involve family in performance of ADLs  - Assess for home care needs following discharge   - Consider OT consult to assist with ADL evaluation and planning for discharge  - Provide patient education as appropriate  Outcome: Progressing  Goal: Maintain or return mobility status to optimal level  Description  INTERVENTIONS:  - Assess patient's baseline mobility status (ambulation, transfers, stairs, etc )    - Identify cognitive and physical deficits and behaviors that affect mobility  - Identify mobility aids required to assist with transfers and/or ambulation (gait belt, sit-to-stand, lift, walker, cane, etc )  - [place] fall precautions as indicated by assessment  - Record patient progress and toleration of activity level on Mobility SBAR; progress patient to next Phase/Stage  - Instruct patient to call for assistance with activity based on assessment  - Consider rehabilitation consult to assist with strengthening/weightbearing, etc   Outcome: Progressing     Problem: DISCHARGE PLANNING  Goal: Discharge to home or other facility with appropriate resources  Description  INTERVENTIONS:  - Identify barriers to discharge w/patient and caregiver  - Arrange for needed discharge resources and transportation as appropriate  - Identify discharge learning needs (meds, wound care, etc )  - Arrange for interpretive services to assist at discharge as needed  - Refer to Case Management Department for coordinating discharge planning if the patient needs post-hospital services based on physician/advanced practitioner order or complex needs related to functional status, cognitive ability, or social support system  Outcome: Progressing     Problem: Knowledge Deficit  Goal: Patient/family/caregiver demonstrates understanding of disease process, treatment plan, medications, and discharge instructions  Description  Complete learning assessment and assess knowledge base    Interventions:  - Provide teaching at level of understanding  - Provide teaching via preferred learning methods  Outcome: Progressing     Problem: CARDIOVASCULAR - ADULT  Goal: Maintains optimal cardiac output and hemodynamic stability  Description  INTERVENTIONS:  - Monitor I/O, vital signs and rhythm  - Monitor for S/S and trends of decreased cardiac output  - Administer and titrate ordered vasoactive medications to optimize hemodynamic stability  - Assess quality of pulses, skin color and temperature  - Assess for signs of decreased coronary artery perfusion  - Instruct patient to report change in severity of symptoms  Outcome: Progressing  Goal: Absence of cardiac dysrhythmias or at baseline rhythm  Description  INTERVENTIONS:  - Continuous cardiac monitoring, vital signs, obtain 12 lead EKG if ordered  - Administer antiarrhythmic and heart rate control medications as ordered  - Monitor electrolytes and administer replacement therapy as ordered  Outcome: Progressing     Problem: RESPIRATORY - ADULT  Goal: Achieves optimal ventilation and oxygenation  Description  INTERVENTIONS:  - Assess for changes in respiratory status  - Assess for changes in mentation and behavior  - Position to facilitate oxygenation and minimize respiratory effort  - Oxygen administered by appropriate delivery if ordered  - Initiate smoking cessation education as indicated  - Encourage broncho-pulmonary hygiene including cough, deep breathe, Incentive Spirometry  - Assess the need for suctioning and aspirate as needed  - Assess and instruct to report SOB or any respiratory difficulty  - Respiratory Therapy support as indicated  Outcome: Progressing     Problem: METABOLIC, FLUID AND ELECTROLYTES - ADULT  Goal: Electrolytes maintained within normal limits  Description  INTERVENTIONS:  - Monitor labs and assess patient for signs and symptoms of electrolyte imbalances  - Administer electrolyte replacement as ordered  - Monitor response to electrolyte replacements, including repeat lab results as appropriate  - Instruct patient on fluid and nutrition as appropriate  Outcome: Progressing  Goal: Fluid balance maintained  Description  INTERVENTIONS:  - Monitor labs   - Monitor I/O and WT  - Instruct patient on fluid and nutrition as appropriate  - Assess for signs & symptoms of volume excess or deficit  Outcome: Progressing  Goal: Glucose maintained within target range  Description  INTERVENTIONS:  - Monitor Blood Glucose as ordered  - Assess for signs and symptoms of hyperglycemia and hypoglycemia  - Administer ordered medications to maintain glucose within target range  - Assess nutritional intake and initiate nutrition service referral as needed  Outcome: Progressing     Problem: SKIN/TISSUE INTEGRITY - ADULT  Goal: Skin integrity remains intact  Description  INTERVENTIONS  - Identify patients at risk for skin breakdown  - Assess and monitor skin integrity  - Assess and monitor nutrition and hydration status  - Monitor labs (i e  albumin)  - Assess for incontinence   - Turn and reposition patient  - Assist with mobility/ambulation  - Relieve pressure over bony prominences  - Avoid friction and shearing  - Provide appropriate hygiene as needed including keeping skin clean and dry  - Evaluate need for skin moisturizer/barrier cream  - Collaborate with interdisciplinary team (i e  Nutrition, Rehabilitation, etc )   - Patient/family teaching  Outcome: Progressing  Goal: Incision(s), wounds(s) or drain site(s) healing without S/S of infection  Description  INTERVENTIONS  - Assess and document risk factors for skin impairment   - Assess and document dressing, incision, wound bed, drain sites and surrounding tissue  - Consider nutrition services referral as needed  - Oral mucous membranes remain intact  - Provide patient/ family education  Outcome: Progressing  Goal: Oral mucous membranes remain intact  Description  INTERVENTIONS  - Assess oral mucosa and hygiene practices  - Implement preventative oral hygiene regimen  - Implement oral medicated treatments as ordered  - Initiate Nutrition services referral as needed  Outcome: Progressing

## 2020-01-07 NOTE — ASSESSMENT & PLAN NOTE
· She is currently not rate controlled with a heart rate of 120    · Will bolus her with cardizem and then initiate a cardizem infusion with a goal to control her heart rate < 110

## 2020-01-08 ENCOUNTER — APPOINTMENT (INPATIENT)
Dept: NON INVASIVE DIAGNOSTICS | Facility: HOSPITAL | Age: 60
DRG: 291 | End: 2020-01-08
Payer: COMMERCIAL

## 2020-01-08 PROBLEM — F43.20 ADJUSTMENT DISORDER: Status: ACTIVE | Noted: 2020-01-08

## 2020-01-08 LAB
ALBUMIN SERPL BCP-MCNC: 3 G/DL (ref 3.5–5)
ALP SERPL-CCNC: 106 U/L (ref 46–116)
ALT SERPL W P-5'-P-CCNC: 23 U/L (ref 12–78)
ANION GAP SERPL CALCULATED.3IONS-SCNC: 11 MMOL/L (ref 4–13)
APTT PPP: 63 SECONDS (ref 23–37)
AST SERPL W P-5'-P-CCNC: 22 U/L (ref 5–45)
BILIRUB DIRECT SERPL-MCNC: 1.27 MG/DL (ref 0–0.2)
BILIRUB SERPL-MCNC: 2.86 MG/DL (ref 0.2–1)
BUN SERPL-MCNC: 46 MG/DL (ref 5–25)
CALCIUM SERPL-MCNC: 8.1 MG/DL (ref 8.3–10.1)
CHLORIDE SERPL-SCNC: 107 MMOL/L (ref 100–108)
CO2 SERPL-SCNC: 29 MMOL/L (ref 21–32)
CORTIS SERPL-MCNC: 17.9 UG/DL
CREAT SERPL-MCNC: 3.1 MG/DL (ref 0.6–1.3)
ERYTHROCYTE [DISTWIDTH] IN BLOOD BY AUTOMATED COUNT: 20.7 % (ref 11.6–15.1)
GFR SERPL CREATININE-BSD FRML MDRD: 16 ML/MIN/1.73SQ M
GLUCOSE SERPL-MCNC: 106 MG/DL (ref 65–140)
GLUCOSE SERPL-MCNC: 161 MG/DL (ref 65–140)
GLUCOSE SERPL-MCNC: 191 MG/DL (ref 65–140)
GLUCOSE SERPL-MCNC: 211 MG/DL (ref 65–140)
GLUCOSE SERPL-MCNC: 73 MG/DL (ref 65–140)
GLUCOSE SERPL-MCNC: 76 MG/DL (ref 65–140)
GLUCOSE SERPL-MCNC: 78 MG/DL (ref 65–140)
GLUCOSE SERPL-MCNC: 82 MG/DL (ref 65–140)
GLUCOSE SERPL-MCNC: 83 MG/DL (ref 65–140)
GLUCOSE SERPL-MCNC: 83 MG/DL (ref 65–140)
GLUCOSE SERPL-MCNC: 85 MG/DL (ref 65–140)
HCT VFR BLD AUTO: 55.7 % (ref 34.8–46.1)
HGB BLD-MCNC: 16.4 G/DL (ref 11.5–15.4)
MCH RBC QN AUTO: 30.8 PG (ref 26.8–34.3)
MCHC RBC AUTO-ENTMCNC: 29.4 G/DL (ref 31.4–37.4)
MCV RBC AUTO: 105 FL (ref 82–98)
PLATELET # BLD AUTO: 115 THOUSANDS/UL (ref 149–390)
PMV BLD AUTO: 10 FL (ref 8.9–12.7)
POTASSIUM SERPL-SCNC: 3.8 MMOL/L (ref 3.5–5.3)
PROT SERPL-MCNC: 6 G/DL (ref 6.4–8.2)
RBC # BLD AUTO: 5.33 MILLION/UL (ref 3.81–5.12)
SODIUM SERPL-SCNC: 147 MMOL/L (ref 136–145)
WBC # BLD AUTO: 5.91 THOUSAND/UL (ref 4.31–10.16)

## 2020-01-08 PROCEDURE — 93306 TTE W/DOPPLER COMPLETE: CPT | Performed by: INTERNAL MEDICINE

## 2020-01-08 PROCEDURE — 94660 CPAP INITIATION&MGMT: CPT

## 2020-01-08 PROCEDURE — 93306 TTE W/DOPPLER COMPLETE: CPT

## 2020-01-08 PROCEDURE — 99233 SBSQ HOSP IP/OBS HIGH 50: CPT | Performed by: INTERNAL MEDICINE

## 2020-01-08 PROCEDURE — 80076 HEPATIC FUNCTION PANEL: CPT | Performed by: NURSE PRACTITIONER

## 2020-01-08 PROCEDURE — 94640 AIRWAY INHALATION TREATMENT: CPT

## 2020-01-08 PROCEDURE — 85730 THROMBOPLASTIN TIME PARTIAL: CPT | Performed by: INTERNAL MEDICINE

## 2020-01-08 PROCEDURE — 80048 BASIC METABOLIC PNL TOTAL CA: CPT | Performed by: NURSE PRACTITIONER

## 2020-01-08 PROCEDURE — 82948 REAGENT STRIP/BLOOD GLUCOSE: CPT

## 2020-01-08 PROCEDURE — 85027 COMPLETE CBC AUTOMATED: CPT | Performed by: NURSE PRACTITIONER

## 2020-01-08 RX ORDER — BUPROPION HYDROCHLORIDE 150 MG/1
150 TABLET ORAL DAILY
Status: DISCONTINUED | OUTPATIENT
Start: 2020-01-09 | End: 2020-01-14

## 2020-01-08 RX ORDER — DILTIAZEM HYDROCHLORIDE 120 MG/1
120 CAPSULE, COATED, EXTENDED RELEASE ORAL DAILY
Status: DISCONTINUED | OUTPATIENT
Start: 2020-01-08 | End: 2020-01-09

## 2020-01-08 RX ORDER — TORSEMIDE 20 MG/1
20 TABLET ORAL 2 TIMES DAILY
Status: DISCONTINUED | OUTPATIENT
Start: 2020-01-08 | End: 2020-01-09

## 2020-01-08 RX ADMIN — NICOTINE 1 PATCH: 14 PATCH TRANSDERMAL at 09:00

## 2020-01-08 RX ADMIN — METOPROLOL SUCCINATE 100 MG: 50 TABLET, EXTENDED RELEASE ORAL at 09:42

## 2020-01-08 RX ADMIN — LEVALBUTEROL HYDROCHLORIDE 1.25 MG: 1.25 SOLUTION, CONCENTRATE RESPIRATORY (INHALATION) at 18:52

## 2020-01-08 RX ADMIN — DILTIAZEM HYDROCHLORIDE 120 MG: 120 CAPSULE, COATED, EXTENDED RELEASE ORAL at 09:41

## 2020-01-08 RX ADMIN — APIXABAN 5 MG: 5 TABLET, FILM COATED ORAL at 17:10

## 2020-01-08 RX ADMIN — IPRATROPIUM BROMIDE 0.5 MG: 0.5 SOLUTION RESPIRATORY (INHALATION) at 18:52

## 2020-01-08 RX ADMIN — HYDROCORTISONE SODIUM SUCCINATE 100 MG: 100 INJECTION, POWDER, FOR SOLUTION INTRAMUSCULAR; INTRAVENOUS at 00:49

## 2020-01-08 RX ADMIN — TORSEMIDE 20 MG: 20 TABLET ORAL at 11:44

## 2020-01-08 RX ADMIN — INSULIN LISPRO 1 UNITS: 100 INJECTION, SOLUTION INTRAVENOUS; SUBCUTANEOUS at 22:00

## 2020-01-08 RX ADMIN — HEPARIN SODIUM AND DEXTROSE 13.1 UNITS/KG/HR: 10000; 5 INJECTION INTRAVENOUS at 08:59

## 2020-01-08 RX ADMIN — HYDROCORTISONE SODIUM SUCCINATE 100 MG: 100 INJECTION, POWDER, FOR SOLUTION INTRAMUSCULAR; INTRAVENOUS at 07:30

## 2020-01-08 RX ADMIN — LEVALBUTEROL HYDROCHLORIDE 1.25 MG: 1.25 SOLUTION, CONCENTRATE RESPIRATORY (INHALATION) at 07:40

## 2020-01-08 RX ADMIN — APIXABAN 5 MG: 5 TABLET, FILM COATED ORAL at 09:41

## 2020-01-08 RX ADMIN — IPRATROPIUM BROMIDE 0.5 MG: 0.5 SOLUTION RESPIRATORY (INHALATION) at 07:40

## 2020-01-08 RX ADMIN — TORSEMIDE 20 MG: 20 TABLET ORAL at 17:10

## 2020-01-08 RX ADMIN — INSULIN LISPRO 2 UNITS: 100 INJECTION, SOLUTION INTRAVENOUS; SUBCUTANEOUS at 11:40

## 2020-01-08 RX ADMIN — LEVALBUTEROL HYDROCHLORIDE 1.25 MG: 1.25 SOLUTION, CONCENTRATE RESPIRATORY (INHALATION) at 12:51

## 2020-01-08 RX ADMIN — INSULIN LISPRO 2 UNITS: 100 INJECTION, SOLUTION INTRAVENOUS; SUBCUTANEOUS at 17:00

## 2020-01-08 RX ADMIN — IPRATROPIUM BROMIDE 0.5 MG: 0.5 SOLUTION RESPIRATORY (INHALATION) at 12:51

## 2020-01-08 NOTE — CONSULTS
Consult Note - Wound   Shante Winters 63 y o  female MRN: [id number]  Unit/Bed#: ICU 08 Encounter: [id number]      History and Present Illness:  63year old female presented to the hospital with weakness, hypoxia, and peripheral cyanosis  Patient's history significant for DM  Assessment Findings:   Patient seen for wound care consultation  Agreeable to assessment  Requires assist x 1 to turn in bed  Incontinent of stool on assessment--roldan-anal care provided  Denies urinary incontinence  Patient is generally dry, flaky, dusky, and ashen with cyanotic lips and face  Lower extremities with moderate edema and chronic venous stasis dermatitis  Bilateral heels intact with blanchable erythema  Scattered scabs and excoriation from patient scratching to arms, thighs, and abdomen  1  MASD/ITD with candidiasis to bilateral groin, abdomen, and breast folds  2   Partial thickness open area along old midline abdominal incision likely due to moisture damage--no evidence of infection, induration, fluctuance, or odor at this time  Patient was unaware of this open area  See flowsheet and media for wound details  Wound Care Plan:   1-Midline abdomen--cleanse with soap and water  Place Maxorb in wound bed and cover with Allevyn Life foam dressing  Change dressing every other day  2-Nystatin powder to groin, abdominal, and breast folds BID  Skin care plans:  1-Hydraguard to bilateral sacrum, buttock and heels TID and PRN  2-Elevate heels to offload pressure  3-Ehob cushion in chair when out of bed  4-Moisturize skin daily with skin nourishing cream   5-Turn/reposition q2h or when medically stable for pressure re-distribution on skin  Wound care team to follow  Plan of care reviewed with primary RN      Wound 01/08/20 Abdomen Mid (Active)   Wound Image   1/8/2020 12:23 PM   Wound Description Pink 1/8/2020 12:23 PM   [place]-wound Assessment Intact 1/8/2020 12:23 PM   Wound Length (cm) 2 cm 1/8/2020 12:23 PM   Wound Width (cm) 0 2 cm 1/8/2020 12:23 PM   Wound Depth (cm) 0 1 1/8/2020 12:23 PM   Calculated Wound Area (cm^2) 0 4 cm^2 1/8/2020 12:23 PM   Calculated Wound Volume (cm^3) 0 04 cm^3 1/8/2020 12:23 PM   Drainage Amount Small 1/8/2020 12:23 PM   Drainage Description Serous 1/8/2020 12:23 PM   Non-staged Wound Description Partial thickness 1/8/2020 12:23 PM   Treatments Cleansed 1/8/2020 12:23 PM   Dressing Calcium Alginate; Foam, Silicon (eg  Allevyn, etc) 1/8/2020 12:23 PM   Dressing Changed New 1/8/2020 12:23 PM   Patient Tolerance Tolerated well 1/8/2020 12:23 PM   Dressing Status Clean;Dry; Intact 1/8/2020 12:23 PM     Marc Carrasco BSN, RN, [employer]

## 2020-01-08 NOTE — DISCHARGE INSTR - OTHER ORDERS
Skin care plans:  1-Hydraguard lotion to bilateral sacrum, buttock and heels three times per day for skin protection  2-Elevate heels off of bed with pillows to offload pressure  3-Offloading cushion in chair when out of bed  4-Moisturize skin twice daily with lotion  5-Turn/reposition every 2 hours while in bed and weight shift frequently while in chair for pressure re-distribution on skin  6-Antifungal powder to skin folds twice per day

## 2020-01-08 NOTE — ASSESSMENT & PLAN NOTE
· She has a history of chronic venous ulceration with some warmth to the upper part of the lower right leg  The foot however is cool to touch  · Blood cultures were obtained    · Will monitor her exam

## 2020-01-08 NOTE — ASSESSMENT & PLAN NOTE
· Her lactic acid level was mildly elevated but she does not have other signs of infection  · Procalcitonin was negative x two  · Will hold on additional antibiotics for now

## 2020-01-08 NOTE — ASSESSMENT & PLAN NOTE
· This is acute on chronic disease as her baseline creatinine appears to be around 1 5 to 2 5    · Creatinine back up to 3 10 today- would favor holding additional diuretics  · Will monitor her renal indices and urine output closely- improved on 1/7

## 2020-01-08 NOTE — PLAN OF CARE
Problem: Prexisting or High Potential for Compromised Skin Integrity  Goal: Skin integrity is maintained or improved  Description  INTERVENTIONS:  - Identify patients at risk for skin breakdown  - Assess and monitor skin integrity  - Assess and monitor nutrition and hydration status  - Monitor labs   - Assess for incontinence   - Turn and reposition patient  - Assist with mobility/ambulation  - Relieve pressure over bony prominences  - Avoid friction and shearing  - Provide appropriate hygiene as needed including keeping skin clean and dry  - Evaluate need for skin moisturizer/barrier cream  - Collaborate with interdisciplinary team   - Patient/family teaching  - Consider wound care consult   Outcome: Progressing     Problem: Potential for Falls  Goal: Patient will remain free of falls  Description  INTERVENTIONS:  - Assess patient frequently for physical needs  -  Identify cognitive and physical deficits and behaviors that affect risk of falls    -  [place] fall precautions as indicated by assessment   - Educate patient/family on patient safety including physical limitations  - Instruct patient to call for assistance with activity based on assessment  - Modify environment to reduce risk of injury  - Consider OT/PT consult to assist with strengthening/mobility  Outcome: Progressing     Problem: PAIN - ADULT  Goal: Verbalizes/displays adequate comfort level or baseline comfort level  Description  Interventions:  - Encourage patient to monitor pain and request assistance  - Assess pain using appropriate pain scale  - Administer analgesics based on type and severity of pain and evaluate response  - Implement non-pharmacological measures as appropriate and evaluate response  - Consider cultural and social influences on pain and pain management  - Notify physician/advanced practitioner if interventions unsuccessful or patient reports new pain  Outcome: Progressing     Problem: INFECTION - ADULT  Goal: Absence or prevention of progression during hospitalization  Description  INTERVENTIONS:  - Assess and monitor for signs and symptoms of infection  - Monitor lab/diagnostic results  - Monitor all insertion sites, i e  indwelling lines, tubes, and drains  - Monitor endotracheal if appropriate and nasal secretions for changes in amount and color  - [place] appropriate cooling/warming therapies per order  - Administer medications as ordered  - Instruct and encourage patient and family to use good hand hygiene technique  - Identify and instruct in appropriate isolation precautions for identified infection/condition  Outcome: Progressing  Goal: Absence of fever/infection during neutropenic period  Description  INTERVENTIONS:  - Monitor WBC    Outcome: Progressing     Problem: SAFETY ADULT  Goal: Maintain or return to baseline ADL function  Description  INTERVENTIONS:  -  Assess patient's ability to carry out ADLs; assess patient's baseline for ADL function and identify physical deficits which impact ability to perform ADLs (bathing, care of mouth/teeth, toileting, grooming, dressing, etc )  - Assess/evaluate cause of self-care deficits   - Assess range of motion  - Assess patient's mobility; develop plan if impaired  - Assess patient's need for assistive devices and provide as appropriate  - Encourage maximum independence but intervene and supervise when necessary  - Involve family in performance of ADLs  - Assess for home care needs following discharge   - Consider OT consult to assist with ADL evaluation and planning for discharge  - Provide patient education as appropriate  Outcome: Progressing  Goal: Maintain or return mobility status to optimal level  Description  INTERVENTIONS:  - Assess patient's baseline mobility status (ambulation, transfers, stairs, etc )    - Identify cognitive and physical deficits and behaviors that affect mobility  - Identify mobility aids required to assist with transfers and/or ambulation (gait belt, sit-to-stand, lift, walker, cane, etc )  - [place] fall precautions as indicated by assessment  - Record patient progress and toleration of activity level on Mobility SBAR; progress patient to next Phase/Stage  - Instruct patient to call for assistance with activity based on assessment  - Consider rehabilitation consult to assist with strengthening/weightbearing, etc   Outcome: Progressing     Problem: DISCHARGE PLANNING  Goal: Discharge to home or other facility with appropriate resources  Description  INTERVENTIONS:  - Identify barriers to discharge w/patient and caregiver  - Arrange for needed discharge resources and transportation as appropriate  - Identify discharge learning needs (meds, wound care, etc )  - Arrange for interpretive services to assist at discharge as needed  - Refer to Case Management Department for coordinating discharge planning if the patient needs post-hospital services based on physician/advanced practitioner order or complex needs related to functional status, cognitive ability, or social support system  Outcome: Progressing     Problem: Knowledge Deficit  Goal: Patient/family/caregiver demonstrates understanding of disease process, treatment plan, medications, and discharge instructions  Description  Complete learning assessment and assess knowledge base    Interventions:  - Provide teaching at level of understanding  - Provide teaching via preferred learning methods  Outcome: Progressing     Problem: CARDIOVASCULAR - ADULT  Goal: Maintains optimal cardiac output and hemodynamic stability  Description  INTERVENTIONS:  - Monitor I/O, vital signs and rhythm  - Monitor for S/S and trends of decreased cardiac output  - Administer and titrate ordered vasoactive medications to optimize hemodynamic stability  - Assess quality of pulses, skin color and temperature  - Assess for signs of decreased coronary artery perfusion  - Instruct patient to report change in severity of symptoms  Outcome: Progressing  Goal: Absence of cardiac dysrhythmias or at baseline rhythm  Description  INTERVENTIONS:  - Continuous cardiac monitoring, vital signs, obtain 12 lead EKG if ordered  - Administer antiarrhythmic and heart rate control medications as ordered  - Monitor electrolytes and administer replacement therapy as ordered  Outcome: Progressing     Problem: RESPIRATORY - ADULT  Goal: Achieves optimal ventilation and oxygenation  Description  INTERVENTIONS:  - Assess for changes in respiratory status  - Assess for changes in mentation and behavior  - Position to facilitate oxygenation and minimize respiratory effort  - Oxygen administered by appropriate delivery if ordered  - Initiate smoking cessation education as indicated  - Encourage broncho-pulmonary hygiene including cough, deep breathe, Incentive Spirometry  - Assess the need for suctioning and aspirate as needed  - Assess and instruct to report SOB or any respiratory difficulty  - Respiratory Therapy support as indicated  Outcome: Progressing     Problem: METABOLIC, FLUID AND ELECTROLYTES - ADULT  Goal: Electrolytes maintained within normal limits  Description  INTERVENTIONS:  - Monitor labs and assess patient for signs and symptoms of electrolyte imbalances  - Administer electrolyte replacement as ordered  - Monitor response to electrolyte replacements, including repeat lab results as appropriate  - Instruct patient on fluid and nutrition as appropriate  Outcome: Progressing  Goal: Fluid balance maintained  Description  INTERVENTIONS:  - Monitor labs   - Monitor I/O and WT  - Instruct patient on fluid and nutrition as appropriate  - Assess for signs & symptoms of volume excess or deficit  Outcome: Progressing  Goal: Glucose maintained within target range  Description  INTERVENTIONS:  - Monitor Blood Glucose as ordered  - Assess for signs and symptoms of hyperglycemia and hypoglycemia  - Administer ordered medications to maintain glucose within target range  - Assess nutritional intake and initiate nutrition service referral as needed  Outcome: Progressing     Problem: SKIN/TISSUE INTEGRITY - ADULT  Goal: Skin integrity remains intact  Description  INTERVENTIONS  - Identify patients at risk for skin breakdown  - Assess and monitor skin integrity  - Assess and monitor nutrition and hydration status  - Monitor labs (i e  albumin)  - Assess for incontinence   - Turn and reposition patient  - Assist with mobility/ambulation  - Relieve pressure over bony prominences  - Avoid friction and shearing  - Provide appropriate hygiene as needed including keeping skin clean and dry  - Evaluate need for skin moisturizer/barrier cream  - Collaborate with interdisciplinary team (i e  Nutrition, Rehabilitation, etc )   - Patient/family teaching  Outcome: Progressing  Goal: Incision(s), wounds(s) or drain site(s) healing without S/S of infection  Description  INTERVENTIONS  - Assess and document risk factors for skin impairment   - Assess and document dressing, incision, wound bed, drain sites and surrounding tissue  - Consider nutrition services referral as needed  - Oral mucous membranes remain intact  - Provide patient/ family education  Outcome: Progressing  Goal: Oral mucous membranes remain intact  Description  INTERVENTIONS  - Assess oral mucosa and hygiene practices  - Implement preventative oral hygiene regimen  - Implement oral medicated treatments as ordered  - Initiate Nutrition services referral as needed  Outcome: Progressing     Problem: Nutrition/Hydration-ADULT  Goal: Nutrient/Hydration intake appropriate for improving, restoring or maintaining nutritional needs  Description  Monitor and assess patient's nutrition/hydration status for malnutrition  Collaborate with interdisciplinary team and initiate plan and interventions as ordered  Monitor patient's weight and dietary intake as ordered or per policy   Utilize nutrition screening tool and intervene as necessary  Determine patient's food preferences and provide high-protein, high-caloric foods as appropriate       INTERVENTIONS:  - Monitor oral intake, urinary output, labs, and treatment plans  - Assess nutrition and hydration status and recommend course of action  - Evaluate amount of meals eaten  - Assist patient with eating if necessary   - Allow adequate time for meals  - Recommend/ encourage appropriate diets, oral nutritional supplements, and vitamin/mineral supplements  - Order, calculate, and assess calorie counts as needed  - Recommend, monitor, and adjust tube feedings and TPN/PPN based on assessed needs  - Assess need for intravenous fluids  - Provide specific nutrition/hydration education as appropriate  - Include patient/family/caregiver in decisions related to nutrition  Outcome: Progressing

## 2020-01-08 NOTE — ASSESSMENT & PLAN NOTE
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

## 2020-01-08 NOTE — ASSESSMENT & PLAN NOTE
· This is acute on chronic disease as her baseline creatinine appears to be around 1 5 to 2 5   · Cr continues to worsen, current Cr 3 5  · Consider urine studies  · Cont home Torsemide   · Will monitor her renal indices and urine output closely

## 2020-01-08 NOTE — CONSULTS
Consultation - Behavioral Health     Identification Data: Shelly Gan 63 y o  female MRN: [id number]  Unit/Bed#: ICU 08 Encounter: [id number]    01/08/20  8:05 PM    Inpatient consult to Psychiatry  Consult performed by: MAYNOR Patterson  Consult ordered by: MAYNOR Whittaker        Physician Requesting Consult: Cachorro Hughes MD  Principal Problem:Acute on chronic respiratory failure with hypoxia and hypercapnia [facility])    Reason for Consult:  depression    History of Present Illness     Shelly Gan is a 63 y o  female with a history of Adjustment Disorder who was admitted to the medical service on 1/5/5592 due to  complications from COPD and Congestive heart failure  Psychiatric consultation was requested due to depression  Psychiatric symptoms prior to admission included worsening depression  Onset of symptoms was gradual starting few weeks ago with progressively worsening course since that time  Stressors preceding admission included death of  and medical problems  Assessment/Plan     Principal Problem:    Acute on chronic respiratory failure with hypoxia and hypercapnia (HCC)  Active Problems:    Chronic obstructive pulmonary disease with acute exacerbation ([facility])    Hypertension    Permanent atrial fibrillation    Chronic venous stasis dermatitis of both lower extremities    Type 2 diabetes mellitus without complication (HCC)    Obesity    SAPNA (acute kidney injury) ([facility] )    SIRS (systemic inflammatory response syndrome) ([facility])    Adjustment disorder      Assessment:   On interview patient indicates that she has never recovered from death of her  3 years ago  She experiences symptoms of depression intermittently and over the past month the symptoms have worsened significantly     Symptoms include hypersomnia, alternating lack of appetite and binge eating, low mood, hopelessness, lack of interest and failure to attend to usual daily tasks and points of pride such as keeping the home clean and caring for self in a diligent manner  Patient states she has missed doses of medication and allowed the house to  Accumulate waist and trash excessively  due to worsening mood  Reports past trial of Wellbutrin for smoking cessation  which was successful at the time  Denies any side effects at time of trial   Patient is on concurrent anticoagulation therapy with Eliquis  Recommend initiation of Wellbutrin 150 mg p o  Daily for treatment of  Depressive symptoms, depressed mood, low energy, lack of motivation  Patient also reports continued difficulty with discontinuation of smoking despite COPD and numerous respiratory complications  Patient denies history of seizures or  Psychosis  Patient does not meet criteria for inpatient psychiatric hospitalization at this time  Plan/Recommendation: initiated Wellbutrin 150 mg p o  Daily and follow-up to re-assess for side effects prior to discharge  May contact consultation liaison if adverse reactions  or troublesome side effects occur  Psychiatric Review Of Systems:    sleep changes: yes, increased  appetite changes: yes, Variable  weight changes: weight gain  energy/anergy: yes, decreased  interest/pleasure/anhedonia: yes, decreased  somatic symptoms: no  anxiety/panic: no  ster[place]: no  guilty/hopeless: yes  self injurious behavior/risky behavior: no  Suicidal ideation: no  Homicidal ideation: no  Auditory hallucinations: no  Visual hallucinations: no  Other hallucinations: no  Delusional thinking: no  Eating disorder history: no  Obsessive/compulsive symptoms: no    Historical Information     Past Psychiatric History:     Past Inpatient Psychiatric Treatment:   No history of past inpatient psychiatric admissions  Past Outpatient Psychiatric Treatment:    No history of past outpatient psychiatric treatment  Past Suicide Attempts: no  Past Violent Behavior: no  Past Psychiatric Medication Trials:  Wellbutrin     Substance Abuse History:    Social History     Tobacco History     Smoking Status  Current Every Day Smoker Smoking Frequency  0 packs/day for 43 years (0 pk yrs) Smoking Tobacco Type  Cigarettes    Smokeless Tobacco Use  Never Used    Tobacco Comment  Currently half a pack a day, 6 CIGARETTES PER DAY           Alcohol History     Alcohol Use Status  Not Currently Comment  socially, NO ALCOHOL USE          Drug Use     Drug Use Status  Not Currently Types  Marijuana          Sexual Activity     Sexually Active  Not Asked          Activities of Daily Living    Not Asked                 I have assessed this patient for substance use within the past 12 months    Alcohol use: denies use  Recreational drug use:   Cocaine:  denies use  Heroin:  denies use  Marijuana:  denies use  Other drugs: denies use     Longest clean time: not applicable  History of Inpatient/Outpatient rehabilitation program: no  Smoking history: 7 cigarettes per day  Use of caffeine: denies use    Family Psychiatric History:     Psychiatric Illness:  no family history of psychiatric illness  Substance Abuse:  no family history of substance abuse  Suicide Attempts:  no family history of suicide attempts    Social History:    Education: 10th grade  Learning Disabilities: none  Marital History:   Children: 1 adult son  Living Arrangement: The patient lives alone  Occupational History: works  same job for past 25 years, currently considering applying for disability  Functioning Relationships: limited support system, good relationship with son  Legal History: none   History: None    Traumatic History:     Abuse: not willing to provide details  Other Traumatic Events:none     Past Medical History:    History of Seizures: no  History of Head injury with loss of consciousness: no    Past Medical History:   Diagnosis Date    Atrial fibrillation with RVR ([facility] )     COPD (chronic obstructive pulmonary disease) ([facility] )     Diabetes type 2, uncontrolled ([facility] )  Hypertension      Past Surgical History:   Procedure Laterality Date    HERNIA REPAIR      HYSTERECTOMY      LAPAROSCOPIC CHOLECYSTECTOMY           Medical Review Of Systems:    Pertinent items are noted in HPI  Allergies:    No Known Allergies    Medications: All current active medications have been reviewed  Objective     Vital signs in last 24 hours:    Temp:  [97 2 °F (36 2 °C)-98 7 °F (37 1 °C)] 98 7 °F (37 1 °C)  HR:  [76-92] 78  Resp:  [13-34] 30  BP: ()/(63-86) 110/78    Intake/Output Summary (Last 24 hours) at 1/8/2020 2005  Last data filed at 1/8/2020 1438  Gross per 24 hour   Intake 683 73 ml   Output 475 ml   Net 208 73 ml       Mental Status Evaluation:    Appearance:  dressed in hospital attire   Behavior:  pleasant, cooperative   Speech:  normal volume, normal pitch, increased latency of response   Mood:  depressed   Affect:  appropriate   Language: naming objects   Thought Process:  organized, logical   Associations: intact associations   Thought Content:  normal   Perceptual Disturbances: none   Risk Potential: Suicidal ideation - None  Homicidal ideation - None  Potential for aggression - No   Sensorium:  oriented to person, place and time/date   Memory:  recent and remote memory grossly intact   Consciousness:  alert and awake    Attention: attention span and concentration are age appropriate   Intellect: within normal limits   Fund of Knowledge: awareness of current events: yes   Insight:  fair   Judgment: fair   Muscle Strength Muscle Tone: normal  normal   Gait/Station: normal gait/station   Motor Activity: no abnormal movements     Laboratory Results: I have personally reviewed all pertinent laboratory/tests results  Imaging Studies: Xr Chest 2 Views    Result Date: 1/7/2020  Narrative: CHEST INDICATION:   Chest Pain  COMPARISON:  Chest radiograph 2/14/2019 EXAM PERFORMED/VIEWS:  XR CHEST PA & LATERAL FINDINGS:  Evaluation is limited by patient positioning  Cardiomediastinal silhouette appears enlarged  Stable prominence of the right hilar region given differences in technique  There is pulmonary vascular congestion  No large effusion or pneumothorax  Osseous structures appear within normal limits for patient age  Impression: Enlargement of the cardiac silhouette and pulmonary interstitial edema  Workstation performed: [id number]       Code Status: Level 1 - Full Code  Advance Directive and Living Will:       Power of :      Treatment Plan:     Planned Medication Changes: All current active medications have been reviewed  Encourage group therapy, milieu therapy and occupational therapy  Start  Wellbutrin 150 mg p o  daily    Current Facility-Administered Medications:  albuterol 2 puff Inhalation Q4H PRN  Fear, CRNP   apixaban 5 mg Oral BID MAYNOR Hinds   [START ON 1/9/2020] buPROPion 150 mg Oral Daily Dorian Sanchez, MAYNOR   diltiazem 120 mg Oral Daily Luli K Kofi, MAYNOR   insulin lispro 1-6 Units Subcutaneous TID AC Luli K Kofi, CRNP   insulin lispro 1-6 Units Subcutaneous HS Luli K Kofi, CRMARY JO   ipratropium 0 5 mg Nebulization TID  Fear, CRNP   levalbuterol 1 25 mg Nebulization TID  Fear, CRNP   levalbuterol 1 25 mg Nebulization Q4H PRN  Fear, CRNP   metoprolol succinate 100 mg Oral Daily  Fear, CRNP   nicotine 1 patch Transdermal Daily  Fear, CRNP   torsemide 20 mg Oral BID MAYNOR Hinds       Risks / Benefits of Treatment:    Risks, benefits, and possible side effects of medications explained to patient and patient verbalizes understanding and agreement for treatment  Counseling / Coordination of Care:    Diagnosis, medication changes and treatment plan reviewed with patient  Maribell MAYNOR Peng 01/08/20    Code Status: Level 1 - Full Code      Portions of the record may have been created with voice recognition software   Occasional wrong word or "sound a like" substitutions may have occurred due to the inherent limitations of voice recognition software  Read the chart carefully and recognize, using context, where substitutions have occurred

## 2020-01-08 NOTE — ASSESSMENT & PLAN NOTE
· She is currently not rate controlled with a heart rate of 120    · Will bolus her with cardizem and then initiate a cardizem infusion with a goal to control her heart rate < 110  · Placed on a heparin infusion- monitor Ptt and adjust accordingly
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
## 2020-01-08 NOTE — ASSESSMENT & PLAN NOTE
Lab Results   Component Value Date    HGBA1C 5 8 04/11/2019       Recent Labs     01/07/20  1134 01/07/20  1351 01/07/20  1644 01/07/20  1823   POCGLU 76 70 69 65       Blood Sugar Average: Last 72 hrs:  · Was hypoglycemic yesterday, eitology?- Placed on d5w- TSH normal, random cortisol 22 9  · Will hold sliding scale insulin for now  · Will continue dextrose IV until able to take po's

## 2020-01-08 NOTE — PROGRESS NOTES
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 )  Assessment & Plan  · Her lactic acid level was mildly elevated but she does not have other signs of infection  · Procalcitonin was negative x two  · Will hold on additional antibiotics for now    SAPNA (acute kidney injury) ([facility] )  Assessment & Plan  · This is acute on chronic disease as her baseline creatinine appears to be around 1 5 to 2 5  · Creatinine back up to 3 10 today- would favor holding additional diuretics  · Will monitor her renal indices and urine output closely- improved on 1/7    Permanent atrial fibrillation  Assessment & Plan  · She is currently not rate controlled with a heart rate of 120    · Will bolus her with cardizem and then initiate a cardizem infusion with a goal to control her heart rate < 110  · Placed on a heparin infusion- monitor Ptt and adjust accordingly    Hypertension  Assessment & Plan  · Will resume her outpatient metoprolol and cardizem    Chronic venous stasis dermatitis of both lower extremities  Assessment & Plan  · She has a history of chronic venous ulceration with some warmth to the upper part of the lower right leg  The foot however is cool to touch  · Blood cultures were obtained  · Will monitor her exam        Type 2 diabetes mellitus without complication [facility])  Assessment & Plan  Lab Results   Component Value Date    HGBA1C 5 8 04/11/2019       Recent Labs     01/07/20  1134 01/07/20  1351 01/07/20  1644 01/07/20  1823   POCGLU 76 70 69 65       Blood Sugar Average: Last 72 hrs:  · Was hypoglycemic yesterday, eitology?- Placed on d5w- TSH normal, random cortisol 22 9  · Will hold sliding scale insulin for now  · Will continue dextrose IV until able to take po's      ----------------------------------------------------------------------------------------  HPI/24hr events: Was lethargic with blood sugar 65- improved after D50 administration    Disposition: Continue Critical Care   Code Status: Level 1 - Full Code  ---------------------------------------------------------------------------------------  SUBJECTIVE  Denies CP or SOB at present, feels improved  Reports has not been complaint with oxygen at home    Review of Systems  Review of systems was reviewed and negative unless stated above in HPI/24-hour events   ---------------------------------------------------------------------------------------  OBJECTIVE    Physical Exam   Constitutional: She appears well-developed and well-nourished  No distress  HENT:   Head: Normocephalic and atraumatic  Conjunctiva are injected which was present on admission   Eyes: Pupils are equal, round, and reactive to light  Neck: Neck supple  Cardiovascular: An irregular rhythm present  Pulmonary/Chest: Effort normal  She has wheezes  Abdominal: Soft  Bowel sounds are normal  She exhibits distension  There is no tenderness  Musculoskeletal: She exhibits edema  Chronic venous stasis skin changes to her lower extremities   Lymphadenopathy:     She has no cervical adenopathy  Neurological: She is alert  A cranial nerve deficit is present  Skin: Skin is warm and dry  Psychiatric: She has a normal mood and affect  Vitals   Vitals:    20 0200 20 0300 20 0301 20 0400   BP: 102/65 104/74  107/74   Pulse: 86 86  88   Resp: 21 20  (!) 24   Temp:    (!) 97 2 °F (36 2 °C)   TempSrc:    Temporal   SpO2: 94% 95% 96% 94%   Weight:       Height:         Temp (24hrs), Av 4 °F (36 3 °C), Min:96 8 °F (36 °C), Max:97 8 °F (36 6 °C)  Current: Temperature: (!) 97 2 °F (36 2 °C)          SpO2 Device: O2 Device: Other (comment)(BiPap)  O2 Flow Rate (L/min): 15 L/min    Invasive/non-invasive ventilation settings   Respiratory    Lab Data (Last 4 hours)    None         O2/Vent Data (Last 4 hours)       030          Non-Invasive Ventilation Mode BiPAP                   Height and Weights   Height: 5' 5" (165 1 cm)  IBW: 57 kg  Body mass index is 49 6 kg/m²    Weight (last 2 days)     Date/Time   Weight    20 0600   135 (298 06)    20 0200   135 (298 06)    20 2238   134 (295 42)              Intake and Output  I/O        07 -  0700  07 -  0700    I V  (mL/kg) 20 (0 1) 1291 8 (9 6)    IV Piggyback 1050     Total Intake(mL/kg) 1070 (7 9) 1291 8 (9 6)    Urine (mL/kg/hr) 350 629 (0 2)    Total Output 350 629    Net +720 +662 8          Unmeasured Urine Occurrence  1 x          Nutrition       Diet Orders   (From admission, onward)             Start     Ordered    20 0258  Diet NPO  (ED Bridging Orders Panel)  Diet effective now     Question:  Diet Type  Answer:  NPO    20 025                Laboratory and Diagnostics:  Results from last 7 days   Lab Units 20  0516 20  1137 20  0524 20  2241   WBC Thousand/uL 5 91 7 16 6 49 7 82   HEMOGLOBIN g/dL 16 4* 17 4* 16 2* 19 5*   HEMATOCRIT % 55 7* 59 0* 55 4* 65 6*   PLATELETS Thousands/uL 115* 125* 122* 156   NEUTROS PCT %  --   --  76* 78*   MONOS PCT %  --   --  10 10     Results from last 7 days   Lab Units 01/08/20  0516 01/07/20  0524 01/06/20  2241   SODIUM mmol/L 147* 148* 146*   POTASSIUM mmol/L 3 8 3 9 4 0   CHLORIDE mmol/L 107 109* 103   CO2 mmol/L 29 28 30   ANION GAP mmol/L 11 11 13   BUN mg/dL 46* 42* 46*   CREATININE mg/dL 3 10* 2 78* 3 32*   CALCIUM mg/dL 8 1* 7 2* 9 1   GLUCOSE RANDOM mg/dL 85 70 72   ALT U/L  --   --  38   AST U/L  --   --  43   ALK PHOS U/L  --   --  162*   ALBUMIN g/dL  --   --  3 3*   TOTAL BILIRUBIN mg/dL  --   --  3 07*     Results from last 7 days   Lab Units 01/07/20  0524   MAGNESIUM mg/dL 2 0      Results from last 7 days   Lab Units 01/08/20  0516 01/07/20  1821 01/07/20  1137   INR   --   --  2 24*   PTT seconds 63* 51* 34      Results from last 7 days   Lab Units 01/06/20  2241   TROPONIN I ng/mL 0 07*     Results from last 7 days   Lab Units 01/07/20  0047 01/06/20  2235   LACTIC ACID mmol/L 1 8 2 5*     ABG:  Results from last 7 days   Lab Units 01/07/20  2311   PH ART  7 318*   PCO2 ART mm Hg 61 6*   PO2 ART mm Hg 85 1   HCO3 ART mmol/L 30 9*   BASE EXC ART mmol/L 2 6   ABG SOURCE  Radial, Left     VBG:  Results from last 7 days   Lab Units 01/07/20  2311   ABG SOURCE  Radial, Left     Results from last 7 days   Lab Units 01/07/20  0629   PROCALCITONIN ng/ml 0 17       Micro  Results from last 7 days   Lab Units 01/06/20  2235 01/06/20  2230   BLOOD CULTURE  Received in Microbiology Lab  Culture in Progress  Received in Microbiology Lab  Culture in Progress  EKG:   Imaging: I have personally reviewed pertinent reports     and I have personally reviewed pertinent films in PACS    Active Medications  Scheduled Meds:    Current Facility-Administered Medications:  albuterol 2 puff Inhalation Q4H PRN Marlynn Spurling, CRNP    dextrose 20 mL/hr Intravenous Continuous Caprice Cramer, CRNP Last Rate: 20 mL/hr (01/07/20 1633)   diltiazem 1-15 mg/hr Intravenous Titrated Marlynn Spurling, CRNP Last Rate: 2 5 mg/hr (01/07/20 1711)   heparin (porcine) 3-20 Units/kg/hr (Order-Specific) Intravenous Titrated MAYNOR Hernandez Last Rate: 13 1 Units/kg/hr (01/07/20 2246)   heparin (porcine) 2,000 Units Intravenous PRN Elicia Trotter MAYNOR Kirby    heparin (porcine) 4,000 Units Intravenous PRN Elicia Trotter MAYNOR Kirby    insulin lispro 1-6 Units Subcutaneous Q6H [facility] Luli MAYNOR Macedo    ipratropium 0 5 mg Nebulization TID Deretha Syracuse, MAYNOR    levalbuterol 1 25 mg Nebulization TID Deretha Syracuse, MAYNOR    levalbuterol 1 25 mg Nebulization Q4H PRN Deretha MAYNOR Quiñones    metoprolol succinate 100 mg Oral Daily MAYNOR Landers    nicotine 1 patch Transdermal Daily MAYNOR Landers      Continuous Infusions:    dextrose 20 mL/hr Last Rate: 20 mL/hr (01/07/20 1833)   diltiazem 1-15 mg/hr Last Rate: 2 5 mg/hr (01/07/20 1711)   heparin (porcine) 3-20 Units/kg/hr (Order-Specific) Last Rate: 13 1 Units/kg/hr (01/07/20 2246)     PRN Meds:     albuterol 2 puff Q4H PRN   heparin (porcine) 2,000 Units PRN   heparin (porcine) 4,000 Units PRN   levalbuterol 1 25 mg Q4H PRN       ---------------------------------------------------------------------------------------  Advance Directive and Living Will:      Power of :    POLST:    ---------------------------------------------------------------------------------------  Counseling / Coordination of Care      MAYNOR Landers      Portions of the record may have been created with voice recognition software  Occasional wrong word or "sound a like" substitutions may have occurred due to the inherent limitations of voice recognition software    Read the chart carefully and recognize, using context, where substitutions have occurred

## 2020-01-09 LAB
ANION GAP SERPL CALCULATED.3IONS-SCNC: 10 MMOL/L (ref 4–13)
BUN SERPL-MCNC: 51 MG/DL (ref 5–25)
CALCIUM SERPL-MCNC: 8.1 MG/DL (ref 8.3–10.1)
CHLORIDE SERPL-SCNC: 104 MMOL/L (ref 100–108)
CO2 SERPL-SCNC: 30 MMOL/L (ref 21–32)
CREAT SERPL-MCNC: 3.51 MG/DL (ref 0.6–1.3)
ERYTHROCYTE [DISTWIDTH] IN BLOOD BY AUTOMATED COUNT: 20.7 % (ref 11.6–15.1)
GFR SERPL CREATININE-BSD FRML MDRD: 14 ML/MIN/1.73SQ M
GLUCOSE SERPL-MCNC: 120 MG/DL (ref 65–140)
GLUCOSE SERPL-MCNC: 127 MG/DL (ref 65–140)
GLUCOSE SERPL-MCNC: 129 MG/DL (ref 65–140)
GLUCOSE SERPL-MCNC: 137 MG/DL (ref 65–140)
GLUCOSE SERPL-MCNC: 138 MG/DL (ref 65–140)
GLUCOSE SERPL-MCNC: 149 MG/DL (ref 65–140)
HCT VFR BLD AUTO: 55.5 % (ref 34.8–46.1)
HGB BLD-MCNC: 15.8 G/DL (ref 11.5–15.4)
MCH RBC QN AUTO: 30.7 PG (ref 26.8–34.3)
MCHC RBC AUTO-ENTMCNC: 28.5 G/DL (ref 31.4–37.4)
MCV RBC AUTO: 108 FL (ref 82–98)
PLATELET # BLD AUTO: 110 THOUSANDS/UL (ref 149–390)
PMV BLD AUTO: 10.6 FL (ref 8.9–12.7)
POTASSIUM SERPL-SCNC: 3.5 MMOL/L (ref 3.5–5.3)
RBC # BLD AUTO: 5.15 MILLION/UL (ref 3.81–5.12)
SODIUM 24H UR-SCNC: 6 MOL/L
SODIUM SERPL-SCNC: 144 MMOL/L (ref 136–145)
WBC # BLD AUTO: 8.09 THOUSAND/UL (ref 4.31–10.16)

## 2020-01-09 PROCEDURE — 99232 SBSQ HOSP IP/OBS MODERATE 35: CPT | Performed by: INTERNAL MEDICINE

## 2020-01-09 PROCEDURE — 84300 ASSAY OF URINE SODIUM: CPT | Performed by: NURSE PRACTITIONER

## 2020-01-09 PROCEDURE — 82948 REAGENT STRIP/BLOOD GLUCOSE: CPT

## 2020-01-09 PROCEDURE — 99255 IP/OBS CONSLTJ NEW/EST HI 80: CPT | Performed by: INTERNAL MEDICINE

## 2020-01-09 PROCEDURE — 80048 BASIC METABOLIC PNL TOTAL CA: CPT | Performed by: NURSE PRACTITIONER

## 2020-01-09 PROCEDURE — 85027 COMPLETE CBC AUTOMATED: CPT | Performed by: NURSE PRACTITIONER

## 2020-01-09 PROCEDURE — 99254 IP/OBS CNSLTJ NEW/EST MOD 60: CPT | Performed by: PSYCHIATRY & NEUROLOGY

## 2020-01-09 PROCEDURE — 94640 AIRWAY INHALATION TREATMENT: CPT

## 2020-01-09 PROCEDURE — NC001 PR NO CHARGE: Performed by: NURSE PRACTITIONER

## 2020-01-09 PROCEDURE — 94660 CPAP INITIATION&MGMT: CPT

## 2020-01-09 RX ORDER — DILTIAZEM HYDROCHLORIDE 120 MG/1
120 CAPSULE, COATED, EXTENDED RELEASE ORAL DAILY
Status: DISCONTINUED | OUTPATIENT
Start: 2020-01-10 | End: 2020-01-12

## 2020-01-09 RX ORDER — POTASSIUM CHLORIDE 20 MEQ/1
40 TABLET, EXTENDED RELEASE ORAL ONCE
Status: COMPLETED | OUTPATIENT
Start: 2020-01-09 | End: 2020-01-09

## 2020-01-09 RX ORDER — NYSTATIN 100000 [USP'U]/G
POWDER TOPICAL 2 TIMES DAILY
Status: DISCONTINUED | OUTPATIENT
Start: 2020-01-09 | End: 2020-01-21 | Stop reason: HOSPADM

## 2020-01-09 RX ORDER — MIDODRINE HYDROCHLORIDE 5 MG/1
5 TABLET ORAL
Status: DISCONTINUED | OUTPATIENT
Start: 2020-01-09 | End: 2020-01-11

## 2020-01-09 RX ORDER — ALBUMIN, HUMAN INJ 5% 5 %
12.5 SOLUTION INTRAVENOUS ONCE
Status: COMPLETED | OUTPATIENT
Start: 2020-01-09 | End: 2020-01-09

## 2020-01-09 RX ADMIN — IPRATROPIUM BROMIDE 0.5 MG: 0.5 SOLUTION RESPIRATORY (INHALATION) at 20:06

## 2020-01-09 RX ADMIN — ALBUMIN (HUMAN) 12.5 G: 12.5 INJECTION, SOLUTION INTRAVENOUS at 07:00

## 2020-01-09 RX ADMIN — MIDODRINE HYDROCHLORIDE 5 MG: 5 TABLET ORAL at 16:00

## 2020-01-09 RX ADMIN — LEVALBUTEROL HYDROCHLORIDE 1.25 MG: 1.25 SOLUTION, CONCENTRATE RESPIRATORY (INHALATION) at 08:42

## 2020-01-09 RX ADMIN — APIXABAN 5 MG: 5 TABLET, FILM COATED ORAL at 08:22

## 2020-01-09 RX ADMIN — IPRATROPIUM BROMIDE 0.5 MG: 0.5 SOLUTION RESPIRATORY (INHALATION) at 08:42

## 2020-01-09 RX ADMIN — BUPROPION HYDROCHLORIDE 150 MG: 150 TABLET, FILM COATED, EXTENDED RELEASE ORAL at 08:22

## 2020-01-09 RX ADMIN — POTASSIUM CHLORIDE 40 MEQ: 1500 TABLET, EXTENDED RELEASE ORAL at 07:57

## 2020-01-09 RX ADMIN — NYSTATIN 1 APPLICATION: 100000 POWDER TOPICAL at 13:35

## 2020-01-09 RX ADMIN — LEVALBUTEROL HYDROCHLORIDE 1.25 MG: 1.25 SOLUTION, CONCENTRATE RESPIRATORY (INHALATION) at 13:18

## 2020-01-09 RX ADMIN — LEVALBUTEROL HYDROCHLORIDE 1.25 MG: 1.25 SOLUTION, CONCENTRATE RESPIRATORY (INHALATION) at 20:06

## 2020-01-09 RX ADMIN — METOPROLOL SUCCINATE 100 MG: 50 TABLET, EXTENDED RELEASE ORAL at 08:23

## 2020-01-09 RX ADMIN — NICOTINE 1 PATCH: 14 PATCH TRANSDERMAL at 08:24

## 2020-01-09 RX ADMIN — IPRATROPIUM BROMIDE 0.5 MG: 0.5 SOLUTION RESPIRATORY (INHALATION) at 13:18

## 2020-01-09 RX ADMIN — NYSTATIN: 100000 POWDER TOPICAL at 18:07

## 2020-01-09 RX ADMIN — APIXABAN 2.5 MG: 2.5 TABLET, FILM COATED ORAL at 18:07

## 2020-01-09 RX ADMIN — DILTIAZEM HYDROCHLORIDE 120 MG: 120 CAPSULE, COATED, EXTENDED RELEASE ORAL at 08:23

## 2020-01-09 NOTE — PROGRESS NOTES
Progress Note - Jaky Anne 1960, 63 y o  female MRN: [id number]    Unit/Bed#: ICU 08 Encounter: [id number]    Primary Care Provider: Holli Martinez MD   Date and time admitted to hospital: 1/6/2020 10:25 PM        * Acute on chronic respiratory failure with hypoxia and hypercapnia ([facility] )  Assessment & Plan  · Etiology likely multifactorial, COPD, Untreated CHF, pulmonary hypertension, 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  · Noted that the patient states she is on 10 liters at home  · Goal SpO2 >88%  · Cont NC during the day and Bipap at HS, pt refusing sleep study      Chronic obstructive pulmonary disease with acute exacerbation ([place] [address] )  Assessment & Plan  · Can likely d/c nebs  · Not on maintenance inhalers at home      SIRS (systemic inflammatory response syndrome) ([place] [address] )  Assessment & Plan  · Her lactic acid level was mildly elevated but she does not have other signs of infection  · Procalcitonin was negative x two  · Will hold on additional antibiotics for now    SAPNA (acute kidney injury) ([place] [address] )  Assessment & Plan  · This is acute on chronic disease as her baseline creatinine appears to be around 1 5 to 2 5   · Cr continues to worsen, current Cr 3 5  · Consider urine studies  · Hold Torsemide today  · Consult nephrology     Permanent atrial fibrillation  Assessment & Plan  · Cardizem infusion d/c - transitioned to po  · Cont Cardizem po  · Cont eliquis for [facility] [place] operated by [facility]    Hypertension  Assessment & Plan  · Will resume her outpatient metoprolol and cardizem    Chronic venous stasis dermatitis of both lower extremities  Assessment & Plan  · She has a history of chronic venous ulceration with some warmth to the upper part of the lower right leg        Type 2 diabetes mellitus without complication [facility])  Assessment & Plan  Lab Results   Component Value Date    HGBA1C 5 8 04/11/2019       Recent Labs     01/08/20  2311 01/09/20  0009 01/09/20  0744 01/09/20  1116   POCGLU 161* 149* 127 129       Blood Sugar Average: Last 72 hrs:  · Previously persistent hypoglycemia  · TSH normal, random cortisol 17 9  · Tolerating oral diet  · Restart SSI with accuchecks QID and 2am      Obesity  Assessment & Plan  · She would benefit from weight loss  · There may be a component of OHV that is contributing to her acute on chronic pulmonary issues    Adjustment disorder  Assessment & Plan  · Psych following - recommend starting Wellbutrin      Progress Note - ICU Transfer to SD/MS tele/MS   Сергей Moss 63 y o  female MRN: [id number]  [address] [place] [address]   Unit/Bed#: ICU 08 Encounter: [id number]    Code Status: Level 1 - Full Code  POA:    Referring Physician: Dr Cami Willingham  Accepting Physician: Dr Maicol Aviles  _____________________________________________________________________    Reason for ICU/SD admission:  BiPAP    History of Present Illness:  64-year-old female with past medical history significant for hypertension, diabetes mellitus type 2, COPD, and atrial fib  She presented to the emergency department complaining of chest pain and shortness of breath  She was found to be hypoxic with an O2 saturation of 80%  In the ER showed increased lethargy due to this was placed on BiPAP  She also was noted to be in atrial fib with RVR placed on a Cardizem infusion  She was admitted into the intensive care unit on BiPAP for respiratory distress  This is felt to be related to obstructive sleep apnea with obesity hypoventilation syndrome with congestive heart failure  Summary of clinical course:  She was admitted to intensive care unit and placed on BiPAP she continues with lethargy despite being on BiPAP  She initially was treated with lasix in the emergency department for possible volume overloaded  However in the ICU she initially was thought to be hypovolemic and treated with mild fluid hydration  On presentation to the hospital she was in acute kidney injury in this improved slightly with IV fluid hydration    However the following day she had pitting edema and crackles and was thought to be overloaded and placed on 40 mg of Lasix b i d  She eventually was able to be weaned off of BiPAP once encephalopathy improved  And she was restarted on her home medications including her torsemide 20 mg b i d  Instead of the Lasix  As per her atrial fib but once she was able to take p o  She was discontinued from a Cardizem infusion and transition to Cardizem p o   In addition to her Eliquis was restarted and she was discontinued from heparin infusion  Patient is placed on BiPAPb hs but has been refusing  She has been refusing to receive outpatient sleep study, she does not also take her medications as prescribed  She is aware of the complications that this may cause and that she now has moderate pulmonary hypertension  Due to her depression we did consult Psychiatry who pleural Wellbutrin  Consultants: nephrology  _____________________________________________________________________    Diagnostic Data:  CBC:  Results from last 7 days   Lab Units 01/09/20  0438   WBC Thousand/uL 8 09   HEMOGLOBIN g/dL 15 8*   HEMATOCRIT % 55 5*   PLATELETS Thousands/uL 110*      CMP:   Lab Results   Component Value Date    SODIUM 144 01/09/2020    K 3 5 01/09/2020     01/09/2020    CO2 30 01/09/2020    BUN 51 (H) 01/09/2020    CREATININE 3 51 (H) 01/09/2020    CALCIUM 8 1 (L) 01/09/2020    EGFR 14 01/09/2020   ,   PT/INR: No results found for: PT, INR,   Magnesium: No components found for: MAG,  Phosphorous: No results found for: PHOS    ABG: No results found for: PHART, MQE3HXR, PO2ART, WPT9XKC, E2WKLHDR, BEART, SOURCE,     Microbiology:  Results from last 7 days   Lab Units 01/06/20 2235 01/06/20 2230   BLOOD CULTURE  No Growth at 48 hrs  No Growth at 48 hrs         Imaging: I have personally reviewed the pertinent imaging studies on the PACS system       Cardiac/EKG/telemetry/Echo:   Results from last 7 days   Lab Units 01/06/20 2241 TROPONIN I ng/mL 0 07*   NT-PRO BNP pg/mL 8,799*        _____________________________________________________________________    Recent or scheduled procedures: needs outpatient follow up with sleep study and pft's    Outstanding/pending diagnostics:       Mobilization Plan: activity as tolerated with pt/ot    Home medications that are not reordered and reason why: lisinopril due to SAPNA    Spoke with Dr Delaney Diaz  regarding transfer  Please call [phone] with any questions or concerns  Portions of the record may have been created with voice recognition software  Occasional wrong word or "sound a like" substitutions may have occurred due to the inherent limitations of voice recognition software  Read the chart carefully and recognize, using context, where substitutions have occurred      Darin Monday

## 2020-01-09 NOTE — CONSULTS
Consultation - Nephrology   Christiano Martini 63 y o  female MRN: [id number]  Unit/Bed#: ICU 08 Encounter: [id number]    ASSESSMENT/PLAN:  Acute kidney injury (POA):  Likely pre-renal presenting with hypotension and well as component of initial volume overload and diuretics  -presented with creatinine of 3 3, initially improved down to 2 7, again worsening up to 3 5 today  - baseline sCr 1 3-1 7    -status post fluid bolus  -received IV Lasix on 1/06 and 1/07   -started on p o  Torsemide 20 mg BID on 01/08, now on hold  -received 3 doses of albumin   -check urinalysis, urine Na  -check bladder scan   -consider checking renal ultrasound   -avoid nephrotoxins  -I/O     CKD III:  Likely secondary to diabetes and hypertension   - will need follow up at discharge  Hypertension:  Blood pressure is soft  -received albumin x 3 doses   -avoid episodes of hypotension or high fluctuations in blood pressure   -maintain map greater than 65 mmHg    -continues on metoprolol and Cardizem, will place holding parameters for systolic blood pressure less than 130    - consider starting midodrine    - continue to hold diuretic  Hypoxic respiratory failure: With history of COPD, likely component of heart failure and pulmonary hypertension   -currently on nasal cannula, refusing to wear BiPAP  -influenza/RSV negative   -chest x-ray:  Pulmonary interstitial edema  - holding diuretics  Acute on chronic heart failure:  Ejection fraction of 50% with ungraded diastolic function, dilated inferior vena cava  -continue daily weights, I/O   -per documentation, weight has been increasing  Atrial fibrillation:  -previously on Cardizem drip now on p o  Cardizem  -continues on Eliquis for anticoagulation  Altered mental status: improving  Borderline hypernatremia:   - noted increased hematocrit  - previously on diuretics, now holding    - receiving albumin today     - encourage PO intake, although still on fluid restriction  HISTORY OF PRESENT ILLNESS:  Requesting Physician: Latoya Ingram MD  Reason for Consult: SAPNA Sheth is a 63y o  year old female with past medical history of CKD, hypertension, heart failure, COPD, and atrial fibrillation, who was admitted to [facility] after presenting with complaints of chest pain or shortness of breath  The patient denies any previous renal history  She denies taking NSAIDs at home  She denies nausea or vomiting  She states that she did have some diarrhea  She states that she was not eating and drinking well  A renal consultation is requested today for assistance in the management of SAPNA  PAST MEDICAL HISTORY:  Past Medical History:   Diagnosis Date    Atrial fibrillation with RVR ([facility] )     COPD (chronic obstructive pulmonary disease) ([facility] )     Diabetes type 2, uncontrolled ([place] [address] )     Hypertension        PAST SURGICAL HISTORY:  Past Surgical History:   Procedure Laterality Date    HERNIA REPAIR      HYSTERECTOMY      LAPAROSCOPIC CHOLECYSTECTOMY         ALLERGIES:  No Known Allergies    SOCIAL HISTORY:  Social History     Substance and Sexual Activity   Alcohol Use Not Currently    Comment: socially, NO ALCOHOL USE     Social History     Substance and Sexual Activity   Drug Use Not Currently    Types: Marijuana     Social History     Tobacco Use   Smoking Status Current Every Day Smoker    Packs/day: 0 00    Years: 43 00    Pack years: 0 00    Types: Cigarettes   Smokeless Tobacco Never Used   Tobacco Comment    Currently half a pack a day, 6 CIGARETTES PER DAY        FAMILY HISTORY:  Family History   Problem Relation Age of Onset    Diabetes type II Mother     No Known Problems Father         She reports not knowing much about her father      Cancer Family     Diabetes Family     Hypertension Family     Stroke Family        MEDICATIONS:    Current Facility-Administered Medications:     albuterol (PROVENTIL HFA,VENTOLIN HFA) inhaler 2 puff, 2 puff, Inhalation, Q4H PRN, NadiraBALJIT VelasquezNP    apixaban (ELIQUIS) tablet 5 mg, 5 mg, Oral, BID, Rory Kirby, CRNP, 5 mg at 01/09/20 6985    buPROPion (WELLBUTRIN XL) 24 hr tablet 150 mg, 150 mg, Oral, Daily, Dorian Sanchez, CRNP, 150 mg at 01/09/20 9708    diltiazem (CARDIZEM CD) 24 hr capsule 120 mg, 120 mg, Oral, Daily, Rory Diasofskkyler, CRNP, 120 mg at 01/09/20 0823    insulin lispro (HumaLOG) 100 units/mL subcutaneous injection 1-6 Units, 1-6 Units, Subcutaneous, TID AC, 2 Units at 01/08/20 1700 **AND** Fingerstick Glucose (POCT), , , TID AC, Luli K Kofi, CRNP    insulin lispro (HumaLOG) 100 units/mL subcutaneous injection 1-6 Units, 1-6 Units, Subcutaneous, HS, Luli K Kofi, CRNP, 1 Units at 01/08/20 2200    ipratropium (ATROVENT) 0 02 % inhalation solution 0 5 mg, 0 5 mg, Nebulization, TID, Nadira BALJIT SmithNP, 0 5 mg at 01/09/20 8825    levalbuterol (XOPENEX) inhalation solution 1 25 mg, 1 25 mg, Nebulization, TID, Nadira Luis CRNP, 1 25 mg at 01/09/20 8217    levalbuterol (XOPENEX) inhalation solution 1 25 mg, 1 25 mg, Nebulization, Q4H PRN, Nadira MAYNOR Smith    metoprolol succinate (TOPROL-XL) 24 hr tablet 100 mg, 100 mg, Oral, Daily, Nadira BALJIT SmithNP, 100 mg at 01/09/20 4391    nicotine (NICODERM CQ) 14 mg/24hr TD 24 hr patch 1 patch, 1 patch, Transdermal, Daily, BALJIT UnderwoodNP, 1 patch at 01/09/20 1217    REVIEW OF SYSTEMS:  A complete review of systems was performed and found to be negative unless otherwise noted in the history of present illness  General: No fevers, chills  Cardiovascular:  + chest pain, + leg edema  Respiratory: No cough, sputum production,  + shortness of breath  Gastrointestinal:  - nausea/vomiting,  + diarrhea,  - abdominal pain  Genitourinary: No hematuria  No foamy urine    No dysuria    PHYSICAL EXAM:  Current Weight: Weight - Scale: (!) 137 kg (302 lb 0 5 oz)  First Weight: Weight - Scale: 134 kg (295 lb 6 7 oz)  Vitals: 01/09/20 0700 01/09/20 0724 01/09/20 0800 01/09/20 0844   BP: 97/64  92/68    Pulse: 78  78    Resp: 22  (!) 26    Temp:  98 °F (36 7 °C)     TempSrc:  Temporal     SpO2: 93%  92% 94%   Weight:       Height:           Intake/Output Summary (Last 24 hours) at 1/9/2020 0907  Last data filed at 1/9/2020 0305  Gross per 24 hour   Intake 223 11 ml   Output 40 ml   Net 183 11 ml     General: NAD  Skin: warm, dry, intact, no rash  HEENT: Moist mucous membranes, sclera anicteric, normocephalic, atraumatic  Neck: No apparent JVD appreciated  Chest: lung sounds clear B/L, on O2  CVS:Regular rate and rhythm, no murmer   Abdomen: Soft, round, non-tender, +BS, obese  Extremities: B/L LE edema present, B/L erythema  Neuro: alert and oriented  Psych: appropriate mood and affect     Invasive Devices:      Lab Results:   Results from last 7 days   Lab Units 01/09/20  0438 01/08/20  0516 01/07/20  1137 01/07/20  0524 01/06/20  2241   WBC Thousand/uL 8 09 5 91 7 16 6 49 7 82   HEMOGLOBIN g/dL 15 8* 16 4* 17 4* 16 2* 19 5*   HEMATOCRIT % 55 5* 55 7* 59 0* 55 4* 65 6*   PLATELETS Thousands/uL 110* 115* 125* 122* 156   POTASSIUM mmol/L 3 5 3 8  --  3 9 4 0   CHLORIDE mmol/L 104 107  --  109* 103   CO2 mmol/L 30 29  --  28 30   BUN mg/dL 51* 46*  --  42* 46*   CREATININE mg/dL 3 51* 3 10*  --  2 78* 3 32*   CALCIUM mg/dL 8 1* 8 1*  --  7 2* 9 1   MAGNESIUM mg/dL  --   --   --  2 0  --    ALK PHOS U/L  --  106  --   --  162*   ALT U/L  --  23  --   --  38   AST U/L  --  22  --   --  43

## 2020-01-09 NOTE — ASSESSMENT & PLAN NOTE
· She has a history of chronic venous ulceration with some warmth to the upper part of the lower right leg  · Check Venous Duplex, pt states swelling and pain is worse than baseline

## 2020-01-09 NOTE — ASSESSMENT & PLAN NOTE
· She would benefit from weight loss  · There may be a component of OHV that is contributing to her acute on chronic pulmonary issues

## 2020-01-09 NOTE — ASSESSMENT & PLAN NOTE
· Cardizem infusion d/c - transitioned to po  · Cont Cardizem po  · Cont eliquis for [facility]

## 2020-01-09 NOTE — ASSESSMENT & PLAN NOTE
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

## 2020-01-09 NOTE — ASSESSMENT & PLAN NOTE
· This is acute on chronic disease as her baseline creatinine appears to be around 1 5 to 2 5   · Cr continues to worsen, current Cr 3 5  · Consider urine studies  · Hold Torsemide today  · Consult nephrology

## 2020-01-09 NOTE — PROGRESS NOTES
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  · Noted that the patient states she is on 10 liters at home  · Goal SpO2 >88%  · Cont NC during the day and Bipap at HS, pt refusing sleep study      Chronic obstructive pulmonary disease with acute exacerbation ([facility] )  Assessment & Plan  · Can likely d/c nebs  · Not on maintenance inhalers at home      SIRS (systemic inflammatory response syndrome) ([facility] )  Assessment & Plan  · Her lactic acid level was mildly elevated but she does not have other signs of infection  · Procalcitonin was negative x two  · Will hold on additional antibiotics for now    SAPNA (acute kidney injury) ([facility] )  Assessment & Plan  · This is acute on chronic disease as her baseline creatinine appears to be around 1 5 to 2 5   · Cr continues to worsen, current Cr 3 5  · Consider urine studies  · Cont home Torsemide   · Will monitor her renal indices and urine output closely    Permanent atrial fibrillation  Assessment & Plan  · Cardizem infusion d/c - transitioned to po  · Cont Cardizem po  · Cont eliquis for [facility]    Hypertension  Assessment & Plan  · Will resume her outpatient metoprolol and cardizem    Chronic venous stasis dermatitis of both lower extremities  Assessment & Plan  · She has a history of chronic venous ulceration with some warmth to the upper part of the lower right leg  · Check Venous Duplex, pt states swelling and pain is worse than baseline      Type 2 diabetes mellitus without complication [facility])  Assessment & Plan  Lab Results   Component Value Date    HGBA1C 5 8 04/11/2019       Recent Labs     01/08/20  1115 01/08/20  1613 01/08/20  2311 01/09/20  0009   POCGLU 211* 191* 161* 149*       Blood Sugar Average: Last 72 hrs:  · Previously persistent hypoglycemia  · TSH normal, random cortisol 17 9  · Tolerating oral diet  · Restart SSI with accuchecks QID and 2am      Obesity  Assessment & Plan  · She would benefit from weight loss  · There may b a component of OHV that is contributing to her acute on chronic pulmonary issues    Adjustment disorder  Assessment & Plan  · Psych following - recommend starting Wellbutrin      ----------------------------------------------------------------------------------------  HPI/24hr events: Pt tolerated Bipap at hs for the most part  Mentation continues to improve  Disposition: Transfer to Med Surg with Telemetry   Code Status: Level 1 - Full Code  ---------------------------------------------------------------------------------------  SUBJECTIVE  Pt c/o dry mouth while on Bipap stating "you can't make me wear that mask "    Review of Systems  Review of systems was reviewed and negative unless stated above in HPI/24-hour events   ---------------------------------------------------------------------------------------  OBJECTIVE    Physical Exam   Constitutional: She is oriented to person, place, and time  She appears well-developed and well-nourished  Eyes: Pupils are equal, round, and reactive to light  Cardiovascular: Normal rate, S1 normal and S2 normal  An irregular rhythm present  Exam reveals no gallop and no friction rub  No murmur heard  Pulmonary/Chest: No respiratory distress  She has decreased breath sounds  She has no wheezes  Abdominal: Soft  Bowel sounds are normal  She exhibits no distension  There is no tenderness  obese   Musculoskeletal: She exhibits edema  Neurological: She is alert and oriented to person, place, and time  Skin: Skin is warm and dry  Capillary refill takes 2 to 3 seconds     Erythema and warm b/l LE, dry flaky skin with generalized scabs throughout       Vitals   Vitals:    20 0200 20 0300 20 0305 20 0400   BP: 104/64 92/63     Pulse: 78 76     Resp:  22     Temp:    97 7 °F (36 5 °C)   TempSrc:    Temporal   SpO2: 91% (!) 89% 92%    Weight:       Height:         Temp (24hrs), Av 1 °F (36 7 °C), Min:97 7 °F (36 5 °C), Max:98 7 °F (37 1 °C)  Current: Temperature: 97 7 °F (36 5 °C)          SpO2: SpO2: 92 %, SpO2 Device: O2 Device: Nasal cannula  O2 Flow Rate (L/min): 5 L/min    Invasive/non-invasive ventilation settings   Respiratory    Lab Data (Last 4 hours)    None         O2/Vent Data (Last 4 hours)      305          Non-Invasive Ventilation Mode BiPAP                   Height and Weights   Height: 5' 5" (165 1 cm)  IBW: 57 kg  Body mass index is 49 6 kg/m²  Weight (last 2 days)     Date/Time   Weight    20 0600   135 (298 06)    20 0200   135 (298 06)              Intake and Output  I/O       701 -  07 -  07    P  O   120    I V  (mL/kg) 1291 8 (9 6) 563 7 (4 2)    Total Intake(mL/kg) 1291 8 (9 6) 683 7 (5 1)    Urine (mL/kg/hr) 1079 (0 3) 25 (0)    Stool 0 0    Total Output 1079 25    Net +212 8 +658 7          Unmeasured Urine Occurrence 1 x 2 x    Unmeasured Stool Occurrence 1 x 2 x          Nutrition       Diet Orders   (From admission, onward)             Start     Ordered    20 0909  Diet Sunil/CHO Controlled; Consistent Carbohydrate Diet Level 2 (5 carb servings/75 grams CHO/meal)  Diet effective now     Question Answer Comment   Diet Type Sunil/CHO Controlled    Sunil/CHO Controlled Consistent Carbohydrate Diet Level 2 (5 carb servings/75 grams CHO/meal)    RD to adjust diet per protocol?  Yes        20 0908                Laboratory and Diagnostics:  Results from last 7 days   Lab Units 20  0438 20  0516 20  1137 20  0524 20  2241   WBC Thousand/uL 8 09 5 91 7 16 6 49 7 82   HEMOGLOBIN g/dL 15 8* 16 4* 17 4* 16 2* 19 5*   HEMATOCRIT % 55 5* 55 7* 59 0* 55 4* 65 6*   PLATELETS Thousands/uL 110* 115* 125* 122* 156   NEUTROS PCT %  --   --   --  76* 78*   MONOS PCT %  --   --   --  10 10     Results from last 7 days   Lab Units 01/09/20  0438 01/08/20  0516 01/07/20  0524 01/06/20  2241   SODIUM mmol/L 144 147* 148* 146*   POTASSIUM mmol/L 3 5 3 8 3 9 4 0   CHLORIDE mmol/L 104 107 109* 103   CO2 mmol/L 30 29 28 30   ANION GAP mmol/L 10 11 11 13   BUN mg/dL 51* 46* 42* 46*   CREATININE mg/dL 3 51* 3 10* 2 78* 3 32*   CALCIUM mg/dL 8 1* 8 1* 7 2* 9 1   GLUCOSE RANDOM mg/dL 138 85 70 72   ALT U/L  --  23  --  38   AST U/L  --  22  --  43   ALK PHOS U/L  --  106  --  162*   ALBUMIN g/dL  --  3 0*  --  3 3*   TOTAL BILIRUBIN mg/dL  --  2 86*  --  3 07*     Results from last 7 days   Lab Units 01/07/20  0524   MAGNESIUM mg/dL 2 0      Results from last 7 days   Lab Units 01/08/20  0516 01/07/20  1821 01/07/20  1137   INR   --   --  2 24*   PTT seconds 63* 51* 34      Results from last 7 days   Lab Units 01/06/20  2241   TROPONIN I ng/mL 0 07*     Results from last 7 days   Lab Units 01/07/20  0047 01/06/20  2235   LACTIC ACID mmol/L 1 8 2 5*     ABG:  Results from last 7 days   Lab Units 01/07/20  2311   PH ART  7 318*   PCO2 ART mm Hg 61 6*   PO2 ART mm Hg 85 1   HCO3 ART mmol/L 30 9*   BASE EXC ART mmol/L 2 6   ABG SOURCE  Radial, Left     VBG:  Results from last 7 days   Lab Units 01/07/20  2311   ABG SOURCE  Radial, Left     Results from last 7 days   Lab Units 01/07/20  0629   PROCALCITONIN ng/ml 0 17       Micro  Results from last 7 days   Lab Units 01/06/20  2235 01/06/20  2230   BLOOD CULTURE  No Growth at 24 hrs  No Growth at 24 hrs         EKG: Atrial Fibrillation  Imaging: I have personally reviewed pertinent films in PACS    Active Medications  Scheduled Meds:    Current Facility-Administered Medications:  albumin human 12 5 g Intravenous Once MAYNOR Soto   albuterol 2 puff Inhalation Q4H PRN Samaria De Leon, CRNP   apixaban 5 mg Oral BID Verta Saba Bilofsky, CRNP   buPROPion 150 mg Oral Daily Dorian Sanchez, MAYNOR   diltiazem 120 mg Oral Daily Luli K Bilofskkyler, CRNP   insulin lispro 1-6 Units Subcutaneous TID AC Luli K Bilofsky, CRNP   insulin lispro 1-6 Units Subcutaneous HS Luli K Bilofsky, CRNP   ipratropium 0 5 mg Nebulization TID Rajesh Raiza, CRNP   levalbuterol 1 25 mg Nebulization TID Rajesh Raiza, CRNP   levalbuterol 1 25 mg Nebulization Q4H PRN Rajesh Raiza, BALJITNP   metoprolol succinate 100 mg Oral Daily Rajesh Raiza, BALJITNP   nicotine 1 patch Transdermal Daily Rajesh Raiza, BALJITNP   torsemide 20 mg Oral BID Luli K Arunoflakesha, CRNP     Continuous Infusions:     PRN Meds:     albuterol 2 puff Q4H PRN   levalbuterol 1 25 mg Q4H PRN       ---------------------------------------------------------------------------------------  Advance Directive and Living Will:      Power of :    POLST:    ---------------------------------------------------------------------------------------  Counseling / Coordination of Care      MAYNOR Soto      Portions of the record may have been created with voice recognition software  Occasional wrong word or "sound a like" substitutions may have occurred due to the inherent limitations of voice recognition software    Read the chart carefully and recognize, using context, where substitutions have occurred

## 2020-01-09 NOTE — PLAN OF CARE
Problem: Prexisting or High Potential for Compromised Skin Integrity  Goal: Skin integrity is maintained or improved  Description  INTERVENTIONS:  - Identify patients at risk for skin breakdown  - Assess and monitor skin integrity  - Assess and monitor nutrition and hydration status  - Monitor labs   - Assess for incontinence   - Turn and reposition patient  - Assist with mobility/ambulation  - Relieve pressure over bony prominences  - Avoid friction and shearing  - Provide appropriate hygiene as needed including keeping skin clean and dry  - Evaluate need for skin moisturizer/barrier cream  - Collaborate with interdisciplinary team   - Patient/family teaching  - Consider wound care consult   Outcome: Progressing     Problem: Potential for Falls  Goal: Patient will remain free of falls  Description  INTERVENTIONS:  - Assess patient frequently for physical needs  -  Identify cognitive and physical deficits and behaviors that affect risk of falls    -  [place] fall precautions as indicated by assessment   - Educate patient/family on patient safety including physical limitations  - Instruct patient to call for assistance with activity based on assessment  - Modify environment to reduce risk of injury  - Consider OT/PT consult to assist with strengthening/mobility  Outcome: Progressing     Problem: PAIN - ADULT  Goal: Verbalizes/displays adequate comfort level or baseline comfort level  Description  Interventions:  - Encourage patient to monitor pain and request assistance  - Assess pain using appropriate pain scale  - Administer analgesics based on type and severity of pain and evaluate response  - Implement non-pharmacological measures as appropriate and evaluate response  - Consider cultural and social influences on pain and pain management  - Notify physician/advanced practitioner if interventions unsuccessful or patient reports new pain  Outcome: Progressing     Problem: INFECTION - ADULT  Goal: Absence or prevention of progression during hospitalization  Description  INTERVENTIONS:  - Assess and monitor for signs and symptoms of infection  - Monitor lab/diagnostic results  - Monitor all insertion sites, i e  indwelling lines, tubes, and drains  - Monitor endotracheal if appropriate and nasal secretions for changes in amount and color  - Rowan appropriate cooling/warming therapies per order  - Administer medications as ordered  - Instruct and encourage patient and family to use good hand hygiene technique  - Identify and instruct in appropriate isolation precautions for identified infection/condition  Outcome: Progressing  Goal: Absence of fever/infection during neutropenic period  Description  INTERVENTIONS:  - Monitor WBC    Outcome: Progressing     Problem: SAFETY ADULT  Goal: Maintain or return to baseline ADL function  Description  INTERVENTIONS:  -  Assess patient's ability to carry out ADLs; assess patient's baseline for ADL function and identify physical deficits which impact ability to perform ADLs (bathing, care of mouth/teeth, toileting, grooming, dressing, etc )  - Assess/evaluate cause of self-care deficits   - Assess range of motion  - Assess patient's mobility; develop plan if impaired  - Assess patient's need for assistive devices and provide as appropriate  - Encourage maximum independence but intervene and supervise when necessary  - Involve family in performance of ADLs  - Assess for home care needs following discharge   - Consider OT consult to assist with ADL evaluation and planning for discharge  - Provide patient education as appropriate  Outcome: Progressing  Goal: Maintain or return mobility status to optimal level  Description  INTERVENTIONS:  - Assess patient's baseline mobility status (ambulation, transfers, stairs, etc )    - Identify cognitive and physical deficits and behaviors that affect mobility  - Identify mobility aids required to assist with transfers and/or ambulation (gait belt, sit-to-stand, lift, walker, cane, etc )  - [place] fall precautions as indicated by assessment  - Record patient progress and toleration of activity level on Mobility SBAR; progress patient to next Phase/Stage  - Instruct patient to call for assistance with activity based on assessment  - Consider rehabilitation consult to assist with strengthening/weightbearing, etc   Outcome: Progressing     Problem: DISCHARGE PLANNING  Goal: Discharge to home or other facility with appropriate resources  Description  INTERVENTIONS:  - Identify barriers to discharge w/patient and caregiver  - Arrange for needed discharge resources and transportation as appropriate  - Identify discharge learning needs (meds, wound care, etc )  - Arrange for interpretive services to assist at discharge as needed  - Refer to Case Management Department for coordinating discharge planning if the patient needs post-hospital services based on physician/advanced practitioner order or complex needs related to functional status, cognitive ability, or social support system  Outcome: Progressing     Problem: Knowledge Deficit  Goal: Patient/family/caregiver demonstrates understanding of disease process, treatment plan, medications, and discharge instructions  Description  Complete learning assessment and assess knowledge base    Interventions:  - Provide teaching at level of understanding  - Provide teaching via preferred learning methods  Outcome: Progressing     Problem: CARDIOVASCULAR - ADULT  Goal: Maintains optimal cardiac output and hemodynamic stability  Description  INTERVENTIONS:  - Monitor I/O, vital signs and rhythm  - Monitor for S/S and trends of decreased cardiac output  - Administer and titrate ordered vasoactive medications to optimize hemodynamic stability  - Assess quality of pulses, skin color and temperature  - Assess for signs of decreased coronary artery perfusion  - Instruct patient to report change in severity of symptoms  Outcome: Progressing  Goal: Absence of cardiac dysrhythmias or at baseline rhythm  Description  INTERVENTIONS:  - Continuous cardiac monitoring, vital signs, obtain 12 lead EKG if ordered  - Administer antiarrhythmic and heart rate control medications as ordered  - Monitor electrolytes and administer replacement therapy as ordered  Outcome: Progressing     Problem: RESPIRATORY - ADULT  Goal: Achieves optimal ventilation and oxygenation  Description  INTERVENTIONS:  - Assess for changes in respiratory status  - Assess for changes in mentation and behavior  - Position to facilitate oxygenation and minimize respiratory effort  - Oxygen administered by appropriate delivery if ordered  - Initiate smoking cessation education as indicated  - Encourage broncho-pulmonary hygiene including cough, deep breathe, Incentive Spirometry  - Assess the need for suctioning and aspirate as needed  - Assess and instruct to report SOB or any respiratory difficulty  - Respiratory Therapy support as indicated  Outcome: Progressing     Problem: METABOLIC, FLUID AND ELECTROLYTES - ADULT  Goal: Electrolytes maintained within normal limits  Description  INTERVENTIONS:  - Monitor labs and assess patient for signs and symptoms of electrolyte imbalances  - Administer electrolyte replacement as ordered  - Monitor response to electrolyte replacements, including repeat lab results as appropriate  - Instruct patient on fluid and nutrition as appropriate  Outcome: Progressing  Goal: Fluid balance maintained  Description  INTERVENTIONS:  - Monitor labs   - Monitor I/O and WT  - Instruct patient on fluid and nutrition as appropriate  - Assess for signs & symptoms of volume excess or deficit  Outcome: Progressing  Goal: Glucose maintained within target range  Description  INTERVENTIONS:  - Monitor Blood Glucose as ordered  - Assess for signs and symptoms of hyperglycemia and hypoglycemia  - Administer ordered medications to maintain glucose within target range  - Assess nutritional intake and initiate nutrition service referral as needed  Outcome: Progressing     Problem: SKIN/TISSUE INTEGRITY - ADULT  Goal: Skin integrity remains intact  Description  INTERVENTIONS  - Identify patients at risk for skin breakdown  - Assess and monitor skin integrity  - Assess and monitor nutrition and hydration status  - Monitor labs (i e  albumin)  - Assess for incontinence   - Turn and reposition patient  - Assist with mobility/ambulation  - Relieve pressure over bony prominences  - Avoid friction and shearing  - Provide appropriate hygiene as needed including keeping skin clean and dry  - Evaluate need for skin moisturizer/barrier cream  - Collaborate with interdisciplinary team (i e  Nutrition, Rehabilitation, etc )   - Patient/family teaching  Outcome: Progressing  Goal: Incision(s), wounds(s) or drain site(s) healing without S/S of infection  Description  INTERVENTIONS  - Assess and document risk factors for skin impairment   - Assess and document dressing, incision, wound bed, drain sites and surrounding tissue  - Consider nutrition services referral as needed  - Oral mucous membranes remain intact  - Provide patient/ family education  Outcome: Progressing  Goal: Oral mucous membranes remain intact  Description  INTERVENTIONS  - Assess oral mucosa and hygiene practices  - Implement preventative oral hygiene regimen  - Implement oral medicated treatments as ordered  - Initiate Nutrition services referral as needed  Outcome: Progressing     Problem: Nutrition/Hydration-ADULT  Goal: Nutrient/Hydration intake appropriate for improving, restoring or maintaining nutritional needs  Description  Monitor and assess patient's nutrition/hydration status for malnutrition  Collaborate with interdisciplinary team and initiate plan and interventions as ordered  Monitor patient's weight and dietary intake as ordered or per policy   Utilize nutrition screening tool and intervene as necessary  Determine patient's food preferences and provide high-protein, high-caloric foods as appropriate       INTERVENTIONS:  - Monitor oral intake, urinary output, labs, and treatment plans  - Assess nutrition and hydration status and recommend course of action  - Evaluate amount of meals eaten  - Assist patient with eating if necessary   - Allow adequate time for meals  - Recommend/ encourage appropriate diets, oral nutritional supplements, and vitamin/mineral supplements  - Order, calculate, and assess calorie counts as needed  - Recommend, monitor, and adjust tube feedings and TPN/PPN based on assessed needs  - Assess need for intravenous fluids  - Provide specific nutrition/hydration education as appropriate  - Include patient/family/caregiver in decisions related to nutrition  Outcome: Progressing

## 2020-01-09 NOTE — ASSESSMENT & PLAN NOTE
Lab Results   Component Value Date    HGBA1C 5 8 04/11/2019       Recent Labs     01/08/20  1115 01/08/20  1613 01/08/20  2311 01/09/20  0009   POCGLU 211* 191* 161* 149*       Blood Sugar Average: Last 72 hrs:  · Previously persistent hypoglycemia  · TSH normal, random cortisol 17 9  · Tolerating oral diet  · Restart SSI with accuchecks QID and 2am

## 2020-01-09 NOTE — ASSESSMENT & PLAN NOTE
· Cardizem infusion d/c - transitioned to po  · Cont Cardizem po  · Cont eliquis for [facility]

## 2020-01-09 NOTE — ASSESSMENT & PLAN NOTE
Lab Results   Component Value Date    HGBA1C 5 8 04/11/2019       Recent Labs     01/08/20  2311 01/09/20  0009 01/09/20  0744 01/09/20  1116   POCGLU 161* 149* 127 129       Blood Sugar Average: Last 72 hrs:  · Previously persistent hypoglycemia  · TSH normal, random cortisol 17 9  · Tolerating oral diet  · Restart SSI with accuchecks QID and 2am

## 2020-01-09 NOTE — ASSESSMENT & PLAN NOTE
· She has a history of chronic venous ulceration with some warmth to the upper part of the lower right leg

## 2020-01-10 ENCOUNTER — APPOINTMENT (INPATIENT)
Dept: RADIOLOGY | Facility: HOSPITAL | Age: 60
DRG: 291 | End: 2020-01-10
Payer: COMMERCIAL

## 2020-01-10 LAB
ANION GAP SERPL CALCULATED.3IONS-SCNC: 11 MMOL/L (ref 4–13)
ARTERIAL PATENCY WRIST A: YES
BASE EXCESS BLDA CALC-SCNC: -1.9 MMOL/L
BASE EXCESS BLDA CALC-SCNC: -2.3 MMOL/L
BASE EXCESS BLDA CALC-SCNC: -2.8 MMOL/L
BASE EXCESS BLDA CALC-SCNC: 0 MMOL/L (ref -2–3)
BUN SERPL-MCNC: 59 MG/DL (ref 5–25)
CA-I BLD-SCNC: 1.14 MMOL/L (ref 1.12–1.32)
CALCIUM SERPL-MCNC: 7.6 MG/DL (ref 8.3–10.1)
CHLORIDE SERPL-SCNC: 105 MMOL/L (ref 100–108)
CO2 SERPL-SCNC: 29 MMOL/L (ref 21–32)
CREAT SERPL-MCNC: 3.73 MG/DL (ref 0.6–1.3)
ERYTHROCYTE [DISTWIDTH] IN BLOOD BY AUTOMATED COUNT: 20.4 % (ref 11.6–15.1)
FIO2 GAS DIL.REBREATH: 100 L
GFR SERPL CREATININE-BSD FRML MDRD: 13 ML/MIN/1.73SQ M
GLUCOSE SERPL-MCNC: 101 MG/DL (ref 65–140)
GLUCOSE SERPL-MCNC: 102 MG/DL (ref 65–140)
GLUCOSE SERPL-MCNC: 102 MG/DL (ref 65–140)
GLUCOSE SERPL-MCNC: 103 MG/DL (ref 65–140)
GLUCOSE SERPL-MCNC: 107 MG/DL (ref 65–140)
GLUCOSE SERPL-MCNC: 148 MG/DL (ref 65–140)
GLUCOSE SERPL-MCNC: 20 MG/DL (ref 65–140)
GLUCOSE SERPL-MCNC: 60 MG/DL (ref 65–140)
GLUCOSE SERPL-MCNC: 95 MG/DL (ref 65–140)
GLUCOSE SERPL-MCNC: 96 MG/DL (ref 65–140)
GLUCOSE SERPL-MCNC: >500 MG/DL (ref 65–140)
HCO3 BLDA-SCNC: 27.8 MMOL/L (ref 22–28)
HCO3 BLDA-SCNC: 29 MMOL/L (ref 22–28)
HCO3 BLDA-SCNC: 29.7 MMOL/L (ref 22–28)
HCO3 BLDA-SCNC: 30 MMOL/L (ref 22–28)
HCT VFR BLD AUTO: 57.2 % (ref 34.8–46.1)
HCT VFR BLD CALC: 55 % (ref 34.8–46.1)
HGB BLD-MCNC: 16.1 G/DL (ref 11.5–15.4)
HGB BLDA-MCNC: 18.7 G/DL (ref 11.5–15.4)
IPAP: 16
IPAP: 22
IPAP: 22
MCH RBC QN AUTO: 30.9 PG (ref 26.8–34.3)
MCHC RBC AUTO-ENTMCNC: 28.1 G/DL (ref 31.4–37.4)
MCV RBC AUTO: 110 FL (ref 82–98)
NON VENT- BIPAP: ABNORMAL
O2 CT BLDA-SCNC: 16.7 ML/DL (ref 16–23)
O2 CT BLDA-SCNC: 24.6 ML/DL (ref 16–23)
O2 CT BLDA-SCNC: 25 ML/DL (ref 16–23)
OXYHGB MFR BLDA: 67.5 % (ref 94–97)
OXYHGB MFR BLDA: 97.9 % (ref 94–97)
OXYHGB MFR BLDA: 98 % (ref 94–97)
PCO2 BLD: 32 MMOL/L (ref 21–32)
PCO2 BLD: 64.9 MM HG (ref 36–44)
PCO2 BLDA: 73.3 MM HG (ref 36–44)
PCO2 BLDA: 78.8 MM HG (ref 36–44)
PCO2 BLDA: 84.8 MM HG (ref 36–44)
PEEP MAX SETTING VENT: 10 CM[H2O]
PEEP MAX SETTING VENT: 12 CM[H2O]
PEEP MAX SETTING VENT: 8 CM[H2O]
PH BLD: 7.27 [PH] (ref 7.35–7.45)
PH BLDA: 7.17 [PH] (ref 7.35–7.45)
PH BLDA: 7.18 [PH] (ref 7.35–7.45)
PH BLDA: 7.2 [PH] (ref 7.35–7.45)
PLATELET # BLD AUTO: 106 THOUSANDS/UL (ref 149–390)
PMV BLD AUTO: 11.4 FL (ref 8.9–12.7)
PO2 BLD: 173 MM HG (ref 75–129)
PO2 BLDA: 168.7 MM HG (ref 75–129)
PO2 BLDA: 193.6 MM HG (ref 75–129)
PO2 BLDA: 42.2 MM HG (ref 75–129)
POTASSIUM BLD-SCNC: 4.1 MMOL/L (ref 3.5–5.3)
POTASSIUM SERPL-SCNC: 3.7 MMOL/L (ref 3.5–5.3)
RBC # BLD AUTO: 5.21 MILLION/UL (ref 3.81–5.12)
SAO2 % BLD FROM PO2: 99 % (ref 60–85)
SODIUM BLD-SCNC: 136 MMOL/L (ref 136–145)
SODIUM SERPL-SCNC: 145 MMOL/L (ref 136–145)
SPECIMEN SOURCE: ABNORMAL
VENT BIPAP FIO2: 100 %
VENT BIPAP FIO2: 100 %
WBC # BLD AUTO: 7.6 THOUSAND/UL (ref 4.31–10.16)

## 2020-01-10 PROCEDURE — 99291 CRITICAL CARE FIRST HOUR: CPT | Performed by: NURSE PRACTITIONER

## 2020-01-10 PROCEDURE — 82947 ASSAY GLUCOSE BLOOD QUANT: CPT

## 2020-01-10 PROCEDURE — 94640 AIRWAY INHALATION TREATMENT: CPT

## 2020-01-10 PROCEDURE — 80048 BASIC METABOLIC PNL TOTAL CA: CPT | Performed by: NURSE PRACTITIONER

## 2020-01-10 PROCEDURE — 82805 BLOOD GASES W/O2 SATURATION: CPT | Performed by: INTERNAL MEDICINE

## 2020-01-10 PROCEDURE — 82948 REAGENT STRIP/BLOOD GLUCOSE: CPT

## 2020-01-10 PROCEDURE — 85014 HEMATOCRIT: CPT

## 2020-01-10 PROCEDURE — 82805 BLOOD GASES W/O2 SATURATION: CPT | Performed by: NURSE PRACTITIONER

## 2020-01-10 PROCEDURE — 85027 COMPLETE CBC AUTOMATED: CPT | Performed by: NURSE PRACTITIONER

## 2020-01-10 PROCEDURE — 84132 ASSAY OF SERUM POTASSIUM: CPT

## 2020-01-10 PROCEDURE — 82330 ASSAY OF CALCIUM: CPT

## 2020-01-10 PROCEDURE — 99232 SBSQ HOSP IP/OBS MODERATE 35: CPT | Performed by: INTERNAL MEDICINE

## 2020-01-10 PROCEDURE — 36600 WITHDRAWAL OF ARTERIAL BLOOD: CPT

## 2020-01-10 PROCEDURE — 82803 BLOOD GASES ANY COMBINATION: CPT

## 2020-01-10 PROCEDURE — 84295 ASSAY OF SERUM SODIUM: CPT

## 2020-01-10 PROCEDURE — 71045 X-RAY EXAM CHEST 1 VIEW: CPT

## 2020-01-10 RX ORDER — DEXTROSE MONOHYDRATE 25 G/50ML
INJECTION, SOLUTION INTRAVENOUS
Status: COMPLETED
Start: 2020-01-10 | End: 2020-01-10

## 2020-01-10 RX ADMIN — IPRATROPIUM BROMIDE 0.5 MG: 0.5 SOLUTION RESPIRATORY (INHALATION) at 19:24

## 2020-01-10 RX ADMIN — MIDODRINE HYDROCHLORIDE 5 MG: 5 TABLET ORAL at 12:40

## 2020-01-10 RX ADMIN — IPRATROPIUM BROMIDE 0.5 MG: 0.5 SOLUTION RESPIRATORY (INHALATION) at 07:46

## 2020-01-10 RX ADMIN — LEVALBUTEROL HYDROCHLORIDE 1.25 MG: 1.25 SOLUTION, CONCENTRATE RESPIRATORY (INHALATION) at 07:46

## 2020-01-10 RX ADMIN — LEVALBUTEROL HYDROCHLORIDE 1.25 MG: 1.25 SOLUTION, CONCENTRATE RESPIRATORY (INHALATION) at 13:35

## 2020-01-10 RX ADMIN — BUPROPION HYDROCHLORIDE 150 MG: 150 TABLET, FILM COATED, EXTENDED RELEASE ORAL at 08:17

## 2020-01-10 RX ADMIN — LEVALBUTEROL HYDROCHLORIDE 1.25 MG: 1.25 SOLUTION, CONCENTRATE RESPIRATORY (INHALATION) at 19:24

## 2020-01-10 RX ADMIN — IPRATROPIUM BROMIDE 0.5 MG: 0.5 SOLUTION RESPIRATORY (INHALATION) at 13:35

## 2020-01-10 RX ADMIN — NICOTINE 1 PATCH: 14 PATCH TRANSDERMAL at 08:16

## 2020-01-10 RX ADMIN — DEXTROSE 50 % IN WATER (D50W) INTRAVENOUS SYRINGE 50 ML: at 21:02

## 2020-01-10 RX ADMIN — APIXABAN 2.5 MG: 2.5 TABLET, FILM COATED ORAL at 08:17

## 2020-01-10 RX ADMIN — NYSTATIN: 100000 POWDER TOPICAL at 08:18

## 2020-01-10 RX ADMIN — MIDODRINE HYDROCHLORIDE 5 MG: 5 TABLET ORAL at 08:17

## 2020-01-10 NOTE — PROGRESS NOTES
Progress Note - Jose L Bowers 63 y o  female MRN: [id number]    Unit/Bed#: [facility] 2 -01 Encounter: [id number]      Subjective: The patient is somewhat lethargic  She is arousable  She denies chest pain or shortness of breath  She denies nausea or vomiting  The patient has been intermittently refusing BiPAP  Physical Exam:   Temp:  [97 4 °F (36 3 °C)-97 8 °F (36 6 °C)] 97 5 °F (36 4 °C)  HR:  [67-88] 88  Resp:  [18-28] 22  BP: ()/(57-88) 130/85    Gen:  Well-developed, severely obese, somewhat lethargic  Neck:  Supple  No lymphadenopathy, goiter, or bruit  Heart:  Irregular rhythm  I could hear no murmur, gallop, or rub  Lungs:  Diminished breath sounds bilaterally  I could hear no wheezing, rales, or rhonchi    Abd:  Soft with active bowel sounds  No mass, tenderness, or organomegaly  Extremities:  +2 edema  Neuro:  Somewhat somnolent  Moves all limbs  Skin:  Warm and dry      LABS:   CBC:   Lab Results   Component Value Date    WBC 7 60 01/10/2020    HGB 16 1 (H) 01/10/2020    HCT 57 2 (H) 01/10/2020     (H) 01/10/2020     (L) 01/10/2020    MCH 30 9 01/10/2020    MCHC 28 1 (L) 01/10/2020    RDW 20 4 (H) 01/10/2020    MPV 11 4 01/10/2020   , CMP:   Lab Results   Component Value Date    SODIUM 145 01/10/2020    K 3 7 01/10/2020     01/10/2020    CO2 29 01/10/2020    BUN 59 (H) 01/10/2020    CREATININE 3 73 (H) 01/10/2020    CALCIUM 7 6 (L) 01/10/2020    EGFR 13 01/10/2020             Assessment/Plan:  1  Acute on chronic respiratory failure with hypoxia and hypercapnia  2  COPD with acute exacerbation  3  Acute kidney injury superimposed on chronic kidney disease stage 3  4  Permanent atrial fibrillation  5  Type 2 diabetes  6  Obesity  7  Acute on chronic right-sided congestive heart failure    The patient's lethargy is likely related to hypercarbia  She was encouraged to use BiPAP    If she continues to refuse this, an arterial blood gas will be obtained to further assess the situation  Currently, diuretics are on hold because of her kidney function    We will continue with bronchodilators, etc       VTE Pharmacologic Prophylaxis:  Apixaban  VTE Mechanical Prophylaxis: reason for no mechanical VTE prophylaxis Therapeutically anticoagulated

## 2020-01-10 NOTE — PROGRESS NOTES
NEPHROLOGY PROGRESS NOTE   Jenifer Colby 63 y o  female MRN: [id number]  Unit/Bed#: [facility] [address] 2 [id number] Encounter: [id number]      ASSESSMENT & PLAN:  1  Acute kidney injury (POA) suspected multifactorial in the setting of prerenal component with volume overload, then aggravated with on and off hypotensive episodes, prior Ace inhibitor use, that likely progressed to ATN  Renal function today 3 7 from 3 5 yesterday, hopefully plateau soon  Comment to hold diuretics for another 24 hours  Reported she was taking torsemide 40 mg twice a day as an outpatient as well as metolazone 5 mg twice a week per heart failure team   Follow daily weights  Monitor ins and outs  Avoid hypotension  She was started on midodrine 5 mg 3 times a day yesterday  2  Chronic kidney disease stage 3 with previous creatinine fluctuating around 1 3-1 6 in the setting of hypertension, diabetes, obesity, congestive heart failure  3  Acute on chronic hypoxic and hypercapnic respiratory failure, disease multifactorial in the setting of COPD, tobacco abuse, pulmonary hypertension  As above keep holding diuretics for another 24 hours while allow her to regular rate, follow daily weights, diuretics should be resumed sooner as long as kidney function improved and hypotension resolved  4  Increased somnolence, worrisome that she can have worsening hypercapnia, I have discussed with Dr Abida Beckett recommend repeat an ABG, reported patient was refusing BiPAP  5  Chronic RV systolic/LV diastolic heart failure, permanent atrial fibrillation, pulmonary hypertension, follow up with heart failure team   On torsemide 40 mg b i d  And metolazone 5 mg twice a week as per Cardiology recommendations  Currently diuretics on hold due to hypotension as well as acute renal failure  Diuretics should be resumed as soon as possible     6   Hypertension, currently hypotensive, holding lisinopril, started on midodrine 5 mg t i d  Consider increase dose to 10 mg if hypotension persists  My plan and recommendations were discussed with Dr Kassy Jain from Internal Medicine team      SUBJECTIVE:  Patient seen and examined, is very somnolent, opens her eyes to voice a fall asleep easily  Trying to answer few questions but fall asleep  Review of systems unable to be reviewed    Reported by nurse she was refusing BiPAP overnight    OBJECTIVE:  Current Weight: Weight - Scale: (!) 137 kg (302 lb 0 5 oz)  Vitals:    01/10/20 0953   BP:    Pulse:    Resp:    Temp:    SpO2: 91%       Intake/Output Summary (Last 24 hours) at 1/10/2020 1033  Last data filed at 1/9/2020 1210  Gross per 24 hour   Intake 250 ml   Output    Net 250 ml     General:  Obese, somnolent, in not acute distress  Eyes: conjunctivae pale, anicteric sclerae  ENT: lips and mucous membranes dry  Neck: supple, JVD unable to be evaluated given patient body habitus  Chest:  Decreased breath sounds bases, no crackles, ronchus or wheezings  CVS: distinct S1 & S2, normal rate, regular rhythm  Abdomen:  Obese, non-tender, non-distended, normoactive bowel sounds  Extremities:  Positive edema of both legs  Skin:  Chronic erythema extremities  Neuro:  Somnolent, open eyes to voice but falls asleep easily      Medications:    Current Facility-Administered Medications:     albuterol (PROVENTIL HFA,VENTOLIN HFA) inhaler 2 puff, 2 puff, Inhalation, Q4H PRN, Lorrie Grade Bilofsky, CRNP    apixaban (ELIQUIS) tablet 2 5 mg, 2 5 mg, Oral, BID, Luli K Bilofsky, CRNP, 2 5 mg at 01/10/20 0817    buPROPion (WELLBUTRIN XL) 24 hr tablet 150 mg, 150 mg, Oral, Daily, Lorrie Grade Bilofsky, CRNP, 150 mg at 01/10/20 0817    diltiazem (CARDIZEM CD) 24 hr capsule 120 mg, 120 mg, Oral, Daily, Lorrie Grade Bilofsky, CRNP, Stopped at 01/10/20 0811    insulin lispro (HumaLOG) 100 units/mL subcutaneous injection 1-6 Units, 1-6 Units, Subcutaneous, TID AC, Stopped at 01/10/20 0752 **AND** Fingerstick Glucose (POCT), , , TID AC, Luli Kirby, MAYNOR    insulin lispro (HumaLOG) 100 units/mL subcutaneous injection 1-6 Units, 1-6 Units, Subcutaneous, HS, MAYNOR Galeana, 1 Units at 01/08/20 2200    ipratropium (ATROVENT) 0 02 % inhalation solution 0 5 mg, 0 5 mg, Nebulization, TID, MAYNOR Galeana, 0 5 mg at 01/10/20 0746    levalbuterol (XOPENEX) inhalation solution 1 25 mg, 1 25 mg, Nebulization, TID, MAYNOR Galeana, 1 25 mg at 01/10/20 0746    levalbuterol (XOPENEX) inhalation solution 1 25 mg, 1 25 mg, Nebulization, Q4H PRN, MAYNOR Galeana    metoprolol succinate (TOPROL-XL) 24 hr tablet 100 mg, 100 mg, Oral, Daily, MAYNOR Galeana, Stopped at 01/10/20 0811    midodrine (PROAMATINE) tablet 5 mg, 5 mg, Oral, TID AC, Luli K MAYNOR Kirby, 5 mg at 01/10/20 0817    nicotine (NICODERM CQ) 14 mg/24hr TD 24 hr patch 1 patch, 1 patch, Transdermal, Daily, MAYNOR Galeana, 1 patch at 01/10/20 0816    nystatin (MYCOSTATIN) powder, , Topical, BID, MAYNOR Guerrero    Invasive Devices:        Lab Results:   Results from last 7 days   Lab Units 01/10/20  0512 01/09/20  0438 01/08/20  0516  01/07/20  0524 01/06/20  2241   WBC Thousand/uL 7 60 8 09 5 91   < > 6 49 7 82   HEMOGLOBIN g/dL 16 1* 15 8* 16 4*   < > 16 2* 19 5*   HEMATOCRIT % 57 2* 55 5* 55 7*   < > 55 4* 65 6*   PLATELETS Thousands/uL 106* 110* 115*   < > 122* 156   SODIUM mmol/L 145 144 147*  --  148* 146*   POTASSIUM mmol/L 3 7 3 5 3 8  --  3 9 4 0   CHLORIDE mmol/L 105 104 107  --  109* 103   CO2 mmol/L 29 30 29  --  28 30   BUN mg/dL 59* 51* 46*  --  42* 46*   CREATININE mg/dL 3 73* 3 51* 3 10*  --  2 78* 3 32*   CALCIUM mg/dL 7 6* 8 1* 8 1*  --  7 2* 9 1   MAGNESIUM mg/dL  --   --   --   --  2 0  --    ALK PHOS U/L  --   --  106  --   --  162*   ALT U/L  --   --  23  --   --  38   AST U/L  --   --  22  --   --  43    < > = values in this interval not displayed         Previous work up:  Urine sodium on 01/09 -  6      Portions of the record may have been created with voice recognition software  Occasional wrong word or "sound a like" substitutions may have occurred due to the inherent limitations of voice recognition software  Read the chart carefully and recognize, using context, where substitutions have occurred  If you have any questions, please contact the dictating provider

## 2020-01-10 NOTE — RAPID RESPONSE
Progress Note - Rapid Response   Uyen Moreno 63 y o  female MRN: [id number]    Time Called ( Time):  9531  Date Called:  10 January 2020  Level of Care: MS  Room#:  792  [id number] Time ( Time):  9069  Event End Time ( Time):  0271  Primary reason for call: Acute change in mental status and Other Respiratory acidosis  Interventions:  Airway/Breathing:  NPPV  Circulation: N/A  Other Treatments: N/A       Assessment:   1  Acute hypoxic respiratory failure  2  COPD  3  Morbid obesity  4  Diabetes mellitus type 2    Plan:   · Transfer to intensive care unit  · Placed on BiPAP  · Monitor blood gas  · Monitor was saturation of peripheral oxygen  · Stat chest x-ray       HPI/Chief Complaint (Background/Situation):   Uyen Moreno is a 63y o  year old female who was initially admitted to the intensive care unit and treated there secondary to acute hypoxic respiratory failure and subsequently transferred to the medical-surgical unit  Today the patient became progressively lethargic  An ABG was done and showed a pH 7 1  According to the registered nurse on the floor the patient refused to wear BiPAP overnight with a known history of obstructive sleep apnea the patient became progressively hypoxic  The patient appears to be somewhat confused likely secondary to her acidosis and hypoxia she will be transferred to the intensive care unit and maintained on BiPAP she is a full code the may require intubation  I discussed this with family and they are in agreement      Historical Information   Past Medical History:   Diagnosis Date    Atrial fibrillation with RVR ([facility] [address] )     COPD (chronic obstructive pulmonary disease) (HCC)     [facility]     Hypertension      Past Surgical History:   Procedure Laterality Date    HERNIA REPAIR      HYSTERECTOMY      LAPAROSCOPIC CHOLECYSTECTOMY       Social History   Social History     Substance and Sexual Activity   Alcohol Use Not Currently    Comment: socially, NO ALCOHOL USE     Social History     Substance and Sexual Activity   Drug Use Not Currently    Types: Marijuana     Social History     Tobacco Use   Smoking Status Current Every Day Smoker    Packs/day: 0 00    Years: 43 00    Pack years: 0 00    Types: Cigarettes   Smokeless Tobacco Never Used   Tobacco Comment    Currently half a pack a day, 6 CIGARETTES PER DAY      Family History: non-contributory    Meds/Allergies     Current Facility-Administered Medications:  albuterol 2 puff Inhalation Q4H PRN Luli K Bilofsky, CRNP   apixaban 2 5 mg Oral BID Delvin Katherine Bilofsky, CRNP   buPROPion 150 mg Oral Daily Luli K Bilofsky, CRNP   diltiazem 120 mg Oral Daily Luli K Bilofsky, CRNP   insulin lispro 1-6 Units Subcutaneous TID AC Luli K Bilofsky, CRNP   insulin lispro 1-6 Units Subcutaneous HS Luli K Bilofsky, CRNP   ipratropium 0 5 mg Nebulization TID Delvin Katherine Bilofsky, CRNP   levalbuterol 1 25 mg Nebulization TID Delvin Katherine Bilofsky, CRNP   levalbuterol 1 25 mg Nebulization Q4H PRN Luli K Bilofsky, CRNP   metoprolol succinate 100 mg Oral Daily Luli K Bilofsky, CRNP   midodrine 5 mg Oral TID AC Luli K Bilofsky, CRNP   nicotine 1 patch Transdermal Daily Luli K Bilofsky, CRNP   nystatin  Topical BID Luli K Bilofsky, CRNP            No Known Allergies    ROS: Negative except complaints shortness of breath    Vitals:  See recorded vital signs    Physical Exam:  Gen:  Lethargic and confused  HEENT:  Atraumatic normocephalic pupils equal round reactive to light extraocular movements intact sclerae anicteric oral mucosa is pink but dry  Neck:  Supple no JVD no lymphadenopathy trachea midline  Chest:  Decreased breath sounds in the left side clear on the right on the BiPAP machine  Cor:  Regular rate and rhythm no murmurs rubs or gallops appreciated  Abd:  Obese protuberant soft nontender with positive bowel sounds  Ext:  Pitting edema bilateral lower extremities  Neuro:  Alert and oriented x2 moves all extremities  Skin:  Warm dry intact no rash    No intake or output data in the 24 hours ending 01/10/20 1853    Respiratory    Lab Data (Last 4 hours)      01/10 1540 01/10 1740          pH, Arterial       7 166          7 183          pCO2, Arterial       84 8          78 8          pO2, Arterial       42 2          193 6          HCO3, Arterial       30 0          29 0          Base Excess, Arterial       -1 9          -2 3               O2/Vent Data         01/10 1640   Most Recent      Non-Invasive Ventilation Mode  BiPAP  BiPAP               Invasive Devices     Peripheral Intravenous Line            Peripheral IV 01/06/20 Left Antecubital 3 days                DIAGNOSTIC DATA:    Lab: I have personally reviewed pertinent lab results  CBC:   Results from last 7 days   Lab Units 01/10/20  0512   WBC Thousand/uL 7 60   HEMOGLOBIN g/dL 16 1*   HEMATOCRIT % 57 2*   PLATELETS Thousands/uL 106*     CMP:   Results from last 7 days   Lab Units 01/10/20  0512 01/09/20  0438 01/08/20  0516  01/06/20  2241   POTASSIUM mmol/L 3 7 3 5 3 8   < > 4 0   CHLORIDE mmol/L 105 104 107   < > 103   CO2 mmol/L 29 30 29   < > 30   BUN mg/dL 59* 51* 46*   < > 46*   CREATININE mg/dL 3 73* 3 51* 3 10*   < > 3 32*   CALCIUM mg/dL 7 6* 8 1* 8 1*   < > 9 1   ALK PHOS U/L  --   --  106  --  162*   ALT U/L  --   --  23  --  38   AST U/L  --   --  22  --  43    < > = values in this interval not displayed       PT/INR:   No results found for: PT, INR,   Magnesium: No components found for: MAG,   Phosphorous: No results found for: PHOS    Microbiology:  Lab Results   Component Value Date    BLOODCX No Growth at 72 hrs  01/06/2020    BLOODCX No Growth at 72 hrs  01/06/2020    URINECX >100,000 cfu/ml Mixed Contaminants X4 11/18/2016    WOUNDCULT 3+ Growth of Pseudomonas aeruginosa (A) 02/12/2019    WOUNDCULT 2+ Growth of Staphylococcus aureus (A) 02/12/2019    WOUNDCULT 2+ Growth of Enterococcus faecalis (A) 02/12/2019    WOUNDCULT 2+ Growth of Pseudomonas aeruginosa (A) 02/12/2019    WOUNDCULT 2+ Growth of Enterococcus faecalis (A) 02/12/2019         OUTCOME:   Transferred to Critical Care Unit  Family notified of transfer:  Yes   Family member contacted:  Brother  Code Status: Level 1 - Full Code  Critical Care Time: Total Critical Care time spent 30 minutes excluding procedures, teaching and family updates

## 2020-01-10 NOTE — PLAN OF CARE
Problem: Prexisting or High Potential for Compromised Skin Integrity  Goal: Skin integrity is maintained or improved  Description  INTERVENTIONS:  - Identify patients at risk for skin breakdown  - Assess and monitor skin integrity  - Assess and monitor nutrition and hydration status  - Monitor labs   - Assess for incontinence   - Turn and reposition patient  - Assist with mobility/ambulation  - Relieve pressure over bony prominences  - Avoid friction and shearing  - Provide appropriate hygiene as needed including keeping skin clean and dry  - Evaluate need for skin moisturizer/barrier cream  - Collaborate with interdisciplinary team   - Patient/family teaching  - Consider wound care consult   Outcome: Progressing     Problem: Potential for Falls  Goal: Patient will remain free of falls  Description  INTERVENTIONS:  - Assess patient frequently for physical needs  -  Identify cognitive and physical deficits and behaviors that affect risk of falls    -  [place] fall precautions as indicated by assessment   - Educate patient/family on patient safety including physical limitations  - Instruct patient to call for assistance with activity based on assessment  - Modify environment to reduce risk of injury  - Consider OT/PT consult to assist with strengthening/mobility  Outcome: Progressing     Problem: PAIN - ADULT  Goal: Verbalizes/displays adequate comfort level or baseline comfort level  Description  Interventions:  - Encourage patient to monitor pain and request assistance  - Assess pain using appropriate pain scale  - Administer analgesics based on type and severity of pain and evaluate response  - Implement non-pharmacological measures as appropriate and evaluate response  - Consider cultural and social influences on pain and pain management  - Notify physician/advanced practitioner if interventions unsuccessful or patient reports new pain  Outcome: Progressing     Problem: INFECTION - ADULT  Goal: Absence or prevention of progression during hospitalization  Description  INTERVENTIONS:  - Assess and monitor for signs and symptoms of infection  - Monitor lab/diagnostic results  - Monitor all insertion sites, i e  indwelling lines, tubes, and drains  - Monitor endotracheal if appropriate and nasal secretions for changes in amount and color  - [place] appropriate cooling/warming therapies per order  - Administer medications as ordered  - Instruct and encourage patient and family to use good hand hygiene technique  - Identify and instruct in appropriate isolation precautions for identified infection/condition  Outcome: Progressing  Goal: Absence of fever/infection during neutropenic period  Description  INTERVENTIONS:  - Monitor WBC    Outcome: Progressing     Problem: SAFETY ADULT  Goal: Maintain or return to baseline ADL function  Description  INTERVENTIONS:  -  Assess patient's ability to carry out ADLs; assess patient's baseline for ADL function and identify physical deficits which impact ability to perform ADLs (bathing, care of mouth/teeth, toileting, grooming, dressing, etc )  - Assess/evaluate cause of self-care deficits   - Assess range of motion  - Assess patient's mobility; develop plan if impaired  - Assess patient's need for assistive devices and provide as appropriate  - Encourage maximum independence but intervene and supervise when necessary  - Involve family in performance of ADLs  - Assess for home care needs following discharge   - Consider OT consult to assist with ADL evaluation and planning for discharge  - Provide patient education as appropriate  Outcome: Progressing  Goal: Maintain or return mobility status to optimal level  Description  INTERVENTIONS:  - Assess patient's baseline mobility status (ambulation, transfers, stairs, etc )    - Identify cognitive and physical deficits and behaviors that affect mobility  - Identify mobility aids required to assist with transfers and/or ambulation (gait belt, sit-to-stand, lift, walker, cane, etc )  - [place] fall precautions as indicated by assessment  - Record patient progress and toleration of activity level on Mobility SBAR; progress patient to next Phase/Stage  - Instruct patient to call for assistance with activity based on assessment  - Consider rehabilitation consult to assist with strengthening/weightbearing, etc   Outcome: Progressing     Problem: DISCHARGE PLANNING  Goal: Discharge to home or other facility with appropriate resources  Description  INTERVENTIONS:  - Identify barriers to discharge w/patient and caregiver  - Arrange for needed discharge resources and transportation as appropriate  - Identify discharge learning needs (meds, wound care, etc )  - Arrange for interpretive services to assist at discharge as needed  - Refer to Case Management Department for coordinating discharge planning if the patient needs post-hospital services based on physician/advanced practitioner order or complex needs related to functional status, cognitive ability, or social support system  Outcome: Progressing     Problem: Knowledge Deficit  Goal: Patient/family/caregiver demonstrates understanding of disease process, treatment plan, medications, and discharge instructions  Description  Complete learning assessment and assess knowledge base    Interventions:  - Provide teaching at level of understanding  - Provide teaching via preferred learning methods  Outcome: Progressing     Problem: CARDIOVASCULAR - ADULT  Goal: Maintains optimal cardiac output and hemodynamic stability  Description  INTERVENTIONS:  - Monitor I/O, vital signs and rhythm  - Monitor for S/S and trends of decreased cardiac output  - Administer and titrate ordered vasoactive medications to optimize hemodynamic stability  - Assess quality of pulses, skin color and temperature  - Assess for signs of decreased coronary artery perfusion  - Instruct patient to report change in severity of symptoms  Outcome: Progressing  Goal: Absence of cardiac dysrhythmias or at baseline rhythm  Description  INTERVENTIONS:  - Continuous cardiac monitoring, vital signs, obtain 12 lead EKG if ordered  - Administer antiarrhythmic and heart rate control medications as ordered  - Monitor electrolytes and administer replacement therapy as ordered  Outcome: Progressing     Problem: RESPIRATORY - ADULT  Goal: Achieves optimal ventilation and oxygenation  Description  INTERVENTIONS:  - Assess for changes in respiratory status  - Assess for changes in mentation and behavior  - Position to facilitate oxygenation and minimize respiratory effort  - Oxygen administered by appropriate delivery if ordered  - Initiate smoking cessation education as indicated  - Encourage broncho-pulmonary hygiene including cough, deep breathe, Incentive Spirometry  - Assess the need for suctioning and aspirate as needed  - Assess and instruct to report SOB or any respiratory difficulty  - Respiratory Therapy support as indicated  Outcome: Progressing     Problem: METABOLIC, FLUID AND ELECTROLYTES - ADULT  Goal: Electrolytes maintained within normal limits  Description  INTERVENTIONS:  - Monitor labs and assess patient for signs and symptoms of electrolyte imbalances  - Administer electrolyte replacement as ordered  - Monitor response to electrolyte replacements, including repeat lab results as appropriate  - Instruct patient on fluid and nutrition as appropriate  Outcome: Progressing  Goal: Fluid balance maintained  Description  INTERVENTIONS:  - Monitor labs   - Monitor I/O and WT  - Instruct patient on fluid and nutrition as appropriate  - Assess for signs & symptoms of volume excess or deficit  Outcome: Progressing  Goal: Glucose maintained within target range  Description  INTERVENTIONS:  - Monitor Blood Glucose as ordered  - Assess for signs and symptoms of hyperglycemia and hypoglycemia  - Administer ordered medications to maintain glucose within target range  - Assess nutritional intake and initiate nutrition service referral as needed  Outcome: Progressing     Problem: SKIN/TISSUE INTEGRITY - ADULT  Goal: Skin integrity remains intact  Description  INTERVENTIONS  - Identify patients at risk for skin breakdown  - Assess and monitor skin integrity  - Assess and monitor nutrition and hydration status  - Monitor labs (i e  albumin)  - Assess for incontinence   - Turn and reposition patient  - Assist with mobility/ambulation  - Relieve pressure over bony prominences  - Avoid friction and shearing  - Provide appropriate hygiene as needed including keeping skin clean and dry  - Evaluate need for skin moisturizer/barrier cream  - Collaborate with interdisciplinary team (i e  Nutrition, Rehabilitation, etc )   - Patient/family teaching  Outcome: Progressing  Goal: Incision(s), wounds(s) or drain site(s) healing without S/S of infection  Description  INTERVENTIONS  - Assess and document risk factors for skin impairment   - Assess and document dressing, incision, wound bed, drain sites and surrounding tissue  - Consider nutrition services referral as needed  - Oral mucous membranes remain intact  - Provide patient/ family education  Outcome: Progressing  Goal: Oral mucous membranes remain intact  Description  INTERVENTIONS  - Assess oral mucosa and hygiene practices  - Implement preventative oral hygiene regimen  - Implement oral medicated treatments as ordered  - Initiate Nutrition services referral as needed  Outcome: Progressing     Problem: Nutrition/Hydration-ADULT  Goal: Nutrient/Hydration intake appropriate for improving, restoring or maintaining nutritional needs  Description  Monitor and assess patient's nutrition/hydration status for malnutrition  Collaborate with interdisciplinary team and initiate plan and interventions as ordered  Monitor patient's weight and dietary intake as ordered or per policy   Utilize nutrition screening tool and intervene as necessary  Determine patient's food preferences and provide high-protein, high-caloric foods as appropriate       INTERVENTIONS:  - Monitor oral intake, urinary output, labs, and treatment plans  - Assess nutrition and hydration status and recommend course of action  - Evaluate amount of meals eaten  - Assist patient with eating if necessary   - Allow adequate time for meals  - Recommend/ encourage appropriate diets, oral nutritional supplements, and vitamin/mineral supplements  - Order, calculate, and assess calorie counts as needed  - Recommend, monitor, and adjust tube feedings and TPN/PPN based on assessed needs  - Assess need for intravenous fluids  - Provide specific nutrition/hydration education as appropriate  - Include patient/family/caregiver in decisions related to nutrition  Outcome: Progressing

## 2020-01-10 NOTE — RESPIRATORY THERAPY NOTE
Patient refusing BIPAP for HS  Explained importance of BIPAP due to possibility of hypercapnia due to noncompliance and patient states she is still not going to wear it  RN aware       Deya Simon, RT

## 2020-01-11 PROBLEM — G93.40 ENCEPHALOPATHY ACUTE: Status: ACTIVE | Noted: 2020-01-11

## 2020-01-11 PROBLEM — I50.33 ACUTE ON CHRONIC DIASTOLIC CONGESTIVE HEART FAILURE (HCC): Status: ACTIVE | Noted: 2020-01-11

## 2020-01-11 PROBLEM — R65.10 SIRS (SYSTEMIC INFLAMMATORY RESPONSE SYNDROME) (HCC): Status: RESOLVED | Noted: 2020-01-07 | Resolved: 2020-01-11

## 2020-01-11 LAB
ALBUMIN SERPL BCP-MCNC: 3 G/DL (ref 3.5–5)
ALP SERPL-CCNC: 99 U/L (ref 46–116)
ALT SERPL W P-5'-P-CCNC: 16 U/L (ref 12–78)
ANION GAP SERPL CALCULATED.3IONS-SCNC: 10 MMOL/L (ref 4–13)
ANION GAP SERPL CALCULATED.3IONS-SCNC: 10 MMOL/L (ref 4–13)
ARTERIAL PATENCY WRIST A: YES
AST SERPL W P-5'-P-CCNC: 12 U/L (ref 5–45)
BACTERIA UR QL AUTO: ABNORMAL /HPF
BASE EXCESS BLDA CALC-SCNC: -1.2 MMOL/L
BILIRUB SERPL-MCNC: 2.1 MG/DL (ref 0.2–1)
BILIRUB UR QL STRIP: NEGATIVE
BUN SERPL-MCNC: 67 MG/DL (ref 5–25)
BUN SERPL-MCNC: 67 MG/DL (ref 5–25)
CALCIUM SERPL-MCNC: 7.8 MG/DL (ref 8.3–10.1)
CALCIUM SERPL-MCNC: 7.8 MG/DL (ref 8.3–10.1)
CHLORIDE SERPL-SCNC: 106 MMOL/L (ref 100–108)
CHLORIDE SERPL-SCNC: 106 MMOL/L (ref 100–108)
CLARITY UR: CLEAR
CO2 SERPL-SCNC: 29 MMOL/L (ref 21–32)
CO2 SERPL-SCNC: 30 MMOL/L (ref 21–32)
COLOR UR: ABNORMAL
CREAT SERPL-MCNC: 3.75 MG/DL (ref 0.6–1.3)
CREAT SERPL-MCNC: 3.84 MG/DL (ref 0.6–1.3)
GFR SERPL CREATININE-BSD FRML MDRD: 12 ML/MIN/1.73SQ M
GFR SERPL CREATININE-BSD FRML MDRD: 13 ML/MIN/1.73SQ M
GLUCOSE SERPL-MCNC: 102 MG/DL (ref 65–140)
GLUCOSE SERPL-MCNC: 105 MG/DL (ref 65–140)
GLUCOSE SERPL-MCNC: 72 MG/DL (ref 65–140)
GLUCOSE SERPL-MCNC: 73 MG/DL (ref 65–140)
GLUCOSE SERPL-MCNC: 74 MG/DL (ref 65–140)
GLUCOSE SERPL-MCNC: 74 MG/DL (ref 65–140)
GLUCOSE SERPL-MCNC: 75 MG/DL (ref 65–140)
GLUCOSE SERPL-MCNC: 77 MG/DL (ref 65–140)
GLUCOSE SERPL-MCNC: 78 MG/DL (ref 65–140)
GLUCOSE SERPL-MCNC: 79 MG/DL (ref 65–140)
GLUCOSE SERPL-MCNC: 80 MG/DL (ref 65–140)
GLUCOSE SERPL-MCNC: 80 MG/DL (ref 65–140)
GLUCOSE SERPL-MCNC: 81 MG/DL (ref 65–140)
GLUCOSE SERPL-MCNC: 82 MG/DL (ref 65–140)
GLUCOSE SERPL-MCNC: 83 MG/DL (ref 65–140)
GLUCOSE SERPL-MCNC: 84 MG/DL (ref 65–140)
GLUCOSE SERPL-MCNC: 85 MG/DL (ref 65–140)
GLUCOSE SERPL-MCNC: 85 MG/DL (ref 65–140)
GLUCOSE SERPL-MCNC: 90 MG/DL (ref 65–140)
GLUCOSE SERPL-MCNC: 96 MG/DL (ref 65–140)
GLUCOSE SERPL-MCNC: 98 MG/DL (ref 65–140)
GLUCOSE UR STRIP-MCNC: NEGATIVE MG/DL
HCO3 BLDA-SCNC: 28 MMOL/L (ref 22–28)
HGB UR QL STRIP.AUTO: ABNORMAL
INR PPP: 1.44 (ref 0.84–1.19)
IPAP: 22
KETONES UR STRIP-MCNC: NEGATIVE MG/DL
LEUKOCYTE ESTERASE UR QL STRIP: NEGATIVE
MAGNESIUM SERPL-MCNC: 2.5 MG/DL (ref 1.6–2.6)
NITRITE UR QL STRIP: NEGATIVE
NON VENT- BIPAP: ABNORMAL
NON-SQ EPI CELLS URNS QL MICRO: ABNORMAL /HPF
O2 CT BLDA-SCNC: 24.3 ML/DL (ref 16–23)
OXYHGB MFR BLDA: 96.5 % (ref 94–97)
PCO2 BLDA: 64.2 MM HG (ref 36–44)
PEEP MAX SETTING VENT: 10 CM[H2O]
PH BLDA: 7.26 [PH] (ref 7.35–7.45)
PH UR STRIP.AUTO: 5.5 [PH]
PO2 BLDA: 107.3 MM HG (ref 75–129)
POTASSIUM SERPL-SCNC: 3.8 MMOL/L (ref 3.5–5.3)
POTASSIUM SERPL-SCNC: 4 MMOL/L (ref 3.5–5.3)
PROT SERPL-MCNC: 6.3 G/DL (ref 6.4–8.2)
PROT UR STRIP-MCNC: ABNORMAL MG/DL
PROTHROMBIN TIME: 17.8 SECONDS (ref 11.6–14.5)
RBC #/AREA URNS AUTO: ABNORMAL /HPF
SODIUM SERPL-SCNC: 145 MMOL/L (ref 136–145)
SODIUM SERPL-SCNC: 146 MMOL/L (ref 136–145)
SP GR UR STRIP.AUTO: 1.02 (ref 1–1.03)
SPECIMEN SOURCE: ABNORMAL
UROBILINOGEN UR QL STRIP.AUTO: 0.2 E.U./DL
VENT BIPAP FIO2: 80 %
WBC #/AREA URNS AUTO: ABNORMAL /HPF

## 2020-01-11 PROCEDURE — 80053 COMPREHEN METABOLIC PANEL: CPT | Performed by: NURSE PRACTITIONER

## 2020-01-11 PROCEDURE — 99291 CRITICAL CARE FIRST HOUR: CPT | Performed by: INTERNAL MEDICINE

## 2020-01-11 PROCEDURE — 99222 1ST HOSP IP/OBS MODERATE 55: CPT | Performed by: INTERNAL MEDICINE

## 2020-01-11 PROCEDURE — 97167 OT EVAL HIGH COMPLEX 60 MIN: CPT

## 2020-01-11 PROCEDURE — 81001 URINALYSIS AUTO W/SCOPE: CPT | Performed by: NURSE PRACTITIONER

## 2020-01-11 PROCEDURE — 83735 ASSAY OF MAGNESIUM: CPT | Performed by: INTERNAL MEDICINE

## 2020-01-11 PROCEDURE — 94640 AIRWAY INHALATION TREATMENT: CPT

## 2020-01-11 PROCEDURE — 82948 REAGENT STRIP/BLOOD GLUCOSE: CPT

## 2020-01-11 PROCEDURE — 82805 BLOOD GASES W/O2 SATURATION: CPT | Performed by: NURSE PRACTITIONER

## 2020-01-11 PROCEDURE — 94660 CPAP INITIATION&MGMT: CPT

## 2020-01-11 PROCEDURE — 85610 PROTHROMBIN TIME: CPT | Performed by: NURSE PRACTITIONER

## 2020-01-11 PROCEDURE — 36600 WITHDRAWAL OF ARTERIAL BLOOD: CPT

## 2020-01-11 PROCEDURE — 97530 THERAPEUTIC ACTIVITIES: CPT

## 2020-01-11 PROCEDURE — 97163 PT EVAL HIGH COMPLEX 45 MIN: CPT

## 2020-01-11 PROCEDURE — 99233 SBSQ HOSP IP/OBS HIGH 50: CPT | Performed by: INTERNAL MEDICINE

## 2020-01-11 PROCEDURE — 80048 BASIC METABOLIC PNL TOTAL CA: CPT | Performed by: NURSE PRACTITIONER

## 2020-01-11 RX ORDER — FUROSEMIDE 10 MG/ML
80 INJECTION INTRAMUSCULAR; INTRAVENOUS ONCE
Status: COMPLETED | OUTPATIENT
Start: 2020-01-11 | End: 2020-01-11

## 2020-01-11 RX ORDER — DEXTROSE MONOHYDRATE 50 MG/ML
50 INJECTION, SOLUTION INTRAVENOUS CONTINUOUS
Status: DISCONTINUED | OUTPATIENT
Start: 2020-01-11 | End: 2020-01-16

## 2020-01-11 RX ORDER — FUROSEMIDE 10 MG/ML
40 SYRINGE (ML) INJECTION CONTINUOUS
Status: DISCONTINUED | OUTPATIENT
Start: 2020-01-11 | End: 2020-01-21 | Stop reason: HOSPADM

## 2020-01-11 RX ORDER — DEXTROSE MONOHYDRATE 25 G/50ML
INJECTION, SOLUTION INTRAVENOUS
Status: COMPLETED
Start: 2020-01-11 | End: 2020-01-11

## 2020-01-11 RX ORDER — FUROSEMIDE 10 MG/ML
60 INJECTION INTRAMUSCULAR; INTRAVENOUS ONCE
Status: COMPLETED | OUTPATIENT
Start: 2020-01-11 | End: 2020-01-11

## 2020-01-11 RX ADMIN — IPRATROPIUM BROMIDE 0.5 MG: 0.5 SOLUTION RESPIRATORY (INHALATION) at 07:05

## 2020-01-11 RX ADMIN — LEVALBUTEROL HYDROCHLORIDE 1.25 MG: 1.25 SOLUTION, CONCENTRATE RESPIRATORY (INHALATION) at 07:05

## 2020-01-11 RX ADMIN — IPRATROPIUM BROMIDE 0.5 MG: 0.5 SOLUTION RESPIRATORY (INHALATION) at 13:13

## 2020-01-11 RX ADMIN — NICOTINE 1 PATCH: 14 PATCH TRANSDERMAL at 08:42

## 2020-01-11 RX ADMIN — FUROSEMIDE 60 MG: 10 INJECTION, SOLUTION INTRAMUSCULAR; INTRAVENOUS at 08:39

## 2020-01-11 RX ADMIN — MIDODRINE HYDROCHLORIDE 5 MG: 5 TABLET ORAL at 07:41

## 2020-01-11 RX ADMIN — DEXTROSE MONOHYDRATE 25 ML: 500 INJECTION PARENTERAL at 00:14

## 2020-01-11 RX ADMIN — NYSTATIN: 100000 POWDER TOPICAL at 17:50

## 2020-01-11 RX ADMIN — LEVALBUTEROL HYDROCHLORIDE 1.25 MG: 1.25 SOLUTION, CONCENTRATE RESPIRATORY (INHALATION) at 13:13

## 2020-01-11 RX ADMIN — Medication 40 MG/HR: at 13:17

## 2020-01-11 RX ADMIN — IPRATROPIUM BROMIDE 0.5 MG: 0.5 SOLUTION RESPIRATORY (INHALATION) at 18:52

## 2020-01-11 RX ADMIN — DEXTROSE 50 ML/HR: 5 SOLUTION INTRAVENOUS at 12:41

## 2020-01-11 RX ADMIN — FUROSEMIDE 80 MG: 10 INJECTION, SOLUTION INTRAMUSCULAR; INTRAVENOUS at 12:45

## 2020-01-11 RX ADMIN — LEVALBUTEROL HYDROCHLORIDE 1.25 MG: 1.25 SOLUTION, CONCENTRATE RESPIRATORY (INHALATION) at 18:52

## 2020-01-11 RX ADMIN — NYSTATIN: 100000 POWDER TOPICAL at 12:11

## 2020-01-11 RX ADMIN — Medication 40 MG/HR: at 19:24

## 2020-01-11 NOTE — ASSESSMENT & PLAN NOTE
· Continue nebulized bronchodilators  No indication for steroids at this point    · Not on maintenance inhalers at home

## 2020-01-11 NOTE — PROGRESS NOTES
Patient lethargic , easy to arouse   Dr Coby Ward made aware Regarding patient refused  To wear Bipap over night

## 2020-01-11 NOTE — PLAN OF CARE
Problem: PHYSICAL THERAPY ADULT  Goal: Performs mobility at highest level of function for planned discharge setting  See evaluation for individualized goals  Description  Treatment/Interventions: LE strengthening/ROM, Therapeutic exercise, Endurance training, Patient/family training, Equipment eval/education, Bed mobility, Spoke to nursing, OT  Equipment Recommended: (TBD once mobility and OOB are assessed)       See flowsheet documentation for full assessment, interventions and recommendations  Note:   Prognosis: Guarded  Problem List: Decreased strength, Decreased range of motion, Decreased endurance, Impaired balance, Decreased mobility, Decreased coordination, Decreased cognition, Impaired judgement, Decreased safety awareness, Obesity, Decreased skin integrity, Pain  Assessment: pt is a 64 y/o female admitted to [facility] 2* acute hypoxic resp failure,acidosis,lethargy,untreated CHF,COPD,noncompiant with BiPAP PTA,pulm HTN,ARF,weakness and elevated troponin  Pt lives alone in a home,reports being completely (I) PTA,reports (+)falls unable to recall how many,reports being completely (I) PTA,(+)drive,noncompliant with use of BiPAP PTA,(+)smoker and use of 10 L NC O2 PTA  Pt currently is not at functional mobility baseline,needs significant Ax2 for mobility,currently being medically treated in ICU,current use of BiPAP in supine,lethargic,poorly responsive,poor historian,dec cognition,agitated at times,multiple lines,ongoing medical care,IV medicine management  Pt demonstrates maximal to dep deficits during functional mobility including dec endurance,dec balance,dec BLE strength,dec cognition (baseline?),lethargic,ataxic and unsteady movements,poorly responsive,impulsive at times,agitated at times and need maxAx2 B rolling,depAx2 for BM  Pt would cont to benefit from skilled inpt PT services to maximize functional independence    Barriers to Discharge: Decreased caregiver support(lives alone)     Recommendation: Other (Comment)(more than likely inpt rehab upon D/C)          See flowsheet documentation for full assessment

## 2020-01-11 NOTE — PROGRESS NOTES
Patient on Bipap   Result from the previous ABG valued as critical  Dr Jos Overton aware regarding the result   Vital @ 1834 /91 , Pulse 76 , O2 98% on Bipap   Patiet hard to awake   Patietn transfer from the chair to the bed with the [facility] lift   Patient lethargic and Drowsiness   Dr Jos Overton not present to assess patient   ICU NP present at the bedside  And rapid response was called   Patient was transfer to ICU for higher level of care

## 2020-01-11 NOTE — PROGRESS NOTES
Patient Vital sign stable   More lethargic than morning   ABG was order   Dr Jumana Carpenter was made aware  Of the results    Patient was placed on the BiPAP and order for the repeat ABG for 1730  Will continue to closely monitor patient

## 2020-01-11 NOTE — ASSESSMENT & PLAN NOTE
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

## 2020-01-11 NOTE — PHYSICAL THERAPY NOTE
Physical Therapy Evaluation:    2 forms of pt ID verified:name,birthdate and pt ID [email or web address]    Patient's Name: Glendell Bernheim    Admitting Diagnosis  Diarrhea [R19 7]  Acute congestive heart failure (HCC) [I50 9]  ARF (acute renal failure) ([facility] ) [N17 9]  Weakness [R53 1]  Hypoxia [R09 02]  Elevated troponin [R79 89]    Problem List  Patient Active Problem List   Diagnosis    Chronic obstructive pulmonary disease with acute exacerbation ([facility] )    Permanent atrial fibrillation    Acute on chronic respiratory failure with hypoxia and hypercapnia ([facility])    Chronic venous stasis dermatitis of both lower extremities    Hypoalbuminemia    Type 2 diabetes mellitus without complication ([facility] )    Non-rheumatic mitral regurgitation    Nonsustained ventricular tachycardia ([facility] )    Obesity    Vitamin D deficiency    Depression    Dyslipidemia    Left ventricular hypertrophy    Stage 3 chronic kidney disease ([facility])    Chronic diastolic CHF (congestive heart failure) ([facility])    Compression fracture of first lumbar vertebra ([facility] )    [facility]    Adjustment disorder    Encephalopathy acute       Past Medical History  Past Medical History:   Diagnosis Date    Atrial fibrillation with RVR ([facility]     COPD (chronic obstructive pulmonary disease) [facility]     Diabetes type 2, uncontrolled ([facility]     Hypertension     SIRS (systemic inflammatory response syndrome) ([facility] ) 1/7/2020       Past Surgical History  Past Surgical History:   Procedure Laterality Date    HERNIA REPAIR      HYSTERECTOMY      LAPAROSCOPIC CHOLECYSTECTOMY          01/11/20 0925   Note Type   Note type Eval/Treat   Pain Assessment   Pain Assessment FLACC   Pain Rating: FLACC (Rest) - Face 1   Pain Rating: FLACC (Rest) - Legs 0   Pain Rating: FLACC (Rest) - Activity 0   Pain Rating: FLACC (Rest) - Cry 1   Pain Rating: FLACC (Rest) - Consolability 1   Score: FLACC (Rest) 3   Pain Rating: FLACC (Activity) - Face 0   Pain Rating: FLACC (Activity) - Legs 0   Pain Rating: FLACC (Activity) - Activity 0   Pain Rating: FLACC (Activity) - Cry 1   Pain Rating: FLACC (Activity) - Consolability 1   Score: FLACC (Activity) 2   Home Living   Type of Home House   Home Layout Two level;Bed/bath upstairs   Home Equipment Cane  (use of 10 L NC O2 PTA,noncompliant BiPAP PTA)   Additional Comments pt reports living at home alone,use of [facility] for mobility PTA,(+)drive,reports being completely (I) PTA;poor historian throughout PT eval mainly received social history information from pts medical chart   Prior Function   Level of [place] Independent with ADLs and functional mobility  (per pt PTA)   Lives With Alone   Receives Help From   (unable to gather information at this time)   ADL Assistance Independent  (per pt PTA)   IADLs Independent  (per pt PTA)   Falls in the last 6 months 1 to 4  (pt reports (+)falls, unable to tell PT how many)   Comments inc confusion and challenging to understand pt verbally 2* current use of BiPAP and lethargic;trial use of "yes" and "no" visual choice board without success 2* pt becomes agitated and unwillingness    Restrictions/Precautions   Other Precautions Cognitive;Agitated;Multiple lines;Telemetry;O2;Fall Risk;Pain;Hard of hearing  (body habitus)   General   Additional Pertinent History untreated CHF,COPD,acute hypoxic resp failure,currently being medically treated in ICU 2* progressive lethargy,acidosis with pH 7 1,ARF,elevated troponin   Family/Caregiver Present No   Cognition   Overall Cognitive Status Impaired   Arousal/Participation Lethargic   Attention Difficulty attending to directions   Orientation Level Oriented to person;Oriented to place; Disoriented to time;Disoriented to situation  (gives answer yes when given option of hospital)   Following Commands Follows one step commands inconsistently  (2* lethargic,dec cog,slow to respond to instruction)   RLE Assessment   RLE Assessment   (at least 2+ to 3 out of 5 grossly throughout)   LLE Assessment   LLE Assessment   (at least 2+ to 3 out of 5 grossly throughout)   Coordination   Movements are Fluid and Coordinated 0   Coordination and Movement Description inc tremors at EOB BUE,shaky and jerky movements throughout,slow mobility,lethargic   Sensation WFL   Light Touch   RLE Light Touch Grossly intact   LLE Light Touch Grossly intact   Bed Mobility   Rolling R 2  Maximal assistance   Additional items Assist x 2;Bedrails; Increased time required;Verbal cues;LE management   Rolling L 2  Maximal assistance   Additional items Assist x 2;Bedrails; Increased time required;Verbal cues;LE management  (initiated L roll towards bedrail)   Supine to Sit 1  Dependent   Additional items Assist x 2;HOB elevated; Bedrails; Increased time required;Verbal cues;LE management  (slow mobility,slow to respond to instruction)   Sit to Supine 1  Dependent   Additional items Assist x 2;HOB elevated; Increased time required;Verbal cues;LE management   Transfers   Sit to Stand Unable to assess   Ambulation/Elevation   Gait Assistance Not tested   Balance   Static Sitting   (varies-->poor to poor(+) throughout)   Endurance Deficit   Endurance Deficit Yes   Endurance Deficit Description weakness,dec cognition,current being medically treated in ICU,fatigue,lethargic   Activity Tolerance   Activity Tolerance Patient limited by fatigue;Treatment limited secondary to agitation;Treatment limited secondary to medical complications (Comment)  (poor)   Medical Staff Made Aware OT Bandar Sorto)   Nurse Made Aware yes   Assessment   Prognosis Guarded   Problem List Decreased strength;Decreased range of motion;Decreased endurance; Impaired balance;Decreased mobility; Decreased coordination;Decreased cognition; Impaired judgement;Decreased safety awareness; Obesity; Decreased skin integrity;Pain   Assessment pt is a 64 y/o female admitted to [facility] 2* acute hypoxic resp failure,acidosis,lethargy,untreated CHF,COPD,noncompiant with BiPAP PTA,pulm HTN,ARF,weakness and elevated troponin  Pt lives alone in a home,reports being completely (I) PTA,reports (+)falls unable to recall how many,reports being completely (I) PTA,(+)drive,noncompliant with use of BiPAP PTA,(+)smoker and use of 10 L NC O2 PTA  Pt currently is not at functional mobility baseline,needs significant Ax2 for mobility,currently being medically treated in ICU,current use of BiPAP in supine,lethargic,poorly responsive,poor historian,dec cognition,agitated at times,multiple lines,ongoing medical care,IV medicine management  Pt demonstrates maximal to dep deficits during functional mobility including dec endurance,dec balance,dec BLE strength,dec cognition (baseline?),lethargic,ataxic and unsteady movements,poorly responsive,impulsive at times,agitated at times and need maxAx2 B rolling,depAx2 for BM  Pt would cont to benefit from skilled inpt PT services to maximize functional independence  Barriers to Discharge Decreased caregiver support  (lives alone)   Goals   Patient Goals no goals reported at this time by the pt,cont to assess   STG Expiration Date 01/21/20   Short Term Goal #1 in 7-10 days: (1) Pt to see for OOB assessment and inc mobility when appropriate(2) activity tolerance:45 mins/45mins,  (4) pt will be able to perform BM and B rolling needing modAx1 level of A to A pt to return to PLOF and to A with repostioning, (5) AAROM->AROM with BLE therapeutic ex HEP in various positions to A pt to inc balance,strength,mobility,endurance and to A to dec pain, (6) inc balance 1/2 grade in order to dec fall risk, (8) cont to provide pt and pt family education for safe D/C planning, (9) inc BLE strength 1/2 to 1 full grade in order to A pt to inc balance,strength,mobility,endurance and to A to dec pain   PT Treatment Day 1  (additional PT tx session following PT eval)   Plan   Treatment/Interventions LE strengthening/ROM; Therapeutic exercise; Endurance training;Patient/family training;Equipment eval/education; Bed mobility;Spoke to nursing;OT   PT Frequency Other (Comment)  (3-5x/week)   Recommendation   Recommendation Other (Comment)  (more than likely inpt rehab upon D/C)   Equipment Recommended   (TBD once mobility and OOB are assessed)   Barthel Index   Feeding 5   Bathing 0   Grooming Score 0   Dressing Score 0   Bladder Score 0   Bowels Score 5   Toilet Use Score 0   Transfers (Bed/Chair) Score 5   Mobility (Level Surface) Score 0   Stairs Score 0   Barthel Index Score 15   Skilled PT recommended while in hospital and upon DC to progress pt toward treatment goals  Rigoberto Cain, PT, DPT    Time [id number]  Time [id number]  Total Time: 30 mins      S:  Pt willing and agreeable to work with PT and to participate in therapy intervention  Difficult to understand pt verbally 2* current use of BiPAP  Trial use of "yes" and "no" sheets for communication without success 2* pt currently lethargic and dec cognition with current simple one step instructions given  O:  Pt able to sit at EOB static sit greater than 3 mins for balance activities and breathing performance  Pt fatigues quickly with inc SOB and labored breathing  SpO2 remain WNL,HR inc  Pt becomes agitated and inc nervousness at EOB requesting to return to supine  Pt is inconsistent with ability to follow simple one step commands,slow to respond to instructions given and impulsive at times  Dec safety awareness at EOB and throughout tx session  A:  Pt fatigues quickly at EOB and request to return to supine  Pt becomes agitated and inc nervousness at EOB with inc labored breathing and SOB  VS WNL throughout with slightly inc HR at EOB  NSG aware  Pt would cont to benefit from skilled inpt PT services to maximize functional independence  P:  OOB assessment and mobility to be seen by PT for future tx sessions as able and as appropriate   Skilled inpt PT 3-5 x/week for mobility,endurance,strength,education and balance    Fatoumata Laird Alecia Ray, PT

## 2020-01-11 NOTE — PLAN OF CARE
Problem: OCCUPATIONAL THERAPY ADULT  Goal: Performs self-care activities at highest level of function for planned discharge setting  See evaluation for individualized goals  Description  Treatment Interventions: ADL retraining, UE strengthening/ROM, Functional transfer training, Cognitive reorientation, Endurance training, Patient/family training, Equipment evaluation/education, Compensatory technique education, Energy conservation, Activityengagement, Continued evaluation, Fine motor coordination activities          See flowsheet documentation for full assessment, interventions and recommendations  Note:   Limitation: Decreased ADL status, Decreased UE strength, Decreased Safe judgement during ADL, Decreased cognition, Decreased endurance, Decreased self-care trans, Decreased high-level ADLs  Prognosis: Guarded  Assessment: Pt is a 63 y o  female seen for OT evaluation s/p admit to [facility] on 1/6/2020 w/ SOB; Acute on chronic respiratory failure with hypoxia and hypercapnia ([facility] [address] ) and pt initially admitted to ICU and on bipap then on 10L O2 on medsur floor then on 1/10 rapid response after pt dian lift OOB and w/ increased lethargy and respiratory distress 2* hypercapnia and pt placed on Bipap and transferred to ICU  At baseline pt on 10 L O2  Comorbidities affecting pt's functional performance at time of assessment include: DM, a-fib, adjustment disorder, CHF, SIRS, L pleural effusion (may need thoracentesis), tobacco abuse  Personal factors affecting pt at time of IE include: lives alone, poor historian and increased confusion  Prior to admission, pt was living alone in apartment and per chart pt independent w/ ADLs, independent w/ functional transfers and mobility w/ SPC, independent w/ IADLs, driving   Upon evaluation: Pt requires MAX assist x2 rolling in bed, Dependent x2 supine<>sit bed mobility w/ increased time to complete, total assist LB ADLs, MAX assist UB ADLs, MOD assist grooming, total assist toileting 2* the following deficits impacting occupational performance: generalized weakness, decreased AROM b/l UEs, inconsistently following commands, multiple lines, impaired balance, impaired activity tolerance, impaired cognition (insight, safety awareness, increased processing time and delayed responses), agitation, impaired functional reach, questionable visual deficits, b/l UE edema, increased pain, dyspnea  Pt not appropriate for functional transfers at this time, recommend dian lift OOB w/ nursing, will complete functional transfer assessment next session as appropriate  Pt to benefit from continued skilled OT tx while in the hospital to address deficits as defined above and maximize level of functional independence w ADL's and functional mobility  Occupational Performance areas to address include: grooming, bathing/shower, toilet hygiene, dressing, health maintenance, functional mobility, clothing management, cleaning and meal prep, formal cognitive assessment, EC education, UE exercises,  From OT standpoint, recommendation at time of d/c would be short term rehab       OT Discharge Recommendation: Short Term Rehab  OT - OK to Discharge: (to rehab when medically stable)

## 2020-01-11 NOTE — PROGRESS NOTES
Progress Note - Dawna Hoyt 1960, 63 y o  female MRN: [id number]    Unit/Bed#: ICU 06 Encounter: [id number]    Primary Care Provider: Peewee Quevedo MD   Date and time admitted to hospital: 1/6/2020 10:25 PM        Encephalopathy acute  Assessment & Plan  · Secondary to hypercapnia and hypoglycemia  Continue BiPAP for hypercapnia  Encourage compliance with nocturnal BiPAP  · Avoid hypoglycemia  · Monitor mental status closely    * Acute on chronic respiratory failure with hypoxia and hypercapnia (HCC)  Assessment & Plan  · Etiology likely multifactorial, COPD, Untreated CHF, pulmonary hypertension, 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)  Assessment & Plan  · Acute on CKD III with baseline approximately 1 5  · Nephrology is following  Creatinine continues to trend up  Urine output has been decreased  · Diuretics have bee on hold secondary to relative hypotension and SAPNA  · Trend renal indices and monitor intake and output  Permanent atrial fibrillation  Assessment & Plan  · Rate is controlled    Continue PO cardizem and metoprolol  · Cont eliquis for AC    SIRS (systemic inflammatory response syndrome) (HCC)resolved as of 1/11/2020  Assessment & Plan  · Her lactic acid level was mildly elevated but she does not have other signs of infection  · Procalcitonin was negative x two  · Will hold on additional antibiotics for now    Chronic venous stasis dermatitis of both lower extremities  Assessment & Plan  · No open ulcers or signs of cellulitis        Type 2 diabetes mellitus without complication [facility])  Assessment & Plan  Lab Results   Component Value Date    HGBA1C 5 8 04/11/2019       Recent Labs     01/10/20  2138 01/10/20  2223 01/10/20  2329 01/11/20  0008   POCGLU 148* 103 96 72       Blood Sugar Average: Last 72 hrs:  · Initially had persistent hypoglycemia which had improved and now reoccurred overnight  · TSH normal, random cortisol 17 9  · Continue accucheks with SSI coverage  Goal to maintain -180  Aggressively monitor and treat hypoglycemia as this is likely contributing factor to her encephalopathy      Obesity  Assessment & Plan  · She would benefit from weight loss  · Likely component of OHV that is contributing to her acute on chronic pulmonary issues    Adjustment disorder  Assessment & Plan  · Continue welbutrin per Psych recommendations  _____________________________________________________________________    HPI/24hr events:   Afebrile  Rapid Reponse on the medical floor yesterday evening due to lethargy and hypoxia  Found to be in a respiratory acidosis secondary to hypercapnia  Placed on BiPAP and transferred to the ICU      Medications:    Current Facility-Administered Medications:  albuterol 2 puff Inhalation Q4H PRN MAYNOR Medina   apixaban 2 5 mg Oral BID MAYNOR Medina   buPROPion 150 mg Oral Daily Luli MAYNOR Macedo   diltiazem 120 mg Oral Daily MAYNOR Jaeger   insulin lispro 1-6 Units Subcutaneous TID AC Luli K MAYNOR Kirby   insulin lispro 1-6 Units Subcutaneous HS MAYNOR Jaeger   ipratropium 0 5 mg Nebulization TID MAYNOR Medina   levalbuterol 1 25 mg Nebulization TID MAYNOR Medina   levalbuterol 1 25 mg Nebulization Q4H PRN Eino Males Bilofsky, CRNP   metoprolol succinate 100 mg Oral Daily MAYNOR Jaeger   midodrine 5 mg Oral TID AC MAYNOR Matthews   nicotine 1 patch Transdermal Daily MAYNOR Matthews   nystatin  Topical BID MAYNOR Moreno              Physical exam:  Vitals: Body mass index is 50 26 kg/m²  Blood pressure 102/71, pulse 74, temperature 98 6 °F (37 °C), temperature source Temporal, resp  rate 19, height 5' 5" (1 651 m), weight (!) 137 kg (302 lb 0 5 oz), SpO2 97 %  ,  Temp  Min: 96 8 °F (36 °C)  Max: 98 7 °F (37 1 °C)  IBW: 57 kg    SpO2: 97 %  SpO2 Activity: At Rest  O2 Device: Other (comment)(bipap)      Intake/Output Summary (Last 24 hours) at 1/11/2020 1330  Last data filed at 1/11/2020 0501  Gross per 24 hour   Intake    Output 150 ml   Net -150 ml       Invasive/non-invasive ventilation settings:   Respiratory    Lab Data (Last 4 hours)    None         O2/Vent Data (Last 4 hours)      01/11 0324          Non-Invasive Ventilation Mode BiPAP                 Invasive Devices     Peripheral Intravenous Line            Peripheral IV 01/06/20 Left Antecubital 4 days    Peripheral IV 01/10/20 Left Hand less than 1 day          Drain            External Urinary Catheter less than 1 day                  Physical Exam:  Gen: Sluggish but arousable  HEENT: atraumatic, normocephalic, pupils 3mm equal and briskly reactive, no nystagmus  Neck: thick, supple, trachea midline, no JVD, no lymphadenopathy  Chest: diminished b/l, worse on the L, crackles posteriorly, no wheeze  Cor: Single S1/S2, no m/r/g, irregularly irregular  Abd: obese, soft, nondistended, no guarding, BS normoactive  Ext: 3+ b/l LE edema, 1+ b/l hand edema, b/l LE skin changes with hemosiderin staining c/w chronic venous stasis  Neuro: JUNG following commands, grossly nonfocal  Skin: warm, dry      Diagnostic Data:  Lab: I have personally reviewed pertinent lab results     CBC:   Results from last 7 days   Lab Units 01/10/20  2325 01/10/20  0512 01/09/20  0438 01/08/20  0516   WBC Thousand/uL  --  7 60 8 09 5 91   HEMOGLOBIN g/dL  --  16 1* 15 8* 16 4* I STAT HEMOGLOBIN g/dl 18 7*  --   --   --    HEMATOCRIT %  --  57 2* 55 5* 55 7*   HEMATOCRIT, ISTAT % 55*  --   --   --    PLATELETS Thousands/uL  --  106* 110* 115*       CMP:   Results from last 7 days   Lab Units 01/11/20  0348 01/10/20  2325 01/10/20  0512 01/09/20  0438 01/08/20  0516  01/06/20  2241   SODIUM mmol/L 146*  --  145 144 147*   < > 146*   POTASSIUM mmol/L 3 8  --  3 7 3 5 3 8   < > 4 0   CHLORIDE mmol/L 106  --  105 104 107   < > 103   CO2 mmol/L 30  --  29 30 29   < > 30   CO2, I-STAT mmol/L  --  32  --   --   --   --   --    BUN mg/dL 67*  --  59* 51* 46*   < > 46*   CREATININE mg/dL 3 84*  --  3 73* 3 51* 3 10*   < > 3 32*   CALCIUM mg/dL 7 8*  --  7 6* 8 1* 8 1*   < > 9 1   ALK PHOS U/L  --   --   --   --  106  --  162*   ALT U/L  --   --   --   --  23  --  38   AST U/L  --   --   --   --  22  --  43   GLUCOSE, ISTAT mg/dl  --  95  --   --   --   --   --     < > = values in this interval not displayed  PT/INR:   No results found for: PT, INR,   Magnesium:   Results from last 7 days   Lab Units 01/11/20  0349 01/07/20  0524   MAGNESIUM mg/dL 2 5 2 0     Phosphorous:       Microbiology:  Results from last 7 days   Lab Units 01/06/20  2235 01/06/20  2230   BLOOD CULTURE  No Growth at 72 hrs  No Growth at 72 hrs  Imaging:  CXR - b/l pulmonary vascular congestion, L sided pleural effusion    Cardiac lab/EKG/telemetry/ECHO:   Atrial fibrillation    VTE Prophylaxis: Eliquis    Code Status: Level 1 - Full Code    Bryant Fair Spatzer, CRNP    Portions of the record may have been created with voice recognition software  Occasional wrong word or "sound a like" substitutions may have occurred due to the inherent limitations of voice recognition software  Read the chart carefully and recognize, using context, where substitutions have occurred

## 2020-01-11 NOTE — ASSESSMENT & PLAN NOTE
· Secondary to hypercapnia and hypoglycemia  Continue BiPAP for hypercapnia    Encourage compliance with nocturnal BiPAP  · Avoid hypoglycemia  · Monitor mental status closely

## 2020-01-11 NOTE — ASSESSMENT & PLAN NOTE
Lab Results   Component Value Date    HGBA1C 5 8 04/11/2019       Recent Labs     01/10/20  2138 01/10/20  2223 01/10/20  2329 01/11/20  0008   POCGLU 148* 103 96 72       Blood Sugar Average: Last 72 hrs:  · Initially had persistent hypoglycemia which had improved and now reoccurred overnight  · TSH normal, random cortisol 17 9  · Continue accucheks with SSI coverage  Goal to maintain -180    Aggressively monitor and treat hypoglycemia as this is likely contributing factor to her encephalopathy

## 2020-01-11 NOTE — ASSESSMENT & PLAN NOTE
· She would benefit from weight loss  · Likely component of OHV that is contributing to her acute on chronic pulmonary issues

## 2020-01-11 NOTE — ASSESSMENT & PLAN NOTE
· Acute on CKD III with baseline approximately 1 5  · Nephrology is following  Creatinine continues to trend up  Urine output has been decreased  · Diuretics have bee on hold secondary to relative hypotension and SAPNA  · Trend renal indices and monitor intake and output

## 2020-01-11 NOTE — PROGRESS NOTES
ccConsultation - Nephrology   Melquiades Green 63 y o  female MRN: [id number]  Unit/Bed#: ICU 06 Encounter: [id number]  ASSESSMENT and PLAN:  1  Acute kidney injury (POA): Multifactorial: Suspect ATN secondary  to volume overload, hypotension episodes, prior Ace I use  - patient was admitted with a creatinine of 3 32 mg/dL  - peak creatinine during this admission thus far was 3 84 mg/dL  Today-continues to worsen  -Has Purwick in place fo r150 ml overnight  -I/O not documented well while on [facility] floors  -Will check renal U/S   -will check urinalysis with micro   - Avoid nephrotoxins, adjust meds to appropriate GFR   - Acid base and lytes stable, bicarb 30, potassium 3 8, mag 2 5  - Clinically patient is not uremic and there is no acute indication for renal replacement therapy (dialysis), however if renal function continues to worsen, this may need to be discussed   -Optimize hemodynamic status to avoid delay in renal recovery  -continue to trend I/O, lab values volume status  -Discussed with primary team, with decreased U/O, concern for ATN, Bipap need will give one time lasix and monitor I/O, labs and volume status closely  -Of note: home dose diuretics include torsemide 40 mg twice a day as well as metolazone 5 mg twice a week per heart failure team     2  Chronic kidney disease:  Suspect secondary to DM, HTN, obesity and CHF  -after review medical records baseline creatinine between 1 3-1 6  -will need follow-up on discharge    3  Blood pressure: Having hypotension episodes  -current medications-midodrine 5 mg TID-may need to increase to 10 mg TID  -also on Toprol  mg daily, Cardizem  mg daily with hold parameters SBP < 120  -maximize hemodynamics to maintain MAP >65  -avoid hypotension or fluctuations in blood pressure  -will continue to trend    4  H&H/anemia: Stable at 16 1  -trend    5  Acid-base: stable with bicarb 30  -Currently on bipap  -Monitor with dieretics     6    Diabetes:  Per primary team  -need adequate blood sugar control    7  Acute on Chronic Hypoxic/hypercapnic respiratory failure: multifactorial:  COPD, Pulmonary HTN, volume overload with hx of CHF, tobacco abuse  -Patient with rapid response yesterday and now on BIPAP  Chest XRAY obtained, reviewed personally and reveals worsening right effusion, await formal read  -Will give one dose of IV lasix and monitor closely-discussed with primary team        SUBJECTIVE:  Patient seen and examined  Patient sluggish and falls asleep easily  Wfhen awake, denies chest pain or shortness of breath        OBJECTIVE:  Current Weight: Weight - Scale: 135 kg (296 lb 15 4 oz)  Vitals:    01/11/20 0410 01/11/20 0600 01/11/20 0603 01/11/20 0705   BP: 128/91  102/71    BP Location:       Pulse: 78  74    Resp: 20  19    Temp: 98 6 °F (37 °C)      TempSrc: Temporal      SpO2: 97%  97% 97%   Weight:  135 kg (296 lb 15 4 oz)     Height:           Intake/Output Summary (Last 24 hours) at 1/11/2020 0741  Last data filed at 1/11/2020 0501  Gross per 24 hour   Intake    Output 150 ml   Net -150 ml     General:  No acute distress, on Bipap, sluggish  Skin:  Warm and dry without rash  ENMT:  Mucous membranes moist, sclera anicteric, tongue is midline  Neck:  Supple without JVD noted   Respiratory:  Decreased throughout, fine crackles bases, poor inspiration effort  Cardiac:  Regular rate and rhythm without rub, or murmur  GI:  Obese, Soft, nontender, no distention, active bowel sounds  : purwick in place for dark suresh urine  Extremities:  +1 LE  edema noted bilaterally, chronic venous stasis bilaterally  Neuro:  Alert and awake however falls asleep  Psych:  Appropriate affect when awake    Medications:    Current Facility-Administered Medications:     albuterol (PROVENTIL HFA,VENTOLIN HFA) inhaler 2 puff, 2 puff, Inhalation, Q4H PRN, Alease Cho Bilofsky, CRNP    apixaban (ELIQUIS) tablet 2 5 mg, 2 5 mg, Oral, BID, Alease Cho Bilofsky, CRNP, 2 5 mg at 01/10/20 0817   buPROPion (WELLBUTRIN XL) 24 hr tablet 150 mg, 150 mg, Oral, Daily, MaryjaneBALIJT AbdiNP, 150 mg at 01/10/20 0817    diltiazem (CARDIZEM CD) 24 hr capsule 120 mg, 120 mg, Oral, Daily, Lady Grace, BALJITNP, Stopped at 01/10/20 0811    insulin lispro (HumaLOG) 100 units/mL subcutaneous injection 1-6 Units, 1-6 Units, Subcutaneous, TID AC, Stopped at 01/10/20 0752 **AND** Fingerstick Glucose (POCT), , , TID AC, Luli K Kofi, BALJITNP    insulin lispro (HumaLOG) 100 units/mL subcutaneous injection 1-6 Units, 1-6 Units, Subcutaneous, HS, Luli K Kofi CRNP, 1 Units at 01/08/20 2200    ipratropium (ATROVENT) 0 02 % inhalation solution 0 5 mg, 0 5 mg, Nebulization, TID, MAYNOR Amato, 0 5 mg at 01/11/20 0705    levalbuterol (XOPENEX) inhalation solution 1 25 mg, 1 25 mg, Nebulization, TID, Maryjane Mail Kofi, BALJITNP, 1 25 mg at 01/11/20 0705    levalbuterol (XOPENEX) inhalation solution 1 25 mg, 1 25 mg, Nebulization, Q4H PRN, MAYNOR Amato    metoprolol succinate (TOPROL-XL) 24 hr tablet 100 mg, 100 mg, Oral, Daily, BALJIT AmatoNP, Stopped at 01/10/20 0811    midodrine (PROAMATINE) tablet 5 mg, 5 mg, Oral, TID AC, Luli K BALJIT KirbyNP, Stopped at 01/11/20 0635    nicotine (NICODERM CQ) 14 mg/24hr TD 24 hr patch 1 patch, 1 patch, Transdermal, Daily, Maryjane BALJIT GoodNP, 1 patch at 01/10/20 0816    nystatin (MYCOSTATIN) powder, , Topical, BID,  Or, MAYNOR    Laboratory Results:  Results from last 7 days   Lab Units 01/11/20  0349 01/11/20  0348 01/10/20  2325 01/10/20  0512 01/09/20  0438 01/08/20  0516 01/07/20  1137 01/07/20  0524 01/06/20  2241   WBC Thousand/uL  --   --   --  7 60 8 09 5 91 7 16 6 49 7 82   HEMOGLOBIN g/dL  --   --   --  16 1* 15 8* 16 4* 17 4* 16 2* 19 5*   I STAT HEMOGLOBIN g/dl  --   --  18 7*  --   --   --   --   --   --    HEMATOCRIT %  --   --   --  57 2* 55 5* 55 7* 59 0* 55 4* 65 6*   HEMATOCRIT, ISTAT %  --   --  55*  --   --   --   --   --   -- PLATELETS Thousands/uL  --   --   --  106* 110* 115* 125* 122* 156   SODIUM mmol/L  --  146*  --  145 144 147*  --  148* 146*   POTASSIUM mmol/L  --  3 8  --  3 7 3 5 3 8  --  3 9 4 0   CHLORIDE mmol/L  --  106  --  105 104 107  --  109* 103   CO2 mmol/L  --  30  --  29 30 29  --  28 30   CO2, I-STAT mmol/L  --   --  32  --   --   --   --   --   --    BUN mg/dL  --  67*  --  59* 51* 46*  --  42* 46*   CREATININE mg/dL  --  3 84*  --  3 73* 3 51* 3 10*  --  2 78* 3 32*   CALCIUM mg/dL  --  7 8*  --  7 6* 8 1* 8 1*  --  7 2* 9 1   MAGNESIUM mg/dL 2 5  --   --   --   --   --   --  2 0  --    GLUCOSE, ISTAT mg/dl  --   --  95  --   --   --   --   --   --

## 2020-01-11 NOTE — OCCUPATIONAL THERAPY NOTE
[address] Evaluation     Patient Name: Lydia Zamora  LGXMV'H Date: 1/11/2020  Problem List  Principal Problem:    Acute on chronic respiratory failure with hypoxia and hypercapnia (HCC)  Active Problems:    Chronic obstructive pulmonary disease with acute exacerbation (HCC)    Permanent atrial fibrillation    Chronic venous stasis dermatitis of both lower extremities    Type 2 diabetes mellitus without complication (HCC)    Obesity    SAPNA (acute kidney injury) ([facility] )    Adjustment disorder    Encephalopathy acute    Past Medical History  Past Medical History:   Diagnosis Date    Atrial fibrillation with RVR ([place] [address] )     COPD (chronic obstructive pulmonary disease) ([place] [address] )     Diabetes type 2, uncontrolled ([place] [address] )     Hypertension     SIRS (systemic inflammatory response syndrome) ([place] [address] ) 1/7/2020     Past Surgical History  Past Surgical History:   Procedure Laterality Date    HERNIA REPAIR      HYSTERECTOMY      LAPAROSCOPIC CHOLECYSTECTOMY               01/11/20 6265   Note Type   Note type Eval/Treat   Restrictions/Precautions   Weight Bearing Precautions Per Order No   Other Precautions Cognitive; Bed Alarm; Fall Risk;Telemetry;Multiple lines;O2;Pain  (bipap)   Pain Assessment   Pain Assessment FLACC   Pain Rating: FLACC (Rest) - Face 1   Pain Rating: FLACC (Rest) - Legs 0   Pain Rating: FLACC (Rest) - Activity 0   Pain Rating: FLACC (Rest) - Cry 1   Pain Rating: FLACC (Rest) - Consolability 1   Score: FLACC (Rest) 3   Pain Rating: FLACC (Activity) - Face 0   Pain Rating: FLACC (Activity) - Legs 0   Pain Rating: FLACC (Activity) - Activity 0   Pain Rating: FLACC (Activity) - Cry 1   Pain Rating: FLACC (Activity) - Consolability 1   Score: FLACC (Activity) 2   Home Living   Type of Home House   Home Layout Two level;Bed/bath upstairs  (flight to bed/bath)   Bathroom Shower/Tub Walk-in shower   Bathroom Toilet Standard   Bathroom Accessibility Accessible   Home Equipment Cane  (O2 at 10 L) Additional Comments pt poor historian, increased processing time unable to provide history information taken from pt chart   Prior Function   Level of [place] Independent with ADLs and functional mobility   Lives With Alone   Receives Help From Family   ADL Assistance Independent   IADLs Independent   Falls in the last 6 months 1 to 4  (reports falls unable to quantify)   Vocational Retired   Comments pt w/ increased confusion, information taken from chart   Lifestyle   Autonomy per chart pt independent w/ ADLs, independent w/ functional transfers and mobiltiy w/ SPC, independent w/ IADLs, driving   Reciprocal Relationships son   Service to Others retired unable to report   Intrinsic Gratification unable to report   Subjective   Subjective "I'm getting agitated"   ADL   Where Assessed Chair   Eating Assistance 3  Moderate Assistance   Grooming Assistance 2  Maximal Assistance   UB Bathing Assistance 2  Maximal Assistance   LB Bathing Assistance 1  Total Assistance   UB Dressing Assistance 2  Maximal Assistance   LB Dressing Assistance 1  Total Assistance   Toileting Assistance  1  Total Assistance   Bed Mobility   Rolling R 2  Maximal assistance   Additional items Assist x 2; Increased time required;Verbal cues;LE management; Bedrails   Rolling L 2  Maximal assistance   Additional items Assist x 2; Increased time required;Verbal cues;LE management; Bedrails   Supine to Sit 1  Dependent   Additional items Assist x 2; Increased time required;Verbal cues;LE management; Bedrails;HOB elevated   Sit to Supine 1  Dependent   Additional items Assist x 2; Increased time required;Verbal cues;LE management; Bedrails   Additional Comments pt w/ increased time to complete task and encouragement to asist; pt EOB x3 minutes w/ Poor + sitting balance   Transfers   Sit to Stand Unable to assess   Additional Comments pt not appropriate for OOB at this time   Balance   Static Sitting Poor +   Dynamic Sitting Poor -   Activity Tolerance Activity Tolerance Patient limited by fatigue;Treatment limited secondary to medical complications (Comment); Treatment limited secondary to agitation;Patient limited by pain   Nurse Made Aware appropriate to see per Teressa HUNTLEY   RUE Assessment   RUE Assessment X   RUE Overall AROM   R Shoulder Flexion 75   R Elbow Flexion WFL   R Elbow Extension WFL   R Elbow Supination WFL   R Elbow Pronation WFL   R Mass Grasp 3+/5   RUE Strength   RUE Overall Strength Deficits   R Shoulder Flexion 2+/5   R Shoulder Extension 3-/5   R Elbow Flexion 3/5   R Elbow Extension 3/5   LUE Assessment   LUE Assessment X   LUE Overall AROM   L Shoulder Flexion 75   L Elbow Flexion WFL   L Elbow Extension WFL   L Elbow Supination WFL   L Elbow Pronation WFL   LUE Strength   LUE Overall Strength Deficits   L Shoulder Flexion 2+/5   L Shoulder Extension 3-/5   L Elbow Flexion 3/5   L Elbow Extension 3/5   Hand Function   Gross Motor Coordination Impaired   Fine Motor Coordination Impaired   Sensation   Light Touch Partial deficits in the RUE;Partial deficits in the LUE   Proprioception   Proprioception No apparent deficits   Vision-Basic Assessment   Current Vision Wears glasses only for reading   Vision - Complex Assessment   Ocular Range of [facility]   Additional Comments reports difficulty w/ vision on R side saying she can't find her "hand" and unable to follow visual testing then patient put R hand on bed rail and stating "I found it, I found it"   Perception   Inattention/Neglect Appears intact   Perseveration Perseverates during conversation   Cognition   Overall Cognitive Status Impaired   Arousal/Participation Lethargic;Persistent stimuli required;Poorly responsive   Attention Difficulty attending to directions   Orientation Level Oriented to person;Disoriented to place; Disoriented to time;Disoriented to situation   Memory Decreased short term memory;Decreased recall of precautions;Decreased recall of recent events;Decreased recall of biographical information   Following Commands Follows one step commands inconsistently   Comments pt w/ increased lethargy and confusion, impaired insight and safety awareness, increased processing time and delayed responses, impaired insight; inconsistently following commands, difficult to understand while on bipap, selfl-limiting at times   Assessment   Limitation Decreased ADL status; Decreased UE strength;Decreased Safe judgement during ADL;Decreased cognition;Decreased endurance;Decreased self-care trans;Decreased high-level ADLs   Prognosis Guarded   Assessment Pt is a 63 y o  female seen for OT evaluation s/p admit to [facility] on 1/6/2020 w/ SOB; Acute on chronic respiratory failure with hypoxia and hypercapnia ([facility] [address] ) and pt initially admitted to ICU and on bipap then on 10L O2 on medsurge floor then on 1/10 rapid response after pt dian lift OOB and w/ increased lethargy and respiratory distress 2* hypercapnia and pt placed on Bipap and transferred to ICU  At baseline pt on 10 L O2  Comorbidities affecting pt's functional performance at time of assessment include: DM, a-fib, adjustment disorder, CHF, SIRS, L pleural effusion (may need thoracentesis), tobacco abuse  Personal factors affecting pt at time of IE include: lives alone, poor historian and increased confusion  Prior to admission, pt was living alone in apartment and per chart pt independent w/ ADLs, independent w/ functional transfers and mobility w/ SPC, independent w/ IADLs, driving   Upon evaluation: Pt requires MAX assist x2 rolling in bed, Dependent x2 supine<>sit bed mobility w/ increased time to complete, total assist LB ADLs, MAX assist UB ADLs, MOD assist grooming, total assist toileting 2* the following deficits impacting occupational performance: generalized weakness, decreased AROM b/l UEs, inconsistently following commands, multiple lines, impaired balance, impaired activity tolerance, impaired cognition (insight, safety awareness, increased processing time and delayed responses), agitation, impaired functional reach, questionable visual deficits, b/l UE edema, increased pain, dyspnea  Pt not appropriate for functional transfers at this time, recommend dian lift OOB w/ nursing, will complete functional transfer assessment next session as appropriate  Pt to benefit from continued skilled OT tx while in the hospital to address deficits as defined above and maximize level of functional independence w ADL's and functional mobility  Occupational Performance areas to address include: grooming, bathing/shower, toilet hygiene, dressing, health maintenance, functional mobility, clothing management, cleaning and meal prep, formal cognitive assessment, EC education, UE exercises,  From OT standpoint, recommendation at time of d/c would be short term rehab  Goals   Patient Goals none expressed at this time   LTG Time Frame 10-14   Long Term Goal please see below goals   Plan   Treatment Interventions ADL retraining;UE strengthening/ROM; Functional transfer training;Cognitive reorientation; Endurance training;Patient/family training;Equipment evaluation/education; Compensatory technique education; Energy conservation; Activityengagement;Continued evaluation; Fine motor coordination activities   Goal Expiration Date 01/25/20   OT Frequency 3-5x/wk   Recommendation   OT Discharge Recommendation Short Term Rehab   OT - OK to Discharge   (to rehab when medically stable)   Barthel Index   Feeding 5   Bathing 0   Grooming Score 0   Dressing Score 0   Bladder Score 0   Bowels Score 5   Toilet Use Score 0   Transfers (Bed/Chair) Score 5   Mobility (Level Surface) Score 0   Stairs Score 0   Barthel Index Score 15   Modified [place] Scale   Modified Jason Scale 5     Occupational Therapy Goals to be met in 10-14 days:  1) Pt will improve activity tolerance to G for 30 min txment sessions to enhance ADLs  2) Pt will complete ADLs/self care w/ min assist  3) Pt will complete toileting w/ min assist w/ G hygiene/thoroughness using DME PRN  4) OTR to assess functional transfers and mobility as appropriate  5) Pt will engage in ongoing cognitive assessment w/ G participation to A w/ safe d/c planning/recommendations  6) Pt will demonstrate G carryover of pt/caregiver education and training as appropriate w/ mod I  w/ G tolerance  7) Pt will engage in depression screen/leisure interest checklist w/ G participation to monitor s/s depression and ID 3 positive coping strategies to A w/ emotional regulation and management  8) Pt will demonstrate 100% carryover of E C  techniques w/ mod I t/o fx'l I/ADL/leisure tasks w/o cues s/p skilled education  9) Pt will tolerate bed mobility and EOB seated tasks w/ mod A for 15 mins to engage in fx'l I/ADL/leisure tasks   10) Pt will demonstrate improved b/l UE strength by 1 MMT grade to enhance ADLS and functional transfers   11) Pt will demonstrate repositioning techniques in bed w/ min assist to prevent further skin breakdown   12) Pt will demonstrate and recall self-monitoring techniques for CHF (such as daily weights on scale, reading scale, modified diets) at MOD I level s/p education and training to enhance health maintenance routines at home       Documentation completed by: Elenita Gonzalez MS, OTR/L

## 2020-01-12 ENCOUNTER — APPOINTMENT (INPATIENT)
Dept: ULTRASOUND IMAGING | Facility: HOSPITAL | Age: 60
DRG: 291 | End: 2020-01-12
Payer: COMMERCIAL

## 2020-01-12 LAB
ANION GAP SERPL CALCULATED.3IONS-SCNC: 10 MMOL/L (ref 4–13)
ARTERIAL PATENCY WRIST A: YES
BACTERIA BLD CULT: NORMAL
BACTERIA BLD CULT: NORMAL
BASE EXCESS BLDA CALC-SCNC: -0.9 MMOL/L
BUN SERPL-MCNC: 67 MG/DL (ref 5–25)
CALCIUM SERPL-MCNC: 7.6 MG/DL (ref 8.3–10.1)
CHLORIDE SERPL-SCNC: 106 MMOL/L (ref 100–108)
CO2 SERPL-SCNC: 30 MMOL/L (ref 21–32)
CREAT SERPL-MCNC: 3.42 MG/DL (ref 0.6–1.3)
GFR SERPL CREATININE-BSD FRML MDRD: 14 ML/MIN/1.73SQ M
GLUCOSE SERPL-MCNC: 116 MG/DL (ref 65–140)
GLUCOSE SERPL-MCNC: 77 MG/DL (ref 65–140)
GLUCOSE SERPL-MCNC: 82 MG/DL (ref 65–140)
GLUCOSE SERPL-MCNC: 84 MG/DL (ref 65–140)
GLUCOSE SERPL-MCNC: 89 MG/DL (ref 65–140)
GLUCOSE SERPL-MCNC: 89 MG/DL (ref 65–140)
GLUCOSE SERPL-MCNC: 90 MG/DL (ref 65–140)
GLUCOSE SERPL-MCNC: 98 MG/DL (ref 65–140)
HCO3 BLDA-SCNC: 25.3 MMOL/L (ref 22–28)
IPAP: 22
NON VENT- BIPAP: ABNORMAL
O2 CT BLDA-SCNC: 24.9 ML/DL (ref 16–23)
OXYHGB MFR BLDA: 95.1 % (ref 94–97)
PCO2 BLDA: 46.8 MM HG (ref 36–44)
PEEP MAX SETTING VENT: 10 CM[H2O]
PH BLDA: 7.35 [PH] (ref 7.35–7.45)
PHOSPHATE SERPL-MCNC: 5.5 MG/DL (ref 2.7–4.5)
PO2 BLDA: 84.1 MM HG (ref 75–129)
POTASSIUM SERPL-SCNC: 3.6 MMOL/L (ref 3.5–5.3)
SODIUM SERPL-SCNC: 146 MMOL/L (ref 136–145)
SPECIMEN SOURCE: ABNORMAL
VENT BIPAP FIO2: 80 %

## 2020-01-12 PROCEDURE — 94660 CPAP INITIATION&MGMT: CPT

## 2020-01-12 PROCEDURE — 80048 BASIC METABOLIC PNL TOTAL CA: CPT | Performed by: NURSE PRACTITIONER

## 2020-01-12 PROCEDURE — 36600 WITHDRAWAL OF ARTERIAL BLOOD: CPT

## 2020-01-12 PROCEDURE — 99291 CRITICAL CARE FIRST HOUR: CPT | Performed by: INTERNAL MEDICINE

## 2020-01-12 PROCEDURE — 99232 SBSQ HOSP IP/OBS MODERATE 35: CPT | Performed by: INTERNAL MEDICINE

## 2020-01-12 PROCEDURE — 82805 BLOOD GASES W/O2 SATURATION: CPT | Performed by: NURSE PRACTITIONER

## 2020-01-12 PROCEDURE — 82948 REAGENT STRIP/BLOOD GLUCOSE: CPT

## 2020-01-12 PROCEDURE — 94640 AIRWAY INHALATION TREATMENT: CPT

## 2020-01-12 PROCEDURE — 84100 ASSAY OF PHOSPHORUS: CPT | Performed by: NURSE PRACTITIONER

## 2020-01-12 PROCEDURE — 76770 US EXAM ABDO BACK WALL COMP: CPT

## 2020-01-12 RX ORDER — METOPROLOL TARTRATE 5 MG/5ML
5 INJECTION INTRAVENOUS EVERY 6 HOURS PRN
Status: DISCONTINUED | OUTPATIENT
Start: 2020-01-12 | End: 2020-01-14

## 2020-01-12 RX ORDER — HEPARIN SODIUM 5000 [USP'U]/ML
5000 INJECTION, SOLUTION INTRAVENOUS; SUBCUTANEOUS EVERY 8 HOURS SCHEDULED
Status: DISCONTINUED | OUTPATIENT
Start: 2020-01-12 | End: 2020-01-13

## 2020-01-12 RX ORDER — POTASSIUM CHLORIDE 14.9 MG/ML
20 INJECTION INTRAVENOUS ONCE
Status: COMPLETED | OUTPATIENT
Start: 2020-01-12 | End: 2020-01-12

## 2020-01-12 RX ORDER — LABETALOL 20 MG/4 ML (5 MG/ML) INTRAVENOUS SYRINGE
10 EVERY 6 HOURS PRN
Status: DISCONTINUED | OUTPATIENT
Start: 2020-01-12 | End: 2020-01-17

## 2020-01-12 RX ORDER — LABETALOL 20 MG/4 ML (5 MG/ML) INTRAVENOUS SYRINGE
Status: COMPLETED
Start: 2020-01-12 | End: 2020-01-12

## 2020-01-12 RX ADMIN — NYSTATIN: 100000 POWDER TOPICAL at 09:36

## 2020-01-12 RX ADMIN — LEVALBUTEROL HYDROCHLORIDE 1.25 MG: 1.25 SOLUTION, CONCENTRATE RESPIRATORY (INHALATION) at 19:04

## 2020-01-12 RX ADMIN — DEXTROSE 75 ML/HR: 5 SOLUTION INTRAVENOUS at 15:17

## 2020-01-12 RX ADMIN — LABETALOL 20 MG/4 ML (5 MG/ML) INTRAVENOUS SYRINGE 10 MG: at 23:17

## 2020-01-12 RX ADMIN — METOPROLOL TARTRATE 5 MG: 5 INJECTION INTRAVENOUS at 20:17

## 2020-01-12 RX ADMIN — LEVALBUTEROL HYDROCHLORIDE 1.25 MG: 1.25 SOLUTION, CONCENTRATE RESPIRATORY (INHALATION) at 13:05

## 2020-01-12 RX ADMIN — NICOTINE 1 PATCH: 14 PATCH TRANSDERMAL at 09:40

## 2020-01-12 RX ADMIN — METOPROLOL TARTRATE 5 MG: 5 INJECTION INTRAVENOUS at 09:35

## 2020-01-12 RX ADMIN — Medication 40 MG/HR: at 21:00

## 2020-01-12 RX ADMIN — HEPARIN SODIUM 5000 UNITS: 5000 INJECTION INTRAVENOUS; SUBCUTANEOUS at 21:04

## 2020-01-12 RX ADMIN — IPRATROPIUM BROMIDE 0.5 MG: 0.5 SOLUTION RESPIRATORY (INHALATION) at 13:04

## 2020-01-12 RX ADMIN — Medication 40 MG/HR: at 08:55

## 2020-01-12 RX ADMIN — HEPARIN SODIUM 5000 UNITS: 5000 INJECTION INTRAVENOUS; SUBCUTANEOUS at 14:35

## 2020-01-12 RX ADMIN — IPRATROPIUM BROMIDE 0.5 MG: 0.5 SOLUTION RESPIRATORY (INHALATION) at 19:04

## 2020-01-12 RX ADMIN — POTASSIUM CHLORIDE 20 MEQ: 14.9 INJECTION, SOLUTION INTRAVENOUS at 09:33

## 2020-01-12 RX ADMIN — LEVALBUTEROL HYDROCHLORIDE 1.25 MG: 1.25 SOLUTION, CONCENTRATE RESPIRATORY (INHALATION) at 07:42

## 2020-01-12 RX ADMIN — DEXTROSE 50 ML/HR: 5 SOLUTION INTRAVENOUS at 07:25

## 2020-01-12 RX ADMIN — NYSTATIN 1 APPLICATION: 100000 POWDER TOPICAL at 17:59

## 2020-01-12 RX ADMIN — IPRATROPIUM BROMIDE 0.5 MG: 0.5 SOLUTION RESPIRATORY (INHALATION) at 07:42

## 2020-01-12 NOTE — ASSESSMENT & PLAN NOTE
· Rate is controlled    Continue PO cardizem and metoprolol  · Cont eliquis for [facility]

## 2020-01-12 NOTE — ASSESSMENT & PLAN NOTE
Wt Readings from Last 3 Encounters:   01/11/20 135 kg (296 lb 15 4 oz)   11/05/19 110 kg (243 lb 9 6 oz)   10/28/19 109 kg (240 lb 4 8 oz)       · Suspect decompensated CHF  Echo from 1/8/20 shows EF of 65% with diastolic dysfunction  · Attempting diuresis with lasix infusion as above

## 2020-01-12 NOTE — ASSESSMENT & PLAN NOTE
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

## 2020-01-12 NOTE — PROGRESS NOTES
ccConsultation - Nephrology   eKila Emery 63 y o  female MRN: [id number]  Unit/Bed#: ICU 06 Encounter: [id number]  ASSESSMENT and PLAN:  1  Acute kidney injury (POA): Multifactorial: Suspect ATN secondary  to volume overload, hypotension episodes, prior Ace I use  - patient was admitted with a creatinine of 3 32 mg/dL  - peak creatinine during this admission thus far was 3 84 mg/dL  , currently 3 42-improved with IV lasix gtt  -Has [place] in place for 2 6 Liters overnight  -U/A with micro: moderate blood, +1 protein, RBC 10-20  WBC 0-1  -renal U/S -pending  - Avoid nephrotoxins, adjust meds to appropriate GFR   - Acid base and lytes stable, bicarb 30, potassium 3 6, phos 5 5  - Clinically patient is not uremic and there is no acute indication for renal replacement therapy (dialysis), however if renal function continues to worsen, this may need to be discussed   -Optimize hemodynamic status to avoid delay in renal recovery  -continue to trend I/O, lab values volume status  -Of note: home dose diuretics include torsemide 40 mg twice a day as well as metolazone 5 mg twice a week per heart failure team   -Currently on Lasix GTT at 4 ml/hour-with good U/O-continue for now     2  Chronic kidney disease:  Suspect secondary to DM, HTN, obesity and CHF  -after review medical records baseline creatinine between 1 3-1 6  -will need follow-up on discharge     3  Blood pressure: Having hypotension episodes  -on Toprol  mg daily, Cardizem  mg daily with hold parameters SBP < 120  -maximize hemodynamics to maintain MAP >65  -avoid hypotension or fluctuations in blood pressure  -will continue to trend     4  H&H/anemia: Stable at 16 1 last checked  -trend     5  Acid-base: stable with bicarb 30  -Currently on bipap  -Monitor with IV diuretic gtt     6  Diabetes:  Per primary team  -need adequate blood sugar control     7   Acute on Chronic Hypoxic/hypercapnic respiratory failure: multifactorial:  COPD, Pulmonary HTN, volume overload with hx of CHF, tobacco abuse  -Patient with rapid response Friday and now on BIPAP  Chest XRAY-reports suspect multifocal pneumonia and probable super imposed CHF  -Currently on IV lasix gtt    8  Hypokalemia: Likely diuretic effect  -Will treat with IV potassium  -Trend    SUBJECTIVE:  Patient seen and examined  Denies chest pain when asked by shaking head no    Remains on Bipap    OBJECTIVE:  Current Weight: Weight - Scale: 136 kg (299 lb 2 6 oz)  Vitals:    01/12/20 0515 01/12/20 0600 01/12/20 0715 01/12/20 0742   BP: 132/87      Pulse: (!) 108      Resp: (!) 24      Temp:   97 6 °F (36 4 °C)    TempSrc:   Temporal    SpO2: 93%   94%   Weight:  136 kg (299 lb 2 6 oz)     Height:           Intake/Output Summary (Last 24 hours) at 1/12/2020 9239  Last data filed at 1/12/2020 0725  Gross per 24 hour   Intake 1001 93 ml   Output 2636 ml   Net -1634 07 ml     General:  No acute distress, sluggish  Skin:  Warm and dry without rash  ENMT:  Mucous membranes moist, sclera anicteric  Neck:  Supple without JVD noted  Respiratory: Decreased, no noted crackles or wheezes this am  Cardiac:  Irregular rate and rhythm   GI:  Soft, nontender, no distention, active bowel sounds  Extremities:  Trace lower extremity edema noted bilaterally, chronic venous stasis  Neuro:  Alert and awake voice commands,   Psych:  Appropriate affect    Medications:    Current Facility-Administered Medications:     albuterol (PROVENTIL HFA,VENTOLIN HFA) inhaler 2 puff, 2 puff, Inhalation, Q4H PRN, MAYNOR Luis    apixaban (ELIQUIS) tablet 2 5 mg, 2 5 mg, Oral, BID, MAYNOR Luis, Stopped at 01/11/20 1128    buPROPion (WELLBUTRIN XL) 24 hr tablet 150 mg, 150 mg, Oral, Daily, MAYNOR Luis, Stopped at 01/11/20 1128    dextrose 5 % infusion, 50 mL/hr, Intravenous, Continuous, MAYNOR Stokes, Last Rate: 50 mL/hr at 01/12/20 0725, 50 mL/hr at 01/12/20 0725    diltiazem (CARDIZEM CD) 24 hr capsule 120 mg, 120 mg, Oral, Daily, Nichole Lanes, CRNP, Stopped at 01/10/20 6228    furosemide (LASIX) 500 mg infusion 50 mL, 40 mg/hr, Intravenous, Continuous, MAYNOR Mancuso, Last Rate: 4 mL/hr at 01/11/20 1924, 40 mg/hr at 01/11/20 1924    insulin lispro (HumaLOG) 100 units/mL subcutaneous injection 1-6 Units, 1-6 Units, Subcutaneous, TID AC, Stopped at 01/10/20 0752 **AND** Fingerstick Glucose (POCT), , , TID AC, MAYNOR Jaeger    insulin lispro (HumaLOG) 100 units/mL subcutaneous injection 1-6 Units, 1-6 Units, Subcutaneous, HS, MAYNOR Jaeger, 1 Units at 01/08/20 2200    ipratropium (ATROVENT) 0 02 % inhalation solution 0 5 mg, 0 5 mg, Nebulization, TID, MAYNOR Jaeger, 0 5 mg at 01/12/20 0742    levalbuterol (XOPENEX) inhalation solution 1 25 mg, 1 25 mg, Nebulization, TID, MAYNOR Jaeger, 1 25 mg at 01/12/20 0742    levalbuterol (XOPENEX) inhalation solution 1 25 mg, 1 25 mg, Nebulization, Q4H PRN, MAYNOR Victoria    metoprolol succinate (TOPROL-XL) 24 hr tablet 100 mg, 100 mg, Oral, Daily, MAYNOR Victoria, Stopped at 01/10/20 0811    nicotine (NICODERM CQ) 14 mg/24hr TD 24 hr patch 1 patch, 1 patch, Transdermal, Daily, MAYNOR Victoria, 1 patch at 01/11/20 0842    nystatin (MYCOSTATIN) powder, , Topical, BID, Nichole Lanes, CRNP    Laboratory Results:  Results from last 7 days   Lab Units 01/12/20  0429 01/11/20  1241 01/11/20  0349 01/11/20  0348 01/10/20  2325 01/10/20  0512 01/09/20  0438 01/08/20  0516 01/07/20  1137 01/07/20  0524 01/06/20  2241   WBC Thousand/uL  --   --   --   --   --  7 60 8 09 5 91 7 16 6 49 7 82   HEMOGLOBIN g/dL  --   --   --   --   --  16 1* 15 8* 16 4* 17 4* 16 2* 19 5*   I STAT HEMOGLOBIN g/dl  --   --   --   --  18 7*  --   --   --   --   --   --    HEMATOCRIT %  --   --   --   --   --  57 2* 55 5* 55 7* 59 0* 55 4* 65 6*   HEMATOCRIT, ISTAT %  --   --   --   --  55*  --   --   --   --   --   --    PLATELETS Thousands/uL  --   --   --   --   --  106* 110* 115* 125* 122* 156   SODIUM mmol/L 146* 145  --  146*  --  145 144 147*  --  148* 146*   POTASSIUM mmol/L 3 6 4 0  --  3 8  --  3 7 3 5 3 8  --  3 9 4 0   CHLORIDE mmol/L 106 106  --  106  --  105 104 107  --  109* 103   CO2 mmol/L 30 29  --  30  --  29 30 29  --  28 30   CO2, I-STAT mmol/L  --   --   --   --  32  --   --   --   --   --   --    BUN mg/dL 67* 67*  --  67*  --  59* 51* 46*  --  42* 46*   CREATININE mg/dL 3 42* 3 75*  --  3 84*  --  3 73* 3 51* 3 10*  --  2 78* 3 32*   CALCIUM mg/dL 7 6* 7 8*  --  7 8*  --  7 6* 8 1* 8 1*  --  7 2* 9 1   MAGNESIUM mg/dL  --   --  2 5  --   --   --   --   --   --  2 0  --    PHOSPHORUS mg/dL 5 5*  --   --   --   --   --   --   --   --   --   --    GLUCOSE, ISTAT mg/dl  --   --   --   --  95  --   --   --   --   --   --

## 2020-01-12 NOTE — ASSESSMENT & PLAN NOTE
Lab Results   Component Value Date    HGBA1C 5 8 04/11/2019       Recent Labs     01/11/20  1606 01/11/20  1655 01/11/20  1755 01/11/20 2019   POCGLU 74 74 77 78       Blood Sugar Average: Last 72 hrs:  · Persistent hypoglycemia possibly due to poor hepatic perfusion in the setting of decompensated CHF  Continue IV dextrose  · TSH normal, random cortisol 17 9  · Goal to maintain -180    Aggressively monitor and treat hypoglycemia as this is likely contributing factor to her encephalopathy

## 2020-01-12 NOTE — ASSESSMENT & PLAN NOTE
· Acute on CKD III with baseline approximately 1 5  · Nephrology is following  Creatinine is improving  Urine output significantly improved on lasix infusion  Continue lasix gtt per nephrology recommendations  If volume status does not significantly improve may require CVVH  · Trend renal indices and monitor intake and output    · Renal U/S is pending

## 2020-01-12 NOTE — CONSULTS
Consultation - Electrophysiology-Cardiology (EP)   Vickie Genao 63 y o  female MRN: [id number]  Unit/Bed#: ICU 06 Encounter: [id number]    Consults  Physician Requesting Consult: Donya Ray MD  Reason for Consult / Principal Problem:  Management of heart failure      Assessment/Plan      Acute on chronic diastolic heart failure  Essential hypertension  Permanent atrial fibrillation  Diabetes mellitus type 2  Chronic kidney disease - stage III  COPD  Chronic venous stasis dermatitis  Obesity, morbid  Hyperlipidemia  Compression fracture of 1st lumbar vertebra      Acute on chronic diastolic heart failure  Echocardiogram has previously revealed retained EF  Currently heart rate is under control on metoprolol succinate, heart rate around 90s    Diuresis is limited by acute on chronic renal failure - is being managed by Pulmonary    Acute on chronic respiratory failure, currently on BiPAP    Permanent atrial fibrillation  Rate control is with metoprolol  Patient is also on low-dose diltiazem  Anticoagulation is with Eliquis       Patient is currently encephalopathic  May need invasive monitoring for blood pressures, volume status  Respiratory and renal failure are predominantly driving the same  Will be as per critical care team        History of Present Illness   HPI: Vickie Genao is a 63y o  year old female who presents to the hospital with  Acute on chronic respiratory failure with hypoxia and hypercapnia  COPD acute exacerbation  Systemic inflammatory response  Acute on chronic kidney injury, creatinine greater than 3 5 while baseline is around 1 5 to 2 5  Permanent atrial fibrillation  Chronic venous stasis dermatitis  Type 2 diabetes mellitus    The patient had a period of recovery and was on the floor  She however decompensated and had to be brought back to Critical Care    She is currently on BiPAP  She is arousable but for very minimal period of time    Patient has long history of diastolic heart failure  She is in chronic atrial fibrillation, currently rate controlled and anticoagulated    No history can be obtained from the patient            Review of Systems:  As described in my history and physical   All other systems reviewed and negative    Historical Information   Past Medical History:   Diagnosis Date    At[facility] fibrillation with RVR ([facility] )     COPD (chronic obstructive pulmonary disease) ([place] [address] )     Diabetes type 2, uncontrolled ([place] [address] )     Hypertension     SIRS (systemic inflammatory response syndrome) ([place] [address] ) 1/7/2020     Past Surgical History:   Procedure Laterality Date    HERNIA REPAIR      HYSTERECTOMY      LAPAROSCOPIC CHOLECYSTECTOMY       Social History     Substance and Sexual Activity   Alcohol Use Not Currently    Comment: socially, NO ALCOHOL USE     Social History     Substance and Sexual Activity   Drug Use Not Currently    Types: Marijuana     Social History     Tobacco Use   Smoking Status Current Every Day Smoker    Packs/day: 0 00    Years: 43 00    Pack years: 0 00    Types: Cigarettes   Smokeless Tobacco Never Used   Tobacco Comment    Currently half a pack a day, 6 CIGARETTES PER DAY      Social History     Socioeconomic History    Marital status:       Spouse name: Not on file    Number of children: Not on file    Years of education: Not on file    Highest education level: Not on file   Occupational History     Employer: [employer] Occupation:      Comment: WORKING FULL TIME   Social Needs    Financial resource strain: Not on file    Food insecurity:     Worry: Not on file     Inability: Not on file    Transportation needs:     Medical: Not on file     Non-medical: Not on file   Tobacco Use    Smoking status: Current Every Day Smoker     Packs/day: 0 00     Years: 43 00     Pack years: 0 00     Types: Cigarettes    Smokeless tobacco: Never Used    Tobacco comment: Currently half a pack a day, 6 CIGARETTES PER DAY Substance and Sexual Activity    Alcohol use: Not Currently     Comment: socially, NO ALCOHOL USE    Drug use: Not Currently     Types: Marijuana    Sexual activity: Not on file   Lifestyle    Physical activity:     Days per week: Not on file     Minutes per session: Not on file    Stress: Not on file   Relationships    Social connections:     Talks on phone: Not on file     Gets together: Not on file     Attends Nondenominational service: Not on file     Active member of club or organization: Not on file     Attends meetings of clubs or organizations: Not on file     Relationship status: Not on file    Intimate partner violence:     Fear of current or ex partner: Not on file     Emotionally abused: Not on file     Physically abused: Not on file     Forced sexual activity: Not on file   Other Topics Concern    Not on file   Social History Narrative    NO LIVING WILL       Family History:  Family History   Problem Relation Age of Onset    Diabetes type II Mother     No Known Problems Father         She reports not knowing much about her father      Cancer Family     Diabetes Family     Hypertension Family     Stroke Family          Meds/Allergies      Current Facility-Administered Medications   Medication Dose Route Frequency    albuterol (PROVENTIL HFA,VENTOLIN HFA) inhaler 2 puff  2 puff Inhalation Q4H PRN    apixaban (ELIQUIS) tablet 2 5 mg  2 5 mg Oral BID    buPROPion (WELLBUTRIN XL) 24 hr tablet 150 mg  150 mg Oral Daily    dextrose 5 % infusion  50 mL/hr Intravenous Continuous    diltiazem (CARDIZEM CD) 24 hr capsule 120 mg  120 mg Oral Daily    furosemide (LASIX) 500 mg infusion 50 mL  40 mg/hr Intravenous Continuous    insulin lispro (HumaLOG) 100 units/mL subcutaneous injection 1-6 Units  1-6 Units Subcutaneous TID AC    insulin lispro (HumaLOG) 100 units/mL subcutaneous injection 1-6 Units  1-6 Units Subcutaneous HS    ipratropium (ATROVENT) 0 02 % inhalation solution 0 5 mg  0 5 mg Nebulization TID    levalbuterol (XOPENEX) inhalation solution 1 25 mg  1 25 mg Nebulization TID    levalbuterol (XOPENEX) inhalation solution 1 25 mg  1 25 mg Nebulization Q4H PRN    metoprolol succinate (TOPROL-XL) 24 hr tablet 100 mg  100 mg Oral Daily    nicotine (NICODERM CQ) 14 mg/24hr TD 24 hr patch 1 patch  1 patch Transdermal Daily    nystatin (MYCOSTATIN) powder   Topical BID        Medications Prior to Admission   Medication    albuterol (PROVENTIL HFA,VENTOLIN HFA) 90 mcg/act inhaler    apixaban (ELIQUIS) 5 mg    diltiazem (CARDIZEM CD) 120 mg 24 hr capsule    lisinopril (ZESTRIL) 40 mg tablet    metoprolol succinate (TOPROL-XL) 100 mg 24 hr tablet    nicotine (NICODERM CQ) 14 mg/24hr TD 24 hr patch    oxyCODONE (ROXICODONE) 5 mg immediate release tablet    predniSONE 10 mg tablet    torsemide (DEMADEX) 20 mg tablet       No Known Allergies    I have reviewed medications and allergies  Objective   Vitals: Blood pressure 133/81, pulse 88, temperature (!) 97 4 °F (36 3 °C), temperature source Temporal, resp  rate 19, height 5' 5" (1 651 m), weight 135 kg (296 lb 15 4 oz), SpO2 96 %    Orthostatic Blood Pressures      Most Recent Value   Blood Pressure  133/81 filed at 01/11/2020 2449   Patient Position - Orthostatic VS  Lying filed at 01/11/2020 0302            Intake/Output Summary (Last 24 hours) at 1/11/2020 2009  Last data filed at 1/11/2020 1830  Gross per 24 hour   Intake 284 7 ml   Output 1186 ml   Net -901 3 ml       Invasive Devices     Peripheral Intravenous Line            Peripheral IV 01/06/20 Left Antecubital 4 days    Peripheral IV 01/10/20 Left Hand 1 day          Drain            External Urinary Catheter less than 1 day                  PHYSICAL EXAM  Morbid obesity  General -the patient is currently on BiPAP, arousable but not very responsive  ENT- on BiPAP  Neck -  short thick neck  Lungs - bilateral air entry is present, conducted sounds from the upper airway  Chest - large barrel-shaped chest  Heart -  S1-S2, irregularly regular  Abdomen-  central obesity present  Neurologic- cannot be evaluated  Extremities-  chronic venous stasis changes  Skin-  chronic venous stasis changes in the legs, skin of the body also shows multiple scabs        @LASTLABG{30D)@      Lab Results:   CBC with diff:   Lab Results   Component Value Date    WBC 7 60 01/10/2020    HGB 18 7 (H) 01/10/2020    HCT 55 (H) 01/10/2020     (H) 01/10/2020     (L) 01/10/2020    MCH 30 9 01/10/2020    MCHC 28 1 (L) 01/10/2020    RDW 20 4 (H) 01/10/2020    MPV 11 4 01/10/2020    NRBC 0 01/07/2020       CMP:  Lab Results   Component Value Date     05/20/2015    K 4 0 01/11/2020    K 4 0 05/20/2015     01/11/2020     05/20/2015    CO2 29 01/11/2020    CO2 32 01/10/2020    ANIONGAP 10 05/20/2015    BUN 67 (H) 01/11/2020    BUN 25 05/20/2015    CREATININE 3 75 (H) 01/11/2020    CREATININE 1 47 (H) 05/20/2015    GLUCOSE 95 01/10/2020    GLUCOSE 129 05/20/2015    CALCIUM 7 8 (L) 01/11/2020    CALCIUM 9 1 05/20/2015    AST 12 01/11/2020    AST 23 05/20/2015    ALT 16 01/11/2020    ALT 31 05/20/2015    ALKPHOS 99 01/11/2020    ALKPHOS 107 05/20/2015    PROT 7 6 05/20/2015    BILITOT 1 0 05/20/2015    EGFR 13 01/11/2020       BMP:  Results from last 7 days   Lab Units 01/11/20  1241 01/11/20  0348 01/10/20  2325 01/10/20  0512   POTASSIUM mmol/L 4 0 3 8  --  3 7   CHLORIDE mmol/L 106 106  --  105   CO2 mmol/L 29 30  --  29   CO2, I-STAT mmol/L  --   --  32  --    BUN mg/dL 67* 67*  --  59*   CREATININE mg/dL 3 75* 3 84*  --  3 73*   GLUCOSE, ISTAT mg/dl  --   --  95  --    CALCIUM mg/dL 7 8* 7 8*  --  7 6*       Magnesium:   Lab Results   Component Value Date    MG 2 5 01/11/2020    MG 2 0 01/06/2014       CPK:       Troponin:   Lab Results   Component Value Date    TROPONINI 0 07 (H) 01/06/2020       BNP:   Lab Results   Component Value Date    BNP 90 01/06/2014       Coags:   Lab Results Component Value Date    PTT 63 (H) 01/08/2020    INR 1 44 (H) 01/11/2020       TSH: No results found for: TSH      Echocardiogram  January 2020  IMPRESSIONS:  Technical quality: Fair but adequate  Cardiac rhythm: Atrial fibrillation      1  Mild to moderate concentric left ventricular hypertrophy, normal left ventricular systolic function, EF around 58%  Diastolic dysfunction is present but could not be graded  2  Dilated right ventricle with reduced right ventricular systolic function  3  Moderate biatrial enlargement  4  Mitral annular calcification and mitral valve leaflet sclerosis and calcification  Mild mitral valve regurgitation  5  Aortic valve sclerosis and calcification, no aortic valve stenosis or regurgitation  6  Mild tricuspid valve regurgitation  7  Trace pulmonic valve regurgitation  8  Moderate pulmonary hypertension  Estimated RVSP/PASP is 52 mmHg  9  No pericardial effusion      Compared to report of previous echocardiogram from February 11, 2019 there is overall no significant change  Xr Chest Portable  Result Date: 1/11/2020  Narrative: CHEST INDICATION:   hypoxia  COMPARISON:  1/6/2020 EXAM PERFORMED/VIEWS:  XR CHEST PORTABLE Images: 2 FINDINGS: Persistent severe cardiomegaly  Bilateral infiltrative opacities suspicious for multifocal pneumonia and likely superimposed CHF  CT assessment may be warranted Osseous structures appear within normal limits for patient age     Impression: Persistent cardiomegaly, suspected multifocal pneumonia and probable superimposed CHF Workstation performed: [id number]           EKG:  AFib with controlled ventricular rate, ventricular rate around 90 per minute    Code Status: Level 1 - Full Code  Advance Directive and Living Will:      Power of :    POLST:      Counseling / Coordination of Care  Assessment and plan discussed with critical care attending      Miguelina Butler MD

## 2020-01-12 NOTE — PROGRESS NOTES
Progress Note - Vickie Genao 1960, 63 y o  female MRN: [id number]    Unit/Bed#: ICU 06 Encounter: [id number]    Primary Care Provider: Kishan Rock MD   Date and time admitted to hospital: 1/6/2020 10:25 PM        Acute on chronic diastolic congestive heart failure [facility])  Assessment & Plan  Wt Readings from Last 3 Encounters:   01/11/20 135 kg (296 lb 15 4 oz)   11/05/19 110 kg (243 lb 9 6 oz)   10/28/19 109 kg (240 lb 4 8 oz)       · Suspect decompensated CHF  Echo from 1/8/20 shows EF of 57% with diastolic dysfunction  · Attempting diuresis with lasix infusion as above  Encephalopathy acute  Assessment & Plan  · Secondary to hypercapnia and hypoglycemia  Continue BiPAP for hypercapnia  Encourage compliance with nocturnal BiPAP  · Avoid hypoglycemia  · Hypercapnia is improving however she remains lethargic  Continue to monitor mental status closely    * Acute on chronic respiratory failure with hypoxia and hypercapnia (HCC)  Assessment & Plan  · Etiology likely multifactorial, COPD, pulmonary edema, pulmonary hypertension, 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, pleural effusion and noncompliance with nocturnal BiPAP  · Pt notes baseline oxygen requirement at home is 10L  · Hypercapnia is improving  Continue BiPAP and transition to NC once hypercapnia improves  · CXR shows large left pleural effusion  On lasix gtt for diuresis  · Continue scheduled nebulized bronchodilators  Chronic obstructive pulmonary disease with acute exacerbation (HCC)  Assessment & Plan  · Continue nebulized bronchodilators  No indication for steroids at this point  · Not on maintenance inhalers at home      SAPNA (acute kidney injury) [facility])  Assessment & Plan  · Acute on CKD III with baseline approximately 1 5  · Nephrology is following  Creatinine is improving  Urine output significantly improved on lasix infusion  Continue lasix gtt per nephrology recommendations    If volume status does not significantly improve may require CVVH  · Trend renal indices and monitor intake and output  · Renal U/S is pending    Permanent atrial fibrillation  Assessment & Plan  · Rate is controlled  Continue PO cardizem and metoprolol  · Cont eliquis for AC    Chronic venous stasis dermatitis of both lower extremities  Assessment & Plan  · No open ulcers or signs of cellulitis        Type 2 diabetes mellitus without complication [facility])  Assessment & Plan  Lab Results   Component Value Date    HGBA1C 5 8 04/11/2019       Recent Labs     01/11/20  1606 01/11/20  1655 01/11/20  1755 01/11/20 2019   POCGLU 74 74 77 78       Blood Sugar Average: Last 72 hrs:  · Persistent hypoglycemia possibly due to poor hepatic perfusion in the setting of decompensated CHF  Continue IV dextrose  · TSH normal, random cortisol 17 9  · Goal to maintain -180  Aggressively monitor and treat hypoglycemia as this is likely contributing factor to her encephalopathy      Obesity  Assessment & Plan  · She would benefit from weight loss  · Likely component of OHV that is contributing to her acute on chronic pulmonary issues    Adjustment disorder  Assessment & Plan  · Continue welbutrin per Psych recommendations  _____________________________________________________________________    HPI/24hr events:   Afebrile  Remains on BiPAP  No acute events overnight      Medications:    Current Facility-Administered Medications:  albuterol 2 puff Inhalation Q4H PRN MAYNOR Lee    apixaban 2 5 mg Oral BID MAYNOR Lee    buPROPion 150 mg Oral Daily Luli MAYNOR Macedo    dextrose 50 mL/hr Intravenous Continuous Ofilia Fore, CRNP Last Rate: 50 mL/hr (01/11/20 1241)   diltiazem 120 mg Oral Daily MAYNOR Jaeger    furosemide 40 mg/hr Intravenous Continuous Ofilia Fore, CRNP Last Rate: 40 mg/hr (01/11/20 1924)   insulin lispro 1-6 Units Subcutaneous TID AC Luli K MAYNOR Kirby    insulin lispro 1-6 Units Subcutaneous HS MAYNRO Batista ipratropium 0 5 mg Nebulization TID [facility] [place] Bilofsky, CRNP    levalbuterol 1 25 mg Nebulization TID [facility] [place] Bilofsky, CRNP    levalbuterol 1 25 mg Nebulization Q4H PRN [facility] [place] Bilofsky, CRNP    metoprolol succinate 100 mg Oral Daily [facility] [place] Bilofsky, CRNP    nicotine 1 patch Transdermal Daily Luli K Bilofsky, CRNP    nystatin  Topical BID [facility] [place] Bilofsky, CRNP          dextrose 50 mL/hr Last Rate: 50 mL/hr (01/11/20 1241)   furosemide 40 mg/hr Last Rate: 40 mg/hr (01/11/20 1924)         Physical exam:  Vitals: Body mass index is 49 78 kg/m²  Blood pressure 132/87, pulse (!) 108, temperature (!) 97 2 °F (36 2 °C), temperature source Temporal, resp  rate (!) 24, height 5' 5" (1 651 m), weight 136 kg (299 lb 2 6 oz), SpO2 93 %  ,  Temp  Min: 96 8 °F (36 °C)  Max: 98 7 °F (37 1 °C)  IBW: 57 kg    SpO2: 93 %  SpO2 Activity: At Rest  O2 Device: Other (comment)(bipap)      Intake/Output Summary (Last 24 hours) at 1/12/2020 2974  Last data filed at 1/12/2020 0536  Gross per 24 hour   Intake 911 1 ml   Output 2636 ml   Net -1724 9 ml       Invasive/non-invasive ventilation settings:   Respiratory    Lab Data (Last 4 hours)    None         O2/Vent Data (Last 4 hours)    None              Invasive Devices     Peripheral Intravenous Line            Peripheral IV 01/06/20 Left Antecubital 5 days    Peripheral IV 01/10/20 Left Hand 1 day          Drain            External Urinary Catheter 1 day                  Physical Exam:  Gen: lethargic, minimally arousable  HEENT: atraumatic, normocephalic, EOMI, pupils 3mm equal and briskly reactive  Neck: thick, supple, trachea midline, no JVD, no lymphadenopathy  Chest: decreased air movement 3/4 of the way up the L lung fields, clear anteriorly on the R, crackles posteriorly, no wheeze  Cor: Single S1/S2, no m/r/g, irregularly irregular  Abd: obese, soft, nondistended, no guarding, BS normoactive  Ext: 4+ b/l LE edema, chronic lower extremity skin changes and hemosiderin staining c/w venous insufficiency, several areas of distal phalanges appear cyanotic  Neuro: opens eyes to loud voice, occasionally tracks, JUNG weakly spontaneously  Skin: warm, dry      Diagnostic Data:  Lab: I have personally reviewed pertinent lab results  CBC:   Results from last 7 days   Lab Units 01/10/20  2325 01/10/20  0512 01/09/20  0438 01/08/20  0516   WBC Thousand/uL  --  7 60 8 09 5 91   HEMOGLOBIN g/dL  --  16 1* 15 8* 16 4*   I STAT HEMOGLOBIN g/dl 18 7*  --   --   --    HEMATOCRIT %  --  57 2* 55 5* 55 7*   HEMATOCRIT, ISTAT % 55*  --   --   --    PLATELETS Thousands/uL  --  106* 110* 115*       CMP:   Results from last 7 days   Lab Units 01/12/20  0429 01/11/20  1241 01/11/20  0348 01/10/20  2325  01/08/20  0516  01/06/20  2241   SODIUM mmol/L 146* 145 146*  --    < > 147*   < > 146*   POTASSIUM mmol/L 3 6 4 0 3 8  --    < > 3 8   < > 4 0   CHLORIDE mmol/L 106 106 106  --    < > 107   < > 103   CO2 mmol/L 30 29 30  --    < > 29   < > 30   CO2, I-STAT mmol/L  --   --   --  32  --   --   --   --    BUN mg/dL 67* 67* 67*  --    < > 46*   < > 46*   CREATININE mg/dL 3 42* 3 75* 3 84*  --    < > 3 10*   < > 3 32*   CALCIUM mg/dL 7 6* 7 8* 7 8*  --    < > 8 1*   < > 9 1   ALK PHOS U/L  --  99  --   --   --  106  --  162*   ALT U/L  --  16  --   --   --  23  --  38   AST U/L  --  12  --   --   --  22  --  43   GLUCOSE, ISTAT mg/dl  --   --   --  95  --   --   --   --     < > = values in this interval not displayed  PT/INR:   Lab Results   Component Value Date    INR 1 44 (H) 01/11/2020   ,   Magnesium:   Results from last 7 days   Lab Units 01/11/20  0349 01/07/20  0524   MAGNESIUM mg/dL 2 5 2 0     Phosphorous:   Results from last 7 days   Lab Units 01/12/20  0429   PHOSPHORUS mg/dL 5 5*       Microbiology:  Results from last 7 days   Lab Units 01/06/20  2235 01/06/20  2230   BLOOD CULTURE  No Growth After 4 Days  No Growth After 4 Days         Imaging:  No new imaging    Cardiac lab/EKG/telemetry/ECHO:   Atrial fibrillation    VTE Prophylaxis: Eliquis    Code Status: Level 1 - Full Code    Marko Kotyk Spatzer, CRNP    Portions of the record may have been created with voice recognition software  Occasional wrong word or "sound a like" substitutions may have occurred due to the inherent limitations of voice recognition software  Read the chart carefully and recognize, using context, where substitutions have occurred

## 2020-01-12 NOTE — ASSESSMENT & PLAN NOTE
· Secondary to hypercapnia and hypoglycemia  Continue BiPAP for hypercapnia  Encourage compliance with nocturnal BiPAP  · Avoid hypoglycemia  · Hypercapnia is improving however she remains lethargic    Continue to monitor mental status closely

## 2020-01-12 NOTE — PLAN OF CARE
Problem: Prexisting or High Potential for Compromised Skin Integrity  Goal: Skin integrity is maintained or improved  Description  INTERVENTIONS:  - Identify patients at risk for skin breakdown  - Assess and monitor skin integrity  - Assess and monitor nutrition and hydration status  - Monitor labs   - Assess for incontinence   - Turn and reposition patient  - Assist with mobility/ambulation  - Relieve pressure over bony prominences  - Avoid friction and shearing  - Provide appropriate hygiene as needed including keeping skin clean and dry  - Evaluate need for skin moisturizer/barrier cream  - Collaborate with interdisciplinary team   - Patient/family teaching  - Consider wound care consult   Outcome: Progressing     Problem: Potential for Falls  Goal: Patient will remain free of falls  Description  INTERVENTIONS:  - Assess patient frequently for physical needs  -  Identify cognitive and physical deficits and behaviors that affect risk of falls    -  [place] fall precautions as indicated by assessment   - Educate patient/family on patient safety including physical limitations  - Instruct patient to call for assistance with activity based on assessment  - Modify environment to reduce risk of injury  - Consider OT/PT consult to assist with strengthening/mobility  Outcome: Progressing     Problem: Prexisting or High Potential for Compromised Skin Integrity  Goal: Skin integrity is maintained or improved  Description  INTERVENTIONS:  - Identify patients at risk for skin breakdown  - Assess and monitor skin integrity  - Assess and monitor nutrition and hydration status  - Monitor labs   - Assess for incontinence   - Turn and reposition patient  - Assist with mobility/ambulation  - Relieve pressure over bony prominences  - Avoid friction and shearing  - Provide appropriate hygiene as needed including keeping skin clean and dry  - Evaluate need for skin moisturizer/barrier cream  - Collaborate with interdisciplinary team   - Patient/family teaching  - Consider wound care consult   Outcome: Progressing     Problem: Potential for Falls  Goal: Patient will remain free of falls  Description  INTERVENTIONS:  - Assess patient frequently for physical needs  -  Identify cognitive and physical deficits and behaviors that affect risk of falls    -  [place] fall precautions as indicated by assessment   - Educate patient/family on patient safety including physical limitations  - Instruct patient to call for assistance with activity based on assessment  - Modify environment to reduce risk of injury  - Consider OT/PT consult to assist with strengthening/mobility  Outcome: Progressing     Problem: PAIN - ADULT  Goal: Verbalizes/displays adequate comfort level or baseline comfort level  Description  Interventions:  - Encourage patient to monitor pain and request assistance  - Assess pain using appropriate pain scale  - Administer analgesics based on type and severity of pain and evaluate response  - Implement non-pharmacological measures as appropriate and evaluate response  - Consider cultural and social influences on pain and pain management  - Notify physician/advanced practitioner if interventions unsuccessful or patient reports new pain  Outcome: Progressing     Problem: INFECTION - ADULT  Goal: Absence or prevention of progression during hospitalization  Description  INTERVENTIONS:  - Assess and monitor for signs and symptoms of infection  - Monitor lab/diagnostic results  - Monitor all insertion sites, i e  indwelling lines, tubes, and drains  - Monitor endotracheal if appropriate and nasal secretions for changes in amount and color  - Tiger appropriate cooling/warming therapies per order  - Administer medications as ordered  - Instruct and encourage patient and family to use good hand hygiene technique  - Identify and instruct in appropriate isolation precautions for identified infection/condition  Outcome: Progressing  Goal: Absence of fever/infection during neutropenic period  Description  INTERVENTIONS:  - Monitor WBC    Outcome: Progressing     Problem: SAFETY ADULT  Goal: Maintain or return to baseline ADL function  Description  INTERVENTIONS:  -  Assess patient's ability to carry out ADLs; assess patient's baseline for ADL function and identify physical deficits which impact ability to perform ADLs (bathing, care of mouth/teeth, toileting, grooming, dressing, etc )  - Assess/evaluate cause of self-care deficits   - Assess range of motion  - Assess patient's mobility; develop plan if impaired  - Assess patient's need for assistive devices and provide as appropriate  - Encourage maximum independence but intervene and supervise when necessary  - Involve family in performance of ADLs  - Assess for home care needs following discharge   - Consider OT consult to assist with ADL evaluation and planning for discharge  - Provide patient education as appropriate  Outcome: Progressing  Goal: Maintain or return mobility status to optimal level  Description  INTERVENTIONS:  - Assess patient's baseline mobility status (ambulation, transfers, stairs, etc )    - Identify cognitive and physical deficits and behaviors that affect mobility  - Identify mobility aids required to assist with transfers and/or ambulation (gait belt, sit-to-stand, lift, walker, cane, etc )  - [place] fall precautions as indicated by assessment  - Record patient progress and toleration of activity level on Mobility SBAR; progress patient to next Phase/Stage  - Instruct patient to call for assistance with activity based on assessment  - Consider rehabilitation consult to assist with strengthening/weightbearing, etc   Outcome: Progressing     Problem: DISCHARGE PLANNING  Goal: Discharge to home or other facility with appropriate resources  Description  INTERVENTIONS:  - Identify barriers to discharge w/patient and caregiver  - Arrange for needed discharge resources and transportation as appropriate  - Identify discharge learning needs (meds, wound care, etc )  - Arrange for interpretive services to assist at discharge as needed  - Refer to Case Management Department for coordinating discharge planning if the patient needs post-hospital services based on physician/advanced practitioner order or complex needs related to functional status, cognitive ability, or social support system  Outcome: Progressing     Problem: Knowledge Deficit  Goal: Patient/family/caregiver demonstrates understanding of disease process, treatment plan, medications, and discharge instructions  Description  Complete learning assessment and assess knowledge base    Interventions:  - Provide teaching at level of understanding  - Provide teaching via preferred learning methods  Outcome: Progressing     Problem: CARDIOVASCULAR - ADULT  Goal: Maintains optimal cardiac output and hemodynamic stability  Description  INTERVENTIONS:  - Monitor I/O, vital signs and rhythm  - Monitor for S/S and trends of decreased cardiac output  - Administer and titrate ordered vasoactive medications to optimize hemodynamic stability  - Assess quality of pulses, skin color and temperature  - Assess for signs of decreased coronary artery perfusion  - Instruct patient to report change in severity of symptoms  Outcome: Progressing  Goal: Absence of cardiac dysrhythmias or at baseline rhythm  Description  INTERVENTIONS:  - Continuous cardiac monitoring, vital signs, obtain 12 lead EKG if ordered  - Administer antiarrhythmic and heart rate control medications as ordered  - Monitor electrolytes and administer replacement therapy as ordered  Outcome: Progressing     Problem: RESPIRATORY - ADULT  Goal: Achieves optimal ventilation and oxygenation  Description  INTERVENTIONS:  - Assess for changes in respiratory status  - Assess for changes in mentation and behavior  - Position to facilitate oxygenation and minimize respiratory effort  - Oxygen administered by appropriate delivery if ordered  - Initiate smoking cessation education as indicated  - Encourage broncho-pulmonary hygiene including cough, deep breathe, Incentive Spirometry  - Assess the need for suctioning and aspirate as needed  - Assess and instruct to report SOB or any respiratory difficulty  - Respiratory Therapy support as indicated  Outcome: Progressing     Problem: METABOLIC, FLUID AND ELECTROLYTES - ADULT  Goal: Electrolytes maintained within normal limits  Description  INTERVENTIONS:  - Monitor labs and assess patient for signs and symptoms of electrolyte imbalances  - Administer electrolyte replacement as ordered  - Monitor response to electrolyte replacements, including repeat lab results as appropriate  - Instruct patient on fluid and nutrition as appropriate  Outcome: Progressing  Goal: Fluid balance maintained  Description  INTERVENTIONS:  - Monitor labs   - Monitor I/O and WT  - Instruct patient on fluid and nutrition as appropriate  - Assess for signs & symptoms of volume excess or deficit  Outcome: Progressing  Goal: Glucose maintained within target range  Description  INTERVENTIONS:  - Monitor Blood Glucose as ordered  - Assess for signs and symptoms of hyperglycemia and hypoglycemia  - Administer ordered medications to maintain glucose within target range  - Assess nutritional intake and initiate nutrition service referral as needed  Outcome: Progressing     Problem: SKIN/TISSUE INTEGRITY - ADULT  Goal: Skin integrity remains intact  Description  INTERVENTIONS  - Identify patients at risk for skin breakdown  - Assess and monitor skin integrity  - Assess and monitor nutrition and hydration status  - Monitor labs (i e  albumin)  - Assess for incontinence   - Turn and reposition patient  - Assist with mobility/ambulation  - Relieve pressure over bony prominences  - Avoid friction and shearing  - Provide appropriate hygiene as needed including keeping skin clean and dry  - Evaluate need for skin moisturizer/barrier cream  - Collaborate with interdisciplinary team (i e  Nutrition, Rehabilitation, etc )   - Patient/family teaching  Outcome: Progressing  Goal: Incision(s), wounds(s) or drain site(s) healing without S/S of infection  Description  INTERVENTIONS  - Assess and document risk factors for skin impairment   - Assess and document dressing, incision, wound bed, drain sites and surrounding tissue  - Consider nutrition services referral as needed  - Oral mucous membranes remain intact  - Provide patient/ family education  Outcome: Progressing  Goal: Oral mucous membranes remain intact  Description  INTERVENTIONS  - Assess oral mucosa and hygiene practices  - Implement preventative oral hygiene regimen  - Implement oral medicated treatments as ordered  - Initiate Nutrition services referral as needed  Outcome: Progressing     Problem: Nutrition/Hydration-ADULT  Goal: Nutrient/Hydration intake appropriate for improving, restoring or maintaining nutritional needs  Description  Monitor and assess patient's nutrition/hydration status for malnutrition  Collaborate with interdisciplinary team and initiate plan and interventions as ordered  Monitor patient's weight and dietary intake as ordered or per policy  Utilize nutrition screening tool and intervene as necessary  Determine patient's food preferences and provide high-protein, high-caloric foods as appropriate       INTERVENTIONS:  - Monitor oral intake, urinary output, labs, and treatment plans  - Assess nutrition and hydration status and recommend course of action  - Evaluate amount of meals eaten  - Assist patient with eating if necessary   - Allow adequate time for meals  - Recommend/ encourage appropriate diets, oral nutritional supplements, and vitamin/mineral supplements  - Order, calculate, and assess calorie counts as needed  - Recommend, monitor, and adjust tube feedings and TPN/PPN based on assessed needs  - Assess need for intravenous fluids  - Provide specific nutrition/hydration education as appropriate  - Include patient/family/caregiver in decisions related to nutrition  Outcome: Progressing

## 2020-01-13 ENCOUNTER — APPOINTMENT (INPATIENT)
Dept: NON INVASIVE DIAGNOSTICS | Facility: HOSPITAL | Age: 60
DRG: 291 | End: 2020-01-13
Payer: COMMERCIAL

## 2020-01-13 ENCOUNTER — APPOINTMENT (INPATIENT)
Dept: RADIOLOGY | Facility: HOSPITAL | Age: 60
DRG: 291 | End: 2020-01-13
Payer: COMMERCIAL

## 2020-01-13 LAB
ANION GAP SERPL CALCULATED.3IONS-SCNC: 11 MMOL/L (ref 4–13)
APTT PPP: 37 SECONDS (ref 23–37)
ARTERIAL PATENCY WRIST A: YES
BASE EXCESS BLDA CALC-SCNC: 4.3 MMOL/L
BUN SERPL-MCNC: 70 MG/DL (ref 5–25)
CA-I BLD-SCNC: 0.95 MMOL/L (ref 1.12–1.32)
CALCIUM SERPL-MCNC: 8.3 MG/DL (ref 8.3–10.1)
CHLORIDE SERPL-SCNC: 103 MMOL/L (ref 100–108)
CO2 SERPL-SCNC: 30 MMOL/L (ref 21–32)
CREAT SERPL-MCNC: 3.05 MG/DL (ref 0.6–1.3)
ERYTHROCYTE [DISTWIDTH] IN BLOOD BY AUTOMATED COUNT: 19.9 % (ref 11.6–15.1)
GFR SERPL CREATININE-BSD FRML MDRD: 16 ML/MIN/1.73SQ M
GLUCOSE SERPL-MCNC: 100 MG/DL (ref 65–140)
GLUCOSE SERPL-MCNC: 102 MG/DL (ref 65–140)
GLUCOSE SERPL-MCNC: 104 MG/DL (ref 65–140)
GLUCOSE SERPL-MCNC: 104 MG/DL (ref 65–140)
GLUCOSE SERPL-MCNC: 133 MG/DL (ref 65–140)
GLUCOSE SERPL-MCNC: 98 MG/DL (ref 65–140)
HCO3 BLDA-SCNC: 30.5 MMOL/L (ref 22–28)
HCT VFR BLD AUTO: 57 % (ref 34.8–46.1)
HGB BLD-MCNC: 17 G/DL (ref 11.5–15.4)
INR PPP: 1.48 (ref 0.84–1.19)
IPAP: 22
MAGNESIUM SERPL-MCNC: 1.9 MG/DL (ref 1.6–2.6)
MCH RBC QN AUTO: 30.7 PG (ref 26.8–34.3)
MCHC RBC AUTO-ENTMCNC: 29.8 G/DL (ref 31.4–37.4)
MCV RBC AUTO: 103 FL (ref 82–98)
NON VENT- BIPAP: ABNORMAL
O2 CT BLDA-SCNC: 25.3 ML/DL (ref 16–23)
OXYHGB MFR BLDA: 95.9 % (ref 94–97)
PCO2 BLDA: 50.2 MM HG (ref 36–44)
PEEP MAX SETTING VENT: 10 CM[H2O]
PH BLDA: 7.4 [PH] (ref 7.35–7.45)
PLATELET # BLD AUTO: 70 THOUSANDS/UL (ref 149–390)
PMV BLD AUTO: 12.6 FL (ref 8.9–12.7)
PO2 BLDA: 91.9 MM HG (ref 75–129)
POTASSIUM SERPL-SCNC: 3.9 MMOL/L (ref 3.5–5.3)
PROTHROMBIN TIME: 18.2 SECONDS (ref 11.6–14.5)
RBC # BLD AUTO: 5.53 MILLION/UL (ref 3.81–5.12)
SODIUM SERPL-SCNC: 144 MMOL/L (ref 136–145)
SPECIMEN SOURCE: ABNORMAL
VENT BIPAP FIO2: 80 %
WBC # BLD AUTO: 6.82 THOUSAND/UL (ref 4.31–10.16)

## 2020-01-13 PROCEDURE — 93970 EXTREMITY STUDY: CPT

## 2020-01-13 PROCEDURE — 94640 AIRWAY INHALATION TREATMENT: CPT

## 2020-01-13 PROCEDURE — 85610 PROTHROMBIN TIME: CPT | Performed by: NURSE PRACTITIONER

## 2020-01-13 PROCEDURE — 83735 ASSAY OF MAGNESIUM: CPT | Performed by: NURSE PRACTITIONER

## 2020-01-13 PROCEDURE — 99233 SBSQ HOSP IP/OBS HIGH 50: CPT | Performed by: INTERNAL MEDICINE

## 2020-01-13 PROCEDURE — 82948 REAGENT STRIP/BLOOD GLUCOSE: CPT

## 2020-01-13 PROCEDURE — 85730 THROMBOPLASTIN TIME PARTIAL: CPT | Performed by: NURSE PRACTITIONER

## 2020-01-13 PROCEDURE — 99232 SBSQ HOSP IP/OBS MODERATE 35: CPT | Performed by: INTERNAL MEDICINE

## 2020-01-13 PROCEDURE — 93970 EXTREMITY STUDY: CPT | Performed by: SURGERY

## 2020-01-13 PROCEDURE — 82330 ASSAY OF CALCIUM: CPT | Performed by: NURSE PRACTITIONER

## 2020-01-13 PROCEDURE — 82805 BLOOD GASES W/O2 SATURATION: CPT | Performed by: NURSE PRACTITIONER

## 2020-01-13 PROCEDURE — 80048 BASIC METABOLIC PNL TOTAL CA: CPT | Performed by: NURSE PRACTITIONER

## 2020-01-13 PROCEDURE — 94660 CPAP INITIATION&MGMT: CPT

## 2020-01-13 PROCEDURE — 71045 X-RAY EXAM CHEST 1 VIEW: CPT

## 2020-01-13 PROCEDURE — 85027 COMPLETE CBC AUTOMATED: CPT | Performed by: NURSE PRACTITIONER

## 2020-01-13 RX ORDER — HEPARIN SODIUM 1000 [USP'U]/ML
4000 INJECTION, SOLUTION INTRAVENOUS; SUBCUTANEOUS ONCE
Status: COMPLETED | OUTPATIENT
Start: 2020-01-13 | End: 2020-01-13

## 2020-01-13 RX ORDER — HEPARIN SODIUM 10000 [USP'U]/100ML
3-20 INJECTION, SOLUTION INTRAVENOUS
Status: DISCONTINUED | OUTPATIENT
Start: 2020-01-13 | End: 2020-01-15

## 2020-01-13 RX ORDER — METOPROLOL TARTRATE 5 MG/5ML
2.5 INJECTION INTRAVENOUS EVERY 6 HOURS
Status: DISCONTINUED | OUTPATIENT
Start: 2020-01-13 | End: 2020-01-14

## 2020-01-13 RX ADMIN — METOPROLOL TARTRATE 5 MG: 5 INJECTION INTRAVENOUS at 09:42

## 2020-01-13 RX ADMIN — HEPARIN SODIUM 4000 UNITS: 1000 INJECTION, SOLUTION INTRAVENOUS; SUBCUTANEOUS at 17:30

## 2020-01-13 RX ADMIN — METOPROLOL TARTRATE 2.5 MG: 5 INJECTION INTRAVENOUS at 17:15

## 2020-01-13 RX ADMIN — NYSTATIN: 100000 POWDER TOPICAL at 17:24

## 2020-01-13 RX ADMIN — LEVALBUTEROL HYDROCHLORIDE 1.25 MG: 1.25 SOLUTION, CONCENTRATE RESPIRATORY (INHALATION) at 13:27

## 2020-01-13 RX ADMIN — Medication 40 MG/HR: at 23:39

## 2020-01-13 RX ADMIN — NICOTINE 1 PATCH: 14 PATCH TRANSDERMAL at 09:20

## 2020-01-13 RX ADMIN — IPRATROPIUM BROMIDE 0.5 MG: 0.5 SOLUTION RESPIRATORY (INHALATION) at 07:31

## 2020-01-13 RX ADMIN — Medication 40 MG/HR: at 10:55

## 2020-01-13 RX ADMIN — HEPARIN SODIUM AND DEXTROSE 11.1 UNITS/KG/HR: 10000; 5 INJECTION INTRAVENOUS at 17:30

## 2020-01-13 RX ADMIN — IPRATROPIUM BROMIDE 0.5 MG: 0.5 SOLUTION RESPIRATORY (INHALATION) at 13:27

## 2020-01-13 RX ADMIN — NYSTATIN: 100000 POWDER TOPICAL at 09:22

## 2020-01-13 RX ADMIN — DEXTROSE 50 ML/HR: 5 SOLUTION INTRAVENOUS at 05:46

## 2020-01-13 RX ADMIN — LEVALBUTEROL HYDROCHLORIDE 1.25 MG: 1.25 SOLUTION, CONCENTRATE RESPIRATORY (INHALATION) at 07:31

## 2020-01-13 RX ADMIN — IPRATROPIUM BROMIDE 0.5 MG: 0.5 SOLUTION RESPIRATORY (INHALATION) at 19:03

## 2020-01-13 RX ADMIN — HEPARIN SODIUM 5000 UNITS: 5000 INJECTION INTRAVENOUS; SUBCUTANEOUS at 13:51

## 2020-01-13 RX ADMIN — LEVALBUTEROL HYDROCHLORIDE 1.25 MG: 1.25 SOLUTION, CONCENTRATE RESPIRATORY (INHALATION) at 19:03

## 2020-01-13 RX ADMIN — METOPROLOL TARTRATE 2.5 MG: 5 INJECTION INTRAVENOUS at 21:32

## 2020-01-13 RX ADMIN — HEPARIN SODIUM 5000 UNITS: 5000 INJECTION INTRAVENOUS; SUBCUTANEOUS at 05:46

## 2020-01-13 RX ADMIN — DEXTROSE 50 ML/HR: 5 SOLUTION INTRAVENOUS at 10:21

## 2020-01-13 NOTE — ASSESSMENT & PLAN NOTE
Lab Results   Component Value Date    HGBA1C 5 8 04/11/2019       Recent Labs     01/12/20  1104 01/12/20  1402 01/12/20  1555 01/12/20  1704   POCGLU 84 89 89 98       Blood Sugar Average: Last 72 hrs:  · Persistent hypoglycemia possibly due to poor hepatic perfusion in the setting of decompensated CHF  Continue IV dextrose  · TSH normal, random cortisol 17 9  · Goal to maintain -180    Aggressively monitor and treat hypoglycemia as this is likely contributing factor to her encephalopathy

## 2020-01-13 NOTE — UTILIZATION REVIEW
Continued Stay Review    Date:    CR  For  1/12                          Current Patient Class:    Inpatient  Current Level of Care:   ICU    HPI:59 y o  female initially admitted on    1/7   With    Acute/chronic respiratory failure with hypoxia and hypercapnia    Assessment/Plan:   Rapid response  called  1/10  For  Mental status change and respiratory acidosis and transferred  To  ICU   1/12:   Remains in  ICU  On BiPap  Can transition  To  NC  Once hypercapnia improves  Cont  Lasix  Drip  May require  [facility]  Pertinent Labs/Diagnostic Results:     U/S  kidney /bladder  ( 1/12)     mm right renal lower pole nonobstructing calculus  No hydronephrosis  Underdistended but grossly unremarkable urinary bladder  Results from last 7 days   Lab Units 01/13/20  0430 01/10/20  2325 01/10/20  0512 01/09/20  0438 01/08/20  0516  01/07/20  0524 01/06/20  2241   WBC Thousand/uL 6 82  --  7 60 8 09 5 91   < > 6 49 7 82   HEMOGLOBIN g/dL 17 0*  --  16 1* 15 8* 16 4*   < > 16 2* 19 5*   I STAT HEMOGLOBIN g/dl  --  18 7*  --   --   --   --   --   --    HEMATOCRIT % 57 0*  --  57 2* 55 5* 55 7*   < > 55 4* 65 6*   HEMATOCRIT, ISTAT %  --  55*  --   --   --   --   --   --    PLATELETS Thousands/uL 70*  --  106* 110* 115*   < > 122* 156   NEUTROS ABS Thousands/µL  --   --   --   --   --   --  5 03 6 06    < > = values in this interval not displayed           Results from last 7 days   Lab Units 01/13/20  0430 01/12/20  0429 01/11/20  1241 01/11/20  0349 01/11/20  0348 01/10/20  2325 01/10/20  0512  01/07/20  0524   SODIUM mmol/L 144 146* 145  --  146*  --  145   < > 148*   POTASSIUM mmol/L 3 9 3 6 4 0  --  3 8  --  3 7   < > 3 9   CHLORIDE mmol/L 103 106 106  --  106  --  105   < > 109*   CO2 mmol/L 30 30 29  --  30  --  29   < > 28   CO2, I-STAT mmol/L  --   --   --   --   --  32  --   --   --    ANION GAP mmol/L 11 10 10  --  10  --  11   < > 11   BUN mg/dL 70* 67* 67*  --  67*  --  59*   < > 42*   CREATININE mg/dL 3 05* 3 42* 3 75*  --  3 84*  --  3 73*   < > 2 78*   EGFR ml/min/1 73sq m 16 14 13  --  12  --  13   < > 18   CALCIUM mg/dL 8 3 7 6* 7 8*  --  7 8*  --  7 6*   < > 7 2*   CALCIUM, IONIZED mmol/L 0 95*  --   --   --   --   --   --   --   --    CALCIUM, IONIZED, ISTAT mmol/L  --   --   --   --   --  1 14  --   --   --    MAGNESIUM mg/dL 1 9  --   --  2 5  --   --   --   --  2 0   PHOSPHORUS mg/dL  --  5 5*  --   --   --   --   --   --   --     < > = values in this interval not displayed       Results from last 7 days   Lab Units 01/11/20  1241 01/08/20  0516 01/07/20  1821 01/06/20  2241   AST U/L 12 22  --  43   ALT U/L 16 23  --  38   ALK PHOS U/L 99 106  --  162*   TOTAL PROTEIN g/dL 6 3* 6 0*  --  7 5   ALBUMIN g/dL 3 0* 3 0*  --  3 3*   TOTAL BILIRUBIN mg/dL 2 10* 2 86*  --  3 07*   BILIRUBIN DIRECT mg/dL  --  1 27*  --   --    AMMONIA umol/L  --   --  55*  --      Results from last 7 days   Lab Units 01/13/20  0727 01/13/20  0425 01/12/20  2103 01/12/20  1704 01/12/20  1555 01/12/20  1402 01/12/20  1104 01/12/20  0724 01/12/20  0535 01/11/20  2211   POC GLUCOSE mg/dl 102 100 116 98 89 89 84 90 77 73     Results from last 7 days   Lab Units 01/13/20  0430 01/12/20  0429 01/11/20  1241 01/11/20  0348 01/10/20  0512 01/09/20  0438 01/08/20  0516 01/07/20  0524 01/06/20  2241   GLUCOSE RANDOM mg/dL 104 82 82 85 101 138 85 70 72          Results from last 7 days   Lab Units 01/13/20  1114 01/12/20  1227 01/11/20  1223   PH ART  7 402 7 350 7 257*   PCO2 ART mm Hg 50 2* 46 8* 64 2*   PO2 ART mm Hg 91 9 84 1 107 3   HCO3 ART mmol/L 30 5* 25 3 28 0   BASE EXC ART mmol/L 4 3 -0 9 -1 2   O2 CONTENT ART mL/dL 25 3* 24 9* 24 3*   O2 HGB, ARTERIAL % 95 9 95 1 96 5   ABG SOURCE  Brachial, Right Radial, Right Radial, Right         Results from last 7 days   Lab Units 01/10/20  2325   I STAT BASE EXC mmol/L 0   I STAT O2 SAT % 99*   ISTAT PH ART  7 268*   I STAT ART PCO2 mm HG 64 9*   I STAT ART PO2 mm  0*   I STAT ART HCO3 mmol/L 29 7*         Results from last 7 days   Lab Units 01/11/20  1241 01/08/20  0516 01/07/20  1821 01/07/20  1137   PROTIME seconds 17 8*  --   --  25 2*   INR  1 44*  --   --  2 24*   PTT seconds  --  63* 51* 34           Results from last 7 days   Lab Units 01/11/20  1136 01/09/20  1836   CLARITY UA  Clear  --    COLOR UA  Faith  --    SPEC GRAV UA  1 025  --    PH UA  5 5  --    GLUCOSE UA mg/dl Negative  --    KETONES UA mg/dl Negative  --    BLOOD UA  Moderate*  --    PROTEIN UA mg/dl 30 (1+)*  --    NITRITE UA  Negative  --    BILIRUBIN UA  Negative  --    UROBILINOGEN UA E U /dl 0 2  --    LEUKOCYTES UA  Negative  --    WBC UA /hpf 0-1*  --    RBC UA /hpf 10-20*  --    BACTERIA UA /hpf Occasional  --    EPITHELIAL CELLS WET PREP /hpf Occasional  --    SODIUM UR   --  6           Vital Signs:   01/12/20 2303    120Abnormal   27Abnormal   150/121Abnormal   134  96 %       01/12/20 2300  97 2 °F (36 2 °C)Abnormal                  01/12/20 2203    116Abnormal   27Abnormal   150/104Abnormal   124  94 %       01/12/20 2103    114Abnormal   26Abnormal   155/118Abnormal   135  93 %       01/12/20 2003    122Abnormal   26Abnormal   164/118Abnormal   134  94 %       01/12/20 1904  97 4 °F (36 3 °C)Abnormal           95 %       01/12/20 1903    122Abnormal   26Abnormal   147/116Abnormal   127  96 %       01/12/20 1800    116Abnormal   25Abnormal   150/103Abnormal     92 %       01/12/20 1700    116Abnormal   24Abnormal   139/107Abnormal     92 %       01/12/20 1600    118Abnormal   24Abnormal   150/97    92 %             Medications:   Scheduled Medications:    Medications:  buPROPion 150 mg Oral Daily   heparin (porcine) 5,000 Units Subcutaneous Q8H [address]   insulin lispro 1-6 Units Subcutaneous TID AC   insulin lispro 1-6 Units Subcutaneous HS   ipratropium 0 5 mg Nebulization TID   levalbuterol 1 25 mg Nebulization TID   nicotine 1 patch Transdermal Daily   nystatin Topical BID     Continuous IV Infusions:    dextrose 50 mL/hr Intravenous Continuous   furosemide 40 mg/hr Intravenous Continuous     PRN Meds:    albuterol 2 puff Inhalation Q4H PRN   Labetalol HCl 10 mg Intravenous Q6H PRN   levalbuterol 1 25 mg Nebulization Q4H PRN   metoprolol 5 mg Intravenous Q6H PRN       Discharge Plan:    TBD    Network Utilization Review Department  [email or web address]  ATTENTION: Please call with any questions or concerns to [phone] and carefully listen to the prompts so that you are directed to the right person  All voicemails are confidential   [facility] all requests for admission clinical reviews, approved or denied determinations and any other requests to dedicated fax number below belonging to the campus where the patient is receiving treatment   List of dedicated fax numbers for the Facilities:  [address] [place] [address] DENIALS (Administrative/Medical Necessity) [phone]   [address] (Maternity/NICU/Pediatrics) [phone]   Sukumar Luo [phone]   Linh Ward [phone]   [facility] [phone]   Larry Piña [phone]   [address] [place] [address] [phone]   [facility]  [phone]   [address] [facility]  [address] [address] [address] [facility] [phone]

## 2020-01-13 NOTE — ASSESSMENT & PLAN NOTE
· Rate is controlled      · Continue PO cardizem and metoprolol  · Cont eliquis for [facility]

## 2020-01-13 NOTE — ASSESSMENT & PLAN NOTE
Wt Readings from Last 3 Encounters:   01/12/20 136 kg (299 lb 2 6 oz)   11/05/19 110 kg (243 lb 9 6 oz)   10/28/19 109 kg (240 lb 4 8 oz)       · Suspect decompensated CHF  Echo from 1/8/20 shows EF of 89% with diastolic dysfunction  · Attempting diuresis with lasix infusion as above

## 2020-01-13 NOTE — PROGRESS NOTES
Progress Note - Vickie Genao 1960, 63 y o  female MRN: [id number]    Unit/Bed#: ICU 06 Encounter: [id number]    Primary Care Provider: Kishan Rock MD   Date and time admitted to hospital: 1/6/2020 10:25 PM        Acute on chronic diastolic congestive heart failure [facility])  Assessment & Plan  Wt Readings from Last 3 Encounters:   01/12/20 136 kg (299 lb 2 6 oz)   11/05/19 110 kg (243 lb 9 6 oz)   10/28/19 109 kg (240 lb 4 8 oz)       · Suspect decompensated CHF  Echo from 1/8/20 shows EF of 00% with diastolic dysfunction  · Attempting diuresis with lasix infusion as above  Encephalopathy acute  Assessment & Plan  · Secondary to hypercapnia and hypoglycemia  · Continue BiPAP for hypercapnia  Encourage compliance with nocturnal BiPAP  · Avoid hypoglycemia  · Hypercapnia is improving however she remains confused, not following commads  · Continue to monitor mental status closely    * Acute on chronic respiratory failure with hypoxia and hypercapnia (HCC)  Assessment & Plan  · Etiology likely multifactorial, COPD, pulmonary edema, pulmonary hypertension, 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, pleural effusion and noncompliance with nocturnal BiPAP  · Pt notes baseline oxygen requirement at home is 10L  · Hypercapnia is improving  Continue BiPAP and transition to NC once hypercapnia improves  · CXR shows large left pleural effusion  On lasix gtt for diuresis  · Continue scheduled nebulized bronchodilators  Chronic obstructive pulmonary disease with acute exacerbation (HCC)  Assessment & Plan  · Continue nebulized bronchodilators  No indication for steroids at this point  · Not on maintenance inhalers at home      SAPNA (acute kidney injury) [facility])  Assessment & Plan  · Acute on CKD III with baseline approximately 1 5  · Nephrology is following  Creatinine is improving  Urine output significantly improved on lasix infusion  Continue lasix gtt per nephrology recommendations    If volume status does not significantly improve may require CVVH  · Trend renal indices and monitor intake and output  · Renal U/S is pending    Permanent atrial fibrillation  Assessment & Plan  · Rate is controlled  · Continue PO cardizem and metoprolol  · Cont eliquis for [facility]    Chronic venous stasis dermatitis of both lower extremities  Assessment & Plan  · No open ulcers or signs of cellulitis        Type 2 diabetes mellitus without complication [facility])  Assessment & Plan  Lab Results   Component Value Date    HGBA1C 5 8 04/11/2019       Recent Labs     01/12/20  1104 01/12/20  1402 01/12/20  1555 01/12/20  1704   POCGLU 84 89 89 98       Blood Sugar Average: Last 72 hrs:  · Persistent hypoglycemia possibly due to poor hepatic perfusion in the setting of decompensated CHF  Continue IV dextrose  · TSH normal, random cortisol 17 9  · Goal to maintain -180  Aggressively monitor and treat hypoglycemia as this is likely contributing factor to her encephalopathy      Obesity  Assessment & Plan  · She would benefit from weight loss  · Likely component of OHV that is contributing to her acute on chronic pulmonary issues    Adjustment disorder  Assessment & Plan  · Continue welbutrin per Psych recommendations     ----------------------------------------------------------------------------------------  HPI/24hr events: Hypertensive overnight- improved with labetolol    Disposition: Continue Critical Care   Code Status: Level 1 - Full Code  ---------------------------------------------------------------------------------------  SUBJECTIVE  Arousable to name, not following commands, appears dyspneic    Review of Systems  Review of systems was reviewed and negative unless stated above in HPI/24-hour events   ---------------------------------------------------------------------------------------  OBJECTIVE    Physical Exam   Constitutional: She appears well-developed  Increased WOB   HENT:   Head: Normocephalic     Eyes: Pupils are equal, round, and reactive to light  Conjunctiva are injected   Neck: Neck supple  Cardiovascular: Normal rate and regular rhythm  No murmur heard  Pulmonary/Chest: Accessory muscle usage present  No respiratory distress  She has decreased breath sounds  She has rales  Lymphadenopathy:     She has no cervical adenopathy  Neurological:   Arousable  Says "what", but not following commands, unable to track  Skin: Skin is warm and dry  Vitals   Vitals:    20 0203 20 0303 20 0400 20 0403   BP: 140/97 (!) 149/111  145/93   Pulse: 100 100  94   Resp: (!) 26 (!) 26  22   Temp:   97 8 °F (36 6 °C)    TempSrc:   Temporal    SpO2: 95% 95%  92%   Weight:       Height:         Temp (24hrs), Av 6 °F (36 4 °C), Min:97 2 °F (36 2 °C), Max:98 °F (36 7 °C)  Current: Temperature: 97 8 °F (36 6 °C)          SpO2 Device: O2 Device: Other (comment)(bipap)  O2 Flow Rate (L/min): 10 L/min    Invasive/non-invasive ventilation settings   Respiratory    Lab Data (Last 4 hours)    None         O2/Vent Data (Last 4 hours)    None                Height and Weights   Height: 5' 5" (165 1 cm)  IBW: 57 kg  Body mass index is 49 78 kg/m²  Weight (last 2 days)     Date/Time   Weight    20 0600   136 (299 16)    20 0600   135 (296 96)              Intake and Output  I/O        07 -  0700  07 -  0700    I V  (mL/kg) 911 1 (6 7) 805 4 (5 9)    IV Piggyback  100    Total Intake(mL/kg) 911 1 (6 7) 905 4 (6 7)    Urine (mL/kg/hr) 2636 (0 8) 1490 (0 5)    Stool  0    Total Output 2636 1490    Net -1724 9 -584  6          Unmeasured Stool Occurrence  1 x          Nutrition       Diet Orders   (From admission, onward)             Start     Ordered    01/10/20 1556  Dietary nutrition supplements  Once     Question Answer Comment   Select Supplement: Ensure Pudding-Chocolate    Frequency Breakfast, Dinner        01/10/20 1555    20 1037  Diet Sunil/CHO Controlled; Consistent Carbohydrate Diet Level 2 (5 carb servings/75 grams CHO/meal); Fluid Restriction 1500 ML  Diet effective now     Question Answer Comment   Diet Type Sunil/CHO Controlled    Sunil/CHO Controlled Consistent Carbohydrate Diet Level 2 (5 carb servings/75 grams CHO/meal)    Other Restriction(s): Fluid Restriction 1500 ML    RD to adjust diet per protocol?  Yes        01/09/20 1036                Laboratory and Diagnostics:  Results from last 7 days   Lab Units 01/10/20  2325 01/10/20  0512 01/09/20  0438 01/08/20  0516 01/07/20  1137 01/07/20  0524 01/06/20  2241   WBC Thousand/uL  --  7 60 8 09 5 91 7 16 6 49 7 82   HEMOGLOBIN g/dL  --  16 1* 15 8* 16 4* 17 4* 16 2* 19 5*   I STAT HEMOGLOBIN g/dl 18 7*  --   --   --   --   --   --    HEMATOCRIT %  --  57 2* 55 5* 55 7* 59 0* 55 4* 65 6*   HEMATOCRIT, ISTAT % 55*  --   --   --   --   --   --    PLATELETS Thousands/uL  --  106* 110* 115* 125* 122* 156   NEUTROS PCT %  --   --   --   --   --  76* 78*   MONOS PCT %  --   --   --   --   --  10 10     Results from last 7 days   Lab Units 01/13/20  0430 01/12/20  0429 01/11/20  1241 01/11/20  0348 01/10/20  2325 01/10/20  0512 01/09/20  0438 01/08/20  0516  01/06/20  2241   SODIUM mmol/L 144 146* 145 146*  --  145 144 147*   < > 146*   POTASSIUM mmol/L 3 9 3 6 4 0 3 8  --  3 7 3 5 3 8   < > 4 0   CHLORIDE mmol/L 103 106 106 106  --  105 104 107   < > 103   CO2 mmol/L 30 30 29 30  --  29 30 29   < > 30   CO2, I-STAT mmol/L  --   --   --   --  32  --   --   --   --   --    ANION GAP mmol/L 11 10 10 10  --  11 10 11   < > 13   BUN mg/dL 70* 67* 67* 67*  --  59* 51* 46*   < > 46*   CREATININE mg/dL 3 05* 3 42* 3 75* 3 84*  --  3 73* 3 51* 3 10*   < > 3 32*   CALCIUM mg/dL 8 3 7 6* 7 8* 7 8*  --  7 6* 8 1* 8 1*   < > 9 1   GLUCOSE RANDOM mg/dL 104 82 82 85  --  101 138 85   < > 72   ALT U/L  --   --  16  --   --   --   --  23  --  38   AST U/L  --   --  12  --   --   --   --  22  --  43   ALK PHOS U/L  --   --  99  --   --   --   -- 106  --  162*   ALBUMIN g/dL  --   --  3 0*  --   --   --   --  3 0*  --  3 3*   TOTAL BILIRUBIN mg/dL  --   --  2 10*  --   --   --   --  2 86*  --  3 07*    < > = values in this interval not displayed  Results from last 7 days   Lab Units 01/13/20  0430 01/12/20  0429 01/11/20  0349 01/07/20  0524   MAGNESIUM mg/dL 1 9  --  2 5 2 0   PHOSPHORUS mg/dL  --  5 5*  --   --       Results from last 7 days   Lab Units 01/11/20  1241 01/08/20  0516 01/07/20  1821 01/07/20  1137   INR  1 44*  --   --  2 24*   PTT seconds  --  63* 51* 34      Results from last 7 days   Lab Units 01/06/20  2241   TROPONIN I ng/mL 0 07*     Results from last 7 days   Lab Units 01/07/20  0047 01/06/20  2235   LACTIC ACID mmol/L 1 8 2 5*     ABG:  Results from last 7 days   Lab Units 01/12/20  1227   PH ART  7 350   PCO2 ART mm Hg 46 8*   PO2 ART mm Hg 84 1   HCO3 ART mmol/L 25 3   BASE EXC ART mmol/L -0 9   ABG SOURCE  Radial, Right     VBG:  Results from last 7 days   Lab Units 01/12/20  1227   ABG SOURCE  Radial, Right     Results from last 7 days   Lab Units 01/07/20  0629   PROCALCITONIN ng/ml 0 17       Micro  Results from last 7 days   Lab Units 01/06/20  2235 01/06/20  2230   BLOOD CULTURE  No Growth After 5 Days  No Growth After 5 Days  EKG:   Imaging: I have personally reviewed pertinent reports     and I have personally reviewed pertinent films in PACS    Active Medications  Scheduled Meds:    Current Facility-Administered Medications:  albuterol 2 puff Inhalation Q4H PRN MAYNOR Nassar    buPROPion 150 mg Oral Daily MAYNOR Jaeger    dextrose 50 mL/hr Intravenous Continuous MAYNOR Alan Last Rate: 50 mL/hr (01/13/20 0546)   furosemide 40 mg/hr Intravenous Continuous MAYNOR Lorenzana Last Rate: 40 mg/hr (01/12/20 2100)   heparin (porcine) 5,000 Units Subcutaneous [facility] MAYNOR Lorenzana    insulin lispro 1-6 Units Subcutaneous TID AC MAYNOR Jaeger    insulin lispro 1-6 Units Subcutaneous HS Luli K Arunoflakesha, BALJITNP    ipratropium 0 5 mg Nebulization TID Lorna Diasoflakesha, MAYNOR    Labetalol HCl 10 mg Intravenous Q6H PRN Danetta Pod, CRNP    levalbuterol 1 25 mg Nebulization TID Lorna Herbert Arunofskkyler, CRNP    levalbuterol 1 25 mg Nebulization Q4H PRN Lorna Herbert Kirby, BALJITNP    metoprolol 5 mg Intravenous Q6H PRN Veronica Meaghan, CRNP    nicotine 1 patch Transdermal Daily Luli K Arunofskkyler, CRNP    nystatin  Topical BID MAYNOR Birch      Continuous Infusions:    dextrose 50 mL/hr Last Rate: 50 mL/hr (01/13/20 0546)   furosemide 40 mg/hr Last Rate: 40 mg/hr (01/12/20 2100)     PRN Meds:     albuterol 2 puff Q4H PRN   Labetalol HCl 10 mg Q6H PRN   levalbuterol 1 25 mg Q4H PRN   metoprolol 5 mg Q6H PRN       ---------------------------------------------------------------------------------------  Advance Directive and Living Will:      Power of :    POLST:    ---------------------------------------------------------------------------------------  Counseling / Coordination of BALJIT PerezNP      Portions of the record may have been created with voice recognition software  Occasional wrong word or "sound a like" substitutions may have occurred due to the inherent limitations of voice recognition software    Read the chart carefully and recognize, using context, where substitutions have occurred

## 2020-01-13 NOTE — PROGRESS NOTES
Progress Note - Cardiology   Lydia Zamora 63 y o  female MRN: [id number]  Unit/Bed#: ICU 06 Encounter: [id number]    Assessment:  1  Multifocal pneumonia  2  COPD with acute respiratory failure  3  Acute on chronic diastolic congestive heart failure  4  Chronic atrial fibrillation  5  Chronic kidney disease, stage III  6  Metabolic encephalopathy  7  Hypertension  8  Diabetes mellitus type 2  9  Morbid obesity  10  Chronic lower extremity venous stasis dermatitis  11  Compression fracture of the 1st lumbar vertebrae     Plan:  1  Continue IV metoprolol for heart rate control  2  Continue IV labetalol for blood pressure control  3  Will defer diuretic therapy to pulmonary       Interval history:  Patient responded to her name although she was unable to communicate how she was feeling  She also responded appropriately to pain when examining her lower extremities  Creatinine appears to be improving down to 3 05 as of today  Patient for CT scan of the head without contrast today        Vitals: /99   Pulse (!) 114   Temp 97 7 °F (36 5 °C) (Temporal)   Resp (!) 28   Ht 5' 5" (1 651 m)   Wt 136 kg (299 lb 2 6 oz)   SpO2 91%   BMI 49 78 kg/m²   Vitals:    01/11/20 0600 01/12/20 0600   Weight: 135 kg (296 lb 15 4 oz) 136 kg (299 lb 2 6 oz)     Orthostatic Blood Pressures      Most Recent Value   Blood Pressure  138/99 filed at 01/13/2020 0603   Patient Position - Orthostatic VS  Lying filed at 01/11/2020 0302            Intake/Output Summary (Last 24 hours) at 1/13/2020 0945  Last data filed at 1/13/2020 0421  Gross per 24 hour   Intake 1532 93 ml   Output 3820 ml   Net -2287 07 ml       Invasive Devices     Peripheral Intravenous Line            Peripheral IV 01/10/20 Left Hand 2 days    Peripheral IV 01/12/20 Right Antecubital less than 1 day          Drain            External Urinary Catheter 2 days                Review of Systems   Unable to perform ROS: Acuity of condition       Physical Exam Constitutional: She appears well-developed and well-nourished  She appears distressed  Morbidly obese BMI 49 8   HENT:   Head: Normocephalic and atraumatic  Neck: No thyromegaly present  Cardiovascular: Normal heart sounds and intact distal pulses  Exam reveals no gallop and no friction rub  No murmur heard  Irregularly irregular heart rhythm   Pulmonary/Chest: No respiratory distress  Bilateral breath sounds noted  Conducted sounds from upper airways present  Patient does not appear to be in distress on high-flow oxygen  Abdominal: Soft  There is no tenderness  Musculoskeletal: She exhibits edema and tenderness  2+ lower extremity pretibial edema with chronic venous stasis skin changes  Lower extremities tender  Neurological:   Response to name  Does not follow commands  Skin: Skin is warm and dry  Rash noted  There is erythema  Lab Results:   CBC with diff:   Results from last 7 days   Lab Units 01/13/20  0430   WBC Thousand/uL 6 82   RBC Million/uL 5 53*   HEMOGLOBIN g/dL 17 0*   HEMATOCRIT % 57 0*   MCV fL 103*   MCH pg 30 7   MCHC g/dL 29 8*   RDW % 19 9*   MPV fL 12 6   PLATELETS Thousands/uL 70*     CMP:   Results from last 7 days   Lab Units 01/13/20  0430  01/11/20  1241  01/10/20  2325   SODIUM mmol/L 144   < > 145   < >  --    POTASSIUM mmol/L 3 9   < > 4 0   < >  --    CHLORIDE mmol/L 103   < > 106   < >  --    CO2 mmol/L 30   < > 29   < >  --    CO2, I-STAT mmol/L  --   --   --   --  32   BUN mg/dL 70*   < > 67*   < >  --    CREATININE mg/dL 3 05*   < > 3 75*   < >  --    GLUCOSE, ISTAT mg/dl  --   --   --   --  95   CALCIUM mg/dL 8 3   < > 7 8*   < >  --    AST U/L  --   --  12  --   --    ALT U/L  --   --  16  --   --    ALK PHOS U/L  --   --  99  --   --    EGFR ml/min/1 73sq m 16   < > 13   < >  --     < > = values in this interval not displayed       Troponin:   0   Lab Value Date/Time    TROPONINI 0 07 (H) 01/06/2020 2241    TROPONINI 0 09 (H) 02/08/2019 1249 Imaging: I have personally reviewed pertinent reports        EKG:  Chronic atrial fibrillation

## 2020-01-13 NOTE — PROGRESS NOTES
NEPHROLOGY PROGRESS NOTE   Magaly Prakash 63 y o  female MRN: [id number]  Unit/Bed#: ICU 06 Encounter: [id number]  Reason for Consult: SAPNA/CKD    ASSESSMENT AND PLAN:  SAPNA POA on CKD stage 3, baseline creatinine 1 3 to 1 6  -SAPNA suspected to be multifactorial in the setting of cardiorenal/volume overload/hypotension episodes, prior ACE-inhibitor use, progression to ATN  -peak creatinine 3 8 now seems to be overall improving to 3 0 today  -continue current Lasix drip at 40 mg/hour with good urine output  -BMP in a m   -continue to monitor intake and output, avoid hypotension, nephrotoxins or NSAIDs  -CKD suspected to be secondary to underlying hypertension/diabetes/CHF in the setting of morbidly obese    Mild hypernatremia, serum sodium has improved to 144, patient is ordered D5 water at 50 mL/hour although at the time of my encounter she is on D5 normal saline, discussed with nurse who will be changing to D5 water at 50 mL/hour  -continue to monitor    Mild hyperphosphatemia, serum phosphorus 5 5, continue to monitor with improving renal function    Acute on chronic hypoxic/hypercapnic respiratory failure  -likely multifactorial in the setting of COPD, pulmonary hypertension, suspected component of sleep apnea, pulmonary congestion/pleural effusion  -currently remains on BiPAP at the time of my encounter, close monitoring and management as per Pulmonary  -diuretics as below    Diastolic CHF, echo this month shows EF 54%, diastolic dysfunction present, decreased right ventricular systolic function, moderate pulmonary hypertension, no pericardial effusion   -good urine output and remains negative balance with current Lasix to 40 mg/hour and continue same for today   -accurate intake and output, daily weight    Encephalopathy, lethargic, suspect multifactorial in the setting of hypercapnia, continue to monitor with regular ABG  Renal function overall improving      Hypertension, blood pressure overall acceptable, continue to monitor  Discussed above plan with ICU team     SUBJECTIVE:  Patient seen and examined at bedside  She remains on BiPAP at the time of my encounter  Remains lethargic  Does not answer any questions  Urine output good on Lasix drip      OBJECTIVE:  Current Weight: Weight - Scale: 136 kg (299 lb 2 6 oz)  Vitals:    01/13/20 0700   BP:    Pulse:    Resp:    Temp: 97 7 °F (36 5 °C)   SpO2:        Intake/Output Summary (Last 24 hours) at 1/13/2020 1025  Last data filed at 1/13/2020 0655  Gross per 24 hour   Intake 1532 93 ml   Output 3570 ml   Net -2037 07 ml     Wt Readings from Last 3 Encounters:   01/12/20 136 kg (299 lb 2 6 oz)   11/05/19 110 kg (243 lb 9 6 oz)   10/28/19 109 kg (240 lb 4 8 oz)     Temp Readings from Last 3 Encounters:   01/13/20 97 7 °F (36 5 °C) (Temporal)   11/05/19 99 5 °F (37 5 °C) (Tympanic)   10/28/19 98 5 °F (36 9 °C)     BP Readings from Last 3 Encounters:   01/13/20 138/99   11/05/19 144/80   10/28/19 150/100     Pulse Readings from Last 3 Encounters:   01/13/20 (!) 114   11/05/19 (!) 118   10/28/19 90        Physical Examination:  General:  Lying in bed, on BiPAP   Eyes:  Mild conjunctival pallor present  ENT:  External examination of ears and nose unremarkable  Neck:  No obvious lymphadenopathy appreciated  Respiratory:  Decreased breath sound at base, decreased breath sound on left side  CVS:  S1, S2 present  GI:  Soft, non tender, nondistended  CNS:  Lethargic, opens eyes, does not answer any questions, does not follow commands at the time of my encounter  Extremities:  1+ edema in legs  Skin:  No new rash in legs    Medications:    Current Facility-Administered Medications:     albuterol (PROVENTIL HFA,VENTOLIN HFA) inhaler 2 puff, 2 puff, Inhalation, Q4H PRN, Luli K ArunofBALJIT crowderNP    buPROPion (WELLBUTRIN XL) 24 hr tablet 150 mg, 150 mg, Oral, Daily, [place] MAYNOR Kirby, Stopped at 01/11/20 1128    dextrose 5 % infusion, 50 mL/hr, Intravenous, Continuous, [place] MAYNOR Vargas, Last Rate: 50 mL/hr at 01/13/20 1021, 50 mL/hr at 01/13/20 1021    furosemide (LASIX) 500 mg infusion 50 mL, 40 mg/hr, Intravenous, Continuous, MAYNOR Caro, Last Rate: 4 mL/hr at 01/12/20 2100, 40 mg/hr at 01/12/20 2100    heparin (porcine) subcutaneous injection 5,000 Units, 5,000 Units, Subcutaneous, Q8H [address], MAYNOR Caro, 5,000 Units at 01/13/20 0546    insulin lispro (HumaLOG) 100 units/mL subcutaneous injection 1-6 Units, 1-6 Units, Subcutaneous, TID AC, Stopped at 01/10/20 0752 **AND** Fingerstick Glucose (POCT), , , TID AC, MAYNOR Jaeger    insulin lispro (HumaLOG) 100 units/mL subcutaneous injection 1-6 Units, 1-6 Units, Subcutaneous, HS, MAYNOR Jaeger, 1 Units at 01/08/20 2200    ipratropium (ATROVENT) 0 02 % inhalation solution 0 5 mg, 0 5 mg, Nebulization, TID, MAYNOR Jaeger, 0 5 mg at 01/13/20 0731    Labetalol HCl (NORMODYNE) injection 10 mg, 10 mg, Intravenous, Q6H PRN, MAYNOR Landers, 10 mg at 01/12/20 2317    levalbuterol (XOPENEX) inhalation solution 1 25 mg, 1 25 mg, Nebulization, TID, MAYNOR Jaeger, 1 25 mg at 01/13/20 0731    levalbuterol (XOPENEX) inhalation solution 1 25 mg, 1 25 mg, Nebulization, Q4H PRN, MAYNOR Adair    metoprolol (LOPRESSOR) injection 5 mg, 5 mg, Intravenous, Q6H PRN, MAYNOR Caro, 5 mg at 01/13/20 0942    nicotine (NICODERM CQ) 14 mg/24hr TD 24 hr patch 1 patch, 1 patch, Transdermal, Daily, MAYNOR Adair, 1 patch at 01/13/20 0920    nystatin (MYCOSTATIN) powder, , Topical, BID, MAYNOR Hernandez    Laboratory Results:  Results from last 7 days   Lab Units 01/13/20  0430 01/12/20  0429 01/11/20  1241 01/11/20  0349 01/11/20  0348 01/10/20  2325 01/10/20  0512 01/09/20  0438 01/08/20  0516 01/07/20  1137 01/07/20  0524 01/06/20  2241   WBC Thousand/uL 6 82  --   --   --   --   --  7 60 8 09 5 91 7 16 6 49 7 82   HEMOGLOBIN g/dL 17 0*  --   --   --   --   --  16 1* 15 8* 16 4* 17 4* 16 2* 19 5*   I STAT HEMOGLOBIN g/dl  --   --   --   --   --  18 7*  --   --   --   --   --   --    HEMATOCRIT % 57 0*  --   --   --   --   --  57 2* 55 5* 55 7* 59 0* 55 4* 65 6*   HEMATOCRIT, ISTAT %  --   --   --   --   --  55*  --   --   --   --   --   --    PLATELETS Thousands/uL 70*  --   --   --   --   --  106* 110* 115* 125* 122* 156   SODIUM mmol/L 144 146* 145  --  146*  --  145 144 147*  --  148* 146*   POTASSIUM mmol/L 3 9 3 6 4 0  --  3 8  --  3 7 3 5 3 8  --  3 9 4 0   CHLORIDE mmol/L 103 106 106  --  106  --  105 104 107  --  109* 103   CO2 mmol/L 30 30 29  --  30  --  29 30 29  --  28 30   CO2, I-STAT mmol/L  --   --   --   --   --  32  --   --   --   --   --   --    BUN mg/dL 70* 67* 67*  --  67*  --  59* 51* 46*  --  42* 46*   CREATININE mg/dL 3 05* 3 42* 3 75*  --  3 84*  --  3 73* 3 51* 3 10*  --  2 78* 3 32*   CALCIUM mg/dL 8 3 7 6* 7 8*  --  7 8*  --  7 6* 8 1* 8 1*  --  7 2* 9 1   MAGNESIUM mg/dL 1 9  --   --  2 5  --   --   --   --   --   --  2 0  --    PHOSPHORUS mg/dL  --  5 5*  --   --   --   --   --   --   --   --   --   --    GLUCOSE, ISTAT mg/dl  --   --   --   --   --  95  --   --   --   --   --   --        US kidney and bladder   Final Result by Phyllis Schwarz MD (01/12 1906)      5 mm right renal lower pole nonobstructing calculus  No hydronephrosis  Underdistended but grossly unremarkable urinary bladder  Workstation performed: [id number]         XR chest portable   Final Result by Irene Amaral MD (01/11 0818)   Persistent cardiomegaly, suspected multifocal pneumonia and probable superimposed CHF            Workstation performed: [id number]         XR chest 2 views   ED Interpretation by Casper Ozuna MD (01/06 7054)   Primary reviewed  Increased interstitial markings  Final Result by Lion Villatoro MD (01/07 5116)      Enlargement of the cardiac silhouette and pulmonary interstitial edema              Workstation performed: [id number]         VAS lower limb arterial duplex, complete bilateral    (Results Pending)   VAS lower limb venous duplex study, complete bilateral    (Results Pending)   CT head wo contrast    (Results Pending)       Portions of the record may have been created with voice recognition software  Occasional wrong word or "sound a like" substitutions may have occurred due to the inherent limitations of voice recognition software  Read the chart carefully and recognize, using context, where substitutions have occurred

## 2020-01-13 NOTE — ASSESSMENT & PLAN NOTE
· Secondary to hypercapnia and hypoglycemia  · Continue BiPAP for hypercapnia    Encourage compliance with nocturnal BiPAP  · Avoid hypoglycemia  · Hypercapnia is improving however she remains confused, not following commads  · Continue to monitor mental status closely

## 2020-01-13 NOTE — WOUND OSTOMY CARE
Progress Note - Wound   Dawna Hoyt 63 y o  female MRN: [id number]  Unit/Bed#: ICU 06 Encounter: [id number]        Assessment:   Patient seen for wound care follow-up  On Bipap, lethargic  Dependent for all ADLs and turning/repositioning  Christy Radhan in place for urinary incontinence management  Moderate edema and bruising to left arm  Scattered scabs and excoriation to bilateral legs and arms  Bilateral heels intact  Feet are dusky purple, cool with weak pulses  Chronic venous stasis dermatitis to lower extremities  Small areas of black eschar to right foot likely from vascular compromise  LEADs pending  1  MASD/ITD with candidiasis to bilateral groin, abdomen, and breast folds--resolved to groin and breast folds  Right abdominal fold with some remaining candidiasis, no open areas  2   Partial thickness open area along old midline abdominal incision likely due to moisture damage--RESOLVED     See flowsheet and media for wound details  Wound Care Plan:   1-Nystatin powder to groin, abdominal, and breast folds BID      Skin care plans:  1-Hydraguard to bilateral sacrum and buttock TID and PRN  2-Elevate heels to offload pressure  3-Ehob cushion in chair when out of bed  4-Moisturize skin daily with skin nourishing cream   5-Turn/reposition q2h or when medically stable for pressure re-distribution on skin--use positioning wedges  6-Continue purewick for urinary incontinence management  7-Apply Allevyn Life foam dressings to bilateral heels for prevention  Olaf with P   Peel back for skin assessment every shift and re-apply  Change dressings every 3 days         Wound care team to follow  Plan of care reviewed with primary RN       Sam HAN, RN, [employer]

## 2020-01-14 ENCOUNTER — APPOINTMENT (INPATIENT)
Dept: CT IMAGING | Facility: HOSPITAL | Age: 60
DRG: 291 | End: 2020-01-14
Payer: COMMERCIAL

## 2020-01-14 ENCOUNTER — APPOINTMENT (INPATIENT)
Dept: NON INVASIVE DIAGNOSTICS | Facility: HOSPITAL | Age: 60
DRG: 291 | End: 2020-01-14
Payer: COMMERCIAL

## 2020-01-14 PROBLEM — I31.3 PERICARDIAL EFFUSION: Status: ACTIVE | Noted: 2020-01-14

## 2020-01-14 PROBLEM — I31.39 PERICARDIAL EFFUSION: Status: ACTIVE | Noted: 2020-01-14

## 2020-01-14 PROBLEM — G93.40 ENCEPHALOPATHY ACUTE: Status: RESOLVED | Noted: 2020-01-11 | Resolved: 2020-01-14

## 2020-01-14 LAB
ANION GAP SERPL CALCULATED.3IONS-SCNC: 8 MMOL/L (ref 4–13)
APTT PPP: 67 SECONDS (ref 23–37)
APTT PPP: 77 SECONDS (ref 23–37)
BUN SERPL-MCNC: 74 MG/DL (ref 5–25)
CALCIUM SERPL-MCNC: 7.9 MG/DL (ref 8.3–10.1)
CHLORIDE SERPL-SCNC: 103 MMOL/L (ref 100–108)
CO2 SERPL-SCNC: 33 MMOL/L (ref 21–32)
CREAT SERPL-MCNC: 2.7 MG/DL (ref 0.6–1.3)
GFR SERPL CREATININE-BSD FRML MDRD: 19 ML/MIN/1.73SQ M
GLUCOSE SERPL-MCNC: 121 MG/DL (ref 65–140)
GLUCOSE SERPL-MCNC: 90 MG/DL (ref 65–140)
GLUCOSE SERPL-MCNC: 95 MG/DL (ref 65–140)
GLUCOSE SERPL-MCNC: 96 MG/DL (ref 65–140)
GLUCOSE SERPL-MCNC: 99 MG/DL (ref 65–140)
POTASSIUM SERPL-SCNC: 3.4 MMOL/L (ref 3.5–5.3)
SODIUM SERPL-SCNC: 144 MMOL/L (ref 136–145)

## 2020-01-14 PROCEDURE — 85730 THROMBOPLASTIN TIME PARTIAL: CPT | Performed by: NURSE PRACTITIONER

## 2020-01-14 PROCEDURE — 71250 CT THORAX DX C-: CPT

## 2020-01-14 PROCEDURE — 99232 SBSQ HOSP IP/OBS MODERATE 35: CPT | Performed by: INTERNAL MEDICINE

## 2020-01-14 PROCEDURE — 80048 BASIC METABOLIC PNL TOTAL CA: CPT | Performed by: INTERNAL MEDICINE

## 2020-01-14 PROCEDURE — 97112 NEUROMUSCULAR REEDUCATION: CPT

## 2020-01-14 PROCEDURE — 97530 THERAPEUTIC ACTIVITIES: CPT

## 2020-01-14 PROCEDURE — 94660 CPAP INITIATION&MGMT: CPT

## 2020-01-14 PROCEDURE — 93923 UPR/LXTR ART STDY 3+ LVLS: CPT

## 2020-01-14 PROCEDURE — 94640 AIRWAY INHALATION TREATMENT: CPT

## 2020-01-14 PROCEDURE — 93923 UPR/LXTR ART STDY 3+ LVLS: CPT | Performed by: SURGERY

## 2020-01-14 PROCEDURE — 82948 REAGENT STRIP/BLOOD GLUCOSE: CPT

## 2020-01-14 PROCEDURE — 97535 SELF CARE MNGMENT TRAINING: CPT

## 2020-01-14 PROCEDURE — 99233 SBSQ HOSP IP/OBS HIGH 50: CPT | Performed by: INTERNAL MEDICINE

## 2020-01-14 RX ORDER — POTASSIUM CHLORIDE 14.9 MG/ML
20 INJECTION INTRAVENOUS ONCE
Status: COMPLETED | OUTPATIENT
Start: 2020-01-14 | End: 2020-01-14

## 2020-01-14 RX ORDER — METOPROLOL TARTRATE 5 MG/5ML
5 INJECTION INTRAVENOUS EVERY 6 HOURS
Status: DISCONTINUED | OUTPATIENT
Start: 2020-01-14 | End: 2020-01-16

## 2020-01-14 RX ADMIN — DEXTROSE 50 ML/HR: 5 SOLUTION INTRAVENOUS at 06:18

## 2020-01-14 RX ADMIN — IPRATROPIUM BROMIDE 0.5 MG: 0.5 SOLUTION RESPIRATORY (INHALATION) at 13:32

## 2020-01-14 RX ADMIN — POTASSIUM CHLORIDE 20 MEQ: 14.9 INJECTION, SOLUTION INTRAVENOUS at 07:59

## 2020-01-14 RX ADMIN — LEVALBUTEROL HYDROCHLORIDE 1.25 MG: 1.25 SOLUTION, CONCENTRATE RESPIRATORY (INHALATION) at 07:20

## 2020-01-14 RX ADMIN — NYSTATIN: 100000 POWDER TOPICAL at 09:58

## 2020-01-14 RX ADMIN — Medication 40 MG/HR: at 14:24

## 2020-01-14 RX ADMIN — METOPROLOL TARTRATE 5 MG: 5 INJECTION INTRAVENOUS at 23:30

## 2020-01-14 RX ADMIN — LEVALBUTEROL HYDROCHLORIDE 1.25 MG: 1.25 SOLUTION, CONCENTRATE RESPIRATORY (INHALATION) at 19:34

## 2020-01-14 RX ADMIN — METOPROLOL TARTRATE 2.5 MG: 5 INJECTION INTRAVENOUS at 04:06

## 2020-01-14 RX ADMIN — METOPROLOL TARTRATE 5 MG: 5 INJECTION INTRAVENOUS at 16:26

## 2020-01-14 RX ADMIN — BUPROPION HYDROCHLORIDE 150 MG: 150 TABLET, FILM COATED, EXTENDED RELEASE ORAL at 08:59

## 2020-01-14 RX ADMIN — NICOTINE 1 PATCH: 14 PATCH TRANSDERMAL at 08:59

## 2020-01-14 RX ADMIN — NYSTATIN: 100000 POWDER TOPICAL at 17:22

## 2020-01-14 RX ADMIN — HEPARIN SODIUM AND DEXTROSE 11.1 UNITS/KG/HR: 10000; 5 INJECTION INTRAVENOUS at 17:22

## 2020-01-14 RX ADMIN — POTASSIUM CHLORIDE 20 MEQ: 14.9 INJECTION, SOLUTION INTRAVENOUS at 13:40

## 2020-01-14 RX ADMIN — LEVALBUTEROL HYDROCHLORIDE 1.25 MG: 1.25 SOLUTION, CONCENTRATE RESPIRATORY (INHALATION) at 13:32

## 2020-01-14 RX ADMIN — LEVALBUTEROL HYDROCHLORIDE 1.25 MG: 1.25 SOLUTION, CONCENTRATE RESPIRATORY (INHALATION) at 07:21

## 2020-01-14 RX ADMIN — IPRATROPIUM BROMIDE 0.5 MG: 0.5 SOLUTION RESPIRATORY (INHALATION) at 19:34

## 2020-01-14 RX ADMIN — METOPROLOL TARTRATE 5 MG: 5 INJECTION INTRAVENOUS at 10:06

## 2020-01-14 RX ADMIN — IPRATROPIUM BROMIDE 0.5 MG: 0.5 SOLUTION RESPIRATORY (INHALATION) at 07:21

## 2020-01-14 NOTE — PLAN OF CARE
Problem: SKIN/TISSUE INTEGRITY - ADULT  Goal: Skin integrity remains intact  Description  INTERVENTIONS  - Identify patients at risk for skin breakdown  - Assess and monitor skin integrity  - Assess and monitor nutrition and hydration status  - Monitor labs (i e  albumin)  - Assess for incontinence   - Turn and reposition patient  - Assist with mobility/ambulation  - Relieve pressure over bony prominences  - Avoid friction and shearing  - Provide appropriate hygiene as needed including keeping skin clean and dry  - Evaluate need for skin moisturizer/barrier cream  - Collaborate with interdisciplinary team (i e  Nutrition, Rehabilitation, etc )   - Patient/family teaching  Outcome: Progressing  Goal: Incision(s), wounds(s) or drain site(s) healing without S/S of infection  Description  INTERVENTIONS  - Assess and document risk factors for skin impairment   - Assess and document dressing, incision, wound bed, drain sites and surrounding tissue  - Consider nutrition services referral as needed  - Oral mucous membranes remain intact  - Provide patient/ family education  Outcome: Progressing  Goal: Oral mucous membranes remain intact  Description  INTERVENTIONS  - Assess oral mucosa and hygiene practices  - Implement preventative oral hygiene regimen  - Implement oral medicated treatments as ordered  - Initiate Nutrition services referral as needed  Outcome: Progressing     Problem: RESPIRATORY - ADULT  Goal: Achieves optimal ventilation and oxygenation  Description  INTERVENTIONS:  - Assess for changes in respiratory status  - Assess for changes in mentation and behavior  - Position to facilitate oxygenation and minimize respiratory effort  - Oxygen administered by appropriate delivery if ordered  - Initiate smoking cessation education as indicated  - Encourage broncho-pulmonary hygiene including cough, deep breathe, Incentive Spirometry  - Assess the need for suctioning and aspirate as needed  - Assess and instruct to report SOB or any respiratory difficulty  - Respiratory Therapy support as indicated  Outcome: Progressing

## 2020-01-14 NOTE — PROGRESS NOTES
Progress Note - Behavioral Health   Ean Singer 63 y o  female MRN: [id number]  Unit/Bed#: ICU 06 Encounter: [id number]    The patient was seen for continuing care and reviewed with treatment team     Mental Status Evaluation:  Appearance:  disheveled   Behavior:  calm, cooperative and friendly   Mood:  Apathetic   Affect: blunted   Speech: Increased latency of response and Soft   Thought Process:  Goal directed and coherent   Thought Content:  Does not verbalize delusional material   Perceptual Disturbances: Denies hallucinations and does not appear to be responding to internal stimuli   Risk Potential: No suicidal or homicidal ideation   Orientation:   AOx3     Progress Toward Goals:  Patient seen in ICU and medications reviewed for side effects and interactions  Patient denies mood symptoms or side effects from medication since initiation  BP and HR within range  No evidence of medication interactions at this time but be aware that bupropion may increase concentration of metoprolol due to potential for CYP2D6 inhibition  May re-consult psychiatry if adverse reaction suspected  Recommended Treatment: Continue with pharmacotherapy, group therapy, milieu therapy and occupational therapy    The patient will be maintained on the following medications:    Current Facility-Administered Medications:  albuterol 2 puff Inhalation Q4H PRN Ronny Grayson, CRNP    buPROPion 150 mg Oral Daily Luli K Bilofsky, CRNP    dextrose 50 mL/hr Intravenous Continuous  Magyar, CRNP Last Rate: 50 mL/hr (01/14/20 0618)   furosemide 40 mg/hr Intravenous Continuous Ofilia Fore, CRNP Last Rate: 40 mg/hr (01/13/20 2339)   heparin (porcine) 3-20 Units/kg/hr (Order-Specific) Intravenous Titrated Loretha Ket, CRNP Last Rate: 11 1 Units/kg/hr (01/13/20 1730)   insulin lispro 1-6 Units Subcutaneous TID AC Luli K Bilofsky, CRNP    insulin lispro 1-6 Units Subcutaneous HS Luli K Bilofsky, CRNP    ipratropium 0 5 mg Nebulization TID MAYNOR Lopez    Labetalol HCl 10 mg Intravenous Q6H PRN MAYNOR Quesada    levalbuterol 1 25 mg Nebulization TID MAYNOR Lopez    levalbuterol 1 25 mg Nebulization Q4H PRN MAYNOR Lopez    metoprolol 5 mg Intravenous Q6H Fareed Thompson MD    nicotine 1 patch Transdermal Daily MAYNOR Lopez    nystatin  Topical BID Luli K MAYNOR Kirby    potassium chloride 20 mEq Intravenous Once Sandra Bishop MD Last Rate: 20 mEq (01/14/20 1340)       Acute on chronic respiratory failure with hypoxia and hypercapnia ([address] )

## 2020-01-14 NOTE — ASSESSMENT & PLAN NOTE
Wt Readings from Last 3 Encounters:   01/12/20 136 kg (299 lb 2 6 oz)   11/05/19 110 kg (243 lb 9 6 oz)   10/28/19 109 kg (240 lb 4 8 oz)       · Suspect decompensated CHF  Echo from 1/8/20 shows EF of 92% with diastolic dysfunction  · Attempting diuresis with lasix infusion as above

## 2020-01-14 NOTE — OCCUPATIONAL THERAPY NOTE
OccupationalTherapy Progress Note(time=8847-2374)     Patient Name: Keila Emery  JHMRK'V Date: 1/14/2020  Problem List  Principal Problem:    Acute on chronic respiratory failure with hypoxia and hypercapnia (HCC)  Active Problems:    Chronic obstructive pulmonary disease with acute exacerbation (HCC)    Permanent atrial fibrillation    Chronic venous stasis dermatitis of both lower extremities    Type 2 diabetes mellitus without complication (HCC)    Obesity    SAPNA (acute kidney injury) ([facility] [address] )    Adjustment disorder    Acute on chronic diastolic congestive heart failure (HCC)    Hypernatremia           Subjective:     01/14/20 1500   Restrictions/Precautions   Weight Bearing Precautions Per Order No   Other Precautions Cognitive;Pain;Telemetry;Multiple lines;O2;Fall Risk   Pain Assessment   Pain Assessment FLACC   Pain Rating: FLACC (Rest) - Face 0   Pain Rating: FLACC (Rest) - Legs 0   Pain Rating: FLACC (Rest) - Activity 0   Pain Rating: FLACC (Rest) - Cry 0   Pain Rating: FLACC (Rest) - Consolability 0   Score: FLACC (Rest) 0   ADL   Where Assessed Supine, bed   LB Dressing Assistance 1  Total Assistance   LB Dressing Deficit Don/doff R sock; Don/doff L sock   Bed Mobility   Rolling R 2  Maximal assistance   Additional items Assist x 2; Increased time required;Verbal cues;LE management   Rolling L 2  Maximal assistance   Additional items Assist x 2; Increased time required;Verbal cues;LE management   Supine to Sit 2  Maximal assistance   Additional items Assist x 2; Increased time required;Verbal cues;LE management   Sit to Supine 2  Maximal assistance   Additional items Assist x 2; Increased time required;Verbal cues;LE management   Transfers   Sit to Stand Unable to assess  (standing N/A 2* limited trunk control)   Functional Mobility   Functional Mobility   [facility]   Cognition   Overall Cognitive Status Impaired   Arousal/Participation Lethargic;Arousable   Attention Attends with cues to redirect   Orientation Level Oriented to person   Memory Decreased short term memory;Decreased recall of precautions   Following Commands Follows one step commands with increased time or repetition   Activity Tolerance   Activity Tolerance Patient limited by fatigue;Treatment limited secondary to medical complications (Comment)   Medical Staff Made Aware nsg, P T  Assessment   Assessment Pt seen for 40 min tx session with focus on functional balance, functional mobility, ADL status, transfer safety, and cognition  Pt able to tolerate EOB mobility; sitting balance=static-f-/p+; dyn=p  Pt required heavy assistance with all functional mobility tasks  Pt dependent with LE ADLs  Cognitive deficits noted--i e orientation, memory, problem-solving, judgement/safety  Standing mobility deferred 2* limited trunk control  Pt continues to demonstrate appropriateness for inpt rehab to improve her overall level of independence  Will continue   Plan   Treatment Interventions ADL retraining;Functional transfer training;UE strengthening/ROM; Cognitive reorientation; Endurance training;Patient/family training;Equipment evaluation/education; Compensatory technique education   Goal Expiration Date 01/25/20   OT Treatment Day 1   OT Frequency 3-5x/wk   Recommendation   OT Discharge Recommendation Short Term Rehab   Barthel Index   Feeding 5   Bathing 0   Grooming Score 0   Dressing Score 5   Bladder Score 0   Bowels Score 5   Toilet Use Score 0   Transfers (Bed/Chair) Score 5   Mobility (Level Surface) Score 0   Stairs Score 0   Barthel Index Score 20   Russ Rollins, OT

## 2020-01-14 NOTE — PLAN OF CARE
Problem: OCCUPATIONAL THERAPY ADULT  Goal: Performs self-care activities at highest level of function for planned discharge setting  See evaluation for individualized goals  Description  Treatment Interventions: ADL retraining, UE strengthening/ROM, Functional transfer training, Cognitive reorientation, Endurance training, Patient/family training, Equipment evaluation/education, Compensatory technique education, Energy conservation, Activityengagement, Continued evaluation, Fine motor coordination activities          See flowsheet documentation for full assessment, interventions and recommendations  Note:   Limitation: Decreased ADL status, Decreased UE strength, Decreased Safe judgement during ADL, Decreased cognition, Decreased endurance, Decreased self-care trans, Decreased high-level ADLs  Prognosis: Guarded  Assessment: Pt seen for 40 min tx session with focus on functional balance, functional mobility, ADL status, transfer safety, and cognition  Pt able to tolerate EOB mobility; sitting balance=static-f-/p+; dyn=p  Pt required heavy assistance with all functional mobility tasks  Pt dependent with LE ADLs  Cognitive deficits noted--i e orientation, memory, problem-solving, judgement/safety  Standing mobility deferred 2* limited trunk control  Pt continues to demonstrate appropriateness for inpt rehab to improve her overall level of independence   Will continue     OT Discharge Recommendation: Short Term Rehab  OT - OK to Discharge: (to rehab when medically stable)

## 2020-01-14 NOTE — PROGRESS NOTES
Progress Note - Jennifer Dill 1960, 63 y o  female MRN: [id number]    Unit/Bed#: ICU 06 Encounter: [id number]    Primary Care Provider: Alejandra Honeycutt MD   Date and time admitted to hospital: 1/6/2020 10:25 PM        Acute on chronic diastolic congestive heart failure [facility])  Assessment & Plan  Wt Readings from Last 3 Encounters:   01/12/20 136 kg (299 lb 2 6 oz)   11/05/19 110 kg (243 lb 9 6 oz)   10/28/19 109 kg (240 lb 4 8 oz)       · Suspect decompensated CHF  Echo from 1/8/20 shows EF of 44% with diastolic dysfunction  · Attempting diuresis with lasix infusion as above  Encephalopathy acute  Assessment & Plan  · Secondary to hypercapnia and hypoglycemia  · Continue BiPAP for hypercapnia  Encourage compliance with nocturnal BiPAP  · Avoid hypoglycemia  · Hypercapnia is improving however she remains confused, not following commads  · Continue to monitor mental status closely    * Acute on chronic respiratory failure with hypoxia and hypercapnia (HCC)  Assessment & Plan  · Etiology likely multifactorial, COPD, pulmonary edema, pulmonary hypertension, 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, pleural effusion and noncompliance with nocturnal BiPAP  · Pt notes baseline oxygen requirement at home is 10L  · Hypercapnia is improving  Continue BiPAP and transition to NC once hypercapnia improves  · CXR shows large left pleural effusion- may require thoracentesis  · On lasix gtt for diuresis  · Continue scheduled nebulized bronchodilators  Chronic obstructive pulmonary disease with acute exacerbation (HCC)  Assessment & Plan  · Continue nebulized bronchodilators  No indication for steroids at this point  · Not on maintenance inhalers at home      SAPNA (acute kidney injury) [facility])  Assessment & Plan  · Acute on CKD III with baseline approximately 1 5  · Nephrology is following  Creatinine is improving  Urine output significantly improved on lasix infusion    Continue lasix gtt per nephrology recommendations  If volume status does not significantly improve may require CVVH  · Trend renal indices and monitor intake and output  · Renal U/S is pending    Permanent atrial fibrillation  Assessment & Plan  · Rate is controlled  · Continue PO cardizem and metoprolol  · Cont heparin infusion for now as may requires procedures which would be difficult in the setting of her Eliquis use    Chronic venous stasis dermatitis of both lower extremities  Assessment & Plan  · No open ulcers or signs of cellulitis        Type 2 diabetes mellitus without complication [facility])  Assessment & Plan  Lab Results   Component Value Date    HGBA1C 5 8 04/11/2019       Recent Labs     01/13/20  0727 01/13/20  1130 01/13/20  1703 01/13/20  2111   POCGLU 102 104 98 133       Blood Sugar Average: Last 72 hrs:  · Persistent hypoglycemia possibly due to poor hepatic perfusion in the setting of decompensated CHF  Continue IV dextrose  · TSH normal, random cortisol 17 9  · Goal to maintain -180    Aggressively monitor and treat hypoglycemia as this is likely contributing factor to her encephalopathy      Obesity  Assessment & Plan  · She would benefit from weight loss  · Likely component of OHV that is contributing to her acute on chronic pulmonary issues  · Nocturnal and when sleeping BIPAP given persistent hypercarbia    Hypernatremia  Assessment & Plan  · Currently on D5 for hypoglycemia  · Will continue to monitor her sodium level    Adjustment disorder  Assessment & Plan  · Continue welbutrin per Psych recommendations     ----------------------------------------------------------------------------------------  HPI/24hr events: no events overnight, remains confused     Disposition: Continue Stepdown Level 1 level of care   Code Status: Level 1 - Full Code  ---------------------------------------------------------------------------------------  SUBJECTIVE  Denies pain, more awake but still slow to respond, not consistently following commads    Review of Systems  Review of systems was reviewed and negative unless stated above in HPI/24-hour events   ---------------------------------------------------------------------------------------  OBJECTIVE    Physical Exam   Constitutional: She appears well-developed and well-nourished  HENT:   Head: Normocephalic and atraumatic  Conjunctival injection   Eyes: Pupils are equal, round, and reactive to light  Neck: Neck supple  Cardiovascular: Normal rate and regular rhythm  Pulmonary/Chest: Effort normal  She has no wheezes  She has rales  Abdominal: Soft  Bowel sounds are normal  She exhibits distension  There is no tenderness  Musculoskeletal: She exhibits edema  Neurological: She is alert  No cranial nerve deficit  Skin: Skin is warm and dry  Chronic venous stasis changes bilateral lower extremities       Vitals   Vitals:    20 0344 20 0400 20 0500 20 0547   BP:  134/94     Pulse:  (!) 110     Resp:  15     Temp:   (!) 97 °F (36 1 °C)    TempSrc:   Temporal    SpO2: 98% 97%     Weight:    131 kg (289 lb 11 oz)   Height:         Temp (24hrs), Av 4 °F (36 3 °C), Min:96 7 °F (35 9 °C), Max:98 3 °F (36 8 °C)  Current: Temperature: (!) 97 °F (36 1 °C)          SpO2 Device: O2 Device: High flow nasal cannula  O2 Flow Rate (L/min): 10 L/min    Invasive/non-invasive ventilation settings   Respiratory    Lab Data (Last 4 hours)    None         O2/Vent Data (Last 4 hours)       034          Non-Invasive Ventilation Mode BiPAP                   Height and Weights   Height: 5' 5" (165 1 cm)  IBW: 57 kg  Body mass index is 48 21 kg/m²  Weight (last 2 days)     Date/Time   Weight    20 0547   131 (289 68)    20 0600   136 (299 16)              Intake and Output  I/O        07 0700    P  O   480    I V  (mL/kg) 1523 8 (11 2) 680 7 (5)    IV Piggyback 100     Total Intake(mL/kg) 1623 8 (11 9) 1160 7 (8 5) Urine (mL/kg/hr) 3970 (1 2) 1709 (0 5)    Stool 0 0    Total Output 3970 1709    Net -2346 2 -548  3          Unmeasured Stool Occurrence 1 x 1 x          Nutrition       Diet Orders   (From admission, onward)             Start     Ordered    01/10/20 1556  Dietary nutrition supplements  Once     Question Answer Comment   Select Supplement: Ensure Pudding-Chocolate    Frequency Breakfast, Dinner        01/10/20 1555    01/09/20 1037  Diet Sunil/CHO Controlled; Consistent Carbohydrate Diet Level 2 (5 carb servings/75 grams CHO/meal); Fluid Restriction 1500 ML  Diet effective now     Question Answer Comment   Diet Type Sunil/CHO Controlled    Sunil/CHO Controlled Consistent Carbohydrate Diet Level 2 (5 carb servings/75 grams CHO/meal)    Other Restriction(s): Fluid Restriction 1500 ML    RD to adjust diet per protocol?  Yes        01/09/20 1036                Laboratory and Diagnostics:  Results from last 7 days   Lab Units 01/13/20  0430 01/10/20  2325 01/10/20  0512 01/09/20  0438 01/08/20  0516 01/07/20  1137   WBC Thousand/uL 6 82  --  7 60 8 09 5 91 7 16   HEMOGLOBIN g/dL 17 0*  --  16 1* 15 8* 16 4* 17 4*   I STAT HEMOGLOBIN g/dl  --  18 7*  --   --   --   --    HEMATOCRIT % 57 0*  --  57 2* 55 5* 55 7* 59 0*   HEMATOCRIT, ISTAT %  --  55*  --   --   --   --    PLATELETS Thousands/uL 70*  --  106* 110* 115* 125*     Results from last 7 days   Lab Units 01/13/20  0430 01/12/20  0429 01/11/20  1241 01/11/20  0348 01/10/20  2325 01/10/20  0512 01/09/20  0438 01/08/20  0516   SODIUM mmol/L 144 146* 145 146*  --  145 144 147*   POTASSIUM mmol/L 3 9 3 6 4 0 3 8  --  3 7 3 5 3 8   CHLORIDE mmol/L 103 106 106 106  --  105 104 107   CO2 mmol/L 30 30 29 30  --  29 30 29   CO2, I-STAT mmol/L  --   --   --   --  32  --   --   --    ANION GAP mmol/L 11 10 10 10  --  11 10 11   BUN mg/dL 70* 67* 67* 67*  --  59* 51* 46*   CREATININE mg/dL 3 05* 3 42* 3 75* 3 84*  --  3 73* 3 51* 3 10*   CALCIUM mg/dL 8 3 7 6* 7 8* 7 8*  --  7 6* 8  1* 8 1*   GLUCOSE RANDOM mg/dL 104 82 82 85  --  101 138 85   ALT U/L  --   --  16  --   --   --   --  23   AST U/L  --   --  12  --   --   --   --  22   ALK PHOS U/L  --   --  99  --   --   --   --  106   ALBUMIN g/dL  --   --  3 0*  --   --   --   --  3 0*   TOTAL BILIRUBIN mg/dL  --   --  2 10*  --   --   --   --  2 86*     Results from last 7 days   Lab Units 01/13/20  0430 01/12/20  0429 01/11/20  0349   MAGNESIUM mg/dL 1 9  --  2 5   PHOSPHORUS mg/dL  --  5 5*  --       Results from last 7 days   Lab Units 01/14/20  0222 01/13/20  1708 01/11/20  1241 01/08/20  0516 01/07/20  1821 01/07/20  1137   INR   --  1 48* 1 44*  --   --  2 24*   PTT seconds 77* 37  --  63* 51* 34              ABG:  Results from last 7 days   Lab Units 01/13/20  1114   PH ART  7 402   PCO2 ART mm Hg 50 2*   PO2 ART mm Hg 91 9   HCO3 ART mmol/L 30 5*   BASE EXC ART mmol/L 4 3   ABG SOURCE  Brachial, Right     VBG:  Results from last 7 days   Lab Units 01/13/20  1114   ABG SOURCE  Brachial, Right     Results from last 7 days   Lab Units 01/07/20  0629   PROCALCITONIN ng/ml 0 17       Micro        EKG:   Imaging: I have personally reviewed pertinent reports     and I have personally reviewed pertinent films in PACS    Active Medications  Scheduled Meds:    Current Facility-Administered Medications:  albuterol 2 puff Inhalation Q4H PRN Ronny [place], CRNP    buPROPion 150 mg Oral Daily Luli K Arunofskkyler, CRNP    dextrose 50 mL/hr Intravenous Continuous  Magyar, CRNP Last Rate: 50 mL/hr (01/13/20 1730)   furosemide 40 mg/hr Intravenous Continuous Ofilia Fore, CRNP Last Rate: 40 mg/hr (01/13/20 2339)   heparin (porcine) 3-20 Units/kg/hr (Order-Specific) Intravenous Titrated Loretha Ket, CRNP Last Rate: 11 1 Units/kg/hr (01/13/20 7940)   insulin lispro 1-6 Units Subcutaneous TID AC MAYNOR Jaeger    insulin lispro 1-6 Units Subcutaneous HS MAYNOR Jaeger    ipratropium 0 5 mg Nebulization TID MAYNOR Batista Labetalol HCl 10 mg Intravenous Q6H PRN Rere Joiner, MAYNOR    levalbuterol 1 25 mg Nebulization TID Rivera Yolanda Kofi, MAYNOR    levalbuterol 1 25 mg Nebulization Q4H PRN Rivera Yolanda MAYNOR Kirby    metoprolol 2 5 mg Intravenous Q6H Jeanimelda Roger, BALJITNP    metoprolol 5 mg Intravenous Q6H PRN Mikey Tiffanie, MAYNOR    nicotine 1 patch Transdermal Daily Luli K Kofi, MAYNOR    nystatin  Topical BID Rivera Yolanda MAYNOR Kirby      Continuous Infusions:    dextrose 50 mL/hr Last Rate: 50 mL/hr (01/13/20 1730)   furosemide 40 mg/hr Last Rate: 40 mg/hr (01/13/20 5969)   heparin (porcine) 3-20 Units/kg/hr (Order-Specific) Last Rate: 11 1 Units/kg/hr (01/13/20 1730)     PRN Meds:     albuterol 2 puff Q4H PRN   Labetalol HCl 10 mg Q6H PRN   levalbuterol 1 25 mg Q4H PRN   metoprolol 5 mg Q6H PRN       ---------------------------------------------------------------------------------------  Advance Directive and Living Will:      Power of :    POLST:    ---------------------------------------------------------------------------------------  Counseling / Coordination of Monica Ricardo, MAYNOR      Portions of the record may have been created with voice recognition software  Occasional wrong word or "sound a like" substitutions may have occurred due to the inherent limitations of voice recognition software    Read the chart carefully and recognize, using context, where substitutions have occurred

## 2020-01-14 NOTE — ASSESSMENT & PLAN NOTE
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

## 2020-01-14 NOTE — PROGRESS NOTES
NEPHROLOGY PROGRESS NOTE   Dada Sheth 63 y o  female MRN: [id number]  Unit/Bed#: ICU 06 Encounter: [id number]  Reason for Consult: Rachna/CKD    ASSESSMENT AND PLAN:  RACHNA POA on CKD stage 3, baseline creatinine 1 3 to 1 6  -RACHNA suspected to be multifactorial in the setting of cardiorenal/volume overload/hypotension episodes, prior ACE-inhibitor use, progression to ATN  -peak creatinine 3 8 now seems to be overall improving to 2 7 today  -continue current Lasix drip at 40 mg/hour with good urine output  -BMP in a m   -continue to monitor intake and output, avoid hypotension, nephrotoxins or NSAIDs  -CKD suspected to be secondary to underlying hypertension/diabetes/CHF in the setting of morbidly obese     Mild hypernatremia, serum sodium has improved and stable 144 today  Continue D5 water at 50 mL/hour  -continue to monitor     Mild hyperphosphatemia, serum phosphorus 5 5, continue to monitor with improving renal function    Mild hypokalemia, serum potassium 3 4 below goal likely due to ongoing diuresis  -status post 20 mEq potassium chloride once today, will give additional 20 mEq later today      Acute on chronic hypoxic/hypercapnic respiratory failure  -likely multifactorial in the setting of COPD, pulmonary hypertension, suspected component of sleep apnea, pulmonary congestion/pleural effusion  -now remains on high-flow oxygen at the time of my encounter  close monitoring and management as per Pulmonary  -diuretics as below     Diastolic CHF, echo this month shows EF 59%, diastolic dysfunction present, decreased right ventricular systolic function, moderate pulmonary hypertension, no pericardial effusion   -good urine output and remains negative balance with current Lasix to 40 mg/hour and continue same for today   -accurate intake and output, daily weight  -weight reducing     Encephalopathy, seems to be slightly improved, more awake and alert today    suspect multifactorial in the setting of hypercapnia, continue to monitor with regular ABG  Renal function overall improving      Hypertension, blood pressure overall acceptable, continue to monitor      Discussed above plan with ICU team     SUBJECTIVE:  Patient seen and examined at bedside  She is more awake and alert today  Denies any chest pain, nausea or vomiting      OBJECTIVE:  Current Weight: Weight - Scale: 131 kg (289 lb 11 oz)  Vitals:    01/14/20 1200   BP: 133/100   Pulse: (!) 116   Resp: (!) 23   Temp:    SpO2: (!) 88%       Intake/Output Summary (Last 24 hours) at 1/14/2020 1235  Last data filed at 1/14/2020 1201  Gross per 24 hour   Intake 1975 91 ml   Output 5391 ml   Net -3415 09 ml     Wt Readings from Last 3 Encounters:   01/14/20 131 kg (289 lb 11 oz)   11/05/19 110 kg (243 lb 9 6 oz)   10/28/19 109 kg (240 lb 4 8 oz)     Temp Readings from Last 3 Encounters:   01/14/20 99 °F (37 2 °C) (Temporal)   11/05/19 99 5 °F (37 5 °C) (Tympanic)   10/28/19 98 5 °F (36 9 °C)     BP Readings from Last 3 Encounters:   01/14/20 133/100   11/05/19 144/80   10/28/19 150/100     Pulse Readings from Last 3 Encounters:   01/14/20 (!) 116   11/05/19 (!) 118   10/28/19 90        Physical Examination:  General:  Lying in bed, no acute distress   Eyes:  Mild conjunctival pallor present  ENT:  External examination of ears and nose unremarkable  Neck:  No obvious lymphadenopathy appreciated  Respiratory:  Bilateral decreased breath sound at base  CVS:  S1, S2 present  GI:  Soft, nontender, nondistended  CNS:  Active, alert, oriented x1  Extremities:  One to 2+ edema in legs  Skin:  No new rash in legs    Medications:    Current Facility-Administered Medications:     albuterol (PROVENTIL HFA,VENTOLIN HFA) inhaler 2 puff, 2 puff, Inhalation, Q4H PRN, Luli K MAYNOR Kirby    buPROPion (WELLBUTRIN XL) 24 hr tablet 150 mg, 150 mg, Oral, Daily, MAYNOR Shrestha, 150 mg at 01/14/20 0859    dextrose 5 % infusion, 50 mL/hr, Intravenous, Continuous, Epps MAYNOR Cerda, Last Rate: 50 mL/hr at 01/14/20 0618, 50 mL/hr at 01/14/20 0618    furosemide (LASIX) 500 mg infusion 50 mL, 40 mg/hr, Intravenous, Continuous, MAYNOR Olivarez, Last Rate: 4 mL/hr at 01/13/20 2339, 40 mg/hr at 01/13/20 2339    heparin (porcine) 25,000 units in 250 mL infusion (premix), 3-20 Units/kg/hr (Order-Specific), Intravenous, Titrated, MAYNOR Gerber, Last Rate: 10 mL/hr at 01/13/20 1730, 11 1 Units/kg/hr at 01/13/20 1730    insulin lispro (HumaLOG) 100 units/mL subcutaneous injection 1-6 Units, 1-6 Units, Subcutaneous, TID AC, Stopped at 01/10/20 0752 **AND** Fingerstick Glucose (POCT), , , TID AC, LuliMAYNOR Banks    insulin lispro (HumaLOG) 100 units/mL subcutaneous injection 1-6 Units, 1-6 Units, Subcutaneous, HS, LuliMAYNOR Banks, 1 Units at 01/08/20 2200    ipratropium (ATROVENT) 0 02 % inhalation solution 0 5 mg, 0 5 mg, Nebulization, TID, Luli K ArunofBALJIT crowderNP, 0 5 mg at 01/14/20 0721    Labetalol HCl (NORMODYNE) injection 10 mg, 10 mg, Intravenous, Q6H PRN, MAYNOR Gao, 10 mg at 01/12/20 2317    levalbuterol (XOPENEX) inhalation solution 1 25 mg, 1 25 mg, Nebulization, TID, Luli K ArunofskBALJIT chávezNP, 1 25 mg at 01/14/20 0721    levalbuterol (XOPENEX) inhalation solution 1 25 mg, 1 25 mg, Nebulization, Q4H PRN, Rigoberto Forget MAYNOR Macedo    metoprolol (LOPRESSOR) injection 5 mg, 5 mg, Intravenous, Q6H, Aparna Leal MD, 5 mg at 01/14/20 1006    nicotine (NICODERM CQ) 14 mg/24hr TD 24 hr patch 1 patch, 1 patch, Transdermal, Daily, Eino Males MAYNOR Kirby, 1 patch at 01/14/20 0859    nystatin (MYCOSTATIN) powder, , Topical, BID, Buzz Flies, MAYNOR    Laboratory Results:  Results from last 7 days   Lab Units 01/14/20  0612 01/13/20  0430 01/12/20  0429 01/11/20  1241 01/11/20  0349 01/11/20  0348 01/10/20  2325 01/10/20  0512 01/09/20  0438 01/08/20  0516   WBC Thousand/uL  --  6 82  --   --   --   --   --  7 60 8 09 5 91   HEMOGLOBIN g/dL  --  17 0*  --   --   --   -- --  16 1* 15 8* 16 4*   I STAT HEMOGLOBIN g/dl  --   --   --   --   --   --  18 7*  --   --   --    HEMATOCRIT %  --  57 0*  --   --   --   --   --  57 2* 55 5* 55 7*   HEMATOCRIT, ISTAT %  --   --   --   --   --   --  55*  --   --   --    PLATELETS Thousands/uL  --  70*  --   --   --   --   --  106* 110* 115*   SODIUM mmol/L 144 144 146* 145  --  146*  --  145 144 147*   POTASSIUM mmol/L 3 4* 3 9 3 6 4 0  --  3 8  --  3 7 3 5 3 8   CHLORIDE mmol/L 103 103 106 106  --  106  --  105 104 107   CO2 mmol/L 33* 30 30 29  --  30  --  29 30 29   CO2, I-STAT mmol/L  --   --   --   --   --   --  32  --   --   --    BUN mg/dL 74* 70* 67* 67*  --  67*  --  59* 51* 46*   CREATININE mg/dL 2 70* 3 05* 3 42* 3 75*  --  3 84*  --  3 73* 3 51* 3 10*   CALCIUM mg/dL 7 9* 8 3 7 6* 7 8*  --  7 8*  --  7 6* 8 1* 8 1*   MAGNESIUM mg/dL  --  1 9  --   --  2 5  --   --   --   --   --    PHOSPHORUS mg/dL  --   --  5 5*  --   --   --   --   --   --   --    GLUCOSE, ISTAT mg/dl  --   --   --   --   --   --  95  --   --   --        VAS lower limb venous duplex study, complete bilateral   Final Result by Aruna Stephens DO (01/13 1521)      XR chest portable   Final Result by Hill Almanzar MD (01/13 1141)      Cardiomegaly and persistent moderate central pulmonary edema      Workstation performed: [id number]         US kidney and bladder   Final Result by Gatito Plunkett MD (01/12 2349)      5 mm right renal lower pole nonobstructing calculus  No hydronephrosis  Underdistended but grossly unremarkable urinary bladder  Workstation performed: [id number]         XR chest portable   Final Result by Gricel Chavez MD (01/11 0818)   Persistent cardiomegaly, suspected multifocal pneumonia and probable superimposed CHF            Workstation performed: [id number]         XR chest 2 views   ED Interpretation by Chary Ang MD (01/06 3805)   Primary reviewed  Increased interstitial markings        Final Result by Ronnie Breen Susi Bermudez MD (01/07 0992)      Enlargement of the cardiac silhouette and pulmonary interstitial edema  Workstation performed: [id number]         CT head wo contrast    (Results Pending)   VAS ELIN & waveform analysis, multiple levels    (Results Pending)       Portions of the record may have been created with voice recognition software  Occasional wrong word or "sound a like" substitutions may have occurred due to the inherent limitations of voice recognition software  Read the chart carefully and recognize, using context, where substitutions have occurred

## 2020-01-14 NOTE — ASSESSMENT & PLAN NOTE
Lab Results   Component Value Date    HGBA1C 5 8 04/11/2019       Recent Labs     01/13/20  0727 01/13/20  1130 01/13/20  1703 01/13/20  2111   POCGLU 102 104 98 133       Blood Sugar Average: Last 72 hrs:  · Persistent hypoglycemia possibly due to poor hepatic perfusion in the setting of decompensated CHF  Continue IV dextrose  · TSH normal, random cortisol 17 9  · Goal to maintain -180    Aggressively monitor and treat hypoglycemia as this is likely contributing factor to her encephalopathy

## 2020-01-14 NOTE — ASSESSMENT & PLAN NOTE
· She would benefit from weight loss  · Likely component of OHV that is contributing to her acute on chronic pulmonary issues  · Nocturnal and when sleeping BIPAP given persistent hypercarbia

## 2020-01-14 NOTE — PROGRESS NOTES
Progress Note - Cardiology   Glendell Bernheim 63 y o  female MRN: [id number]  Unit/Bed#: ICU 06 Encounter: [id number]    Assessment:  1  Permanent atrial fibrillation with a tendency towards RVR  2  Acute on chronic diastolic congestive heart failure (HFpEF, heart failure with preserved ejection fraction)  3  Acute on chronic respiratory failure with hypercapnia  4  Patient alert an oriented today  She knows she is in [facility]  Chronic kidney disease stage 3  6  Hypertension, controlled  7  Diabetes mellitus type 2  8  Morbid obesity  9  Chronic lower extremity venous stasis dermatitis  10  Compression fracture of the 1st lumbar vertebrae      Plan:  1  Will increase IV metoprolol to 5 mg q 6h to obtain better heart rate control  With slower heart rate, there should be more time for diastolic filling which the erratically should improve diastolic CHF  2  Continue IV labetalol as needed for blood pressure control  Interval history:  Patient is much more alert today and knows that she is in [facility]   Heart rate continues on the rapid side, atrial fibrillation, ranging from 100-120  Creatinine has improved from 3 05-2 7 today  If bed scale is correct, weight is down 10 lb since 01/12/2020        Vitals: /86   Pulse 104   Temp (!) 97 3 °F (36 3 °C) (Temporal)   Resp 22   Ht 5' 5" (1 651 m)   Wt 131 kg (289 lb 11 oz)   SpO2 96%   BMI 48 21 kg/m²   Vitals:    01/12/20 0600 01/14/20 0547   Weight: 136 kg (299 lb 2 6 oz) 131 kg (289 lb 11 oz)     Orthostatic Blood Pressures      Most Recent Value   Blood Pressure  128/86 filed at 01/14/2020 0700   Patient Position - Orthostatic VS  Lying filed at 01/11/2020 0302            Intake/Output Summary (Last 24 hours) at 1/14/2020 0947  Last data filed at 1/14/2020 0622  Gross per 24 hour   Intake 1975 91 ml   Output 3941 ml   Net -1965 09 ml       Invasive Devices     Peripheral Intravenous Line            Peripheral IV 01/10/20 Left Hand 3 days    Peripheral IV 01/12/20 Right Antecubital 1 day          Drain            External Urinary Catheter 3 days                Review of Systems   Respiratory: Negative for cough, choking, chest tightness, shortness of breath and wheezing  Cardiovascular: Negative for chest pain, palpitations and leg swelling  Skin: Negative for rash  Neurological: Negative for dizziness, tremors, syncope, weakness, light-headedness, numbness and headaches  Psychiatric/Behavioral: Negative for agitation and behavioral problems  The patient is not hyperactive  Physical Exam   Constitutional: She is oriented to person, place, and time  She appears well-developed and well-nourished  No distress  Morbidly obese, BMI 48 2   HENT:   Head: Normocephalic and atraumatic  Neck: No JVD present  No thyromegaly present  Cardiovascular: Normal heart sounds and intact distal pulses  Exam reveals no gallop and no friction rub  No murmur heard  Irregularly irregular rhythm   Pulmonary/Chest: No respiratory distress  She has no wheezes  She has rales  Musculoskeletal: She exhibits edema  Lower extremity edema with chronic stasis dermatitis bilaterally  Neurological: She is alert and oriented to person, place, and time  Skin: Skin is warm and dry  Psychiatric: She has a normal mood and affect   Her behavior is normal        Lab Results:   CBC with diff:   Results from last 7 days   Lab Units 01/13/20  0430   WBC Thousand/uL 6 82   RBC Million/uL 5 53*   HEMOGLOBIN g/dL 17 0*   HEMATOCRIT % 57 0*   MCV fL 103*   MCH pg 30 7   MCHC g/dL 29 8*   RDW % 19 9*   MPV fL 12 6   PLATELETS Thousands/uL 70*     CMP:   Results from last 7 days   Lab Units 01/14/20  0612  01/11/20  1241  01/10/20  2325   SODIUM mmol/L 144   < > 145   < >  --    POTASSIUM mmol/L 3 4*   < > 4 0   < >  --    CHLORIDE mmol/L 103   < > 106   < >  --    CO2 mmol/L 33*   < > 29   < >  --    CO2, I-STAT mmol/L  --   --   --   --  32   BUN mg/dL 74*   < > 67*   < >  --    CREATININE mg/dL 2 70*   < > 3 75*   < >  --    GLUCOSE, ISTAT mg/dl  --   --   --   --  95   CALCIUM mg/dL 7 9*   < > 7 8*   < >  --    AST U/L  --   --  12  --   --    ALT U/L  --   --  16  --   --    ALK PHOS U/L  --   --  99  --   --    EGFR ml/min/1 73sq m 19   < > 13   < >  --     < > = values in this interval not displayed  Imaging: I have personally reviewed pertinent reports        EKG:  Atrial fibrillation with heart rates 110-120 beats per minute

## 2020-01-14 NOTE — PLAN OF CARE
Problem: PHYSICAL THERAPY ADULT  Goal: Performs mobility at highest level of function for planned discharge setting  See evaluation for individualized goals  Description  Treatment/Interventions: LE strengthening/ROM, Therapeutic exercise, Endurance training, Patient/family training, Equipment eval/education, Bed mobility, Spoke to nursing, OT  Equipment Recommended: (TBD once mobility and OOB are assessed)       See flowsheet documentation for full assessment, interventions and recommendations  Outcome: Progressing  Note:   Prognosis: Guarded  Problem List: Decreased strength, Decreased range of motion, Decreased endurance, Impaired balance, Decreased mobility, Decreased cognition, Impaired judgement, Decreased safety awareness, Obesity, Decreased skin integrity, Pain  Assessment: Pt seen for progression of PT  Remains confused and lethargic  Now on high flow O2  Continues to require max A of 2 for all bed mobility  Able to tolerate EOB sitting x 4 minutes  Progressed from continual support to being able to self support   + fatigue observed  Sats > 90% while EOB on high flow  Unable to progress with OOB mobility given LE weakness, decreased activity tolerance, balance  Linda Mendoza is recommended for OOB mobility at this time  Pt will require STR given significant functional limitations  Will follow and progress  Barriers to Discharge: Decreased caregiver support     Recommendation: Short-term skilled PT          See flowsheet documentation for full assessment

## 2020-01-14 NOTE — PROGRESS NOTES
Progress Note - Behavioral Health   Christiano Martini 63 y o  female MRN: [id number]  Unit/Bed#: ICU 06 Encounter: [id number]    The patient was seen for continuing care and reviewed with treatment team     Mental Status Evaluation:  Appearance:  disheveled   Behavior:  calm, cooperative and friendly   Mood:  Apathetic   Affect: blunted   Speech: Increased latency of response and Soft   Thought Process:  Goal directed and coherent   Thought Content:  Does not verbalize delusional material   Perceptual Disturbances: Denies hallucinations and does not appear to be responding to internal stimuli   Risk Potential: No suicidal or homicidal ideation   Orientation:   AOx3     Progress Toward Goals:  Patient seen in ICU and medications reviewed for side effects and interactions  Patient denies mood symptoms or side effects from medication since initiation  Per Cardiology cardiac arrhythmia has become increasing concern  Due to potential for bupropion to increased risk of arrhythmia, potential for increased metoprolol levels with concurrent administration of bupropion and worsening physical health of patient risk  Currently outweigh benefits  D/C bupropion  May  Re-consult Psychiatry when patient is medically stable and ready for transfer from ICU  Recommended Treatment: Continue with pharmacotherapy, group therapy, milieu therapy and occupational therapy    The patient will be maintained on the following medications:    Current Facility-Administered Medications:  albuterol 2 puff Inhalation Q4H PRN Mat MAYNOR Bazzi    dextrose 50 mL/hr Intravenous Continuous MAYNOR Peterson Last Rate: 50 mL/hr (01/14/20 0618)   furosemide 40 mg/hr Intravenous Continuous Mikey Tiffanie, CRNP Last Rate: 40 mg/hr (01/14/20 1424)   heparin (porcine) 3-20 Units/kg/hr (Order-Specific) Intravenous Titrated MAYNOR Freitas Last Rate: 11 1 Units/kg/hr (01/13/20 1730)   insulin lispro 1-6 Units Subcutaneous TID AC Luli K MAYNOR Kirby    insulin lispro 1-6 Units Subcutaneous HS MAYNOR Jaeger    ipratropium 0 5 mg Nebulization TID MAYNOR Lee    Labetalol HCl 10 mg Intravenous Q6H PRN MAYNOR Escobedo    levalbuterol 1 25 mg Nebulization TID MAYNOR Lee    levalbuterol 1 25 mg Nebulization Q4H PRN MAYNOR Lee    metoprolol 5 mg Intravenous Q6H Naomi Bowser MD    nicotine 1 patch Transdermal Daily MAYNOR Jaeger    nystatin  Topical BID MAYNOR Lee        Acute on chronic respiratory failure with hypoxia and hypercapnia ([address] )

## 2020-01-14 NOTE — PHYSICAL THERAPY NOTE
PHYSICAL THERAPY NOTE          Patient Name: Rosa Maria Garcia  OAAWH'K Date: 1/14/2020 01/14/20 1121   Pain Assessment   Pain Assessment No/denies pain   Pain Rating: FLACC (Rest) - Face 0   Pain Rating: FLACC (Rest) - Legs 0   Pain Rating: FLACC (Rest) - Activity 0   Pain Rating: FLACC (Rest) - Cry 0   Pain Rating: FLACC (Rest) - Consolability 0   Score: FLACC (Rest) 0   Pain Rating: FLACC (Activity) - Face 1   Pain Rating: FLACC (Activity) - Legs 0   Pain Rating: FLACC (Activity) - Activity 1   Pain Rating: FLACC (Activity) - Cry 1   Pain Rating: FLACC (Activity) - Consolability 1   Score: FLACC (Activity) 4   Restrictions/Precautions   Weight Bearing Precautions Per Order No   Other Precautions Cognitive; Bed Alarm;Multiple lines;Telemetry;O2;Fall Risk;Pain  (high flow O2)   General   Chart Reviewed Yes   Additional Pertinent History high flow O2   Response to Previous Treatment Patient with no complaints from previous session  Family/Caregiver Present No   Cognition   Overall Cognitive Status Impaired   Arousal/Participation Lethargic;Responsive   Attention Difficulty attending to directions   Orientation Level Oriented to person;Oriented to place   Comments delayed responses   Subjective   Subjective Pt without c/o  Bed Mobility   Rolling R 2  Maximal assistance   Additional items Assist x 2; Increased time required;Verbal cues;LE management   Rolling L 2  Maximal assistance   Additional items Assist x 2; Increased time required;Verbal cues;LE management   Supine to Sit 2  Maximal assistance   Additional items Assist x 2; Increased time required;Verbal cues;LE management   Sit to Supine 2  Maximal assistance   Additional items Assist x 2; Increased time required;LE management;Verbal cues   Additional Comments EOB sitting tolerance 4 minutes  Progressed from continual support to self support with UEs     Transfers   Sit to Stand Unable to assess  (not appropriate given strength, balance, activity tolerance )   Additional Comments NITA IS RECOMMENDED FOR OOB  Balance   Static Sitting Poor +   Dynamic Sitting Poor -   Endurance Deficit   Endurance Deficit Yes   Endurance Deficit Description weakness, decreased cognition, deconditions, lethargy  Activity Tolerance   Activity Tolerance Patient limited by fatigue;Treatment limited secondary to medical complications (Comment)   Medical Staff Made Aware Mishel Joshi 89   Nurse Made Aware appropriate to see per nsg  Communicated to nurse that nita is recommended for OOB  Exercises   Knee AROM Long Arc Quad Sitting;10 reps;Bilateral;AAROM   Ankle Pumps Sitting;5 reps;AAROM; Bilateral   Neuro re-ed EOB sitting x 4 minutes  Assessment   Prognosis Guarded   Problem List Decreased strength;Decreased range of motion;Decreased endurance; Impaired balance;Decreased mobility; Decreased cognition; Impaired judgement;Decreased safety awareness; Obesity; Decreased skin integrity;Pain   Assessment Pt seen for progression of PT  Remains confused and lethargic  Now on high flow O2  Continues to require max A of 2 for all bed mobility  Able to tolerate EOB sitting x 4 minutes  Progressed from continual support to being able to self support   + fatigue observed  Sats > 90% while EOB on high flow  Unable to progress with OOB mobility given LE weakness, decreased activity tolerance, balance  Raudel Landon is recommended for OOB mobility at this time  Pt will require STR given significant functional limitations  Will follow and progress  Barriers to Discharge Decreased caregiver support   Goals   Patient Goals none stated   STG Expiration Date 01/21/20   PT Treatment Day 2   Plan   Treatment/Interventions Functional transfer training;LE strengthening/ROM; Therapeutic exercise; Endurance training;Patient/family training;Equipment eval/education; Bed mobility; Compensatory technique education;Continued evaluation;Spoke to nursing;OT   Progress Slow progress, medical status limitations   PT Frequency Other (Comment)  (2-5x/wk)   Recommendation   Recommendation Short-term skilled PT   Equipment Recommended   (monitor)   PT - OK to Discharge   (yes to rhb  NO to home when medically stable )   Allen Richardson, PT

## 2020-01-15 ENCOUNTER — APPOINTMENT (INPATIENT)
Dept: NON INVASIVE DIAGNOSTICS | Facility: HOSPITAL | Age: 60
DRG: 291 | End: 2020-01-15
Payer: COMMERCIAL

## 2020-01-15 DIAGNOSIS — Z71.89 COMPLEX CARE COORDINATION: Primary | ICD-10-CM

## 2020-01-15 LAB
ALBUMIN SERPL BCP-MCNC: 2.6 G/DL (ref 3.5–5)
ALP SERPL-CCNC: 80 U/L (ref 46–116)
ALT SERPL W P-5'-P-CCNC: 8 U/L (ref 12–78)
ANION GAP SERPL CALCULATED.3IONS-SCNC: 7 MMOL/L (ref 4–13)
APTT PPP: 53 SECONDS (ref 23–37)
AST SERPL W P-5'-P-CCNC: 12 U/L (ref 5–45)
BILIRUB SERPL-MCNC: 5.34 MG/DL (ref 0.2–1)
BUN SERPL-MCNC: 74 MG/DL (ref 5–25)
CALCIUM SERPL-MCNC: 8.1 MG/DL (ref 8.3–10.1)
CHLORIDE SERPL-SCNC: 102 MMOL/L (ref 100–108)
CO2 SERPL-SCNC: 35 MMOL/L (ref 21–32)
CREAT SERPL-MCNC: 2.31 MG/DL (ref 0.6–1.3)
ERYTHROCYTE [DISTWIDTH] IN BLOOD BY AUTOMATED COUNT: 19.6 % (ref 11.6–15.1)
GFR SERPL CREATININE-BSD FRML MDRD: 23 ML/MIN/1.73SQ M
GLUCOSE SERPL-MCNC: 150 MG/DL (ref 65–140)
GLUCOSE SERPL-MCNC: 167 MG/DL (ref 65–140)
GLUCOSE SERPL-MCNC: 188 MG/DL (ref 65–140)
GLUCOSE SERPL-MCNC: 67 MG/DL (ref 65–140)
GLUCOSE SERPL-MCNC: 83 MG/DL (ref 65–140)
HCT VFR BLD AUTO: 53.4 % (ref 34.8–46.1)
HGB BLD-MCNC: 16 G/DL (ref 11.5–15.4)
MAGNESIUM SERPL-MCNC: 1.7 MG/DL (ref 1.6–2.6)
MCH RBC QN AUTO: 30.4 PG (ref 26.8–34.3)
MCHC RBC AUTO-ENTMCNC: 30 G/DL (ref 31.4–37.4)
MCV RBC AUTO: 102 FL (ref 82–98)
PLATELET # BLD AUTO: 62 THOUSANDS/UL (ref 149–390)
PMV BLD AUTO: 13.7 FL (ref 8.9–12.7)
POTASSIUM SERPL-SCNC: 3.6 MMOL/L (ref 3.5–5.3)
PROT SERPL-MCNC: 5.8 G/DL (ref 6.4–8.2)
RBC # BLD AUTO: 5.26 MILLION/UL (ref 3.81–5.12)
SODIUM SERPL-SCNC: 144 MMOL/L (ref 136–145)
WBC # BLD AUTO: 7.08 THOUSAND/UL (ref 4.31–10.16)

## 2020-01-15 PROCEDURE — 94640 AIRWAY INHALATION TREATMENT: CPT

## 2020-01-15 PROCEDURE — 85730 THROMBOPLASTIN TIME PARTIAL: CPT | Performed by: NURSE PRACTITIONER

## 2020-01-15 PROCEDURE — 82948 REAGENT STRIP/BLOOD GLUCOSE: CPT

## 2020-01-15 PROCEDURE — 93325 DOPPLER ECHO COLOR FLOW MAPG: CPT | Performed by: INTERNAL MEDICINE

## 2020-01-15 PROCEDURE — 99232 SBSQ HOSP IP/OBS MODERATE 35: CPT | Performed by: INTERNAL MEDICINE

## 2020-01-15 PROCEDURE — 80053 COMPREHEN METABOLIC PANEL: CPT | Performed by: NURSE PRACTITIONER

## 2020-01-15 PROCEDURE — 85027 COMPLETE CBC AUTOMATED: CPT | Performed by: NURSE PRACTITIONER

## 2020-01-15 PROCEDURE — 99233 SBSQ HOSP IP/OBS HIGH 50: CPT | Performed by: INTERNAL MEDICINE

## 2020-01-15 PROCEDURE — 94660 CPAP INITIATION&MGMT: CPT

## 2020-01-15 PROCEDURE — 99232 SBSQ HOSP IP/OBS MODERATE 35: CPT | Performed by: PSYCHIATRY & NEUROLOGY

## 2020-01-15 PROCEDURE — 83735 ASSAY OF MAGNESIUM: CPT | Performed by: NURSE PRACTITIONER

## 2020-01-15 PROCEDURE — 93308 TTE F-UP OR LMTD: CPT

## 2020-01-15 PROCEDURE — 93308 TTE F-UP OR LMTD: CPT | Performed by: INTERNAL MEDICINE

## 2020-01-15 PROCEDURE — 93321 DOPPLER ECHO F-UP/LMTD STD: CPT | Performed by: INTERNAL MEDICINE

## 2020-01-15 RX ORDER — DILTIAZEM HYDROCHLORIDE 120 MG/1
120 CAPSULE, COATED, EXTENDED RELEASE ORAL DAILY
Status: DISCONTINUED | OUTPATIENT
Start: 2020-01-15 | End: 2020-01-20

## 2020-01-15 RX ORDER — MAGNESIUM SULFATE HEPTAHYDRATE 40 MG/ML
2 INJECTION, SOLUTION INTRAVENOUS ONCE
Status: COMPLETED | OUTPATIENT
Start: 2020-01-15 | End: 2020-01-15

## 2020-01-15 RX ORDER — POTASSIUM CHLORIDE 20 MEQ/1
40 TABLET, EXTENDED RELEASE ORAL ONCE
Status: COMPLETED | OUTPATIENT
Start: 2020-01-15 | End: 2020-01-15

## 2020-01-15 RX ADMIN — METOPROLOL TARTRATE 5 MG: 5 INJECTION INTRAVENOUS at 16:28

## 2020-01-15 RX ADMIN — LEVALBUTEROL HYDROCHLORIDE 1.25 MG: 1.25 SOLUTION, CONCENTRATE RESPIRATORY (INHALATION) at 20:33

## 2020-01-15 RX ADMIN — APIXABAN 5 MG: 5 TABLET, FILM COATED ORAL at 17:56

## 2020-01-15 RX ADMIN — IPRATROPIUM BROMIDE 0.5 MG: 0.5 SOLUTION RESPIRATORY (INHALATION) at 07:18

## 2020-01-15 RX ADMIN — Medication 40 MG/HR: at 13:24

## 2020-01-15 RX ADMIN — METOPROLOL TARTRATE 5 MG: 5 INJECTION INTRAVENOUS at 05:30

## 2020-01-15 RX ADMIN — LEVALBUTEROL HYDROCHLORIDE 1.25 MG: 1.25 SOLUTION, CONCENTRATE RESPIRATORY (INHALATION) at 13:01

## 2020-01-15 RX ADMIN — DEXTROSE 50 ML/HR: 5 SOLUTION INTRAVENOUS at 23:54

## 2020-01-15 RX ADMIN — Medication 40 MG/HR: at 01:59

## 2020-01-15 RX ADMIN — MAGNESIUM SULFATE HEPTAHYDRATE 2 G: 40 INJECTION, SOLUTION INTRAVENOUS at 09:01

## 2020-01-15 RX ADMIN — NICOTINE 1 PATCH: 14 PATCH TRANSDERMAL at 09:00

## 2020-01-15 RX ADMIN — NYSTATIN 1 APPLICATION: 100000 POWDER TOPICAL at 09:01

## 2020-01-15 RX ADMIN — LEVALBUTEROL HYDROCHLORIDE 1.25 MG: 1.25 SOLUTION, CONCENTRATE RESPIRATORY (INHALATION) at 07:18

## 2020-01-15 RX ADMIN — POTASSIUM CHLORIDE 40 MEQ: 1500 TABLET, EXTENDED RELEASE ORAL at 09:08

## 2020-01-15 RX ADMIN — IPRATROPIUM BROMIDE 0.5 MG: 0.5 SOLUTION RESPIRATORY (INHALATION) at 13:01

## 2020-01-15 RX ADMIN — INSULIN LISPRO 1 UNITS: 100 INJECTION, SOLUTION INTRAVENOUS; SUBCUTANEOUS at 16:27

## 2020-01-15 RX ADMIN — DILTIAZEM HYDROCHLORIDE 120 MG: 120 CAPSULE, COATED, EXTENDED RELEASE ORAL at 12:21

## 2020-01-15 RX ADMIN — INSULIN LISPRO 1 UNITS: 100 INJECTION, SOLUTION INTRAVENOUS; SUBCUTANEOUS at 13:21

## 2020-01-15 RX ADMIN — IPRATROPIUM BROMIDE 0.5 MG: 0.5 SOLUTION RESPIRATORY (INHALATION) at 20:33

## 2020-01-15 RX ADMIN — DEXTROSE 50 ML/HR: 5 SOLUTION INTRAVENOUS at 01:59

## 2020-01-15 RX ADMIN — METOPROLOL TARTRATE 5 MG: 5 INJECTION INTRAVENOUS at 11:15

## 2020-01-15 RX ADMIN — NYSTATIN 1 APPLICATION: 100000 POWDER TOPICAL at 17:56

## 2020-01-15 RX ADMIN — APIXABAN 5 MG: 5 TABLET, FILM COATED ORAL at 12:21

## 2020-01-15 NOTE — PROGRESS NOTES
Progress Note - Cardiology   Shelly Gan 63 y o  female MRN: [id number]  Unit/Bed#: ICU 06 Encounter: [id number]    Assessment:  1  Permanent atrial fibrillation with rate control at 105 to 120  Inappropriate rate for her level of illness  2  Acute on chronic heart failure with preserved ejection fraction  3  Cor pulmonale from chronic respiratory failure and pulmonary hypertension  4  Acute on chronic respiratory failure with hypercapnia  5  Patient alert an oriented  6  Hypertension  7  Diabetes mellitus type 2  8  Morbid obesity  9  Both pulmonary congestion and peripheral edema  10  Compression fracture of the 1st lumbar vertebrae   11  Acute on Chronic kidney disease, stage III, improving  12  Pericardial effusion noted on CT scan     Plan:  1  Continue IV metoprolol 5 mg q 6h  2  Continue IV labetalol as needed for blood pressure control  3  Continue diuresis  4  Repeat echocardiogram      Interval history:  Patient continues to be awake and alert  Heart rate control continues to be in the range of 100 to 120 which is probably appropriate for her illness  At the present time, would not try to achieve better control  Weight down 1 lb  Creatinine improved from 2 7 to 2 31 today  CT scan of the chest continues to show pulmonary vascular congestion with atelectasis and consolidation in the bases and bilateral pleural effusions  There was also note of a pericardial effusion which was not seen on the previous echocardiogram   Enlargement of the main pulmonary artery consistent with pulmonary hypertension and enlargement of the inferior vena cava consistent with right heart failure  Possible cirrhotic changes in the liver        Vitals: /89   Pulse 104   Temp 98 °F (36 7 °C) (Temporal)   Resp (!) 27   Ht 5' 5" (1 651 m)   Wt 131 kg (288 lb 12 8 oz)   SpO2 93%   BMI 48 06 kg/m²   Vitals:    01/14/20 0547 01/15/20 0600   Weight: 131 kg (289 lb 11 oz) 131 kg (288 lb 12 8 oz)     Orthostatic Blood Pressures      Most Recent Value   Blood Pressure  136/89 filed at 01/15/2020 0815   Patient Position - Orthostatic VS  Lying filed at 01/14/2020 2210            Intake/Output Summary (Last 24 hours) at 1/15/2020 0939  Last data filed at 1/15/2020 0610  Gross per 24 hour   Intake 1581 44 ml   Output 2300 ml   Net -718 56 ml       Invasive Devices     Peripheral Intravenous Line            Peripheral IV 01/10/20 Left Hand 4 days    Peripheral IV 01/12/20 Right Antecubital 2 days          Drain            External Urinary Catheter 4 days                Review of Systems   Respiratory: Positive for shortness of breath  Negative for cough, choking, chest tightness and wheezing  Shortness of breath improving   Cardiovascular: Negative for chest pain, palpitations and leg swelling  Musculoskeletal: Negative for gait problem  Skin: Negative for rash  Neurological: Negative for dizziness, tremors, syncope, weakness, light-headedness, numbness and headaches  Psychiatric/Behavioral: Negative for agitation and behavioral problems  The patient is not hyperactive  Physical Exam   Constitutional: She is oriented to person, place, and time  She appears well-developed and well-nourished  Morbid obesity, BMI 48 1   HENT:   Head: Normocephalic and atraumatic  Neck: Normal range of motion  Neck supple  JVD present  No tracheal deviation present  No thyromegaly present  Cardiovascular: Normal heart sounds and intact distal pulses  Exam reveals no gallop and no friction rub  No murmur heard  Irregularly irregular rhythm   Pulmonary/Chest: Effort normal  No respiratory distress  She has rales  By basilar faint rales   Abdominal: Soft  Bowel sounds are normal    Musculoskeletal: Normal range of motion  She exhibits edema  2+ bilateral pretibial edema in a with chronic stasis dermatitis   Neurological: She is alert and oriented to person, place, and time  Skin: Skin is warm and dry     Psychiatric: She has a normal mood and affect  Her behavior is normal        CT scan of the chest  IMPRESSION:  Exam is suboptimal due to respiratory motion and patient body habitus  Evaluation for malignancy is limited without IV contrast   1  No definite findings for malignancy based on this noncontrast exam   2  Interlobular septal thickening suggestive of mild interstitial edema  Mild bibasilar consolidation may be related to atelectasis or pneumonia  3   Small bilateral pleural effusions slightly greater on the left than on the right  4  Marked cardiomegaly  Mild-to-moderate pericardial effusion  5  Marked distention of the main pulmonary artery compatible with pulmonary artery artery hypertension  6   Slightly nodular hepatic contour, question related to early cirrhotic changes  Markedly distended partially visualized IVC  This favors right heart failure  7   Mild anterior wedging at T12 of indeterminate age  Severe compression deformity at L1 likely has progressed from prior exams  Lab Results:   CBC with diff:   Results from last 7 days   Lab Units 01/15/20  0555   WBC Thousand/uL 7 08   RBC Million/uL 5 26*   HEMOGLOBIN g/dL 16 0*   HEMATOCRIT % 53 4*   MCV fL 102*   MCH pg 30 4   MCHC g/dL 30 0*   RDW % 19 6*   MPV fL 13 7*   PLATELETS Thousands/uL 62*     CMP:   Results from last 7 days   Lab Units 01/15/20  0556  01/10/20  2325   SODIUM mmol/L 144   < >  --    POTASSIUM mmol/L 3 6   < >  --    CHLORIDE mmol/L 102   < >  --    CO2 mmol/L 35*   < >  --    CO2, I-STAT mmol/L  --   --  32   BUN mg/dL 74*   < >  --    CREATININE mg/dL 2 31*   < >  --    GLUCOSE, ISTAT mg/dl  --   --  95   CALCIUM mg/dL 8 1*   < >  --    AST U/L 12   < >  --    ALT U/L 8*   < >  --    ALK PHOS U/L 80   < >  --    EGFR ml/min/1 73sq m 23   < >  --     < > = values in this interval not displayed  Imaging: I have personally reviewed pertinent reports        EKG:  Atrial fibrillation with heart rate control ranging from 105 to 120 beats per minute

## 2020-01-15 NOTE — PLAN OF CARE
Problem: Prexisting or High Potential for Compromised Skin Integrity  Goal: Skin integrity is maintained or improved  Description  INTERVENTIONS:  - Identify patients at risk for skin breakdown  - Assess and monitor skin integrity  - Assess and monitor nutrition and hydration status  - Monitor labs   - Assess for incontinence   - Turn and reposition patient  - Assist with mobility/ambulation  - Relieve pressure over bony prominences  - Avoid friction and shearing  - Provide appropriate hygiene as needed including keeping skin clean and dry  - Evaluate need for skin moisturizer/barrier cream  - Collaborate with interdisciplinary team   - Patient/family teaching  - Consider wound care consult   Outcome: Progressing     Problem: Potential for Falls  Goal: Patient will remain free of falls  Description  INTERVENTIONS:  - Assess patient frequently for physical needs  -  Identify cognitive and physical deficits and behaviors that affect risk of falls    -  [place] fall precautions as indicated by assessment   - Educate patient/family on patient safety including physical limitations  - Instruct patient to call for assistance with activity based on assessment  - Modify environment to reduce risk of injury  - Consider OT/PT consult to assist with strengthening/mobility  Outcome: Progressing     Problem: PAIN - ADULT  Goal: Verbalizes/displays adequate comfort level or baseline comfort level  Description  Interventions:  - Encourage patient to monitor pain and request assistance  - Assess pain using appropriate pain scale  - Administer analgesics based on type and severity of pain and evaluate response  - Implement non-pharmacological measures as appropriate and evaluate response  - Consider cultural and social influences on pain and pain management  - Notify physician/advanced practitioner if interventions unsuccessful or patient reports new pain  Outcome: Progressing     Problem: INFECTION - ADULT  Goal: Absence or prevention of progression during hospitalization  Description  INTERVENTIONS:  - Assess and monitor for signs and symptoms of infection  - Monitor lab/diagnostic results  - Monitor all insertion sites, i e  indwelling lines, tubes, and drains  - Monitor endotracheal if appropriate and nasal secretions for changes in amount and color  - [place] appropriate cooling/warming therapies per order  - Administer medications as ordered  - Instruct and encourage patient and family to use good hand hygiene technique  - Identify and instruct in appropriate isolation precautions for identified infection/condition  Outcome: Progressing  Goal: Absence of fever/infection during neutropenic period  Description  INTERVENTIONS:  - Monitor WBC    Outcome: Progressing     Problem: SAFETY ADULT  Goal: Maintain or return to baseline ADL function  Description  INTERVENTIONS:  -  Assess patient's ability to carry out ADLs; assess patient's baseline for ADL function and identify physical deficits which impact ability to perform ADLs (bathing, care of mouth/teeth, toileting, grooming, dressing, etc )  - Assess/evaluate cause of self-care deficits   - Assess range of motion  - Assess patient's mobility; develop plan if impaired  - Assess patient's need for assistive devices and provide as appropriate  - Encourage maximum independence but intervene and supervise when necessary  - Involve family in performance of ADLs  - Assess for home care needs following discharge   - Consider OT consult to assist with ADL evaluation and planning for discharge  - Provide patient education as appropriate  Outcome: Progressing  Goal: Maintain or return mobility status to optimal level  Description  INTERVENTIONS:  - Assess patient's baseline mobility status (ambulation, transfers, stairs, etc )    - Identify cognitive and physical deficits and behaviors that affect mobility  - Identify mobility aids required to assist with transfers and/or ambulation (gait belt, sit-to-stand, lift, walker, cane, etc )  - [place] fall precautions as indicated by assessment  - Record patient progress and toleration of activity level on Mobility SBAR; progress patient to next Phase/Stage  - Instruct patient to call for assistance with activity based on assessment  - Consider rehabilitation consult to assist with strengthening/weightbearing, etc   Outcome: Progressing     Problem: DISCHARGE PLANNING  Goal: Discharge to home or other facility with appropriate resources  Description  INTERVENTIONS:  - Identify barriers to discharge w/patient and caregiver  - Arrange for needed discharge resources and transportation as appropriate  - Identify discharge learning needs (meds, wound care, etc )  - Arrange for interpretive services to assist at discharge as needed  - Refer to Case Management Department for coordinating discharge planning if the patient needs post-hospital services based on physician/advanced practitioner order or complex needs related to functional status, cognitive ability, or social support system  Outcome: Progressing     Problem: Knowledge Deficit  Goal: Patient/family/caregiver demonstrates understanding of disease process, treatment plan, medications, and discharge instructions  Description  Complete learning assessment and assess knowledge base    Interventions:  - Provide teaching at level of understanding  - Provide teaching via preferred learning methods  Outcome: Progressing     Problem: CARDIOVASCULAR - ADULT  Goal: Maintains optimal cardiac output and hemodynamic stability  Description  INTERVENTIONS:  - Monitor I/O, vital signs and rhythm  - Monitor for S/S and trends of decreased cardiac output  - Administer and titrate ordered vasoactive medications to optimize hemodynamic stability  - Assess quality of pulses, skin color and temperature  - Assess for signs of decreased coronary artery perfusion  - Instruct patient to report change in severity of symptoms  Outcome: Progressing  Goal: Absence of cardiac dysrhythmias or at baseline rhythm  Description  INTERVENTIONS:  - Continuous cardiac monitoring, vital signs, obtain 12 lead EKG if ordered  - Administer antiarrhythmic and heart rate control medications as ordered  - Monitor electrolytes and administer replacement therapy as ordered  Outcome: Progressing     Problem: RESPIRATORY - ADULT  Goal: Achieves optimal ventilation and oxygenation  Description  INTERVENTIONS:  - Assess for changes in respiratory status  - Assess for changes in mentation and behavior  - Position to facilitate oxygenation and minimize respiratory effort  - Oxygen administered by appropriate delivery if ordered  - Initiate smoking cessation education as indicated  - Encourage broncho-pulmonary hygiene including cough, deep breathe, Incentive Spirometry  - Assess the need for suctioning and aspirate as needed  - Assess and instruct to report SOB or any respiratory difficulty  - Respiratory Therapy support as indicated  Outcome: Progressing     Problem: METABOLIC, FLUID AND ELECTROLYTES - ADULT  Goal: Electrolytes maintained within normal limits  Description  INTERVENTIONS:  - Monitor labs and assess patient for signs and symptoms of electrolyte imbalances  - Administer electrolyte replacement as ordered  - Monitor response to electrolyte replacements, including repeat lab results as appropriate  - Instruct patient on fluid and nutrition as appropriate  Outcome: Progressing  Goal: Fluid balance maintained  Description  INTERVENTIONS:  - Monitor labs   - Monitor I/O and WT  - Instruct patient on fluid and nutrition as appropriate  - Assess for signs & symptoms of volume excess or deficit  Outcome: Progressing  Goal: Glucose maintained within target range  Description  INTERVENTIONS:  - Monitor Blood Glucose as ordered  - Assess for signs and symptoms of hyperglycemia and hypoglycemia  - Administer ordered medications to maintain glucose within target range  - Assess nutritional intake and initiate nutrition service referral as needed  Outcome: Progressing     Problem: SKIN/TISSUE INTEGRITY - ADULT  Goal: Skin integrity remains intact  Description  INTERVENTIONS  - Identify patients at risk for skin breakdown  - Assess and monitor skin integrity  - Assess and monitor nutrition and hydration status  - Monitor labs (i e  albumin)  - Assess for incontinence   - Turn and reposition patient  - Assist with mobility/ambulation  - Relieve pressure over bony prominences  - Avoid friction and shearing  - Provide appropriate hygiene as needed including keeping skin clean and dry  - Evaluate need for skin moisturizer/barrier cream  - Collaborate with interdisciplinary team (i e  Nutrition, Rehabilitation, etc )   - Patient/family teaching  Outcome: Progressing  Goal: Incision(s), wounds(s) or drain site(s) healing without S/S of infection  Description  INTERVENTIONS  - Assess and document risk factors for skin impairment   - Assess and document dressing, incision, wound bed, drain sites and surrounding tissue  - Consider nutrition services referral as needed  - Oral mucous membranes remain intact  - Provide patient/ family education  Outcome: Progressing  Goal: Oral mucous membranes remain intact  Description  INTERVENTIONS  - Assess oral mucosa and hygiene practices  - Implement preventative oral hygiene regimen  - Implement oral medicated treatments as ordered  - Initiate Nutrition services referral as needed  Outcome: Progressing     Problem: Nutrition/Hydration-ADULT  Goal: Nutrient/Hydration intake appropriate for improving, restoring or maintaining nutritional needs  Description  Monitor and assess patient's nutrition/hydration status for malnutrition  Collaborate with interdisciplinary team and initiate plan and interventions as ordered  Monitor patient's weight and dietary intake as ordered or per policy   Utilize nutrition screening tool and intervene as necessary  Determine patient's food preferences and provide high-protein, high-caloric foods as appropriate       INTERVENTIONS:  - Monitor oral intake, urinary output, labs, and treatment plans  - Assess nutrition and hydration status and recommend course of action  - Evaluate amount of meals eaten  - Assist patient with eating if necessary   - Allow adequate time for meals  - Recommend/ encourage appropriate diets, oral nutritional supplements, and vitamin/mineral supplements  - Order, calculate, and assess calorie counts as needed  - Recommend, monitor, and adjust tube feedings and TPN/PPN based on assessed needs  - Assess need for intravenous fluids  - Provide specific nutrition/hydration education as appropriate  - Include patient/family/caregiver in decisions related to nutrition  Outcome: Progressing

## 2020-01-15 NOTE — ASSESSMENT & PLAN NOTE
Wt Readings from Last 3 Encounters:   01/14/20 131 kg (289 lb 11 oz)   11/05/19 110 kg (243 lb 9 6 oz)   10/28/19 109 kg (240 lb 4 8 oz)       · Suspect decompensated CHF  Echo from 1/8/20 shows EF of 35% with diastolic dysfunction    · Continue diuresis per nephrology recommendations  · Lopressor for HR control- may help with her diastolic failure if HR was better controlled

## 2020-01-15 NOTE — ASSESSMENT & PLAN NOTE
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

## 2020-01-15 NOTE — ASSESSMENT & PLAN NOTE
· Acute on CKD III with baseline approximately 1 5  · Nephrology is following  Creatinine is improving  Urine output significantly improved on lasix infusion  Continue lasix gtt per nephrology recommendations  · Trend renal indices and monitor intake and output

## 2020-01-15 NOTE — ASSESSMENT & PLAN NOTE
Lab Results   Component Value Date    HGBA1C 5 8 04/11/2019       Recent Labs     01/14/20  0700 01/14/20  1131 01/14/20  1637 01/14/20  2103   POCGLU 90 95 121 96       Blood Sugar Average: Last 72 hrs:  · Persistent hypoglycemia possibly due to poor hepatic perfusion in the setting of decompensated CHF  Continue IV dextrose  · TSH normal, random cortisol 17 9  · Goal to maintain -180    Aggressively monitor and treat hypoglycemia as this is likely contributing factor to her encephalopathy

## 2020-01-15 NOTE — PROGRESS NOTES
Progress Note - [place] Knee 1960, 63 y o  female MRN: [id number]    Unit/Bed#: ICU 06 Encounter: [id number]    Primary Care Provider: Alexis Styles MD   Date and time admitted to hospital: 1/6/2020 10:25 PM        Acute on chronic diastolic congestive heart failure [facility])  Assessment & Plan  Wt Readings from Last 3 Encounters:   01/14/20 131 kg (289 lb 11 oz)   11/05/19 110 kg (243 lb 9 6 oz)   10/28/19 109 kg (240 lb 4 8 oz)       · Suspect decompensated CHF  Echo from 1/8/20 shows EF of 60% with diastolic dysfunction  · Continue diuresis per nephrology recommendations  · Lopressor for HR control- may help with her diastolic failure if HR was better controlled      * Acute on chronic respiratory failure with hypoxia and hypercapnia (HCC)  Assessment & Plan  · Etiology likely multifactorial, COPD, pulmonary edema, pulmonary hypertension, 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, pleural effusion and noncompliance with nocturnal BiPAP  · Pt notes baseline oxygen requirement at home is 10L  · Hypercapnia is improving  Continue BiPAP and transition to NC once hypercapnia improves  · On lasix gtt for diuresis  · Continue scheduled nebulized bronchodilators  Chronic obstructive pulmonary disease with acute exacerbation (HCC)  Assessment & Plan  · Continue nebulized bronchodilators  No indication for steroids at this point  · Not on maintenance inhalers at home      Pericardial effusion  Assessment & Plan  · The patient was noted to have a pericardial effusion on CT imaging yesterday that is described as mild to moderate  On her ECHO form 1/8 there was no evidence of effusion  · Will repeat ECHO, could new onset effusion be contributing to her tachycardia and persistent pulmonary symptoms? Permanent atrial fibrillation  Assessment & Plan  · Rate is currently not controlled      · Continue PO cardizem and metoprolol per cardiology recommendations  · Cont heparin infusion for now as may requires procedures which would be difficult in the setting of her Eliquis use    SAPNA (acute kidney injury) ([address] )  Assessment & Plan  · Acute on CKD III with baseline approximately 1 5  · Nephrology is following  Creatinine is improving  Urine output significantly improved on lasix infusion  Continue lasix gtt per nephrology recommendations  · Trend renal indices and monitor intake and output  Chronic venous stasis dermatitis of both lower extremities  Assessment & Plan  · No open ulcers or signs of cellulitis        Type 2 diabetes mellitus without complication [facility])  Assessment & Plan  Lab Results   Component Value Date    HGBA1C 5 8 04/11/2019       Recent Labs     01/14/20  0700 01/14/20  1131 01/14/20  1637 01/14/20  2103   POCGLU 90 95 121 96       Blood Sugar Average: Last 72 hrs:  · Persistent hypoglycemia possibly due to poor hepatic perfusion in the setting of decompensated CHF  Continue IV dextrose  · TSH normal, random cortisol 17 9  · Goal to maintain -180    Aggressively monitor and treat hypoglycemia as this is likely contributing factor to her encephalopathy      Obesity  Assessment & Plan  · She would benefit from weight loss  · Likely component of OHV that is contributing to her acute on chronic pulmonary issues  · Nocturnal and when sleeping BIPAP given persistent hypercarbia    Hypernatremia  Assessment & Plan  · Currently on D5 for hypoglycemia  · Will continue to monitor her sodium level    Adjustment disorder  Assessment & Plan  · Wellbutrin was discontinued by psych based on her current tachy arryhtmia     ----------------------------------------------------------------------------------------  HPI/24hr events: no events overnight, mental status is improved this AM  Denies SOB    Disposition: Continue Stepdown Level 1 level of care   Code Status: Level 1 - Full Code  ---------------------------------------------------------------------------------------  SUBJECTIVE  Denies pain of SOB    Review of Systems  Review of systems was reviewed and negative unless stated above in HPI/24-hour events   ---------------------------------------------------------------------------------------  OBJECTIVE    Physical Exam   Constitutional: She is oriented to person, place, and time  She appears well-developed and well-nourished  No distress  HENT:   Head: Normocephalic and atraumatic  Eyes: Pupils are equal, round, and reactive to light  Neck: Neck supple  Cardiovascular: Normal rate  An irregular rhythm present  Pulmonary/Chest: Effort normal  No respiratory distress  She has rales  Abdominal: Soft  Bowel sounds are normal  She exhibits distension  There is no tenderness  Lymphadenopathy:     She has no cervical adenopathy  Neurological: She is alert and oriented to person, place, and time  Skin: Skin is warm and dry  Psychiatric: She has a normal mood and affect  Vitals   Vitals:    01/15/20 0302 01/15/20 0311 01/15/20 0402 01/15/20 0502   BP: 131/93  133/89 135/92   Pulse: 104  100 98   Resp:  20   Temp:       TempSrc:       SpO2: 96% 96% 95% 97%   Weight:       Height:         Temp (24hrs), Av 3 °F (36 8 °C), Min:97 3 °F (36 3 °C), Max:99 3 °F (37 4 °C)  Current: Temperature: 97 5 °F (36 4 °C)          SpO2 Device: O2 Device: High flow nasal cannula  O2 Flow Rate (L/min): 10 L/min    Invasive/non-invasive ventilation settings   Respiratory    Lab Data (Last 4 hours)    None         O2/Vent Data (Last 4 hours)      01/15 0311          Non-Invasive Ventilation Mode BiPAP                   Height and Weights   Height: 5' 5" (165 1 cm)  IBW: 57 kg  Body mass index is 48 21 kg/m²  Weight (last 2 days)     Date/Time   Weight    20 0547   131 (289 68)              Intake and Output  I/O       701 -  07 - 01/15 0700    P  O  480     I V  (mL/kg) 1495 9 (11 4)     IV Piggyback  50    Total Intake(mL/kg) 1975 9 (15 1) 50 (0 4)    Urine (mL/kg/hr) 3941 (1 3) 2700 (0 9)    Stool 0 0    Total Output 3941 2700    Net -1965 1 -2650          Unmeasured Stool Occurrence 1 x 1 x          Nutrition       Diet Orders   (From admission, onward)             Start     Ordered    01/10/20 1556  Dietary nutrition supplements  Once     Question Answer Comment   Select Supplement: Ensure Pudding-Chocolate    Frequency Breakfast, Dinner        01/10/20 1555    01/09/20 1037  Diet Sunil/CHO Controlled; Consistent Carbohydrate Diet Level 2 (5 carb servings/75 grams CHO/meal); Fluid Restriction 1500 ML  Diet effective now     Question Answer Comment   Diet Type Sunil/CHO Controlled    Sunil/CHO Controlled Consistent Carbohydrate Diet Level 2 (5 carb servings/75 grams CHO/meal)    Other Restriction(s): Fluid Restriction 1500 ML    RD to adjust diet per protocol?  Yes        01/09/20 1036                Laboratory and Diagnostics:  Results from last 7 days   Lab Units 01/13/20  0430 01/10/20  2325 01/10/20  0512 01/09/20  0438   WBC Thousand/uL 6 82  --  7 60 8 09   HEMOGLOBIN g/dL 17 0*  --  16 1* 15 8*   I STAT HEMOGLOBIN g/dl  --  18 7*  --   --    HEMATOCRIT % 57 0*  --  57 2* 55 5*   HEMATOCRIT, ISTAT %  --  55*  --   --    PLATELETS Thousands/uL 70*  --  106* 110*     Results from last 7 days   Lab Units 01/14/20  0612 01/13/20  0430 01/12/20  0429 01/11/20  1241 01/11/20  0348 01/10/20  2325 01/10/20  0512 01/09/20  0438   SODIUM mmol/L 144 144 146* 145 146*  --  145 144   POTASSIUM mmol/L 3 4* 3 9 3 6 4 0 3 8  --  3 7 3 5   CHLORIDE mmol/L 103 103 106 106 106  --  105 104   CO2 mmol/L 33* 30 30 29 30  --  29 30   CO2, I-STAT mmol/L  --   --   --   --   --  32  --   --    ANION GAP mmol/L 8 11 10 10 10  --  11 10   BUN mg/dL 74* 70* 67* 67* 67*  --  59* 51*   CREATININE mg/dL 2 70* 3 05* 3 42* 3 75* 3 84*  --  3 73* 3 51*   CALCIUM mg/dL 7 9* 8 3 7 6* 7 8* 7 8*  --  7 6* 8 1*   GLUCOSE RANDOM mg/dL 99 104 82 82 85  --  101 138   ALT U/L  --   --   --  16  --   --   --   --    AST U/L  --   -- --  12  --   --   --   --    ALK PHOS U/L  --   --   --  99  --   --   --   --    ALBUMIN g/dL  --   --   --  3 0*  --   --   --   --    TOTAL BILIRUBIN mg/dL  --   --   --  2 10*  --   --   --   --      Results from last 7 days   Lab Units 01/13/20  0430 01/12/20  0429 01/11/20  0349   MAGNESIUM mg/dL 1 9  --  2 5   PHOSPHORUS mg/dL  --  5 5*  --       Results from last 7 days   Lab Units 01/14/20  0612 01/14/20  0222 01/13/20  1708 01/11/20  1241   INR   --   --  1 48* 1 44*   PTT seconds 67* 77* 37  --               ABG:  Results from last 7 days   Lab Units 01/13/20  1114   PH ART  7 402   PCO2 ART mm Hg 50 2*   PO2 ART mm Hg 91 9   HCO3 ART mmol/L 30 5*   BASE EXC ART mmol/L 4 3   ABG SOURCE  Brachial, Right     VBG:  Results from last 7 days   Lab Units 01/13/20  1114   ABG SOURCE  Brachial, Right           Micro        EKG:   Imaging: I have personally reviewed pertinent reports     and I have personally reviewed pertinent films in PACS    Active Medications  Scheduled Meds:    Current Facility-Administered Medications:  albuterol 2 puff Inhalation Q4H PRN Lang Hartwick Bilofsky, CRNP    dextrose 50 mL/hr Intravenous Continuous  Magyar, CRNP Last Rate: 50 mL/hr (01/15/20 0159)   furosemide 40 mg/hr Intravenous Continuous Ofilia Fore, CRNP Last Rate: 40 mg/hr (01/15/20 0159)   heparin (porcine) 3-20 Units/kg/hr (Order-Specific) Intravenous Titrated Loretha Ket, CRNP Last Rate: 11 1 Units/kg/hr (01/14/20 1722)   insulin lispro 1-6 Units Subcutaneous TID AC Luli K Bilofsky, CRNP    insulin lispro 1-6 Units Subcutaneous HS Luli K Bilofsky, CRNP    ipratropium 0 5 mg Nebulization TID Lang Hartwick Bilofsky, CRNP    Labetalol HCl 10 mg Intravenous Q6H PRN  Magyar, CRNP    levalbuterol 1 25 mg Nebulization TID MAYNOR Lee    levalbuterol 1 25 mg Nebulization Q4H PRN MAYNOR Lee    metoprolol 5 mg Intravenous Q6H Jhony Reyes MD    nicotine 1 patch Transdermal Daily MAYNOR Batista nystatin  Topical BID MAYNOR Victoria      Continuous Infusions:    dextrose 50 mL/hr Last Rate: 50 mL/hr (01/15/20 0159)   furosemide 40 mg/hr Last Rate: 40 mg/hr (01/15/20 0159)   heparin (porcine) 3-20 Units/kg/hr (Order-Specific) Last Rate: 11 1 Units/kg/hr (01/14/20 1722)     PRN Meds:     albuterol 2 puff Q4H PRN   Labetalol HCl 10 mg Q6H PRN   levalbuterol 1 25 mg Q4H PRN       ---------------------------------------------------------------------------------------  Advance Directive and Living Will:      Power of :    POLST:    ---------------------------------------------------------------------------------------  Counseling / Coordination of Monica Ricardo, CRNP      Portions of the record may have been created with voice recognition software  Occasional wrong word or "sound a like" substitutions may have occurred due to the inherent limitations of voice recognition software    Read the chart carefully and recognize, using context, where substitutions have occurred

## 2020-01-15 NOTE — OCCUPATIONAL THERAPY NOTE
Occupational Therapy         Patient Name: Jenifer Colby  Today's Date: 1/15/2020    Attempted to see pt for tx session, but having a bedside procedure  Will continue when appropriate   Danwa Lomeli OT

## 2020-01-15 NOTE — PHYSICAL THERAPY NOTE
PHYSICAL THERAPY NOTE          Patient Name: Siomara Flor  JMMonchoDORIS Date: 1/15/2020     Pt currently having echo at bedside  PT will attempt to return at later time      Tank Truong PT

## 2020-01-15 NOTE — PROGRESS NOTES
NEPHROLOGY PROGRESS NOTE   Сергей Moss 63 y o  female MRN: [id number]  Unit/Bed#: ICU 06 Encounter: [id number]  Reason for Consult: Rachna/CKD    ASSESSMENT AND PLAN:  RACHNA POA on CKD stage 3, baseline creatinine 1 3 to 1 6  -RACHNA suspected to be multifactorial in the setting of cardiorenal/volume overload/hypotension episodes, prior ACE-inhibitor use, progression to ATN  -peak creatinine 3 8 now seems to be overall improving to 2 3 today  -continue current Lasix drip at 40 mg/hour with good urine output  -BMP in a m   -continue to monitor intake and output, avoid hypotension, nephrotoxins or NSAIDs  -CKD suspected to be secondary to underlying hypertension/diabetes/CHF in the setting of morbidly obese     Mild hypernatremia, serum sodium has improved and stable 144 today  Continue D5 water at 50 mL/hour, encourage free water intake and eventually consider discontinuing IV D5 water   -continue to monitor     Mild hyperphosphatemia, serum phosphorus 5 5, continue to monitor with improving renal function     Mild hypokalemia, overall better with replacement, serum potassium 3 6 today  Will give potassium chloride 20 mEq one dose today      Acute on chronic hypoxic/hypercapnic respiratory failure  -likely multifactorial in the setting of COPD, pulmonary hypertension, suspected component of sleep apnea, pulmonary congestion/pleural effusion  -now remains on high-flow oxygen at the time of my encounter  close monitoring and management as per Pulmonary  -diuretics as below     Diastolic CHF, echo this month shows EF 90%, diastolic dysfunction present, decreased right ventricular systolic function, moderate pulmonary hypertension, no pericardial effusion   -good urine output and remains negative balance with current Lasix to 40 mg/hour and continue same for today   -accurate intake and output, daily weight  -weight reducing     Encephalopathy, seems to be slowly improving    suspect multifactorial in the setting of hypercapnia, continue to monitor with regular ABG   Renal function overall improving      Hypertension, blood pressure overall acceptable, continue to monitor      Discussed above plan with ICU team     SUBJECTIVE:  Patient seen and examined at bedside  Remains on high-flow oxygen, urine output good  Denies any nausea or vomiting      OBJECTIVE:  Current Weight: Weight - Scale: 131 kg (288 lb 12 8 oz)  Vitals:    01/15/20 1115   BP: 117/81   Pulse: (!) 118   Resp: (!) 28   Temp:    SpO2: 95%       Intake/Output Summary (Last 24 hours) at 1/15/2020 1143  Last data filed at 1/15/2020 0610  Gross per 24 hour   Intake 1581 44 ml   Output 2300 ml   Net -718 56 ml     Wt Readings from Last 3 Encounters:   01/15/20 131 kg (288 lb 12 8 oz)   11/05/19 110 kg (243 lb 9 6 oz)   10/28/19 109 kg (240 lb 4 8 oz)     Temp Readings from Last 3 Encounters:   01/15/20 98 °F (36 7 °C) (Temporal)   11/05/19 99 5 °F (37 5 °C) (Tympanic)   10/28/19 98 5 °F (36 9 °C)     BP Readings from Last 3 Encounters:   01/15/20 117/81   11/05/19 144/80   10/28/19 150/100     Pulse Readings from Last 3 Encounters:   01/15/20 (!) 118   11/05/19 (!) 118   10/28/19 90        Physical Examination:  General:  Lying in bed, no acute distress   Eyes:  Mild conjunctival pallor present  ENT:  External examination of ears and nose unremarkable  Neck:  No obvious lymphadenopathy appreciated  Respiratory:  Decreased breath sound at base  CVS:  S1, S2 present  GI:  Soft, nontender, nondistended  CNS:  Active alert oriented x2  Extremities:  One to 2+ edema in legs, slowly improving  Skin:  No new rash in legs    Medications:    Current Facility-Administered Medications:     albuterol (PROVENTIL HFA,VENTOLIN HFA) inhaler 2 puff, 2 puff, Inhalation, Q4H PRN, Luli K Arunoflakesha, CRNP    dextrose 5 % infusion, 50 mL/hr, Intravenous, Continuous, Deretha Nuria CRNP, Last Rate: 50 mL/hr at 01/15/20 0159, 50 mL/hr at 01/15/20 0159    furosemide (LASIX) 500 mg infusion 50 mL, 40 mg/hr, Intravenous, Continuous, Amy Archuleta, CRNP, Last Rate: 4 mL/hr at 01/15/20 0159, 40 mg/hr at 01/15/20 0159    heparin (porcine) 25,000 units in 250 mL infusion (premix), 3-20 Units/kg/hr (Order-Specific), Intravenous, Titrated, BALJIT GerberNP, Last Rate: 11 8 mL/hr at 01/15/20 0729, 13 1 Units/kg/hr at 01/15/20 0729    insulin lispro (HumaLOG) 100 units/mL subcutaneous injection 1-6 Units, 1-6 Units, Subcutaneous, TID AC, Stopped at 01/10/20 0752 **AND** Fingerstick Glucose (POCT), , , TID AC, Luli K Arunoflakesha, CRNP    insulin lispro (HumaLOG) 100 units/mL subcutaneous injection 1-6 Units, 1-6 Units, Subcutaneous, HS, Luli K ArunofBALJIT crowderNP, 1 Units at 01/08/20 2200    ipratropium (ATROVENT) 0 02 % inhalation solution 0 5 mg, 0 5 mg, Nebulization, TID, Luli K Arunofskkyler, CRNP, 0 5 mg at 01/15/20 0718    Labetalol HCl (NORMODYNE) injection 10 mg, 10 mg, Intravenous, Q6H PRN, Rita Putty, CRNP, 10 mg at 01/12/20 2317    levalbuterol (XOPENEX) inhalation solution 1 25 mg, 1 25 mg, Nebulization, TID, Luli K Bilofsky, CRNP, 1 25 mg at 01/15/20 0718    levalbuterol (XOPENEX) inhalation solution 1 25 mg, 1 25 mg, Nebulization, Q4H PRN, MAYNOR Bravo    metoprolol (LOPRESSOR) injection 5 mg, 5 mg, Intravenous, Q6H, Ryan Escobar MD, 5 mg at 01/15/20 1115    nicotine (NICODERM CQ) 14 mg/24hr TD 24 hr patch 1 patch, 1 patch, Transdermal, Daily, MAYNOR Bravo, 1 patch at 01/15/20 0900    nystatin (MYCOSTATIN) powder, , Topical, BID, Danitza Merino, MAYNOR, 1 application at 80/22/16 0901    Laboratory Results:  Results from last 7 days   Lab Units 01/15/20  0556 01/15/20  0555 01/14/20  0612 01/13/20  0430 01/12/20  0429 01/11/20  1241 01/11/20  0349 01/11/20  0348 01/10/20  2325 01/10/20  0512 01/09/20  0438   WBC Thousand/uL  --  7 08  --  6 82  --   --   --   --   --  7 60 8 09   HEMOGLOBIN g/dL  --  16 0*  --  17 0*  --   --   --   --   --  16 1* 15 8*   I STAT HEMOGLOBIN g/dl  --   --   --   --   --   --   --   --  18 7*  --   --    HEMATOCRIT %  --  53 4*  --  57 0*  --   --   --   --   --  57 2* 55 5*   HEMATOCRIT, ISTAT %  --   --   --   --   --   --   --   --  55*  --   --    PLATELETS Thousands/uL  --  62*  --  70*  --   --   --   --   --  106* 110*   SODIUM mmol/L 144  --  144 144 146* 145  --  146*  --  145 144   POTASSIUM mmol/L 3 6  --  3 4* 3 9 3 6 4 0  --  3 8  --  3 7 3 5   CHLORIDE mmol/L 102  --  103 103 106 106  --  106  --  105 104   CO2 mmol/L 35*  --  33* 30 30 29  --  30  --  29 30   CO2, I-STAT mmol/L  --   --   --   --   --   --   --   --  32  --   --    BUN mg/dL 74*  --  74* 70* 67* 67*  --  67*  --  59* 51*   CREATININE mg/dL 2 31*  --  2 70* 3 05* 3 42* 3 75*  --  3 84*  --  3 73* 3 51*   CALCIUM mg/dL 8 1*  --  7 9* 8 3 7 6* 7 8*  --  7 8*  --  7 6* 8 1*   MAGNESIUM mg/dL 1 7  --   --  1 9  --   --  2 5  --   --   --   --    PHOSPHORUS mg/dL  --   --   --   --  5 5*  --   --   --   --   --   --    GLUCOSE, ISTAT mg/dl  --   --   --   --   --   --   --   --  95  --   --        CT chest wo contrast   Final Result by Minnie Escalona MD (01/14 1338)   Exam is suboptimal due to respiratory motion and patient body habitus  Evaluation for malignancy is limited without IV contrast    1  No definite findings for malignancy based on this noncontrast exam    2  Interlobular septal thickening suggestive of mild interstitial edema  Mild bibasilar consolidation may be related to atelectasis or pneumonia  3   Small bilateral pleural effusions slightly greater on the left than on the right  4  Marked cardiomegaly  Mild-to-moderate pericardial effusion  5  Marked distention of the main pulmonary artery compatible with pulmonary artery artery hypertension  6   Slightly nodular hepatic contour, question related to early cirrhotic changes  Markedly distended partially visualized IVC  This favors right heart failure     7   Mild anterior wedging at T12 of indeterminate age  Severe compression deformity at L1 likely has progressed from prior exams  The study was marked in EPIC for significant notification  Workstation performed: [id number]         VAS ELIN & waveform analysis, multiple levels   Final Result by Ahsley Chavez MD (01/14 1839)      VAS lower limb venous duplex study, complete bilateral   Final Result by Violet Helm DO (01/13 1521)      XR chest portable   Final Result by Dalila Yoder MD (01/13 1141)      Cardiomegaly and persistent moderate central pulmonary edema      Workstation performed: [id number]         US kidney and bladder   Final Result by Anne-Marie Miranda MD (01/12 1409)      5 mm right renal lower pole nonobstructing calculus  No hydronephrosis  Underdistended but grossly unremarkable urinary bladder  Workstation performed: [id number]         XR chest portable   Final Result by Michael Kirby MD (01/11 2018)   Persistent cardiomegaly, suspected multifocal pneumonia and probable superimposed CHF            Workstation performed: [id number]         XR chest 2 views   ED Interpretation by Reynaldo Ornelas MD (01/06 1172)   Primary reviewed  Increased interstitial markings  Final Result by Monica Gordon MD (01/07 5534)      Enlargement of the cardiac silhouette and pulmonary interstitial edema  Workstation performed: [id number]         CT head wo contrast    (Results Pending)       Portions of the record may have been created with voice recognition software  Occasional wrong word or "sound a like" substitutions may have occurred due to the inherent limitations of voice recognition software  Read the chart carefully and recognize, using context, where substitutions have occurred

## 2020-01-15 NOTE — ASSESSMENT & PLAN NOTE
· The patient was noted to have a pericardial effusion on CT imaging yesterday that is described as mild to moderate  On her ECHO form 1/8 there was no evidence of effusion  · Will repeat ECHO, could new onset effusion be contributing to her tachycardia and persistent pulmonary symptoms?

## 2020-01-15 NOTE — ASSESSMENT & PLAN NOTE
· Rate is currently not controlled      · Continue PO cardizem and metoprolol per cardiology recommendations  · Cont heparin infusion for now as may requires procedures which would be difficult in the setting of her Eliquis use

## 2020-01-16 PROBLEM — F43.20 ADJUSTMENT DISORDER: Status: RESOLVED | Noted: 2020-01-08 | Resolved: 2020-01-16

## 2020-01-16 LAB
ALBUMIN SERPL BCP-MCNC: 2.5 G/DL (ref 3.5–5)
ALP SERPL-CCNC: 84 U/L (ref 46–116)
ALT SERPL W P-5'-P-CCNC: 8 U/L (ref 12–78)
ANION GAP SERPL CALCULATED.3IONS-SCNC: 6 MMOL/L (ref 4–13)
AST SERPL W P-5'-P-CCNC: 11 U/L (ref 5–45)
BILIRUB SERPL-MCNC: 4.08 MG/DL (ref 0.2–1)
BUN SERPL-MCNC: 80 MG/DL (ref 5–25)
CALCIUM SERPL-MCNC: 8 MG/DL (ref 8.3–10.1)
CHLORIDE SERPL-SCNC: 100 MMOL/L (ref 100–108)
CO2 SERPL-SCNC: 36 MMOL/L (ref 21–32)
CREAT SERPL-MCNC: 2.19 MG/DL (ref 0.6–1.3)
ERYTHROCYTE [DISTWIDTH] IN BLOOD BY AUTOMATED COUNT: 19.1 % (ref 11.6–15.1)
GFR SERPL CREATININE-BSD FRML MDRD: 24 ML/MIN/1.73SQ M
GLUCOSE SERPL-MCNC: 108 MG/DL (ref 65–140)
GLUCOSE SERPL-MCNC: 118 MG/DL (ref 65–140)
GLUCOSE SERPL-MCNC: 122 MG/DL (ref 65–140)
GLUCOSE SERPL-MCNC: 85 MG/DL (ref 65–140)
GLUCOSE SERPL-MCNC: 90 MG/DL (ref 65–140)
HCT VFR BLD AUTO: 49.9 % (ref 34.8–46.1)
HGB BLD-MCNC: 15 G/DL (ref 11.5–15.4)
MCH RBC QN AUTO: 30.8 PG (ref 26.8–34.3)
MCHC RBC AUTO-ENTMCNC: 30.1 G/DL (ref 31.4–37.4)
MCV RBC AUTO: 103 FL (ref 82–98)
PLATELET # BLD AUTO: 60 THOUSANDS/UL (ref 149–390)
PMV BLD AUTO: 13 FL (ref 8.9–12.7)
POTASSIUM SERPL-SCNC: 3.7 MMOL/L (ref 3.5–5.3)
PROT SERPL-MCNC: 5.6 G/DL (ref 6.4–8.2)
RBC # BLD AUTO: 4.87 MILLION/UL (ref 3.81–5.12)
SODIUM SERPL-SCNC: 142 MMOL/L (ref 136–145)
WBC # BLD AUTO: 7.05 THOUSAND/UL (ref 4.31–10.16)

## 2020-01-16 PROCEDURE — 94660 CPAP INITIATION&MGMT: CPT

## 2020-01-16 PROCEDURE — 99232 SBSQ HOSP IP/OBS MODERATE 35: CPT | Performed by: INTERNAL MEDICINE

## 2020-01-16 PROCEDURE — 94640 AIRWAY INHALATION TREATMENT: CPT

## 2020-01-16 PROCEDURE — 97530 THERAPEUTIC ACTIVITIES: CPT

## 2020-01-16 PROCEDURE — 82948 REAGENT STRIP/BLOOD GLUCOSE: CPT

## 2020-01-16 PROCEDURE — 97112 NEUROMUSCULAR REEDUCATION: CPT | Performed by: PHYSICAL THERAPIST

## 2020-01-16 PROCEDURE — 86022 PLATELET ANTIBODIES: CPT | Performed by: NURSE PRACTITIONER

## 2020-01-16 PROCEDURE — 97535 SELF CARE MNGMENT TRAINING: CPT

## 2020-01-16 PROCEDURE — 99232 SBSQ HOSP IP/OBS MODERATE 35: CPT

## 2020-01-16 PROCEDURE — 80053 COMPREHEN METABOLIC PANEL: CPT | Performed by: INTERNAL MEDICINE

## 2020-01-16 PROCEDURE — 85027 COMPLETE CBC AUTOMATED: CPT | Performed by: INTERNAL MEDICINE

## 2020-01-16 PROCEDURE — 99233 SBSQ HOSP IP/OBS HIGH 50: CPT | Performed by: INTERNAL MEDICINE

## 2020-01-16 PROCEDURE — 97116 GAIT TRAINING THERAPY: CPT | Performed by: PHYSICAL THERAPIST

## 2020-01-16 RX ORDER — METOPROLOL TARTRATE 5 MG/5ML
5 INJECTION INTRAVENOUS EVERY 6 HOURS PRN
Status: DISCONTINUED | OUTPATIENT
Start: 2020-01-16 | End: 2020-01-21 | Stop reason: HOSPADM

## 2020-01-16 RX ORDER — POTASSIUM CHLORIDE 20 MEQ/1
20 TABLET, EXTENDED RELEASE ORAL ONCE
Status: COMPLETED | OUTPATIENT
Start: 2020-01-16 | End: 2020-01-16

## 2020-01-16 RX ORDER — METOLAZONE 5 MG/1
5 TABLET ORAL ONCE
Status: COMPLETED | OUTPATIENT
Start: 2020-01-16 | End: 2020-01-16

## 2020-01-16 RX ORDER — METOPROLOL SUCCINATE 50 MG/1
100 TABLET, EXTENDED RELEASE ORAL DAILY
Status: DISCONTINUED | OUTPATIENT
Start: 2020-01-16 | End: 2020-01-21 | Stop reason: HOSPADM

## 2020-01-16 RX ADMIN — LEVALBUTEROL HYDROCHLORIDE 1.25 MG: 1.25 SOLUTION, CONCENTRATE RESPIRATORY (INHALATION) at 20:16

## 2020-01-16 RX ADMIN — METOPROLOL TARTRATE 5 MG: 5 INJECTION INTRAVENOUS at 06:00

## 2020-01-16 RX ADMIN — LEVALBUTEROL HYDROCHLORIDE 1.25 MG: 1.25 SOLUTION, CONCENTRATE RESPIRATORY (INHALATION) at 13:26

## 2020-01-16 RX ADMIN — IPRATROPIUM BROMIDE 0.5 MG: 0.5 SOLUTION RESPIRATORY (INHALATION) at 08:01

## 2020-01-16 RX ADMIN — METOLAZONE 5 MG: 5 TABLET ORAL at 12:13

## 2020-01-16 RX ADMIN — POTASSIUM CHLORIDE 20 MEQ: 1500 TABLET, EXTENDED RELEASE ORAL at 12:14

## 2020-01-16 RX ADMIN — APIXABAN 5 MG: 5 TABLET, FILM COATED ORAL at 17:03

## 2020-01-16 RX ADMIN — APIXABAN 5 MG: 5 TABLET, FILM COATED ORAL at 08:54

## 2020-01-16 RX ADMIN — DILTIAZEM HYDROCHLORIDE 120 MG: 120 CAPSULE, COATED, EXTENDED RELEASE ORAL at 08:54

## 2020-01-16 RX ADMIN — METOPROLOL SUCCINATE 100 MG: 50 TABLET, EXTENDED RELEASE ORAL at 12:13

## 2020-01-16 RX ADMIN — NICOTINE 1 PATCH: 14 PATCH TRANSDERMAL at 08:54

## 2020-01-16 RX ADMIN — LEVALBUTEROL HYDROCHLORIDE 1.25 MG: 1.25 SOLUTION, CONCENTRATE RESPIRATORY (INHALATION) at 08:01

## 2020-01-16 RX ADMIN — NYSTATIN 1 APPLICATION: 100000 POWDER TOPICAL at 08:53

## 2020-01-16 RX ADMIN — IPRATROPIUM BROMIDE 0.5 MG: 0.5 SOLUTION RESPIRATORY (INHALATION) at 13:26

## 2020-01-16 RX ADMIN — NYSTATIN 1 APPLICATION: 100000 POWDER TOPICAL at 17:04

## 2020-01-16 RX ADMIN — Medication 40 MG/HR: at 02:35

## 2020-01-16 RX ADMIN — Medication 40 MG/HR: at 17:57

## 2020-01-16 RX ADMIN — IPRATROPIUM BROMIDE 0.5 MG: 0.5 SOLUTION RESPIRATORY (INHALATION) at 20:16

## 2020-01-16 NOTE — PROGRESS NOTES
NEPHROLOGY PROGRESS NOTE   Doug Francois 63 y o  female MRN: [id number]  Unit/Bed#: ICU 06 Encounter: [id number]  Reason for Consult: SAPNA/CKD    ASSESSMENT AND PLAN:  SAPNA POA on CKD stage 3, baseline creatinine 1 3 to 1 6  -SAPNA suspected to be multifactorial in the setting of cardiorenal/volume overload/hypotension episodes, prior ACE-inhibitor use, progression to ATN  -peak creatinine 3 8 now seems to be overall improving to 2 1 today  -continue current Lasix drip at 40 mg/hour with good urine output  -BMP in a m   -continue to monitor intake and output, avoid hypotension, nephrotoxins or NSAIDs  -CKD suspected to be secondary to underlying hypertension/diabetes/CHF in the setting of morbidly obese     Mild hypernatremia, serum sodium has improved 142 today  Now remains off IV D5 water, encourage free water intake       Mild hyperphosphatemia, serum phosphorus 5 5, continue to monitor with improving renal function     Mild hypokalemia, overall improved with replacement  Will give potassium chloride 20 mEq one dose today      Acute on chronic hypoxic/hypercapnic respiratory failure  -likely multifactorial in the setting of COPD, pulmonary hypertension, suspected component of sleep apnea, pulmonary congestion/pleural effusion  -now remains on high-flow oxygen at the time of my encounter   Oxygen requirement seems to be slowly improving   close monitoring and management as per Pulmonary  -diuretics as below     Diastolic CHF, echo this month shows EF 07%, diastolic dysfunction present, decreased right ventricular systolic function, moderate pulmonary hypertension, no pericardial effusion  -urine output seems to have somewhat reduced despite being on Lasix to 40 mg/hour and continue same for today    Will do metolazone 5 mg one dose today   -accurate intake and output, daily weight  -weight reducing     Encephalopathy, seems to be slowly improving   suspect multifactorial in the setting of hypercapnia, continue to monitor with regular ABG   Renal function overall improving      Hypertension, blood pressure overall acceptable, continue to monitor      Discussed above plan with ICU team    SUBJECTIVE:  Patient seen and examined at bedside  She remains on high-flow oxygen although oxygen requirement seems to be slowly improving  Denies any nausea, vomiting      OBJECTIVE:  Current Weight: Weight - Scale: 131 kg (288 lb 12 8 oz)  Vitals:    01/16/20 1020   BP: 115/66   Pulse: 96   Resp: (!) 27   Temp:    SpO2: 94%       Intake/Output Summary (Last 24 hours) at 1/16/2020 1113  Last data filed at 1/16/2020 0800  Gross per 24 hour   Intake 1461 46 ml   Output 1883 ml   Net -421 54 ml     Wt Readings from Last 3 Encounters:   01/15/20 131 kg (288 lb 12 8 oz)   11/05/19 110 kg (243 lb 9 6 oz)   10/28/19 109 kg (240 lb 4 8 oz)     Temp Readings from Last 3 Encounters:   01/16/20 97 9 °F (36 6 °C) (Temporal)   11/05/19 99 5 °F (37 5 °C) (Tympanic)   10/28/19 98 5 °F (36 9 °C)     BP Readings from Last 3 Encounters:   01/16/20 115/66   11/05/19 144/80   10/28/19 150/100     Pulse Readings from Last 3 Encounters:   01/16/20 96   11/05/19 (!) 118   10/28/19 90      Physical Examination:  General:  Lying in bed, mild acute distress   Eyes:  Mild conjunctival pallor present  ENT:  External examination of ears and nose unremarkable  Neck:  No obvious lymphadenopathy appreciated  Respiratory:  Bilateral decreased breath sound at base  CVS:  S1, S2 present  GI:  Soft, nontender, nondistended  CNS:  Active alert oriented x2  Extremities:  1+ edema in both legs, improved  Skin:  No new rash in legs    Medications:    Current Facility-Administered Medications:     albuterol (PROVENTIL HFA,VENTOLIN HFA) inhaler 2 puff, 2 puff, Inhalation, Q4H PRN, Meera Lapidus Arunofskkyler, CRNP    apixaban (ELIQUIS) tablet 5 mg, 5 mg, Oral, BID, Luli K Bilofskkyler, CRNP, 5 mg at 01/16/20 0854    diltiazem (CARDIZEM CD) 24 hr capsule 120 mg, 120 mg, Oral, Daily, Meera Goldberg MAYNOR Kirby, 120 mg at 01/16/20 0854    furosemide (LASIX) 500 mg infusion 50 mL, 40 mg/hr, Intravenous, Continuous, MAYNOR Akers, Stopped at 01/16/20 1057    insulin lispro (HumaLOG) 100 units/mL subcutaneous injection 1-6 Units, 1-6 Units, Subcutaneous, TID AC, 1 Units at 01/15/20 1627 **AND** Fingerstick Glucose (POCT), , , TID AC, MAYNOR Jaeger    insulin lispro (HumaLOG) 100 units/mL subcutaneous injection 1-6 Units, 1-6 Units, Subcutaneous, HS, MAYNOR Jaeger, 1 Units at 01/08/20 2200    ipratropium (ATROVENT) 0 02 % inhalation solution 0 5 mg, 0 5 mg, Nebulization, TID, MAYNOR Jaeger, 0 5 mg at 01/16/20 0801    Labetalol HCl (NORMODYNE) injection 10 mg, 10 mg, Intravenous, Q6H PRN, MAYNOR Heller, 10 mg at 01/12/20 2317    levalbuterol (XOPENEX) inhalation solution 1 25 mg, 1 25 mg, Nebulization, TID, MAYNOR Jaeger, 1 25 mg at 01/16/20 0801    levalbuterol (XOPENEX) inhalation solution 1 25 mg, 1 25 mg, Nebulization, Q4H PRN, MAYNOR Hinds    metoprolol (LOPRESSOR) injection 5 mg, 5 mg, Intravenous, Q6H PRN, MAYNOR Pemberton    metoprolol succinate (TOPROL-XL) 24 hr tablet 100 mg, 100 mg, Oral, Daily, MAYNOR Jaeger    nicotine (NICODERM CQ) 14 mg/24hr TD 24 hr patch 1 patch, 1 patch, Transdermal, Daily, MAYNOR Hinds, 1 patch at 01/16/20 0854    nystatin (MYCOSTATIN) powder, , Topical, BID, MAYNOR Whittaker, 1 application at 82/34/80 1776    Laboratory Results:  Results from last 7 days   Lab Units 01/16/20  0427 01/15/20  0556 01/15/20  0555 01/14/20  0612 01/13/20  0430 01/12/20  0429 01/11/20  1241 01/11/20  0349 01/11/20  0348 01/10/20  2325 01/10/20  0512   WBC Thousand/uL 7 05  --  7 08  --  6 82  --   --   --   --   --  7 60   HEMOGLOBIN g/dL 15 0  --  16 0*  --  17 0*  --   --   --   --   --  16 1*   I STAT HEMOGLOBIN g/dl  --   --   --   --   --   --   --   --   --  18 7*  --    HEMATOCRIT % 49 9*  --  53 4* --  57 0*  --   --   --   --   --  57 2*   HEMATOCRIT, ISTAT %  --   --   --   --   --   --   --   --   --  55*  --    PLATELETS Thousands/uL 60*  --  62*  --  70*  --   --   --   --   --  106*   SODIUM mmol/L 142 144  --  144 144 146* 145  --  146*  --  145   POTASSIUM mmol/L 3 7 3 6  --  3 4* 3 9 3 6 4 0  --  3 8  --  3 7   CHLORIDE mmol/L 100 102  --  103 103 106 106  --  106  --  105   CO2 mmol/L 36* 35*  --  33* 30 30 29  --  30  --  29   CO2, I-STAT mmol/L  --   --   --   --   --   --   --   --   --  32  --    BUN mg/dL 80* 74*  --  74* 70* 67* 67*  --  67*  --  59*   CREATININE mg/dL 2 19* 2 31*  --  2 70* 3 05* 3 42* 3 75*  --  3 84*  --  3 73*   CALCIUM mg/dL 8 0* 8 1*  --  7 9* 8 3 7 6* 7 8*  --  7 8*  --  7 6*   MAGNESIUM mg/dL  --  1 7  --   --  1 9  --   --  2 5  --   --   --    PHOSPHORUS mg/dL  --   --   --   --   --  5 5*  --   --   --   --   --    GLUCOSE, ISTAT mg/dl  --   --   --   --   --   --   --   --   --  95  --        CT chest wo contrast   Final Result by Jessica Petit MD (01/14 2290)   Exam is suboptimal due to respiratory motion and patient body habitus  Evaluation for malignancy is limited without IV contrast    1  No definite findings for malignancy based on this noncontrast exam    2  Interlobular septal thickening suggestive of mild interstitial edema  Mild bibasilar consolidation may be related to atelectasis or pneumonia  3   Small bilateral pleural effusions slightly greater on the left than on the right  4  Marked cardiomegaly  Mild-to-moderate pericardial effusion  5  Marked distention of the main pulmonary artery compatible with pulmonary artery artery hypertension  6   Slightly nodular hepatic contour, question related to early cirrhotic changes  Markedly distended partially visualized IVC  This favors right heart failure  7   Mild anterior wedging at T12 of indeterminate age  Severe compression deformity at L1 likely has progressed from prior exams        The study was marked in EPIC for significant notification  Workstation performed: [id number]         VAS ELIN & waveform analysis, multiple levels   Final Result by Checo Horta MD (01/14 1839)      VAS lower limb venous duplex study, complete bilateral   Final Result by Kaitlynn Wells DO (01/13 1521)      XR chest portable   Final Result by Bright Toth MD (01/13 1141)      Cardiomegaly and persistent moderate central pulmonary edema      Workstation performed: [id number]         US kidney and bladder   Final Result by Amol Abreu MD (01/12 1409)      5 mm right renal lower pole nonobstructing calculus  No hydronephrosis  Underdistended but grossly unremarkable urinary bladder  Workstation performed: [id number]         XR chest portable   Final Result by Meche Garcia MD (01/11 0818)   Persistent cardiomegaly, suspected multifocal pneumonia and probable superimposed CHF            Workstation performed: [id number]         XR chest 2 views   ED Interpretation by Oly Biswas MD (01/06 5989)   Primary reviewed  Increased interstitial markings  Final Result by Gini Reddy MD (01/07 6713)      Enlargement of the cardiac silhouette and pulmonary interstitial edema  Workstation performed: [id number]         CT head wo contrast    (Results Pending)       Portions of the record may have been created with voice recognition software  Occasional wrong word or "sound a like" substitutions may have occurred due to the inherent limitations of voice recognition software  Read the chart carefully and recognize, using context, where substitutions have occurred

## 2020-01-16 NOTE — ASSESSMENT & PLAN NOTE
· Rate is currently controlled      · Continue PO cardizem and metoprolol per cardiology recommendations  · Restarted on apixaban

## 2020-01-16 NOTE — PLAN OF CARE
Problem: Prexisting or High Potential for Compromised Skin Integrity  Goal: Skin integrity is maintained or improved  Description  INTERVENTIONS:  - Identify patients at risk for skin breakdown  - Assess and monitor skin integrity  - Assess and monitor nutrition and hydration status  - Monitor labs   - Assess for incontinence   - Turn and reposition patient  - Assist with mobility/ambulation  - Relieve pressure over bony prominences  - Avoid friction and shearing  - Provide appropriate hygiene as needed including keeping skin clean and dry  - Evaluate need for skin moisturizer/barrier cream  - Collaborate with interdisciplinary team   - Patient/family teaching  - Consider wound care consult   Outcome: Progressing     Problem: Potential for Falls  Goal: Patient will remain free of falls  Description  INTERVENTIONS:  - Assess patient frequently for physical needs  -  Identify cognitive and physical deficits and behaviors that affect risk of falls    -  [place] fall precautions as indicated by assessment   - Educate patient/family on patient safety including physical limitations  - Instruct patient to call for assistance with activity based on assessment  - Modify environment to reduce risk of injury  - Consider OT/PT consult to assist with strengthening/mobility  Outcome: Progressing     Problem: PAIN - ADULT  Goal: Verbalizes/displays adequate comfort level or baseline comfort level  Description  Interventions:  - Encourage patient to monitor pain and request assistance  - Assess pain using appropriate pain scale  - Administer analgesics based on type and severity of pain and evaluate response  - Implement non-pharmacological measures as appropriate and evaluate response  - Consider cultural and social influences on pain and pain management  - Notify physician/advanced practitioner if interventions unsuccessful or patient reports new pain  Outcome: Progressing     Problem: INFECTION - ADULT  Goal: Absence or prevention of progression during hospitalization  Description  INTERVENTIONS:  - Assess and monitor for signs and symptoms of infection  - Monitor lab/diagnostic results  - Monitor all insertion sites, i e  indwelling lines, tubes, and drains  - Monitor endotracheal if appropriate and nasal secretions for changes in amount and color  - [place] appropriate cooling/warming therapies per order  - Administer medications as ordered  - Instruct and encourage patient and family to use good hand hygiene technique  - Identify and instruct in appropriate isolation precautions for identified infection/condition  Outcome: Progressing  Goal: Absence of fever/infection during neutropenic period  Description  INTERVENTIONS:  - Monitor WBC    Outcome: Progressing     Problem: SAFETY ADULT  Goal: Maintain or return to baseline ADL function  Description  INTERVENTIONS:  -  Assess patient's ability to carry out ADLs; assess patient's baseline for ADL function and identify physical deficits which impact ability to perform ADLs (bathing, care of mouth/teeth, toileting, grooming, dressing, etc )  - Assess/evaluate cause of self-care deficits   - Assess range of motion  - Assess patient's mobility; develop plan if impaired  - Assess patient's need for assistive devices and provide as appropriate  - Encourage maximum independence but intervene and supervise when necessary  - Involve family in performance of ADLs  - Assess for home care needs following discharge   - Consider OT consult to assist with ADL evaluation and planning for discharge  - Provide patient education as appropriate  Outcome: Progressing  Goal: Maintain or return mobility status to optimal level  Description  INTERVENTIONS:  - Assess patient's baseline mobility status (ambulation, transfers, stairs, etc )    - Identify cognitive and physical deficits and behaviors that affect mobility  - Identify mobility aids required to assist with transfers and/or ambulation (gait belt, sit-to-stand, lift, walker, cane, etc )  - [place] fall precautions as indicated by assessment  - Record patient progress and toleration of activity level on Mobility SBAR; progress patient to next Phase/Stage  - Instruct patient to call for assistance with activity based on assessment  - Consider rehabilitation consult to assist with strengthening/weightbearing, etc   Outcome: Progressing     Problem: DISCHARGE PLANNING  Goal: Discharge to home or other facility with appropriate resources  Description  INTERVENTIONS:  - Identify barriers to discharge w/patient and caregiver  - Arrange for needed discharge resources and transportation as appropriate  - Identify discharge learning needs (meds, wound care, etc )  - Arrange for interpretive services to assist at discharge as needed  - Refer to Case Management Department for coordinating discharge planning if the patient needs post-hospital services based on physician/advanced practitioner order or complex needs related to functional status, cognitive ability, or social support system  Outcome: Progressing     Problem: Knowledge Deficit  Goal: Patient/family/caregiver demonstrates understanding of disease process, treatment plan, medications, and discharge instructions  Description  Complete learning assessment and assess knowledge base    Interventions:  - Provide teaching at level of understanding  - Provide teaching via preferred learning methods  Outcome: Progressing     Problem: CARDIOVASCULAR - ADULT  Goal: Maintains optimal cardiac output and hemodynamic stability  Description  INTERVENTIONS:  - Monitor I/O, vital signs and rhythm  - Monitor for S/S and trends of decreased cardiac output  - Administer and titrate ordered vasoactive medications to optimize hemodynamic stability  - Assess quality of pulses, skin color and temperature  - Assess for signs of decreased coronary artery perfusion  - Instruct patient to report change in severity of symptoms  Outcome: Progressing  Goal: Absence of cardiac dysrhythmias or at baseline rhythm  Description  INTERVENTIONS:  - Continuous cardiac monitoring, vital signs, obtain 12 lead EKG if ordered  - Administer antiarrhythmic and heart rate control medications as ordered  - Monitor electrolytes and administer replacement therapy as ordered  Outcome: Progressing     Problem: RESPIRATORY - ADULT  Goal: Achieves optimal ventilation and oxygenation  Description  INTERVENTIONS:  - Assess for changes in respiratory status  - Assess for changes in mentation and behavior  - Position to facilitate oxygenation and minimize respiratory effort  - Oxygen administered by appropriate delivery if ordered  - Initiate smoking cessation education as indicated  - Encourage broncho-pulmonary hygiene including cough, deep breathe, Incentive Spirometry  - Assess the need for suctioning and aspirate as needed  - Assess and instruct to report SOB or any respiratory difficulty  - Respiratory Therapy support as indicated  Outcome: Progressing     Problem: METABOLIC, FLUID AND ELECTROLYTES - ADULT  Goal: Electrolytes maintained within normal limits  Description  INTERVENTIONS:  - Monitor labs and assess patient for signs and symptoms of electrolyte imbalances  - Administer electrolyte replacement as ordered  - Monitor response to electrolyte replacements, including repeat lab results as appropriate  - Instruct patient on fluid and nutrition as appropriate  Outcome: Progressing  Goal: Fluid balance maintained  Description  INTERVENTIONS:  - Monitor labs   - Monitor I/O and WT  - Instruct patient on fluid and nutrition as appropriate  - Assess for signs & symptoms of volume excess or deficit  Outcome: Progressing  Goal: Glucose maintained within target range  Description  INTERVENTIONS:  - Monitor Blood Glucose as ordered  - Assess for signs and symptoms of hyperglycemia and hypoglycemia  - Administer ordered medications to maintain glucose within target range  - Assess nutritional intake and initiate nutrition service referral as needed  Outcome: Progressing

## 2020-01-16 NOTE — PROGRESS NOTES
Progress Note - Cardiology   Glendell Bernheim 63 y o  female MRN: [id number]  Unit/Bed#: ICU 06 Encounter: [id number]      Assessment/Recommendations/Discussion:   1  Permanent atrial fibrillation with rates now in the 90s  2  Acute on chronic heart failure with preserved ejection fraction 58%  3  Cor pulmonale from chronic respiratory failure and pulmonary hypertension, est PASP by echo 52mmHg  4  Acute on chronic respiratory failure with hypercapnia  5  Patient alert an oriented  6  Hypertension  7  Diabetes mellitus type 2  8  Morbid obesity  9  Both pulmonary congestion and peripheral edema  10  Compression fracture of the 1st lumbar vertebrae   11  Acute on Chronic kidney disease, stage III, improving  12  Pericardial effusion noted on CT scan        PLAN   Improving  Heart rates now in 90s, afib   Negative 436 ml yesterday, on lasix gtt   Continue lasix gtt per nephrology, creatinine improving   Cont eliquis for AC   Diltiazem 120, lopressor 5q6 for rate control   High flow O2 here  Baseline home O2 is 10L   Repeat echo yesterday was personally reviewed  Not much change from prior RV is dilated and hypokinetic with moderate TR suggestive of at least moderate to possibly severe pulm HTN   She probalaby has mixed picture WHO group 2 (diastolic dysfunction) and 3 (copd) pulm htn   May benefit from [address] to measure PA pressures, wedge, transpulmonary gradient, as well as outpatient eval with the advanced heart failure / pulm htn specialists  Subjective:   HPI  Breathing improving  Creat improving      Review of Systems: As noted in HPI  Rest of ROS is negative      Vitals:   /65   Pulse 90   Temp 97 9 °F (36 6 °C) (Temporal)   Resp (!) 25   Ht 5' 5" (1 651 m)   Wt 131 kg (288 lb 12 8 oz)   SpO2 (!) 88%   BMI 48 06 kg/m²   Vitals:    01/14/20 0547 01/15/20 0600   Weight: 131 kg (289 lb 11 oz) 131 kg (288 lb 12 8 oz)       Intake/Output Summary (Last 24 hours) at 1/16/2020 0912  Last data filed at 1/16/2020 0800  Gross per 24 hour   Intake 1511 46 ml   Output 1883 ml   Net -371 54 ml       Physical Exam:  General appearance: alert and oriented, in no acute distress, obese female  Head: Normocephalic, without obvious abnormality, atraumatic, on high flow NC 02  Eyes: conjunctivae/corneas clear  Anicteric  Neck: no adenopathy, no carotid bruit, no JVD  Lungs: decreased breath sounds bilaterally  Heart: irregularly irregular, S1, S2 normal, no murmur, no click, rub or gallop  Abdomen: soft, non-tender; bowel sounds normal; no masses,  no organomegaly  Extremities: extremities normal, warm and well-perfused; no cyanosis, clubbing, or edema  Skin: Skin color, texture, turgor normal  No rashes or lesions     TELEMETRY: afib In 90s  Lab Results:  Results from last 7 days   Lab Units 01/16/20 0427   WBC Thousand/uL 7 05   HEMOGLOBIN g/dL 15 0   HEMATOCRIT % 49 9*   PLATELETS Thousands/uL 60*     Results from last 7 days   Lab Units 01/16/20  0427  01/10/20  2325   POTASSIUM mmol/L 3 7   < >  --    CHLORIDE mmol/L 100   < >  --    CO2 mmol/L 36*   < >  --    CO2, I-STAT mmol/L  --   --  32   BUN mg/dL 80*   < >  --    CREATININE mg/dL 2 19*   < >  --    CALCIUM mg/dL 8 0*   < >  --    ALK PHOS U/L 84   < >  --    ALT U/L 8*   < >  --    AST U/L 11   < >  --    GLUCOSE, ISTAT mg/dl  --   --  95    < > = values in this interval not displayed  Results from last 7 days   Lab Units 01/16/20  0427  01/10/20  2325   POTASSIUM mmol/L 3 7   < >  --    CHLORIDE mmol/L 100   < >  --    CO2 mmol/L 36*   < >  --    CO2, I-STAT mmol/L  --   --  32   BUN mg/dL 80*   < >  --    CREATININE mg/dL 2 19*   < >  --    GLUCOSE, ISTAT mg/dl  --   --  95   CALCIUM mg/dL 8 0*   < >  --     < > = values in this interval not displayed             Medications:    Current Facility-Administered Medications:     albuterol (PROVENTIL HFA,VENTOLIN HFA) inhaler 2 puff, 2 puff, Inhalation, Q4H PRN, MAYNOR Navarro    apixaban (ELIQUIS) tablet 5 mg, 5 mg, Oral, BID, Lorrie Grade Bilofsky, CRNP, 5 mg at 01/16/20 0854    dextrose 5 % infusion, 50 mL/hr, Intravenous, Continuous, Prem Card, CRNP, Last Rate: 50 mL/hr at 01/15/20 2354, 50 mL/hr at 01/15/20 2354    diltiazem (CARDIZEM CD) 24 hr capsule 120 mg, 120 mg, Oral, Daily, Lorrie Grade Bilofsky, CRNP, 120 mg at 01/16/20 0854    furosemide (LASIX) 500 mg infusion 50 mL, 40 mg/hr, Intravenous, Continuous, Deondre Roles, CRNP, Last Rate: 4 mL/hr at 01/16/20 0235, 40 mg/hr at 01/16/20 0235    insulin lispro (HumaLOG) 100 units/mL subcutaneous injection 1-6 Units, 1-6 Units, Subcutaneous, TID AC, 1 Units at 01/15/20 1627 **AND** Fingerstick Glucose (POCT), , , TID AC, Luli K Arunoflakesha, CRNP    insulin lispro (HumaLOG) 100 units/mL subcutaneous injection 1-6 Units, 1-6 Units, Subcutaneous, HS, Luli K Bilofsky, CRNP, 1 Units at 01/08/20 2200    ipratropium (ATROVENT) 0 02 % inhalation solution 0 5 mg, 0 5 mg, Nebulization, TID, Luli K Bilofskkyler, CRNP, 0 5 mg at 01/16/20 0801    Labetalol HCl (NORMODYNE) injection 10 mg, 10 mg, Intravenous, Q6H PRN, Prem Card, CRNP, 10 mg at 01/12/20 2317    levalbuterol (XOPENEX) inhalation solution 1 25 mg, 1 25 mg, Nebulization, TID, Luli K Bilofsky, CRNP, 1 25 mg at 01/16/20 0801    levalbuterol (XOPENEX) inhalation solution 1 25 mg, 1 25 mg, Nebulization, Q4H PRN, Stephenie Serene K Kofi, CRNP    metoprolol (LOPRESSOR) injection 5 mg, 5 mg, Intravenous, Q6H, Adriana Contreras MD, 5 mg at 01/16/20 0600    nicotine (NICODERM CQ) 14 mg/24hr TD 24 hr patch 1 patch, 1 patch, Transdermal, Daily, Lorrie Grade MAYNOR Kirby, 1 patch at 01/16/20 0854    nystatin (MYCOSTATIN) powder, , Topical, BID, MAYNOR Keys, 1 application at 50/86/41 4074    This note was completed in part utilizing [employer] Software    Grammatical errors, random word insertions, spelling mistakes, and incomplete sentences may be an occasional consequence of this system secondary to software limitations, ambient noise, and hardware issues  If you have any questions or concerns about the content, text, or information contained within the body of this dictation, please contact the provider for clarification      Rd Naylor DO, [facility] [place]  1/16/2020 9:12 AM

## 2020-01-16 NOTE — OCCUPATIONAL THERAPY NOTE
OccupationalTherapy Progress Note     Patient Name: Shante Winters  [id number] Date: 1/16/2020  Problem List  Principal Problem:    Acute on chronic respiratory failure with hypoxia and hypercapnia (HCC)  Active Problems:    Chronic obstructive pulmonary disease with acute exacerbation (HCC)    Permanent atrial fibrillation    Chronic venous stasis dermatitis of both lower extremities    Type 2 diabetes mellitus without complication (HCC)    Obesity    SAPNA (acute kidney injury) ([facility] [address] )    Adjustment disorder    Acute on chronic diastolic congestive heart failure (HCC)    Hypernatremia    Pericardial effusion          01/16/20 1110   Restrictions/Precautions   Weight Bearing Precautions Per Order No   Other Precautions Cognitive;Multiple lines; Fall Risk;Telemetry;O2;Pain  (HFNC, body habitus)   Pain Assessment   Pain Assessment No/denies pain   Pain Score No Pain   ADL   Where Assessed Chair   Grooming Assistance 4  Minimal Assistance   Grooming Deficit Setup;Verbal cueing;Supervision/safety; Increased time to complete;Wash/dry hands; Wash/dry face   LB Bathing Assistance 2  Maximal Assistance   LB Bathing Deficit Setup;Supervision/safety;Verbal cueing; Increased time to complete;Left lower leg including foot;Right lower leg including foot; Left upper leg;Right upper leg   LB Bathing Comments encouragement to wash upper LEs while seated, impaired functional reach for lower LEs   LB Dressing Assistance 2  Maximal Assistance   LB Dressing Deficit Setup;Don/doff R sock; Don/doff L sock   LB Dressing Comments impaired functional reach   Bed Mobility   Supine to Sit 3  Moderate assistance   Additional items Assist x 1;Verbal cues;LE management; Increased time required;HOB elevated; Bedrails   Additional Comments increased time to complete and scoot forward in bed   Transfers   Sit to Stand 3  Moderate assistance   Additional items Assist x 2; Increased time required;Verbal cues; Bedrails   Stand to Sit 3  Moderate assistance Additional items Assist x 2; Increased time required;Verbal cues;Armrests   Stand pivot 3  Moderate assistance   Additional items Assist x 2; Increased time required;Verbal cues;Armrests   Additional Comments cues for safety and hand placement   Functional Mobility   Functional Mobility 3  Moderate assistance   Additional Comments assist x2 w/ RW and increased time to complete   Additional items Rolling walker   Toilet Transfers   Toilet Transfers Comments recommend SPT to [facility] w/ assist x2 and NSG   Therapeutic Exercise - ROM   UE-ROM Yes   ROM- Right Upper Extremities   R Elbow AROM;Elbow flexion;Elbow extension   R Weight/Reps/Sets 5 reps   RUE ROM Comment tolerated   ROM - Left Upper Extremities    L Elbow AROM;Elbow flexion;Elbow extension   L Weight/Reps/Sets 5 reps    LUE ROM Comment tolerated   Cognition   Overall Cognitive Status Impaired   Arousal/Participation Responsive; Cooperative   Attention Attends with cues to redirect   Orientation Level Oriented to person;Oriented to place;Oriented to time  (oriented to month and year, not specific date)   Memory Decreased short term memory;Decreased recall of recent events;Decreased recall of precautions   Following Commands Follows one step commands with increased time or repetition   Comments pt w/ increased processing time, improved alertness since initial evaluation; increased processing time; pt unable to perform simple calculations: informed pt she came in the the 6th and she has been here 10 days and asked what the date was and pt stated 13, 15 for answers; further formal cognitive assessment to be complete   Additional Activities   Additional Activities   (education on OOB 1-2hours at a time)   Additional Activities Comments pt receptive and requires continued education   Activity Tolerance   Activity Tolerance Patient limited by fatigue;Treatment limited secondary to medical complications (Comment)   Medical Staff Made Aware appropriate to see per RNRandi and aware of transfer status   Assessment   Assessment Pt seen for skilled OT session focused on ADLs, functional transfers and mobility, pt education and update of OT goals  Pt w/ improvement in alertness and appropriate responses this session compared to initial evaluation  Pt w/total assist to don socks while supine  Pt w/ MOD assist x1 supine>sit bed mobility w/ increased time to complete  Pt w/ MOD assist x2 sit>stand from bed w/ increased time and cues  Pt w/ MOD assist x2 functional SPT w/ RW to bedside recliner w/ increased time and cues for safety  Pt w/ MOD assist x2 stand>sit transfer w/ cues for proper positioning in chair  Pt w/ MAX assist LB Bathing while seated in recliner pt required encouragement to participate in self care and able to assist w/ washing upper thighs  Pt w/ assist for lower LEs and upper to ensure cleanliness  Pt w/ MAX assist to don/doff socks  Pt w/ LEs elevated on pillows while seated upright in chair  Pt performed b/l UE exercises seen above and encouraged to complete t/o the day  Educated pt and staff to sit OOB in chair 1-2hours at time and be OOB for all meals, pt receptive  Pt continues to be limited due to decreased strength and endurance, impaired balance, impaired activity tolerance, multiple lines, dyspnea on HFNC, impaired insight and safety awareness, impaired cognition (unable to state the date when explained she came in on the 6th and has been here 10 days) all causing a decline in ADLs, functional transfers and mobility recommend STR when medically stable  Additional OT goals: Pt will demonstrate improved functional transfers to MIN assist to enhance ADLs, pt will demonstrate improved functional mobility to min assist w/ DME as needed to enhance functional performance  Plan   Treatment Interventions ADL retraining;Functional transfer training; Endurance training;UE strengthening/ROM; Cognitive reorientation;Patient/family training;Equipment evaluation/education; Compensatory technique education; Energy conservation; Activityengagement   Goal Expiration Date 01/25/20   OT Treatment Day 2   OT Frequency 3-5x/wk   Recommendation   OT Discharge Recommendation Short Term Rehab   OT - OK to Discharge   (to rehab when medically stable)   Barthel Index   Feeding 5   Bathing 0   Grooming Score 5   Dressing Score 5   Bladder Score 0   Bowels Score 5   Toilet Use Score 5   Transfers (Bed/Chair) Score 5   Mobility (Level Surface) Score 0   Stairs Score 0   Barthel Index Score 30   Modified [place] Scale   Modified [place] Scale 4     Documentation completed by: James Lerma MS, OTR/L

## 2020-01-16 NOTE — PROGRESS NOTES
Progress Note - Uyen Moreno 1960, 63 y o  female MRN: [id number]    Unit/Bed#: ICU 06 Encounter: [id number]    Primary Care Provider: Yoly Richard MD   Date and time admitted to hospital: 1/6/2020 10:25 PM        Acute on chronic diastolic congestive heart failure [facility])  Assessment & Plan  Wt Readings from Last 3 Encounters:   01/15/20 131 kg (288 lb 12 8 oz)   11/05/19 110 kg (243 lb 9 6 oz)   10/28/19 109 kg (240 lb 4 8 oz)       · Suspect decompensated CHF  Echo from 1/8/20 shows EF of 45% with diastolic dysfunction  · Continue diuresis per nephrology recommendations  · Lopressor for HR control- may help with her diastolic failure with better HR control      * Acute on chronic respiratory failure with hypoxia and hypercapnia (HCC)  Assessment & Plan  · Etiology likely multifactorial, COPD, pulmonary edema, pulmonary hypertension, 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, pleural effusion and noncompliance with nocturnal BiPAP  · Pt notes baseline oxygen requirement at home is 10L  · Hypercapnia is improving  Continue BiPAP and transition to NC once hypercapnia improves  · On lasix gtt at 40 mg/hr for diuresis  · Continue scheduled nebulized bronchodilators  Chronic obstructive pulmonary disease with acute exacerbation (HCC)  Assessment & Plan  · Continue nebulized bronchodilators  No indication for steroids at this point  · Not on maintenance inhalers at home      Pericardial effusion  Assessment & Plan  · The patient was noted to have a pericardial effusion on CT imaging yesterday that is described as mild to moderate  On her ECHO form 1/8 there was no evidence of effusion  · Repeat echo on 1/15, pending read     Permanent atrial fibrillation  Assessment & Plan  · Rate is currently controlled      · Continue PO cardizem and metoprolol per cardiology recommendations  · Restarted on apixaban    SAPNA (acute kidney injury) ([facility] [address] )  Assessment & Plan  · Acute on CKD III with baseline approximately 1 5  · Nephrology is following  Creatinine is improving  Urine output significantly improved on lasix infusion  Continue lasix gtt per nephrology recommendations  · Trend renal indices and monitor intake and output  · Urine output goal of 1-2 l negative per 24 hours      Chronic venous stasis dermatitis of both lower extremities  Assessment & Plan  · No open ulcers or signs of cellulitis        Type 2 diabetes mellitus without complication [facility])  Assessment & Plan  Lab Results   Component Value Date    HGBA1C 5 8 04/11/2019       Recent Labs     01/15/20  0726 01/15/20  1134 01/15/20  1612 01/15/20  2140   POCGLU 67 188* 167* 150*       Blood Sugar Average: Last 72 hrs:  · Persistent hypoglycemia possibly due to poor hepatic perfusion in the setting of decompensated CHF  Continue IV dextrose  · TSH normal, random cortisol 17 9  · Goal to maintain -180  Aggressively monitor and treat hypoglycemia as this is likely contributing factor to her encephalopathy      Obesity  Assessment & Plan  · She would benefit from weight loss  · Likely component of OHV that is contributing to her acute on chronic pulmonary issues  · Nocturnal and when sleeping BIPAP given persistent hypercarbia    Hypernatremia  Assessment & Plan  · Currently on D5 for hypoglycemia, will help with increased sodium  · Will continue to monitor her sodium level    Adjustment disorder  Assessment & Plan  · Wellbutrin was discontinued by psych based on her current tachy arryhtmia     ----------------------------------------------------------------------------------------  HPI/24hr events: No events overnight  Disposition: Transfer to Stepdown Level 1   Code Status: Level 1 - Full Code  ---------------------------------------------------------------------------------------  SUBJECTIVE  No complaints       Review of Systems  Review of systems was reviewed and negative unless stated above in HPI/24-hour events ---------------------------------------------------------------------------------------  OBJECTIVE    Physical Exam   Constitutional: She is oriented to person, place, and time  She appears well-developed and well-nourished  No distress  obese   HENT:   Head: Normocephalic and atraumatic  Right Ear: External ear normal    Left Ear: External ear normal    Nose: Nose normal    Mouth/Throat: Oropharynx is clear and moist    Eyes: Pupils are equal, round, and reactive to light  Conjunctivae and EOM are normal    Neck: Normal range of motion  Neck supple  Thick neck, unable to fully assess   Cardiovascular: Normal rate, normal heart sounds and intact distal pulses  Irregular rhythm   Pulmonary/Chest: Effort normal  No respiratory distress  Coarse breath sounds   Abdominal: Soft  Bowel sounds are normal    LBM 1/15   Musculoskeletal: Normal range of motion  She exhibits edema  +2 pitting edema of BLEs   Neurological: She is alert and oriented to person, place, and time  Skin: Skin is warm and dry  Capillary refill takes less than 2 seconds  Scattered eccymosis with erythema of lower extremities and poor vasculature   Psychiatric: She has a normal mood and affect  Her behavior is normal  Judgment and thought content normal        Vitals   Vitals:    20 0117 20 0217 20 0400 20 0617   BP: 125/91 119/86  122/83   Pulse: 98 98  90   Resp: (!) 30 18  20   Temp:   97 6 °F (36 4 °C)    TempSrc:   Temporal    SpO2: 95% 94%  95%   Weight:       Height:         Temp (24hrs), Av 1 °F (36 7 °C), Min:97 5 °F (36 4 °C), Max:99 °F (37 2 °C)  Current: Temperature: 97 6 °F (36 4 °C)          SpO2: SpO2: 95 %  O2 Flow Rate (L/min): 50 L/min    Invasive/non-invasive ventilation settings   Respiratory    Lab Data (Last 4 hours)    None         O2/Vent Data (Last 4 hours)    None                Height and Weights   Height: 5' 5" (165 1 cm)  IBW: 57 kg  Body mass index is 48 06 kg/m²    Weight (last 2 days)     Date/Time   Weight    01/15/20 0600   131 (288 8)    01/14/20 0547   131 (289 68)              Intake and Output  I/O       01/14 0701 - 01/15 0700 01/15 0701 - 01/16 0700    I V  (mL/kg) 1531 4 (11 7) 1146 5 (8 8)    IV Piggyback 50 50    Total Intake(mL/kg) 1581 4 (12 1) 1196 5 (9 1)    Urine (mL/kg/hr) 3200 (1) 1383 (0 4)    Stool 0     Total Output 3200 1383    Net -1618 6 -186  5          Unmeasured Urine Occurrence 1 x 4 x    Unmeasured Stool Occurrence 1 x           Nutrition       Diet Orders   (From admission, onward)             Start     Ordered    01/10/20 1556  Dietary nutrition supplements  Once     Question Answer Comment   Select Supplement: Ensure Pudding-Chocolate    Frequency Breakfast, Dinner        01/10/20 1555    01/09/20 1037  Diet Sunil/CHO Controlled; Consistent Carbohydrate Diet Level 2 (5 carb servings/75 grams CHO/meal); Fluid Restriction 1500 ML  Diet effective now     Question Answer Comment   Diet Type Sunil/CHO Controlled    Sunil/CHO Controlled Consistent Carbohydrate Diet Level 2 (5 carb servings/75 grams CHO/meal)    Other Restriction(s): Fluid Restriction 1500 ML    RD to adjust diet per protocol?  Yes        01/09/20 1036                Laboratory and Diagnostics:  Results from last 7 days   Lab Units 01/16/20  0427 01/15/20  0555 01/13/20  0430 01/10/20  2325 01/10/20  0512   WBC Thousand/uL 7 05 7 08 6 82  --  7 60   HEMOGLOBIN g/dL 15 0 16 0* 17 0*  --  16 1*   I STAT HEMOGLOBIN g/dl  --   --   --  18 7*  --    HEMATOCRIT % 49 9* 53 4* 57 0*  --  57 2*   HEMATOCRIT, ISTAT %  --   --   --  55*  --    PLATELETS Thousands/uL 60* 62* 70*  --  106*     Results from last 7 days   Lab Units 01/16/20  0427 01/15/20  0556 01/14/20  0612 01/13/20  0430 01/12/20  0429 01/11/20  1241 01/11/20  0348   SODIUM mmol/L 142 144 144 144 146* 145 146*   POTASSIUM mmol/L 3 7 3 6 3 4* 3 9 3 6 4 0 3 8   CHLORIDE mmol/L 100 102 103 103 106 106 106   CO2 mmol/L 36* 35* 33* 30 30 29 30   ANION GAP mmol/L 6 7 8 11 10 10 10   BUN mg/dL 80* 74* 74* 70* 67* 67* 67*   CREATININE mg/dL 2 19* 2 31* 2 70* 3 05* 3 42* 3 75* 3 84*   CALCIUM mg/dL 8 0* 8 1* 7 9* 8 3 7 6* 7 8* 7 8*   GLUCOSE RANDOM mg/dL 90 83 99 104 82 82 85   ALT U/L 8* 8*  --   --   --  16  --    AST U/L 11 12  --   --   --  12  --    ALK PHOS U/L 84 80  --   --   --  99  --    ALBUMIN g/dL 2 5* 2 6*  --   --   --  3 0*  --    TOTAL BILIRUBIN mg/dL 4 08* 5 34*  --   --   --  2 10*  --      Results from last 7 days   Lab Units 01/15/20  0556 01/13/20  0430 01/12/20  0429 01/11/20  0349   MAGNESIUM mg/dL 1 7 1 9  --  2 5   PHOSPHORUS mg/dL  --   --  5 5*  --       Results from last 7 days   Lab Units 01/15/20  0603 01/14/20  0612 01/14/20  0222 01/13/20  1708 01/11/20  1241   INR   --   --   --  1 48* 1 44*   PTT seconds 53* 67* 77* 37  --               ABG:  Results from last 7 days   Lab Units 01/13/20  1114   PH ART  7 402   PCO2 ART mm Hg 50 2*   PO2 ART mm Hg 91 9   HCO3 ART mmol/L 30 5*   BASE EXC ART mmol/L 4 3   ABG SOURCE  Brachial, Right     VBG:  Results from last 7 days   Lab Units 01/13/20  1114   ABG SOURCE  Brachial, Right           Micro        EKG: Atrial fibrillation, controlled  Imaging: I have personally reviewed pertinent reports        Active Medications  Scheduled Meds:    Current Facility-Administered Medications:  albuterol 2 puff Inhalation Q4H PRN MAYNOR Birch    apixaban 5 mg Oral BID BALJIT BirchNP    dextrose 50 mL/hr Intravenous Continuous Danetta Pod, CRNP Last Rate: 50 mL/hr (01/15/20 5924)   diltiazem 120 mg Oral Daily Luli K Bilofsky, CRNP    furosemide 40 mg/hr Intravenous Continuous Veronica Meaghan, CRNP Last Rate: 40 mg/hr (01/16/20 0235)   insulin lispro 1-6 Units Subcutaneous TID AC MAYNOR Jaeger    insulin lispro 1-6 Units Subcutaneous HS Luli Kirby, MAYNOR    ipratropium 0 5 mg Nebulization TID MAYNOR Birch    Labetalol HCl 10 mg Intravenous Q6H PRN Agnieszka Ramsey CRNP levalbuterol 1 25 mg Nebulization TID MAYNOR Vitale    levalbuterol 1 25 mg Nebulization Q4H PRN MAYNOR Vitale    metoprolol 5 mg Intravenous Q6H Tonny Martinez MD    nicotine 1 patch Transdermal Daily MAYNOR Vitale    nystatin  Topical BID MAYNOR Vitale      Continuous Infusions:    dextrose 50 mL/hr Last Rate: 50 mL/hr (01/15/20 8252)   furosemide 40 mg/hr Last Rate: 40 mg/hr (01/16/20 5239)     PRN Meds:     albuterol 2 puff Q4H PRN   Labetalol HCl 10 mg Q6H PRN   levalbuterol 1 25 mg Q4H PRN       ---------------------------------------------------------------------------------------  Advance Directive and Living Will:      Power of :    POLST:    ---------------------------------------------------------------------------------------  Counseling / Coordination of Care  Total Critical Care time spent 35 minutes excluding procedures, teaching and family updates  MAYNOR Abraham      Portions of the record may have been created with voice recognition software  Occasional wrong word or "sound a like" substitutions may have occurred due to the inherent limitations of voice recognition software    Read the chart carefully and recognize, using context, where substitutions have occurred

## 2020-01-16 NOTE — ASSESSMENT & PLAN NOTE
Wt Readings from Last 3 Encounters:   01/15/20 131 kg (288 lb 12 8 oz)   11/05/19 110 kg (243 lb 9 6 oz)   10/28/19 109 kg (240 lb 4 8 oz)       · Suspect decompensated CHF  Echo from 1/8/20 shows EF of 22% with diastolic dysfunction    · Continue diuresis per nephrology recommendations  · Lopressor for HR control- may help with her diastolic failure with better HR control

## 2020-01-16 NOTE — ASSESSMENT & PLAN NOTE
· Currently on D5 for hypoglycemia, will help with increased sodium  · Will continue to monitor her sodium level

## 2020-01-16 NOTE — ASSESSMENT & PLAN NOTE
Lab Results   Component Value Date    HGBA1C 5 8 04/11/2019       Recent Labs     01/15/20  0726 01/15/20  1134 01/15/20  1612 01/15/20  2140   POCGLU 67 188* 167* 150*       Blood Sugar Average: Last 72 hrs:  · Persistent hypoglycemia possibly due to poor hepatic perfusion in the setting of decompensated CHF  Continue IV dextrose  · TSH normal, random cortisol 17 9  · Goal to maintain -180    Aggressively monitor and treat hypoglycemia as this is likely contributing factor to her encephalopathy

## 2020-01-16 NOTE — ASSESSMENT & PLAN NOTE
· The patient was noted to have a pericardial effusion on CT imaging yesterday that is described as mild to moderate   On her ECHO form 1/8 there was no evidence of effusion  · Repeat echo on 1/15, pending read

## 2020-01-16 NOTE — ASSESSMENT & PLAN NOTE
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

## 2020-01-16 NOTE — PHYSICAL THERAPY NOTE
Physical Therapy Progress Note     01/16/20 1115   Pain Assessment   Pain Assessment No/denies pain   Restrictions/Precautions   Weight Bearing Precautions Per Order No   Other Precautions Cognitive;Multiple lines;Telemetry;O2;Fall Risk   General   Chart Reviewed Yes   Additional Pertinent History high flow O2   Cognition   Overall Cognitive Status Impaired   Orientation Level Oriented to person;Oriented to place;Oriented to situation   Subjective   Subjective wants to get out of bed  Bed Mobility   Rolling R 4  Minimal assistance   Additional items Assist x 1; Increased time required;Verbal cues;LE management   Supine to Sit 3  Moderate assistance   Additional items Assist x 1; Increased time required; Impulsive;Verbal cues;LE management   Transfers   Sit to Stand 3  Moderate assistance   Additional items Assist x 2;Impulsive; Increased time required;Verbal cues   Stand to Sit 3  Moderate assistance   Additional items Assist x 2; Increased time required;Verbal cues; Impulsive   Stand pivot 3  Moderate assistance   Additional items Assist x 2; Increased time required; Impulsive;Verbal cues   Ambulation/Elevation   Gait pattern Improper Weight shift; Poor UE support; Forward Flexion; Wide PRIMITIVO; Decreased foot clearance; Short stride; Step to;Excessively slow   Gait Assistance 4  Minimal assist   Additional items Assist x 2;Verbal cues; Tactile cues   Assistive Device Rolling walker  (needs rose marie rw)   Distance 4'   Balance   Static Sitting Fair   Dynamic Sitting Poor +   Static Standing Poor +   Dynamic Standing Poor +   Ambulatory Poor +   Endurance Deficit   Endurance Deficit Yes   Endurance Deficit Description weakness, hypoxia   Activity Tolerance   Activity Tolerance Treatment limited secondary to medical complications (Comment)   Medical Staff Made Aware OT   Nurse Made Aware yes   Exercises   TKR   (instructed but not performed chair ex)   Neuro re-ed sit to stand training  reposition training in chair      Assessment Prognosis Fair   Problem List Decreased strength;Decreased endurance; Impaired balance;Decreased mobility; Decreased cognition; Impaired judgement;Decreased safety awareness; Obesity; Decreased skin integrity   Assessment pt alert and generally oriented, pt wanting to get OOB  pt required excess time for mobility and rests due to pulmonary compromise  pt needed mod assist for bed mobility and mod assist of 2 for transfers and few steps to amb  pt leans way forward over rw  noted to be tachypneic but did not have any significant drop in spO2  pt remained at 95-96%  pt instructed in laq, ankle pumps, marching while sitting  pt rejproted fatigue but tolerated well  pt will need continued PT and rehab  Barriers to Discharge Decreased caregiver support   Goals   Patient Goals walk   STG Expiration Date 01/21/20   Short Term Goal #2 additional goals  bed mobility to sitting with min assist  transfers wtih min assist of 1 using rw  amb 20' using rw and min assist  deomonstrate good safety practices   PT Treatment Day 3   Plan   Treatment/Interventions Functional transfer training;LE strengthening/ROM; Therapeutic exercise; Endurance training;Cognitive reorientation;Patient/family training;Equipment eval/education; Bed mobility;Gait training   Progress Slow progress, medical status limitations   PT Frequency   (2-5x/week)   Recommendation   Recommendation Post acute IP rehab   PT - OK to Discharge   (rehab when stable)     Connie Nicole, PT

## 2020-01-16 NOTE — PLAN OF CARE
Problem: OCCUPATIONAL THERAPY ADULT  Goal: Performs self-care activities at highest level of function for planned discharge setting  See evaluation for individualized goals  Description  Treatment Interventions: ADL retraining, UE strengthening/ROM, Functional transfer training, Cognitive reorientation, Endurance training, Patient/family training, Equipment evaluation/education, Compensatory technique education, Energy conservation, Activityengagement, Continued evaluation, Fine motor coordination activities          See flowsheet documentation for full assessment, interventions and recommendations  Note:   Limitation: Decreased ADL status, Decreased UE strength, Decreased Safe judgement during ADL, Decreased cognition, Decreased endurance, Decreased self-care trans, Decreased high-level ADLs  Prognosis: Guarded  Assessment: Pt seen for skilled OT session focused on ADLs, functional transfers and mobility, pt education and update of OT goals  Pt w/ improvement in alertness and appropriate responses this session compared to initial evaluation  Pt w/total assist to don socks while supine  Pt w/ MOD assist x1 supine>sit bed mobility w/ increased time to complete  Pt w/ MOD assist x2 sit>stand from bed w/ increased time and cues  Pt w/ MOD assist x2 functional SPT w/ RW to bedside recliner w/ increased time and cues for safety  Pt w/ MOD assist x2 stand>sit transfer w/ cues for proper positioning in chair  Pt w/ MAX assist LB Bathing while seated in recliner pt required encouragement to participate in self care and able to assist w/ washing upper thighs  Pt w/ assist for lower LEs and upper to ensure cleanliness  Pt w/ MAX assist to don/doff socks  Pt w/ LEs elevated on pillows while seated upright in chair  Pt performed b/l UE exercises seen above and encouraged to complete t/o the day  Educated pt and staff to sit OOB in chair 1-2hours at time and be OOB for all meals, pt receptive   Pt continues to be limited due to decreased strength and endurance, impaired balance, impaired activity tolerance, multiple lines, dyspnea on HFNC, impaired insight and safety awareness, impaired cognition (unable to state the date when explained she came in on the 6th and has been here 10 days) all causing a decline in ADLs, functional transfers and mobility recommend STR when medically stable  Additional OT goals: Pt will demonstrate improved functional transfers to MIN assist to enhance ADLs, pt will demonstrate improved functional mobility to min assist w/ DME as needed to enhance functional performance       OT Discharge Recommendation: Short Term Rehab  OT - OK to Discharge: (to rehab when medically stable)

## 2020-01-17 ENCOUNTER — APPOINTMENT (INPATIENT)
Dept: RADIOLOGY | Facility: HOSPITAL | Age: 60
DRG: 291 | End: 2020-01-17
Payer: COMMERCIAL

## 2020-01-17 LAB
ANION GAP SERPL CALCULATED.3IONS-SCNC: 8 MMOL/L (ref 4–13)
BUN SERPL-MCNC: 84 MG/DL (ref 5–25)
CALCIUM SERPL-MCNC: 8.1 MG/DL (ref 8.3–10.1)
CHLORIDE SERPL-SCNC: 98 MMOL/L (ref 100–108)
CO2 SERPL-SCNC: 36 MMOL/L (ref 21–32)
CREAT SERPL-MCNC: 2.11 MG/DL (ref 0.6–1.3)
GFR SERPL CREATININE-BSD FRML MDRD: 25 ML/MIN/1.73SQ M
GLUCOSE SERPL-MCNC: 106 MG/DL (ref 65–140)
GLUCOSE SERPL-MCNC: 106 MG/DL (ref 65–140)
GLUCOSE SERPL-MCNC: 109 MG/DL (ref 65–140)
GLUCOSE SERPL-MCNC: 110 MG/DL (ref 65–140)
GLUCOSE SERPL-MCNC: 130 MG/DL (ref 65–140)
GLUCOSE SERPL-MCNC: 88 MG/DL (ref 65–140)
POTASSIUM SERPL-SCNC: 3.7 MMOL/L (ref 3.5–5.3)
SODIUM SERPL-SCNC: 142 MMOL/L (ref 136–145)

## 2020-01-17 PROCEDURE — 80048 BASIC METABOLIC PNL TOTAL CA: CPT | Performed by: NURSE PRACTITIONER

## 2020-01-17 PROCEDURE — 94660 CPAP INITIATION&MGMT: CPT

## 2020-01-17 PROCEDURE — 82948 REAGENT STRIP/BLOOD GLUCOSE: CPT

## 2020-01-17 PROCEDURE — 99231 SBSQ HOSP IP/OBS SF/LOW 25: CPT

## 2020-01-17 PROCEDURE — 71045 X-RAY EXAM CHEST 1 VIEW: CPT

## 2020-01-17 PROCEDURE — 99233 SBSQ HOSP IP/OBS HIGH 50: CPT | Performed by: INTERNAL MEDICINE

## 2020-01-17 PROCEDURE — 94640 AIRWAY INHALATION TREATMENT: CPT

## 2020-01-17 PROCEDURE — 99232 SBSQ HOSP IP/OBS MODERATE 35: CPT | Performed by: INTERNAL MEDICINE

## 2020-01-17 PROCEDURE — 94760 N-INVAS EAR/PLS OXIMETRY 1: CPT

## 2020-01-17 RX ORDER — DEXTROSE MONOHYDRATE 25 G/50ML
INJECTION, SOLUTION INTRAVENOUS
Status: COMPLETED
Start: 2020-01-17 | End: 2020-01-17

## 2020-01-17 RX ORDER — METOLAZONE 5 MG/1
5 TABLET ORAL ONCE
Status: COMPLETED | OUTPATIENT
Start: 2020-01-17 | End: 2020-01-17

## 2020-01-17 RX ORDER — DEXTROSE MONOHYDRATE 25 G/50ML
50 INJECTION, SOLUTION INTRAVENOUS ONCE
Status: COMPLETED | OUTPATIENT
Start: 2020-01-17 | End: 2020-01-17

## 2020-01-17 RX ORDER — DEXTROSE MONOHYDRATE 25 G/50ML
25 INJECTION, SOLUTION INTRAVENOUS AS NEEDED
Status: DISCONTINUED | OUTPATIENT
Start: 2020-01-17 | End: 2020-01-21 | Stop reason: HOSPADM

## 2020-01-17 RX ORDER — ONDANSETRON 2 MG/ML
4 INJECTION INTRAMUSCULAR; INTRAVENOUS EVERY 6 HOURS PRN
Status: DISCONTINUED | OUTPATIENT
Start: 2020-01-17 | End: 2020-01-21 | Stop reason: HOSPADM

## 2020-01-17 RX ORDER — QUETIAPINE FUMARATE 25 MG/1
25 TABLET, FILM COATED ORAL ONCE AS NEEDED
Status: COMPLETED | OUTPATIENT
Start: 2020-01-17 | End: 2020-01-17

## 2020-01-17 RX ORDER — POTASSIUM CHLORIDE 20 MEQ/1
20 TABLET, EXTENDED RELEASE ORAL ONCE
Status: COMPLETED | OUTPATIENT
Start: 2020-01-17 | End: 2020-01-17

## 2020-01-17 RX ADMIN — IPRATROPIUM BROMIDE 0.5 MG: 0.5 SOLUTION RESPIRATORY (INHALATION) at 07:15

## 2020-01-17 RX ADMIN — APIXABAN 5 MG: 5 TABLET, FILM COATED ORAL at 09:47

## 2020-01-17 RX ADMIN — NYSTATIN 1 APPLICATION: 100000 POWDER TOPICAL at 09:34

## 2020-01-17 RX ADMIN — DILTIAZEM HYDROCHLORIDE 120 MG: 120 CAPSULE, COATED, EXTENDED RELEASE ORAL at 09:47

## 2020-01-17 RX ADMIN — DEXTROSE MONOHYDRATE 50 ML: 25 INJECTION, SOLUTION INTRAVENOUS at 13:37

## 2020-01-17 RX ADMIN — DEXTROSE 50 % IN WATER (D50W) INTRAVENOUS SYRINGE 50 ML: at 13:37

## 2020-01-17 RX ADMIN — NYSTATIN 1 APPLICATION: 100000 POWDER TOPICAL at 17:18

## 2020-01-17 RX ADMIN — METOLAZONE 5 MG: 5 TABLET ORAL at 09:30

## 2020-01-17 RX ADMIN — LEVALBUTEROL HYDROCHLORIDE 1.25 MG: 1.25 SOLUTION, CONCENTRATE RESPIRATORY (INHALATION) at 19:04

## 2020-01-17 RX ADMIN — ONDANSETRON 4 MG: 2 INJECTION INTRAMUSCULAR; INTRAVENOUS at 05:14

## 2020-01-17 RX ADMIN — QUETIAPINE FUMARATE 25 MG: 25 TABLET ORAL at 22:11

## 2020-01-17 RX ADMIN — IPRATROPIUM BROMIDE 0.5 MG: 0.5 SOLUTION RESPIRATORY (INHALATION) at 13:01

## 2020-01-17 RX ADMIN — LEVALBUTEROL HYDROCHLORIDE 1.25 MG: 1.25 SOLUTION, CONCENTRATE RESPIRATORY (INHALATION) at 07:15

## 2020-01-17 RX ADMIN — IPRATROPIUM BROMIDE 0.5 MG: 0.5 SOLUTION RESPIRATORY (INHALATION) at 19:04

## 2020-01-17 RX ADMIN — NICOTINE 1 PATCH: 14 PATCH TRANSDERMAL at 09:33

## 2020-01-17 RX ADMIN — Medication 40 MG/HR: at 17:18

## 2020-01-17 RX ADMIN — LEVALBUTEROL HYDROCHLORIDE 1.25 MG: 1.25 SOLUTION, CONCENTRATE RESPIRATORY (INHALATION) at 13:01

## 2020-01-17 RX ADMIN — APIXABAN 5 MG: 5 TABLET, FILM COATED ORAL at 17:18

## 2020-01-17 RX ADMIN — POTASSIUM CHLORIDE 20 MEQ: 1500 TABLET, EXTENDED RELEASE ORAL at 09:50

## 2020-01-17 RX ADMIN — METOPROLOL SUCCINATE 100 MG: 50 TABLET, EXTENDED RELEASE ORAL at 09:48

## 2020-01-17 NOTE — ASSESSMENT & PLAN NOTE
· The patient was noted to have a pericardial effusion on CT imaging that is described as mild to moderate  On her ECHO form 1/8 there was no evidence of effusion  · Small to moderate pericardial effusion noted on repeat Echo from 1/15  No evidence of hemodynamic compromise  Cardiology is following

## 2020-01-17 NOTE — PLAN OF CARE
Problem: Prexisting or High Potential for Compromised Skin Integrity  Goal: Skin integrity is maintained or improved  Description  INTERVENTIONS:  - Identify patients at risk for skin breakdown  - Assess and monitor skin integrity  - Assess and monitor nutrition and hydration status  - Monitor labs   - Assess for incontinence   - Turn and reposition patient  - Assist with mobility/ambulation  - Relieve pressure over bony prominences  - Avoid friction and shearing  - Provide appropriate hygiene as needed including keeping skin clean and dry  - Evaluate need for skin moisturizer/barrier cream  - Collaborate with interdisciplinary team   - Patient/family teaching  - Consider wound care consult   Outcome: Progressing     Problem: Potential for Falls  Goal: Patient will remain free of falls  Description  INTERVENTIONS:  - Assess patient frequently for physical needs  -  Identify cognitive and physical deficits and behaviors that affect risk of falls    -  [place] fall precautions as indicated by assessment   - Educate patient/family on patient safety including physical limitations  - Instruct patient to call for assistance with activity based on assessment  - Modify environment to reduce risk of injury  - Consider OT/PT consult to assist with strengthening/mobility  Outcome: Progressing     Problem: PAIN - ADULT  Goal: Verbalizes/displays adequate comfort level or baseline comfort level  Description  Interventions:  - Encourage patient to monitor pain and request assistance  - Assess pain using appropriate pain scale  - Administer analgesics based on type and severity of pain and evaluate response  - Implement non-pharmacological measures as appropriate and evaluate response  - Consider cultural and social influences on pain and pain management  - Notify physician/advanced practitioner if interventions unsuccessful or patient reports new pain  Outcome: Progressing     Problem: INFECTION - ADULT  Goal: Absence or prevention of progression during hospitalization  Description  INTERVENTIONS:  - Assess and monitor for signs and symptoms of infection  - Monitor lab/diagnostic results  - Monitor all insertion sites, i e  indwelling lines, tubes, and drains  - Monitor endotracheal if appropriate and nasal secretions for changes in amount and color  - Purchase appropriate cooling/warming therapies per order  - Administer medications as ordered  - Instruct and encourage patient and family to use good hand hygiene technique  - Identify and instruct in appropriate isolation precautions for identified infection/condition  Outcome: Progressing  Goal: Absence of fever/infection during neutropenic period  Description  INTERVENTIONS:  - Monitor WBC    Outcome: Progressing     Problem: SAFETY ADULT  Goal: Maintain or return to baseline ADL function  Description  INTERVENTIONS:  -  Assess patient's ability to carry out ADLs; assess patient's baseline for ADL function and identify physical deficits which impact ability to perform ADLs (bathing, care of mouth/teeth, toileting, grooming, dressing, etc )  - Assess/evaluate cause of self-care deficits   - Assess range of motion  - Assess patient's mobility; develop plan if impaired  - Assess patient's need for assistive devices and provide as appropriate  - Encourage maximum independence but intervene and supervise when necessary  - Involve family in performance of ADLs  - Assess for home care needs following discharge   - Consider OT consult to assist with ADL evaluation and planning for discharge  - Provide patient education as appropriate  Outcome: Progressing  Goal: Maintain or return mobility status to optimal level  Description  INTERVENTIONS:  - Assess patient's baseline mobility status (ambulation, transfers, stairs, etc )    - Identify cognitive and physical deficits and behaviors that affect mobility  - Identify mobility aids required to assist with transfers and/or ambulation (gait belt, sit-to-stand, lift, walker, cane, etc )  - [place] fall precautions as indicated by assessment  - Record patient progress and toleration of activity level on Mobility SBAR; progress patient to next Phase/Stage  - Instruct patient to call for assistance with activity based on assessment  - Consider rehabilitation consult to assist with strengthening/weightbearing, etc   Outcome: Progressing     Problem: DISCHARGE PLANNING  Goal: Discharge to home or other facility with appropriate resources  Description  INTERVENTIONS:  - Identify barriers to discharge w/patient and caregiver  - Arrange for needed discharge resources and transportation as appropriate  - Identify discharge learning needs (meds, wound care, etc )  - Arrange for interpretive services to assist at discharge as needed  - Refer to Case Management Department for coordinating discharge planning if the patient needs post-hospital services based on physician/advanced practitioner order or complex needs related to functional status, cognitive ability, or social support system  Outcome: Progressing     Problem: Knowledge Deficit  Goal: Patient/family/caregiver demonstrates understanding of disease process, treatment plan, medications, and discharge instructions  Description  Complete learning assessment and assess knowledge base    Interventions:  - Provide teaching at level of understanding  - Provide teaching via preferred learning methods  Outcome: Progressing     Problem: CARDIOVASCULAR - ADULT  Goal: Maintains optimal cardiac output and hemodynamic stability  Description  INTERVENTIONS:  - Monitor I/O, vital signs and rhythm  - Monitor for S/S and trends of decreased cardiac output  - Administer and titrate ordered vasoactive medications to optimize hemodynamic stability  - Assess quality of pulses, skin color and temperature  - Assess for signs of decreased coronary artery perfusion  - Instruct patient to report change in severity of symptoms  Outcome: Progressing  Goal: Absence of cardiac dysrhythmias or at baseline rhythm  Description  INTERVENTIONS:  - Continuous cardiac monitoring, vital signs, obtain 12 lead EKG if ordered  - Administer antiarrhythmic and heart rate control medications as ordered  - Monitor electrolytes and administer replacement therapy as ordered  Outcome: Progressing     Problem: RESPIRATORY - ADULT  Goal: Achieves optimal ventilation and oxygenation  Description  INTERVENTIONS:  - Assess for changes in respiratory status  - Assess for changes in mentation and behavior  - Position to facilitate oxygenation and minimize respiratory effort  - Oxygen administered by appropriate delivery if ordered  - Initiate smoking cessation education as indicated  - Encourage broncho-pulmonary hygiene including cough, deep breathe, Incentive Spirometry  - Assess the need for suctioning and aspirate as needed  - Assess and instruct to report SOB or any respiratory difficulty  - Respiratory Therapy support as indicated  Outcome: Progressing     Problem: METABOLIC, FLUID AND ELECTROLYTES - ADULT  Goal: Electrolytes maintained within normal limits  Description  INTERVENTIONS:  - Monitor labs and assess patient for signs and symptoms of electrolyte imbalances  - Administer electrolyte replacement as ordered  - Monitor response to electrolyte replacements, including repeat lab results as appropriate  - Instruct patient on fluid and nutrition as appropriate  Outcome: Progressing  Goal: Fluid balance maintained  Description  INTERVENTIONS:  - Monitor labs   - Monitor I/O and WT  - Instruct patient on fluid and nutrition as appropriate  - Assess for signs & symptoms of volume excess or deficit  Outcome: Progressing  Goal: Glucose maintained within target range  Description  INTERVENTIONS:  - Monitor Blood Glucose as ordered  - Assess for signs and symptoms of hyperglycemia and hypoglycemia  - Administer ordered medications to maintain glucose within target range  - Assess nutritional intake and initiate nutrition service referral as needed  Outcome: Progressing     Problem: SKIN/TISSUE INTEGRITY - ADULT  Goal: Skin integrity remains intact  Description  INTERVENTIONS  - Identify patients at risk for skin breakdown  - Assess and monitor skin integrity  - Assess and monitor nutrition and hydration status  - Monitor labs (i e  albumin)  - Assess for incontinence   - Turn and reposition patient  - Assist with mobility/ambulation  - Relieve pressure over bony prominences  - Avoid friction and shearing  - Provide appropriate hygiene as needed including keeping skin clean and dry  - Evaluate need for skin moisturizer/barrier cream  - Collaborate with interdisciplinary team (i e  Nutrition, Rehabilitation, etc )   - Patient/family teaching  Outcome: Progressing  Goal: Incision(s), wounds(s) or drain site(s) healing without S/S of infection  Description  INTERVENTIONS  - Assess and document risk factors for skin impairment   - Assess and document dressing, incision, wound bed, drain sites and surrounding tissue  - Consider nutrition services referral as needed  - Oral mucous membranes remain intact  - Provide patient/ family education  Outcome: Progressing  Goal: Oral mucous membranes remain intact  Description  INTERVENTIONS  - Assess oral mucosa and hygiene practices  - Implement preventative oral hygiene regimen  - Implement oral medicated treatments as ordered  - Initiate Nutrition services referral as needed  Outcome: Progressing     Problem: Nutrition/Hydration-ADULT  Goal: Nutrient/Hydration intake appropriate for improving, restoring or maintaining nutritional needs  Description  Monitor and assess patient's nutrition/hydration status for malnutrition  Collaborate with interdisciplinary team and initiate plan and interventions as ordered  Monitor patient's weight and dietary intake as ordered or per policy   Utilize nutrition screening tool and intervene as necessary  Determine patient's food preferences and provide high-protein, high-caloric foods as appropriate       INTERVENTIONS:  - Monitor oral intake, urinary output, labs, and treatment plans  - Assess nutrition and hydration status and recommend course of action  - Evaluate amount of meals eaten  - Assist patient with eating if necessary   - Allow adequate time for meals  - Recommend/ encourage appropriate diets, oral nutritional supplements, and vitamin/mineral supplements  - Order, calculate, and assess calorie counts as needed  - Recommend, monitor, and adjust tube feedings and TPN/PPN based on assessed needs  - Assess need for intravenous fluids  - Provide specific nutrition/hydration education as appropriate  - Include patient/family/caregiver in decisions related to nutrition  Outcome: Progressing

## 2020-01-17 NOTE — ASSESSMENT & PLAN NOTE
Lab Results   Component Value Date    HGBA1C 5 8 04/11/2019       Recent Labs     01/16/20  0750 01/16/20  1151 01/16/20  1625 01/16/20 2122   POCGLU 85 122 108 118       Blood Sugar Average: Last 72 hrs:  · Has been tolerating oral diet and initial hypoglycemia has improved  · TSH normal, random cortisol 17 9  · Blood sugar controlled  Continue accucheks with SSI coverage  Goal to maintain -180  Aggressively monitor and treat hypoglycemia as this has been associated with encephalopathy in this patient

## 2020-01-17 NOTE — PROGRESS NOTES
NEPHROLOGY PROGRESS NOTE   Marci Posey 63 y o  female MRN: [id number]  Unit/Bed#: ICU 06 Encounter: [id number]  Reason for Consult: SAPNA/CKD    ASSESSMENT AND PLAN:  SAPNA POA on CKD stage 3, baseline creatinine 1 3 to 1 6  -SAPNA suspected to be multifactorial in the setting of cardiorenal/volume overload/hypotension episodes, prior ACE-inhibitor use, progression to ATN  -peak creatinine 3 8 now seems to be overall improving to 2 1 today  -continue current Lasix drip at 40 mg/hour with good urine output  -BMP in a m   -continue to monitor intake and output, avoid hypotension, nephrotoxins or NSAIDs  -CKD suspected to be secondary to underlying hypertension/diabetes/CHF in the setting of morbidly obese     Mild hypernatremia, serum sodium has improved 142 today  Now remains off IV D5 water, encourage free water intake       Mild hyperphosphatemia, serum phosphorus 5 5, continue to monitor with improving renal function     Acute on chronic hypoxic/hypercapnic respiratory failure  -likely multifactorial in the setting of COPD, pulmonary hypertension, suspected component of sleep apnea, pulmonary congestion/pleural effusion  -now remains on high-flow oxygen at the time of my encounter   Oxygen requirement seems to be slowly improving   close monitoring and management as per Pulmonary  -diuretics as below     Diastolic CHF, echo this month shows EF 39%, diastolic dysfunction present, decreased right ventricular systolic function, moderate pulmonary hypertension, no pericardial effusion  -urine output seems to have somewhat reduced despite being on Lasix to 40 mg/hour and continue same for today  Will do metolazone 5 mg one dose again today   -accurate intake and output, daily weight  -weight reducing     Encephalopathy, seems to be slowly improving   suspect multifactorial in the setting of hypercapnia, continue to monitor with regular ABG    Renal function overall improving      Hypertension, blood pressure overall acceptable, continue to monitor      Discussed above plan with ICU team    SUBJECTIVE:  Patient seen and examined at bedside  Remains on high-flow oxygen  Urine output good with Lasix drip  Off and on shortness of breath although seems to be overall volume status improving  Denies nausea or vomiting      OBJECTIVE:  Current Weight: Weight - Scale: 131 kg (288 lb 12 8 oz)  Vitals:    01/17/20 0742   BP:    Pulse:    Resp:    Temp: (!) 97 1 °F (36 2 °C)   SpO2:        Intake/Output Summary (Last 24 hours) at 1/17/2020 0810  Last data filed at 1/17/2020 0445  Gross per 24 hour   Intake 80 8 ml   Output 3188 ml   Net -3107 2 ml     Wt Readings from Last 3 Encounters:   01/15/20 131 kg (288 lb 12 8 oz)   11/05/19 110 kg (243 lb 9 6 oz)   10/28/19 109 kg (240 lb 4 8 oz)     Temp Readings from Last 3 Encounters:   01/17/20 (!) 97 1 °F (36 2 °C) (Temporal)   11/05/19 99 5 °F (37 5 °C) (Tympanic)   10/28/19 98 5 °F (36 9 °C)     BP Readings from Last 3 Encounters:   01/17/20 96/71   11/05/19 144/80   10/28/19 150/100     Pulse Readings from Last 3 Encounters:   01/17/20 84   11/05/19 (!) 118   10/28/19 90        Physical Examination:  General:  Lying in bed, no acute distress   Eyes:  Mild conjunctival pallor present  ENT:  External examination of ears and nose unremarkable  Neck:  No obvious lymphadenopathy appreciated  Respiratory:  Bilateral air entry present, decreased breath sound at base, occasional inspiratory crackles present  CVS:  S1, S2 present  GI:  Soft, nontender  CNS:  Sleepy, opens eyes, oriented x2  Extremities:  One to 2+ edema in both legs, improving  Skin:  No new rash in legs    Medications:    Current Facility-Administered Medications:     albuterol (PROVENTIL HFA,VENTOLIN HFA) inhaler 2 puff, 2 puff, Inhalation, Q4H PRN, Eino MAYNOR Marks    apixaban (ELIQUIS) tablet 5 mg, 5 mg, Oral, BID, Luli K MAYNOR Kirby, 5 mg at 01/16/20 1703    diltiazem (CARDIZEM CD) 24 hr capsule 120 mg, 120 mg, Oral, Daily, MAYNOR Adair, 120 mg at 01/16/20 0854    furosemide (LASIX) 500 mg infusion 50 mL, 40 mg/hr, Intravenous, Continuous, MAYNOR Caro, Last Rate: 4 mL/hr at 01/16/20 1757, 40 mg/hr at 01/16/20 1757    insulin lispro (HumaLOG) 100 units/mL subcutaneous injection 1-6 Units, 1-6 Units, Subcutaneous, TID AC, 1 Units at 01/15/20 1627 **AND** Fingerstick Glucose (POCT), , , TID AC, MAYNOR Jaeger    insulin lispro (HumaLOG) 100 units/mL subcutaneous injection 1-6 Units, 1-6 Units, Subcutaneous, HS, MAYNOR Jaeger, 1 Units at 01/08/20 2200    ipratropium (ATROVENT) 0 02 % inhalation solution 0 5 mg, 0 5 mg, Nebulization, TID, MAYNOR Jaeger, 0 5 mg at 01/17/20 0715    Labetalol HCl (NORMODYNE) injection 10 mg, 10 mg, Intravenous, Q6H PRN, MAYNOR Landers, 10 mg at 01/12/20 2317    levalbuterol (XOPENEX) inhalation solution 1 25 mg, 1 25 mg, Nebulization, TID, MAYNOR Jaeger, 1 25 mg at 01/17/20 0715    levalbuterol (XOPENEX) inhalation solution 1 25 mg, 1 25 mg, Nebulization, Q4H PRN, MAYNOR Adair    metoprolol (LOPRESSOR) injection 5 mg, 5 mg, Intravenous, Q6H PRN, MAYNOR Adkins    metoprolol succinate (TOPROL-XL) 24 hr tablet 100 mg, 100 mg, Oral, Daily, MAYNOR Jaeger, 100 mg at 01/16/20 1213    nicotine (NICODERM CQ) 14 mg/24hr TD 24 hr patch 1 patch, 1 patch, Transdermal, Daily, MAYNOR Adair, 1 patch at 01/16/20 0854    nystatin (MYCOSTATIN) powder, , Topical, BID, MAYNOR Adair, 1 application at 85/84/52 1704    ondansetron (ZOFRAN) injection 4 mg, 4 mg, Intravenous, Q6H PRN, Yehuda Hopkins Spatzer, CRNP, 4 mg at 01/17/20 3856    Laboratory Results:  Results from last 7 days   Lab Units 01/17/20  0619 01/16/20  0427 01/15/20  0556 01/15/20  0555 01/14/20  0612 01/13/20  0430 01/12/20  0429 01/11/20  1241 01/11/20  0349  01/10/20  2325   WBC Thousand/uL  --  7 05  --  7 08  --  6 82  --   --   --   --   -- HEMOGLOBIN g/dL  --  15 0  --  16 0*  --  17 0*  --   --   --   --   --    I STAT HEMOGLOBIN g/dl  --   --   --   --   --   --   --   --   --   --  18 7*   HEMATOCRIT %  --  49 9*  --  53 4*  --  57 0*  --   --   --   --   --    HEMATOCRIT, ISTAT %  --   --   --   --   --   --   --   --   --   --  55*   PLATELETS Thousands/uL  --  60*  --  62*  --  70*  --   --   --   --   --    SODIUM mmol/L 142 142 144  --  144 144 146* 145  --    < >  --    POTASSIUM mmol/L 3 7 3 7 3 6  --  3 4* 3 9 3 6 4 0  --    < >  --    CHLORIDE mmol/L 98* 100 102  --  103 103 106 106  --    < >  --    CO2 mmol/L 36* 36* 35*  --  33* 30 30 29  --    < >  --    CO2, I-STAT mmol/L  --   --   --   --   --   --   --   --   --   --  32   BUN mg/dL 84* 80* 74*  --  74* 70* 67* 67*  --    < >  --    CREATININE mg/dL 2 11* 2 19* 2 31*  --  2 70* 3 05* 3 42* 3 75*  --    < >  --    CALCIUM mg/dL 8 1* 8 0* 8 1*  --  7 9* 8 3 7 6* 7 8*  --    < >  --    MAGNESIUM mg/dL  --   --  1 7  --   --  1 9  --   --  2 5  --   --    PHOSPHORUS mg/dL  --   --   --   --   --   --  5 5*  --   --   --   --    GLUCOSE, ISTAT mg/dl  --   --   --   --   --   --   --   --   --   --  95    < > = values in this interval not displayed  CT chest wo contrast   Final Result by Tyrone Wisdom MD (01/14 7331)   Exam is suboptimal due to respiratory motion and patient body habitus  Evaluation for malignancy is limited without IV contrast    1  No definite findings for malignancy based on this noncontrast exam    2  Interlobular septal thickening suggestive of mild interstitial edema  Mild bibasilar consolidation may be related to atelectasis or pneumonia  3   Small bilateral pleural effusions slightly greater on the left than on the right  4  Marked cardiomegaly  Mild-to-moderate pericardial effusion  5  Marked distention of the main pulmonary artery compatible with pulmonary artery artery hypertension     6   Slightly nodular hepatic contour, question related to early cirrhotic changes  Markedly distended partially visualized IVC  This favors right heart failure  7   Mild anterior wedging at T12 of indeterminate age  Severe compression deformity at L1 likely has progressed from prior exams  The study was marked in EPIC for significant notification  Workstation performed: [id number]         VAS ELIN & waveform analysis, multiple levels   Final Result by Ashley Chavez MD (01/14 1839)      VAS lower limb venous duplex study, complete bilateral   Final Result by Violet Helm DO (01/13 1521)      XR chest portable   Final Result by Dalila Yoder MD (01/13 1141)      Cardiomegaly and persistent moderate central pulmonary edema      Workstation performed: [id number]         US kidney and bladder   Final Result by Anne-Marie Miranda MD (01/12 1409)      5 mm right renal lower pole nonobstructing calculus  No hydronephrosis  Underdistended but grossly unremarkable urinary bladder  Workstation performed: [id number]         XR chest portable   Final Result by Michael Kirby MD (01/11 0818)   Persistent cardiomegaly, suspected multifocal pneumonia and probable superimposed CHF            Workstation performed: [id number]         XR chest 2 views   ED Interpretation by Reynaldo Ornelas MD (01/06 3581)   Primary reviewed  Increased interstitial markings  Final Result by Monica Gordon MD (01/07 3966)      Enlargement of the cardiac silhouette and pulmonary interstitial edema  Workstation performed: [id number]         CT head wo contrast    (Results Pending)       Portions of the record may have been created with voice recognition software  Occasional wrong word or "sound a like" substitutions may have occurred due to the inherent limitations of voice recognition software  Read the chart carefully and recognize, using context, where substitutions have occurred

## 2020-01-17 NOTE — PROGRESS NOTES
Progress Note - Cardiology   Vickie Genao 63 y o  female MRN: [id number]  Unit/Bed#: ICU 06 Encounter: [id number]      Assessment/Recommendations/Discussion:   1  Permanent atrial fibrillation with rates still in the 90s  2  Acute on chronic heart failure with preserved ejection fraction 58%  3  Cor pulmonale from chronic respiratory failure and pulmonary hypertension, est PASP by echo 52mmHg  4  Acute on chronic respiratory failure with hypercapnia  5  Patient alert an oriented  6  Hypertension  7  Diabetes mellitus type 2  8  Morbid obesity  9  Both pulmonary congestion and peripheral edema  10  Compression fracture of the 1st lumbar vertebrae   11  Acute on Chronic kidney disease, stage III, improving  12  Pericardial effusion noted on CT scan        PLAN   Making good urine   Continue Lasix drip per Nephrology   Eliquis for anticoagulation   AFib rates are controlled continue diltiazem and Lopressor   Continue to work to wean off of high-flow oxygen back to baseline 10 L   After speaking to the ICU team yesterday, She has historically been non compliant and unwilling to pursue further therapy for her pulmonary hypertension  Given this, I would highly consider palliative care evaluation as I do not see her pulmonary hypertension getting any better long term  Subjective:   HPI  Urine output has been very good over the past 24 hours  Heart rate has been controlled in the 80s to 90s  AFib  Still on high-flow oxygen      Review of Systems: As noted in HPI  Rest of ROS is negative      Vitals:   /88   Pulse 76   Temp 98 1 °F (36 7 °C) (Temporal)   Resp (!) 26   Ht 5' 5" (1 651 m)   Wt 131 kg (288 lb 12 8 oz)   SpO2 91%   BMI 48 06 kg/m²   Vitals:    01/14/20 0547 01/15/20 0600   Weight: 131 kg (289 lb 11 oz) 131 kg (288 lb 12 8 oz)       Intake/Output Summary (Last 24 hours) at 1/17/2020 0735  Last data filed at 1/17/2020 0445  Gross per 24 hour   Intake 145 6 ml   Output 3188 ml   Net -3042 4 ml       Physical Exam:  General appearance: alert and oriented, in no acute distress, obese female  Head: Normocephalic, without obvious abnormality, atraumatic  Eyes: conjunctivae/corneas clear  Anicteric  Neck: no adenopathy, no carotid bruit, no JVD  Lungs:  Decreased breath sounds bilaterally, on high-flow nasal cannula oxygen  Heart:  Irregularly regular rhythm with distant heart sounds, S1, S2 normal, no murmur, no click, rub or gallop  Abdomen: soft, non-tender; bowel sounds normal; no masses,  no organomegaly  Extremities: extremities normal, warm and well-perfused; no cyanosis, clubbing, or edema  Skin: Skin color, texture, turgor normal  No rashes or lesions     TELEMETRY:  AFib in the 80s and 90s  Lab Results:  Results from last 7 days   Lab Units 01/16/20  0427   WBC Thousand/uL 7 05   HEMOGLOBIN g/dL 15 0   HEMATOCRIT % 49 9*   PLATELETS Thousands/uL 60*     Results from last 7 days   Lab Units 01/17/20 0619 01/16/20 0427  01/10/20  2325   POTASSIUM mmol/L 3 7 3 7   < >  --    CHLORIDE mmol/L 98* 100   < >  --    CO2 mmol/L 36* 36*   < >  --    CO2, I-STAT mmol/L  --   --   --  32   BUN mg/dL 84* 80*   < >  --    CREATININE mg/dL 2 11* 2 19*   < >  --    CALCIUM mg/dL 8 1* 8 0*   < >  --    ALK PHOS U/L  --  84   < >  --    ALT U/L  --  8*   < >  --    AST U/L  --  11   < >  --    GLUCOSE, ISTAT mg/dl  --   --   --  95    < > = values in this interval not displayed  Results from last 7 days   Lab Units 01/17/20  0619  01/10/20  2325   POTASSIUM mmol/L 3 7   < >  --    CHLORIDE mmol/L 98*   < >  --    CO2 mmol/L 36*   < >  --    CO2, I-STAT mmol/L  --   --  32   BUN mg/dL 84*   < >  --    CREATININE mg/dL 2 11*   < >  --    GLUCOSE, ISTAT mg/dl  --   --  95   CALCIUM mg/dL 8 1*   < >  --     < > = values in this interval not displayed             Medications:    Current Facility-Administered Medications:     albuterol (PROVENTIL HFA,VENTOLIN HFA) inhaler 2 puff, 2 puff, Inhalation, Q4H PRN, [employer], MAYNOR    apixaban (ELIQUIS) tablet 5 mg, 5 mg, Oral, BID, MAYNOR Vides, 5 mg at 01/16/20 1703    diltiazem (CARDIZEM CD) 24 hr capsule 120 mg, 120 mg, Oral, Daily, StephenMAYNOR Whyte, 120 mg at 01/16/20 0854    furosemide (LASIX) 500 mg infusion 50 mL, 40 mg/hr, Intravenous, Continuous, MAYNOR Lorenzana, Last Rate: 4 mL/hr at 01/16/20 1757, 40 mg/hr at 01/16/20 1757    insulin lispro (HumaLOG) 100 units/mL subcutaneous injection 1-6 Units, 1-6 Units, Subcutaneous, TID AC, 1 Units at 01/15/20 1627 **AND** Fingerstick Glucose (POCT), , , TID AC, Luli MAYNOR Macedo    insulin lispro (HumaLOG) 100 units/mL subcutaneous injection 1-6 Units, 1-6 Units, Subcutaneous, HS, LuliBALJIT BanksNP, 1 Units at 01/08/20 2200    ipratropium (ATROVENT) 0 02 % inhalation solution 0 5 mg, 0 5 mg, Nebulization, TID, Luli K Kofi, BALJITNP, 0 5 mg at 01/17/20 0715    Labetalol HCl (NORMODYNE) injection 10 mg, 10 mg, Intravenous, Q6H PRN, MAYNOR Alan, 10 mg at 01/12/20 2317    levalbuterol (XOPENEX) inhalation solution 1 25 mg, 1 25 mg, Nebulization, TID, Luli K Arunoflakesha CRNP, 1 25 mg at 01/17/20 0715    levalbuterol (XOPENEX) inhalation solution 1 25 mg, 1 25 mg, Nebulization, Q4H PRN, MAYNOR Vides    metoprolol (LOPRESSOR) injection 5 mg, 5 mg, Intravenous, Q6H PRN, MAYNOR Ruiz    metoprolol succinate (TOPROL-XL) 24 hr tablet 100 mg, 100 mg, Oral, Daily, Luli K Arunofsky, CRNP, 100 mg at 01/16/20 1213    nicotine (NICODERM CQ) 14 mg/24hr TD 24 hr patch 1 patch, 1 patch, Transdermal, Daily, MAYNOR Vides, 1 patch at 01/16/20 0854    nystatin (MYCOSTATIN) powder, , Topical, BID, MAYNOR Vides, 1 application at 21/62/32 1704    ondansetron (ZOFRAN) injection 4 mg, 4 mg, Intravenous, Q6H PRN, Cordell Potters Spatzer, CRNP, 4 mg at 01/17/20 9895    This note was completed in part utilizing [employer] Flu[employer] Direct Software    Grammatical errors, random word insertions, spelling mistakes, and incomplete sentences may be an occasional consequence of this system secondary to software limitations, ambient noise, and hardware issues  If you have any questions or concerns about the content, text, or information contained within the body of this dictation, please contact the provider for clarification      Giuliano Prather DO, [facility] [place]  1/17/2020 7:35 AM

## 2020-01-17 NOTE — ASSESSMENT & PLAN NOTE
· Continue nebulized bronchodilators  No indication for steroids    · Not on maintenance inhalers at home

## 2020-01-17 NOTE — ASSESSMENT & PLAN NOTE
· Acute on CKD III with baseline approximately 1 5  · Nephrology is following  Creatinine continues to improve with vigorous urine output on lasix infusion  Continue lasix gtt per nephrology recommendations  · Trend renal indices and monitor intake and output    · Urine output goal of 1-2 l negative per 24 hours

## 2020-01-17 NOTE — PROGRESS NOTES
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, pleural effusion and noncompliance with nocturnal BiPAP  · Pt notes baseline oxygen requirement at home is 10L  · Improving  Tolerating HFNC during the day and [facility]PAP at [facility]  Continue to wean daytime supplemental O2 as tolerated to maintain saturation >88%  · Has been diuresing well  Continue on lasix gtt at 40 mg/hr  · Continue scheduled nebulized bronchodilators  · Aggressive pulmonary toileting  Encourage cough and deep breathing  Use incentive spirometer  Out of bed to the chair during the day        COPD (chronic obstructive pulmonary disease) (HCC)  Assessment & Plan  · Continue nebulized bronchodilators  No indication for steroids  · Not on maintenance inhalers at home      Pericardial effusion  Assessment & Plan  · The patient was noted to have a pericardial effusion on CT imaging that is described as mild to moderate  On her ECHO form 1/8 there was no evidence of effusion  · Small to moderate pericardial effusion noted on repeat Echo from 1/15  No evidence of hemodynamic compromise  Cardiology is following  Permanent atrial fibrillation  Assessment & Plan  · Rate remains controlled  · Continue PO cardizem and metoprolol per cardiology recommendations  · Restarted on apixaban after previously being on Heparin  HIT antibody is pending    SAPNA (acute kidney injury) ([facility] [address] )  Assessment & Plan  · Acute on CKD III with baseline approximately 1 5  · Nephrology is following  Creatinine continues to improve with vigorous urine output on lasix infusion    Continue lasix gtt per nephrology recommendations  · Trend renal indices and monitor intake and output  · Urine output goal of 1-2 l negative per 24 hours      Chronic venous stasis dermatitis of both lower extremities  Assessment & Plan  · No open ulcers or signs of cellulitis        Type 2 diabetes mellitus without complication [facility])  Assessment & Plan  Lab Results   Component Value Date    HGBA1C 5 8 04/11/2019       Recent Labs     01/16/20  0750 01/16/20  1151 01/16/20  1625 01/16/20  2122   POCGLU 85 122 108 118       Blood Sugar Average: Last 72 hrs:  · Has been tolerating oral diet and initial hypoglycemia has improved  · TSH normal, random cortisol 17 9  · Blood sugar controlled  Continue accucheks with SSI coverage  Goal to maintain -180  Aggressively monitor and treat hypoglycemia as this has been associated with encephalopathy in this patient  Obesity  Assessment & Plan  · She would benefit from weight loss  · Likely component of OHV that is contributing to her acute on chronic pulmonary issues  · Continue BiPAP nocturnally and when napping given risk for hypercapnia    Hypernatremiaresolved as of 1/16/2020  Assessment & Plan  · Currently on D5 for hypoglycemia, will help with increased sodium  · Will continue to monitor her sodium level    Adjustment disorderresolved as of 1/16/2020  Assessment & Plan  · Wellbutrin was discontinued by psych based on her current tachy arryhtmia     _____________________________________________________________________    HPI/24hr events:   Afebrile  No acute events overnight      Medications:    Current Facility-Administered Medications:  albuterol 2 puff Inhalation Q4H PRN Alease Cho MAYNOR Kirby    apixaban 5 mg Oral BID Alease Cho MAYNOR Kirby    diltiazem 120 mg Oral Daily Luli K MAYNOR Kirby    furosemide 40 mg/hr Intravenous Continuous BernabeMAYNOR Prieto Last Rate: 40 mg/hr (01/16/20 7503)   insulin lispro 1-6 Units Subcutaneous TID MAYNOR Montanez insulin lispro 1-6 Units Subcutaneous HS Luli K Bilofsky, CRNP    ipratropium 0 5 mg Nebulization TID Katie Furbish Bilofsky, CRNP    Labetalol HCl 10 mg Intravenous Q6H PRN Aston Javed, CRNP    levalbuterol 1 25 mg Nebulization TID Katie Furbish Bilofsky, CRNP    levalbuterol 1 25 mg Nebulization Q4H PRN Katie Furbish Bilofsky, CRNP    metoprolol 5 mg Intravenous Q6H PRN Katie Furbish Bilofsky, CRNP    metoprolol succinate 100 mg Oral Daily Katie Furbish Bilofsky, CRNP    nicotine 1 patch Transdermal Daily Luli K Bilofsky, CRNP    nystatin  Topical BID Katie Furbish Bilofsky, CRNP    ondansetron 4 mg Intravenous Q6H PRN Leila Erp Spatzer, CRNP          furosemide 40 mg/hr Last Rate: 40 mg/hr (01/16/20 1757)         Physical exam:  Vitals: Body mass index is 48 06 kg/m²  Blood pressure 118/88, pulse 76, temperature 98 1 °F (36 7 °C), temperature source Temporal, resp  rate (!) 26, height 5' 5" (1 651 m), weight 131 kg (288 lb 12 8 oz), SpO2 96 %  ,  Temp  Min: 96 7 °F (35 9 °C)  Max: 99 3 °F (37 4 °C)  IBW: 57 kg    SpO2: 96 %  SpO2 Activity: At Rest  O2 Device: Nasal cannula      Intake/Output Summary (Last 24 hours) at 1/17/2020 0622  Last data filed at 1/17/2020 0445  Gross per 24 hour   Intake 395 8 ml   Output 3688 ml   Net -3292 2 ml       Invasive/non-invasive ventilation settings:   Respiratory    Lab Data (Last 4 hours)    None         O2/Vent Data (Last 4 hours)      01/17 0229          Non-Invasive Ventilation Mode BiPAP                 Invasive Devices     Peripheral Intravenous Line            Peripheral IV 01/16/20 Right Wrist less than 1 day          Drain            External Urinary Catheter 6 days                  Physical Exam:  Gen: Sleeping but easily arousable, appropriate, no acute distress  HEENT:  Atraumatic, normocephalic, extraocular movements intact, pupils 3 mm equal and reactive, sclerae anicteric, oropharynx is clear  Neck:  Supple, trachea midline, no JVD, no lymphadenopathy  Chest:  Diminished bilaterally more so on the left, no wheeze  Cor:  Single S1/S2, no murmurs, rubs, gallops, irregularly irregular  Abd:  Obese, soft, nontender, nondistended, bowel sounds normoactive  Ext:  3+ bilateral lower extremity edema, nonpitting bilateral upper extremity edema, no clubbing or cyanosis bilateral lower extremity skin changes consistent with chronic venous stasis, bilateral lower extremity hemosiderin staining  Neuro:  Oriented x3, cranial nerves 2-12 grossly intact, no focal deficits  Skin:  Warm, dry      Diagnostic Data:  Lab: I have personally reviewed pertinent lab results  CBC:   Results from last 7 days   Lab Units 01/16/20 0427 01/15/20  0555 01/13/20  0430   WBC Thousand/uL 7 05 7 08 6 82   HEMOGLOBIN g/dL 15 0 16 0* 17 0*   HEMATOCRIT % 49 9* 53 4* 57 0*   PLATELETS Thousands/uL 60* 62* 70*       CMP:   Results from last 7 days   Lab Units 01/16/20  0427 01/15/20  0556 01/14/20  0612  01/11/20  1241  01/10/20  2325   SODIUM mmol/L 142 144 144   < > 145   < >  --    POTASSIUM mmol/L 3 7 3 6 3 4*   < > 4 0   < >  --    CHLORIDE mmol/L 100 102 103   < > 106   < >  --    CO2 mmol/L 36* 35* 33*   < > 29   < >  --    CO2, I-STAT mmol/L  --   --   --   --   --   --  32   BUN mg/dL 80* 74* 74*   < > 67*   < >  --    CREATININE mg/dL 2 19* 2 31* 2 70*   < > 3 75*   < >  --    CALCIUM mg/dL 8 0* 8 1* 7 9*   < > 7 8*   < >  --    ALK PHOS U/L 84 80  --   --  99  --   --    ALT U/L 8* 8*  --   --  16  --   --    AST U/L 11 12  --   --  12  --   --    GLUCOSE, ISTAT mg/dl  --   --   --   --   --   --  95    < > = values in this interval not displayed       PT/INR:   No results found for: PT, INR,   Magnesium:   Results from last 7 days   Lab Units 01/15/20  0556 01/13/20  0430 01/11/20  0349   MAGNESIUM mg/dL 1 7 1 9 2 5     Phosphorous:   Results from last 7 days   Lab Units 01/12/20  0429   PHOSPHORUS mg/dL 5 5*       Microbiology:        Imaging:  No new imaging    Cardiac lab/EKG/telemetry/ECHO:   Atrial fibrillation    VTE Prophylaxis:  Eliquis    Code Status: Level 1 - Full Code    Vessie Cools Spatzer, CRNP    Portions of the record may have been created with voice recognition software  Occasional wrong word or "sound a like" substitutions may have occurred due to the inherent limitations of voice recognition software  Read the chart carefully and recognize, using context, where substitutions have occurred

## 2020-01-17 NOTE — UTILIZATION REVIEW
Continued Stay Review    Date:    1/17/2020                          Current Patient Class:   Inpatient  Current Level of Care:   ICU    HPI:59 y o  female initially admitted on /7   With    Acute/chronic respiratory failure with hypoxia and hypercapnia   Transferred to ICU   1/10    Assessment/Plan:   1/17  Remains  In  ICU  Continues  On  HFNC   During the  Day and  BiPap  At night  Continue to wean daytime O2 as tolerated to maintain sats  >  88 %  Remains on  Lasix drip and has  Been diuresing well  Continue aggressive  Pulmonary toilet, cough and deep  Breathing encouraged  Continue to monitor labs       Pertinent Labs/Diagnostic Results:   Results from last 7 days   Lab Units 01/16/20  0427 01/15/20  0555 01/13/20  0430 01/10/20  2325   WBC Thousand/uL 7 05 7 08 6 82  --    HEMOGLOBIN g/dL 15 0 16 0* 17 0*  --    I STAT HEMOGLOBIN g/dl  --   --   --  18 7*   HEMATOCRIT % 49 9* 53 4* 57 0*  --    HEMATOCRIT, ISTAT %  --   --   --  55*   PLATELETS Thousands/uL 60* 62* 70*  --          Results from last 7 days   Lab Units 01/17/20  0619 01/16/20  0427 01/15/20  0556 01/14/20  0612 01/13/20  0430 01/12/20  0429  01/11/20  0349  01/10/20  2325   SODIUM mmol/L 142 142 144 144 144 146*   < >  --    < >  --    POTASSIUM mmol/L 3 7 3 7 3 6 3 4* 3 9 3 6   < >  --    < >  --    CHLORIDE mmol/L 98* 100 102 103 103 106   < >  --    < >  --    CO2 mmol/L 36* 36* 35* 33* 30 30   < >  --    < >  --    CO2, I-STAT mmol/L  --   --   --   --   --   --   --   --   --  32   ANION GAP mmol/L 8 6 7 8 11 10   < >  --    < >  --    BUN mg/dL 84* 80* 74* 74* 70* 67*   < >  --    < >  --    CREATININE mg/dL 2 11* 2 19* 2 31* 2 70* 3 05* 3 42*   < >  --    < >  --    EGFR ml/min/1 73sq m 25 24 23 19 16 14   < >  --    < >  --    CALCIUM mg/dL 8 1* 8 0* 8 1* 7 9* 8 3 7 6*   < >  --    < >  --    CALCIUM, IONIZED mmol/L  --   --   --   --  0 95*  --   --   --   --   --    CALCIUM, IONIZED, ISTAT mmol/L  --   --   --   --   --   -- --   --   --  1 14   MAGNESIUM mg/dL  --   --  1 7  --  1 9  --   --  2 5  --   --    PHOSPHORUS mg/dL  --   --   --   --   --  5 5*  --   --   --   --     < > = values in this interval not displayed  Results from last 7 days   Lab Units 01/16/20  0427 01/15/20  0556 01/11/20  1241   AST U/L 11 12 12   ALT U/L 8* 8* 16   ALK PHOS U/L 84 80 99   TOTAL PROTEIN g/dL 5 6* 5 8* 6 3*   ALBUMIN g/dL 2 5* 2 6* 3 0*   TOTAL BILIRUBIN mg/dL 4 08* 5 34* 2 10*     Results from last 7 days   Lab Units 01/17/20  0742 01/17/20  0618 01/16/20  2122 01/16/20  1625 01/16/20  1151 01/16/20  0750 01/15/20  2140 01/15/20  1612 01/15/20  1134 01/15/20  0726   POC GLUCOSE mg/dl 109 106 118 108 122 85 150* 167* 188* 67     Results from last 7 days   Lab Units 01/17/20  0619 01/16/20  0427 01/15/20  0556 01/14/20  0612 01/13/20  0430 01/12/20  0429 01/11/20  1241 01/11/20  0348   GLUCOSE RANDOM mg/dL 106 90 83 99 104 82 82 85          Results from last 7 days   Lab Units 01/13/20  1114 01/12/20  1227 01/11/20  1223   PH ART  7 402 7 350 7 257*   PCO2 ART mm Hg 50 2* 46 8* 64 2*   PO2 ART mm Hg 91 9 84 1 107 3   HCO3 ART mmol/L 30 5* 25 3 28 0   BASE EXC ART mmol/L 4 3 -0 9 -1 2   O2 CONTENT ART mL/dL 25 3* 24 9* 24 3*   O2 HGB, ARTERIAL % 95 9 95 1 96 5   ABG SOURCE  Brachial, Right Radial, Right Radial, Right       Results from last 7 days   Lab Units 01/15/20  0603 01/14/20  0612 01/14/20  0222 01/13/20  1708  01/11/20  1241   PROTIME seconds  --   --   --  18 2*  --  17 8*   INR   --   --   --  1 48*  --  1 44*   PTT seconds 53* 67* 77* 37   < >  --     < > = values in this interval not displayed         Vital Signs:      92  24Abnormal   113/75  88  90 %        01/17/20 0948    95               01/17/20 0947        111/71           01/17/20 0915    92  23Abnormal   111/71  88  92 %       01/17/20 0815    90    107/70  82  90 %       01/17/20 0742  97 1 °F (36 2 °C)Abnormal   [facility]       01/17/20 4332           91 %       01/17/20 0715    84  24Abnormal   96/71  79  91 %       01/17/20 0417    76  26Abnormal   118/88  98  96 %       01/17/20 0317  98 1 °F (36 7 °C)  80  28Abnormal   109/76  87  92 %       01/17/20 0217    80  26Abnormal   135/96  110  92 %       01/17/20 0117    82  30Abnormal   118/83  98  94 %       01/17/20 0017    80  28Abnormal   113/83  96  96 %           Medications:   Scheduled Medications:    Medications:  apixaban 5 mg Oral BID   diltiazem 120 mg Oral Daily   insulin lispro 1-6 Units Subcutaneous TID AC   insulin lispro 1-6 Units Subcutaneous HS   ipratropium 0 5 mg Nebulization TID   levalbuterol 1 25 mg Nebulization TID   metoprolol succinate 100 mg Oral Daily   nicotine 1 patch Transdermal Daily   nystatin  Topical BID     Continuous IV Infusions:    furosemide 40 mg/hr Intravenous Continuous     PRN Meds:    Labetalol HCl 10 mg Intravenous Q6H PRN   metoprolol 5 mg Intravenous Q6H PRN   ondansetron 4 mg Intravenous Q6H PRN       Discharge Plan:   TBD    Network Utilization Review Department  [email or web address]  ATTENTION: Please call with any questions or concerns to [phone] and carefully listen to the prompts so that you are directed to the right person  All voicemails are confidential   Carisa Carmen all requests for admission clinical reviews, approved or denied determinations and any other requests to dedicated fax number below belonging to the campus where the patient is receiving treatment   List of dedicated fax numbers for the Facilities:  FACILITY NAME UR FAX NUMBER   ADMISSION DENIALS (Administrative/Medical Necessity) [phone]   [address] (Maternity/NICU/Pediatrics) [phone] [facility] [phone]   Yuriy Vides [phone]   [place] [phone]   [address] [phone]   [address] [facility], [facility]  [phone]   [address] [place] [address] [facility] [phone]

## 2020-01-17 NOTE — ASSESSMENT & PLAN NOTE
· She would benefit from weight loss  · Likely component of OHV that is contributing to her acute on chronic pulmonary issues  · Continue BiPAP nocturnally and when napping given risk for hypercapnia

## 2020-01-17 NOTE — ASSESSMENT & PLAN NOTE
· Rate remains controlled  · Continue PO cardizem and metoprolol per cardiology recommendations  · Restarted on apixaban after previously being on Heparin    HIT antibody is pending

## 2020-01-17 NOTE — ASSESSMENT & PLAN NOTE
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

## 2020-01-18 PROBLEM — D75.829 HEPARIN INDUCED THROMBOCYTOPENIA: Status: ACTIVE | Noted: 2020-01-18

## 2020-01-18 PROBLEM — D75.82 HEPARIN INDUCED THROMBOCYTOPENIA (HCC): Status: ACTIVE | Noted: 2020-01-18

## 2020-01-18 PROBLEM — I27.20 PULMONARY HYPERTENSION (HCC): Status: ACTIVE | Noted: 2020-01-18

## 2020-01-18 LAB
ANION GAP SERPL CALCULATED.3IONS-SCNC: 2 MMOL/L (ref 4–13)
BUN SERPL-MCNC: 92 MG/DL (ref 5–25)
CALCIUM SERPL-MCNC: 8 MG/DL (ref 8.3–10.1)
CHLORIDE SERPL-SCNC: 98 MMOL/L (ref 100–108)
CO2 SERPL-SCNC: 40 MMOL/L (ref 21–32)
CREAT SERPL-MCNC: 2.02 MG/DL (ref 0.6–1.3)
GFR SERPL CREATININE-BSD FRML MDRD: 27 ML/MIN/1.73SQ M
GLUCOSE SERPL-MCNC: 102 MG/DL (ref 65–140)
GLUCOSE SERPL-MCNC: 110 MG/DL (ref 65–140)
GLUCOSE SERPL-MCNC: 113 MG/DL (ref 65–140)
GLUCOSE SERPL-MCNC: 126 MG/DL (ref 65–140)
PF4 HEPARIN CMPLX AB SER-ACNC: 3.16 OD (ref 0–0.4)
POTASSIUM SERPL-SCNC: 4.1 MMOL/L (ref 3.5–5.3)
SODIUM SERPL-SCNC: 140 MMOL/L (ref 136–145)

## 2020-01-18 PROCEDURE — 99232 SBSQ HOSP IP/OBS MODERATE 35: CPT | Performed by: INTERNAL MEDICINE

## 2020-01-18 PROCEDURE — 82948 REAGENT STRIP/BLOOD GLUCOSE: CPT

## 2020-01-18 PROCEDURE — 94660 CPAP INITIATION&MGMT: CPT

## 2020-01-18 PROCEDURE — 80048 BASIC METABOLIC PNL TOTAL CA: CPT | Performed by: NURSE PRACTITIONER

## 2020-01-18 PROCEDURE — 99233 SBSQ HOSP IP/OBS HIGH 50: CPT | Performed by: INTERNAL MEDICINE

## 2020-01-18 PROCEDURE — 99231 SBSQ HOSP IP/OBS SF/LOW 25: CPT

## 2020-01-18 PROCEDURE — 94640 AIRWAY INHALATION TREATMENT: CPT

## 2020-01-18 RX ORDER — ALBUMIN (HUMAN) 12.5 G/50ML
25 SOLUTION INTRAVENOUS ONCE
Status: COMPLETED | OUTPATIENT
Start: 2020-01-18 | End: 2020-01-18

## 2020-01-18 RX ORDER — ACETAZOLAMIDE 250 MG/1
250 TABLET ORAL EVERY 12 HOURS SCHEDULED
Status: COMPLETED | OUTPATIENT
Start: 2020-01-18 | End: 2020-01-20

## 2020-01-18 RX ADMIN — IPRATROPIUM BROMIDE 0.5 MG: 0.5 SOLUTION RESPIRATORY (INHALATION) at 07:25

## 2020-01-18 RX ADMIN — NICOTINE 1 PATCH: 14 PATCH TRANSDERMAL at 10:22

## 2020-01-18 RX ADMIN — METOPROLOL SUCCINATE 100 MG: 50 TABLET, EXTENDED RELEASE ORAL at 10:20

## 2020-01-18 RX ADMIN — APIXABAN 5 MG: 5 TABLET, FILM COATED ORAL at 18:37

## 2020-01-18 RX ADMIN — ALBUMIN (HUMAN) 25 G: 0.25 INJECTION, SOLUTION INTRAVENOUS at 18:37

## 2020-01-18 RX ADMIN — DILTIAZEM HYDROCHLORIDE 120 MG: 120 CAPSULE, COATED, EXTENDED RELEASE ORAL at 10:21

## 2020-01-18 RX ADMIN — NYSTATIN 1 APPLICATION: 100000 POWDER TOPICAL at 09:21

## 2020-01-18 RX ADMIN — LEVALBUTEROL HYDROCHLORIDE 1.25 MG: 1.25 SOLUTION, CONCENTRATE RESPIRATORY (INHALATION) at 14:20

## 2020-01-18 RX ADMIN — Medication 40 MG/HR: at 21:56

## 2020-01-18 RX ADMIN — ACETAZOLAMIDE 250 MG: 250 TABLET ORAL at 22:01

## 2020-01-18 RX ADMIN — IPRATROPIUM BROMIDE 0.5 MG: 0.5 SOLUTION RESPIRATORY (INHALATION) at 14:20

## 2020-01-18 RX ADMIN — NYSTATIN 1 APPLICATION: 100000 POWDER TOPICAL at 18:37

## 2020-01-18 RX ADMIN — LEVALBUTEROL HYDROCHLORIDE 1.25 MG: 1.25 SOLUTION, CONCENTRATE RESPIRATORY (INHALATION) at 07:25

## 2020-01-18 RX ADMIN — LEVALBUTEROL HYDROCHLORIDE 1.25 MG: 1.25 SOLUTION, CONCENTRATE RESPIRATORY (INHALATION) at 19:39

## 2020-01-18 RX ADMIN — Medication 40 MG/HR: at 07:12

## 2020-01-18 RX ADMIN — IPRATROPIUM BROMIDE 0.5 MG: 0.5 SOLUTION RESPIRATORY (INHALATION) at 19:39

## 2020-01-18 RX ADMIN — APIXABAN 5 MG: 5 TABLET, FILM COATED ORAL at 10:21

## 2020-01-18 NOTE — ASSESSMENT & PLAN NOTE
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  Slowly weaning HFNC settings  Continue to wean daytime supplemental O2 as tolerated to maintain saturation >88%  · Continue diuresis per nephrology recommendations  · Continue scheduled nebulized bronchodilators  · Aggressive pulmonary toileting  Encourage cough and deep breathing  Use incentive spirometer    Out of bed to the chair during the day  · PT/OT

## 2020-01-18 NOTE — PROGRESS NOTES
NEPHROLOGY PROGRESS NOTE   Sury Cr 63 y o  female MRN: [id number]  Unit/Bed#: ICU 06 Encounter: [id number]  Reason for Consult: SAPNA/CKD/volume overload    ASSESSMENT/PLAN:  1  Acute Kidney Injury, POA- likely due to cardiorenal volume overload with hypotension in the setting of an ACEI  - creatinine improving with diuresis down to 2 0 from 3 8  - nonoliguric  2  Chronic Kidney Disease stage 3- Baseline creatinine 1 3-1 6  Suspected due to hypertension, diabetes, cardiorenal syndrome  3  Volume Overload/Diastolic CHF- continue lasix drip at 40mg/hr  - received metolazone 5mg past 2 days, will hold today with elevated bicarbonate level  - need daily weights and accurate urine output  - weight not being documented daily since 1/15 but was improving from 135kg to 131kg  - weight from summer 2018 was 240-250 pounds per patient  4  Elevated bicarbonate level- consider due to contraction alkalosis  - hold metolazone today  5  Borderline Hypotension- on diltiazem and metoprolol for rate control  6  Hyperphosphatemia- recheck in AM, monitor with improving renal function    Disposition:  Continue lasix drip  No further dose of metolazone needed today  SUBJECTIVE:  Patient states breathing has improved since admission but not back to baseline yet  Eating lunch      OBJECTIVE:  Current Weight: Weight - Scale: 131 kg (288 lb 12 8 oz)  Vitals:    01/18/20 0810 01/18/20 0827 01/18/20 0900 01/18/20 1020   BP: 97/75   105/72   BP Location:       Pulse: 78   82   Resp: (!) 29   20   Temp:  97 5 °F (36 4 °C)     TempSrc:  Temporal     SpO2: 95%  92% 99%   Weight:       Height:           Intake/Output Summary (Last 24 hours) at 1/18/2020 1055  Last data filed at 1/17/2020 1800  Gross per 24 hour   Intake 32 ml   Output 550 ml   Net -518 ml     General: NAD  Skin: no rash  Eyes: anicteric  ENMT: mm moist  Neck: no masses  Respiratory: basilar crackles  Cardiac: RRR  Extremities: + pitting edema to hips  GI: soft, + edema of isaiasus  Neuro: alert awake  Psych: mood and affect appropriate    Medications:    Current Facility-Administered Medications:     apixaban (ELIQUIS) tablet 5 mg, 5 mg, Oral, BID, Luli K Bilofsky, CRNP, 5 mg at 01/18/20 1021    dextrose 50 % IV solution 25 mL, 25 mL, Intravenous, PRN, Moris Mcghee, CRNP    diltiazem (CARDIZEM CD) 24 hr capsule 120 mg, 120 mg, Oral, Daily, Loreda Beecham Bilofsky, CRNP, 120 mg at 01/18/20 1021    furosemide (LASIX) 500 mg infusion 50 mL, 40 mg/hr, Intravenous, Continuous, Angle [place], BALJITNP, Last Rate: 4 mL/hr at 01/18/20 0712, 40 mg/hr at 01/18/20 0712    ipratropium (ATROVENT) 0 02 % inhalation solution 0 5 mg, 0 5 mg, Nebulization, TID, Loreda Beeirlandam Arunofskkyler, CRNP, 0 5 mg at 01/18/20 0725    levalbuterol (XOPENEX) inhalation solution 1 25 mg, 1 25 mg, Nebulization, TID, Luli K Bilofsky, CRNP, 1 25 mg at 01/18/20 0725    metoprolol (LOPRESSOR) injection 5 mg, 5 mg, Intravenous, Q6H PRN, Loreda Beecham Bilofsky, CRNP    metoprolol succinate (TOPROL-XL) 24 hr tablet 100 mg, 100 mg, Oral, Daily, Luli K Bilofsky, CRNP, 100 mg at 01/18/20 1020    nicotine (NICODERM CQ) 14 mg/24hr TD 24 hr patch 1 patch, 1 patch, Transdermal, Daily, Loreda Beeirlandam Bilofsky, CRNP, 1 patch at 01/18/20 1022    nystatin (MYCOSTATIN) powder, , Topical, BID, Loreda Beecham Bilofsky, CRNP, 1 application at 54/91/71 0921    ondansetron (ZOFRAN) injection 4 mg, 4 mg, Intravenous, Q6H PRN, Marcelino Manns Spatzer, CRNP, 4 mg at 01/17/20 3247    Laboratory Results:  Results from last 7 days   Lab Units 01/18/20  0510 01/17/20  0619 01/16/20  0427 01/15/20  0556 01/15/20  0555 01/14/20  0612 01/13/20  0430 01/12/20 0429   WBC Thousand/uL  --   --  7 05  --  7 08  --  6 82  --    HEMOGLOBIN g/dL  --   --  15 0  --  16 0*  --  17 0*  --    HEMATOCRIT %  --   --  49 9*  --  53 4*  --  57 0*  --    PLATELETS Thousands/uL  --   --  60*  --  62*  --  70*  --    POTASSIUM mmol/L 4 1 3 7 3 7 3 6  --  3 4* 3 9 3 6   CHLORIDE mmol/L 98* 98* 100 102 --  103 103 106   CO2 mmol/L 40* 36* 36* 35*  --  33* 30 30   BUN mg/dL 92* 84* 80* 74*  --  74* 70* 67*   CREATININE mg/dL 2 02* 2 11* 2 19* 2 31*  --  2 70* 3 05* 3 42*   CALCIUM mg/dL 8 0* 8 1* 8 0* 8 1*  --  7 9* 8 3 7 6*   MAGNESIUM mg/dL  --   --   --  1 7  --   --  1 9  --    PHOSPHORUS mg/dL  --   --   --   --   --   --   --  5 5*

## 2020-01-18 NOTE — ASSESSMENT & PLAN NOTE
Lab Results   Component Value Date    HGBA1C 5 8 04/11/2019       Recent Labs     01/17/20  0742 01/17/20  1122 01/17/20  1333 01/17/20  1627   POCGLU 109 130 88 110       Blood Sugar Average: Last 72 hrs:  · Has been tolerating oral diet and initial hypoglycemia has improved  · TSH normal, random cortisol 17 9  · Blood sugar controlled  Continue accucheks with SSI coverage  Goal to maintain -180  Aggressively monitor and treat hypoglycemia as this has been associated with encephalopathy in this patient  no

## 2020-01-18 NOTE — ASSESSMENT & PLAN NOTE
· She would benefit from weight loss  Diet per nutrition recommendations  · Likely component of OHV that is contributing to her acute on chronic pulmonary issues    Continue BiPAP nocturnally and when napping given risk for hypercapnia

## 2020-01-18 NOTE — PROGRESS NOTES
Progress Note - Melquiades Green 1960, 63 y o  female MRN: [id number]    Unit/Bed#: ICU 06 Encounter: [id number]    Primary Care Provider: Diamond Flaherty MD   Date and time admitted to hospital: 1/6/2020 10:25 PM        Acute on chronic diastolic congestive heart failure [facility])  Assessment & Plan  Wt Readings from Last 3 Encounters:   01/15/20 131 kg (288 lb 12 8 oz)   11/05/19 110 kg (243 lb 9 6 oz)   10/28/19 109 kg (240 lb 4 8 oz)       · Suspect decompensated CHF  Echo from 1/8/20 shows EF of 37% with diastolic dysfunction  · Has been diuresing very well  Continue per nephrology recommendations  · Continue beta blockade      * Acute on chronic respiratory failure with hypoxia and hypercapnia (HCC)  Assessment & Plan  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, pleural effusion and noncompliance with nocturnal BiPAP  · Pt notes baseline oxygen requirement at home is 10L  · Slowly improving  [facility]oler[facility] during the day [facility]  Slowly weaning HFNC settings  Continue to wean daytime supplemental O2 as tolerated to maintain saturation >88%  · Continue diuresis per nephrology recommendations  · Continue scheduled nebulized bronchodilators  · Aggressive pulmonary toileting  Encourage cough and deep breathing  Use incentive spirometer  Out of bed to the chair during the day  · PT/OT        COPD (chronic obstructive pulmonary disease) (HCC)  Assessment & Plan  · Continue nebulized bronchodilators  No indication for steroids  · Not on maintenance inhalers at home      Pericardial effusion  Assessment & Plan  · The patient was noted to have a pericardial effusion on CT imaging that is described as mild to moderate  On her ECHO form 1/8 there was no evidence of effusion  · Small to moderate pericardial effusion noted on repeat Echo from 1/15  No evidence of hemodynamic compromise    Cardiology is following  Permanent atrial fibrillation  Assessment & Plan  · Rate remains controlled  · Continue PO cardizem and metoprolol  · Restarted on apixaban after previously being on Heparin  HIT antibody is pending    SAPNA (acute kidney injury) ([facility], [place] [address] )  Assessment & Plan  · Acute on CKD III with baseline approximately 1 5  · Nephrology is following  Creatinine is trending down  Vigorous urine output on lasix infusion  Continue lasix gtt per nephrology recommendations  · Trend renal indices and monitor intake and output  · Urine output goal of 1-2 l negative per 24 hours      Chronic venous stasis dermatitis of both lower extremities  Assessment & Plan  · No open ulcers or signs of cellulitis        Type 2 diabetes mellitus without complication [facility])  Assessment & Plan  Lab Results   Component Value Date    HGBA1C 5 8 04/11/2019       Recent Labs     01/17/20  0742 01/17/20  1122 01/17/20  1333 01/17/20  1627   POCGLU 109 130 88 110       Blood Sugar Average: Last 72 hrs:  · Has been tolerating oral diet and initial hypoglycemia has improved  · TSH normal, random cortisol 17 9  · Blood sugar controlled  Continue accucheks with SSI coverage  Goal to maintain -180  Aggressively monitor and treat hypoglycemia as this has been associated with encephalopathy in this patient  Obesity  Assessment & Plan  · She would benefit from weight loss  Diet per nutrition recommendations  · Likely component of OHV that is contributing to her acute on chronic pulmonary issues  Continue BiPAP nocturnally and when napping given risk for hypercapnia    _____________________________________________________________________    HPI/24hr events:   Afebrile  No acute events overnight      Medications:    Current Facility-Administered Medications:  apixaban 5 mg Oral BID MAYNOR Matta    dextrose 25 mL Intravenous PRN MAYNOR Soto    diltiazem 120 mg Oral Daily MAYNOR Jaeger    furosemide 40 mg/hr Intravenous Continuous Cyndra Lapidus, CRNP Last Rate: 40 mg/hr (01/17/20 1718)   ipratropium 0 5 mg Nebulization TID Perri Pancake Bilofsky, CRNP    levalbuterol 1 25 mg Nebulization TID Perri Pancake Bilofsky, CRNP    metoprolol 5 mg Intravenous Q6H PRN Perri Pancake Bilofsky, CRNP    metoprolol succinate 100 mg Oral Daily Perri Pancake Bilofsky, CRNP    nicotine 1 patch Transdermal Daily Luli K Bilofsky, CRNP    nystatin  Topical BID Perri Pancake Bilofsky, CRNP    ondansetron 4 mg Intravenous Q6H PRN Ferrell Craver Spatzer, CRNP          furosemide 40 mg/hr Last Rate: 40 mg/hr (01/17/20 1718)         Physical exam:  Vitals: Body mass index is 48 06 kg/m²  Blood pressure (!) 89/47, pulse 84, temperature 97 8 °F (36 6 °C), temperature source Temporal, resp  rate 21, height 5' 5" (1 651 m), weight 131 kg (288 lb 12 8 oz), SpO2 90 %  ,  Temp  Min: 96 7 °F (35 9 °C)  Max: 99 3 °F (37 4 °C)  IBW: 57 kg    SpO2: 90 %  SpO2 Activity: At Rest  O2 Device: High flow nasal cannula      Intake/Output Summary (Last 24 hours) at 1/18/2020 0612  Last data filed at 1/17/2020 1800  Gross per 24 hour   Intake 53 ml   Output 1150 ml   Net -1097 ml       Invasive/non-invasive ventilation settings:   Respiratory    Lab Data (Last 4 hours)    None         O2/Vent Data (Last 4 hours)    None              Invasive Devices     Peripheral Intravenous Line            Peripheral IV 01/16/20 Right Wrist 1 day          Drain            External Urinary Catheter 7 days                  Physical Exam:  Gen: Sleeping but arousable, no acute distress  HEENT:  Atraumatic, normocephalic, extraocular movements intact, pupils 3 mm equal and reactive, sclerae anicteric, oropharynx is clear, tongue beefy red  Neck:  Thick, supple, trachea midline, no JVD, no lymphadenopathy  Chest:  Diminished bilaterally worse on the left  Cor:  Single S1/S2, no murmurs, rubs, gallops, irregularly irregular  Abd:  Obese, soft, nontender, nondistended, bowel sounds normoactive  Ext:  3+ bilateral lower extremity edema, lower extremity skin changes consistent with chronic venous stasis, lower extremity hemosiderin staining  Neuro:  Oriented x3, cranial nerves 2-12 grossly intact, no focal deficits  Skin:  Warm, dry      Diagnostic Data:  Lab: I have personally reviewed pertinent lab results  CBC:   Results from last 7 days   Lab Units 01/16/20  0427 01/15/20  0555 01/13/20  0430   WBC Thousand/uL 7 05 7 08 6 82   HEMOGLOBIN g/dL 15 0 16 0* 17 0*   HEMATOCRIT % 49 9* 53 4* 57 0*   PLATELETS Thousands/uL 60* 62* 70*       CMP:   Results from last 7 days   Lab Units 01/18/20  0510 01/17/20  0619 01/16/20  0427 01/15/20  0556  01/11/20  1241   SODIUM mmol/L 140 142 142 144   < > 145   POTASSIUM mmol/L 4 1 3 7 3 7 3 6   < > 4 0   CHLORIDE mmol/L 98* 98* 100 102   < > 106   CO2 mmol/L 40* 36* 36* 35*   < > 29   BUN mg/dL 92* 84* 80* 74*   < > 67*   CREATININE mg/dL 2 02* 2 11* 2 19* 2 31*   < > 3 75*   CALCIUM mg/dL 8 0* 8 1* 8 0* 8 1*   < > 7 8*   ALK PHOS U/L  --   --  84 80  --  99   ALT U/L  --   --  8* 8*  --  16   AST U/L  --   --  11 12  --  12    < > = values in this interval not displayed  PT/INR:   No results found for: PT, INR,   Magnesium:   Results from last 7 days   Lab Units 01/15/20  0556 01/13/20  0430   MAGNESIUM mg/dL 1 7 1 9     Phosphorous:   Results from last 7 days   Lab Units 01/12/20  0429   PHOSPHORUS mg/dL 5 5*       Microbiology:        Imaging:  CXR - Persistent b/l diffuse opacity without clear focal infiltrate, L sided pleural effusion    Cardiac lab/EKG/telemetry/ECHO:   Atrial fibrillation    VTE Prophylaxis: Eliquis    Code Status: Level 1 - Full Code    Manfred Manns Spatzer, CRNP    Portions of the record may have been created with voice recognition software  Occasional wrong word or "sound a like" substitutions may have occurred due to the inherent limitations of voice recognition software   Read the chart carefully and recognize, using context, where substitutions have occurred

## 2020-01-18 NOTE — PLAN OF CARE
Problem: Prexisting or High Potential for Compromised Skin Integrity  Goal: Skin integrity is maintained or improved  Description  INTERVENTIONS:  - Identify patients at risk for skin breakdown  - Assess and monitor skin integrity  - Assess and monitor nutrition and hydration status  - Monitor labs   - Assess for incontinence   - Turn and reposition patient  - Assist with mobility/ambulation  - Relieve pressure over bony prominences  - Avoid friction and shearing  - Provide appropriate hygiene as needed including keeping skin clean and dry  - Evaluate need for skin moisturizer/barrier cream  - Collaborate with interdisciplinary team   - Patient/family teaching  - Consider wound care consult   Outcome: Progressing     Problem: Potential for Falls  Goal: Patient will remain free of falls  Description  INTERVENTIONS:  - Assess patient frequently for physical needs  -  Identify cognitive and physical deficits and behaviors that affect risk of falls    -  [place] fall precautions as indicated by assessment   - Educate patient/family on patient safety including physical limitations  - Instruct patient to call for assistance with activity based on assessment  - Modify environment to reduce risk of injury  - Consider OT/PT consult to assist with strengthening/mobility  Outcome: Progressing     Problem: PAIN - ADULT  Goal: Verbalizes/displays adequate comfort level or baseline comfort level  Description  Interventions:  - Encourage patient to monitor pain and request assistance  - Assess pain using appropriate pain scale  - Administer analgesics based on type and severity of pain and evaluate response  - Implement non-pharmacological measures as appropriate and evaluate response  - Consider cultural and social influences on pain and pain management  - Notify physician/advanced practitioner if interventions unsuccessful or patient reports new pain  Outcome: Progressing     Problem: INFECTION - ADULT  Goal: Absence or prevention of progression during hospitalization  Description  INTERVENTIONS:  - Assess and monitor for signs and symptoms of infection  - Monitor lab/diagnostic results  - Monitor all insertion sites, i e  indwelling lines, tubes, and drains  - Monitor endotracheal if appropriate and nasal secretions for changes in amount and color  - [place] appropriate cooling/warming therapies per order  - Administer medications as ordered  - Instruct and encourage patient and family to use good hand hygiene technique  - Identify and instruct in appropriate isolation precautions for identified infection/condition  Outcome: Progressing  Goal: Absence of fever/infection during neutropenic period  Description  INTERVENTIONS:  - Monitor WBC    Outcome: Progressing     Problem: SAFETY ADULT  Goal: Maintain or return to baseline ADL function  Description  INTERVENTIONS:  -  Assess patient's ability to carry out ADLs; assess patient's baseline for ADL function and identify physical deficits which impact ability to perform ADLs (bathing, care of mouth/teeth, toileting, grooming, dressing, etc )  - Assess/evaluate cause of self-care deficits   - Assess range of motion  - Assess patient's mobility; develop plan if impaired  - Assess patient's need for assistive devices and provide as appropriate  - Encourage maximum independence but intervene and supervise when necessary  - Involve family in performance of ADLs  - Assess for home care needs following discharge   - Consider OT consult to assist with ADL evaluation and planning for discharge  - Provide patient education as appropriate  Outcome: Progressing  Goal: Maintain or return mobility status to optimal level  Description  INTERVENTIONS:  - Assess patient's baseline mobility status (ambulation, transfers, stairs, etc )    - Identify cognitive and physical deficits and behaviors that affect mobility  - Identify mobility aids required to assist with transfers and/or ambulation (gait belt, sit-to-stand, lift, walker, cane, etc )  - [place] fall precautions as indicated by assessment  - Record patient progress and toleration of activity level on Mobility SBAR; progress patient to next Phase/Stage  - Instruct patient to call for assistance with activity based on assessment  - Consider rehabilitation consult to assist with strengthening/weightbearing, etc   Outcome: Progressing     Problem: DISCHARGE PLANNING  Goal: Discharge to home or other facility with appropriate resources  Description  INTERVENTIONS:  - Identify barriers to discharge w/patient and caregiver  - Arrange for needed discharge resources and transportation as appropriate  - Identify discharge learning needs (meds, wound care, etc )  - Arrange for interpretive services to assist at discharge as needed  - Refer to Case Management Department for coordinating discharge planning if the patient needs post-hospital services based on physician/advanced practitioner order or complex needs related to functional status, cognitive ability, or social support system  Outcome: Progressing     Problem: Knowledge Deficit  Goal: Patient/family/caregiver demonstrates understanding of disease process, treatment plan, medications, and discharge instructions  Description  Complete learning assessment and assess knowledge base    Interventions:  - Provide teaching at level of understanding  - Provide teaching via preferred learning methods  Outcome: Progressing     Problem: CARDIOVASCULAR - ADULT  Goal: Maintains optimal cardiac output and hemodynamic stability  Description  INTERVENTIONS:  - Monitor I/O, vital signs and rhythm  - Monitor for S/S and trends of decreased cardiac output  - Administer and titrate ordered vasoactive medications to optimize hemodynamic stability  - Assess quality of pulses, skin color and temperature  - Assess for signs of decreased coronary artery perfusion  - Instruct patient to report change in severity of symptoms  Outcome: Progressing  Goal: Absence of cardiac dysrhythmias or at baseline rhythm  Description  INTERVENTIONS:  - Continuous cardiac monitoring, vital signs, obtain 12 lead EKG if ordered  - Administer antiarrhythmic and heart rate control medications as ordered  - Monitor electrolytes and administer replacement therapy as ordered  Outcome: Progressing     Problem: RESPIRATORY - ADULT  Goal: Achieves optimal ventilation and oxygenation  Description  INTERVENTIONS:  - Assess for changes in respiratory status  - Assess for changes in mentation and behavior  - Position to facilitate oxygenation and minimize respiratory effort  - Oxygen administered by appropriate delivery if ordered  - Initiate smoking cessation education as indicated  - Encourage broncho-pulmonary hygiene including cough, deep breathe, Incentive Spirometry  - Assess the need for suctioning and aspirate as needed  - Assess and instruct to report SOB or any respiratory difficulty  - Respiratory Therapy support as indicated  Outcome: Progressing     Problem: METABOLIC, FLUID AND ELECTROLYTES - ADULT  Goal: Electrolytes maintained within normal limits  Description  INTERVENTIONS:  - Monitor labs and assess patient for signs and symptoms of electrolyte imbalances  - Administer electrolyte replacement as ordered  - Monitor response to electrolyte replacements, including repeat lab results as appropriate  - Instruct patient on fluid and nutrition as appropriate  Outcome: Progressing  Goal: Fluid balance maintained  Description  INTERVENTIONS:  - Monitor labs   - Monitor I/O and WT  - Instruct patient on fluid and nutrition as appropriate  - Assess for signs & symptoms of volume excess or deficit  Outcome: Progressing  Goal: Glucose maintained within target range  Description  INTERVENTIONS:  - Monitor Blood Glucose as ordered  - Assess for signs and symptoms of hyperglycemia and hypoglycemia  - Administer ordered medications to maintain glucose within target range  - Assess nutritional intake and initiate nutrition service referral as needed  Outcome: Progressing     Problem: SKIN/TISSUE INTEGRITY - ADULT  Goal: Skin integrity remains intact  Description  INTERVENTIONS  - Identify patients at risk for skin breakdown  - Assess and monitor skin integrity  - Assess and monitor nutrition and hydration status  - Monitor labs (i e  albumin)  - Assess for incontinence   - Turn and reposition patient  - Assist with mobility/ambulation  - Relieve pressure over bony prominences  - Avoid friction and shearing  - Provide appropriate hygiene as needed including keeping skin clean and dry  - Evaluate need for skin moisturizer/barrier cream  - Collaborate with interdisciplinary team (i e  Nutrition, Rehabilitation, etc )   - Patient/family teaching  Outcome: Progressing  Goal: Incision(s), wounds(s) or drain site(s) healing without S/S of infection  Description  INTERVENTIONS  - Assess and document risk factors for skin impairment   - Assess and document dressing, incision, wound bed, drain sites and surrounding tissue  - Consider nutrition services referral as needed  - Oral mucous membranes remain intact  - Provide patient/ family education  Outcome: Progressing  Goal: Oral mucous membranes remain intact  Description  INTERVENTIONS  - Assess oral mucosa and hygiene practices  - Implement preventative oral hygiene regimen  - Implement oral medicated treatments as ordered  - Initiate Nutrition services referral as needed  Outcome: Progressing     Problem: Nutrition/Hydration-ADULT  Goal: Nutrient/Hydration intake appropriate for improving, restoring or maintaining nutritional needs  Description  Monitor and assess patient's nutrition/hydration status for malnutrition  Collaborate with interdisciplinary team and initiate plan and interventions as ordered  Monitor patient's weight and dietary intake as ordered or per policy   Utilize nutrition screening tool and intervene as necessary  Determine patient's food preferences and provide high-protein, high-caloric foods as appropriate       INTERVENTIONS:  - Monitor oral intake, urinary output, labs, and treatment plans  - Assess nutrition and hydration status and recommend course of action  - Evaluate amount of meals eaten  - Assist patient with eating if necessary   - Allow adequate time for meals  - Recommend/ encourage appropriate diets, oral nutritional supplements, and vitamin/mineral supplements  - Order, calculate, and assess calorie counts as needed  - Recommend, monitor, and adjust tube feedings and TPN/PPN based on assessed needs  - Assess need for intravenous fluids  - Provide specific nutrition/hydration education as appropriate  - Include patient/family/caregiver in decisions related to nutrition  Outcome: Progressing

## 2020-01-18 NOTE — ASSESSMENT & PLAN NOTE
· Rate remains controlled  · Continue PO cardizem and metoprolol  · Restarted on apixaban after previously being on Heparin    HIT antibody is pending

## 2020-01-18 NOTE — ASSESSMENT & PLAN NOTE
Wt Readings from Last 3 Encounters:   01/15/20 131 kg (288 lb 12 8 oz)   11/05/19 110 kg (243 lb 9 6 oz)   10/28/19 109 kg (240 lb 4 8 oz)       · Suspect decompensated CHF  Echo from 1/8/20 shows EF of 10% with diastolic dysfunction  · Has been diuresing very well  Continue per nephrology recommendations    · Continue beta blockade

## 2020-01-18 NOTE — ASSESSMENT & PLAN NOTE
· Acute on CKD III with baseline approximately 1 5  · Nephrology is following  Creatinine is trending down  Vigorous urine output on lasix infusion  Continue lasix gtt per nephrology recommendations  · Trend renal indices and monitor intake and output    · Urine output goal of 1-2 l negative per 24 hours

## 2020-01-19 LAB
ANION GAP SERPL CALCULATED.3IONS-SCNC: -1 MMOL/L (ref 4–13)
BUN SERPL-MCNC: 97 MG/DL (ref 5–25)
CALCIUM SERPL-MCNC: 8.1 MG/DL (ref 8.3–10.1)
CHLORIDE SERPL-SCNC: 98 MMOL/L (ref 100–108)
CO2 SERPL-SCNC: 42 MMOL/L (ref 21–32)
CREAT SERPL-MCNC: 2.08 MG/DL (ref 0.6–1.3)
ERYTHROCYTE [DISTWIDTH] IN BLOOD BY AUTOMATED COUNT: 18.6 % (ref 11.6–15.1)
GFR SERPL CREATININE-BSD FRML MDRD: 26 ML/MIN/1.73SQ M
GLUCOSE SERPL-MCNC: 104 MG/DL (ref 65–140)
GLUCOSE SERPL-MCNC: 128 MG/DL (ref 65–140)
GLUCOSE SERPL-MCNC: 134 MG/DL (ref 65–140)
GLUCOSE SERPL-MCNC: 252 MG/DL (ref 65–140)
GLUCOSE SERPL-MCNC: 87 MG/DL (ref 65–140)
HCT VFR BLD AUTO: 48.4 % (ref 34.8–46.1)
HGB BLD-MCNC: 14.2 G/DL (ref 11.5–15.4)
MAGNESIUM SERPL-MCNC: 1.7 MG/DL (ref 1.6–2.6)
MCH RBC QN AUTO: 30.5 PG (ref 26.8–34.3)
MCHC RBC AUTO-ENTMCNC: 29.3 G/DL (ref 31.4–37.4)
MCV RBC AUTO: 104 FL (ref 82–98)
PHOSPHATE SERPL-MCNC: 4.1 MG/DL (ref 2.7–4.5)
PLATELET # BLD AUTO: 66 THOUSANDS/UL (ref 149–390)
PMV BLD AUTO: 12.6 FL (ref 8.9–12.7)
POTASSIUM SERPL-SCNC: 3.1 MMOL/L (ref 3.5–5.3)
RBC # BLD AUTO: 4.65 MILLION/UL (ref 3.81–5.12)
SODIUM SERPL-SCNC: 139 MMOL/L (ref 136–145)
WBC # BLD AUTO: 4.74 THOUSAND/UL (ref 4.31–10.16)

## 2020-01-19 PROCEDURE — 85027 COMPLETE CBC AUTOMATED: CPT | Performed by: NURSE PRACTITIONER

## 2020-01-19 PROCEDURE — 99232 SBSQ HOSP IP/OBS MODERATE 35: CPT | Performed by: INTERNAL MEDICINE

## 2020-01-19 PROCEDURE — 82948 REAGENT STRIP/BLOOD GLUCOSE: CPT

## 2020-01-19 PROCEDURE — 99233 SBSQ HOSP IP/OBS HIGH 50: CPT | Performed by: INTERNAL MEDICINE

## 2020-01-19 PROCEDURE — 80048 BASIC METABOLIC PNL TOTAL CA: CPT | Performed by: PHYSICIAN ASSISTANT

## 2020-01-19 PROCEDURE — 94660 CPAP INITIATION&MGMT: CPT

## 2020-01-19 PROCEDURE — 83735 ASSAY OF MAGNESIUM: CPT | Performed by: PHYSICIAN ASSISTANT

## 2020-01-19 PROCEDURE — 94640 AIRWAY INHALATION TREATMENT: CPT

## 2020-01-19 PROCEDURE — 84100 ASSAY OF PHOSPHORUS: CPT | Performed by: PHYSICIAN ASSISTANT

## 2020-01-19 RX ORDER — QUETIAPINE FUMARATE 25 MG/1
25 TABLET, FILM COATED ORAL
Status: DISCONTINUED | OUTPATIENT
Start: 2020-01-19 | End: 2020-01-19

## 2020-01-19 RX ORDER — POTASSIUM CHLORIDE 20 MEQ/1
40 TABLET, EXTENDED RELEASE ORAL ONCE
Status: COMPLETED | OUTPATIENT
Start: 2020-01-19 | End: 2020-01-19

## 2020-01-19 RX ORDER — POTASSIUM CHLORIDE 14.9 MG/ML
20 INJECTION INTRAVENOUS ONCE
Status: COMPLETED | OUTPATIENT
Start: 2020-01-19 | End: 2020-01-19

## 2020-01-19 RX ORDER — MAGNESIUM SULFATE HEPTAHYDRATE 40 MG/ML
2 INJECTION, SOLUTION INTRAVENOUS ONCE
Status: COMPLETED | OUTPATIENT
Start: 2020-01-19 | End: 2020-01-19

## 2020-01-19 RX ORDER — QUETIAPINE FUMARATE 25 MG/1
25 TABLET, FILM COATED ORAL EVERY EVENING
Status: DISCONTINUED | OUTPATIENT
Start: 2020-01-19 | End: 2020-01-21 | Stop reason: HOSPADM

## 2020-01-19 RX ADMIN — LEVALBUTEROL HYDROCHLORIDE 1.25 MG: 1.25 SOLUTION, CONCENTRATE RESPIRATORY (INHALATION) at 08:00

## 2020-01-19 RX ADMIN — APIXABAN 5 MG: 5 TABLET, FILM COATED ORAL at 10:54

## 2020-01-19 RX ADMIN — IPRATROPIUM BROMIDE 0.5 MG: 0.5 SOLUTION RESPIRATORY (INHALATION) at 08:00

## 2020-01-19 RX ADMIN — MAGNESIUM SULFATE HEPTAHYDRATE 2 G: 40 INJECTION, SOLUTION INTRAVENOUS at 06:30

## 2020-01-19 RX ADMIN — ACETAZOLAMIDE 250 MG: 250 TABLET ORAL at 10:53

## 2020-01-19 RX ADMIN — NICOTINE 1 PATCH: 14 PATCH TRANSDERMAL at 10:55

## 2020-01-19 RX ADMIN — POTASSIUM CHLORIDE 20 MEQ: 14.9 INJECTION, SOLUTION INTRAVENOUS at 06:15

## 2020-01-19 RX ADMIN — QUETIAPINE FUMARATE 25 MG: 25 TABLET ORAL at 20:51

## 2020-01-19 RX ADMIN — LEVALBUTEROL HYDROCHLORIDE 1.25 MG: 1.25 SOLUTION, CONCENTRATE RESPIRATORY (INHALATION) at 14:22

## 2020-01-19 RX ADMIN — IPRATROPIUM BROMIDE 0.5 MG: 0.5 SOLUTION RESPIRATORY (INHALATION) at 19:24

## 2020-01-19 RX ADMIN — ACETAZOLAMIDE 250 MG: 250 TABLET ORAL at 20:52

## 2020-01-19 RX ADMIN — Medication 40 MG/HR: at 23:48

## 2020-01-19 RX ADMIN — NYSTATIN: 100000 POWDER TOPICAL at 10:55

## 2020-01-19 RX ADMIN — APIXABAN 5 MG: 5 TABLET, FILM COATED ORAL at 17:36

## 2020-01-19 RX ADMIN — QUETIAPINE FUMARATE 25 MG: 25 TABLET ORAL at 00:45

## 2020-01-19 RX ADMIN — DILTIAZEM HYDROCHLORIDE 120 MG: 120 CAPSULE, COATED, EXTENDED RELEASE ORAL at 10:54

## 2020-01-19 RX ADMIN — POTASSIUM CHLORIDE 40 MEQ: 1500 TABLET, EXTENDED RELEASE ORAL at 06:13

## 2020-01-19 RX ADMIN — NYSTATIN 1 APPLICATION: 100000 POWDER TOPICAL at 17:36

## 2020-01-19 RX ADMIN — LEVALBUTEROL HYDROCHLORIDE 1.25 MG: 1.25 SOLUTION, CONCENTRATE RESPIRATORY (INHALATION) at 19:24

## 2020-01-19 RX ADMIN — METOPROLOL SUCCINATE 100 MG: 50 TABLET, EXTENDED RELEASE ORAL at 10:54

## 2020-01-19 RX ADMIN — IPRATROPIUM BROMIDE 0.5 MG: 0.5 SOLUTION RESPIRATORY (INHALATION) at 14:22

## 2020-01-19 RX ADMIN — Medication 40 MG/HR: at 11:12

## 2020-01-19 NOTE — ASSESSMENT & PLAN NOTE
· Rate remains controlled  · Continue PO cardizem and metoprolol  · Restarted on apixaban after previously being on Heparin    HIT antibody is positive

## 2020-01-19 NOTE — PHYSICAL THERAPY NOTE
PHYSICAL THERAPY NOTE          Patient Name: Magaly Prakash  JIXHO'N Date: 1/19/2020     Attempted to initiate therapy services this am   Informed by pt's nurse that pt is currently eating breakfast   Pt becomes upset, per nursing,  if interrupted with meal and request for PT to return at later time    Ryan Wilkinson, PT

## 2020-01-19 NOTE — ASSESSMENT & PLAN NOTE
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, pleural effusion and noncompliance with nocturnal BiPAP  · Pt notes baseline oxygen requirement at home is 10L but case management as confirmed she is supposed to be on 3 liters  · Slowly improving  Tolerating HFNC during the day and BiPAP at [facility]  Slowly weaning HFNC settings  Continue to wean daytime supplemental O2 as tolerated to maintain saturation >88%  · Continue diuresis per nephrology recommendations, added diamox for 4 doses to be completed today  · Continue scheduled nebulized bronchodilators  · Aggressive pulmonary toileting  Encourage cough and deep breathing  Use incentive spirometer    Out of bed to the chair during the day  · PT/OT

## 2020-01-19 NOTE — OCCUPATIONAL THERAPY NOTE
Occupational Therapy  Attempted to initiate therapy services this am   Informed by pt's nurse that pt is currently eating breakfast   Pt becomes upset, per nursing, if interrupted with meal and request for OT to return at later time  Will continue to follow as available   HILARIA Alaniz

## 2020-01-19 NOTE — ASSESSMENT & PLAN NOTE
· Cardiology following and patient does not want treatment or evaluation of pulmonary hypertension  · Is non compliant at baseline with medications and BiPAP

## 2020-01-19 NOTE — ASSESSMENT & PLAN NOTE
Wt Readings from Last 3 Encounters:   01/15/20 131 kg (288 lb 12 8 oz)   11/05/19 110 kg (243 lb 9 6 oz)   10/28/19 109 kg (240 lb 4 8 oz)       · Suspect decompensated CHF  Echo from 1/8/20 shows EF of 51% with diastolic dysfunction  · Has been diuresing very well  Continue per nephrology recommendations    · Continue beta blockade  · Negative 1 liter overnight

## 2020-01-19 NOTE — ASSESSMENT & PLAN NOTE
· Acute on CKD III with baseline approximately 1 3-1 6  · Nephrology is following  Creatinine is trending down  Vigorous urine output on lasix infusion  Continue lasix gtt per nephrology recommendations  · Trend renal indices and monitor intake and output    · Urine output goal of 1-2 l negative per 24 hours  · Obtain daily weights  · Creatinine 2 02 yesterday and increased to 2 08 today

## 2020-01-19 NOTE — ASSESSMENT & PLAN NOTE
· Continue nebulized bronchodilators  No indication for steroids    · Not on maintenance inhalers at home  · Will need outpatient follow up

## 2020-01-19 NOTE — PROGRESS NOTES
NEPHROLOGY PROGRESS NOTE   [place] Knee 63 y o  female MRN: [id number]  Unit/Bed#: ICU 06 Encounter: [id number]  Reason for Consult: SAPNA/CKD/volume overload    ASSESSMENT/PLAN:  1  Acute Kidney Injury, POA- likely due to cardiorenal volume overload with hypotension in the setting of an ACEI  - creatinine improving with diuresis down to 2 0 from 3 8  - nonoliguric  2  Chronic Kidney Disease stage 3- Baseline creatinine 1 3-1 6  Suspected due to hypertension, diabetes, cardiorenal syndrome  3  Volume Overload/Diastolic CHF- continue lasix drip at 40mg/hr  - received metolazone 5mg 1/16 & 1/17, now holding due to elevated bicarbonate level  - need daily weights and accurate urine output  - weight down from 135kg to 120kg over 2 weeks  - weight from summer 2018 was 240-250 pounds per patient  4  Elevated bicarbonate level- consider due to contraction alkalosis  - avoid further metolazone  - diamox 250mg BID x4 (only received one dose 1/18)  - monitor bicarbonate level  - consider reducing dose of lasix drip however patient still likely has 30 pounds of fluid on board  5  Borderline Hypotension- on diltiazem and metoprolol for rate control  6  Hyperphosphatemia- recheck in AM, monitor with improving renal function  7  Hypokalemia- repleted by primary team, monitor     Disposition:  Continue lasix drip  No further dose of metolazone due to metabolic alkalosis  SUBJECTIVE:  Patient tired this am   Denies acute worsening SOB  Feels comfortable with high flow O2      OBJECTIVE:  Current Weight: Weight - Scale: 120 kg (265 lb 6 9 oz)  Vitals:    01/19/20 0600 01/19/20 0705 01/19/20 0710 01/19/20 0800   BP:  96/68     Pulse:  72     Resp:  18     Temp:   98 1 °F (36 7 °C)    TempSrc:   Temporal    SpO2:  95%  96%   Weight: 120 kg (265 lb 6 9 oz)      Height:           Intake/Output Summary (Last 24 hours) at 1/19/2020 0852  Last data filed at 1/19/2020 0556  Gross per 24 hour   Intake 143 74 ml   Output 2152 ml Net -2008 26 ml     General: NAD  Skin: no rash  Eyes: anicteric  ENMT: mm moist  Neck: no masses  Respiratory: decreased at bases  Cardiac: RRR  Extremities: ++ bilateral edema into hips/abdomen  GI: soft, + edema of pannus  Neuro: alert awake  Psych: mood and affect appropriate    Medications:    Current Facility-Administered Medications:     acetaZOLAMIDE (DIAMOX) tablet 250 mg, 250 mg, Oral, Q12H [facility], Lizette Schafer, DO, 250 mg at 01/18/20 2201    apixaban (ELIQUIS) tablet 5 mg, 5 mg, Oral, BID, Luli K Biloflakesha, CRNP, 5 mg at 01/18/20 1837    dextrose 50 % IV solution 25 mL, 25 mL, Intravenous, PRN, MAYNOR Soto    diltiazem (CARDIZEM CD) 24 hr capsule 120 mg, 120 mg, Oral, Daily, Bryania Crisjerry Diasoflakesha, CRNP, 120 mg at 01/18/20 1021    furosemide (LASIX) 500 mg infusion 50 mL, 40 mg/hr, Intravenous, Continuous, Genette PrimesMAYNOR, Last Rate: 4 mL/hr at 01/18/20 2156, 40 mg/hr at 01/18/20 2156    ipratropium (ATROVENT) 0 02 % inhalation solution 0 5 mg, 0 5 mg, Nebulization, TID, Debria Crisp Arunoflakesha, CRNP, 0 5 mg at 01/19/20 0800    levalbuterol (XOPENEX) inhalation solution 1 25 mg, 1 25 mg, Nebulization, TID, Luli K Arunoflakesha, CRNP, 1 25 mg at 01/19/20 0800    metoprolol (LOPRESSOR) injection 5 mg, 5 mg, Intravenous, Q6H PRN, Vernel Kin K Arunoflakesha CRNP    metoprolol succinate (TOPROL-XL) 24 hr tablet 100 mg, 100 mg, Oral, Daily, Luli K Bilofsky, CRNP, 100 mg at 01/18/20 1020    nicotine (NICODERM CQ) 14 mg/24hr TD 24 hr patch 1 patch, 1 patch, Transdermal, Daily, Luli K Arunoflakesha CRNP, 1 patch at 01/18/20 1022    nystatin (MYCOSTATIN) powder, , Topical, BID, Rory WyattMAYNOR Rachel, 1 application at 59/35/01 1837    ondansetron (ZOFRAN) injection 4 mg, 4 mg, Intravenous, Q6H PRN, Luanne Horse Spatzer, CRNP, 4 mg at 01/17/20 0514    QUEtiapine (SEROquel) tablet 25 mg, 25 mg, Oral, HS, MAYNOR Jaeger, 25 mg at 01/19/20 0045    Laboratory Results:  Results from last 7 days   Lab Units 01/19/20  0439 01/18/20  0510 01/17/20  3786 01/16/20  0427 01/15/20  0556 01/15/20  0555 01/14/20  0612 01/13/20  0430   WBC Thousand/uL 4 74  --   --  7 05  --  7 08  --  6 82   HEMOGLOBIN g/dL 14 2  --   --  15 0  --  16 0*  --  17 0*   HEMATOCRIT % 48 4*  --   --  49 9*  --  53 4*  --  57 0*   PLATELETS Thousands/uL 66*  --   --  60*  --  62*  --  70*   POTASSIUM mmol/L 3 1* 4 1 3 7 3 7 3 6  --  3 4* 3 9   CHLORIDE mmol/L 98* 98* 98* 100 102  --  103 103   CO2 mmol/L 42* 40* 36* 36* 35*  --  33* 30   BUN mg/dL 97* 92* 84* 80* 74*  --  74* 70*   CREATININE mg/dL 2 08* 2 02* 2 11* 2 19* 2 31*  --  2 70* 3 05*   CALCIUM mg/dL 8 1* 8 0* 8 1* 8 0* 8 1*  --  7 9* 8 3   MAGNESIUM mg/dL 1 7  --   --   --  1 7  --   --  1 9   PHOSPHORUS mg/dL 4 1  --   --   --   --   --   --   --

## 2020-01-19 NOTE — ASSESSMENT & PLAN NOTE
Lab Results   Component Value Date    HGBA1C 5 8 04/11/2019       Recent Labs     01/17/20  1627 01/18/20  0231 01/18/20  1221 01/18/20  1553   POCGLU 110 113 126 110       Blood Sugar Average: Last 72 hrs:  · Has been tolerating oral diet and initial hypoglycemia has improved  · TSH normal, random cortisol 17 9  · Blood sugar controlled  Continue accucheks with SSI coverage  Goal to maintain -180  Aggressively monitor and treat hypoglycemia as this has been associated with encephalopathy in this patient

## 2020-01-19 NOTE — PLAN OF CARE
Problem: Prexisting or High Potential for Compromised Skin Integrity  Goal: Skin integrity is maintained or improved  Description  INTERVENTIONS:  - Identify patients at risk for skin breakdown  - Assess and monitor skin integrity  - Assess and monitor nutrition and hydration status  - Monitor labs   - Assess for incontinence   - Turn and reposition patient  - Assist with mobility/ambulation  - Relieve pressure over bony prominences  - Avoid friction and shearing  - Provide appropriate hygiene as needed including keeping skin clean and dry  - Evaluate need for skin moisturizer/barrier cream  - Collaborate with interdisciplinary team   - Patient/family teaching  - Consider wound care consult   Outcome: Progressing     Problem: Potential for Falls  Goal: Patient will remain free of falls  Description  INTERVENTIONS:  - Assess patient frequently for physical needs  -  Identify cognitive and physical deficits and behaviors that affect risk of falls    -  [place] fall precautions as indicated by assessment   - Educate patient/family on patient safety including physical limitations  - Instruct patient to call for assistance with activity based on assessment  - Modify environment to reduce risk of injury  - Consider OT/PT consult to assist with strengthening/mobility  Outcome: Progressing     Problem: PAIN - ADULT  Goal: Verbalizes/displays adequate comfort level or baseline comfort level  Description  Interventions:  - Encourage patient to monitor pain and request assistance  - Assess pain using appropriate pain scale  - Administer analgesics based on type and severity of pain and evaluate response  - Implement non-pharmacological measures as appropriate and evaluate response  - Consider cultural and social influences on pain and pain management  - Notify physician/advanced practitioner if interventions unsuccessful or patient reports new pain  Outcome: Progressing     Problem: INFECTION - ADULT  Goal: Absence or prevention of progression during hospitalization  Description  INTERVENTIONS:  - Assess and monitor for signs and symptoms of infection  - Monitor lab/diagnostic results  - Monitor all insertion sites, i e  indwelling lines, tubes, and drains  - Monitor endotracheal if appropriate and nasal secretions for changes in amount and color  - [place] appropriate cooling/warming therapies per order  - Administer medications as ordered  - Instruct and encourage patient and family to use good hand hygiene technique  - Identify and instruct in appropriate isolation precautions for identified infection/condition  Outcome: Progressing  Goal: Absence of fever/infection during neutropenic period  Description  INTERVENTIONS:  - Monitor WBC    Outcome: Progressing     Problem: SAFETY ADULT  Goal: Maintain or return to baseline ADL function  Description  INTERVENTIONS:  -  Assess patient's ability to carry out ADLs; assess patient's baseline for ADL function and identify physical deficits which impact ability to perform ADLs (bathing, care of mouth/teeth, toileting, grooming, dressing, etc )  - Assess/evaluate cause of self-care deficits   - Assess range of motion  - Assess patient's mobility; develop plan if impaired  - Assess patient's need for assistive devices and provide as appropriate  - Encourage maximum independence but intervene and supervise when necessary  - Involve family in performance of ADLs  - Assess for home care needs following discharge   - Consider OT consult to assist with ADL evaluation and planning for discharge  - Provide patient education as appropriate  Outcome: Progressing  Goal: Maintain or return mobility status to optimal level  Description  INTERVENTIONS:  - Assess patient's baseline mobility status (ambulation, transfers, stairs, etc )    - Identify cognitive and physical deficits and behaviors that affect mobility  - Identify mobility aids required to assist with transfers and/or ambulation (gait belt, sit-to-stand, lift, walker, cane, etc )  - [place] fall precautions as indicated by assessment  - Record patient progress and toleration of activity level on Mobility SBAR; progress patient to next Phase/Stage  - Instruct patient to call for assistance with activity based on assessment  - Consider rehabilitation consult to assist with strengthening/weightbearing, etc   Outcome: Progressing     Problem: DISCHARGE PLANNING  Goal: Discharge to home or other facility with appropriate resources  Description  INTERVENTIONS:  - Identify barriers to discharge w/patient and caregiver  - Arrange for needed discharge resources and transportation as appropriate  - Identify discharge learning needs (meds, wound care, etc )  - Arrange for interpretive services to assist at discharge as needed  - Refer to Case Management Department for coordinating discharge planning if the patient needs post-hospital services based on physician/advanced practitioner order or complex needs related to functional status, cognitive ability, or social support system  Outcome: Progressing     Problem: Knowledge Deficit  Goal: Patient/family/caregiver demonstrates understanding of disease process, treatment plan, medications, and discharge instructions  Description  Complete learning assessment and assess knowledge base    Interventions:  - Provide teaching at level of understanding  - Provide teaching via preferred learning methods  Outcome: Progressing     Problem: CARDIOVASCULAR - ADULT  Goal: Maintains optimal cardiac output and hemodynamic stability  Description  INTERVENTIONS:  - Monitor I/O, vital signs and rhythm  - Monitor for S/S and trends of decreased cardiac output  - Administer and titrate ordered vasoactive medications to optimize hemodynamic stability  - Assess quality of pulses, skin color and temperature  - Assess for signs of decreased coronary artery perfusion  - Instruct patient to report change in severity of symptoms  Outcome: Progressing  Goal: Absence of cardiac dysrhythmias or at baseline rhythm  Description  INTERVENTIONS:  - Continuous cardiac monitoring, vital signs, obtain 12 lead EKG if ordered  - Administer antiarrhythmic and heart rate control medications as ordered  - Monitor electrolytes and administer replacement therapy as ordered  Outcome: Progressing     Problem: RESPIRATORY - ADULT  Goal: Achieves optimal ventilation and oxygenation  Description  INTERVENTIONS:  - Assess for changes in respiratory status  - Assess for changes in mentation and behavior  - Position to facilitate oxygenation and minimize respiratory effort  - Oxygen administered by appropriate delivery if ordered  - Initiate smoking cessation education as indicated  - Encourage broncho-pulmonary hygiene including cough, deep breathe, Incentive Spirometry  - Assess the need for suctioning and aspirate as needed  - Assess and instruct to report SOB or any respiratory difficulty  - Respiratory Therapy support as indicated  Outcome: Progressing     Problem: METABOLIC, FLUID AND ELECTROLYTES - ADULT  Goal: Electrolytes maintained within normal limits  Description  INTERVENTIONS:  - Monitor labs and assess patient for signs and symptoms of electrolyte imbalances  - Administer electrolyte replacement as ordered  - Monitor response to electrolyte replacements, including repeat lab results as appropriate  - Instruct patient on fluid and nutrition as appropriate  Outcome: Progressing  Goal: Fluid balance maintained  Description  INTERVENTIONS:  - Monitor labs   - Monitor I/O and WT  - Instruct patient on fluid and nutrition as appropriate  - Assess for signs & symptoms of volume excess or deficit  Outcome: Progressing  Goal: Glucose maintained within target range  Description  INTERVENTIONS:  - Monitor Blood Glucose as ordered  - Assess for signs and symptoms of hyperglycemia and hypoglycemia  - Administer ordered medications to maintain glucose within target range  - Assess nutritional intake and initiate nutrition service referral as needed  Outcome: Progressing     Problem: SKIN/TISSUE INTEGRITY - ADULT  Goal: Skin integrity remains intact  Description  INTERVENTIONS  - Identify patients at risk for skin breakdown  - Assess and monitor skin integrity  - Assess and monitor nutrition and hydration status  - Monitor labs (i e  albumin)  - Assess for incontinence   - Turn and reposition patient  - Assist with mobility/ambulation  - Relieve pressure over bony prominences  - Avoid friction and shearing  - Provide appropriate hygiene as needed including keeping skin clean and dry  - Evaluate need for skin moisturizer/barrier cream  - Collaborate with interdisciplinary team (i e  Nutrition, Rehabilitation, etc )   - Patient/family teaching  Outcome: Progressing  Goal: Incision(s), wounds(s) or drain site(s) healing without S/S of infection  Description  INTERVENTIONS  - Assess and document risk factors for skin impairment   - Assess and document dressing, incision, wound bed, drain sites and surrounding tissue  - Consider nutrition services referral as needed  - Oral mucous membranes remain intact  - Provide patient/ family education  Outcome: Progressing  Goal: Oral mucous membranes remain intact  Description  INTERVENTIONS  - Assess oral mucosa and hygiene practices  - Implement preventative oral hygiene regimen  - Implement oral medicated treatments as ordered  - Initiate Nutrition services referral as needed  Outcome: Progressing     Problem: Nutrition/Hydration-ADULT  Goal: Nutrient/Hydration intake appropriate for improving, restoring or maintaining nutritional needs  Description  Monitor and assess patient's nutrition/hydration status for malnutrition  Collaborate with interdisciplinary team and initiate plan and interventions as ordered  Monitor patient's weight and dietary intake as ordered or per policy   Utilize nutrition screening tool and intervene as necessary  Determine patient's food preferences and provide high-protein, high-caloric foods as appropriate       INTERVENTIONS:  - Monitor oral intake, urinary output, labs, and treatment plans  - Assess nutrition and hydration status and recommend course of action  - Evaluate amount of meals eaten  - Assist patient with eating if necessary   - Allow adequate time for meals  - Recommend/ encourage appropriate diets, oral nutritional supplements, and vitamin/mineral supplements  - Order, calculate, and assess calorie counts as needed  - Recommend, monitor, and adjust tube feedings and TPN/PPN based on assessed needs  - Assess need for intravenous fluids  - Provide specific nutrition/hydration education as appropriate  - Include patient/family/caregiver in decisions related to nutrition  Outcome: Progressing

## 2020-01-19 NOTE — PROGRESS NOTES
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, pleural effusion and noncompliance with nocturnal BiPAP  · Pt notes baseline oxygen requirement at home is 10L but case management as confirmed she is supposed to be on 3 liters  · Slowly improving  Tolerating HFNC during the day and BiPAP at [facility]  Slowly weaning HFNC settings  Continue to wean daytime supplemental O2 as tolerated to maintain saturation >88%  · Continue diuresis per nephrology recommendations, added diamox for 4 doses to be completed today  · Continue scheduled nebulized bronchodilators  · Aggressive pulmonary toileting  Encourage cough and deep breathing  Use incentive spirometer  Out of bed to the chair during the day  · PT/OT        Acute on chronic diastolic congestive heart failure [facility])  Assessment & Plan  Wt Readings from Last 3 Encounters:   01/15/20 131 kg (288 lb 12 8 oz)   11/05/19 110 kg (243 lb 9 6 oz)   10/28/19 109 kg (240 lb 4 8 oz)       · Suspect decompensated CHF  Echo from 1/8/20 shows EF of 38% with diastolic dysfunction  · Has been diuresing very well  Continue per nephrology recommendations  · Continue beta blockade  · Negative 1 liter overnight      Pericardial effusion  Assessment & Plan  · The patient was noted to have a pericardial effusion on CT imaging that is described as mild to moderate  On her ECHO form 1/8 there was no evidence of effusion  · Small to moderate pericardial effusion noted on repeat Echo from 1/15  No evidence of hemodynamic compromise  Cardiology is following      COPD (chronic obstructive pulmonary disease) [facility])  Assessment & Plan  · Continue nebulized bronchodilators  No indication for steroids  · Not on maintenance inhalers at home  · Will need outpatient follow up       Permanent atrial fibrillation  Assessment & Plan  · Rate remains controlled  · Continue PO cardizem and metoprolol  · Restarted on apixaban after previously being on Heparin  HIT antibody is positive    SAPNA (acute kidney injury) ([facility] [address] )  Assessment & Plan  · Acute on CKD III with baseline approximately 1 3-1 6  · Nephrology is following  Creatinine is trending down  Vigorous urine output on lasix infusion  Continue lasix gtt per nephrology recommendations  · Trend renal indices and monitor intake and output  · Urine output goal of 1-2 l negative per 24 hours  · Obtain daily weights  · Creatinine 2 02 yesterday and increased to 2 08 today      Pulmonary hypertension ([facility] )  Assessment & Plan  · Cardiology following and patient does not want treatment or evaluation of pulmonary hypertension  · Is non compliant at baseline with medications and BiPAP    Heparin induced thrombocytopenia (HCC)  Assessment & Plan  · Heparin antibody was positive  · Has been transitioned back to [facility]     Obesity  Assessment & Plan  · She would benefit from weight loss  Diet per nutrition recommendations  · Likely component of OHV that is contributing to her acute on chronic pulmonary issues  Continue BiPAP nocturnally and when napping given risk for hypercapnia    Type 2 diabetes mellitus without complication [facility])  Assessment & Plan  Lab Results   Component Value Date    HGBA1C 5 8 04/11/2019       Recent Labs     01/17/20  1627 01/18/20  0231 01/18/20  1221 01/18/20  1553   POCGLU 110 113 126 110       Blood Sugar Average: Last 72 hrs:  · Has been tolerating oral diet and initial hypoglycemia has improved  · TSH normal, random cortisol 17 9  · Blood sugar controlled  Continue accucheks with SSI coverage  Goal to maintain -180    Aggressively monitor and treat hypoglycemia as this has been associated with encephalopathy in this patient  Chronic venous stasis dermatitis of both lower extremities  Assessment & Plan  · No open ulcers or signs of cellulitis        ----------------------------------------------------------------------------------------  HPI/24hr events: No issues overnight  Given a dose of seroquel to help with BiPAP  She wore BiPAP until 430am     Disposition: Continue Stepdown Level 1 level of care   Code Status: Level 1 - Full Code  ---------------------------------------------------------------------------------------  SUBJECTIVE  Still requiring 70% high flow nasal cannula and BiPAP at night  She denies any other complaints  Review of Systems   Constitutional: Negative for chills and fever  HENT: Negative  Respiratory:        Does not like BiPAP   Cardiovascular: Negative  Negative for chest pain  Gastrointestinal: Negative  Negative for abdominal pain, diarrhea and nausea  Genitourinary: Negative  Negative for dysuria and urgency  Musculoskeletal: Negative  Hematological: Negative  All other systems reviewed and are negative  Review of systems was reviewed and negative unless stated above in HPI/24-hour events   ---------------------------------------------------------------------------------------  OBJECTIVE    Vitals   Vitals:    20 0305 20 0447 20 0505 20 0600   BP: 100/73  90/69    Pulse: 70  70    Resp: 21  21    Temp: 98 2 °F (36 8 °C)      TempSrc: Temporal      SpO2: 96% 93% (!) 89%    Weight:    120 kg (265 lb 6 9 oz)   Height:         Temp (24hrs), Av 9 °F (36 6 °C), Min:97 5 °F (36 4 °C), Max:98 4 °F (36 9 °C)  Current: Temperature: 98 2 °F (36 8 °C)        SpO2: SpO2: (!) 89 %    O2 Flow Rate (L/min): 6 L/min    Physical Exam   Constitutional: She is oriented to person, place, and time  She appears well-developed and well-nourished  No distress     obese   HENT:   Head: Normocephalic and atraumatic  Nose: Nose normal    Mouth/Throat: Oropharynx is clear and moist  No oropharyngeal exudate  Eyes: Pupils are equal, round, and reactive to light  Conjunctivae and EOM are normal  No scleral icterus  Neck: Normal range of motion  Neck supple  No JVD present  Cardiovascular: Exam reveals no gallop and no friction rub  No murmur heard  Irregular rate and rhythm     Pulmonary/Chest: She has no wheezes  Decreased breath sounds   Abdominal: Soft  Bowel sounds are normal  She exhibits no distension  There is no tenderness  Obese     Musculoskeletal: Normal range of motion  She exhibits edema (lower extremity edema)  Lymphadenopathy:     She has no cervical adenopathy  Neurological: She is alert and oriented to person, place, and time  No cranial nerve deficit  Skin: Skin is warm and dry  Multiple ecchymotic areas   Vitals reviewed        Invasive/non-invasive ventilation settings   Respiratory    Lab Data (Last 4 hours)    None         O2/Vent Data (Last 4 hours)      01/19 0447          Non-Invasive Ventilation Mode HFNC                   Laboratory and Diagnostics:  Results from last 7 days   Lab Units 01/19/20  0439 01/16/20  0427 01/15/20  0555 01/13/20  0430   WBC Thousand/uL 4 74 7 05 7 08 6 82   HEMOGLOBIN g/dL 14 2 15 0 16 0* 17 0*   HEMATOCRIT % 48 4* 49 9* 53 4* 57 0*   PLATELETS Thousands/uL 66* 60* 62* 70*     Results from last 7 days   Lab Units 01/19/20  0439 01/18/20  0510 01/17/20  0619 01/16/20  0427 01/15/20  0556 01/14/20  0612 01/13/20  0430   SODIUM mmol/L 139 140 142 142 144 144 144   POTASSIUM mmol/L 3 1* 4 1 3 7 3 7 3 6 3 4* 3 9   CHLORIDE mmol/L 98* 98* 98* 100 102 103 103   CO2 mmol/L 42* 40* 36* 36* 35* 33* 30   ANION GAP mmol/L -1* 2* 8 6 7 8 11   BUN mg/dL 97* 92* 84* 80* 74* 74* 70*   CREATININE mg/dL 2 08* 2 02* 2 11* 2 19* 2 31* 2 70* 3 05*   CALCIUM mg/dL 8 1* 8 0* 8 1* 8 0* 8 1* 7 9* 8 3   GLUCOSE RANDOM mg/dL 104 102 106 90 83 99 104 ALT U/L  --   --   --  8* 8*  --   --    AST U/L  --   --   --  11 12  --   --    ALK PHOS U/L  --   --   --  84 80  --   --    ALBUMIN g/dL  --   --   --  2 5* 2 6*  --   --    TOTAL BILIRUBIN mg/dL  --   --   --  4 08* 5 34*  --   --      Results from last 7 days   Lab Units 01/19/20  0439 01/15/20  0556 01/13/20  0430   MAGNESIUM mg/dL 1 7 1 7 1 9   PHOSPHORUS mg/dL 4 1  --   --       Results from last 7 days   Lab Units 01/15/20  0603 01/14/20  0612 01/14/20  0222 01/13/20  1708   INR   --   --   --  1 48*   PTT seconds 53* 67* 77* 37              ABG:  Results from last 7 days   Lab Units 01/13/20  1114   PH ART  7 402   PCO2 ART mm Hg 50 2*   PO2 ART mm Hg 91 9   HCO3 ART mmol/L 30 5*   BASE EXC ART mmol/L 4 3   ABG SOURCE  Brachial, Right     VBG:  Results from last 7 days   Lab Units 01/13/20  1114   ABG SOURCE  Brachial, Right           Micro           Intake and Output  I/O       01/17 0701 - 01/18 0700 01/18 0701 - 01/19 0700    I V  (mL/kg) 53 (0 4) 133 5 (1)    Total Intake(mL/kg) 53 (0 4) 133 5 (1)    Urine (mL/kg/hr) 1150 (0 4) 1172 (0 4)    Stool 0     Total Output 1150 1172    Net -1097 -1038 5          Unmeasured Urine Occurrence 1 x     Unmeasured Stool Occurrence 1 x           Height and Weights   Height: 5' 5" (165 1 cm)  IBW: 57 kg  Body mass index is 44 17 kg/m²  Weight (last 2 days)     Date/Time   Weight    01/19/20 0600   120 (265 43)                Nutrition       Diet Orders   (From admission, onward)             Start     Ordered    01/16/20 1119  Diet Sunil/CHO Controlled; Consistent Carbohydrate Diet Level 2 (5 carb servings/75 grams CHO/meal)  Diet effective now     Question Answer Comment   Diet Type Sunil/CHO Controlled    Sunil/CHO Controlled Consistent Carbohydrate Diet Level 2 (5 carb servings/75 grams CHO/meal)    RD to adjust diet per protocol?  Yes        01/16/20 1118                  Active Medications  Scheduled Meds:    Current Facility-Administered Medications:  acetaZOLAMIDE 250 mg Oral Q12H [facility] Christopher Schafer DO    apixaban 5 mg Oral BID Ann Kirby, MAYNOR    dextrose 25 mL Intravenous PRN Nai Mcghee, MAYNOR    diltiazem 120 mg Oral Daily MAYNOR Matta    furosemide 40 mg/hr Intravenous Continuous MAYNOR Nickerson Last Rate: 40 mg/hr (01/18/20 2156)   ipratropium 0 5 mg Nebulization TID Ann Kirby, BALJITNP    levalbuterol 1 25 mg Nebulization TID Ann Kirby, MAYNOR    magnesium sulfate 2 g Intravenous Once Claudell Payment, CRNP Last Rate: 2 g (01/19/20 0630)   metoprolol 5 mg Intravenous Q6H PRN Luli K MAYNOR Kirby    metoprolol succinate 100 mg Oral Daily Ann Kirby, BALJITNP    nicotine 1 patch Transdermal Daily Luli K Kofi, CRMARY JO    nystatin  Topical BID Ann Kirby, MAYNOR    ondansetron 4 mg Intravenous Q6H PRN Nyra Messing Spatzer, MAYNOR    potassium chloride 20 mEq Intravenous Once Claudell Payment, CRNP Last Rate: 20 mEq (01/19/20 0615)   QUEtiapine 25 mg Oral HS Luli MAYNOR Macedo      Continuous Infusions:    furosemide 40 mg/hr Last Rate: 40 mg/hr (01/18/20 2156)     PRN Meds:     dextrose 25 mL PRN   metoprolol 5 mg Q6H PRN   ondansetron 4 mg Q6H PRN     ---------------------------------------------------------------------------------------  Advance Directive and Living Will:      Power of :    POLST:    ---------------------------------------------------------------------------------------    Claudell Payment, CRNP      Portions of the record may have been created with voice recognition software  Occasional wrong word or "sound a like" substitutions may have occurred due to the inherent limitations of voice recognition software    Read the chart carefully and recognize, using context, where substitutions have occurred

## 2020-01-20 PROBLEM — E87.3 ALKALOSIS: Status: ACTIVE | Noted: 2020-01-20

## 2020-01-20 PROBLEM — E87.6 HYPOKALEMIA: Status: ACTIVE | Noted: 2020-01-20

## 2020-01-20 PROBLEM — I13.0: Status: ACTIVE | Noted: 2020-01-20

## 2020-01-20 PROBLEM — N18.30: Status: ACTIVE | Noted: 2020-01-20

## 2020-01-20 LAB
BASE EX.OXY STD BLDV CALC-SCNC: 63.2 % (ref 60–80)
BASE EXCESS BLDV CALC-SCNC: 21.8 MMOL/L
BUN SERPL-MCNC: 99 MG/DL (ref 5–25)
CALCIUM SERPL-MCNC: 8.4 MG/DL (ref 8.3–10.1)
CHLORIDE SERPL-SCNC: 96 MMOL/L (ref 100–108)
CO2 SERPL-SCNC: >45 MMOL/L (ref 21–32)
CREAT SERPL-MCNC: 2.15 MG/DL (ref 0.6–1.3)
ERYTHROCYTE [DISTWIDTH] IN BLOOD BY AUTOMATED COUNT: 18.3 % (ref 11.6–15.1)
GFR SERPL CREATININE-BSD FRML MDRD: 25 ML/MIN/1.73SQ M
GLUCOSE SERPL-MCNC: 115 MG/DL (ref 65–140)
GLUCOSE SERPL-MCNC: 123 MG/DL (ref 65–140)
GLUCOSE SERPL-MCNC: 129 MG/DL (ref 65–140)
GLUCOSE SERPL-MCNC: 155 MG/DL (ref 65–140)
GLUCOSE SERPL-MCNC: 83 MG/DL (ref 65–140)
HCO3 BLDV-SCNC: 53.2 MMOL/L (ref 24–30)
HCT VFR BLD AUTO: 49.2 % (ref 34.8–46.1)
HGB BLD-MCNC: 14.1 G/DL (ref 11.5–15.4)
MAGNESIUM SERPL-MCNC: 2.1 MG/DL (ref 1.6–2.6)
MCH RBC QN AUTO: 30.5 PG (ref 26.8–34.3)
MCHC RBC AUTO-ENTMCNC: 28.7 G/DL (ref 31.4–37.4)
MCV RBC AUTO: 107 FL (ref 82–98)
O2 CT BLDV-SCNC: 14 ML/DL
PCO2 BLDV: 90.6 MM HG (ref 42–50)
PH BLDV: 7.39 [PH] (ref 7.3–7.4)
PLATELET # BLD AUTO: 71 THOUSANDS/UL (ref 149–390)
PMV BLD AUTO: 12.7 FL (ref 8.9–12.7)
PO2 BLDV: 35.5 MM HG (ref 35–45)
POTASSIUM SERPL-SCNC: 3.3 MMOL/L (ref 3.5–5.3)
RBC # BLD AUTO: 4.62 MILLION/UL (ref 3.81–5.12)
SODIUM SERPL-SCNC: 144 MMOL/L (ref 136–145)
WBC # BLD AUTO: 6.4 THOUSAND/UL (ref 4.31–10.16)

## 2020-01-20 PROCEDURE — 82805 BLOOD GASES W/O2 SATURATION: CPT | Performed by: INTERNAL MEDICINE

## 2020-01-20 PROCEDURE — 99233 SBSQ HOSP IP/OBS HIGH 50: CPT | Performed by: INTERNAL MEDICINE

## 2020-01-20 PROCEDURE — 94640 AIRWAY INHALATION TREATMENT: CPT

## 2020-01-20 PROCEDURE — 94660 CPAP INITIATION&MGMT: CPT

## 2020-01-20 PROCEDURE — 85027 COMPLETE CBC AUTOMATED: CPT | Performed by: NURSE PRACTITIONER

## 2020-01-20 PROCEDURE — 82542 COL CHROMOTOGRAPHY QUAL/QUAN: CPT | Performed by: NURSE PRACTITIONER

## 2020-01-20 PROCEDURE — 99222 1ST HOSP IP/OBS MODERATE 55: CPT | Performed by: INTERNAL MEDICINE

## 2020-01-20 PROCEDURE — 80048 BASIC METABOLIC PNL TOTAL CA: CPT | Performed by: PHYSICIAN ASSISTANT

## 2020-01-20 PROCEDURE — 82948 REAGENT STRIP/BLOOD GLUCOSE: CPT

## 2020-01-20 PROCEDURE — 83735 ASSAY OF MAGNESIUM: CPT | Performed by: NURSE PRACTITIONER

## 2020-01-20 RX ORDER — DIGOXIN 0.25 MG/ML
250 INJECTION INTRAMUSCULAR; INTRAVENOUS ONCE
Status: COMPLETED | OUTPATIENT
Start: 2020-01-20 | End: 2020-01-20

## 2020-01-20 RX ORDER — DIGOXIN 0.25 MG/ML
500 INJECTION INTRAMUSCULAR; INTRAVENOUS ONCE
Status: COMPLETED | OUTPATIENT
Start: 2020-01-20 | End: 2020-01-20

## 2020-01-20 RX ORDER — POTASSIUM CHLORIDE 20MEQ/15ML
40 LIQUID (ML) ORAL ONCE
Status: COMPLETED | OUTPATIENT
Start: 2020-01-20 | End: 2020-01-20

## 2020-01-20 RX ORDER — SILDENAFIL CITRATE 20 MG/1
20 TABLET ORAL 3 TIMES DAILY
Status: DISCONTINUED | OUTPATIENT
Start: 2020-01-20 | End: 2020-01-21

## 2020-01-20 RX ORDER — DIGOXIN 0.25 MG/ML
125 INJECTION INTRAMUSCULAR; INTRAVENOUS DAILY
Status: DISCONTINUED | OUTPATIENT
Start: 2020-01-21 | End: 2020-01-21

## 2020-01-20 RX ADMIN — NYSTATIN: 100000 POWDER TOPICAL at 09:38

## 2020-01-20 RX ADMIN — LEVALBUTEROL HYDROCHLORIDE 1.25 MG: 1.25 SOLUTION, CONCENTRATE RESPIRATORY (INHALATION) at 12:59

## 2020-01-20 RX ADMIN — APIXABAN 5 MG: 5 TABLET, FILM COATED ORAL at 18:00

## 2020-01-20 RX ADMIN — LEVALBUTEROL HYDROCHLORIDE 1.25 MG: 1.25 SOLUTION, CONCENTRATE RESPIRATORY (INHALATION) at 18:44

## 2020-01-20 RX ADMIN — QUETIAPINE FUMARATE 25 MG: 25 TABLET ORAL at 20:52

## 2020-01-20 RX ADMIN — NICOTINE 7 MG: 7 PATCH TRANSDERMAL at 11:56

## 2020-01-20 RX ADMIN — APIXABAN 5 MG: 5 TABLET, FILM COATED ORAL at 11:40

## 2020-01-20 RX ADMIN — ACETAZOLAMIDE 250 MG: 250 TABLET ORAL at 11:41

## 2020-01-20 RX ADMIN — SILDENAFIL 20 MG: 20 TABLET ORAL at 22:49

## 2020-01-20 RX ADMIN — IPRATROPIUM BROMIDE 0.5 MG: 0.5 SOLUTION RESPIRATORY (INHALATION) at 13:00

## 2020-01-20 RX ADMIN — METOPROLOL SUCCINATE 100 MG: 50 TABLET, EXTENDED RELEASE ORAL at 11:40

## 2020-01-20 RX ADMIN — IPRATROPIUM BROMIDE 0.5 MG: 0.5 SOLUTION RESPIRATORY (INHALATION) at 07:55

## 2020-01-20 RX ADMIN — LEVALBUTEROL HYDROCHLORIDE 1.25 MG: 1.25 SOLUTION, CONCENTRATE RESPIRATORY (INHALATION) at 07:55

## 2020-01-20 RX ADMIN — NYSTATIN 1 APPLICATION: 100000 POWDER TOPICAL at 18:05

## 2020-01-20 RX ADMIN — SILDENAFIL 20 MG: 20 TABLET ORAL at 13:05

## 2020-01-20 RX ADMIN — POTASSIUM CHLORIDE 40 MEQ: 20 SOLUTION ORAL at 11:39

## 2020-01-20 RX ADMIN — DIGOXIN 250 MCG: 0.25 INJECTION INTRAMUSCULAR; INTRAVENOUS at 18:00

## 2020-01-20 RX ADMIN — IPRATROPIUM BROMIDE 0.5 MG: 0.5 SOLUTION RESPIRATORY (INHALATION) at 18:45

## 2020-01-20 RX ADMIN — SILDENAFIL 20 MG: 20 TABLET ORAL at 18:00

## 2020-01-20 RX ADMIN — DIGOXIN 500 MCG: 0.25 INJECTION INTRAMUSCULAR; INTRAVENOUS at 11:56

## 2020-01-20 NOTE — ASSESSMENT & PLAN NOTE
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, pleural effusion and noncompliance with nocturnal BiPAP  · Pt notes baseline oxygen requirement at home is 10L but case management as confirmed she is supposed to be on 3 liters  · Slowly improving  Tolerating HFNC during the day and BiPAP at [facility]  Slowly weaning HFNC settings  Continue to wean daytime supplemental O2 as tolerated to maintain saturation >88%  · Continue diuresis per nephrology recommendations diamox was added for 4 doses given degree of contraction- will monitor her CO2 level  · Continue scheduled nebulized bronchodilators  · Aggressive pulmonary toileting  Encourage cough and deep breathing  Use incentive spirometer    Out of bed to the chair during the day  · PT/OT

## 2020-01-20 NOTE — PROGRESS NOTES
Progress Note - Nephrology   Tegan Burt 63 y o  female MRN: [id number]  Unit/Bed#: ICU 06 Encounter: [id number]      Assessment / Plan:  1  Acute kidney injury, POA, suspecting cardiorenal syndrome/volume overload plus relative hypotension with previous ACE-inhibitor use  -sCr relatively stable on lasix gtt  -may need to accept higher sCr for euvolemia  -weight improving daily  Will continue current management  May consider changing to bumex gtt if further diuresis needed  -Highly recommend palliative care consult in light of patient nonadherence with medications at home and her lack of will to live  -f/u am CMP  2  Chronic kidney disease, stage III likely due to hypertension, diabetes as well as cardiorenal syndrome - baseline creatinine 1 3-1 6, would benefit from outpatient renal f/u  3  Volume overload with small to moderate concentric pericardial effusion   - continue with Lasix drip, Diamox added yesterday given worsening alkalosis x 4 doses  May consider redosing if pH truly alkalotic  4  Hypokalemia, diuretic induced - continue with replacement, mag ok  Monitor K/mag levels  5  Metabolic alkalosis - s/p 4 doses of diamox over the weekend  CO2 > 45  May in part be due to CO2 retention and respiratory component as well  Will check VBG  6  Hypotension - would consider midodrine for BP support while on lasix gtt  If BP worsens, would repeat echo to rule out tamponade  7  thrombocytopenia - monitor platelets  HIT Ab positive, f/u [facility], per ICU team  8  Hyperphosphatemia-resolved        Subjective:   Unable to elicit HPI or review of systems as patient on BiPAP and drowsy  Objective:     Vitals: Blood pressure 96/73, pulse 72, temperature 97 6 °F (36 4 °C), temperature source Temporal, resp  rate 21, height 5' 5" (1 651 m), weight 116 kg (256 lb 2 8 oz), SpO2 94 %  ,Body mass index is 42 63 kg/m²  Temp (24hrs), Av 7 °F (36 5 °C), Min:97 4 °F (36 3 °C), Max:97 9 °F (36 6 °C)      Weight (last 2 days)     Date/Time   Weight    01/20/20 0600   116 (256 17)    01/19/20 0600   120 (265 43)                Intake/Output Summary (Last 24 hours) at 1/20/2020 1027  Last data filed at 1/20/2020 1006  Gross per 24 hour   Intake 1192 68 ml   Output 5186 ml   Net -3993 32 ml     I/O last 24 hours: In: 1672 7 [P O :1560; I V :112 7]  Out: 5611 [Urine:5611]        Physical Exam:   Physical Exam   Constitutional: She appears well-developed and well-nourished  No distress  Chronically ill-appearing, on BiPAP, obese   HENT:   Head: Normocephalic and atraumatic  Mouth/Throat: No oropharyngeal exudate  MM dry   Eyes: Right eye exhibits no discharge  Left eye exhibits no discharge  No scleral icterus  Neck: Neck supple  No thyromegaly present  Cardiovascular: Normal rate  No murmur heard  Irregular rhythm   Pulmonary/Chest: Effort normal  No respiratory distress  She has no wheezes  Coarse BS b/l   Abdominal: Soft  Bowel sounds are normal  She exhibits no distension  Genitourinary:   Genitourinary Comments: +purewick   Musculoskeletal: She exhibits edema (chronic venous stasis changes b/l LE with some UE boggy edema noted)  Neurological: She exhibits normal muscle tone  Arouses briefly, on bipap, nonverbal   Skin: Skin is warm and dry  No rash noted  She is not diaphoretic    +ecchymoses over all extremities   Psychiatric:   Flat affect   Nursing note and vitals reviewed        Invasive Devices     Peripheral Intravenous Line            Peripheral IV 01/16/20 Right Wrist 3 days    Peripheral IV 01/18/20 Right;Upper;Ventral (anterior) Arm 1 day          Drain            External Urinary Catheter 9 days                Medications:    Scheduled Meds:  Current Facility-Administered Medications:  acetaZOLAMIDE 250 mg Oral Q12H [address] Janinae Tarun Schafer DO    apixaban 5 mg Oral BID MAYNOR Mullins    dextrose 25 mL Intravenous PRN MAYNOR Soto    [START ON 1/21/2020] digoxin 125 mcg Intravenous Daily Lafrances , CRNP    digoxin 250 mcg Intravenous Once Lafrances , CRNP    digoxin 500 mcg Intravenous Once Lafrances , CRNP    furosemide 40 mg/hr Intravenous Continuous Veronica Meaghan, CRNP Last Rate: 40 mg/hr (01/19/20 2348)   ipratropium 0 5 mg Nebulization TID Lorna Herbert Bilofsky, CRNP    levalbuterol 1 25 mg Nebulization TID Lorna Herbert Bilofsky, CRNP    metoprolol 5 mg Intravenous Q6H PRN Lorna Herbert Bilofsky, CRNP    metoprolol succinate 100 mg Oral Daily Lorna Herbert Bilofsky, CRNP    nicotine 7 mg Transdermal Daily Lafrances , CRNP    nystatin  Topical BID Lorna Herbert Bilofsky, CRNP    ondansetron 4 mg Intravenous Q6H PRN Morey Catchings Spatzer, CRNP    potassium chloride 40 mEq Oral Once [employer], CRNP    QUEtiapine 25 mg Oral QPM Veronica Meaghan, CRNP    sildenafil 20 mg Oral TID Lafrances , CRNP        PRN Meds: dextrose    metoprolol    ondansetron    Continuous Infusions:  furosemide 40 mg/hr Last Rate: 40 mg/hr (01/19/20 2348)           LAB RESULTS:      Results from last 7 days   Lab Units 01/20/20  0503 01/19/20  0439 01/18/20  0510 01/17/20  0619 01/16/20  0427 01/15/20  0556 01/15/20  0555 01/14/20  0612   WBC Thousand/uL 6 40 4 74  --   --  7 05  --  7 08  --    HEMOGLOBIN g/dL 14 1 14 2  --   --  15 0  --  16 0*  --    HEMATOCRIT % 49 2* 48 4*  --   --  49 9*  --  53 4*  --    PLATELETS Thousands/uL 71* 66*  --   --  60*  --  62*  --    POTASSIUM mmol/L 3 3* 3 1* 4 1 3 7 3 7 3 6  --  3 4*   CHLORIDE mmol/L 96* 98* 98* 98* 100 102  --  103   CO2 mmol/L >45* 42* 40* 36* 36* 35*  --  33*   BUN mg/dL 99* 97* 92* 84* 80* 74*  --  74*   CREATININE mg/dL 2 15* 2 08* 2 02* 2 11* 2 19* 2 31*  --  2 70*   CALCIUM mg/dL 8 4 8 1* 8 0* 8 1* 8 0* 8 1*  --  7 9*   ALK PHOS U/L  --   --   --   --  84 80  --   --    ALT U/L  --   --   --   --  8* 8*  --   --    AST U/L  --   --   --   --  11 12  --   --    MAGNESIUM mg/dL 2 1 1 7  --   --   --  1 7  --   --    PHOSPHORUS mg/dL  --  4 1  --   --   --   -- --   --        CUTURES:  Lab Results   Component Value Date    BLOODCX No Growth After 5 Days  01/06/2020    BLOODCX No Growth After 5 Days  01/06/2020    URINECX >100,000 cfu/ml Mixed Contaminants X4 11/18/2016                 Portions of the record may have been created with voice recognition software  Occasional wrong word or "sound a like" substitutions may have occurred due to the inherent limitations of voice recognition software  Read the chart carefully and recognize, using context, where substitutions have occurred  If you have any questions, please contact the dictating provider

## 2020-01-20 NOTE — PLAN OF CARE
Problem: Prexisting or High Potential for Compromised Skin Integrity  Goal: Skin integrity is maintained or improved  Description  INTERVENTIONS:  - Identify patients at risk for skin breakdown  - Assess and monitor skin integrity  - Assess and monitor nutrition and hydration status  - Monitor labs   - Assess for incontinence   - Turn and reposition patient  - Assist with mobility/ambulation  - Relieve pressure over bony prominences  - Avoid friction and shearing  - Provide appropriate hygiene as needed including keeping skin clean and dry  - Evaluate need for skin moisturizer/barrier cream  - Collaborate with interdisciplinary team   - Patient/family teaching  - Consider wound care consult   Outcome: Progressing     Problem: Potential for Falls  Goal: Patient will remain free of falls  Description  INTERVENTIONS:  - Assess patient frequently for physical needs  -  Identify cognitive and physical deficits and behaviors that affect risk of falls    -  [place] fall precautions as indicated by assessment   - Educate patient/family on patient safety including physical limitations  - Instruct patient to call for assistance with activity based on assessment  - Modify environment to reduce risk of injury  - Consider OT/PT consult to assist with strengthening/mobility  Outcome: Progressing     Problem: PAIN - ADULT  Goal: Verbalizes/displays adequate comfort level or baseline comfort level  Description  Interventions:  - Encourage patient to monitor pain and request assistance  - Assess pain using appropriate pain scale  - Administer analgesics based on type and severity of pain and evaluate response  - Implement non-pharmacological measures as appropriate and evaluate response  - Consider cultural and social influences on pain and pain management  - Notify physician/advanced practitioner if interventions unsuccessful or patient reports new pain  Outcome: Progressing     Problem: INFECTION - ADULT  Goal: Absence or prevention of progression during hospitalization  Description  INTERVENTIONS:  - Assess and monitor for signs and symptoms of infection  - Monitor lab/diagnostic results  - Monitor all insertion sites, i e  indwelling lines, tubes, and drains  - Monitor endotracheal if appropriate and nasal secretions for changes in amount and color  - [place] appropriate cooling/warming therapies per order  - Administer medications as ordered  - Instruct and encourage patient and family to use good hand hygiene technique  - Identify and instruct in appropriate isolation precautions for identified infection/condition  Outcome: Progressing  Goal: Absence of fever/infection during neutropenic period  Description  INTERVENTIONS:  - Monitor WBC    Outcome: Progressing     Problem: SAFETY ADULT  Goal: Maintain or return to baseline ADL function  Description  INTERVENTIONS:  -  Assess patient's ability to carry out ADLs; assess patient's baseline for ADL function and identify physical deficits which impact ability to perform ADLs (bathing, care of mouth/teeth, toileting, grooming, dressing, etc )  - Assess/evaluate cause of self-care deficits   - Assess range of motion  - Assess patient's mobility; develop plan if impaired  - Assess patient's need for assistive devices and provide as appropriate  - Encourage maximum independence but intervene and supervise when necessary  - Involve family in performance of ADLs  - Assess for home care needs following discharge   - Consider OT consult to assist with ADL evaluation and planning for discharge  - Provide patient education as appropriate  Outcome: Progressing  Goal: Maintain or return mobility status to optimal level  Description  INTERVENTIONS:  - Assess patient's baseline mobility status (ambulation, transfers, stairs, etc )    - Identify cognitive and physical deficits and behaviors that affect mobility  - Identify mobility aids required to assist with transfers and/or ambulation (gait belt, sit-to-stand, lift, walker, cane, etc )  - [place] fall precautions as indicated by assessment  - Record patient progress and toleration of activity level on Mobility SBAR; progress patient to next Phase/Stage  - Instruct patient to call for assistance with activity based on assessment  - Consider rehabilitation consult to assist with strengthening/weightbearing, etc   Outcome: Progressing     Problem: DISCHARGE PLANNING  Goal: Discharge to home or other facility with appropriate resources  Description  INTERVENTIONS:  - Identify barriers to discharge w/patient and caregiver  - Arrange for needed discharge resources and transportation as appropriate  - Identify discharge learning needs (meds, wound care, etc )  - Arrange for interpretive services to assist at discharge as needed  - Refer to Case Management Department for coordinating discharge planning if the patient needs post-hospital services based on physician/advanced practitioner order or complex needs related to functional status, cognitive ability, or social support system  Outcome: Progressing     Problem: Knowledge Deficit  Goal: Patient/family/caregiver demonstrates understanding of disease process, treatment plan, medications, and discharge instructions  Description  Complete learning assessment and assess knowledge base    Interventions:  - Provide teaching at level of understanding  - Provide teaching via preferred learning methods  Outcome: Progressing     Problem: CARDIOVASCULAR - ADULT  Goal: Maintains optimal cardiac output and hemodynamic stability  Description  INTERVENTIONS:  - Monitor I/O, vital signs and rhythm  - Monitor for S/S and trends of decreased cardiac output  - Administer and titrate ordered vasoactive medications to optimize hemodynamic stability  - Assess quality of pulses, skin color and temperature  - Assess for signs of decreased coronary artery perfusion  - Instruct patient to report change in severity of symptoms  Outcome: Progressing  Goal: Absence of cardiac dysrhythmias or at baseline rhythm  Description  INTERVENTIONS:  - Continuous cardiac monitoring, vital signs, obtain 12 lead EKG if ordered  - Administer antiarrhythmic and heart rate control medications as ordered  - Monitor electrolytes and administer replacement therapy as ordered  Outcome: Progressing     Problem: RESPIRATORY - ADULT  Goal: Achieves optimal ventilation and oxygenation  Description  INTERVENTIONS:  - Assess for changes in respiratory status  - Assess for changes in mentation and behavior  - Position to facilitate oxygenation and minimize respiratory effort  - Oxygen administered by appropriate delivery if ordered  - Initiate smoking cessation education as indicated  - Encourage broncho-pulmonary hygiene including cough, deep breathe, Incentive Spirometry  - Assess the need for suctioning and aspirate as needed  - Assess and instruct to report SOB or any respiratory difficulty  - Respiratory Therapy support as indicated  Outcome: Progressing     Problem: METABOLIC, FLUID AND ELECTROLYTES - ADULT  Goal: Electrolytes maintained within normal limits  Description  INTERVENTIONS:  - Monitor labs and assess patient for signs and symptoms of electrolyte imbalances  - Administer electrolyte replacement as ordered  - Monitor response to electrolyte replacements, including repeat lab results as appropriate  - Instruct patient on fluid and nutrition as appropriate  Outcome: Progressing  Goal: Fluid balance maintained  Description  INTERVENTIONS:  - Monitor labs   - Monitor I/O and WT  - Instruct patient on fluid and nutrition as appropriate  - Assess for signs & symptoms of volume excess or deficit  Outcome: Progressing  Goal: Glucose maintained within target range  Description  INTERVENTIONS:  - Monitor Blood Glucose as ordered  - Assess for signs and symptoms of hyperglycemia and hypoglycemia  - Administer ordered medications to maintain glucose within target range  - Assess nutritional intake and initiate nutrition service referral as needed  Outcome: Progressing     Problem: SKIN/TISSUE INTEGRITY - ADULT  Goal: Skin integrity remains intact  Description  INTERVENTIONS  - Identify patients at risk for skin breakdown  - Assess and monitor skin integrity  - Assess and monitor nutrition and hydration status  - Monitor labs (i e  albumin)  - Assess for incontinence   - Turn and reposition patient  - Assist with mobility/ambulation  - Relieve pressure over bony prominences  - Avoid friction and shearing  - Provide appropriate hygiene as needed including keeping skin clean and dry  - Evaluate need for skin moisturizer/barrier cream  - Collaborate with interdisciplinary team (i e  Nutrition, Rehabilitation, etc )   - Patient/family teaching  Outcome: Progressing  Goal: Incision(s), wounds(s) or drain site(s) healing without S/S of infection  Description  INTERVENTIONS  - Assess and document risk factors for skin impairment   - Assess and document dressing, incision, wound bed, drain sites and surrounding tissue  - Consider nutrition services referral as needed  - Oral mucous membranes remain intact  - Provide patient/ family education  Outcome: Progressing  Goal: Oral mucous membranes remain intact  Description  INTERVENTIONS  - Assess oral mucosa and hygiene practices  - Implement preventative oral hygiene regimen  - Implement oral medicated treatments as ordered  - Initiate Nutrition services referral as needed  Outcome: Progressing     Problem: Nutrition/Hydration-ADULT  Goal: Nutrient/Hydration intake appropriate for improving, restoring or maintaining nutritional needs  Description  Monitor and assess patient's nutrition/hydration status for malnutrition  Collaborate with interdisciplinary team and initiate plan and interventions as ordered  Monitor patient's weight and dietary intake as ordered or per policy   Utilize nutrition screening tool and intervene as necessary  Determine patient's food preferences and provide high-protein, high-caloric foods as appropriate       INTERVENTIONS:  - Monitor oral intake, urinary output, labs, and treatment plans  - Assess nutrition and hydration status and recommend course of action  - Evaluate amount of meals eaten  - Assist patient with eating if necessary   - Allow adequate time for meals  - Recommend/ encourage appropriate diets, oral nutritional supplements, and vitamin/mineral supplements  - Order, calculate, and assess calorie counts as needed  - Recommend, monitor, and adjust tube feedings and TPN/PPN based on assessed needs  - Assess need for intravenous fluids  - Provide specific nutrition/hydration education as appropriate  - Include patient/family/caregiver in decisions related to nutrition  Outcome: Progressing

## 2020-01-20 NOTE — ASSESSMENT & PLAN NOTE
· Acute on CKD III with baseline approximately 1 3-1 6  · Nephrology is following  Creatinine is trending down  Vigorous urine output on lasix infusion  Continue lasix gtt per nephrology recommendations  Monitor for contraction- was given diamox over the weekend  · Trend renal indices and monitor intake and output    · Urine output goal of 1-2 l negative per 24 hours- currently more then 15 liters negative since admission  · Obtain daily weights  · Creatinine today is

## 2020-01-20 NOTE — ASSESSMENT & PLAN NOTE
· The patient was noted to have a pericardial effusion on CT imaging that is described as mild to moderate  On her ECHO form 1/8 there was no evidence of effusion  · Small to moderate pericardial effusion noted on repeat Echo from 1/15  No evidence of hemodynamic compromise  ·  Cardiology is following

## 2020-01-20 NOTE — ASSESSMENT & PLAN NOTE
· She would benefit from weight loss  Diet per nutrition recommendations  · Likely component of OHV that is contributing to her acute on chronic pulmonary issues      · Continue BiPAP nocturnally and when napping given risk for hypercapnia

## 2020-01-20 NOTE — CONSULTS
Consultation - Palliative and Supportive Care   Jose L Bowers 63 y o  female [id number]      IDENTIFICATION:  Inpatient consult to Palliative Care  Consult performed by: Serina Grace MD  Consult ordered by: Joyce Merrill, [address] [place] [address]        Physician Requesting Consult: Betsy Mendiola MD  Reason for Consult / Principal Problem: assistance with goals of care  Hx and PE limited by: lethargy, BiPAP use    HISTORY OF PRESENT ILLNESS:       Jose L Bowers is a 63 y o  female with PMHx significant for obesity, chronic hypoxic hypercapnic respiratory failure, COPD, DM2, CKD 3, who is currently admitted with acute hypoxic RF thought be multifactorial in nature - COPD exacerbation, acute decompensated heart failure/pulmonary HTN, RADHA/OHV, and permanent Afib  It was noted that she was noncompliant with her meds and PCP visits for unclear reasons  She has been on and off of BiPAP/HiFlo since her admission in 1/10  Met her at bedside twice today - while eating lunch and then 2 hours later  She was AAO x 3 suprisingly  She was on the HiFlo at the time of my visits  She appeared comfortable and also admitted that she thinks she feels better everyday  She displayed some understanding of her various medical conditions  For now, she would like to continue current cares with the eventual goal of going back home  We spoke about her code status  She is still undecided on intubation and CPR if needed  She is a   Her  passed away years ago  She has a son named Krissy Lange who she wants to be her health care representative  She said she has 2 brothers but that they are not that close  Review of Systems   Cardiovascular: Negative for chest pain  Respiratory: Negative for shortness of breath  Gastrointestinal: Negative for abdominal pain  All other systems reviewed and are negative        Past Medical History:   Diagnosis Date    Atrial fibrillation with RVR ([facility] [address]     COPD [facility]chronic obstructive pulmonary disease) ([facility] )     Diabetes type 2, uncontrolled ([facility] )     Encephalopathy acute 1/11/2020    Hypertension     SIRS (systemic inflammatory response syndrome) ([place] [id number] ) 1/7/2020     Past Surgical History:   Procedure Laterality Date    HERNIA REPAIR      HYSTERECTOMY      LAPAROSCOPIC CHOLECYSTECTOMY       Social History     Socioeconomic History    Marital status:       Spouse name: Not on file    Number of children: Not on file    Years of education: Not on file    Highest education level: Not on file   Occupational History     Employer: [employer] Occupation:      Comment: WORKING FULL TIME   Social Needs    Financial resource strain: Not on file    Food insecurity:     Worry: Not on file     Inability: Not on file    Transportation needs:     Medical: Not on file     Non-medical: Not on file   Tobacco Use    Smoking status: Current Every Day Smoker     Packs/day: 0 00     Years: 43 00     Pack years: 0 00     Types: Cigarettes    Smokeless tobacco: Never Used    Tobacco comment: Currently half a pack a day, 6 CIGARETTES PER DAY    Substance and Sexual Activity    Alcohol use: Not Currently     Comment: socially, NO ALCOHOL USE    Drug use: Not Currently     Types: Marijuana    Sexual activity: Not on file   Lifestyle    Physical activity:     Days per week: Not on file     Minutes per session: Not on file    Stress: Not on file   Relationships    Social connections:     Talks on phone: Not on file     Gets together: Not on file     Attends Denominational service: Not on file     Active member of club or organization: Not on file     Attends meetings of clubs or organizations: Not on file     Relationship status: Not on file    Intimate partner violence:     Fear of current or ex partner: Not on file     Emotionally abused: Not on file     Physically abused: Not on file     Forced sexual activity: Not on file   Other Topics Concern    Not on file   Social History Narrative    NO LIVING WILL     Family History   Problem Relation Age of Onset    Diabetes type II Mother     No Known Problems Father         She reports not knowing much about her father   Cancer Family     Diabetes Family     Hypertension Family     Stroke Family        MEDICATIONS / ALLERGIES:    all current active meds have been reviewed    Allergies   Allergen Reactions    Heparin        OBJECTIVE:    Physical Exam  Physical Exam   Constitutional: She appears well-developed  No distress  Obese, appears older than stated age, appears chronically ill   HENT:   Head: Normocephalic and atraumatic  Right Ear: External ear normal    Left Ear: External ear normal    Eyes: Pupils are equal, round, and reactive to light  Conjunctivae and EOM are normal  Right eye exhibits no discharge  Left eye exhibits no discharge  No scleral icterus  Cardiovascular: Normal rate  Pulmonary/Chest:   [place], slightly tachypneic   Abdominal: Soft  Musculoskeletal: She exhibits edema  Neurological:   Awake, alert, a little slow to respond, oriented x 3   Skin: Skin is warm and dry  She is not diaphoretic  Psychiatric: She has a normal mood and affect  Her behavior is normal        Lab Results: I have personally reviewed pertinent labs  as noted in the HPI  Imaging Studies: I have personally reviewed pertinent reports  as noted in the HPI  EKG, Pathology, and Other Studies: I have personally reviewed pertinent reports  as noted in the HPI      Assessment:  COPD  Acute, persistent on chronic hypoxic hypercapnic respiratory failure, on BiPAP/Hiflo  Acute on chronic HFpEF  Severe pulmonary HTN  Permanent Afib  DM2  SAPNA on CKD 3  Obesity  Goals of care      Plan:  1  Symptom management    - none managed by PCS    2  Goals    - She demonstrates good insight and judgement into her medical condition     - Power of :  presumed to be son/Luis by [place]   - Expressed goal: continue current cares   She wants to get better, stay at her son's place for a while and then find another place for her to live  She does not want/believe that she can still stay at home  - Discussed code status  She wants to think about this some more before deciding  She will remain a full code  - Code Status: Full - Level 1    3  Psychosocial support   - seems to have little social support  She said her relationship with her son/ has been "getting better"  However, Logan Dobbins is apparently moving to [place]  If/when that happens, she said she has a few other people she can stay with - including her brother and a friend  - she requests to speak with CM/SW to help her with resources for when she is back in the community  We appreciate the invitation to be involved in this patient's care  We will continue to follow  Please do not hesitate to reach our on call provider through our clinic answering service at  should you have acute symptom control concerns  Counseling / Coordination of Care  Total floor / unit time spent today 60 minutes  Greater than 50% of total time was spent with the patient and / or family counseling and / or coordination of care  A description of the counseling / coordination of care: provided medical updates, discussed palliative care, determined capacity/competency, determined goals of care, determined POA, determined social/family support, discussed plans of care, discussed symptom management, provided psychosocial support            Fani Coy MD  Palliative Medicine & Supportive Care  Internal Medicine  Available via [facility] Text  Office: [phone]  Fax: [phone]

## 2020-01-20 NOTE — ASSESSMENT & PLAN NOTE
Wt Readings from Last 3 Encounters:   01/19/20 120 kg (265 lb 6 9 oz)   11/05/19 110 kg (243 lb 9 6 oz)   10/28/19 109 kg (240 lb 4 8 oz)       · Suspect decompensated CHF  Echo from 1/8/20 shows EF of 33% with diastolic dysfunction  · Has been diuresing very well  Continue per nephrology recommendations    · Continue beta blockade

## 2020-01-20 NOTE — ASSESSMENT & PLAN NOTE
Lab Results   Component Value Date    HGBA1C 5 8 04/11/2019       Recent Labs     01/18/20  1553 01/19/20  0732 01/19/20  1051 01/19/20  1620   POCGLU 110 87 128 252*       Blood Sugar Average: Last 72 hrs:  · Has been tolerating oral diet and initial hypoglycemia has improved  · TSH normal, random cortisol 17 9  · Blood sugar controlled  · Continue accucheks with SSI coverage  Goal to maintain -180  · Aggressively monitor and treat hypoglycemia as this has been associated with encephalopathy in this patient

## 2020-01-20 NOTE — ASSESSMENT & PLAN NOTE
· Rate remains controlled  · Cardizem transitioned to digoxin secondary to history of severe pulmonary HTN  · Continue lopressor PO  · Restarted on apixaban after previously being on Heparin  · HIT antibody is positive

## 2020-01-20 NOTE — WOUND OSTOMY CARE
Progress Note - Wound   Jennifer Dill 63 y o  female MRN: [id number]  Unit/Bed#: ICU 06 Encounter: [id number]        Assessment:   Patient seen for wound care follow-up  Agreeable to assessment, denies pain  Wearing bipap--nasal bride intact  Skin generally jaime, dry, and flaky  Scattered scabbed areas to arms, legs, feet  Bruising to arms  Excoriation to right flank from scratching  Abdominal and groin folds pink moist, intact  Blanchable erythema to bilateral buttocks, sacrum, and heels  Patient is frequently  incontinent of urine secondary to lasix drip--purewick in place for incontinence management  Continent of stool  Patient had small partial thickness wound documented to perineum on 1/15/20 per nursing documentation--no open areas to perineum at this time, pink, fragile  1   MASD/ITD with candidiasis to bilateral groin, abdomen, and breast folds--RESOLVED  2   Partial thickness open area along old midline abdominal incision likely due to moisture damage--RESOLVED     See flowsheet and media for wound details  Patient on low air-loss mattress in ICU  Positioning wedges, heel foams, and barrier creams in use for pressure injury prevention  Wound Care Plan:   1-Nystatin powder to groin, abdominal, and breast folds BID      Skin care plans:  1-Hydraguard to bilateral sacrum and buttock TID and PRN  2-Elevate heels to offload pressure  3-Ehob cushion in chair when out of bed  4-Moisturize skin daily with skin nourishing cream   5-Turn/reposition q2h or when medically stable for pressure re-distribution on skin--use positioning wedges  6-Continue purewick for urinary incontinence management  7-Apply Allevyn Life foam dressings to bilateral heels for prevention  Olaf with P   Peel back for skin assessment every shift and re-apply  Change dressings every 3 days         Wound care team will sign-off at this time--primary RN made aware      Taylor PAGEN, RN, [employer]

## 2020-01-20 NOTE — ASSESSMENT & PLAN NOTE
· Acute on CKD III with baseline approximately 1 3-1 6  · Nephrology is following  Creatinine is trending down  Vigorous urine output on lasix infusion  Continue lasix gtt per nephrology recommendations  · Trend renal indices and monitor intake and output    · Urine output goal of 1-2 l negative per 24 hours- currently more then 12 liters negative since admission  · Obtain daily weights  · Creatinine today is  2 15

## 2020-01-20 NOTE — PROGRESS NOTES
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, pleural effusion and noncompliance with nocturnal BiPAP  · Pt notes baseline oxygen requirement at home is 10L but case management as confirmed she is supposed to be on 3 liters  · Slowly improving  Tolerating HFNC during the day and BiPAP at [facility]  Slowly weaning HFNC settings  Continue to wean daytime supplemental O2 as tolerated to maintain saturation >88%  · Continue diuresis per nephrology recommendations diamox was added for 4 doses given degree of contraction- will monitor her CO2 level  · Continue scheduled nebulized bronchodilators  · Aggressive pulmonary toileting  Encourage cough and deep breathing  Use incentive spirometer  Out of bed to the chair during the day  · PT/OT        Acute on chronic diastolic congestive heart failure [facility])  Assessment & Plan  Wt Readings from Last 3 Encounters:   01/19/20 120 kg (265 lb 6 9 oz)   11/05/19 110 kg (243 lb 9 6 oz)   10/28/19 109 kg (240 lb 4 8 oz)       · Suspect decompensated CHF  Echo from 1/8/20 shows EF of 67% with diastolic dysfunction  · Has been diuresing very well  Continue per nephrology recommendations  · Continue beta blockade      Pericardial effusion  Assessment & Plan  · The patient was noted to have a pericardial effusion on CT imaging that is described as mild to moderate  On her ECHO form 1/8 there was no evidence of effusion  · Small to moderate pericardial effusion noted on repeat Echo from 1/15  No evidence of hemodynamic compromise  ·  Cardiology is following      COPD (chronic obstructive pulmonary disease) ([facility] )  Assessment & Plan  · Continue nebulized bronchodilators  No indication for steroids  · Not on maintenance inhalers at home  · Will need outpatient follow up       Permanent atrial fibrillation  Assessment & Plan  · Rate remains controlled  · Continue PO cardizem and metoprolol  · Restarted on apixaban after previously being on Heparin  HIT antibody is positive    SAPNA (acute kidney injury) ([facility] )  Assessment & Plan  · Acute on CKD III with baseline approximately 1 3-1 6  · Nephrology is following  Creatinine is trending down  Vigorous urine output on lasix infusion  Continue lasix gtt per nephrology recommendations  · Trend renal indices and monitor intake and output  · Urine output goal of 1-2 l negative per 24 hours- currently more then 12 liters negative since admission  · Obtain daily weights  · Creatinine today is  2 15      Pulmonary hypertension ([place] [address] )  Assessment & Plan  · Cardiology following and patient does not want treatment or evaluation of pulmonary hypertension  · Is non compliant at baseline with medications and BiPAP    Heparin induced thrombocytopenia (HCC)  Assessment & Plan  · Heparin antibody was positive  · Has been transitioned back to [facility]     Obesity  Assessment & Plan  · She would benefit from weight loss  Diet per nutrition recommendations  · Likely component of OHV that is contributing to her acute on chronic pulmonary issues  · Continue BiPAP nocturnally and when napping given risk for hypercapnia    Type 2 diabetes mellitus without complication [facility])  Assessment & Plan  Lab Results   Component Value Date    HGBA1C 5 8 04/11/2019       Recent Labs     01/18/20  1553 01/19/20  0732 01/19/20  1051 01/19/20  1620   POCGLU 110 87 128 252*       Blood Sugar Average: Last 72 hrs:  · Has been tolerating oral diet and initial hypoglycemia has improved  · TSH normal, random cortisol 17 9  · Blood sugar controlled      · Continue accucheks with SSI coverage  Goal to maintain -180  · Aggressively monitor and treat hypoglycemia as this has been associated with encephalopathy in this patient  Chronic venous stasis dermatitis of both lower extremities  Assessment & Plan  · No open ulcers or signs of cellulitis        ----------------------------------------------------------------------------------------  HPI/24hr events: no events overnight, improving    Disposition: Continue Stepdown Level 1 level of care   Code Status: Level 1 - Full Code  ---------------------------------------------------------------------------------------  SUBJECTIVE  Denies SOB at present    Review of Systems  Review of systems was reviewed and negative unless stated above in HPI/24-hour events   ---------------------------------------------------------------------------------------  OBJECTIVE    Vitals   Vitals:    20 0415 20 0515 20 0612 20 0615   BP: 95/71 (!) 89/68  94/65   Pulse: 68   66   Resp: (!) 24 (!) 27  (!) 27   Temp:       TempSrc:       SpO2: 99% 95% 96% 96%   Weight:       Height:         Temp (24hrs), Av 8 °F (36 6 °C), Min:97 4 °F (36 3 °C), Max:98 1 °F (36 7 °C)  Current: Temperature: 97 8 °F (36 6 °C)        SpO2 Device: O2 Device: High flow nasal cannula  O2 Flow Rate (L/min): 6 L/min    Physical Exam   Constitutional: She is oriented to person, place, and time  She appears well-developed and well-nourished  No distress  HENT:   Head: Normocephalic and atraumatic  Eyes: Pupils are equal, round, and reactive to light  Conjunctiva are injected   Neck: Neck supple  Cardiovascular: Normal rate  An irregular rhythm present  Pulmonary/Chest: Effort normal  She has rales  Abdominal: Soft  She exhibits distension  There is no tenderness  Musculoskeletal:   Chronic venous stasis skin changes in her lower extremity   Lymphadenopathy:     She has no cervical adenopathy     Neurological: She is alert and oriented to person, place, and time  Skin: Skin is warm and dry  Psychiatric: She has a normal mood and affect         Invasive/non-invasive ventilation settings   Respiratory    Lab Data (Last 4 hours)    None         O2/Vent Data (Last 4 hours)      01/20 0249 01/20 0612        Non-Invasive Ventilation Mode BiPAP HFNC                  Laboratory and Diagnostics:  Results from last 7 days   Lab Units 01/20/20  0503 01/19/20  0439 01/16/20  0427 01/15/20  0555   WBC Thousand/uL 6 40 4 74 7 05 7 08   HEMOGLOBIN g/dL 14 1 14 2 15 0 16 0*   HEMATOCRIT % 49 2* 48 4* 49 9* 53 4*   PLATELETS Thousands/uL 71* 66* 60* 62*     Results from last 7 days   Lab Units 01/20/20  0503 01/19/20  0439 01/18/20  0510 01/17/20  0619 01/16/20  0427 01/15/20  0556 01/14/20  0612   SODIUM mmol/L 144 139 140 142 142 144 144   POTASSIUM mmol/L 3 3* 3 1* 4 1 3 7 3 7 3 6 3 4*   CHLORIDE mmol/L 96* 98* 98* 98* 100 102 103   CO2 mmol/L >45* 42* 40* 36* 36* 35* 33*   ANION GAP mmol/L  --  -1* 2* 8 6 7 8   BUN mg/dL 99* 97* 92* 84* 80* 74* 74*   CREATININE mg/dL 2 15* 2 08* 2 02* 2 11* 2 19* 2 31* 2 70*   CALCIUM mg/dL 8 4 8 1* 8 0* 8 1* 8 0* 8 1* 7 9*   GLUCOSE RANDOM mg/dL 115 104 102 106 90 83 99   ALT U/L  --   --   --   --  8* 8*  --    AST U/L  --   --   --   --  11 12  --    ALK PHOS U/L  --   --   --   --  84 80  --    ALBUMIN g/dL  --   --   --   --  2 5* 2 6*  --    TOTAL BILIRUBIN mg/dL  --   --   --   --  4 08* 5 34*  --      Results from last 7 days   Lab Units 01/20/20  0503 01/19/20  0439 01/15/20  0556   MAGNESIUM mg/dL 2 1 1 7 1 7   PHOSPHORUS mg/dL  --  4 1  --       Results from last 7 days   Lab Units 01/15/20  0603 01/14/20  0612 01/14/20  0222 01/13/20  1708   INR   --   --   --  1 48*   PTT seconds 53* 67* 77* 37              ABG:  Results from last 7 days   Lab Units 01/13/20  1114   PH ART  7 402   PCO2 ART mm Hg 50 2*   PO2 ART mm Hg 91 9   HCO3 ART mmol/L 30 5*   BASE EXC ART mmol/L 4 3   ABG SOURCE  Brachial, Right     VBG:  Results from last 7 days   Lab Units 01/13/20  1114   ABG SOURCE  Brachial, Right           Micro        EKG:   Imaging: I have personally reviewed pertinent reports  and I have personally reviewed pertinent films in PACS    Intake and Output  I/O       01/18 0701 - 01/19 0700 01/19 0701 - 01/20 0700    P  O   1560    I V  (mL/kg) 143 7 (1 2) 40 3 (0 3)    Total Intake(mL/kg) 143 7 (1 2) 1600 3 (13 3)    Urine (mL/kg/hr) 2152 (0 7) 2561 (0 9)    Total Output 2152 2561    Net -2008 3 -960 7                Height and Weights   Height: 5' 5" (165 1 cm)  IBW: 57 kg  Body mass index is 44 17 kg/m²  Weight (last 2 days)     Date/Time   Weight    01/19/20 0600   120 (265 43)                Nutrition       Diet Orders   (From admission, onward)             Start     Ordered    01/16/20 1119  Diet Sunil/CHO Controlled; Consistent Carbohydrate Diet Level 2 (5 carb servings/75 grams CHO/meal)  Diet effective now     Question Answer Comment   Diet Type Sunil/CHO Controlled    Sunil/CHO Controlled Consistent Carbohydrate Diet Level 2 (5 carb servings/75 grams CHO/meal)    RD to adjust diet per protocol?  Yes        01/16/20 1118                  Active Medications  Scheduled Meds:    Current Facility-Administered Medications:  acetaZOLAMIDE 250 mg Oral Q12H [facility] Maxi Schafer,     apixaban 5 mg Oral BID Rexann Bars BilofMAYNOR crowder    dextrose 25 mL Intravenous PRN MAYNOR Soto    diltiazem 120 mg Oral Daily Luli K MAYNOR Kirby    furosemide 40 mg/hr Intravenous Continuous MAYNOR Balderrama Last Rate: 40 mg/hr (01/19/20 6235)   ipratropium 0 5 mg Nebulization TID Rexann Bars BilofskyMAYNOR    levalbuterol 1 25 mg Nebulization TID Rexann Bars Bilofsky, CRNP    metoprolol 5 mg Intravenous Q6H PRN Rexann Bars BilofskyMAYNOR    metoprolol succinate 100 mg Oral Daily Rexann Bars BilofskyMAYNOR    nicotine 1 patch Transdermal Daily Luli K Bilofskkyler CRNP    nystatin  Topical BID Rexann Bars Bilofsky, CRNP    ondansetron 4 mg Intravenous Q6H PRN Cintia Yang Spatzer, CRNP    QUEtiapine 25 mg Oral QPM MAYNOR Hernandez      Continuous Infusions:    furosemide 40 mg/hr Last Rate: 40 mg/hr (01/19/20 7337)     PRN Meds:     dextrose 25 mL PRN   metoprolol 5 mg Q6H PRN   ondansetron 4 mg Q6H PRN     ---------------------------------------------------------------------------------------  Advance Directive and Living Will:      Power of :    POLST:    ---------------------------------------------------------------------------------------  Counseling / Coordination of MAYNOR Perez      Portions of the record may have been created with voice recognition software  Occasional wrong word or "sound a like" substitutions may have occurred due to the inherent limitations of voice recognition software    Read the chart carefully and recognize, using context, where substitutions have occurred

## 2020-01-21 ENCOUNTER — HOSPITAL ENCOUNTER (INPATIENT)
Facility: HOSPITAL | Age: 60
LOS: 9 days | Discharge: NURSING, REHAB OR LONG-TERM CARE | DRG: 286 | End: 2020-01-30
Attending: ANESTHESIOLOGY | Admitting: EMERGENCY MEDICINE
Payer: COMMERCIAL

## 2020-01-21 DIAGNOSIS — I48.21 PERMANENT ATRIAL FIBRILLATION (HCC): ICD-10-CM

## 2020-01-21 DIAGNOSIS — I10 ESSENTIAL HYPERTENSION: ICD-10-CM

## 2020-01-21 DIAGNOSIS — I47.2 NONSUSTAINED VENTRICULAR TACHYCARDIA (HCC): ICD-10-CM

## 2020-01-21 DIAGNOSIS — I27.20 PULMONARY HYPERTENSION (HCC): ICD-10-CM

## 2020-01-21 DIAGNOSIS — J44.9 COPD (CHRONIC OBSTRUCTIVE PULMONARY DISEASE) (HCC): ICD-10-CM

## 2020-01-21 DIAGNOSIS — I50.33 ACUTE ON CHRONIC DIASTOLIC CONGESTIVE HEART FAILURE (HCC): ICD-10-CM

## 2020-01-21 DIAGNOSIS — S32.010A CLOSED COMPRESSION FRACTURE OF FIRST LUMBAR VERTEBRA, INITIAL ENCOUNTER: ICD-10-CM

## 2020-01-21 DIAGNOSIS — I50.32 CHRONIC DIASTOLIC CHF (CONGESTIVE HEART FAILURE) (HCC): ICD-10-CM

## 2020-01-21 DIAGNOSIS — I34.0 NON-RHEUMATIC MITRAL REGURGITATION: Primary | ICD-10-CM

## 2020-01-21 DIAGNOSIS — F32.A DEPRESSION, UNSPECIFIED DEPRESSION TYPE: Chronic | ICD-10-CM

## 2020-01-21 LAB
ALBUMIN SERPL BCP-MCNC: 2.5 G/DL (ref 3.5–5)
ALP SERPL-CCNC: 97 U/L (ref 46–116)
ALT SERPL W P-5'-P-CCNC: 13 U/L (ref 12–78)
ARTERIAL PATENCY WRIST A: YES
AST SERPL W P-5'-P-CCNC: 20 U/L (ref 5–45)
BASE EXCESS BLDA CALC-SCNC: 18.7 MMOL/L
BILIRUB SERPL-MCNC: 2.29 MG/DL (ref 0.2–1)
BUN SERPL-MCNC: 100 MG/DL (ref 5–25)
CALCIUM SERPL-MCNC: 8.3 MG/DL (ref 8.3–10.1)
CHLORIDE SERPL-SCNC: 96 MMOL/L (ref 100–108)
CO2 SERPL-SCNC: >45 MMOL/L (ref 21–32)
CREAT SERPL-MCNC: 2 MG/DL (ref 0.6–1.3)
GFR SERPL CREATININE-BSD FRML MDRD: 27 ML/MIN/1.73SQ M
GLUCOSE SERPL-MCNC: 102 MG/DL (ref 65–140)
GLUCOSE SERPL-MCNC: 127 MG/DL (ref 65–140)
GLUCOSE SERPL-MCNC: 153 MG/DL (ref 65–140)
GLUCOSE SERPL-MCNC: 85 MG/DL (ref 65–140)
GLUCOSE SERPL-MCNC: 92 MG/DL (ref 65–140)
HCO3 BLDA-SCNC: 45.8 MMOL/L (ref 22–28)
IPAP: 50
MAGNESIUM SERPL-MCNC: 1.9 MG/DL (ref 1.6–2.6)
NON VENT- BIPAP: ABNORMAL
O2 CT BLDA-SCNC: 20 ML/DL (ref 16–23)
OXYHGB MFR BLDA: 90.7 % (ref 94–97)
PCO2 BLDA: 60.7 MM HG (ref 36–44)
PEEP MAX SETTING VENT: 10 CM[H2O]
PH BLDA: 7.5 [PH] (ref 7.35–7.45)
PHOSPHATE SERPL-MCNC: 2.8 MG/DL (ref 2.7–4.5)
PO2 BLDA: 63.4 MM HG (ref 75–129)
POTASSIUM SERPL-SCNC: 3.8 MMOL/L (ref 3.5–5.3)
PROT SERPL-MCNC: 6 G/DL (ref 6.4–8.2)
SODIUM SERPL-SCNC: 145 MMOL/L (ref 136–145)
SPECIMEN SOURCE: ABNORMAL
VENT BIPAP FIO2: 50 %

## 2020-01-21 PROCEDURE — 82948 REAGENT STRIP/BLOOD GLUCOSE: CPT

## 2020-01-21 PROCEDURE — 94760 N-INVAS EAR/PLS OXIMETRY 1: CPT

## 2020-01-21 PROCEDURE — 80053 COMPREHEN METABOLIC PANEL: CPT | Performed by: INTERNAL MEDICINE

## 2020-01-21 PROCEDURE — 99223 1ST HOSP IP/OBS HIGH 75: CPT | Performed by: EMERGENCY MEDICINE

## 2020-01-21 PROCEDURE — 82805 BLOOD GASES W/O2 SATURATION: CPT | Performed by: NURSE PRACTITIONER

## 2020-01-21 PROCEDURE — 36600 WITHDRAWAL OF ARTERIAL BLOOD: CPT

## 2020-01-21 PROCEDURE — 99232 SBSQ HOSP IP/OBS MODERATE 35: CPT | Performed by: INTERNAL MEDICINE

## 2020-01-21 PROCEDURE — NC001 PR NO CHARGE: Performed by: INTERNAL MEDICINE

## 2020-01-21 PROCEDURE — 99238 HOSP IP/OBS DSCHRG MGMT 30/<: CPT | Performed by: INTERNAL MEDICINE

## 2020-01-21 PROCEDURE — 94660 CPAP INITIATION&MGMT: CPT

## 2020-01-21 PROCEDURE — 83735 ASSAY OF MAGNESIUM: CPT | Performed by: INTERNAL MEDICINE

## 2020-01-21 PROCEDURE — 84100 ASSAY OF PHOSPHORUS: CPT | Performed by: INTERNAL MEDICINE

## 2020-01-21 PROCEDURE — 94640 AIRWAY INHALATION TREATMENT: CPT

## 2020-01-21 RX ORDER — FUROSEMIDE 10 MG/ML
40 SYRINGE (ML) INJECTION CONTINUOUS
Status: DISCONTINUED | OUTPATIENT
Start: 2020-01-22 | End: 2020-01-22

## 2020-01-21 RX ORDER — NYSTATIN 100000 [USP'U]/G
POWDER TOPICAL 2 TIMES DAILY
Status: DISCONTINUED | OUTPATIENT
Start: 2020-01-22 | End: 2020-01-30 | Stop reason: HOSPADM

## 2020-01-21 RX ORDER — FUROSEMIDE 10 MG/ML
40 SYRINGE (ML) INJECTION CONTINUOUS
Status: CANCELLED | OUTPATIENT
Start: 2020-01-21

## 2020-01-21 RX ORDER — SILDENAFIL CITRATE 20 MG/1
40 TABLET ORAL 3 TIMES DAILY
Status: DISCONTINUED | OUTPATIENT
Start: 2020-01-22 | End: 2020-01-30 | Stop reason: HOSPADM

## 2020-01-21 RX ORDER — ONDANSETRON 2 MG/ML
4 INJECTION INTRAMUSCULAR; INTRAVENOUS EVERY 6 HOURS PRN
Status: DISCONTINUED | OUTPATIENT
Start: 2020-01-21 | End: 2020-01-30 | Stop reason: HOSPADM

## 2020-01-21 RX ORDER — METOPROLOL TARTRATE 5 MG/5ML
5 INJECTION INTRAVENOUS EVERY 6 HOURS PRN
Status: DISCONTINUED | OUTPATIENT
Start: 2020-01-21 | End: 2020-01-30 | Stop reason: HOSPADM

## 2020-01-21 RX ORDER — LEVALBUTEROL 1.25 MG/.5ML
1.25 SOLUTION, CONCENTRATE RESPIRATORY (INHALATION)
Status: DISCONTINUED | OUTPATIENT
Start: 2020-01-22 | End: 2020-01-30 | Stop reason: HOSPADM

## 2020-01-21 RX ORDER — DEXTROSE MONOHYDRATE 25 G/50ML
25 INJECTION, SOLUTION INTRAVENOUS AS NEEDED
Status: DISCONTINUED | OUTPATIENT
Start: 2020-01-21 | End: 2020-01-30 | Stop reason: HOSPADM

## 2020-01-21 RX ORDER — DEXTROSE MONOHYDRATE 25 G/50ML
25 INJECTION, SOLUTION INTRAVENOUS AS NEEDED
Status: CANCELLED | OUTPATIENT
Start: 2020-01-21

## 2020-01-21 RX ORDER — DIGOXIN 0.25 MG/ML
250 INJECTION INTRAMUSCULAR; INTRAVENOUS DAILY
Status: CANCELLED | OUTPATIENT
Start: 2020-01-22

## 2020-01-21 RX ORDER — SILDENAFIL CITRATE 20 MG/1
40 TABLET ORAL 3 TIMES DAILY
Status: CANCELLED | OUTPATIENT
Start: 2020-01-21

## 2020-01-21 RX ORDER — DIGOXIN 0.25 MG/ML
250 INJECTION INTRAMUSCULAR; INTRAVENOUS DAILY
Status: DISCONTINUED | OUTPATIENT
Start: 2020-01-22 | End: 2020-01-21

## 2020-01-21 RX ORDER — DIGOXIN 0.25 MG/ML
250 INJECTION INTRAMUSCULAR; INTRAVENOUS DAILY
Status: DISCONTINUED | OUTPATIENT
Start: 2020-01-21 | End: 2020-01-21 | Stop reason: HOSPADM

## 2020-01-21 RX ORDER — LEVALBUTEROL 1.25 MG/.5ML
1.25 SOLUTION, CONCENTRATE RESPIRATORY (INHALATION)
Status: CANCELLED | OUTPATIENT
Start: 2020-01-21

## 2020-01-21 RX ORDER — NYSTATIN 100000 [USP'U]/G
POWDER TOPICAL 2 TIMES DAILY
Status: CANCELLED | OUTPATIENT
Start: 2020-01-21

## 2020-01-21 RX ORDER — QUETIAPINE FUMARATE 25 MG/1
25 TABLET, FILM COATED ORAL EVERY EVENING
Status: CANCELLED | OUTPATIENT
Start: 2020-01-21

## 2020-01-21 RX ORDER — METOPROLOL SUCCINATE 50 MG/1
100 TABLET, EXTENDED RELEASE ORAL DAILY
Status: CANCELLED | OUTPATIENT
Start: 2020-01-22

## 2020-01-21 RX ORDER — METOPROLOL TARTRATE 5 MG/5ML
5 INJECTION INTRAVENOUS EVERY 6 HOURS PRN
Status: CANCELLED | OUTPATIENT
Start: 2020-01-21

## 2020-01-21 RX ORDER — QUETIAPINE FUMARATE 25 MG/1
25 TABLET, FILM COATED ORAL EVERY EVENING
Status: DISCONTINUED | OUTPATIENT
Start: 2020-01-22 | End: 2020-01-30 | Stop reason: HOSPADM

## 2020-01-21 RX ORDER — METOPROLOL SUCCINATE 50 MG/1
100 TABLET, EXTENDED RELEASE ORAL DAILY
Status: DISCONTINUED | OUTPATIENT
Start: 2020-01-22 | End: 2020-01-22

## 2020-01-21 RX ORDER — SILDENAFIL CITRATE 20 MG/1
40 TABLET ORAL 3 TIMES DAILY
Status: DISCONTINUED | OUTPATIENT
Start: 2020-01-21 | End: 2020-01-21 | Stop reason: HOSPADM

## 2020-01-21 RX ORDER — ONDANSETRON 2 MG/ML
4 INJECTION INTRAMUSCULAR; INTRAVENOUS EVERY 6 HOURS PRN
Status: CANCELLED | OUTPATIENT
Start: 2020-01-21

## 2020-01-21 RX ORDER — DIGOXIN 0.25 MG/ML
250 INJECTION INTRAMUSCULAR; INTRAVENOUS DAILY
Status: DISCONTINUED | OUTPATIENT
Start: 2020-01-22 | End: 2020-01-22

## 2020-01-21 RX ADMIN — NYSTATIN 1 APPLICATION: 100000 POWDER TOPICAL at 18:27

## 2020-01-21 RX ADMIN — LEVALBUTEROL HYDROCHLORIDE 1.25 MG: 1.25 SOLUTION, CONCENTRATE RESPIRATORY (INHALATION) at 12:44

## 2020-01-21 RX ADMIN — IPRATROPIUM BROMIDE 0.5 MG: 0.5 SOLUTION RESPIRATORY (INHALATION) at 12:45

## 2020-01-21 RX ADMIN — DIGOXIN 250 MCG: 0.25 INJECTION INTRAMUSCULAR; INTRAVENOUS at 11:47

## 2020-01-21 RX ADMIN — LEVALBUTEROL HYDROCHLORIDE 1.25 MG: 1.25 SOLUTION, CONCENTRATE RESPIRATORY (INHALATION) at 19:57

## 2020-01-21 RX ADMIN — QUETIAPINE FUMARATE 25 MG: 25 TABLET ORAL at 21:00

## 2020-01-21 RX ADMIN — NYSTATIN: 100000 POWDER TOPICAL at 13:36

## 2020-01-21 RX ADMIN — NICOTINE 7 MG: 7 PATCH TRANSDERMAL at 11:47

## 2020-01-21 RX ADMIN — IPRATROPIUM BROMIDE 0.5 MG: 0.5 SOLUTION RESPIRATORY (INHALATION) at 19:57

## 2020-01-21 RX ADMIN — SILDENAFIL 40 MG: 20 TABLET ORAL at 17:00

## 2020-01-21 RX ADMIN — METOPROLOL SUCCINATE 100 MG: 50 TABLET, EXTENDED RELEASE ORAL at 13:36

## 2020-01-21 RX ADMIN — IPRATROPIUM BROMIDE 0.5 MG: 0.5 SOLUTION RESPIRATORY (INHALATION) at 08:32

## 2020-01-21 RX ADMIN — APIXABAN 5 MG: 5 TABLET, FILM COATED ORAL at 17:21

## 2020-01-21 RX ADMIN — APIXABAN 5 MG: 5 TABLET, FILM COATED ORAL at 13:36

## 2020-01-21 RX ADMIN — LEVALBUTEROL HYDROCHLORIDE 1.25 MG: 1.25 SOLUTION, CONCENTRATE RESPIRATORY (INHALATION) at 08:32

## 2020-01-21 RX ADMIN — Medication 40 MG/HR: at 03:39

## 2020-01-21 RX ADMIN — SILDENAFIL 40 MG: 20 TABLET ORAL at 21:01

## 2020-01-21 RX ADMIN — Medication 40 MG/HR: at 15:05

## 2020-01-21 NOTE — ASSESSMENT & PLAN NOTE
· Started on sildenafil 20 mg TID  · Is non compliant at baseline with medications and BiPAP  · Consult Heart Failure service for Right Heart Cath and pulm htn mgmt

## 2020-01-21 NOTE — PROGRESS NOTES
01/21/20 590 [facility] Affiliation None   Spiritual Beliefs/Perceptions   Support Systems Children   Plan of Care   Comments Tried to cultivate realtionship of care and support  Pt enjoyed sharing her good sense of humor  Pt requested visit from  - to discuss enrolling in SSI  Message passed on to nursing staff     Assessment Completed by: Unit visit

## 2020-01-21 NOTE — UTILIZATION REVIEW
Continued Stay Review    Date:     1/20/20                          Current Patient Class:    Inpatient  Current Level of Care:    ICU    HPI:59 y o  female initially admitted on    1/7     With    Acute/chronic respiratory failure with hypoxia and hypercapnia   Transferred to ICU   1/10    Assessment/Plan:   1/21     Remains  In  ICU  Remains  On  HFNC  during the day,    Bipap  Every night  Continue  Lasix  Drip  PO  cardizem d/c  1/20  And  Started  Dig  Cont nebulizer treatments  Continue to monitor labs         Pertinent Labs/Diagnostic Results:   Results from last 7 days   Lab Units 01/20/20  0503 01/19/20  0439 01/16/20  0427 01/15/20  0555   WBC Thousand/uL 6 40 4 74 7 05 7 08   HEMOGLOBIN g/dL 14 1 14 2 15 0 16 0*   HEMATOCRIT % 49 2* 48 4* 49 9* 53 4*   PLATELETS Thousands/uL 71* 66* 60* 62*         Results from last 7 days   Lab Units 01/21/20  0550 01/20/20  0503 01/19/20  0439 01/18/20  0510 01/17/20  0619 01/16/20  0427 01/15/20  0556   SODIUM mmol/L 145 144 139 140 142 142 144   POTASSIUM mmol/L 3 8 3 3* 3 1* 4 1 3 7 3 7 3 6   CHLORIDE mmol/L 96* 96* 98* 98* 98* 100 102   CO2 mmol/L >45* >45* 42* 40* 36* 36* 35*   ANION GAP mmol/L  --   --  -1* 2* 8 6 7   BUN mg/dL 100* 99* 97* 92* 84* 80* 74*   CREATININE mg/dL 2 00* 2 15* 2 08* 2 02* 2 11* 2 19* 2 31*   EGFR ml/min/1 73sq m 27 25 26 27 25 24 23   CALCIUM mg/dL 8 3 8 4 8 1* 8 0* 8 1* 8 0* 8 1*   MAGNESIUM mg/dL 1 9 2 1 1 7  --   --   --  1 7   PHOSPHORUS mg/dL 2 8  --  4 1  --   --   --   --      Results from last 7 days   Lab Units 01/21/20  0550 01/16/20  0427 01/15/20  0556   AST U/L 20 11 12   ALT U/L 13 8* 8*   ALK PHOS U/L 97 84 80   TOTAL PROTEIN g/dL 6 0* 5 6* 5 8*   ALBUMIN g/dL 2 5* 2 5* 2 6*   TOTAL BILIRUBIN mg/dL 2 29* 4 08* 5 34*     Results from last 7 days   Lab Units 01/21/20  0801 01/20/20  2159 01/20/20  1619 01/20/20  1044 01/20/20  0729 01/19/20  2051 01/19/20  1620 01/19/20  1051 01/19/20  0732 01/18/20  1553   POC GLUCOSE mg/dl 92 129 123 83 155* 134 252* 128 87 110     Results from last 7 days   Lab Units 01/21/20  0550 01/20/20  0503 01/19/20  0439 01/18/20  0510 01/17/20  0619 01/16/20  0427 01/15/20  0556   GLUCOSE RANDOM mg/dL 102 115 104 102 106 90 83              Results from last 7 days   Lab Units 01/20/20  1250   PH ELYSIA  7 387   PCO2 ELYSIA mm Hg 90 6*   PO2 ELYSIA mm Hg 35 5   HCO3 ELYSIA mmol/L 53 2*   BASE EXC ELYSIA mmol/L 21 8   O2 CONTENT ELYSIA ml/dL 14 0   O2 HGB, VENOUS % 63 2             Vital Signs:   97 3 °F (36 3 °C)Abnormal                    01/20/20 2315    80  25Abnormal   100/64  75  89 %Abnormal        01/20/20 2215    82  28Abnormal   100/57  65  89 %Abnormal        01/20/20 2115    80  22  106/56  70  90 %       01/20/20 1915  97 2 °F (36 2 °C)Abnormal   84  27Abnormal   93/60  72  89 %Abnormal        01/20/20 1849              High flow nasal cannula     01/20/20 1813    80  30Abnormal   81/63Abnormal   72  79 %Abnormal        01/20/20 1713    80  29Abnormal   99/75  80  88 %Abnormal        01/20/20 1701  97 3 °F (36 3 °C)Abnormal                  01/20/20 1613    78  26Abnormal   97/64  72  86 %Abnormal              Medications:   Scheduled Medications:    Medications:  apixaban 5 mg Oral BID   digoxin 125 mcg Intravenous Daily   ipratropium 0 5 mg Nebulization TID   levalbuterol 1 25 mg Nebulization TID   metoprolol succinate 100 mg Oral Daily   nicotine 7 mg Transdermal Daily   nystatin  Topical BID   QUEtiapine 25 mg Oral QPM   sildenafil 20 mg Oral TID     Continuous IV Infusions:    furosemide 40 mg/hr Intravenous Continuous     PRN Meds:    dextrose 25 mL Intravenous PRN   metoprolol 5 mg Intravenous Q6H PRN   ondansetron 4 mg Intravenous Q6H PRN       Discharge Plan:  TBD    Network Utilization Review Department  [email or web address]  ATTENTION: Please call with any questions or concerns to [phone] and carefully listen to the prompts so that you are directed to the right person  All voicemails are confidential   Mat Bicker all requests for admission clinical reviews, approved or denied determinations and any other requests to dedicated fax number below belonging to the campus where the patient is receiving treatment   List of dedicated fax numbers for the Facilities:  [address] [place] [address] DENIALS (Administrative/Medical Necessity) [phone]   [address] (Maternity/NICU/Pediatrics) [phone]   Marc Burger [phone]   Nicky Pineda [phone]   [facility] [phone]   Quinn Connolly [phone]   [address] [place] [address] [phone]   [facility]  [phone]   [address] [facility] [address]  [address] [facility] [address] [facility] [phone]

## 2020-01-21 NOTE — ASSESSMENT & PLAN NOTE
· Rate remains controlled  · Cardizem transitioned to digoxin secondary to history of severe pulmonary HTN  · Continue lopressor PO  · Restarted on apixaban after previously being on Heparin  · HIT antibody is positive   HIT PCR pending

## 2020-01-21 NOTE — PROGRESS NOTES
Progress Note - Nephrology   Dada Sheth 63 y o  female MRN: [id number]  Unit/Bed#: ICU 06 Encounter: [id number]      Assessment / Plan:  1  Acute kidney injury, POA, suspecting cardiorenal syndrome/volume overload plus relative hypotension with previous ACE-inhibitor use  -sCr relatively stable on lasix gtt at 2  -may need to accept higher sCr for euvolemia  -weight improving daily  Will continue current management  May consider changing to bumex gtt if further diuresis needed  -f/u am BMP  2  Chronic kidney disease, stage III likely due to hypertension, diabetes as well as cardiorenal syndrome - baseline creatinine 1 3-1 6, would benefit from outpatient renal f/u  3  Volume overload with small to moderate concentric pericardial effusion   - continue with Lasix drip  4  Hypokalemia, diuretic induced - continue with replacement, mag ok  Monitor K/mag levels  5  Metabolic alkalosis - s/p 4 doses of diamox over the weekend  ABG shows pH 7 496, pCO2 60 7  Consider diamox addition if inadequate diuresis  Total CO2 > 45  6  Hypotension - would consider midodrine for BP support while on lasix gtt  If BP worsens, would repeat echo to rule out tamponade  7  thrombocytopenia - monitor platelets  HIT Ab positive, f/u TREE, on apixaban per ICU team        Subjective:   I am unable to elicit HPI or review of systems from the patient as she falls back asleep very quickly after briefly waking to her name  She is on BiPAP  She is also on a Lasix drip  Objective:     Vitals: Blood pressure 105/74, pulse 78, temperature (!) 97 °F (36 1 °C), temperature source Temporal, resp  rate 20, height 5' 5" (1 651 m), weight 116 kg (255 lb 11 7 oz), SpO2 91 %  ,Body mass index is 42 56 kg/m²  Temp (24hrs), Av 1 °F (36 2 °C), Min:96 7 °F (35 9 °C), Max:97 3 °F (36 3 °C)      Weight (last 2 days)     Date/Time   Weight    20 0600   116 (255 73)    20 0600   116 (256 17)    20 0600   120 (265 43) Intake/Output Summary (Last 24 hours) at 1/21/2020 1308  Last data filed at 1/21/2020 0800  Gross per 24 hour   Intake 555 93 ml   Output 3478 ml   Net -2922 07 ml     I/O last 24 hours: In: 583 5 [P O :480; I V :103 5]  Out: 4578 [Urine:4578]        Physical Exam:   Physical Exam   Constitutional: She appears well-developed and well-nourished  No distress  Obese, on bipap   HENT:   Head: Normocephalic and atraumatic  Mouth/Throat: No oropharyngeal exudate  Eyes: Right eye exhibits no discharge  Left eye exhibits no discharge  No scleral icterus  Neck: Normal range of motion  Neck supple  No thyromegaly present  Cardiovascular: Normal rate  No murmur heard  Irregular rhythm   Pulmonary/Chest: Effort normal  No respiratory distress  She has no wheezes  Coarse BS b/l, on bipap   Abdominal: Soft  Bowel sounds are normal  She exhibits no distension  Genitourinary:   Genitourinary Comments: External urinary catheter   Musculoskeletal: She exhibits edema (chronic venous stasis changes b/l LE, trace UE edema)  Neurological: She exhibits normal muscle tone  Lethargic on bipap   Skin: Skin is warm and dry  No rash noted  She is not diaphoretic  Psychiatric:   Flat affect   Nursing note and vitals reviewed        Invasive Devices     Peripheral Intravenous Line            Peripheral IV 01/18/20 Right;Upper;Ventral (anterior) Arm 2 days    Peripheral IV 01/20/20 Left Antecubital 1 day          Drain            External Urinary Catheter 10 days                Medications:    Scheduled Meds:  Current Facility-Administered Medications:  apixaban 5 mg Oral BID Luli K MAYNOR Kirby    dextrose 25 mL Intravenous PRN MAYNOR Soto    digoxin 250 mcg Intravenous Daily MAYNOR Soto    furosemide 40 mg/hr Intravenous Continuous Estle Hom[place], MAYNOR Last Rate: 40 mg/hr (01/21/20 0339)   ipratropium 0 5 mg Nebulization TID MAYNOR Butcher    levalbuterol 1 25 mg Nebulization TID Em Marianne Kirby, CRNP    metoprolol 5 mg Intravenous Q6H PRN Em Lopes Kofi, CRNP    metoprolol succinate 100 mg Oral Daily Em Lopes Kofi, CRNP    nicotine 7 mg Transdermal Daily Allan Arriola, CRNP    nystatin  Topical BID Em Hendersonmagalie Kirby, MAYNOR    ondansetron 4 mg Intravenous Q6H PRN Meredithann Abelson Spatzer, MAYNOR    QUEtiapine 25 mg Oral QPM Cande Lopez, MAYNOR    sildenafil 40 mg Oral TID Lary Alyx, BALJITNP        PRN Meds: dextrose    metoprolol    ondansetron    Continuous Infusions:  furosemide 40 mg/hr Last Rate: 40 mg/hr (01/21/20 0339)           LAB RESULTS:      Results from last 7 days   Lab Units 01/21/20  0550 01/20/20  0503 01/19/20  0439 01/18/20  0510 01/17/20  0619 01/16/20  0427 01/15/20  0556 01/15/20  0555   WBC Thousand/uL  --  6 40 4 74  --   --  7 05  --  7 08   HEMOGLOBIN g/dL  --  14 1 14 2  --   --  15 0  --  16 0*   HEMATOCRIT %  --  49 2* 48 4*  --   --  49 9*  --  53 4*   PLATELETS Thousands/uL  --  71* 66*  --   --  60*  --  62*   POTASSIUM mmol/L 3 8 3 3* 3 1* 4 1 3 7 3 7 3 6  --    CHLORIDE mmol/L 96* 96* 98* 98* 98* 100 102  --    CO2 mmol/L >45* >45* 42* 40* 36* 36* 35*  --    BUN mg/dL 100* 99* 97* 92* 84* 80* 74*  --    CREATININE mg/dL 2 00* 2 15* 2 08* 2 02* 2 11* 2 19* 2 31*  --    CALCIUM mg/dL 8 3 8 4 8 1* 8 0* 8 1* 8 0* 8 1*  --    ALK PHOS U/L 97  --   --   --   --  84 80  --    ALT U/L 13  --   --   --   --  8* 8*  --    AST U/L 20  --   --   --   --  11 12  --    MAGNESIUM mg/dL 1 9 2 1 1 7  --   --   --  1 7  --    PHOSPHORUS mg/dL 2 8  --  4 1  --   --   --   --   --        CUTURES:  Lab Results   Component Value Date    BLOODCX No Growth After 5 Days  01/06/2020    BLOODCX No Growth After 5 Days  01/06/2020    URINECX >100,000 cfu/ml Mixed Contaminants X4 11/18/2016                 Portions of the record may have been created with voice recognition software   Occasional wrong word or "sound a like" substitutions may have occurred due to the inherent limitations of voice recognition software  Read the chart carefully and recognize, using context, where substitutions have occurred  If you have any questions, please contact the dictating provider

## 2020-01-21 NOTE — ASSESSMENT & PLAN NOTE
Lab Results   Component Value Date    HGBA1C 5 8 04/11/2019       Recent Labs     01/19/20 2051 01/20/20  0729 01/20/20  1044 01/20/20  1619   POCGLU 134 155* 83 123       Blood Sugar Average: Last 72 hrs:  · Has been tolerating oral diet and initial hypoglycemia has improved  · TSH normal, random cortisol 17 9  · Blood sugar controlled  · Continue accucheks with SSI coverage  Goal to maintain -180  · Aggressively monitor and treat hypoglycemia as this has been associated with encephalopathy in this patient

## 2020-01-21 NOTE — PROGRESS NOTES
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, pleural effusion and noncompliance with nocturnal BiPAP  · Pt notes baseline oxygen requirement at home is 10L but case management has confirmed she is supposed to be on 3 liters  · Slowly improving  Tolerating HFNC during the day and BiPAP at [facility]  Slowly weaning HFNC settings  Continue to wean daytime supplemental O2 as tolerated to maintain saturation >88%  · Continue diuresis per nephrology recommendations diamox was added for 4 doses given degree of contraction- will monitor her CO2 level  · Continue scheduled nebulized bronchodilators  · Aggressive pulmonary toileting  Encourage cough and deep breathing  Use incentive spirometer  Out of bed to the chair during the day  · PT/OT        Acute on chronic diastolic congestive heart failure [facility])  Assessment & Plan  Wt Readings from Last 3 Encounters:   01/20/20 116 kg (256 lb 2 8 oz)   11/05/19 110 kg (243 lb 9 6 oz)   10/28/19 109 kg (240 lb 4 8 oz)       · Suspect decompensated CHF  Echo from 1/8/20 shows EF of 34% with diastolic dysfunction  · Has been diuresing very well  Continue per nephrology recommendations  · Continue beta blockade  · Digoxin added on 1/20      Pericardial effusion  Assessment & Plan  · The patient was noted to have a pericardial effusion on CT imaging that is described as mild to moderate  On her ECHO form 1/8 there was no evidence of effusion  · Small to moderate pericardial effusion noted on repeat Echo from 1/15  No evidence of hemodynamic compromise     ·  Cardiology is following- no need for intervention at this point  COPD (chronic obstructive pulmonary disease) (HCC)  Assessment & Plan  · Continue nebulized bronchodilators  No indication for steroids  · Not on maintenance inhalers at home  · Will need outpatient follow up       Permanent atrial fibrillation  Assessment & Plan  · Rate remains controlled  · Cardizem transitioned to digoxin secondary to history of severe pulmonary HTN  · Continue lopressor PO  · Restarted on apixaban after previously being on Heparin  · HIT antibody is positive  SAPNA (acute kidney injury) ([facility] [address] )  Assessment & Plan  · Acute on CKD III with baseline approximately 1 3-1 6  · Nephrology is following  Creatinine is trending down  Vigorous urine output on lasix infusion  Continue lasix gtt per nephrology recommendations  Monitor for contraction- was given diamox over the weekend  · Trend renal indices and monitor intake and output  · Urine output goal of 1-2 l negative per 24 hours- currently more then 15 liters negative since admission  · Obtain daily weights  · Creatinine today is        Pulmonary [facility] ([facility] )  Assessment & Plan  · Started on sildenafil 20 mg TID  · Is non compliant at baseline with medications and BiPAP    Heparin induced thrombocytopenia (HCC)  Assessment & Plan  · Heparin antibody was positive  · Has been transitioned back to apixiban    Obesity  Assessment & Plan  · She would benefit from weight loss  Diet per nutrition recommendations  · Likely component of OHV that is contributing to her acute on chronic pulmonary issues      · Continue BiPAP nocturnally and when napping given risk for hypercapnia    Type 2 diabetes mellitus without complication [facility])  Assessment & Plan  Lab Results   Component Value Date    HGBA1C 5 8 04/11/2019       Recent Labs     01/19/20  2051 01/20/20  0729 01/20/20  1044 01/20/20  1619   POCGLU 134 155* 83 123       Blood Sugar Average: Last 72 hrs:  · Has been tolerating oral diet and initial hypoglycemia has improved  · TSH normal, random cortisol 17 9  · Blood sugar controlled  · Continue accucheks with SSI coverage  Goal to maintain -180  · Aggressively monitor and treat hypoglycemia as this has been associated with encephalopathy in this patient  Chronic venous stasis dermatitis of both lower extremities  Assessment & Plan  · No open ulcers or signs of cellulitis        ----------------------------------------------------------------------------------------  HPI/24hr events: Yesterday AM had an episode of becoming unresponsive and needed placement on BIPAP, currently denies SOB    Disposition: Continue Stepdown Level 1 level of care   Code Status: Level 1 - Full Code  ---------------------------------------------------------------------------------------  SUBJECTIVE  Feels better, denies SOB at present    Review of Systems  Review of systems was reviewed and negative unless stated above in HPI/24-hour events   ---------------------------------------------------------------------------------------  OBJECTIVE    Vitals   Vitals:    20 0015 20 0330 20 0415 20 0545   BP: 101/73 101/64 101/63 (!) 85/70   Pulse: 82 76 76 78   Resp: 20 (!) 26 19 (!) 30   Temp:       TempSrc:       SpO2: 92% 94% 91%    Weight:       Height:         Temp (24hrs), Av 3 °F (36 3 °C), Min:96 9 °F (36 1 °C), Max:97 6 °F (36 4 °C)  Current: Temperature: (!) 97 3 °F (36 3 °C)        SpO2 Device: O2 Device: High flow nasal cannula  O2 Flow Rate (L/min): 50 L/min    Physical Exam   Constitutional: She is oriented to person, place, and time  She appears well-developed and well-nourished  No distress  HENT:   Head: Normocephalic and atraumatic  Eyes: Pupils are equal, round, and reactive to light  Neck: Neck supple  Cardiovascular: Normal rate  An irregular rhythm present  Pulmonary/Chest: Effort normal  She has rales  Abdominal: Soft   Bowel sounds are normal  She exhibits distension  There is no tenderness  Musculoskeletal: She exhibits no edema  Lymphadenopathy:     She has no cervical adenopathy  Neurological: She is alert and oriented to person, place, and time  Skin: Skin is warm and dry  Psychiatric: She has a normal mood and affect         Invasive/non-invasive ventilation settings   Respiratory    Lab Data (Last 4 hours)    None         O2/Vent Data (Last 4 hours)      01/21 0231          Non-Invasive Ventilation Mode BiPAP                   Laboratory and Diagnostics:  Results from last 7 days   Lab Units 01/20/20  0503 01/19/20  0439 01/16/20  0427 01/15/20  0555   WBC Thousand/uL 6 40 4 74 7 05 7 08   HEMOGLOBIN g/dL 14 1 14 2 15 0 16 0*   HEMATOCRIT % 49 2* 48 4* 49 9* 53 4*   PLATELETS Thousands/uL 71* 66* 60* 62*     Results from last 7 days   Lab Units 01/20/20  0503 01/19/20  0439 01/18/20  0510 01/17/20  0619 01/16/20  0427 01/15/20  0556   SODIUM mmol/L 144 139 140 142 142 144   POTASSIUM mmol/L 3 3* 3 1* 4 1 3 7 3 7 3 6   CHLORIDE mmol/L 96* 98* 98* 98* 100 102   CO2 mmol/L >45* 42* 40* 36* 36* 35*   ANION GAP mmol/L  --  -1* 2* 8 6 7   BUN mg/dL 99* 97* 92* 84* 80* 74*   CREATININE mg/dL 2 15* 2 08* 2 02* 2 11* 2 19* 2 31*   CALCIUM mg/dL 8 4 8 1* 8 0* 8 1* 8 0* 8 1*   GLUCOSE RANDOM mg/dL 115 104 102 106 90 83   ALT U/L  --   --   --   --  8* 8*   AST U/L  --   --   --   --  11 12   ALK PHOS U/L  --   --   --   --  84 80   ALBUMIN g/dL  --   --   --   --  2 5* 2 6*   TOTAL BILIRUBIN mg/dL  --   --   --   --  4 08* 5 34*     Results from last 7 days   Lab Units 01/20/20  0503 01/19/20  0439 01/15/20  0556   MAGNESIUM mg/dL 2 1 1 7 1 7   PHOSPHORUS mg/dL  --  4 1  --       Results from last 7 days   Lab Units 01/15/20  0603   PTT seconds 53*              ABG:    VBG:  Results from last 7 days   Lab Units 01/20/20  1250   PH ELYSIA  7 387   PCO2 ELYSIA mm Hg 90 6*   PO2 ELYSIA mm Hg 35 5   HCO3 ELYSIA mmol/L 53 2*   BASE EXC ELYSIA mmol/L 21 8           Micro        EKG: Imaging: I have personally reviewed pertinent reports  and I have personally reviewed pertinent films in PACS    Intake and Output  I/O       01/19 0701 - 01/20 0700 01/20 0701 - 01/21 0700    P  O  1560 480    I V  (mL/kg) 96 8 (0 8) 49 5 (0 4)    Total Intake(mL/kg) 1656 8 (14 3) 529 5 (4 6)    Urine (mL/kg/hr) 4811 (1 7) 2378 (0 9)    Total Output 4811 2378    Net -3154 2 -1848 5                Height and Weights   Height: 5' 5" (165 1 cm)  IBW: 57 kg  Body mass index is 42 63 kg/m²  Weight (last 2 days)     Date/Time   Weight    01/20/20 0600   116 (256 17)    01/19/20 0600   120 (265 43)                Nutrition       Diet Orders   (From admission, onward)             Start     Ordered    01/16/20 1119  Diet Sunil/CHO Controlled; Consistent Carbohydrate Diet Level 2 (5 carb servings/75 grams CHO/meal)  Diet effective now     Question Answer Comment   Diet Type Sunil/CHO Controlled    Sunil/CHO Controlled Consistent Carbohydrate Diet Level 2 (5 carb servings/75 grams CHO/meal)    RD to adjust diet per protocol?  Yes        01/16/20 1118                  Active Medications  Scheduled Meds:    Current Facility-Administered Medications:  apixaban 5 mg Oral BID Luli K Arunoflakesha, BALJITNP    dextrose 25 mL Intravenous PRN Nai Mcghee, BALJITNP    digoxin 125 mcg Intravenous Daily MAYNOR London    furosemide 40 mg/hr Intravenous Continuous MAYNOR Carson Last Rate: 40 mg/hr (01/21/20 0339)   ipratropium 0 5 mg Nebulization TID Derian Blank Bilofsky, CRMARY JO    levalbuterol 1 25 mg Nebulization TID Derian Blank Bilofsky, CRNP    metoprolol 5 mg Intravenous Q6H PRN Derian Blank Bilofsky CRMARY JO    metoprolol succinate 100 mg Oral Daily Derian Blank Bilofsky, BALJITNP    nicotine 7 mg Transdermal Daily Robin Phi, CRNP    nystatin  Topical BID Derian Blank Bilofsky, CRNP    ondansetron 4 mg Intravenous Q6H PRN Marcine Bandy Spatzer, MAYNOR    QUEtiapine 25 mg Oral QPM MAYNOR Carson    sildenafil 20 mg Oral TID MAYNOR London      Continuous Infusions:    furosemide 40 mg/hr Last Rate: 40 mg/hr (01/21/20 0339)     PRN Meds:     dextrose 25 mL PRN   metoprolol 5 mg Q6H PRN   ondansetron 4 mg Q6H PRN     ---------------------------------------------------------------------------------------  Advance Directive and Living Will:      Power of :    POLST:    ---------------------------------------------------------------------------------------  Counseling / Coordination of Monica Valerio 16, CRNP      Portions of the record may have been created with voice recognition software  Occasional wrong word or "sound a like" substitutions may have occurred due to the inherent limitations of voice recognition software    Read the chart carefully and recognize, using context, where substitutions have occurred

## 2020-01-21 NOTE — PROGRESS NOTES
Progress note - Palliative and Supportive Care   Jenifer Colby 63 y o  female [id number]    Interval history:  LATE ENTRY  Saw patient this afternoon  Chart reviewed  No acute events overnight  This afternoon, met with her son/Luis outside of her room  Introduced myself and palliative care  He confirmed that his mom will be staying with him for a few days until she finds a new place to live  I emphasized the importance of follow up for continuing medical care once she is back in the community  He is agreeable to be POA while he is still local  He will speak to his mother, however, when he moves to [place]  MEDICATIONS / ALLERGIES:    all current active meds have been reviewed    Allergies   Allergen Reactions    Heparin        OBJECTIVE:    Physical Exam  Physical Exam   Constitutional: She is oriented to person, place, and time  She appears well-developed  No distress  Obese, appears older than stated age, appears chronically ill   HENT:   Head: Normocephalic and atraumatic  Right Ear: External ear normal    Left Ear: External ear normal    Eyes: Pupils are equal, round, and reactive to light  Conjunctivae and EOM are normal  Right eye exhibits no discharge  Left eye exhibits no discharge  No scleral icterus  Cardiovascular: Normal rate and regular rhythm  Pulmonary/Chest: No respiratory distress  On Hiflo   Abdominal: Soft  Musculoskeletal: She exhibits edema  Neurological: She is alert and oriented to person, place, and time  Skin: She is not diaphoretic  Psychiatric: She has a normal mood and affect  Her behavior is normal        Lab Results: I have personally reviewed pertinent labs  Imaging Studies: I have personally reviewed pertinent reports  EKG, Pathology, and Other Studies: I have personally reviewed pertinent reports        Assessment:  COPD  Acute, persistent on chronic hypoxic hypercapnic respiratory failure, on BiPAP/Hiflo  Acute on chronic HFpEF  Severe pulmonary HTN  Permanent Afib  DM2  SAPNA on CKD 3  Obesity  Goals of care    Plan:  1  Symptom management    - none managed by PCS    2  Goals    - Patient demonstrates good insight and judgement into her medical condition  She seems competent on my exam today  - Power of :  presumed to be son by [place] 169   - Treatment focused, no limits   - Code Status: Full - Level 1    Counseling / Coordination of Care  Total floor / unit time spent today 25 minutes  Greater than 50% of total time was spent with the patient and / or family counseling and / or coordination of care  A description of the counseling / coordination of care: provided medical updates, discussed palliative care, determined competency, determined goals of care, determined POA, determined social/family support, discussed plans of care, discussed symptom management, provided psychosocial support      Beto Patton MD  Palliative Medicine & Supportive Care  Internal Medicine  Available via [facility] Text  Office: [phone]  Fax: [phone]

## 2020-01-21 NOTE — ASSESSMENT & PLAN NOTE
· The patient was noted to have a pericardial effusion on CT imaging that is described as mild to moderate  On her ECHO form 1/8 there was no evidence of effusion  · Small to moderate pericardial effusion noted on repeat Echo from 1/15  No evidence of hemodynamic compromise  ·  Cardiology is following- no need for intervention at this point

## 2020-01-21 NOTE — ASSESSMENT & PLAN NOTE
· Acute on CKD III with baseline approximately 1 3-1 6  · Nephrology is following  Creatinine is trending down  Vigorous urine output on lasix infusion  Continue lasix gtt per nephrology recommendations  Monitor for contraction- was given diamox over the weekend  · Trend renal indices and monitor intake and output    · Urine output goal of 1-2 l negative per 24 hours- currently more then 19 liters negative since admission  · Obtain daily weights  · Creatinine today is  2 0

## 2020-01-21 NOTE — DISCHARGE SUMMARY
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, pleural effusion and noncompliance with nocturnal BiPAP  · Pt notes baseline oxygen requirement at home is 10L but case management has confirmed she is supposed to be on 3 liters  · Slowly improving  Tolerating HFNC during the day and BiPAP at [facility]  Slowly weaning HFNC settings  Continue to wean daytime supplemental O2 as tolerated to maintain saturation >88%  · Continue diuresis per nephrology recommendations diamox was added for 4 doses given degree of contraction- will monitor her CO2 level  · Continue scheduled nebulized bronchodilators  · Aggressive pulmonary toileting  Encourage cough and deep breathing  Use incentive spirometer  Out of bed to the chair during the day  · PT/OT        Acute on chronic diastolic congestive heart failure [facility])  Assessment & Plan  Wt Readings from Last 3 Encounters:   01/21/20 116 kg (255 lb 11 7 oz)   11/05/19 110 kg (243 lb 9 6 oz)   10/28/19 109 kg (240 lb 4 8 oz)       · Suspect decompensated CHF  Echo from 1/8/20 shows EF of 57% with diastolic dysfunction  · Has been diuresing very well  Continue per nephrology recommendations  · Continue beta blockade  · Digoxin added on 1/20, increased today      Pericardial effusion  Assessment & Plan  · The patient was noted to have a pericardial effusion on CT imaging that is described as mild to moderate  On her ECHO form 1/8 there was no evidence of effusion  · Small to moderate pericardial effusion noted on repeat Echo from 1/15  No evidence of hemodynamic compromise     ·  Cardiology is following- no need for intervention at this point  COPD (chronic obstructive pulmonary disease) ([facility])  Assessment & Plan  · Continue nebulized bronchodilators  No indication for steroids  · Not on maintenance inhalers at home  · Will need outpatient follow up       Permanent atrial fibrillation  Assessment & Plan  · Rate remains controlled  · Cardizem transitioned to digoxin secondary to history of severe pulmonary HTN  · Continue lopressor PO  · Restarted on apixaban after previously being on Heparin  · HIT antibody is positive  HIT PCR pending    SAPNA (acute kidney injury) ([facility] [address] )  Assessment & Plan  · Acute on CKD III with baseline approximately 1 3-1 6  · Nephrology is following  Creatinine is trending down  Vigorous urine output on lasix infusion  Continue lasix gtt per nephrology recommendations  Monitor for contraction- was given diamox over the weekend  · Trend renal indices and monitor intake and output  · Urine output goal of 1-2 l negative per 24 hours- currently more then 19 liters negative since admission  · Obtain daily weights  · Creatinine today is  2 0      Pulmonary hypertension (HCC)  Assessment & Plan  · Started on sildenafil 20 mg TID  · Is non compliant at baseline with medications and BiPAP  · Consult Heart Failure service for Right Heart Cath and pulm htn mgmt    Heparin induced thrombocytopenia (HCC)  Assessment & Plan  · Heparin antibody was positive, Heparin PCR pending  · Has been transitioned back to apixiban    Obesity  Assessment & Plan  · She would benefit from weight loss  Diet per nutrition recommendations  · Likely component of OHV that is contributing to her acute on chronic pulmonary issues      · Continue BiPAP nocturnally and when napping given risk for hypercapnia    Type 2 diabetes mellitus without complication [facility])  Assessment & Plan  Lab Results   Component Value Date    HGBA1C 5 8 04/11/2019       Recent Labs     01/20/20  2159 01/21/20  0801 01/21/20  1206 01/21/20  1559   POCGLU 129 92 85 127       Blood Sugar Average: Last 72 hrs:  · Has been tolerating oral diet and initial hypoglycemia has improved  · TSH normal, random cortisol 17 9  · Blood sugar controlled  · Continue accucheks with SSI coverage  Goal to maintain -180  · Aggressively monitor and treat hypoglycemia as this has been associated with encephalopathy in this patient        Chronic venous stasis dermatitis of both lower extremities  Assessment & Plan  · No open ulcers or signs of cellulitis        Alkalosis  Assessment & Plan  · Contraction alkalosis secondary to IV Lasix infusion  · Received Diamox over the weekend  · Cont to trend VBG as needed  · Nephrology following    Hypokalemia  Assessment & Plan  · Secondary to diuresis, replete as needed    Benign hypertensive heart and kidney disease with CHF, NYHA class 2 and CKD stage 3 ([facility])  Assessment & Plan  Wt Readings from Last 3 Encounters:   01/21/20 116 kg (255 lb 11 7 oz)   11/05/19 110 kg (243 lb 9 6 oz)   10/28/19 109 kg (240 lb 4 8 oz)                         Resolved Problems  Date Reviewed: 1/21/2020          Resolved    Hypertension 1/11/2020     Resolved by  Dora Net, CRNP    SIRS (systemic inflammatory response syndrome) ([facility] [address] ) 1/11/2020     Resolved by  Dora Net, CRNP    Adjustment disorder 1/16/2020     Resolved by  Penn Net, BALJITNP    Encephalopathy acute 1/14/2020     Resolved by  [place] Levels, CRNP    Hypernatremia 1/16/2020     Resolved by  Penn Net, CRNP          Admission Date:   Admission Orders (From admission, onward)     Ordered        01/07/20 0118  Inpatient Admission (expected length of stay for this patient Order details is greater than two midnights)  Once         01/06/20 2345  Inpatient Admission (expected length of stay for this patient Order details is greater than two midnights)  Once,   Status:  Canceled         01/06/20 2340  Inpatient Admission (expected length of stay for this patient Order details is greater than two midnights)  Once,   Status:  Canceled                     Admitting Diagnosis: Diarrhea [R19 7]  Acute congestive heart failure (HCC) [I50 9]  ARF (acute renal failure) ([address] ) [N17 9]  Weakness [R53 1]  Hypoxia [R09 02]  Elevated troponin [R79 89]    HPI: Per Dr Bob Saunders 01/20/2020  "Brief HPI:   This is a 80-year-old female with previous medical history of congestive heart failure diastolic on home oxygen up to 10 liters/minute, morbid obesity, COPD, severe pulmonary hypertension who was admitted on January 10 due to acute hypoxic and hypercapnic respiratory failure  The patient was placed on BiPAP on and off and was treated for severe volume overload "    Procedures Performed:   Orders Placed This Encounter   Procedures   [email or web address] ED ECG Documentation Only       Summary of Hospital Course: Pt has required Bipap and HFNC for persistent acute on chronic hypoxic and hypercarbic respiratory failure  Pt has repeat episodes of encepholapathy attributed to hypoglycemia and hypercarbia  Cardiology and Nephrology were consulted and pt was initiated on a lasix infusion and has diuresed significantly however still dependent on HFNC and Bipap  ECHO revealed severe pulmonary hypertension  [facility] failure eval and possible Right Heart Catheterization  Significant Findings, Care, Treatment and Services Provided: see above    Complications: none    Condition at Discharge: stable         Discharge instructions/Information to patient and family:   See after visit summary for information provided to patient and family  Provisions for Follow-Up Care:  See after visit summary for information related to follow-up care and any pertinent home health orders        PCP: Ashlie Jo MD    Disposition: [facility] - [facility]    Planned Readmission: Yes discharge readmit    Discharge Statement   I spent 26 minutes discharging the patient  This time was spent on the day of discharge  I had direct contact with the patient on the day of discharge  Additional documentation is required if more than 30 minutes were spent on discharge  Discharge Medications:  See after visit summary for reconciled discharge medications provided to patient and family

## 2020-01-21 NOTE — ASSESSMENT & PLAN NOTE
· Contraction alkalosis secondary to IV Lasix infusion  · Received Diamox over the weekend  · Cont to trend VBG as needed  · Nephrology following

## 2020-01-21 NOTE — PLAN OF CARE
Problem: Prexisting or High Potential for Compromised Skin Integrity  Goal: Skin integrity is maintained or improved  Description  INTERVENTIONS:  - Identify patients at risk for skin breakdown  - Assess and monitor skin integrity  - Assess and monitor nutrition and hydration status  - Monitor labs   - Assess for incontinence   - Turn and reposition patient  - Assist with mobility/ambulation  - Relieve pressure over bony prominences  - Avoid friction and shearing  - Provide appropriate hygiene as needed including keeping skin clean and dry  - Evaluate need for skin moisturizer/barrier cream  - Collaborate with interdisciplinary team   - Patient/family teaching  - Consider wound care consult   Outcome: Progressing     Problem: Potential for Falls  Goal: Patient will remain free of falls  Description  INTERVENTIONS:  - Assess patient frequently for physical needs  -  Identify cognitive and physical deficits and behaviors that affect risk of falls    -  [place] fall precautions as indicated by assessment   - Educate patient/family on patient safety including physical limitations  - Instruct patient to call for assistance with activity based on assessment  - Modify environment to reduce risk of injury  - Consider OT/PT consult to assist with strengthening/mobility  Outcome: Progressing     Problem: PAIN - ADULT  Goal: Verbalizes/displays adequate comfort level or baseline comfort level  Description  Interventions:  - Encourage patient to monitor pain and request assistance  - Assess pain using appropriate pain scale  - Administer analgesics based on type and severity of pain and evaluate response  - Implement non-pharmacological measures as appropriate and evaluate response  - Consider cultural and social influences on pain and pain management  - Notify physician/advanced practitioner if interventions unsuccessful or patient reports new pain  Outcome: Progressing     Problem: INFECTION - ADULT  Goal: Absence or prevention of progression during hospitalization  Description  INTERVENTIONS:  - Assess and monitor for signs and symptoms of infection  - Monitor lab/diagnostic results  - Monitor all insertion sites, i e  indwelling lines, tubes, and drains  - Monitor endotracheal if appropriate and nasal secretions for changes in amount and color  - Mobile appropriate cooling/warming therapies per order  - Administer medications as ordered  - Instruct and encourage patient and family to use good hand hygiene technique  - Identify and instruct in appropriate isolation precautions for identified infection/condition  Outcome: Progressing  Goal: Absence of fever/infection during neutropenic period  Description  INTERVENTIONS:  - Monitor WBC    Outcome: Progressing     Problem: SAFETY ADULT  Goal: Maintain or return to baseline ADL function  Description  INTERVENTIONS:  -  Assess patient's ability to carry out ADLs; assess patient's baseline for ADL function and identify physical deficits which impact ability to perform ADLs (bathing, care of mouth/teeth, toileting, grooming, dressing, etc )  - Assess/evaluate cause of self-care deficits   - Assess range of motion  - Assess patient's mobility; develop plan if impaired  - Assess patient's need for assistive devices and provide as appropriate  - Encourage maximum independence but intervene and supervise when necessary  - Involve family in performance of ADLs  - Assess for home care needs following discharge   - Consider OT consult to assist with ADL evaluation and planning for discharge  - Provide patient education as appropriate  Outcome: Progressing  Goal: Maintain or return mobility status to optimal level  Description  INTERVENTIONS:  - Assess patient's baseline mobility status (ambulation, transfers, stairs, etc )    - Identify cognitive and physical deficits and behaviors that affect mobility  - Identify mobility aids required to assist with transfers and/or ambulation (gait belt, sit-to-stand, lift, walker, cane, etc )  - [place] fall precautions as indicated by assessment  - Record patient progress and toleration of activity level on Mobility SBAR; progress patient to next Phase/Stage  - Instruct patient to call for assistance with activity based on assessment  - Consider rehabilitation consult to assist with strengthening/weightbearing, etc   Outcome: Progressing     Problem: DISCHARGE PLANNING  Goal: Discharge to home or other facility with appropriate resources  Description  INTERVENTIONS:  - Identify barriers to discharge w/patient and caregiver  - Arrange for needed discharge resources and transportation as appropriate  - Identify discharge learning needs (meds, wound care, etc )  - Arrange for interpretive services to assist at discharge as needed  - Refer to Case Management Department for coordinating discharge planning if the patient needs post-hospital services based on physician/advanced practitioner order or complex needs related to functional status, cognitive ability, or social support system  Outcome: Progressing     Problem: Knowledge Deficit  Goal: Patient/family/caregiver demonstrates understanding of disease process, treatment plan, medications, and discharge instructions  Description  Complete learning assessment and assess knowledge base    Interventions:  - Provide teaching at level of understanding  - Provide teaching via preferred learning methods  Outcome: Progressing     Problem: CARDIOVASCULAR - ADULT  Goal: Maintains optimal cardiac output and hemodynamic stability  Description  INTERVENTIONS:  - Monitor I/O, vital signs and rhythm  - Monitor for S/S and trends of decreased cardiac output  - Administer and titrate ordered vasoactive medications to optimize hemodynamic stability  - Assess quality of pulses, skin color and temperature  - Assess for signs of decreased coronary artery perfusion  - Instruct patient to report change in severity of symptoms  Outcome: Progressing  Goal: Absence of cardiac dysrhythmias or at baseline rhythm  Description  INTERVENTIONS:  - Continuous cardiac monitoring, vital signs, obtain 12 lead EKG if ordered  - Administer antiarrhythmic and heart rate control medications as ordered  - Monitor electrolytes and administer replacement therapy as ordered  Outcome: Progressing     Problem: RESPIRATORY - ADULT  Goal: Achieves optimal ventilation and oxygenation  Description  INTERVENTIONS:  - Assess for changes in respiratory status  - Assess for changes in mentation and behavior  - Position to facilitate oxygenation and minimize respiratory effort  - Oxygen administered by appropriate delivery if ordered  - Initiate smoking cessation education as indicated  - Encourage broncho-pulmonary hygiene including cough, deep breathe, Incentive Spirometry  - Assess the need for suctioning and aspirate as needed  - Assess and instruct to report SOB or any respiratory difficulty  - Respiratory Therapy support as indicated  Outcome: Progressing     Problem: METABOLIC, FLUID AND ELECTROLYTES - ADULT  Goal: Electrolytes maintained within normal limits  Description  INTERVENTIONS:  - Monitor labs and assess patient for signs and symptoms of electrolyte imbalances  - Administer electrolyte replacement as ordered  - Monitor response to electrolyte replacements, including repeat lab results as appropriate  - Instruct patient on fluid and nutrition as appropriate  Outcome: Progressing  Goal: Fluid balance maintained  Description  INTERVENTIONS:  - Monitor labs   - Monitor I/O and WT  - Instruct patient on fluid and nutrition as appropriate  - Assess for signs & symptoms of volume excess or deficit  Outcome: Progressing  Goal: Glucose maintained within target range  Description  INTERVENTIONS:  - Monitor Blood Glucose as ordered  - Assess for signs and symptoms of hyperglycemia and hypoglycemia  - Administer ordered medications to maintain glucose within target range  - Assess nutritional intake and initiate nutrition service referral as needed  Outcome: Progressing     Problem: SKIN/TISSUE INTEGRITY - ADULT  Goal: Skin integrity remains intact  Description  INTERVENTIONS  - Identify patients at risk for skin breakdown  - Assess and monitor skin integrity  - Assess and monitor nutrition and hydration status  - Monitor labs (i e  albumin)  - Assess for incontinence   - Turn and reposition patient  - Assist with mobility/ambulation  - Relieve pressure over bony prominences  - Avoid friction and shearing  - Provide appropriate hygiene as needed including keeping skin clean and dry  - Evaluate need for skin moisturizer/barrier cream  - Collaborate with interdisciplinary team (i e  Nutrition, Rehabilitation, etc )   - Patient/family teaching  Outcome: Progressing  Goal: Incision(s), wounds(s) or drain site(s) healing without S/S of infection  Description  INTERVENTIONS  - Assess and document risk factors for skin impairment   - Assess and document dressing, incision, wound bed, drain sites and surrounding tissue  - Consider nutrition services referral as needed  - Oral mucous membranes remain intact  - Provide patient/ family education  Outcome: Progressing  Goal: Oral mucous membranes remain intact  Description  INTERVENTIONS  - Assess oral mucosa and hygiene practices  - Implement preventative oral hygiene regimen  - Implement oral medicated treatments as ordered  - Initiate Nutrition services referral as needed  Outcome: Progressing     Problem: Nutrition/Hydration-ADULT  Goal: Nutrient/Hydration intake appropriate for improving, restoring or maintaining nutritional needs  Description  Monitor and assess patient's nutrition/hydration status for malnutrition  Collaborate with interdisciplinary team and initiate plan and interventions as ordered  Monitor patient's weight and dietary intake as ordered or per policy   Utilize nutrition screening tool and intervene as necessary  Determine patient's food preferences and provide high-protein, high-caloric foods as appropriate       INTERVENTIONS:  - Monitor oral intake, urinary output, labs, and treatment plans  - Assess nutrition and hydration status and recommend course of action  - Evaluate amount of meals eaten  - Assist patient with eating if necessary   - Allow adequate time for meals  - Recommend/ encourage appropriate diets, oral nutritional supplements, and vitamin/mineral supplements  - Order, calculate, and assess calorie counts as needed  - Recommend, monitor, and adjust tube feedings and TPN/PPN based on assessed needs  - Assess need for intravenous fluids  - Provide specific nutrition/hydration education as appropriate  - Include patient/family/caregiver in decisions related to nutrition  Outcome: Progressing

## 2020-01-21 NOTE — ASSESSMENT & PLAN NOTE
Wt Readings from Last 3 Encounters:   01/21/20 116 kg (255 lb 11 7 oz)   11/05/19 110 kg (243 lb 9 6 oz)   10/28/19 109 kg (240 lb 4 8 oz)

## 2020-01-21 NOTE — ASSESSMENT & PLAN NOTE
Lab Results   Component Value Date    HGBA1C 5 8 04/11/2019       Recent Labs     01/20/20  2159 01/21/20  0801 01/21/20  1206 01/21/20  1559   POCGLU 129 92 85 127       Blood Sugar Average: Last 72 hrs:  · Has been tolerating oral diet and initial hypoglycemia has improved  · TSH normal, random cortisol 17 9  · Blood sugar controlled  · Continue accucheks with SSI coverage  Goal to maintain -180  · Aggressively monitor and treat hypoglycemia as this has been associated with encephalopathy in this patient

## 2020-01-21 NOTE — ASSESSMENT & PLAN NOTE
Wt Readings from Last 3 Encounters:   01/20/20 116 kg (256 lb 2 8 oz)   11/05/19 110 kg (243 lb 9 6 oz)   10/28/19 109 kg (240 lb 4 8 oz)       · Suspect decompensated CHF  Echo from 1/8/20 shows EF of 81% with diastolic dysfunction  · Has been diuresing very well  Continue per nephrology recommendations    · Continue beta blockade  · Digoxin added on 1/20

## 2020-01-21 NOTE — ASSESSMENT & PLAN NOTE
Wt Readings from Last 3 Encounters:   01/21/20 116 kg (255 lb 11 7 oz)   11/05/19 110 kg (243 lb 9 6 oz)   10/28/19 109 kg (240 lb 4 8 oz)       · Suspect decompensated CHF  Echo from 1/8/20 shows EF of 45% with diastolic dysfunction  · Has been diuresing very well  Continue per nephrology recommendations    · Continue beta blockade  · Digoxin added on 1/20, increased today

## 2020-01-21 NOTE — PHYSICAL THERAPY NOTE
PHYSICAL THERAPY NOTE          Patient Name: Frederick Parr  FGZQT'F Date: 1/21/2020     Spoke with critical care team this am and pt's nurse, Janey Marshall  Pt is currently on BIPAP  Request made by team for PT to return at later time to attempt progression of mobility when off BIPAP    Cookie Carlo, PT

## 2020-01-21 NOTE — ASSESSMENT & PLAN NOTE
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  Slowly weaning HFNC settings  Continue to wean daytime supplemental O2 as tolerated to maintain saturation >88%  · Continue diuresis per nephrology recommendations diamox was added for 4 doses given degree of contraction- will monitor her CO2 level  · Continue scheduled nebulized bronchodilators  · Aggressive pulmonary toileting  Encourage cough and deep breathing  Use incentive spirometer    Out of bed to the chair during the day  · PT/OT

## 2020-01-21 NOTE — ASSESSMENT & PLAN NOTE
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, pleural effusion and noncompliance with nocturnal BiPAP  · Pt notes baseline oxygen requirement at home is 10L but case management has confirmed she is supposed to be on 3 liters  · Slowly improving  Tolerating HFNC during the day and BiPAP at [facility]  Slowly weaning HFNC settings  Continue to wean daytime supplemental O2 as tolerated to maintain saturation >88%  · Continue diuresis per nephrology recommendations diamox was added for 4 doses given degree of contraction- will monitor her CO2 level  · Continue scheduled nebulized bronchodilators  · Aggressive pulmonary toileting  Encourage cough and deep breathing  Use incentive spirometer    Out of bed to the chair during the day  · PT/OT

## 2020-01-22 ENCOUNTER — APPOINTMENT (INPATIENT)
Dept: RADIOLOGY | Facility: HOSPITAL | Age: 60
DRG: 286 | End: 2020-01-22
Payer: COMMERCIAL

## 2020-01-22 ENCOUNTER — TRANSITIONAL CARE MANAGEMENT (OUTPATIENT)
Dept: INTERNAL MEDICINE CLINIC | Facility: CLINIC | Age: 60
End: 2020-01-22

## 2020-01-22 LAB
ALBUMIN SERPL BCP-MCNC: 2.7 G/DL (ref 3.5–5)
ALP SERPL-CCNC: 116 U/L (ref 46–116)
ALT SERPL W P-5'-P-CCNC: 18 U/L (ref 12–78)
ARTERIAL PATENCY WRIST A: YES
AST SERPL W P-5'-P-CCNC: 16 U/L (ref 5–45)
BASE EXCESS BLDA CALC-SCNC: 26 MMOL/L
BASE EXCESS BLDA CALC-SCNC: 26 MMOL/L
BASOPHILS # BLD AUTO: 0.08 THOUSANDS/ΜL (ref 0–0.1)
BASOPHILS # BLD AUTO: 0.08 THOUSANDS/ΜL (ref 0–0.1)
BASOPHILS NFR BLD AUTO: 1 % (ref 0–1)
BASOPHILS NFR BLD AUTO: 1 % (ref 0–1)
BILIRUB SERPL-MCNC: 2.57 MG/DL (ref 0.2–1)
BODY TEMPERATURE: 98.3 DEGREES FEHRENHEIT
BUN SERPL-MCNC: 100 MG/DL (ref 5–25)
BUN SERPL-MCNC: 91 MG/DL (ref 5–25)
CA-I BLD-SCNC: 0.99 MMOL/L (ref 1.12–1.32)
CALCIUM SERPL-MCNC: 8.6 MG/DL (ref 8.3–10.1)
CALCIUM SERPL-MCNC: 8.7 MG/DL (ref 8.3–10.1)
CHLORIDE SERPL-SCNC: 90 MMOL/L (ref 100–108)
CHLORIDE SERPL-SCNC: 90 MMOL/L (ref 100–108)
CO2 SERPL-SCNC: >45 MMOL/L (ref 21–32)
CO2 SERPL-SCNC: >45 MMOL/L (ref 21–32)
CREAT SERPL-MCNC: 1.75 MG/DL (ref 0.6–1.3)
CREAT SERPL-MCNC: 1.86 MG/DL (ref 0.6–1.3)
DIGOXIN SERPL-MCNC: 1.1 NG/ML (ref 0.8–2)
EOSINOPHIL # BLD AUTO: 0.27 THOUSAND/ΜL (ref 0–0.61)
EOSINOPHIL # BLD AUTO: 0.3 THOUSAND/ΜL (ref 0–0.61)
EOSINOPHIL NFR BLD AUTO: 3 % (ref 0–6)
EOSINOPHIL NFR BLD AUTO: 4 % (ref 0–6)
ERYTHROCYTE [DISTWIDTH] IN BLOOD BY AUTOMATED COUNT: 18.5 % (ref 11.6–15.1)
ERYTHROCYTE [DISTWIDTH] IN BLOOD BY AUTOMATED COUNT: 18.5 % (ref 11.6–15.1)
GFR SERPL CREATININE-BSD FRML MDRD: 29 ML/MIN/1.73SQ M
GFR SERPL CREATININE-BSD FRML MDRD: 32 ML/MIN/1.73SQ M
GLUCOSE SERPL-MCNC: 106 MG/DL (ref 65–140)
GLUCOSE SERPL-MCNC: 113 MG/DL (ref 65–140)
GLUCOSE SERPL-MCNC: 124 MG/DL (ref 65–140)
GLUCOSE SERPL-MCNC: 143 MG/DL (ref 65–140)
GLUCOSE SERPL-MCNC: 86 MG/DL (ref 65–140)
HCO3 BLDA-SCNC: 55.3 MMOL/L (ref 22–28)
HCO3 BLDA-SCNC: 56 MMOL/L (ref 22–28)
HCT VFR BLD AUTO: 49.2 % (ref 34.8–46.1)
HCT VFR BLD AUTO: 50.2 % (ref 34.8–46.1)
HFNC FLOW LPM: 50
HGB BLD-MCNC: 14.6 G/DL (ref 11.5–15.4)
HGB BLD-MCNC: 15.2 G/DL (ref 11.5–15.4)
IMM GRANULOCYTES # BLD AUTO: 0.02 THOUSAND/UL (ref 0–0.2)
IMM GRANULOCYTES # BLD AUTO: 0.03 THOUSAND/UL (ref 0–0.2)
IMM GRANULOCYTES NFR BLD AUTO: 0 % (ref 0–2)
IMM GRANULOCYTES NFR BLD AUTO: 0 % (ref 0–2)
IPAP: 20
LYMPHOCYTES # BLD AUTO: 0.75 THOUSANDS/ΜL (ref 0.6–4.47)
LYMPHOCYTES # BLD AUTO: 0.86 THOUSANDS/ΜL (ref 0.6–4.47)
LYMPHOCYTES NFR BLD AUTO: 11 % (ref 14–44)
LYMPHOCYTES NFR BLD AUTO: 9 % (ref 14–44)
MAGNESIUM SERPL-MCNC: 2.1 MG/DL (ref 1.6–2.6)
MCH RBC QN AUTO: 30 PG (ref 26.8–34.3)
MCH RBC QN AUTO: 30.8 PG (ref 26.8–34.3)
MCHC RBC AUTO-ENTMCNC: 29.7 G/DL (ref 31.4–37.4)
MCHC RBC AUTO-ENTMCNC: 30.3 G/DL (ref 31.4–37.4)
MCV RBC AUTO: 101 FL (ref 82–98)
MCV RBC AUTO: 102 FL (ref 82–98)
MONOCYTES # BLD AUTO: 0.84 THOUSAND/ΜL (ref 0.17–1.22)
MONOCYTES # BLD AUTO: 0.92 THOUSAND/ΜL (ref 0.17–1.22)
MONOCYTES NFR BLD AUTO: 11 % (ref 4–12)
MONOCYTES NFR BLD AUTO: 11 % (ref 4–12)
NEUTROPHILS # BLD AUTO: 5.52 THOUSANDS/ΜL (ref 1.85–7.62)
NEUTROPHILS # BLD AUTO: 6 THOUSANDS/ΜL (ref 1.85–7.62)
NEUTS SEG NFR BLD AUTO: 73 % (ref 43–75)
NEUTS SEG NFR BLD AUTO: 76 % (ref 43–75)
NON VENT HFNC FIO2: 100
NON VENT TYPE HFNC: ABNORMAL
NON VENT- BIPAP: ABNORMAL
NRBC BLD AUTO-RTO: 0 /100 WBCS
NRBC BLD AUTO-RTO: 0 /100 WBCS
O2 CT BLDA-SCNC: 20.2 ML/DL (ref 16–23)
O2 CT BLDA-SCNC: 20.5 ML/DL (ref 16–23)
OXYHGB MFR BLDA: 92.5 % (ref 94–97)
OXYHGB MFR BLDA: 94.5 % (ref 94–97)
PCO2 BLDA: 73.9 MM HG (ref 36–44)
PCO2 BLDA: 79.5 MM HG (ref 36–44)
PCO2 TEMP ADJ BLDA: 78.8 MM HG (ref 36–44)
PEEP MAX SETTING VENT: 10 CM[H2O]
PH BLD: 7.47 [PH] (ref 7.35–7.45)
PH BLDA: 7.47 [PH] (ref 7.35–7.45)
PH BLDA: 7.49 [PH] (ref 7.35–7.45)
PHOSPHATE SERPL-MCNC: 2.4 MG/DL (ref 2.7–4.5)
PLATELET # BLD AUTO: 86 THOUSANDS/UL (ref 149–390)
PLATELET # BLD AUTO: 92 THOUSANDS/UL (ref 149–390)
PMV BLD AUTO: 13.6 FL (ref 8.9–12.7)
PMV BLD AUTO: 13.6 FL (ref 8.9–12.7)
PO2 BLD: 86.1 MM HG (ref 75–129)
PO2 BLDA: 71.3 MM HG (ref 75–129)
PO2 BLDA: 87.2 MM HG (ref 75–129)
POTASSIUM SERPL-SCNC: 3.4 MMOL/L (ref 3.5–5.3)
POTASSIUM SERPL-SCNC: 3.6 MMOL/L (ref 3.5–5.3)
PROT SERPL-MCNC: 6.2 G/DL (ref 6.4–8.2)
RBC # BLD AUTO: 4.87 MILLION/UL (ref 3.81–5.12)
RBC # BLD AUTO: 4.93 MILLION/UL (ref 3.81–5.12)
SODIUM SERPL-SCNC: 140 MMOL/L (ref 136–145)
SODIUM SERPL-SCNC: 142 MMOL/L (ref 136–145)
SPECIMEN SOURCE: ABNORMAL
VENT BIPAP FIO2: 60 %
WBC # BLD AUTO: 7.62 THOUSAND/UL (ref 4.31–10.16)
WBC # BLD AUTO: 8.05 THOUSAND/UL (ref 4.31–10.16)

## 2020-01-22 PROCEDURE — 99233 SBSQ HOSP IP/OBS HIGH 50: CPT | Performed by: INTERNAL MEDICINE

## 2020-01-22 PROCEDURE — 80162 ASSAY OF DIGOXIN TOTAL: CPT | Performed by: PHYSICIAN ASSISTANT

## 2020-01-22 PROCEDURE — 83735 ASSAY OF MAGNESIUM: CPT | Performed by: PHYSICIAN ASSISTANT

## 2020-01-22 PROCEDURE — 94640 AIRWAY INHALATION TREATMENT: CPT

## 2020-01-22 PROCEDURE — 80048 BASIC METABOLIC PNL TOTAL CA: CPT | Performed by: PHYSICIAN ASSISTANT

## 2020-01-22 PROCEDURE — 85025 COMPLETE CBC W/AUTO DIFF WBC: CPT | Performed by: PHYSICIAN ASSISTANT

## 2020-01-22 PROCEDURE — 82330 ASSAY OF CALCIUM: CPT | Performed by: PHYSICIAN ASSISTANT

## 2020-01-22 PROCEDURE — 82805 BLOOD GASES W/O2 SATURATION: CPT | Performed by: PHYSICIAN ASSISTANT

## 2020-01-22 PROCEDURE — 99222 1ST HOSP IP/OBS MODERATE 55: CPT | Performed by: INTERNAL MEDICINE

## 2020-01-22 PROCEDURE — NC001 PR NO CHARGE: Performed by: INTERNAL MEDICINE

## 2020-01-22 PROCEDURE — 94660 CPAP INITIATION&MGMT: CPT

## 2020-01-22 PROCEDURE — 97163 PT EVAL HIGH COMPLEX 45 MIN: CPT

## 2020-01-22 PROCEDURE — 94760 N-INVAS EAR/PLS OXIMETRY 1: CPT

## 2020-01-22 PROCEDURE — 71045 X-RAY EXAM CHEST 1 VIEW: CPT

## 2020-01-22 PROCEDURE — 84100 ASSAY OF PHOSPHORUS: CPT | Performed by: PHYSICIAN ASSISTANT

## 2020-01-22 PROCEDURE — NC001 PR NO CHARGE: Performed by: NURSE PRACTITIONER

## 2020-01-22 PROCEDURE — 36600 WITHDRAWAL OF ARTERIAL BLOOD: CPT

## 2020-01-22 PROCEDURE — 97167 OT EVAL HIGH COMPLEX 60 MIN: CPT

## 2020-01-22 PROCEDURE — 80053 COMPREHEN METABOLIC PANEL: CPT | Performed by: PHYSICIAN ASSISTANT

## 2020-01-22 RX ORDER — METOPROLOL TARTRATE 50 MG/1
50 TABLET, FILM COATED ORAL EVERY 12 HOURS SCHEDULED
Status: DISCONTINUED | OUTPATIENT
Start: 2020-01-22 | End: 2020-01-25

## 2020-01-22 RX ORDER — POTASSIUM CHLORIDE 20 MEQ/1
40 TABLET, EXTENDED RELEASE ORAL ONCE
Status: COMPLETED | OUTPATIENT
Start: 2020-01-22 | End: 2020-01-22

## 2020-01-22 RX ORDER — DIGOXIN 250 MCG
250 TABLET ORAL DAILY
Status: DISCONTINUED | OUTPATIENT
Start: 2020-01-23 | End: 2020-01-22

## 2020-01-22 RX ORDER — ALBUMIN (HUMAN) 12.5 G/50ML
25 SOLUTION INTRAVENOUS ONCE
Status: COMPLETED | OUTPATIENT
Start: 2020-01-22 | End: 2020-01-22

## 2020-01-22 RX ORDER — ACETAZOLAMIDE 500 MG/5ML
250 INJECTION, POWDER, LYOPHILIZED, FOR SOLUTION INTRAVENOUS ONCE
Status: COMPLETED | OUTPATIENT
Start: 2020-01-22 | End: 2020-01-22

## 2020-01-22 RX ORDER — DIGOXIN 125 MCG
125 TABLET ORAL DAILY
Status: DISCONTINUED | OUTPATIENT
Start: 2020-01-23 | End: 2020-01-30 | Stop reason: HOSPADM

## 2020-01-22 RX ORDER — ALBUTEROL SULFATE 2.5 MG/3ML
2.5 SOLUTION RESPIRATORY (INHALATION) EVERY 4 HOURS PRN
Status: DISCONTINUED | OUTPATIENT
Start: 2020-01-22 | End: 2020-01-30 | Stop reason: HOSPADM

## 2020-01-22 RX ADMIN — IPRATROPIUM BROMIDE 0.5 MG: 0.5 SOLUTION RESPIRATORY (INHALATION) at 08:22

## 2020-01-22 RX ADMIN — IPRATROPIUM BROMIDE 0.5 MG: 0.5 SOLUTION RESPIRATORY (INHALATION) at 14:04

## 2020-01-22 RX ADMIN — NYSTATIN: 100000 POWDER TOPICAL at 09:03

## 2020-01-22 RX ADMIN — METOPROLOL TARTRATE 50 MG: 50 TABLET, FILM COATED ORAL at 21:40

## 2020-01-22 RX ADMIN — SILDENAFIL 40 MG: 20 TABLET ORAL at 08:59

## 2020-01-22 RX ADMIN — ALBUMIN (HUMAN) 25 G: 0.25 INJECTION, SOLUTION INTRAVENOUS at 01:37

## 2020-01-22 RX ADMIN — LEVALBUTEROL HYDROCHLORIDE 1.25 MG: 1.25 SOLUTION, CONCENTRATE RESPIRATORY (INHALATION) at 14:04

## 2020-01-22 RX ADMIN — LEVALBUTEROL HYDROCHLORIDE 1.25 MG: 1.25 SOLUTION, CONCENTRATE RESPIRATORY (INHALATION) at 08:22

## 2020-01-22 RX ADMIN — Medication 40 MG/HR: at 04:21

## 2020-01-22 RX ADMIN — SILDENAFIL 40 MG: 20 TABLET ORAL at 17:18

## 2020-01-22 RX ADMIN — QUETIAPINE FUMARATE 25 MG: 25 TABLET ORAL at 21:40

## 2020-01-22 RX ADMIN — METOPROLOL TARTRATE 50 MG: 50 TABLET, FILM COATED ORAL at 10:00

## 2020-01-22 RX ADMIN — POTASSIUM CHLORIDE 40 MEQ: 1500 TABLET, EXTENDED RELEASE ORAL at 11:51

## 2020-01-22 RX ADMIN — INSULIN LISPRO 1 UNITS: 100 INJECTION, SOLUTION INTRAVENOUS; SUBCUTANEOUS at 09:02

## 2020-01-22 RX ADMIN — NYSTATIN: 100000 POWDER TOPICAL at 17:19

## 2020-01-22 RX ADMIN — WATER 10 ML: 1 INJECTION INTRAMUSCULAR; INTRAVENOUS; SUBCUTANEOUS at 11:51

## 2020-01-22 RX ADMIN — APIXABAN 5 MG: 5 TABLET, FILM COATED ORAL at 08:59

## 2020-01-22 RX ADMIN — DIGOXIN 250 MCG: 0.25 INJECTION INTRAMUSCULAR; INTRAVENOUS at 08:58

## 2020-01-22 RX ADMIN — Medication 40 MG/HR: at 01:34

## 2020-01-22 RX ADMIN — LEVALBUTEROL HYDROCHLORIDE 1.25 MG: 1.25 SOLUTION, CONCENTRATE RESPIRATORY (INHALATION) at 20:16

## 2020-01-22 RX ADMIN — ACETAZOLAMIDE 250 MG: 500 INJECTION, POWDER, LYOPHILIZED, FOR SOLUTION INTRAVENOUS at 11:52

## 2020-01-22 RX ADMIN — IPRATROPIUM BROMIDE 0.5 MG: 0.5 SOLUTION RESPIRATORY (INHALATION) at 20:16

## 2020-01-22 RX ADMIN — NICOTINE 7 MG: 7 PATCH TRANSDERMAL at 09:03

## 2020-01-22 RX ADMIN — APIXABAN 5 MG: 5 TABLET, FILM COATED ORAL at 17:18

## 2020-01-22 RX ADMIN — SILDENAFIL 40 MG: 20 TABLET ORAL at 21:40

## 2020-01-22 NOTE — ASSESSMENT & PLAN NOTE
· Acute on CKD III with baseline approximately 1 3-1 6  · Nephrology is following  Creatinine is trending down  Vigorous urine output on lasix infusion  Continue lasix gtt per nephrology recommendations  Monitor for contraction- was given diamox over the weekend  · Trend renal indices and monitor intake and output    · Urine output goal of 1-2 l negative per 24 hours- currently more then 19 liters negative since admission  · Obtain daily weights  · Trend renal indices

## 2020-01-22 NOTE — RESPIRATORY THERAPY NOTE
RT Protocol Note  Earl Dobbs 63 y o  female MRN: [id number]  Unit/Bed#: [facility] 009-41 Encounter: [id number]    Assessment    Active Problems:    * No active hospital problems  *      Home Pulmonary Medications:    Home Devices/Therapy: Home O2(albuterol mdi prn, O2)    Past Medical History:   Diagnosis Date    Atrial fibrillation with RVR (HCC)     COPD [facility]chronic obstructive pulmonary disease) ([facility])     Diabetes type 2, uncontrolled ([place] [address] )     Encephalopathy acute 1/11/2020    Hypertension     SIRS (systemic inflammatory response syndrome) ([facility] ) 1/7/2020     Social History     Socioeconomic History    Marital status:       Spouse name: Not on file    Number of children: Not on file    Years of education: Not on file    Highest education level: Not on file   Occupational History     Employer: [employer] Occupation:      Comment: WORKING FULL TIME   Social Needs    Financial resource strain: Not on file    Food insecurity:     Worry: Not on file     Inability: Not on file    Transportation needs:     Medical: Not on file     Non-medical: Not on file   Tobacco Use    Smoking status: Current Every Day Smoker     Packs/day: 0 00     Years: 43 00     Pack years: 0 00     Types: Cigarettes    Smokeless tobacco: Never Used    Tobacco comment: Currently half a pack a day, 6 CIGARETTES PER DAY    Substance and Sexual Activity    Alcohol use: Not Currently     Comment: socially, NO ALCOHOL USE    Drug use: Not Currently     Types: Marijuana    Sexual activity: Not on file   Lifestyle    Physical activity:     Days per week: Not on file     Minutes per session: Not on file    Stress: Not on file   Relationships    Social connections:     Talks on phone: Not on file     Gets together: Not on file     Attends Confucianism service: Not on file     Active member of club or organization: Not on file     Attends meetings of clubs or organizations: Not on file     Relationship status: Not on file    Intimate partner violence:     Fear of current or ex partner: Not on file     Emotionally abused: Not on file     Physically abused: Not on file     Forced sexual activity: Not on file   Other Topics Concern    Not on file   Social History Narrative    NO LIVING WILL       Subjective         Objective    Physical Exam:   Assessment Type: Assess only  General Appearance: Awake, Drowsy  Respiratory Pattern: Dyspnea with exertion, Dyspnea at rest  Chest Assessment: Chest expansion symmetrical  Bilateral Breath Sounds: (P) Diminished  O2 Device: (P) Pt  admit with dyspnea/respiratory failure and has been transferred to [facility] for a cath  She has a history of pulmonary hypertension, COPD, CHF and suspected RADHA  Pt states she uses an albuterol mdi prn and wears O2 up to 10L at home (supposed to wear 3L per chart)  Pt is currently on bipap for HS tolerating well at this time  No wheezing or distress noted  Pt was assessed by Dr Erum Burns at [facility] and was receiving uds TID  Will continue xopenex/atrovent TID and albuterol prn as ordered and continue to monitor pt per protocol  Vitals:  Blood pressure 103/69, pulse 90, temperature 98 3 °F (36 8 °C), temperature source Oral, resp  rate (!) 30, height 5' 4" (1 626 m), weight 107 kg (236 lb 12 4 oz), SpO2 95 %  Results from last 7 days   Lab Units 01/22/20  0017 01/21/20  1104   PH ART  7 466* 7 496*   PCO2 ART mm Hg 79 5* 60 7*   PO2 ART mm Hg 87 2 63 4*   HCO3 ART mmol/L 56 0* 45 8*   BASE EXC ART mmol/L 26 0 18 7   O2 CONTENT ART mL/dL 20 5 20 0   O2 HGB, ARTERIAL % 94 5 90 7*   ABG SOURCE  Radial, Right Radial, Right   NALLELY TEST   --  Yes       Imaging and other studies: I have personally reviewed pertinent reports  O2 Device: (P) Pt  admit with dyspnea/respiratory failure and has been transferred to [facility] for a cath  She has a history of pulmonary hypertension, COPD, CHF and suspected RADHA   Pt states she uses an albuterol mdi prn and wears O2 up to 10L at home (supposed to wear 3L per chart)  Pt is currently on bipap for HS tolerating well at this time  No wheezing or distress noted  Pt was assessed by Dr Angela Hare at \[facility]\"" and was receiving uds TID  Will continue xopenex/atrovent TID and albuterol prn as ordered and continue to monitor pt per protocol  Plan    Respiratory Plan: Vent/NIV/HFNC, Mild Distress pathway, Home Bronchodilator Patient pathway        Resp Comments: Pt placed on bipap for HS per orders

## 2020-01-22 NOTE — ASSESSMENT & PLAN NOTE
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, pleural effusion and noncompliance with nocturnal BiPAP  · Pt notes baseline oxygen requirement at home is 10L but case management has confirmed she is supposed to be on 3 liters  · Slowly improving  Tolerating HFNC during the day and BiPAP at [facility]  Slowly weaning HFNC settings  Continue to wean daytime supplemental O2 as tolerated to maintain saturation >88%  · Continue diuresis per nephrology recommendations diamox was added for 4 doses given degree of contraction- will monitor her CO2 level  · Continue scheduled nebulized bronchodilators  · Aggressive pulmonary toileting  Encourage cough and deep breathing  Use incentive spirometer    Out of bed to the chair during the day  · PT/OT  · Awaiting transfer to [address] [place] [address] for right heart catherization to further characterize her degree of pulmonary hypertension

## 2020-01-22 NOTE — PHYSICAL THERAPY NOTE
Physical Therapy Evaluation     Patient's Name: Melquiades Green    Admitting Diagnosis  CHF (congestive heart failure) ([place] [address] ) [I50 9]    Problem List  Patient Active Problem List   Diagnosis    COPD (chronic obstructive pulmonary disease) ([facility] )    Permanent atrial fibrillation    Acute on chronic respiratory failure with hypoxia and hypercapnia ([facility])    Chronic venous stasis dermatitis of both lower extremities    Hypoalbuminemia    Type 2 diabetes mellitus without complication ([facility])    Non-rheumatic mitral regurgitation    Nonsustained ventricular tachycardia ([facility])    Obesity    Vitamin D deficiency    Depression    Dyslipidemia    Left ventricular hypertrophy    Stage 3 chronic kidney disease (HCC)    Chronic diastolic CHF (congestive heart failure) ([facility])    Compression fracture of first lumbar vertebra ([place] [address] )    SAPNA (acute kidney injury) ([place] [address] )    Hypervolemia    Acute on chronic diastolic congestive heart failure ([facility])    Chronic renal impairment    Pericardial effusion    Heparin induced thrombocytopenia ([facility])    Pulmonary hypertension ([facility])    Benign hypertensive heart and kidney disease with CHF, NYHA class 2 and CKD stage 3 ([place] [address] )    Hypokalemia    Alkalosis       Past Medical History  Past Medical History:   Diagnosis Date    Atrial fibrillation with RVR ([place] [address] )     COPD (chronic obstructive pulmonary disease) ([place] [address] )     Diabetes type 2, uncontrolled ([place] [address] )     Encephalopathy acute 1/11/2020    Hypertension     SIRS (systemic inflammatory response syndrome) ([place] [address] ) 1/7/2020       Past Surgical History  Past Surgical History:   Procedure Laterality Date    HERNIA REPAIR      HYSTERECTOMY      LAPAROSCOPIC CHOLECYSTECTOMY          01/22/20 1127   Note Type   Note type Eval only   Pain Assessment   Pain Assessment 0-10   Pain Score Worst Possible Pain   Pain Type Acute pain   Pain Location Foot   Pain Orientation Right   Hospital Pain Intervention(s) Repositioned; Ambulation/increased activity; Distraction; Emotional support   Response to Interventions tolerated   Home Living   Type of [address] Two level;Bed/bath upstairs;Stairs to enter with rails   Bathroom Shower/Tub Tub/shower unit   Bathroom Toilet Standard   Bathroom Equipment   (none per pt)   Home Equipment Cane   Additional Comments Pt resides in [address] w/ bed/bath upstairs and "a few steps to enter" Pt reports using SPC for community distances only   Prior Function   Level of [place] Independent with ADLs and functional mobility   Lives With Alone   Receives Help From Family   ADL Assistance Independent   IADLs Independent   Falls in the last 6 months 1 to 4   Vocational Retired   Restrictions/Precautions   [employer] Bearing Precautions Per Order No   Other Precautions Cognitive; Chair Alarm; Bed Alarm;Multiple lines;Telemetry;O2;Fall Risk;Pain  (HFNC)   General   Family/Caregiver Present No   Cognition   Overall Cognitive Status Impaired   Arousal/Participation Alert   Attention Attends with cues to redirect   Orientation Level Oriented X4   Memory Decreased recall of precautions   Following Commands Follows one step commands with increased time or repetition   Comments Pt requires encouragement to participate in therapy this day 2' R foot pain  Pt required VCs throughout session for initiation, sequencing, and safety awareness   LLE Assessment   LLE Assessment   (grossly assessed w/ functional mobility; at least 3/5)   Bed Mobility   Rolling R 3  Moderate assistance   Additional items Assist x 1; Increased time required;Verbal cues;LE management   Rolling L 3  Moderate assistance   Additional items Assist x 1; Increased time required;Verbal cues;LE management   Supine to Sit 3  Moderate assistance   Additional items Assist x 2;HOB elevated; Increased time required;Verbal cues;LE management   Sit to Supine 3  Moderate assistance   Additional items Assist x 2; Increased time required;Verbal cues;LE management   Additional Comments Pt lying supine in bed upon PT arrival  Pt sat EOB ~5 min at close supervision  Pt declining STS trial 2' c/o of 10/10 R foot pain  Attempted to perform lateral scoots along bed however pt refusing reporting "I'd have to put weight on my R foot to do so" Pt returned supine at end of PT session w/ all needs within reach   Transfers   Sit to Stand Unable to assess   Stand to Sit Unable to assess   Additional Comments Pt refusing further functional mobility 2' 10/10 R foot pain   Balance   Static Sitting Fair -   Dynamic Sitting Poor +   Endurance Deficit   Endurance Deficit Yes   Endurance Deficit Description pain, weakness, fatigue   Activity Tolerance   Activity Tolerance Patient limited by fatigue;Patient limited by pain   Medical Staff Made Aware OT, CM   Nurse Made Aware RN cleared pt for therapy   Assessment   Prognosis Fair   Problem List Decreased strength;Decreased endurance; Impaired balance;Decreased mobility; Decreased coordination;Decreased cognition;Decreased safety awareness;Pain   Assessment Pt is 63 y o  female seen for PT evaluation s/p admit to [facility] on 1/21/2020 w/ Acute on chronic respiratory failure with hypoxia and hypercapnia ([facility] )  "Pt has required Bipap and HFNC for persistent acute on chronic hypoxic and hypercarbic respiratory failure  Pt has repeat episodes of encepholapathy attributed to hypoglycemia and hypercarbia" PT consulted to assess pt's functional mobility and d/c needs  Order placed for PT eval and tx, w/ up w/ A order  Comorbidities affecting pt's physical performance at time of assessment include: resp failure, COPD, a-fib, Type 2 DM, SAPNA, CHF, pulmonary HTN, pericardial effusion, chronic venous statis of BLEs, hx of compression fx of first lumbar vertebra  PTA, pt was independent w/ all functional mobility w/ SPC for community distances and lives alone in two level house w/ bed/bath upstairs and "a few steps to enter"   Personal factors affecting pt at time of IE include: inaccessible home environment, lives in two story house, ambulating w/ assistive device, stairs to enter home, inability to navigate community distances, limited home support and inability to perform IADLs  Please find objective findings from PT assessment regarding body systems outlined above with impairments and limitations including weakness, impaired balance, decreased endurance, impaired coordination, gait deviations, pain, decreased activity tolerance, decreased functional mobility tolerance, decreased safety awareness, impaired judgement, fall risk and decreased cognition  Pt lying supine in bed upon PT arrival  Pt sat EOB ~5 min at close supervision  Pt declining STS trial 2' c/o of 10/10 R foot pain  Attempted to perform lateral scoots along bed however pt refusing reporting "I'd have to put weight on my R foot to do so" Pt returned supine at end of PT session w/ all needs within reach  The following objective measures performed on IE also reveal limitations: Barthel Index: 30/100  Pt's clinical presentation is currently unstable/unpredictable seen in pt's presentation of recent admission for resp failure requiring medical attention, recent decline in function as compared to baseline, multiple lines, fall risk, pain, increased reliance on assistance for functional mobility as compared to baseline  Pt to benefit from continued PT tx to address deficits as defined above and maximize level of functional independent mobility and consistency  From PT/mobility standpoint, recommendation at time of d/c would be STR pending progress in   Goals   Patient Goals To be able to walk   STG Expiration Date 02/05/20   Short Term Goal #1 1  Pt will demonstrate the ability to perform rolling at Mod I in onder to maximize functional independence and decrease burden on caregivers   2 Pt will demonstrate the ability to perform sit<>supine at Mod I in order to maximize functional independence and decrease burden on caregivers  3 Pt will demonstrate improved balance by one grade in order to decrease risk of falls  4 Pt will increase b/l LE strength by 1 grade in order to increase ease of functional mobility and transfers  5 Pt will improve activity tolerance to >20 minutes  PT to assess further mobility goals as appropriate   PT Treatment Day 0   Plan   Treatment/Interventions Functional transfer training;LE strengthening/ROM; Elevations; Therapeutic exercise; Endurance training;Patient/family training;Equipment eval/education; Bed mobility;Gait training;Spoke to nursing;Spoke to case management   PT Frequency   (3-5x/week)   Recommendation   Recommendation Post acute IP rehab   Equipment Recommended Walker  (RW)   PT - OK to Discharge Yes   Additional Comments when medically cleared to rehab   Modified Jason Scale   Modified Jason Scale 4   Barthel Index   Feeding 10   Bathing 0   Grooming Score 5   Dressing Score 5   Bladder Score 0   Bowels Score 5   Toilet Use Score 5   Transfers (Bed/Chair) Score 0   Mobility (Level Surface) Score 0   Stairs Score 0   Barthel Index Score 30     Ebony Mauro, PT, DPT

## 2020-01-22 NOTE — ASSESSMENT & PLAN NOTE
Lab Results   Component Value Date    HGBA1C 5 8 04/11/2019       Recent Labs     01/21/20  0801 01/21/20  1206 01/21/20  1559 01/21/20 2055   POCGLU 92 85 127 153*       Blood Sugar Average: Last 72 hrs:  · Has been tolerating oral diet and initial hypoglycemia has improved  · TSH normal, random cortisol 17 9  · Blood sugar controlled  · Continue accucheks with SSI coverage  Goal to maintain -180  · Aggressively monitor and treat hypoglycemia as this has been associated with encephalopathy in this patient

## 2020-01-22 NOTE — PROGRESS NOTES
Heart Failure/ Pulmonary Hypertension Progress Note - Vickie Genao 63 y o  female MRN: [id number]    Unit/Bed#: [facility] 517-01 Encounter: [id number]      Assessment:    Principal Problem:    Acute on chronic respiratory failure with hypoxia and hypercapnia (HCC)  Active Problems:    COPD (chronic obstructive pulmonary disease) (HCC)    Permanent atrial fibrillation    Type 2 diabetes mellitus without complication (HCC)    SAPNA (acute kidney injury) (HCC)    Acute on chronic diastolic congestive heart failure (HCC)    Pericardial effusion    Pulmonary hypertension (HCC)    # Pulmonary Hypertension  Etiology: Likely combination of WHO group 2 and group 3 from diastolic HF and severe COPD, RADHA, OHV syndrome  Her LA is large and LV is thickened on echo so likely has high pulmonary venous pressures constantly but also has pulmonary driving components such as OHV, ? COPD,  RADHA  This likely led to group 2 pulmonary heart disease with RV to pulmonary circuit uncoupling in relation to pulmonary vascular resistance      PAH specific med Rx: sildanefil 40 mg TID- just started    Weight: 237 lbs    CT Chest  1/14/20: main PA 6 5 cm    Echocardiogram 1/15/20  LVEF: normal  LVIDd: small  RV: severely dilated , hypokinetic  MR:  PASP: 80's  RVOT: not assessed  Other: large LA    TTE 2/11/19:   RV dilated, PASP 60 mmHg    # Chronic HFpEF, Stage C  - Diuresed 58 lbs per chart since admit    Neurohormonal Blockade:  --Beta-Blocker:  --ACEi, ARB or ARNi:    (or SVR reduction)  --Aldosterone Receptor Blocker:  --Diuretic:    # pericardial effusion- more likely a manifestation of her elevated PA, RA pressures and marker of severity of her PAH    # Severe COPD/ RADHA/ OHV syndrome  On high percentage oxygen at home  # Acute on chronic hypoxic respiratory failure with hypercapnia  CO2 persistently in the 70's on ABG  # chronic Afib  # Obesity, BMI 42  # SAPNA on CKD  # med non compliance  # depression hx  # DM2  # HIT    Plan:  RHC in AM  Eventual VQ scan  Continue diamox    Central Line (day, reason): Reyes catheter (day, reason):    Vitals: Blood pressure 98/64, pulse 89, temperature 98 °F (36 7 °C), temperature source Oral, resp  rate (!) 29, height 5' 4" (1 626 m), weight 108 kg (237 lb 3 4 oz), SpO2 93 %  , Body mass index is 40 72 kg/m² , I/O last 3 completed shifts: In: 117 3 [I V :17 3; IV Piggyback:100]  Out: 1200 [Urine:1200]  I/O this shift:  In: 200 [P O :200]  Out: 800 [Urine:800]  Wt Readings from Last 3 Encounters:   01/22/20 108 kg (237 lb 3 4 oz)   01/21/20 116 kg (255 lb 11 7 oz)   11/05/19 110 kg (243 lb 9 6 oz)       Intake/Output Summary (Last 24 hours) at 1/22/2020 1038  Last data filed at 1/22/2020 0746  Gross per 24 hour   Intake 317 26 ml   Output 2000 ml   Net -1682 74 ml     I/O last 3 completed shifts: In: 117 3 [I V :17 3; IV Piggyback:100]  Out: 1200 [Urine:1200]    No significant arrhythmias seen on telemetry review         Physical Exam:  Vitals:    01/22/20 0700 01/22/20 0820 01/22/20 0859 01/22/20 1000   BP: 102/67  98/64    Pulse: 86 92 89    Resp: (!) 29      Temp: 98 °F (36 7 °C)      TempSrc: Oral      SpO2: 92% 99%  93%   Weight:       Height:           GEN: [place] appears chronically ill  HEENT: pupils equal, round, and reactive to light; extraocular muscles intact  NECK: supple, no carotid bruits   HEART: regular rhythm, normal S1 and S2, no murmurs, clicks, gallops or rubs, JVP is down    LUNGS: decreased breath sounds  ABDOMEN: normal bowel sounds, soft, no tenderness, no distention  EXTREMITIES: peripheral pulses normal; no clubbing, cyanosis, or edema  NEURO: no focal findings   SKIN: normal without suspicious lesions on exposed skin      Current Facility-Administered Medications:     acetaZOLAMIDE (DIAMOX) injection 250 mg, 250 mg, Intravenous, Once, Orvil MAYNOR Alvarez    albuterol inhalation solution 2 5 mg, 2 5 mg, Nebulization, Q4H PRN, Faviola Craft MD    apixaban Patricioittlu Hurst) tablet 5 mg, 5 mg, Oral, BID, William Smith PA-C, 5 mg at 01/22/20 0859    dextrose 50 % IV solution 25 mL, 25 mL, Intravenous, PRN, William Smith PA-C    [START ON 1/23/2020] digoxin (LANOXIN) tablet 250 mcg, 250 mcg, Oral, Daily, MAYNOR Estrella    insulin lispro (HumaLOG) 100 units/mL subcutaneous injection 1-5 Units, 1-5 Units, Subcutaneous, TID AC, 1 Units at 01/22/20 0902 **AND** Fingerstick Glucose (POCT), , , TID AC, William Smith PA-C    insulin lispro (HumaLOG) 100 units/mL subcutaneous injection 1-5 Units, 1-5 Units, Subcutaneous, HS, William Smith PA-C    ipratropium (ATROVENT) 0 02 % inhalation solution 0 5 mg, 0 5 mg, Nebulization, TID, William Smith PA-C, 0 5 mg at 01/22/20 1797    levalbuterol (XOPENEX) inhalation solution 1 25 mg, 1 25 mg, Nebulization, TID, William Smith PA-C, 1 25 mg at 01/22/20 2744    metoprolol (LOPRESSOR) injection 5 mg, 5 mg, Intravenous, Q6H PRN, William Smith PA-C    metoprolol tartrate (LOPRESSOR) tablet 50 mg, 50 mg, Oral, Q12H [facility], MAYNOR Estrella    nicotine (NICODERM CQ) 7 mg/24hr TD 24 hr patch 7 mg, 7 mg, Transdermal, Daily, William Smith PA-C, 7 mg at 01/22/20 0903    nystatin (MYCOSTATIN) powder, , Topical, BID, William Smith PA-C    ondansetron (ZOFRAN) injection 4 mg, 4 mg, Intravenous, Q6H PRN, William Smith PA-C    QUEtiapine (SEROquel) tablet 25 mg, 25 mg, Oral, QPM, William Smith PA-C    sildenafil (REVATIO) tablet 40 mg, 40 mg, Oral, TID, William Smith PA-C, 40 mg at 01/22/20 0859      Labs & Results:        Results from last 7 days   Lab Units 01/22/20  0522 01/22/20  0014 01/20/20  0503   WBC Thousand/uL 7 62 8 05 6 40   HEMOGLOBIN g/dL 14 6 15 2 14 1   HEMATOCRIT % 49 2* 50 2* 49 2*   PLATELETS Thousands/uL 92* 86* 71*         Results from last 7 days   Lab Units 01/22/20  0522 01/22/20  0014 01/21/20  0550 01/16/20  0427   POTASSIUM mmol/L 3 4* 3 6 3 8   < > 3 7   CHLORIDE mmol/L 90* 90* 96*   < > 100   CO2 mmol/L >45* >45* >45*   < > 36*   BUN mg/dL 91* 100* 100*   < > 80*   CREATININE mg/dL 1 75* 1 86* 2 00*   < > 2 19*   CALCIUM mg/dL 8 7 8 6 8 3   < > 8 0*   ALK PHOS U/L  --  116 97  --  84   ALT U/L  --  18 13  --  8*   AST U/L  --  16 20  --  11    < > = values in this interval not displayed  Counseling / Coordination of Care  Total floor / unit time spent today 60 minutes  Greater than 50% of total time was spent with the patient and / or family counseling and / or coordination of care  A description of the counseling / coordination of care: 36    Thank you for the opportunity to participate in the care of this patient  Edilia CHARLES    Director Heart Failure/ Medical Director [facility]

## 2020-01-22 NOTE — PLAN OF CARE
Problem: PHYSICAL THERAPY ADULT  Goal: Performs mobility at highest level of function for planned discharge setting  See evaluation for individualized goals  Description  Treatment/Interventions: Functional transfer training, LE strengthening/ROM, Elevations, Therapeutic exercise, Endurance training, Patient/family training, Equipment eval/education, Bed mobility, Gait training, Spoke to nursing, Spoke to case management  Equipment Recommended: Walker(RW)       See flowsheet documentation for full assessment, interventions and recommendations  Note:   Prognosis: Fair  Problem List: Decreased strength, Decreased endurance, Impaired balance, Decreased mobility, Decreased coordination, Decreased cognition, Decreased safety awareness, Pain  Assessment: Pt is 63 y o  female seen for PT evaluation s/p admit to [facility] on 1/21/2020 w/ Acute on chronic respiratory failure with hypoxia and hypercapnia ([facility] )  "Pt has required Bipap and HFNC for persistent acute on chronic hypoxic and hypercarbic respiratory failure  Pt has repeat episodes of encepholapathy attributed to hypoglycemia and hypercarbia" PT consulted to assess pt's functional mobility and d/c needs  Order placed for PT eval and tx, w/ up w/ A order  Comorbidities affecting pt's physical performance at time of assessment include: resp failure, COPD, a-fib, Type 2 DM, SAPNA, CHF, pulmonary HTN, pericardial effusion, chronic venous statis of BLEs, hx of compression fx of first lumbar vertebra  PTA, pt was independent w/ all functional mobility w/ SPC for community distances and lives alone in two level house w/ bed/bath upstairs and "a few steps to enter"  Personal factors affecting pt at time of IE include: inaccessible home environment, lives in two story house, ambulating w/ assistive device, stairs to enter home, inability to navigate community distances, limited home support and inability to perform IADLs   Please find objective findings from PT assessment regarding body systems outlined above with impairments and limitations including weakness, impaired balance, decreased endurance, impaired coordination, gait deviations, pain, decreased activity tolerance, decreased functional mobility tolerance, decreased safety awareness, impaired judgement, fall risk and decreased cognition  Pt lying supine in bed upon PT arrival  Pt sat EOB ~5 min at close supervision  Pt declining STS trial 2' c/o of 10/10 R foot pain  Attempted to perform lateral scoots along bed however pt refusing reporting "I'd have to put weight on my R foot to do so" Pt returned supine at end of PT session w/ all needs within reach  The following objective measures performed on IE also reveal limitations: Barthel Index: 30/100  Pt's clinical presentation is currently unstable/unpredictable seen in pt's presentation of recent admission for resp failure requiring medical attention, recent decline in function as compared to baseline, multiple lines, fall risk, pain, increased reliance on assistance for functional mobility as compared to baseline  Pt to benefit from continued PT tx to address deficits as defined above and maximize level of functional independent mobility and consistency  From PT/mobility standpoint, recommendation at time of d/c would be STR pending progress in        Recommendation: Post acute IP rehab     PT - OK to Discharge: Yes    See flowsheet documentation for full assessment

## 2020-01-22 NOTE — CONSULTS
Consultation - [address] [facility] 63 y o  female MRN: [id number]  Unit/Bed#: [facility] 517-01 Encounter: [id number]      Assessment/Plan     Assessment:  COPD  Acute, persistent on chronic hypoxic hypercapnic respiratory failure, on BiPAP/Hiflo  Acute on chronic HFpEF  Severe pulmonary HTN  Permanent Afib  DM2  SAPNA on CKD 3  Obesity  Goals of care    Plan:  Symptoms- could consider low dose oxyIR for air hunger- will defer to CCM  Also, consideration for oral ketamine for depressive symptoms  Goals- patient not contributing to conversation  Reached out to son- Pat Burt- who confirms that he has talked to his mom and agrees to be POA  He states they have not discussed code status further but will continue to be involved in her care and decision making  Unsure of patient's competency at this time- she did not engage much with me but was able to explain that the plan will be for RHC tomorrow  Will continue to evaluate her competency  Tentative family meeting- Friday 1/24/2020 around mid-morning  Psychosocial support- patient not well supported and pending outcome of hospitalization and disposition, will need more support in her home in regards to food and medication compliance  History of Present Illness   Physician Requesting Consult: Bessy Jennings MD  Reason for Consult / Principal Problem: goals of care  Hx and PE limited by: patient not very interactive  HPI: Jennifer Davis is a 63y o  year old female who was transferred from [address] [place] [address] for higher level of care in setting of acute on chronic hypoxic/hypercapnic respiratory failure with severe COPD, RADHA an OHV   She was seen by my colleague at [facility] and per Dr Roz Thakkar' consult note:    Jennifer Davis is a 63 y o  female with PMHx significant for obesity, chronic hypoxic hypercapnic respiratory failure, COPD, DM2, CKD 3, who is currently admitted with acute hypoxic RF thought be multifactorial in nature - COPD exacerbation, acute decompensated heart failure/pulmonary HTN, RADHA/OHV, and permanent Afib  It was noted that she was noncompliant with her meds and PCP visits for unclear reasons  She has been on and off of BiPAP/HiFlo since her admission in 1/10  Met her at bedside twice today - while eating lunch and then 2 hours later  She was AAO x 3 suprisingly  She was on the HiFlo at the time of my visits  She appeared comfortable and also admitted that she thinks she feels better everyday  She displayed some understanding of her various medical conditions  For now, she would like to continue current cares with the eventual goal of going back home  We spoke about her code status  She is still undecided on intubation and CPR if needed       She is a   Her  passed away years ago  She has a son named Nicky Smith who she wants to be her health care representative  She said she has 2 brothers but that they are not that close  Patient wakes for me but quickly drifts back to sleep  Remembers meeting with palliative at [address]  Confirms that Nicky Smith is her son and emergency contact  She did not want to discuss anything further and just requests that I find out when she will get lunch  Inpatient consult to Palliative Care  Consult performed by: Marcelino Hancock DO  Consult ordered by: MAYNOR Sullivan          Review of Systems   Respiratory: Positive for shortness of breath  Gastrointestinal: Negative for constipation, diarrhea, nausea and vomiting  Musculoskeletal: Positive for myalgias  Skin: Positive for color change  Psychiatric/Behavioral: The patient is nervous/anxious  All other systems reviewed and are negative        Historical Information   Past Medical History:   Diagnosis Date    Atrial fibrillation with RVR ([facility] )     COPD (chronic obstructive pulmonary disease) (HCC)     Diabetes type 2, uncontrolled ([facility] )     Encephalopathy acute 1/11/2020    Hypertension     SIRS (systemic inflammatory response syndrome) ([facility] ) 1/7/2020 Past Surgical History:   Procedure Laterality Date    HERNIA REPAIR      HYSTERECTOMY      LAPAROSCOPIC CHOLECYSTECTOMY       Social History     Socioeconomic History    Marital status:       Spouse name: Not on file    Number of children: Not on file    Years of education: Not on file    Highest education level: Not on file   Occupational History     Employer: [employer] Occupation:      Comment: WORKING FULL TIME   Social Needs    Financial resource strain: Not on file    Food insecurity:     Worry: Not on file     Inability: Not on file    Transportation needs:     Medical: Not on file     Non-medical: Not on file   Tobacco Use    Smoking status: Current Every Day Smoker     Packs/day: 0 00     Years: 43 00     Pack years: 0 00     Types: Cigarettes    Smokeless tobacco: Never Used    Tobacco comment: Currently half a pack a day, 6 CIGARETTES PER DAY    Substance and Sexual Activity    Alcohol use: Not Currently     Comment: socially, NO ALCOHOL USE    Drug use: Not Currently     Types: Marijuana    Sexual activity: Not on file   Lifestyle    Physical activity:     Days per week: Not on file     Minutes per session: Not on file    Stress: Not on file   Relationships    Social connections:     Talks on phone: Not on file     Gets together: Not on file     Attends Nondenominational service: Not on file     Active member of club or organization: Not on file     Attends meetings of clubs or organizations: Not on file     Relationship status: Not on file    Intimate partner violence:     Fear of current or ex partner: Not on file     Emotionally abused: Not on file     Physically abused: Not on file     Forced sexual activity: Not on file   Other Topics Concern    Not on file   Social History Narrative    NO LIVING WILL     Family History   Problem Relation Age of Onset    Diabetes type II Mother     No Known Problems Father         She reports not knowing much about her father   Cancer Family     Diabetes Family     Hypertension Family     Stroke Family        Meds/Allergies   all current active meds have been reviewed and current meds:   Current Facility-Administered Medications   Medication Dose Route Frequency    albuterol inhalation solution 2 5 mg  2 5 mg Nebulization Q4H PRN    apixaban (ELIQUIS) tablet 5 mg  5 mg Oral BID    dextrose 50 % IV solution 25 mL  25 mL Intravenous PRN    [START ON 1/23/2020] digoxin (LANOXIN) tablet 250 mcg  250 mcg Oral Daily    insulin lispro (HumaLOG) 100 units/mL subcutaneous injection 1-5 Units  1-5 Units Subcutaneous TID AC    insulin lispro (HumaLOG) 100 units/mL subcutaneous injection 1-5 Units  1-5 Units Subcutaneous HS    ipratropium (ATROVENT) 0 02 % inhalation solution 0 5 mg  0 5 mg Nebulization TID    levalbuterol (XOPENEX) inhalation solution 1 25 mg  1 25 mg Nebulization TID    metoprolol (LOPRESSOR) injection 5 mg  5 mg Intravenous Q6H PRN    metoprolol tartrate (LOPRESSOR) tablet 50 mg  50 mg Oral Q12H [address]    nicotine (NICODERM CQ) 7 mg/24hr TD 24 hr patch 7 mg  7 mg Transdermal Daily    nystatin (MYCOSTATIN) powder   Topical BID    ondansetron (ZOFRAN) injection 4 mg  4 mg Intravenous Q6H PRN    QUEtiapine (SEROquel) tablet 25 mg  25 mg Oral QPM    sildenafil (REVATIO) tablet 40 mg  40 mg Oral TID       Palliative Care Medications: NA    Allergies   Allergen Reactions    Heparin        Objective     Physical Exam   Constitutional: She appears well-nourished  She appears distressed  Chronically ill appearing   HENT:   Head: Normocephalic and atraumatic  Right Ear: External ear normal    Left Ear: External ear normal    Nose: Nose normal    Mouth/Throat: Oropharynx is clear and moist    Eyes: EOM are normal  Right eye exhibits no discharge  Left eye exhibits no discharge  No scleral icterus  Neck: Neck supple  No JVD present  No tracheal deviation present     Cardiovascular: Normal rate and intact distal pulses  Irregular    Pulmonary/Chest: Effort normal  No respiratory distress  She has no wheezes  She has rales  On HFNC   Abdominal: Bowel sounds are normal  She exhibits no distension and no mass  There is no tenderness  Musculoskeletal: She exhibits tenderness  She exhibits no edema or deformity  Neurological: She is alert  No cranial nerve deficit  Coordination normal    Skin: Skin is warm and dry  She is not diaphoretic  Chronic venous stasis, poor skin integrity   Psychiatric: She has a normal mood and affect  Nursing note and vitals reviewed  Lab Results:   I have personally reviewed pertinent labs  , CBC:   Lab Results   Component Value Date    WBC 7 62 01/22/2020    HGB 14 6 01/22/2020    HCT 49 2 (H) 01/22/2020     (H) 01/22/2020    PLT 92 (L) 01/22/2020    MCH 30 0 01/22/2020    MCHC 29 7 (L) 01/22/2020    RDW 18 5 (H) 01/22/2020    MPV 13 6 (H) 01/22/2020    NRBC 0 01/22/2020   , CMP:   Lab Results   Component Value Date    SODIUM 140 01/22/2020    K 3 4 (L) 01/22/2020    CL 90 (L) 01/22/2020    CO2 >45 (HH) 01/22/2020    BUN 91 (H) 01/22/2020    CREATININE 1 75 (H) 01/22/2020    CALCIUM 8 7 01/22/2020    AST 16 01/22/2020    ALT 18 01/22/2020    ALKPHOS 116 01/22/2020    EGFR 32 01/22/2020     Imaging Studies: I have personally reviewed pertinent reports  EKG, Pathology, and Other Studies: I have personally reviewed pertinent reports  Code Status: Level 1 - Full Code  Advance Directive and Living Will:      Power of :    POLST:      Counseling / Coordination of Care  Total floor / unit time spent today 55+ minutes  Greater than 50% of total time was spent with the patient and / or family counseling and / or coordination of care   A description of the counseling / coordination of care: chart review, medication review and changes, supportive listening, discussion with care team

## 2020-01-22 NOTE — UTILIZATION REVIEW
Initial Clinical Review    Admission: Date/Time/Statement: Inpatient Admission Orders (From admission, onward)     Ordered        01/21/20 2350  Inpatient Admission  Once             Orders Placed This Encounter   Procedures    Inpatient Admission     Standing Status:   Standing     Number of Occurrences:   1     Order Specific Question:   Admitting Physician     Answer:   Fritz Chang [[id number]]     Order Specific Question:   Level of Care     Answer:   Critical Care [15]     Order Specific Question:   Estimated length of stay     Answer:   More than 2 Midnights     Order Specific Question:   Certification     Answer:   I certify that inpatient services are medically necessary for this patient for a duration of greater than two midnights  See H&P and MD Progress Notes for additional information about the patient's course of treatment  Admission Date/ Time:  1/22/2020 AT 2350 URGENT INPATIENT TRANSFER FROM [facility] ICU TO [facility]     Assessment/Plan: 63year old female with PMHx morbid obesity, HTN, COPD/ severe Pulmonary HTN, severe diastolic CHF with Home O2 up to 10 L/min, Atrial Fib  Initially admitted to [facility] on 1/6/20 2nd Acute on Chronic respiratory failure with hypoxia and hypercapnia and severe volume overload  Initial tx with BiPAP and Iv Lasix  On 1/10/20 - Transferred to ICU post Rapid response 2nd  Acute mental status change and respiratory acidosis  Remained  In  ICU 2nd decompensated slow improvement Acute on chronic respiratory failure with hypoxia and hypercapnia likely multifactorial 2nd COPD, pulmonary edema, pulmonary hypertension, likely RADHA/ OHV syndrome, pleural effusion, CHF on  HFNC  during the day and Bipap  every night with continued IV Lasix  Drip  Also started on Digoxin 1/20/20, continued nebulizer treatments     PER CARDIOLOGY AND RENAL 2ND SLOW IMPROVEMENT with ECHO revealing severe pulmonary hypertension - DECISION TO TRANSFER TO [facility] [place] 1/21/20 AT 2350 FOR HEART FAILURE EVALUATION AND RIGHT HEART CATHETERIZATION ON 1/23/2020 PER HEART FAILURE   HEART FAILURE CONSULT 1/22/20:  Principal Problem:    Acute on chronic respiratory failure with hypoxia and hypercapnia (HCC)  -on chronic home O2  -CO2 persistently in the 70s on ABG    HFpEF, LVEF 58%, LVIDd 5 3, RVIDd 4 8  - on torsemide 20 mg b i d  prior to admission  -IV Lasix drip    -*wt down 58 lb from admission per chart documentation  - Plan for RHC tomorrow 1/23/2020    Pulmonary hypertension  -likely WHO group 2 (diastolic HF) and group 3 (COPD)  -started on sildenafil this admission, currently on 40 mg t i d  Severe COPD/OHS  -per documentation requires 10 L nasal cannula home    Vital Signs:   Blood pressure 98/64, pulse 89, temperature 98 °F (36 7 °C), temperature source Oral, resp  rate (!) 29,   height 5' 4" (1 626 m), weight 108 kg (237 lb 3 4 oz),   SpO2 85 - 93 %  ,   Body mass index is 40 72 kg/m²  ,     Date/Time  Temp  Pulse  Resp  BP  MAP (mmHg)  SpO2  O2 Device   01/22/20 1217    90        91 %  HFNC    01/22/20 1216    94        85 %Abnormal   HFNC   100 %     Orthostatic Blood Pressures      Most Recent Value   Blood Pressure  98/64 filed at 01/22/2020 0859      Intake/Output Summary (Last 24 hours) at 1/22/2020 1010  Gross per 24 hour   Intake 317 26 ml   Output 2000 ml   Net -1682 74 ml       Weight (last 2 days)      Date/Time   Weight     01/22/20 0555    108 (237 22)     01/21/20 2344    107 (236 77)     Blood Gases   (Last 48 hours)     01/21 1104 01/22 0017 01/22 6745                 pH, Arterial 7 496High      7 466High      7 492High                pCO2, Arterial 60  7High Panic       79  5High Panic       73  9High Panic                 pO2, Arterial 63 4Low      87 2     71 3Low                HCO3, Arterial 45  8High      56  0High      55  3High                   Respiratory Data       01/21 1100 01/21 2342 01/22 0602  Most Recent               O2 Mode BiPAP HFNC HFNC  HFNC         IPAP (cm) (cm) 20    20         EPAP (cm) (cm) 10    10         FiO2 (%) 50 100 75  100         Flow (lpm) 5 50 55  55         Pressure Support (cm H2O) 10    10         O2 Device     High fl             O2 Flow Rate (L/min) (L/min)     50              Pertinent Labs/Diagnostic Test Results:   Results from last 7 days   Lab Units 01/22/20  0522 01/22/20  0014 01/20/20  0503 01/19/20  0439 01/16/20  0427   WBC Thousand/uL 7 62 8 05 6 40 4 74 7 05   HEMOGLOBIN g/dL 14 6 15 2 14 1 14 2 15 0   HEMATOCRIT % 49 2* 50 2* 49 2* 48 4* 49 9*   PLATELETS Thousands/uL 92* 86* 71* 66* 60*   NEUTROS ABS Thousands/µL 5 52 6 00  --   --   --        Results from last 7 days   Lab Units 01/22/20  0522 01/22/20  0014 01/21/20  0550 01/20/20  0503 01/19/20  0439 01/18/20  0510 01/17/20  0619 01/16/20  0427   SODIUM mmol/L 140 142 145 144 139 140 142 142   POTASSIUM mmol/L 3 4* 3 6 3 8 3 3* 3 1* 4 1 3 7 3 7   CHLORIDE mmol/L 90* 90* 96* 96* 98* 98* 98* 100   CO2 mmol/L >45* >45* >45* >45* 42* 40* 36* 36*   ANION GAP mmol/L  --   --   --   --  -1* 2* 8 6   BUN mg/dL 91* 100* 100* 99* 97* 92* 84* 80*   CREATININE mg/dL 1 75* 1 86* 2 00* 2 15* 2 08* 2 02* 2 11* 2 19*   EGFR ml/min/1 73sq m 32 29 27 25 26 27 25 24   CALCIUM mg/dL 8 7 8 6 8 3 8 4 8 1* 8 0* 8 1* 8 0*   CALCIUM, IONIZED mmol/L  --  0 99*  --   --   --   --   --   --    MAGNESIUM mg/dL  --  2 1 1 9 2 1 1 7  --   --   --    PHOSPHORUS mg/dL  --  2 4* 2 8  --  4 1  --   --   --      Results from last 7 days   Lab Units 01/22/20  0014 01/21/20  0550 01/16/20  0427   AST U/L 16 20 11   ALT U/L 18 13 8*   ALK PHOS U/L 116 97 84   TOTAL PROTEIN g/dL 6 2* 6 0* 5 6*   ALBUMIN g/dL 2 7* 2 5* 2 5*   TOTAL BILIRUBIN mg/dL 2 57* 2 29* 4 08*     Results from last 7 days   Lab Units 01/22/20  1212 01/21/20  2055 01/21/20  1559 01/21/20  1206 01/21/20  0801 01/20/20  2159 01/20/20  1619 01/20/20  1044 01/20/20  0729 01/19/20 2051   POC GLUCOSE mg/dl 106 153* 127 85 92 129 123 83 155* 134     Results from last 7 days   Lab Units 01/22/20  0614 01/22/20  0017 01/21/20  1104   PH ART  7 492* 7 466* 7 496*   PCO2 ART mm Hg 73 9* 79 5* 60 7*   PO2 ART mm Hg 71 3* 87 2 63 4*   HCO3 ART mmol/L 55 3* 56 0* 45 8*   BASE EXC ART mmol/L 26 0 26 0 18 7   O2 CONTENT ART mL/dL 20 2 20 5 20 0   O2 HGB, ARTERIAL % 92 5* 94 5 90 7*   ABG SOURCE   --  Radial, Right Radial, Right   NON VENT TYPE HFNC   --  HFNC Flow  --    HFNC FLOW LPM   --  50  --      Results from last 7 days   Lab Units 01/20/20  1250   PH ELYSIA  7 387   PCO2 ELYSIA mm Hg 90 6*   PO2 ELYSIA mm Hg 35 5   HCO3 ELYSIA mmol/L 53 2*   BASE EXC ELYSIA mmol/L 21 8   O2 CONTENT ELYSIA ml/dL 14 0   O2 HGB, VENOUS % 63 2     Results from last 7 days   Lab Units 01/22/20  0522   DIGOXIN LVL ng/mL 1 1     Past Medical History:   Diagnosis Date    Atrial fibrillation with RVR (HCC)     COPD (chronic obstructive pulmonary disease) (HCC)     [facility]     Encephalopathy acute 1/11/2020    Hypertension     SIRS (systemic inflammatory response syndrome) ([place] [address] ) 1/7/2020     Present on Admission:   Acute on chronic respiratory failure with hypoxia and hypercapnia (HCC)   Acute on chronic diastolic congestive heart failure (HCC)   COPD (chronic obstructive pulmonary disease) ([facility]   Pericardial effusion   Permanent atrial fibrillation   SAPNA (acute kidney injury) ([place] [address] )   Pulmonary hypertension ([facility]   Type 2 diabetes mellitus without complication ([place] [address] )    Admitting Diagnosis: CHF (congestive heart failure) ([place] [address] ) [I50 9]    Age/Sex: 63 y o  female    Admission Orders:  CARDIO PULM MONITORING  VS Q1HR  HFNC  % FiO2 - BiPAP 20/10 with 50 % - TITRATE SPO2   CONTINUOUS PULSE OXIMETRY  Up with Assistance  Skin care TID  Daily weight   Strict I+O  Diet Sunil/CHO Controlled; Consistent Carbohydrate Diet Level 2 (5 carb servings/75 grams CHO/meal)  PT + OT Evals    Scheduled Medications:  apixaban 5 mg Oral BID   [START ON 1/23/2020] digoxin 250 mcg Oral Daily   insulin lispro 1-5 Units Subcutaneous TID AC   insulin lispro 1-5 Units Subcutaneous HS   ipratropium 0 5 mg Nebulization TID   levalbuterol 1 25 mg Nebulization TID   metoprolol tartrate 50 mg Oral Q12H NEREYDA   nicotine 7 mg Transdermal Daily   nystatin  Topical BID   QUEtiapine 25 mg Oral QPM   sildenafil 40 mg Oral TID     Continuous IV Infusions:   IV furosemide (LASIX) 500 mg infusion 50 mL    Ordered Dose: 40 mg/hr Route: Intravenous Frequency: Continuous @ 4 mL/hr   Scheduled Start Date/Time: 01/22/20 0000        PRN Meds:  albuterol 2 5 mg Nebulization Q4H PRN   dextrose 25 mL Intravenous PRN   metoprolol 5 mg Intravenous Q6H PRN   ondansetron 4 mg Intravenous Q6H PRN     IP CONSULT TO HEART FAILURE SERVICE  IP CONSULT TO PALLIATIVE CARE    Network Utilization Review Department  [email or web address]  ATTENTION: Please call with any questions or concerns to [phone] and carefully listen to the prompts so that you are directed to the right person  All voicemails are confidential   Rand Santiago all requests for admission clinical reviews, approved or denied determinations and any other requests to dedicated fax number below belonging to the campus where the patient is receiving treatment   List of dedicated fax numbers for the Facilities:  [address] DENIALS (Administrative/Medical Necessity) [phone]   [address] (Maternity/NICU/Pediatrics) [phone]   Ina Brody [phone]   Sumaya Patino [phone]   [address] [place] [address] [phone]   [address] [place] [address]  [phone]   [address] [place] [address] [phone]   [facility]  [phone]   [address] [facility], [address]  [address] [facility] 1000 [address] [facility] [phone]

## 2020-01-22 NOTE — SOCIAL WORK
Attempted to meet with the patient who was sleeping soundly and not able to interact with CM  Patient had transferred from [facility] and history was obtained from RN, MD and  prior CM staff  The patient lives alone in a 2 floor home with bedroom and bathroom on second floor  She was employed and drives  Patient uses a single point cane and 10 L home oxygen from [employer]  PCP is Dr Trip Macias  Patient uses [facility] [address]  She has no reported inpatient treatment for MH and D&A  Patient has no Advance Directive  Her son, Kristal Espinoza, [phone], is primary contact  Dr Nunu Irizarry has spoken to son and scheduled a family meeting for Friday, 1/24/2020

## 2020-01-22 NOTE — OCCUPATIONAL THERAPY NOTE
Occupational Therapy Evaluation      Sriram Cobb    1/22/2020    Principal Problem:    Acute on chronic respiratory failure with hypoxia and hypercapnia (HCC)  Active Problems:    COPD (chronic obstructive pulmonary disease) (HCC)    Permanent atrial fibrillation    Type 2 diabetes mellitus without complication (HCC)    SAPNA (acute kidney injury) ([facility] )    Acute on chronic diastolic congestive heart failure (HCC)    Pericardial effusion    Pulmonary hypertension (HCC)      Past Medical History:   Diagnosis Date    Atrial fibrillation with RVR (HCC)     COPD (chronic obstructive pulmonary disease) ([place] [address] )     Diabetes type 2, uncontrolled ([place] [address] )     Encephalopathy acute 1/11/2020    Hypertension     SIRS (systemic inflammatory response syndrome) ([place] [address] ) 1/7/2020       Past Surgical History:   Procedure Laterality Date    HERNIA REPAIR      HYSTERECTOMY      LAPAROSCOPIC CHOLECYSTECTOMY          01/22/20 1128   Note Type   Note type Eval/Treat   Restrictions/Precautions   Weight Bearing Precautions Per Order No   Other Precautions Impulsive;Cognitive; Chair Alarm; Bed Alarm;Multiple lines;Telemetry;O2;Fall Risk;Pain  (HFNC)   Pain Assessment   Pain Assessment 0-10   Pain Score Worst Possible Pain   Pain Type Acute pain   Pain Location Foot   Pain Orientation Right   Pain Descriptors Sharp   Pain Frequency Constant/continuous   Pain Onset Ongoing   Clinical Progression Not changed   Patient's Stated Pain Goal No pain   Hospital Pain Intervention(s) Ambulation/increased activity;Repositioned;Distraction; Emotional support   Response to Interventions Pt refusing to attempt STS or weight bear through R foot 2' acute onset of pain, however later demonstrated weight bearing through RLE to reposition in bed      Home Living   Type of [address] [place] [address] Two level;Bed/bath upstairs;Stairs to enter with rails  ([address]; few DENYS)   Bathroom Shower/Tub Tub/shower unit   Bathroom Toilet Standard   Bathroom Equipment Other (Comment)  (denies DME)   [address]   Additional Comments Pt reports use of SPC for community mobility only PTA   Prior Function   Level of [place] Independent with ADLs and functional mobility   Lives With Alone   Receives Help From Family   ADL Assistance Independent   IADLs Independent   Falls in the last 6 months 1 to 4   Vocational Retired   Lifestyle   Autonomy Pt reports being IND w/ all ADLS and IADLS; ambulates w/ SPC for community mobility PTA  Pt reports wearing 10L NC O2 at home  Reciprocal Relationships Pt lives alone  Pt reports limited social support  Service to Others Pt did not answer   Intrinsic Gratification Pt reports enjoying being IND and being home   Psychosocial   Psychosocial (WDL) X   Patient Behaviors/Mood Flat affect;Labile   Subjective   Subjective "I'm sorry girls, come back another time "   ADL   Where Assessed Edge of bed   Eating Assistance 5  Supervision/Setup   Grooming Assistance 4  Minimal Assistance   UB Bathing Assistance 3  Moderate Assistance   LB Bathing Assistance 2  Maximal Assistance   [address] 3  Moderate Assistance   LB Dressing Assistance 1  Total Assistance   Toileting Assistance  1  Total Assistance   Bed Mobility   Rolling R 2  Maximal assistance   Additional items Assist x 1; Increased time required;LE management;Verbal cues; Bedrails   Rolling L 2  Maximal assistance   Additional items Assist x 1; Increased time required;LE management;Verbal cues; Bedrails   Supine to Sit 3  Moderate assistance   Additional items Assist x 2; Increased time required;LE management;Verbal cues; Bedrails;HOB elevated   Sit to Supine 2  Maximal assistance   Additional items Assist x 2; Increased time required;LE management;Verbal cues; Bedrails   Additional Comments Pt laying supine in bed upon OT arrival  Pt seated at EOB ~5 min at S-Min A level for trunk/postural support   Pt's SpO2 desat to 86% on HFNC duirng bed mobility, however recovered to 96% w/ seated rest break and diaphragmatic breathing techniques  Pt refusing STS or lateral scoot at EOB transfers this day 2' R foot pain  Pt returned to sitting upright in bed w/ all needs in reach s/p OT session  Transfers   Sit to Stand Unable to assess   Stand to Sit Unable to assess   Additional Comments Pt refusing all transfers this day 2' R foot pain  YUKO Wright notified and aware  OTR to see to assess functional transfers and mobility  Balance   Static Sitting Fair -   Dynamic Sitting Poor +   Activity Tolerance   Activity Tolerance Patient limited by fatigue;Patient limited by pain;Treatment limited secondary to medical complications (Comment)  (ROMERO/SOB)   Medical Staff Made Aware PT Maranda John CM for safe dispo planning   Nurse Made Aware YUKO Wright cleared Pt for OT eval   RUE Assessment   RUE Assessment X  (decreased gross strength 4-/5)   LUE Assessment   LUE Assessment X  (decreased gross strength 4-/5)   Hand Function   Gross Motor Coordination Impaired   Fine Motor Coordination Impaired   Sensation   Light Touch No apparent deficits   Cognition   Overall Cognitive Status Impaired   Arousal/Participation Alert; Responsive;Lethargic   Attention Attends with cues to redirect   Orientation Level Oriented to person;Oriented to place;Oriented to situation;Disoriented to time   Memory Decreased recall of precautions;Decreased recall of recent events;Decreased short term memory   Following Commands Follows one step commands with increased time or repetition   Comments Pt required increased encouragement to participate in therapy this day  Presents w/ decreased insight into deficits and decreased safety awareness  Pt presents w/ flat affect, labile, and behavioral  Pt required increased time for processing and to respond to PLOF and home set up questioning  Pt required VC and TC for all initiation, sequencing, and safety awareness  Assessment   Limitation Decreased ADL status; Decreased UE strength;Decreased Safe judgement during ADL;Decreased cognition;Decreased endurance;Decreased fine motor control;Decreased high-level ADLs   Prognosis Fair   Assessment Pt is a 64 yo female seen for OT eval s/p transfer [facility] to [facility] for cardiac R heart cath dx'd w/ acute on chronic respiratory failure w/ hypoxia and hypercapnia  Comorbidities include a h/o COPD, A-fib, DM 2, SAPNA, CHF, pericardial effusion, pulmonary HTN, obesity, depression, CKD stage 3, compression fx of first lumbar vertebra, hypokalemia  Pt with active OT orders and up with assistance  orders  Pt is retired and resides alone in [address] w/ few DENYS  Pt reports limited social support upon D/C  Pt was I w/  ADLS and IADLS, drove, & required use of DME PTA, including SPC for community mobility  Pt is currently demonstrating the following occupational deficits: Mod Ax2 bed mobility, Min A grooming, Mod A UB Bathing/dressing, Max-Total A LB bathing/dressing, and Pt refusing all STS 2' R foot pain  These deficits that are impacting pt's baseline areas of occupation are a result of the following impairments: pain, endurance, activity tolerance, functional mobility, forward functional reach, balance, trunk control, functional standing tolerance, unsupportive home environment, decreased I w/ ADLS/IADLS, strength, cognitive impairments, decreased safety awareness, decreased insight into deficits and impaired fine motor skills  The following Occupational Performance Areas to address include: grooming, bathing/shower, toilet hygiene, dressing, health maintenance, functional mobility and clothing management  Based on the aforementioned OT evaluation, functional performance deficits, and assessments, pt has been identified as a high complexity evaluation  Recommend STR upon D/C   Pt to continue to benefit from acute immediate OT services to address the following goals 3-5x/week to  w/in 7-10 days:    Goals   Patient Goals "I want to do more" LTG Time Frame 7-10   Long Term Goal #1 Refer to goals below   Plan   Treatment Interventions ADL retraining;Functional transfer training;UE strengthening/ROM; Endurance training;Cognitive reorientation;Patient/family training;Equipment evaluation/education; Fine motor coordination activities; Compensatory technique education; Activityengagement; Energy conservation   Goal Expiration Date 02/01/20   OT Frequency 3-5x/wk   Recommendation   OT Discharge Recommendation Short Term Rehab   OT - OK to Discharge Yes  (when medically cleared)         GOALS    1) Pt will improve activity tolerance to G for min 30 min txment sessions for increase engagement in functional tasks    2) Pt will complete UB/LB dressing/self care w/ mod I using adaptive device and DME as needed    3) Pt will complete bathing w/ Mod I w/ use of AE and DME as needed    4) Pt will complete toileting w/ mod I w/ G hygiene/thoroughness using DME as needed    7) Pt will participate in simulated IADL management task to increase independence to Mod I w/ G safety and endurance    8) Pt will be attentive 100% of the time during ongoing cognitive assessment w/ G participation to assist w/ safe d/c planning/recommendations    9) Pt will demonstrate G carryover of pt/caregiver education and training as appropriate w/o cues w/ good tolerance to increase safety during functional tasks    10) Pt will demonstrate 100% carryover of energy conservation techniques t/o functional I/ADL/leisure tasks w/o cues s/p skilled education to increase endurance during functional tasks         *OTR to see to assess functional transfers and mobility      Axel Craft MS, OTR/L

## 2020-01-22 NOTE — PLAN OF CARE
Problem: Prexisting or High Potential for Compromised Skin Integrity  Goal: Skin integrity is maintained or improved  Description  INTERVENTIONS:  - Identify patients at risk for skin breakdown  - Assess and monitor skin integrity  - Assess and monitor nutrition and hydration status  - Monitor labs   - Assess for incontinence   - Turn and reposition patient  - Assist with mobility/ambulation  - Relieve pressure over bony prominences  - Avoid friction and shearing  - Provide appropriate hygiene as needed including keeping skin clean and dry  - Evaluate need for skin moisturizer/barrier cream  - Collaborate with interdisciplinary team   - Patient/family teaching  - Consider wound care consult   Outcome: Progressing     Problem: Potential for Falls  Goal: Patient will remain free of falls  Description  INTERVENTIONS:  - Assess patient frequently for physical needs  -  Identify cognitive and physical deficits and behaviors that affect risk of falls    -  [place] fall precautions as indicated by assessment   - Educate patient/family on patient safety including physical limitations  - Instruct patient to call for assistance with activity based on assessment  - Modify environment to reduce risk of injury  - Consider OT/PT consult to assist with strengthening/mobility  Outcome: Progressing     Problem: PAIN - ADULT  Goal: Verbalizes/displays adequate comfort level or baseline comfort level  Description  Interventions:  - Encourage patient to monitor pain and request assistance  - Assess pain using appropriate pain scale  - Administer analgesics based on type and severity of pain and evaluate response  - Implement non-pharmacological measures as appropriate and evaluate response  - Consider cultural and social influences on pain and pain management  - Notify physician/advanced practitioner if interventions unsuccessful or patient reports new pain  Outcome: Progressing     Problem: INFECTION - ADULT  Goal: Absence or prevention of progression during hospitalization  Description  INTERVENTIONS:  - Assess and monitor for signs and symptoms of infection  - Monitor lab/diagnostic results  - Monitor all insertion sites, i e  indwelling lines, tubes, and drains  - Monitor endotracheal if appropriate and nasal secretions for changes in amount and color  - [place] appropriate cooling/warming therapies per order  - Administer medications as ordered  - Instruct and encourage patient and family to use good hand hygiene technique  - Identify and instruct in appropriate isolation precautions for identified infection/condition  Outcome: Progressing  Goal: Absence of fever/infection during neutropenic period  Description  INTERVENTIONS:  - Monitor WBC    Outcome: Progressing     Problem: SAFETY ADULT  Goal: Maintain or return to baseline ADL function  Description  INTERVENTIONS:  -  Assess patient's ability to carry out ADLs; assess patient's baseline for ADL function and identify physical deficits which impact ability to perform ADLs (bathing, care of mouth/teeth, toileting, grooming, dressing, etc )  - Assess/evaluate cause of self-care deficits   - Assess range of motion  - Assess patient's mobility; develop plan if impaired  - Assess patient's need for assistive devices and provide as appropriate  - Encourage maximum independence but intervene and supervise when necessary  - Involve family in performance of ADLs  - Assess for home care needs following discharge   - Consider OT consult to assist with ADL evaluation and planning for discharge  - Provide patient education as appropriate  Outcome: Not Progressing  Goal: Maintain or return mobility status to optimal level  Description  INTERVENTIONS:  - Assess patient's baseline mobility status (ambulation, transfers, stairs, etc )    - Identify cognitive and physical deficits and behaviors that affect mobility  - Identify mobility aids required to assist with transfers and/or ambulation (gait belt, sit-to-stand, lift, walker, cane, etc )  - [place] fall precautions as indicated by assessment  - Record patient progress and toleration of activity level on Mobility SBAR; progress patient to next Phase/Stage  - Instruct patient to call for assistance with activity based on assessment  - Consider rehabilitation consult to assist with strengthening/weightbearing, etc   Outcome: Not Progressing     Problem: DISCHARGE PLANNING  Goal: Discharge to home or other facility with appropriate resources  Description  INTERVENTIONS:  - Identify barriers to discharge w/patient and caregiver  - Arrange for needed discharge resources and transportation as appropriate  - Identify discharge learning needs (meds, wound care, etc )  - Arrange for interpretive services to assist at discharge as needed  - Refer to Case Management Department for coordinating discharge planning if the patient needs post-hospital services based on physician/advanced practitioner order or complex needs related to functional status, cognitive ability, or social support system  Outcome: Progressing     Problem: Knowledge Deficit  Goal: Patient/family/caregiver demonstrates understanding of disease process, treatment plan, medications, and discharge instructions  Description  Complete learning assessment and assess knowledge base    Interventions:  - Provide teaching at level of understanding  - Provide teaching via preferred learning methods  Outcome: Progressing     Problem: CARDIOVASCULAR - ADULT  Goal: Maintains optimal cardiac output and hemodynamic stability  Description  INTERVENTIONS:  - Monitor I/O, vital signs and rhythm  - Monitor for S/S and trends of decreased cardiac output  - Administer and titrate ordered vasoactive medications to optimize hemodynamic stability  - Assess quality of pulses, skin color and temperature  - Assess for signs of decreased coronary artery perfusion  - Instruct patient to report change in severity of symptoms  Outcome: Progressing  Goal: Absence of cardiac dysrhythmias or at baseline rhythm  Description  INTERVENTIONS:  - Continuous cardiac monitoring, vital signs, obtain 12 lead EKG if ordered  - Administer antiarrhythmic and heart rate control medications as ordered  - Monitor electrolytes and administer replacement therapy as ordered  Outcome: Progressing     Problem: RESPIRATORY - ADULT  Goal: Achieves optimal ventilation and oxygenation  Description  INTERVENTIONS:  - Assess for changes in respiratory status  - Assess for changes in mentation and behavior  - Position to facilitate oxygenation and minimize respiratory effort  - Oxygen administered by appropriate delivery if ordered  - Initiate smoking cessation education as indicated  - Encourage broncho-pulmonary hygiene including cough, deep breathe, Incentive Spirometry  - Assess the need for suctioning and aspirate as needed  - Assess and instruct to report SOB or any respiratory difficulty  - Respiratory Therapy support as indicated  Outcome: Progressing     Problem: METABOLIC, FLUID AND ELECTROLYTES - ADULT  Goal: Electrolytes maintained within normal limits  Description  INTERVENTIONS:  - Monitor labs and assess patient for signs and symptoms of electrolyte imbalances  - Administer electrolyte replacement as ordered  - Monitor response to electrolyte replacements, including repeat lab results as appropriate  - Instruct patient on fluid and nutrition as appropriate  Outcome: Progressing  Goal: Fluid balance maintained  Description  INTERVENTIONS:  - Monitor labs   - Monitor I/O and WT  - Instruct patient on fluid and nutrition as appropriate  - Assess for signs & symptoms of volume excess or deficit  Outcome: Not Progressing  Goal: Glucose maintained within target range  Description  INTERVENTIONS:  - Monitor Blood Glucose as ordered  - Assess for signs and symptoms of hyperglycemia and hypoglycemia  - Administer ordered medications to maintain glucose within target range  - Assess nutritional intake and initiate nutrition service referral as needed  Outcome: Progressing     Problem: SKIN/TISSUE INTEGRITY - ADULT  Goal: Skin integrity remains intact  Description  INTERVENTIONS  - Identify patients at risk for skin breakdown  - Assess and monitor skin integrity  - Assess and monitor nutrition and hydration status  - Monitor labs (i e  albumin)  - Assess for incontinence   - Turn and reposition patient  - Assist with mobility/ambulation  - Relieve pressure over bony prominences  - Avoid friction and shearing  - Provide appropriate hygiene as needed including keeping skin clean and dry  - Evaluate need for skin moisturizer/barrier cream  - Collaborate with interdisciplinary team (i e  Nutrition, Rehabilitation, etc )   - Patient/family teaching  Outcome: Not Progressing  Goal: Incision(s), wounds(s) or drain site(s) healing without S/S of infection  Description  INTERVENTIONS  - Assess and document risk factors for skin impairment   - Assess and document dressing, incision, wound bed, drain sites and surrounding tissue  - Consider nutrition services referral as needed  - Oral mucous membranes remain intact  - Provide patient/ family education  Outcome: Progressing  Goal: Oral mucous membranes remain intact  Description  INTERVENTIONS  - Assess oral mucosa and hygiene practices  - Implement preventative oral hygiene regimen  - Implement oral medicated treatments as ordered  - Initiate Nutrition services referral as needed  Outcome: Progressing     Problem: Nutrition/Hydration-ADULT  Goal: Nutrient/Hydration intake appropriate for improving, restoring or maintaining nutritional needs  Description  Monitor and assess patient's nutrition/hydration status for malnutrition  Collaborate with interdisciplinary team and initiate plan and interventions as ordered  Monitor patient's weight and dietary intake as ordered or per policy   Utilize nutrition screening tool and intervene as necessary  Determine patient's food preferences and provide high-protein, high-caloric foods as appropriate       INTERVENTIONS:  - Monitor oral intake, urinary output, labs, and treatment plans  - Assess nutrition and hydration status and recommend course of action  - Evaluate amount of meals eaten  - Assist patient with eating if necessary   - Allow adequate time for meals  - Recommend/ encourage appropriate diets, oral nutritional supplements, and vitamin/mineral supplements  - Order, calculate, and assess calorie counts as needed  - Recommend, monitor, and adjust tube feedings and TPN/PPN based on assessed needs  - Assess need for intravenous fluids  - Provide specific nutrition/hydration education as appropriate  - Include patient/family/caregiver in decisions related to nutrition  Outcome: Progressing

## 2020-01-22 NOTE — PROGRESS NOTES
Pt O2 requirements increased  Hi Flow 65 to 90%  SPO2 85 to 94%  Notably more lethargic  RT at bedside  Per Anders Dakins, place pt back on Bipap reassess orientation and alertness in one hour  VSS  Will continue to monitor

## 2020-01-22 NOTE — PROGRESS NOTES
NEPHROLOGY PROGRESS NOTE   Anali Chavez 63 y o  female MRN: [id number]  Unit/Bed#: [facility] 517-01 Encounter: [id number]      ASSESSMENT & PLAN    81-year-old female who originally presented to [facility] with shortness of breath  She has a history of diastolic congestive heart failure and pulmonary hypertension, COPD, and stage 3 chronic kidney disease with a baseline creatinine of 1 3-1 6, type 2 diabetes hypertension atrial fibrillation     1  Acute kidney injury present on admission suspecting cardiorenal syndrome/volume overload plus relative hypotension with previous Ace inhibition  -creatinine improved with Lasix drip, now with alkalosis S Lasix drip on hold  -receiving acetazolamide 250 mg, would give a total of 4 doses  -scheduled to get a right heart catheterization and further diuresis can be adjusted depending on clinical outcome  -overall from a renal standpoint stable  -made over a L of urine yesterday  -urinalysis from January 11 does show 1+ proteinuria and a specific gravity of 1 025    2  Electrolytes:  -hypokalemia in the setting of aggressive diuresis-continue repletion  -otherwise electrolytes stable    3  Acid/Base  -metabolic alkalosis/respiratory acidosis  -pH increased to 7 49  -continue acetazolamide    4  BP/HR/cardiovascular  -chronic hypertension in the setting of chronic kidney disease-now mildly hypotensive, will monitor with diuresis remains on metoprolol 50 mg every 12 hours  -pulmonary hypertension-on sildenafil  -atrial fibrillation-on digoxin and metoprolol for rate control, Eliquis for anticoagulation    5  Volume overload in the setting of cardiomyopathy, acute kidney injury on CKD  -clinically more euvolemic but still on high-flow nasal can  -monitor respiratory status  -right heart catheterization to be done    6  Anemia of chronic kidney disease  -hemoglobins remained stable    7  MBD  -hypophosphatemia  -10 check PTH as an outpatient    8   Health Maintanance/Risk Reduction  -moderate glycemic control  -continue blood pressure control  -for right heart catheterization today    Discussed with ICU team and Heart failure team    SUBJECTIVE:    Patient was seen today overall arousable but lethargic  Urinating  Denies any acute chest pain or shortness of breath states her shortness of breath has improved  Otherwise review of systems negative    OBJECTIVE:  Current Weight: Weight - Scale: 108 kg (237 lb 3 4 oz)  Vitals:    01/22/20 1100   BP: 115/83   Pulse: 94   Resp: (!) 27   Temp: 97 6 °F (36 4 °C)   SpO2: 94%       Intake/Output Summary (Last 24 hours) at 1/22/2020 1214  Last data filed at 1/22/2020 1101  Gross per 24 hour   Intake 697 26 ml   Output 2484 ml   Net -1786 74 ml     Weight (last 2 days)     Date/Time   Weight    01/22/20 0555   108 (237 22)    01/21/20 2344   107 (236 77)              General: conscious, cooperative, in not acute distress, morbidly obese  Eyes:  Pallor  ENT: lips and mucous membranes moist  Neck: supple, no JVD  Chest:  Decreased breath sounds  CVS: distinct S1 & S2, normal rate, regular rhythm  Abdomen: soft, non-tender, non-distended, normoactive bowel sounds  Extremities:  Edema in the lower extremity  Skin: no rash  Neuro:  Arousable but lethargic  External urinary catheter    Medications:    Current Facility-Administered Medications:     albuterol inhalation solution 2 5 mg, 2 5 mg, Nebulization, Q4H PRN, Husam La MD    apixaban (ELIQUIS) tablet 5 mg, 5 mg, Oral, BID, William Smith PA-C, 5 mg at 01/22/20 0859    dextrose 50 % IV solution 25 mL, 25 mL, Intravenous, PRN, William Smith PA-C    [START ON 1/23/2020] digoxin (LANOXIN) tablet 250 mcg, 250 mcg, Oral, Daily, MAYNOR Aguilar    insulin lispro (HumaLOG) 100 units/mL subcutaneous injection 1-5 Units, 1-5 Units, Subcutaneous, TID AC, 1 Units at 01/22/20 0902 **AND** Fingerstick Glucose (POCT), , , TID AC, William Smith PA-C    insulin lispro (HumaLOG) 100 units/mL subcutaneous injection 1-5 Units, 1-5 Units, Subcutaneous, HS, William Smith PA-C    ipratropium (ATROVENT) 0 02 % inhalation solution 0 5 mg, 0 5 mg, Nebulization, TID, William Smith PA-C, 0 5 mg at 01/22/20 5819    levalbuterol (XOPENEX) inhalation solution 1 25 mg, 1 25 mg, Nebulization, TID, William Smith PA-C, 1 25 mg at 01/22/20 2868    metoprolol (LOPRESSOR) injection 5 mg, 5 mg, Intravenous, Q6H PRN, William Smith PA-C    metoprolol tartrate (LOPRESSOR) tablet 50 mg, 50 mg, Oral, Q12H [facility], CRNP, 50 mg at 01/22/20 1000    nicotine (NICODERM CQ) 7 mg/24hr TD 24 hr patch 7 mg, 7 mg, Transdermal, Daily, William Smith PA-C, 7 mg at 01/22/20 0903    nystatin (MYCOSTATIN) powder, , Topical, BID, William Smith PA-C    ondansetron (ZOFRAN) injection 4 mg, 4 mg, Intravenous, Q6H PRN, William Smith PA-C    QUEtiapine (SEROquel) tablet 25 mg, 25 mg, Oral, QPM, William Smith PA-C    sildenafil (REVATIO) tablet 40 mg, 40 mg, Oral, TID, William Smith PA-C, 40 mg at 01/22/20 0859    Invasive Devices:      Lab Results:   Results from last 7 days   Lab Units 01/22/20  0522 01/22/20  0014 01/21/20  0550 01/20/20  0503 01/19/20  0439  01/16/20  0427   WBC Thousand/uL 7 62 8 05  --  6 40 4 74  --  7 05   HEMOGLOBIN g/dL 14 6 15 2  --  14 1 14 2  --  15 0   HEMATOCRIT % 49 2* 50 2*  --  49 2* 48 4*  --  49 9*   PLATELETS Thousands/uL 92* 86*  --  71* 66*  --  60*   POTASSIUM mmol/L 3 4* 3 6 3 8 3 3* 3 1*   < > 3 7   CHLORIDE mmol/L 90* 90* 96* 96* 98*   < > 100   CO2 mmol/L >45* >45* >45* >45* 42*   < > 36*   BUN mg/dL 91* 100* 100* 99* 97*   < > 80*   CREATININE mg/dL 1 75* 1 86* 2 00* 2 15* 2 08*   < > 2 19*   CALCIUM mg/dL 8 7 8 6 8 3 8 4 8 1*   < > 8 0*   MAGNESIUM mg/dL  --  2 1 1 9 2 1 1 7  --   --    PHOSPHORUS mg/dL  --  2 4* 2 8  --  4 1  --   --    ALK PHOS U/L  --  116 97  --   --   -- 84   ALT U/L  --  18 13  --   --   --  8*   AST U/L  --  16 20  --   --   --  11    < > = values in this interval not displayed         Previous work up:  Please see previous notes

## 2020-01-22 NOTE — PLAN OF CARE
Problem: Prexisting or High Potential for Compromised Skin Integrity  Goal: Skin integrity is maintained or improved  Description  INTERVENTIONS:  - Identify patients at risk for skin breakdown  - Assess and monitor skin integrity  - Assess and monitor nutrition and hydration status  - Monitor labs   - Assess for incontinence   - Turn and reposition patient  - Assist with mobility/ambulation  - Relieve pressure over bony prominences  - Avoid friction and shearing  - Provide appropriate hygiene as needed including keeping skin clean and dry  - Evaluate need for skin moisturizer/barrier cream  - Collaborate with interdisciplinary team   - Patient/family teaching  - Consider wound care consult   Outcome: Progressing     Problem: Potential for Falls  Goal: Patient will remain free of falls  Description  INTERVENTIONS:  - Assess patient frequently for physical needs  -  Identify cognitive and physical deficits and behaviors that affect risk of falls    -  [place] fall precautions as indicated by assessment   - Educate patient/family on patient safety including physical limitations  - Instruct patient to call for assistance with activity based on assessment  - Modify environment to reduce risk of injury  - Consider OT/PT consult to assist with strengthening/mobility  Outcome: Progressing     Problem: CARDIOVASCULAR - ADULT  Goal: Maintains optimal cardiac output and hemodynamic stability  Description  INTERVENTIONS:  - Monitor I/O, vital signs and rhythm  - Monitor for S/S and trends of decreased cardiac output  - Administer and titrate ordered vasoactive medications to optimize hemodynamic stability  - Assess quality of pulses, skin color and temperature  - Assess for signs of decreased coronary artery perfusion  - Instruct patient to report change in severity of symptoms  Outcome: Progressing  Goal: Absence of cardiac dysrhythmias or at baseline rhythm  Description  INTERVENTIONS:  - Continuous cardiac monitoring, vital signs, obtain 12 lead EKG if ordered  - Administer antiarrhythmic and heart rate control medications as ordered  - Monitor electrolytes and administer replacement therapy as ordered  Outcome: Progressing     Problem: RESPIRATORY - ADULT  Goal: Achieves optimal ventilation and oxygenation  Description  INTERVENTIONS:  - Assess for changes in respiratory status  - Assess for changes in mentation and behavior  - Position to facilitate oxygenation and minimize respiratory effort  - Oxygen administered by appropriate delivery if ordered  - Initiate smoking cessation education as indicated  - Encourage broncho-pulmonary hygiene including cough, deep breathe, Incentive Spirometry  - Assess the need for suctioning and aspirate as needed  - Assess and instruct to report SOB or any respiratory difficulty  - Respiratory Therapy support as indicated  Outcome: Progressing     Problem: METABOLIC, FLUID AND ELECTROLYTES - ADULT  Goal: Electrolytes maintained within normal limits  Description  INTERVENTIONS:  - Monitor labs and assess patient for signs and symptoms of electrolyte imbalances  - Administer electrolyte replacement as ordered  - Monitor response to electrolyte replacements, including repeat lab results as appropriate  - Instruct patient on fluid and nutrition as appropriate  Outcome: Progressing  Goal: Fluid balance maintained  Description  INTERVENTIONS:  - Monitor labs   - Monitor I/O and WT  - Instruct patient on fluid and nutrition as appropriate  - Assess for signs & symptoms of volume excess or deficit  Outcome: Progressing     Problem: SKIN/TISSUE INTEGRITY - ADULT  Goal: Skin integrity remains intact  Description  INTERVENTIONS  - Identify patients at risk for skin breakdown  - Assess and monitor skin integrity  - Assess and monitor nutrition and hydration status  - Monitor labs (i e  albumin)  - Assess for incontinence   - Turn and reposition patient  - Assist with mobility/ambulation  - Relieve pressure over bony prominences  - Avoid friction and shearing  - Provide appropriate hygiene as needed including keeping skin clean and dry  - Evaluate need for skin moisturizer/barrier cream  - Collaborate with interdisciplinary team (i e  Nutrition, Rehabilitation, etc )   - Patient/family teaching  Outcome: Progressing     Problem: MUSCULOSKELETAL - ADULT  Goal: Maintain or return mobility to safest level of function  Description  INTERVENTIONS:  - Assess patient's ability to carry out ADLs; assess patient's baseline for ADL function and identify physical deficits which impact ability to perform ADLs (bathing, care of mouth/teeth, toileting, grooming, dressing, etc )  - Assess/evaluate cause of self-care deficits   - Assess range of motion  - Assess patient's mobility  - Assess patient's need for assistive devices and provide as appropriate  - Encourage maximum independence but intervene and supervise when necessary  - Involve family in performance of ADLs  - Assess for home care needs following discharge   - Consider OT consult to assist with ADL evaluation and planning for discharge  - Provide patient education as appropriate  Outcome: Progressing

## 2020-01-22 NOTE — CONSULTS
Consultation - Cardiology   Chandler Albrecht 63 y o  female MRN: [id number]  Unit/Bed#: [facility] 517-01 Encounter: [id number]      Assessment and Plan:  Principal Problem:    Acute on chronic respiratory failure with hypoxia and hypercapnia (HCC)  -on chronic home O2  -CO2 persistently in the 70s on ABG    HFpEF, LVEF 58%, LVIDd 5 3, RVIDd 4 8  - on torsemide 20 mg b i d  prior to admission  -previous on Lasix drip now off diuretics  -down 58 lb from admission per chart documentation  - Plan for RHC tomorrow     Pulmonary hypertension  -likely WHO group 2 (diastolic HF) and group 3 (COPD)  -started on sildenafil this admission, currently on 40 mg t i d  Severe COPD/OHS  -per documentation requires 10 L nasal cannula home    Permanent atrial fibrillation  -anticoagulant Eliquis 5 mg b i d   -currently rate controlled on Lopressor 50 mg b i d  And digoxin 250 mcg daily  -patient is on metoprolol and Cardizem at home rate control normally    Type 2 diabetes mellitus without complication (HCC)    SAPNA (acute kidney injury) ([facility] [address] )    Acute on chronic diastolic congestive heart failure (HCC)    Pericardial effusion    Pulmonary hypertension (HCC)          History of Present Illness   Physician Requesting Consult: Brenda Hubbard MD  Reason for Consult / Principal Problem:  Pulmonary hypertension/diastolic heart failure  HPI: Chandler Albrecht is a 63y o  year old female who presents with shortness of breath  She has past medical history of diastolic heart failure, severe COPD on 10 L nasal cannula at home, morbid obesity, severe pulmonary hypertension, CKD stage 3, permanent atrial fibrillation, type 2 diabetes, hypertension, and hyperlipidemia  She was initially admitted to [facility] on 1/7  Per chart review, it appears she was noncompliant with home medications and presented with acute on chronic respiratory failure with hypoxia and hypercapnia as well as acute renal failure    She has required continuous BiPAP or high-flow nasal cannula this admission  Her creatinine has significantly improved and is back to baseline with diuresis  She was transferred to [facility] heart failure valuation and possible right heart catheterization to better evaluate her pulmonary hypertension and heart failure  This morning patient was very tired, but was A&Ox3  She denied any chest pain or other complaints this morning, but repeatedly fell back asleep during our conversation  Inpatient consult to Heart Failure Service     Performed by  Karli Flores MD     Authorized by Flako Powell PA-C              Review of Systems:  Review of Systems   Constitutional: Positive for fatigue  Negative for chills and fever  HENT: Negative for congestion  Respiratory: Positive for shortness of breath  Negative for apnea  Cardiovascular: Positive for leg swelling  Negative for chest pain and palpitations  Gastrointestinal: Negative for abdominal distention, abdominal pain, nausea and vomiting  Genitourinary: Negative for difficulty urinating  Musculoskeletal: Negative for arthralgias  Neurological: Negative for dizziness, syncope, light-headedness and headaches           Historical Information   Past Medical History:   Diagnosis Date    Atrial fibrillation with RVR ([facility] )     COPD (chronic obstructive pulmonary disease) ([place] [address] )     Diabetes type 2, uncontrolled ([place] [address] )     Encephalopathy acute 1/11/2020    Hypertension     SIRS (systemic inflammatory response syndrome) ([place] [address] ) 1/7/2020     Past Surgical History:   Procedure Laterality Date    HERNIA REPAIR      HYSTERECTOMY      LAPAROSCOPIC CHOLECYSTECTOMY       Social History     Substance and Sexual Activity   Alcohol Use Not Currently    Comment: socially, NO ALCOHOL USE     Social History     Substance and Sexual Activity   Drug Use Not Currently    Types: Marijuana     Social History     Tobacco Use   Smoking Status Current Every Day Smoker    Packs/day: 0 00    Years: 43 00    Pack years: 0 00    Types: Cigarettes   Smokeless Tobacco Never Used   Tobacco Comment    Currently half a pack a day, 6 CIGARETTES PER DAY      Family History: non-contributory    Meds/Allergies   all current active meds have been reviewed  Allergies   Allergen Reactions    Heparin        Objective   Vitals: Blood pressure 98/64, pulse 89, temperature 98 °F (36 7 °C), temperature source Oral, resp  rate (!) 29, height 5' 4" (1 626 m), weight 108 kg (237 lb 3 4 oz), SpO2 93 %  , Body mass index is 40 72 kg/m² ,   Orthostatic Blood Pressures      Most Recent Value   Blood Pressure  98/64 filed at 01/22/2020 0859            Intake/Output Summary (Last 24 hours) at 1/22/2020 1010  Last data filed at 1/22/2020 0746  Gross per 24 hour   Intake 317 26 ml   Output 2000 ml   Net -1682 74 ml       Invasive Devices     Peripheral Intravenous Line            Peripheral IV 01/18/20 Upper;Ventral (anterior); Left Arm 3 days    Peripheral IV 01/20/20 Left Antecubital 2 days    Peripheral IV 01/22/20 Right Forearm less than 1 day          Drain            External Urinary Catheter less than 1 day                    Physical Exam:  Physical Exam   Constitutional: She is oriented to person, place, and time  She appears well-developed and well-nourished  No distress  HENT:   Mouth/Throat: Oropharynx is clear and moist    Eyes: Pupils are equal, round, and reactive to light  Conjunctivae and EOM are normal    Neck: Normal range of motion  Neck supple  Cardiovascular: Normal rate and intact distal pulses  An irregularly irregular rhythm present  Exam reveals no gallop and no friction rub  Murmur heard  Pulmonary/Chest: Effort normal and breath sounds normal  No respiratory distress  She has no wheezes  Abdominal: Soft  Bowel sounds are normal  She exhibits no distension  There is no tenderness  There is no guarding  Musculoskeletal: Normal range of motion  She exhibits no edema  Neurological: She is alert and oriented to person, place, and time  Skin: Skin is warm and dry  She is not diaphoretic  Cyanotic versus hyperpigmented lips   Psychiatric: She has a normal mood and affect  Her behavior is normal          Lab Results:     Lab Results   Component Value Date    TROPONINI 0 07 (H) 01/06/2020    TROPONINI 0 09 (H) 02/08/2019       Lab Results   Component Value Date    GLUCOSE 95 01/10/2020    CALCIUM 8 7 01/22/2020     05/20/2015    K 3 4 (L) 01/22/2020    CO2 >45 (HH) 01/22/2020    CL 90 (L) 01/22/2020    BUN 91 (H) 01/22/2020    CREATININE 1 75 (H) 01/22/2020       Lab Results   Component Value Date    WBC 7 62 01/22/2020    HGB 14 6 01/22/2020    HCT 49 2 (H) 01/22/2020     (H) 01/22/2020    PLT 92 (L) 01/22/2020       No results found for: CHOL  Lab Results   Component Value Date    HDL 32 (L) 02/09/2019    HDL 36 (L) 02/13/2018    HDL 29 (L) 11/19/2016     Lab Results   Component Value Date    LDLCALC 32 02/09/2019    LDLCALC 86 02/13/2018    LDLCALC 66 11/19/2016     Lab Results   Component Value Date    TRIG 61 02/09/2019    TRIG 93 02/13/2018    TRIG 107 11/19/2016       Lab Results   Component Value Date    ALT 18 01/22/2020    AST 16 01/22/2020               Imaging: I have personally reviewed pertinent reports        EKG:  Atrial fibrillation  Tele:  No significant events overnight

## 2020-01-22 NOTE — ASSESSMENT & PLAN NOTE
Wt Readings from Last 3 Encounters:   01/21/20 116 kg (255 lb 11 7 oz)   11/05/19 110 kg (243 lb 9 6 oz)   10/28/19 109 kg (240 lb 4 8 oz)       · Suspect decompensated CHF  Echo from 1/8/20 shows EF of 63% with diastolic dysfunction  · Has been diuresing very well  Continue per nephrology recommendations    · Continue beta blockade  · Digoxin added on 1/20

## 2020-01-22 NOTE — ASSESSMENT & PLAN NOTE
· Rate remains controlled  · Cardizem transitioned to digoxin secondary to history of severe pulmonary HTN  · Continue lopressor PO  · Restarted on apixaban after previously being on Heparin  · HIT antibody is positive   HIT PCR is elevated

## 2020-01-22 NOTE — PLAN OF CARE
Problem: OCCUPATIONAL THERAPY ADULT  Goal: Performs self-care activities at highest level of function for planned discharge setting  See evaluation for individualized goals  Description  Treatment Interventions: ADL retraining, Functional transfer training, UE strengthening/ROM, Endurance training, Cognitive reorientation, Patient/family training, Equipment evaluation/education, Fine motor coordination activities, Compensatory technique education, Activityengagement, Energy conservation          See flowsheet documentation for full assessment, interventions and recommendations  Note:   Limitation: Decreased ADL status, Decreased UE strength, Decreased Safe judgement during ADL, Decreased cognition, Decreased endurance, Decreased fine motor control, Decreased high-level ADLs  Prognosis: Fair  Assessment: Pt is a 62 yo female seen for OT eval s/p transfer [facility] to [facility] for cardiac R heart cath dx'd w/ acute on chronic respiratory failure w/ hypoxia and hypercapnia  Comorbidities include a h/o COPD, A-fib, DM 2, SAPNA, CHF, pericardial effusion, pulmonary HTN, obesity, depression, CKD stage 3, compression fx of first lumbar vertebra, hypokalemia  Pt with active OT orders and up with assistance  orders  Pt is retired and resides alone in [address] [place] [address] w/ few DENYS  Pt reports limited social support upon D/C  Pt was I w/  ADLS and IADLS, drove, & required use of DME PTA, including SPC for community mobility  Pt is currently demonstrating the following occupational deficits: Mod Ax2 bed mobility, Min A grooming, Mod A UB Bathing/dressing, Max-Total A LB bathing/dressing, and Pt refusing all STS 2' R foot pain   These deficits that are impacting pt's baseline areas of occupation are a result of the following impairments: pain, endurance, activity tolerance, functional mobility, forward functional reach, balance, trunk control, functional standing tolerance, unsupportive home environment, decreased I w/ ADLS/IADLS, strength, cognitive impairments, decreased safety awareness, decreased insight into deficits and impaired fine motor skills  The following Occupational Performance Areas to address include: grooming, bathing/shower, toilet hygiene, dressing, health maintenance, functional mobility and clothing management  Based on the aforementioned OT evaluation, functional performance deficits, and assessments, pt has been identified as a high complexity evaluation  Recommend STR upon D/C   Pt to continue to benefit from acute immediate OT services to address the following goals 3-5x/week to  w/in 7-10 days:      OT Discharge Recommendation: Short Term Rehab  OT - OK to Discharge: Yes(when medically cleared)

## 2020-01-23 ENCOUNTER — APPOINTMENT (OUTPATIENT)
Dept: NON INVASIVE DIAGNOSTICS | Facility: HOSPITAL | Age: 60
DRG: 286 | End: 2020-01-23
Attending: INTERNAL MEDICINE
Payer: COMMERCIAL

## 2020-01-23 VITALS
SYSTOLIC BLOOD PRESSURE: 103 MMHG | DIASTOLIC BLOOD PRESSURE: 61 MMHG | BODY MASS INDEX: 42.61 KG/M2 | OXYGEN SATURATION: 97 % | HEIGHT: 65 IN | TEMPERATURE: 97.5 F | RESPIRATION RATE: 26 BRPM | HEART RATE: 90 BPM | WEIGHT: 255.73 LBS

## 2020-01-23 LAB
BASOPHILS # BLD AUTO: 0.09 THOUSANDS/ΜL (ref 0–0.1)
BASOPHILS NFR BLD AUTO: 1 % (ref 0–1)
BUN SERPL-MCNC: 90 MG/DL (ref 5–25)
CA-I BLD-SCNC: 0.99 MMOL/L (ref 1.12–1.32)
CALCIUM SERPL-MCNC: 8.8 MG/DL (ref 8.3–10.1)
CHLORIDE SERPL-SCNC: 91 MMOL/L (ref 100–108)
CO2 SERPL-SCNC: >45 MMOL/L (ref 21–32)
CREAT SERPL-MCNC: 1.7 MG/DL (ref 0.6–1.3)
EOSINOPHIL # BLD AUTO: 0.19 THOUSAND/ΜL (ref 0–0.61)
EOSINOPHIL NFR BLD AUTO: 2 % (ref 0–6)
ERYTHROCYTE [DISTWIDTH] IN BLOOD BY AUTOMATED COUNT: 18.5 % (ref 11.6–15.1)
GFR SERPL CREATININE-BSD FRML MDRD: 33 ML/MIN/1.73SQ M
GLUCOSE SERPL-MCNC: 100 MG/DL (ref 65–140)
GLUCOSE SERPL-MCNC: 108 MG/DL (ref 65–140)
GLUCOSE SERPL-MCNC: 67 MG/DL (ref 65–140)
GLUCOSE SERPL-MCNC: 89 MG/DL (ref 65–140)
GLUCOSE SERPL-MCNC: 97 MG/DL (ref 65–140)
HCT VFR BLD AUTO: 50.6 % (ref 34.8–46.1)
HGB BLD-MCNC: 15 G/DL (ref 11.5–15.4)
IMM GRANULOCYTES # BLD AUTO: 0.05 THOUSAND/UL (ref 0–0.2)
IMM GRANULOCYTES NFR BLD AUTO: 1 % (ref 0–2)
LYMPHOCYTES # BLD AUTO: 0.83 THOUSANDS/ΜL (ref 0.6–4.47)
LYMPHOCYTES NFR BLD AUTO: 9 % (ref 14–44)
MAGNESIUM SERPL-MCNC: 1.9 MG/DL (ref 1.6–2.6)
MCH RBC QN AUTO: 29.9 PG (ref 26.8–34.3)
MCHC RBC AUTO-ENTMCNC: 29.6 G/DL (ref 31.4–37.4)
MCV RBC AUTO: 101 FL (ref 82–98)
MONOCYTES # BLD AUTO: 0.94 THOUSAND/ΜL (ref 0.17–1.22)
MONOCYTES NFR BLD AUTO: 10 % (ref 4–12)
NEUTROPHILS # BLD AUTO: 7.34 THOUSANDS/ΜL (ref 1.85–7.62)
NEUTS SEG NFR BLD AUTO: 77 % (ref 43–75)
NRBC BLD AUTO-RTO: 0 /100 WBCS
PLATELET # BLD AUTO: 106 THOUSANDS/UL (ref 149–390)
PMV BLD AUTO: 13.1 FL (ref 8.9–12.7)
POTASSIUM SERPL-SCNC: 3.7 MMOL/L (ref 3.5–5.3)
RBC # BLD AUTO: 5.01 MILLION/UL (ref 3.81–5.12)
SODIUM SERPL-SCNC: 139 MMOL/L (ref 136–145)
SRA .2 IU/ML UFH SER-ACNC: 2 % (ref 0–20)
SRA 100IU/ML UFH SER-ACNC: <1 % (ref 0–20)
SRA UFH SER-IMP: NORMAL
WBC # BLD AUTO: 9.44 THOUSAND/UL (ref 4.31–10.16)

## 2020-01-23 PROCEDURE — 97116 GAIT TRAINING THERAPY: CPT

## 2020-01-23 PROCEDURE — 85025 COMPLETE CBC W/AUTO DIFF WBC: CPT | Performed by: NURSE PRACTITIONER

## 2020-01-23 PROCEDURE — 83735 ASSAY OF MAGNESIUM: CPT | Performed by: NURSE PRACTITIONER

## 2020-01-23 PROCEDURE — 4A133B3 MONITORING OF ARTERIAL PRESSURE, PULMONARY, PERCUTANEOUS APPROACH: ICD-10-PCS | Performed by: INTERNAL MEDICINE

## 2020-01-23 PROCEDURE — C1894 INTRO/SHEATH, NON-LASER: HCPCS | Performed by: INTERNAL MEDICINE

## 2020-01-23 PROCEDURE — 97535 SELF CARE MNGMENT TRAINING: CPT

## 2020-01-23 PROCEDURE — 82948 REAGENT STRIP/BLOOD GLUCOSE: CPT

## 2020-01-23 PROCEDURE — 94760 N-INVAS EAR/PLS OXIMETRY 1: CPT

## 2020-01-23 PROCEDURE — 99233 SBSQ HOSP IP/OBS HIGH 50: CPT | Performed by: NURSE PRACTITIONER

## 2020-01-23 PROCEDURE — 80048 BASIC METABOLIC PNL TOTAL CA: CPT | Performed by: NURSE PRACTITIONER

## 2020-01-23 PROCEDURE — 82810 BLOOD GASES O2 SAT ONLY: CPT | Performed by: INTERNAL MEDICINE

## 2020-01-23 PROCEDURE — 99232 SBSQ HOSP IP/OBS MODERATE 35: CPT | Performed by: INTERNAL MEDICINE

## 2020-01-23 PROCEDURE — 94660 CPAP INITIATION&MGMT: CPT

## 2020-01-23 PROCEDURE — 82330 ASSAY OF CALCIUM: CPT | Performed by: NURSE PRACTITIONER

## 2020-01-23 PROCEDURE — 97530 THERAPEUTIC ACTIVITIES: CPT

## 2020-01-23 PROCEDURE — 4A023N6 MEASUREMENT OF CARDIAC SAMPLING AND PRESSURE, RIGHT HEART, PERCUTANEOUS APPROACH: ICD-10-PCS | Performed by: INTERNAL MEDICINE

## 2020-01-23 PROCEDURE — 4A1239Z MONITORING OF CARDIAC OUTPUT, PERCUTANEOUS APPROACH: ICD-10-PCS | Performed by: INTERNAL MEDICINE

## 2020-01-23 PROCEDURE — C1769 GUIDE WIRE: HCPCS | Performed by: INTERNAL MEDICINE

## 2020-01-23 PROCEDURE — 93451 RIGHT HEART CATH: CPT | Performed by: INTERNAL MEDICINE

## 2020-01-23 PROCEDURE — 94640 AIRWAY INHALATION TREATMENT: CPT

## 2020-01-23 RX ORDER — CALCIUM GLUCONATE 20 MG/ML
2 INJECTION, SOLUTION INTRAVENOUS ONCE
Status: COMPLETED | OUTPATIENT
Start: 2020-01-23 | End: 2020-01-23

## 2020-01-23 RX ORDER — MAGNESIUM SULFATE 1 G/100ML
1 INJECTION INTRAVENOUS ONCE
Status: COMPLETED | OUTPATIENT
Start: 2020-01-23 | End: 2020-01-23

## 2020-01-23 RX ORDER — POTASSIUM CHLORIDE 20 MEQ/1
40 TABLET, EXTENDED RELEASE ORAL ONCE
Status: COMPLETED | OUTPATIENT
Start: 2020-01-23 | End: 2020-01-23

## 2020-01-23 RX ORDER — ACETAZOLAMIDE 500 MG/5ML
250 INJECTION, POWDER, LYOPHILIZED, FOR SOLUTION INTRAVENOUS EVERY 12 HOURS SCHEDULED
Status: DISPENSED | OUTPATIENT
Start: 2020-01-23 | End: 2020-01-26

## 2020-01-23 RX ORDER — LIDOCAINE HYDROCHLORIDE 10 MG/ML
INJECTION, SOLUTION EPIDURAL; INFILTRATION; INTRACAUDAL; PERINEURAL CODE/TRAUMA/SEDATION MEDICATION
Status: COMPLETED | OUTPATIENT
Start: 2020-01-23 | End: 2020-01-23

## 2020-01-23 RX ADMIN — IPRATROPIUM BROMIDE 0.5 MG: 0.5 SOLUTION RESPIRATORY (INHALATION) at 19:40

## 2020-01-23 RX ADMIN — SILDENAFIL 40 MG: 20 TABLET ORAL at 08:01

## 2020-01-23 RX ADMIN — SILDENAFIL 40 MG: 20 TABLET ORAL at 21:13

## 2020-01-23 RX ADMIN — APIXABAN 5 MG: 5 TABLET, FILM COATED ORAL at 17:00

## 2020-01-23 RX ADMIN — IPRATROPIUM BROMIDE 0.5 MG: 0.5 SOLUTION RESPIRATORY (INHALATION) at 14:08

## 2020-01-23 RX ADMIN — IPRATROPIUM BROMIDE 0.5 MG: 0.5 SOLUTION RESPIRATORY (INHALATION) at 07:55

## 2020-01-23 RX ADMIN — ACETAZOLAMIDE 250 MG: 500 INJECTION, POWDER, LYOPHILIZED, FOR SOLUTION INTRAVENOUS at 15:15

## 2020-01-23 RX ADMIN — SILDENAFIL 40 MG: 20 TABLET ORAL at 16:56

## 2020-01-23 RX ADMIN — DIGOXIN 125 MCG: 125 TABLET ORAL at 08:04

## 2020-01-23 RX ADMIN — LIDOCAINE HYDROCHLORIDE 5 ML: 10 INJECTION, SOLUTION EPIDURAL; INFILTRATION; INTRACAUDAL; PERINEURAL at 08:52

## 2020-01-23 RX ADMIN — DEXTROSE 50 % IN WATER (D50W) INTRAVENOUS SYRINGE 25 ML: at 07:58

## 2020-01-23 RX ADMIN — LEVALBUTEROL HYDROCHLORIDE 1.25 MG: 1.25 SOLUTION, CONCENTRATE RESPIRATORY (INHALATION) at 14:08

## 2020-01-23 RX ADMIN — POTASSIUM CHLORIDE 40 MEQ: 1500 TABLET, EXTENDED RELEASE ORAL at 11:41

## 2020-01-23 RX ADMIN — NICOTINE 7 MG: 7 PATCH TRANSDERMAL at 08:00

## 2020-01-23 RX ADMIN — NYSTATIN: 100000 POWDER TOPICAL at 08:02

## 2020-01-23 RX ADMIN — LEVALBUTEROL HYDROCHLORIDE 1.25 MG: 1.25 SOLUTION, CONCENTRATE RESPIRATORY (INHALATION) at 19:40

## 2020-01-23 RX ADMIN — APIXABAN 5 MG: 5 TABLET, FILM COATED ORAL at 08:02

## 2020-01-23 RX ADMIN — QUETIAPINE FUMARATE 25 MG: 25 TABLET ORAL at 21:13

## 2020-01-23 RX ADMIN — MAGNESIUM SULFATE HEPTAHYDRATE 1 G: 1 INJECTION, SOLUTION INTRAVENOUS at 11:41

## 2020-01-23 RX ADMIN — LEVALBUTEROL HYDROCHLORIDE 1.25 MG: 1.25 SOLUTION, CONCENTRATE RESPIRATORY (INHALATION) at 07:55

## 2020-01-23 RX ADMIN — CALCIUM GLUCONATE 2 G: 20 INJECTION, SOLUTION INTRAVENOUS at 11:41

## 2020-01-23 NOTE — SOCIAL WORK
IVANAW called patient's son to attempt to designate a time to meet on Friday with the medical team       Awaiting a call back

## 2020-01-23 NOTE — PLAN OF CARE
Problem: OCCUPATIONAL THERAPY ADULT  Goal: Performs self-care activities at highest level of function for planned discharge setting  See evaluation for individualized goals  Description  Treatment Interventions: ADL retraining, Functional transfer training, UE strengthening/ROM, Endurance training, Cognitive reorientation, Patient/family training, Equipment evaluation/education, Fine motor coordination activities, Compensatory technique education, Activityengagement, Energy conservation          See flowsheet documentation for full assessment, interventions and recommendations  Outcome: Progressing  Note:   Limitation: Decreased ADL status, Decreased UE strength, Decreased Safe judgement during ADL, Decreased cognition, Decreased endurance, Decreased fine motor control, Decreased high-level ADLs  Prognosis: Fair  Assessment: Patient participated in Skilled OT session this date with interventions consisting of ADL re training with the use of correct body mechnaics, Energy Conservation techniques, deep breathing technique, safety awareness and fall prevention techniques, one handed dressing technique, increase dynamic sit/ stand balance during functional activity , increase postural control, increase trunk control and increase OOB/ sitting tolerance   Patient agreeable to OT treatment session, upon arrival patient was found supine in bed  Pt participated in bed mobility, sitting EOB tolerance, STS transfers, mobility, and toileting  Please refer to chart for functional levels  Patient requiring frequent re direction, verbal cues for safety, verbal cues for correct technique, verbal cues for pacing thru activity steps, cognitive assistance to anticipate next step, one step directives and frequent rest periods  Patient continues to be functioning below baseline level, occupational performance remains limited secondary to factors listed above and increased risk for falls and injury     From OT standpoint, recommendation at time of d/c would be Short Term Rehab  Patient to benefit from continued Occupational Therapy treatment while in the hospital to address deficits as defined above and maximize level of functional independence with ADLs and functional mobility        OT Discharge Recommendation: Short Term Rehab  OT - OK to Discharge: Yes(when medically cleared)

## 2020-01-23 NOTE — PROGRESS NOTES
Heart Failure/ Pulmonary Hypertension Progress Note - Kamilah Artist 63 y o  female MRN: [id number]    Unit/Bed#: [facility] 517-01 Encounter: [id number]      Assessment:    Principal Problem:    Acute on chronic respiratory failure with hypoxia and hypercapnia (HCC)  Active Problems:    COPD (chronic obstructive pulmonary disease) (HCC)    Permanent atrial fibrillation    Type 2 diabetes mellitus without complication (HCC)    SAPNA (acute kidney injury) (HCC)    Acute on chronic diastolic congestive heart failure (HCC)    Pericardial effusion    Pulmonary hypertension ([facility] [address] )      Interim:  Ins/outs: 1220/4984 diamox only  Weight: 237    # Pulmonary Hypertension- phenotypic expression of group 1 PAH with out of proportion PVR elevation and group 2 pulmonary heart disease  Etiology: Likely combination of WHO group 2 and group 3 from diastolic HF and severe COPD, RADHA, OHV syndrome  Her LA is large and LV is thickened on echo so likely has high pulmonary venous pressures constantly but also has pulmonary driving components such as OHV, ? COPD,  RADHA  This likely led to group 2 pulmonary heart disease with RV to pulmonary circuit uncoupling in relation to pulmonary vascular resistance      PAH specific med Rx: sildanefil 40 mg TID- just started this admit    Weight: 237 lbs    RH today on sildanefil 40 mg TID  RA: 8 mmHg  PA: 44/26/33 mmHg  PCWP: 9 mmHg  PA sat 67%  CO 5 3 l/min  PVR 4 5 DRUMMOND    CT Chest  1/14/20: main PA 6 5 cm    Echocardiogram 1/15/20  LVEF: normal  LVIDd: small  RV: severely dilated , hypokinetic  MR:  PASP: 80's  RVOT: not assessed  Other: large LA    TTE 2/11/19:   RV dilated, PASP 60 mmHg    # Chronic HFpEF, Stage C  - Diuresed 58 lbs per chart since admit    Neurohormonal Blockade:  --Beta-Blocker:  --ACEi, ARB or ARNi:    (or SVR reduction)  --Aldosterone Receptor Blocker:  --Diuretic:    # pericardial effusion- more likely a manifestation of her elevated PA, RA pressures and marker of severity of her [address]    # Severe COPD/ RADHA/ OHV syndrome  On high percentage oxygen at home  # Acute on chronic hypoxic respiratory failure with hypercapnia  CO2 persistently in the 70's on ABG  # chronic Afib  # Obesity, BMI 42  # SAPNA on CKD  # med non compliance  # depression hx  # DM2  # HIT    Plan:  Continue with sildanefil 40 mg TID  Continue with diamox  Further diuresis not needed at this point    [facility] (day, reason): Reyes catheter (day, reason):    Vitals: Blood pressure 94/50, pulse 94, temperature 98 3 °F (36 8 °C), temperature source Oral, resp  rate 18, height 5' 4" (1 626 m), weight 108 kg (237 lb 3 4 oz), SpO2 97 %  , Body mass index is 40 72 kg/m² , I/O last 3 completed shifts: In: 1337 3 [P O :1220; I V :17 3; IV Piggyback:100]  Out: 6184 [Urine:6184]  No intake/output data recorded  Wt Readings from Last 3 Encounters:   01/22/20 108 kg (237 lb 3 4 oz)   01/21/20 116 kg (255 lb 11 7 oz)   11/05/19 110 kg (243 lb 9 6 oz)       Intake/Output Summary (Last 24 hours) at 1/23/2020 0922  Last data filed at 1/23/2020 0647  Gross per 24 hour   Intake 640 ml   Output 4184 ml   Net -3544 ml     I/O last 3 completed shifts: In: 1337 3 [P O :1220; I V :17 3; IV Piggyback:100]  Out: 0271 [Urine:6184]    No significant arrhythmias seen on telemetry review         Physical Exam:  Vitals:    01/23/20 0653 01/23/20 0700 01/23/20 0801 01/23/20 0804   BP: 102/67 94/68 94/50    BP Location: Right arm      Pulse: 89 90  94   Resp: 21 18     Temp: 98 3 °F (36 8 °C)      TempSrc: Oral      SpO2: 97% 97%     Weight:       Height:           GEN: Mara Agustin appears chronically ill  HEENT: pupils equal, round, and reactive to light; extraocular muscles intact  NECK: supple, no carotid bruits   HEART: regular rhythm, normal S1 and S2, no murmurs, clicks, gallops or rubs, JVP is down    LUNGS: decreased breath sounds  ABDOMEN: normal bowel sounds, soft, no tenderness, no distention  EXTREMITIES: peripheral pulses normal; no clubbing, cyanosis, or edema  NEURO: no focal findings   SKIN: normal without suspicious lesions on exposed skin      Current Facility-Administered Medications:     albuterol inhalation solution 2 5 mg, 2 5 mg, Nebulization, Q4H PRN, Brenda Hubbard MD    apixaban (ELIQUIS) tablet 5 mg, 5 mg, Oral, BID, William Smith PA-C, 5 mg at 01/23/20 0802    dextrose 50 % IV solution 25 mL, 25 mL, Intravenous, PRN, William Smith PA-C, 25 mL at 01/23/20 0758    digoxin (LANOXIN) tablet 125 mcg, 125 mcg, Oral, Daily, MAYNOR Wray, 125 mcg at 01/23/20 0804    insulin lispro (HumaLOG) 100 units/mL subcutaneous injection 1-5 Units, 1-5 Units, Subcutaneous, TID AC, 1 Units at 01/22/20 0902 **AND** Fingerstick Glucose (POCT), , , TID AC, William Smith PA-C    insulin lispro (HumaLOG) 100 units/mL subcutaneous injection 1-5 Units, 1-5 Units, Subcutaneous, HS, William Smith PA-C    ipratropium (ATROVENT) 0 02 % inhalation solution 0 5 mg, 0 5 mg, Nebulization, TID, William Smith PA-C, 0 5 mg at 01/22/20 2016    levalbuterol ([place]) inhalation solution 1 25 mg, 1 25 mg, Nebulization, TID, William Smith PA-C, 1 25 mg at 01/22/20 2016    metoprolol (LOPRESSOR) injection 5 mg, 5 mg, Intravenous, Q6H PRN, William Smith PA-C    metoprolol tartrate (LOPRESSOR) tablet 50 mg, 50 mg, Oral, Q12H [address], Coral Edvin, CRNP, 50 mg at 01/22/20 2140    nicotine (NICODERM CQ) 7 mg/24hr TD 24 hr patch 7 mg, 7 mg, Transdermal, Daily, William Smith PA-C, 7 mg at 01/23/20 0800    nystatin (MYCOSTATIN) powder, , Topical, BID, William Smith PA-C    ondansetron (ZOFRAN) injection 4 mg, 4 mg, Intravenous, Q6H PRN, William Smith PA-C    QUEtiapine (SEROquel) tablet 25 mg, 25 mg, Oral, QPM, William Smith PA-C, 25 mg at 01/22/20 2140    sildenafil (REVATIO) tablet 40 mg, 40 mg, Oral, TID, William Smith PA-C, 40 mg at 01/23/20 0801      Labs & Results:        Results from last 7 days   Lab Units 01/23/20  0450 01/22/20  0522 01/22/20  0014   WBC Thousand/uL 9 44 7 62 8 05   HEMOGLOBIN g/dL 15 0 14 6 15 2   HEMATOCRIT % 50 6* 49 2* 50 2*   PLATELETS Thousands/uL 106* 92* 86*         Results from last 7 days   Lab Units 01/23/20  0450 01/22/20  0522 01/22/20  0014 01/21/20  0550   POTASSIUM mmol/L 3 7 3 4* 3 6 3 8   CHLORIDE mmol/L 91* 90* 90* 96*   CO2 mmol/L >45* >45* >45* >45*   BUN mg/dL 90* 91* 100* 100*   CREATININE mg/dL 1 70* 1 75* 1 86* 2 00*   CALCIUM mg/dL 8 8 8 7 8 6 8 3   ALK PHOS U/L  --   --  116 97   ALT U/L  --   --  18 13   AST U/L  --   --  16 20           Counseling / Coordination of Care  Total floor / unit time spent today 60 minutes  Greater than 50% of total time was spent with the patient and / or family counseling and / or coordination of care  A description of the counseling / coordination of care: 36    Thank you for the opportunity to participate in the care of this patient  Rema CHARLES    Director Heart Failure/ Medical Director [address] [place] [address]

## 2020-01-23 NOTE — PLAN OF CARE
Problem: PHYSICAL THERAPY ADULT  Goal: Performs mobility at highest level of function for planned discharge setting  See evaluation for individualized goals  Description  Treatment/Interventions: Functional transfer training, LE strengthening/ROM, Elevations, Therapeutic exercise, Endurance training, Patient/family training, Equipment eval/education, Bed mobility, Gait training, Spoke to nursing, Spoke to case management  Equipment Recommended: Walker(RW)       See flowsheet documentation for full assessment, interventions and recommendations  Note:   Prognosis: Fair  Problem List: Decreased strength, Decreased endurance, Impaired balance, Decreased mobility, Decreased coordination, Decreased cognition, Impaired judgement, Decreased safety awareness, Pain  Assessment: Pt presents with decreased mobility, strength, balance, and activity tolerance  Pt demonstrated ability to perform bed mobility at [address] [place] [address]  Pt sat EOB ~3 minutes at supervision  Pt required 3 STS trials before obtaining upright posture 2' pt presenting w/ self-limiting behavior and reporting "I can't do it"  Pt required rocking X3 to perform STS  Pt ambulated 4-5 steps to commode at Max A X2 w/ maximal VCs and TCs for initiation, sequencing, safety, and RW management  Pt repeatedly saying "I can't do it just let me sit" at unsafe moments to sit however pt was able to maintain a standing position w/ RW ~2-3 minutes while performing transfers  After using commode, pt performed a transfer to chair at [address] [place] [address]  Upon standing, pt took 2 steps and then was reporting "I'm going to fall  I'm going to sit down now" Pt instructed that sitting at that moment was not safe and commode was moved to place chair behind patient  Pt continues to benefit from skilled therapy to maximize functional independence  Recommendation at this time is rehab   Pt would benefit from continued ambulation, functional transfers, strength, balance, and activity tolerance Recommendation: Post acute IP rehab     PT - OK to Discharge: Yes    See flowsheet documentation for full assessment

## 2020-01-23 NOTE — OCCUPATIONAL THERAPY NOTE
Occupational Therapy Treatment Note      Vickie Birgit    1/23/2020    Principal Problem:    Acute on chronic respiratory failure with hypoxia and hypercapnia (HCC)  Active Problems:    COPD (chronic obstructive pulmonary disease) (HCC)    Permanent atrial fibrillation    Type 2 diabetes mellitus without complication (HCC)    SAPNA (acute kidney injury) ([facility] )    Acute on chronic diastolic congestive heart failure (HCC)    Pericardial effusion    Pulmonary hypertension (HCC)      Past Medical History:   Diagnosis Date    Atrial fibrillation with RVR (HCC)     COPD (chronic obstructive pulmonary disease) ([place] [address] )     Diabetes type 2, uncontrolled ([place] [address] )     Encephalopathy acute 1/11/2020    Hypertension     SIRS (systemic inflammatory response syndrome) ([place] [address] ) 1/7/2020       Past Surgical History:   Procedure Laterality Date    HERNIA REPAIR      HYSTERECTOMY      LAPAROSCOPIC CHOLECYSTECTOMY          01/23/20 1223   Restrictions/Precautions   Weight Bearing Precautions Per Order No   Other Precautions Impulsive;Cognitive; Chair Alarm; Bed Alarm;Multiple lines;Telemetry;O2;Fall Risk;Pain  (HFNC)   Lifestyle   Autonomy Pt reports being IND w/ all ADLS and IADLS; ambulates w/ SPC for community mobility PTA   Reciprocal Relationships Pt lives alone  Pt reports limited social support  Service to Others Pt did not answer   Intrinsic Gratification Pt reports enjoying being IND and being home   Pain Assessment   Pain Assessment 0-10   Pain Score 7   Pain Type Acute pain   Pain Location Foot   Pain Orientation Right   Patient's Stated Pain Goal No pain   Hospital Pain Intervention(s) Repositioned; Ambulation/increased activity; Distraction; Emotional support   Response to Interventions Tolerated   ADL   Eating Assistance 5  Supervision/Setup   Eating Deficit Setup;Supervision/safety;Verbal cueing; Increased time to complete   Eating Comments Pt required set up A for her lunch tray 2' not able to open some container lids  LB Dressing Assistance 2  Maximal Assistance   LB Dressing Deficit Setup;Verbal cueing;Supervision/safety; Increased time to complete; Don/doff R sock; Don/doff L sock   LB Dressing Comments Donning B/L socks at EOB   Toileting Assistance  1  Total Assistance   Toileting Deficit Setup;Verbal cueing;Supervison/safety; Increased time to complete; Bedside commode;Grab bar use;Clothing management up;Clothing management down;Perineal hygiene   Bed Mobility   Supine to Sit 2  Maximal assistance   Additional items Assist x 2; Increased time required;LE management;Verbal cues;HOB elevated; Bedrails   Sit to Supine Unable to assess   Additional Comments Pt laying supine in bed upon OT arrival  Pt seated OOB in chair w/ chair alarm activated and all needs in reach s/p OT sesison  Transfers   Sit to Stand 2  Maximal assistance   Additional items Assist x 2; Increased time required;Verbal cues;Armrests   Stand to Sit 2  Maximal assistance   Additional items Assist x 2; Increased time required;Verbal cues;Armrests   Toilet transfer 2  Maximal assistance   Additional items Assist x 2; Increased time required;Commode;Verbal cues;Armrests   Additional Comments Transfers w/ RW  Pt required x3 STS attempts before achieving clearing bottom from bed in stance  Pt unable to achieve full upright posture t/o stance and presented forward flexed posture while leaning on RW w/ B/L forearms despite MAX VC and TC for safety, hand placement, and RW management  Pt presents highly anxious t/o transfers and at times yelling out "I can't do it! I'm going to fall!" Pt transferred EOB>BSC then [facility] to chair  Functional Mobility   Functional Mobility 2  Maximal assistance  (x2)   Additional Comments Pt demonstrated short distance mobility ~2ft EOB>BSC then BSC>chair w/ Max Ax2  Pt required continuous VC and TC for all initiation, sequencing, motor planning, and RW management      Additional items Rolling walker   Cognition   Overall Cognitive Status Impaired   Arousal/Participation Alert; Cooperative   Attention Attends with cues to redirect   Orientation Level Oriented to person;Oriented to place;Oriented to situation;Disoriented to time   Memory Decreased recall of precautions;Decreased recall of recent events;Decreased short term memory   Following Commands Follows one step commands with increased time or repetition   Comments Pt presented highly anxious t/o session and required constant emotional support and VC for safety awareness  Pt required Max encouragement to participate in therapy  Pt continues to attempt to refuse therapy 2' R foot pain, however demonstrated ability to WB through RLE and OOB transfers  Pt required continuous VC and TC for all initiation, sequencing, motor planning, safety awareness, and redirection to task  Activity Tolerance   Activity Tolerance Patient limited by fatigue;Patient limited by pain;Treatment limited secondary to medical complications (Comment)  (SOB/ROMERO)   Medical Staff Made Aware RN cleared Pt for OT session   Assessment   Assessment Patient participated in Skilled OT session this date with interventions consisting of ADL re training with the use of correct body mechnaics, Energy Conservation techniques, deep breathing technique, safety awareness and fall prevention techniques, one handed dressing technique, increase dynamic sit/ stand balance during functional activity , increase postural control, increase trunk control and increase OOB/ sitting tolerance   Patient agreeable to OT treatment session, upon arrival patient was found supine in bed  Pt participated in bed mobility, sitting EOB tolerance, STS transfers, mobility, and toileting  Please refer to chart for functional levels   Patient requiring frequent re direction, verbal cues for safety, verbal cues for correct technique, verbal cues for pacing thru activity steps, cognitive assistance to anticipate next step, one step directives and frequent rest periods  Patient continues to be functioning below baseline level, occupational performance remains limited secondary to factors listed above and increased risk for falls and injury  From OT standpoint, recommendation at time of d/c would be Short Term Rehab  Patient to benefit from continued Occupational Therapy treatment while in the hospital to address deficits as defined above and maximize level of functional independence with ADLs and functional mobility  Plan   Treatment Interventions ADL retraining;Functional transfer training;UE strengthening/ROM; Endurance training;Cognitive reorientation;Patient/family training;Equipment evaluation/education; Fine motor coordination activities; Compensatory technique education; Activityengagement; Energy conservation   Goal Expiration Date 02/01/20   OT Treatment Day 3   OT Frequency 3-5x/wk   Recommendation   OT Discharge Recommendation Short Term Rehab   OT - OK to Discharge Yes  (when medically cleared)         Don Maldonado MS, OTR/L

## 2020-01-23 NOTE — PHYSICAL THERAPY NOTE
PHYSICAL THERAPY NOTE      Patient Name: Shante HANSONTRYANGELA Date: 1/23/2020 01/23/20 1220   Pain Assessment   Pain Assessment 0-10   Pain Score 7   Pain Type Acute pain   Pain Location Foot   Pain Orientation Right   Hospital Pain Intervention(s) Repositioned; Other (Comment); Distraction; Emotional support   Response to Interventions tolerated   Restrictions/Precautions   Weight Bearing Precautions Per Order No   Other Precautions Impulsive;Cognitive; Chair Alarm; Bed Alarm;Multiple lines;Telemetry;O2;Fall Risk;Pain  (HFNC)   General   Chart Reviewed Yes   Response to Previous Treatment Patient with no complaints from previous session  Family/Caregiver Present No   Cognition   Overall Cognitive Status Impaired   Arousal/Participation Alert   Attention Attends with cues to redirect   Orientation Level Oriented X4   Memory Decreased recall of precautions   Following Commands Follows one step commands with increased time or repetition   Comments Pt requires encouragement to participate in therapy  Pt reports pain in R foot and reports she hasn't put weight on it; however, nursing reports pt performed transfer to chair yesterday   Subjective   Subjective Pt lying supine and agreeable to work w/ therapy   Bed Mobility   Supine to Sit 2  Maximal assistance   Additional items Assist x 2;HOB elevated; Increased time required;Verbal cues;LE management   Sit to Supine Unable to assess   Additional Comments Pt lying supine upon PT arrival  Pt returned sitting OOB in chair at end of PT session w/ chair alarm activated and all needs within reach   Transfers   Sit to Stand 2  Maximal assistance   Additional items Assist x 2; Increased time required;Verbal cues   Stand to Sit 2  Maximal assistance   Additional items Assist x 2; Increased time required;Verbal cues   Stand pivot 2  Maximal assistance   Additional items Assist x 2; Increased time required;Verbal cues   Toilet transfer 2  Maximal assistance   Additional items Assist x 2;Commode; Increased time required;Verbal cues   Additional Comments Pt required MAX encouragement to perform transfers to chair  Pt sat EOB ~3 minutes at supervision  Pt required 3 STS trials before obtaining upright posture 2' pt presenting w/ self-limiting behavior and reporting "I can't do it"  Pt required rocking X3 to perform STS  Pt ambulated 4-5 steps to commode at Max A X2 w/ maximal VCs and TCs for initiation, sequencing, safety, and RW management  Pt repeatedly saying "I can't do it just let me sit" at unsafe moments to sit however pt was able to maintain a standing position w/ RW ~2-3 minutes while performing transfers  After using commode, pt performed a transfer to chair at [address] [place] [address]  Upon standing, pt took 2 steps and then was reporting "I'm going to fall  I'm going to sit down now" Pt instructed that sitting at that moment was not safe and commode was moved to place chair behind patient  Ambulation/Elevation   Gait pattern Excessively slow; Short stride; Shuffling;Decreased foot clearance;Decreased R stance; Improper Weight shift; Foward flexed; Inconsistent parvez   Gait Assistance 2  Maximal assist   Additional items Assist x 2;Verbal cues   Assistive Device Rolling walker   Distance 3ft   Stair Management Assistance Not tested   Balance   Static Sitting Fair -   Dynamic Sitting Poor +   Static Standing Poor   Dynamic Standing Poor -   Ambulatory Poor -   Endurance Deficit   Endurance Deficit Yes   Endurance Deficit Description pain, weakness, fatigue   Activity Tolerance   Activity Tolerance Patient limited by fatigue;Patient limited by pain   Medical Staff Made Aware OT   Nurse Made Aware RN cleared pt for therapy   Assessment   Prognosis Fair   Problem List Decreased strength;Decreased endurance; Impaired balance;Decreased mobility; Decreased coordination;Decreased cognition; Impaired judgement;Decreased safety awareness;Pain   Assessment Pt presents with decreased mobility, strength, balance, and activity tolerance  Pt demonstrated ability to perform bed mobility at [address] [place] [address]  Pt sat EOB ~3 minutes at supervision  Pt required 3 STS trials before obtaining upright posture 2' pt presenting w/ self-limiting behavior and reporting "I can't do it"  Pt required rocking X3 to perform STS  Pt ambulated 4-5 steps to commode at Max A X2 w/ maximal VCs and TCs for initiation, sequencing, safety, and RW management  Pt repeatedly saying "I can't do it just let me sit" at unsafe moments to sit however pt was able to maintain a standing position w/ RW ~2-3 minutes while performing transfers  After using commode, pt performed a transfer to chair at [address] [place] [address]  Upon standing, pt took 2 steps and then was reporting "I'm going to fall  I'm going to sit down now" Pt instructed that sitting at that moment was not safe and commode was moved to place chair behind patient  Pt continues to benefit from skilled therapy to maximize functional independence  Recommendation at this time is rehab  Pt would benefit from continued ambulation, functional transfers, strength, balance, and activity tolerance   Goals   Patient Goals Using the commode   STG Expiration Date 02/05/20   Short Term Goal #2 In addition to previous goals, 6  Pt will demonstrate the ability to perform all functional transfers at [address] in order to maximize functional independence and decrease burden on caregivers  7 Pt will demonstrate the ability to ambulate at least 50ft with least restrictive device at [address] in order to maximize functional independence  PT Treatment Day 1   Plan   Treatment/Interventions Functional transfer training;LE strengthening/ROM; Endurance training;Patient/family training;Equipment eval/education; Bed mobility;Gait training;Spoke to nursing   Progress Slow progress, decreased activity tolerance   PT Frequency   (3-5x/week)   Recommendation Recommendation Post acute IP rehab   Equipment Recommended Walker  (RW)   PT - OK to Discharge Yes   Additional Comments when medically cleared to rehab     Alecia Cueto, PT, DPT

## 2020-01-23 NOTE — PROGRESS NOTES
Daily Progress Note - Critical Care   Allison Fonseca 63 y o  female MRN: [id number]  Unit/Bed#: [facility] 517-01 Encounter: [id number]        ----------------------------------------------------------------------------------------  HPI/24hr events: Patient tolerates HFNC during the day, BiPAP HS  Mental status waxes and wanes with episodes of lethargy   NPO at midnight for right heart catheterization today      ---------------------------------------------------------------------------------------  SUBJECTIVE  Patient doesn't offer complaints        ---------------------------------------------------------------------------------------  Assessment and Plan:    Neuro:    Toxic metabolic encephalopathy vs ICU hypoactive delirium   o CAM ICU   o Sleep wake hygiene  o ABGs without evidence of decompensated respiratory acidosis (baseline hypercapnea)  o Patient does have SAPNA with uremia, possibly contributing  o Palliative care following given end stage disease processes, appreciate recommendations    - Tentative family meeting 1/24    CV:   · Pulmonary hypertension   o Heart failure team following, appreciate recommendations   o Right heart cath this morning  o Continue sildenifil 40 TID  o    Pericardial effusion  o Small to moderate pericardial effusion on Echo 1/15, likely secondary to pulmonary hypertension   o No tamponade physiology    Atrial fibrillation, rate controlled   o Continue eliquis  o Continue digoxin   o Metoprolol        Pulm:   Acute on chronic hypoxemic, hypercapnic respiratory failure  o HFNC during the day, wean as tolerated for goal Sp02>88%  o Pulmonary hygiene  o BiPAP HS 22/10  o ABG 7 49/73/71/55/26 (92%)  o Received diamox yesterday, will likely continue x 4 more doses   o CXR continues to show vascular congestion, patient diuresing well    COPD, not in acute exacerbation   o Continue neb treatments PRN  · RADHA  · Not compliant with CPAP at home  · Continue BiPAP HS    GI:   · No acute issues  · Continue bowel regimen   · No PUD ppx required       :   SAPNA on CKD, improving    Creatinine 1 7 from 1 75 yesterday    Peak creatinine 2 7 on this admission    Baseline creatinine 1 3-1 6   Patient had been on a lasix infusion but is now diuresing well without diuretics other than diamox    -3 7L/24 hours   Continue daily weights   Strict I&Os      F/E/N:    Cardiac diet as tolerated, fluid restriction   goal K>4, Mg>2      Heme/Onc:    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   o Assay: NEGATIVE, resulted 1/23   o Platelets improving, 106 today from 80 yesterday  o Continue eliquis for Afib        Endo:    Type 2 DM2  o SSI coverage  o Goal -180    ID:   o No active issues  o Trend WBC and fever curve       MSK/Skin:    Chronic venous stasis dermatitis of lower extremities  o Local wound care  o OOB as tolerated      Disposition: Continue Critical Care   Code Status: Level 1 - Full Code  ---------------------------------------------------------------------------------------  ICU CORE MEASURES    Prophylaxis   VTE Pharmacologic Prophylaxis: Apixaban (Eliquis)  VTE Mechanical Prophylaxis: sequential compression device  Stress Ulcer Prophylaxis: Prophylaxis Not Indicated     CAM-ICU: Positive    Invasive Devices Review  Invasive Devices     Peripheral Intravenous Line            Peripheral IV 01/20/20 Left Antecubital 3 days    Peripheral IV 01/20/20 Upper;Ventral (anterior); Left Arm 2 days    Peripheral IV 01/22/20 Right Forearm 1 day          Drain            External Urinary Catheter 1 day              Can any invasive devices be discontinued today?  No  ---------------------------------------------------------------------------------------  OBJECTIVE    Vitals   Vitals:    01/23/20 0804 01/23/20 1000 01/23/20 1100 01/23/20 1110   BP:  92/71 (!) 77/45 (!) 85/47   BP Location:       Pulse: 94 100 94 94   Resp:  (!) 26 (!) 27 (!) 25   Temp:       TempSrc:       SpO2:  93% 97% 96%   Weight:       Height:         Temp (24hrs), Av 9 °F (36 6 °C), Min:97 4 °F (36 3 °C), Max:98 4 °F (36 9 °C)  Current: Temperature: 98 3 °F (36 8 °C)    Physical Exam   Constitutional: She appears well-developed and well-nourished  She appears lethargic  She appears ill  HENT:   Head: Normocephalic and atraumatic  Mouth/Throat: No oropharyngeal exudate  Eyes: Pupils are equal, round, and reactive to light  EOM are normal  No scleral icterus  Neck: Normal range of motion  Neck supple  No JVD present  Cardiovascular: Normal rate, regular rhythm, normal heart sounds and intact distal pulses  Exam reveals no gallop and no friction rub  No murmur heard  Pulmonary/Chest: Effort normal  No accessory muscle usage  No tachypnea  No respiratory distress  She has decreased breath sounds  She has no wheezes  She has no rhonchi  Abdominal: Soft  Bowel sounds are normal  She exhibits no distension  There is no tenderness  There is no guarding  Lymphadenopathy:     She has no cervical adenopathy  Neurological: She appears lethargic  GCS eye subscore is 4  GCS verbal subscore is 5  GCS motor subscore is 6  Waxing and waning mentation    Skin: Skin is warm and dry  Capillary refill takes less than 2 seconds  No erythema  Scattered ecchymosis  Lower extremity PVD color changes   Psychiatric: She is not withdrawn           Respiratory:  SpO2: SpO2: 97 %  O2 Flow Rate (L/min): 55 L/min    Invasive/non-invasive ventilation settings   Respiratory    Lab Data (Last 4 hours)    None         O2/Vent Data (Last 4 hours)       0755          Non-Invasive Ventilation Mode HFNC                   Laboratory and Diagnostics:  Results from last 7 days   Lab Units 20  0450 20  0522 20  0014 20  0503 20  0439   WBC Thousand/uL 9 44 7 62 8 05 6 40 4 74   HEMOGLOBIN g/dL 15 0 14 6 15 2 14 1 14 2   HEMATOCRIT % 50 6* 49 2* 50 2* 49 2* 48 4*   PLATELETS Thousands/uL 106* 92* 86* 71* 66*   NEUTROS PCT % 77* 73 76*  --   --    MONOS PCT % 10 11 11  --   --      Results from last 7 days   Lab Units 01/23/20  0450 01/22/20  0522 01/22/20  0014 01/21/20  0550 01/20/20  0503 01/19/20  0439 01/18/20  0510 01/17/20  0619   SODIUM mmol/L 139 140 142 145 144 139 140 142   POTASSIUM mmol/L 3 7 3 4* 3 6 3 8 3 3* 3 1* 4 1 3 7   CHLORIDE mmol/L 91* 90* 90* 96* 96* 98* 98* 98*   CO2 mmol/L >45* >45* >45* >45* >45* 42* 40* 36*   ANION GAP mmol/L  --   --   --   --   --  -1* 2* 8   BUN mg/dL 90* 91* 100* 100* 99* 97* 92* 84*   CREATININE mg/dL 1 70* 1 75* 1 86* 2 00* 2 15* 2 08* 2 02* 2 11*   CALCIUM mg/dL 8 8 8 7 8 6 8 3 8 4 8 1* 8 0* 8 1*   GLUCOSE RANDOM mg/dL 89 86 113 102 115 104 102 106   ALT U/L  --   --  18 13  --   --   --   --    AST U/L  --   --  16 20  --   --   --   --    ALK PHOS U/L  --   --  116 97  --   --   --   --    ALBUMIN g/dL  --   --  2 7* 2 5*  --   --   --   --    TOTAL BILIRUBIN mg/dL  --   --  2 57* 2 29*  --   --   --   --      Results from last 7 days   Lab Units 01/23/20  0450 01/22/20  0014 01/21/20  0550 01/20/20  0503 01/19/20  0439   MAGNESIUM mg/dL 1 9 2 1 1 9 2 1 1 7   PHOSPHORUS mg/dL  --  2 4* 2 8  --  4 1                   ABG:  Results from last 7 days   Lab Units 01/22/20  0614 01/22/20  0017   PH ART  7 492* 7 466*   PCO2 ART mm Hg 73 9* 79 5*   PO2 ART mm Hg 71 3* 87 2   HCO3 ART mmol/L 55 3* 56 0*   BASE EXC ART mmol/L 26 0 26 0   ABG SOURCE   --  Radial, Right     VBG:  Results from last 7 days   Lab Units 01/22/20  0017  01/20/20  1250   PH ELYSIA   --   --  7 387   PCO2 ELYSIA mm Hg  --   --  90 6*   PO2 ELYSIA mm Hg  --   --  35 5   HCO3 ELYSIA mmol/L  --   --  53 2*   BASE EXC ELYSIA mmol/L  --   --  21 8   ABG SOURCE  Radial, Right   < >  --     < > = values in this interval not displayed  Micro        EKG: atrial fibrillation  Imaging: no new imaging    Intake and Output  I/O       01/21 0701 - 01/22 0700 01/22 0701 - 01/23 0700 01/23 0701 - 01/24 0700    P  O  1220     I V  (mL/kg) 17 3 (0 2)      IV Piggyback 100      Total Intake(mL/kg) 117 3 (1 1) 1220 (11 3)     Urine (mL/kg/hr) 1200 4984 (1 9) 200 (0 4)    Emesis/NG output  0     Stool 0 0     Total Output 1200 4984 200    Net -1082 7 -3764 -200           Unmeasured Urine Occurrence   1 x    Unmeasured Stool Occurrence 1 x 1 x           Height and Weights   Height: 5' 4" (162 6 cm)  IBW: 54 7 kg  Body mass index is 40 72 kg/m²  Weight (last 2 days)     Date/Time   Weight    01/22/20 0555   108 (237 22)    01/21/20 2344   107 (236 77)                Nutrition       Diet Orders   (From admission, onward)             Start     Ordered    01/23/20 0939  Diet Cardiovascular; Sodium 2 GM; Fluid Restriction 1500 ML  Diet effective now     Question Answer Comment   Diet Type Cardiovascular    Cardiac Sodium 2 GM    Other Restriction(s): Fluid Restriction 1500 ML    RD to adjust diet per protocol?  Yes        01/23/20 9241                  Active Medications  Scheduled Meds:  Current Facility-Administered Medications:  albuterol 2 5 mg Nebulization Q4H PRN Cleo Schaefer MD   apixaban 5 mg Oral BID William Smith PA-C   calcium gluconate 2 g Intravenous Once MAYNOR Saunders   dextrose 25 mL Intravenous PRN William Smith PA-C   digoxin 125 mcg Oral Daily Stevphen MAYNOR Spears   insulin lispro 1-5 Units Subcutaneous TID AC William Smith PA-C   insulin lispro 1-5 Units Subcutaneous HS William Smith PA-C   ipratropium 0 5 mg Nebulization TID William Smith PA-C   levalbuterol 1 25 mg Nebulization TID William Smith PA-C   magnesium sulfate 1 g Intravenous Once StevMAYNOR Fernandez   metoprolol 5 mg Intravenous Q6H PRN William Smith PA-C   metoprolol tartrate 50 mg Oral Q12H [address] Stevphen Pace, BALJITNP   nicotine 7 mg Transdermal Daily William Smith PA-C   nystatin  Topical BID Heidimarie I Opperman, PA-C   ondansetron 4 mg Intravenous Q6H PRN William Smith PA-C   potassium chloride 40 mEq Oral Once MAYNOR Garcia   QUEtiapine 25 mg Oral QPM William Smith PA-C   sildenafil 40 mg Oral TID William Smith PA-C     Continuous Infusions:     PRN Meds:     albuterol 2 5 mg Q4H PRN   dextrose 25 mL PRN   metoprolol 5 mg Q6H PRN   ondansetron 4 mg Q6H PRN       Allergies   Allergies   Allergen Reactions    Heparin      ---------------------------------------------------------------------------------------  Advance Directive and Living Will:      Power of :    POLST:    ---------------------------------------------------------------------------------------  Counseling / Coordination of Care  Total time spent today 31 minutes  Greater than 50% of total time was spent with the patient and / or family counseling and / or coordination of care  MAYNOR Garcia      Portions of the record may have been created with voice recognition software  Occasional wrong word or "sound a like" substitutions may have occurred due to the inherent limitations of voice recognition software    Read the chart carefully and recognize, using context, where substitutions have occurred

## 2020-01-23 NOTE — PROGRESS NOTES
NEPHROLOGY PROGRESS NOTE   Chandler Albrecht 63 y o  female MRN: [id number]  Unit/Bed#: [facility] 517-01 Encounter: [id number]      ASSESSMENT & PLAN    50-year-old female who originally presented to [facility] with shortness of breath  She has a history of diastolic congestive heart failure and pulmonary hypertension, COPD, and stage 3 chronic kidney disease with a baseline creatinine of 1 3-1 6, type 2 diabetes hypertension atrial fibrillation      1  Acute kidney injury present on admission suspecting cardiorenal syndrome/volume overload plus relative hypotension with previous Ace inhibition  -creatinine improved with Lasix drip, now with alkalosis Lasix drip on hold  -receiving acetazolamide 250 mg, would give a total of 4 doses, discussed with ICU team   -scheduled to get a right heart catheterization and further diuresis can be adjusted depending on clinical outcome  -overall from a renal standpoint stable  -urine output has been acceptable  -urinalysis from January 11 does show 1+ proteinuria and a specific gravity of 1 025     2  Electrolytes:  -hypokalemia in the setting of aggressive diuresis-continue repletion  -otherwise electrolytes stable     3  Acid/Base  -metabolic alkalosis/respiratory acidosis  -pH increased to 7 49  -continue acetazolamide 250 mg IV BID x4     4  BP/HR/cardiovascular  -chronic hypertension in the setting of chronic kidney disease-now mildly hypotensive, will monitor with diuresis remains on metoprolol 50 mg every 12 hours  -pulmonary hypertension-on sildenafil  -atrial fibrillation-on digoxin and metoprolol for rate control, Eliquis for anticoagulation     5  Volume overload in the setting of cardiomyopathy, acute kidney injury on CKD  -clinically more euvolemic but still on high-flow nasal can  -monitor respiratory status  -right heart catheterization might be done     6  Anemia of chronic kidney disease  -hemoglobins remained stable     7   MBD  -hypophosphatemia  -10 check PTH as an outpatient     8   Health Maintanance/Risk Reduction  -moderate glycemic control  -continue blood pressure control  -for right heart catheterization today    SUBJECTIVE:    Overall stable  More alert today  No cp, sob improving  Urine output robust  ROS otherwise negative    OBJECTIVE:  Current Weight: Weight - Scale: 108 kg (237 lb 3 4 oz)  Vitals:    01/23/20 1000   BP: 92/71   Pulse: 100   Resp: (!) 26   Temp:    SpO2: 93%       Intake/Output Summary (Last 24 hours) at 1/23/2020 1057  Last data filed at 1/23/2020 1000  Gross per 24 hour   Intake 640 ml   Output 4384 ml   Net -3744 ml     Weight (last 2 days)     Date/Time   Weight    01/22/20 0555   108 (237 22)    01/21/20 2344   107 (236 77)              General: conscious, cooperative, in not acute distress  Eyes: conjunctivae pink, anicteric sclerae  ENT: lips and mucous membranes moist, HFNC in place  Neck: supple, no JVD  Chest: clear breath sounds bilateral, no crackles, ronchus or wheezings  CVS: distinct S1 & S2, normal rate, regular rhythm  Abdomen: soft, non-tender, non-distended, normoactive bowel sounds  Extremities: no edema of both legs  Skin: no rash  Neuro: awake, alert, oriented      Medications:    Current Facility-Administered Medications:     albuterol inhalation solution 2 5 mg, 2 5 mg, Nebulization, Q4H PRN, Basilia Sibley MD    apixaban (ELIQUIS) tablet 5 mg, 5 mg, Oral, BID, William Smith PA-C, 5 mg at 01/23/20 0802    dextrose 50 % IV solution 25 mL, 25 mL, Intravenous, PRN, William Smith PA-C, 25 mL at 01/23/20 0758    digoxin (LANOXIN) tablet 125 mcg, 125 mcg, Oral, Daily, MAYNOR Baldwin, 125 mcg at 01/23/20 0804    insulin lispro (HumaLOG) 100 units/mL subcutaneous injection 1-5 Units, 1-5 Units, Subcutaneous, TID AC, 1 Units at 01/22/20 0902 **AND** Fingerstick Glucose (POCT), , , TID AC, William Smith PA-C    insulin lispro (HumaLOG) 100 units/mL subcutaneous injection 1-5 Units, 1-5 Units, Subcutaneous, HS, William Smith PA-C    ipratropium (ATROVENT) 0 02 % inhalation solution 0 5 mg, 0 5 mg, Nebulization, TID, William Smith PA-C, 0 5 mg at 01/23/20 0755    levalbuterol (XOPENEX) inhalation solution 1 25 mg, 1 25 mg, Nebulization, TID, William Smith PA-C, 1 25 mg at 01/23/20 0755    metoprolol (LOPRESSOR) injection 5 mg, 5 mg, Intravenous, Q6H PRN, William Smith PA-C    metoprolol tartrate (LOPRESSOR) tablet 50 mg, 50 mg, Oral, Q12H [facility] Derian, CRNP, 50 mg at 01/22/20 2140    nicotine (NICODERM CQ) 7 mg/24hr TD 24 hr patch 7 mg, 7 mg, Transdermal, Daily, William Smith PA-C, 7 mg at 01/23/20 0800    nystatin (MYCOSTATIN) powder, , Topical, BID, William Smith PA-C    ondansetron (ZOFRAN) injection 4 mg, 4 mg, Intravenous, Q6H PRN, William Smith PA-C    QUEtiapine (SEROquel) tablet 25 mg, 25 mg, Oral, QPM, William Smith PA-C, 25 mg at 01/22/20 2140    sildenafil (REVATIO) tablet 40 mg, 40 mg, Oral, TID, William Smith PA-C, 40 mg at 01/23/20 0801    Invasive Devices:      Lab Results:   Results from last 7 days   Lab Units 01/23/20  0450 01/22/20  0522 01/22/20  0014 01/21/20  0550 01/20/20  0503 01/19/20  0439   WBC Thousand/uL 9 44 7 62 8 05  --  6 40 4 74   HEMOGLOBIN g/dL 15 0 14 6 15 2  --  14 1 14 2   HEMATOCRIT % 50 6* 49 2* 50 2*  --  49 2* 48 4*   PLATELETS Thousands/uL 106* 92* 86*  --  71* 66*   POTASSIUM mmol/L 3 7 3 4* 3 6 3 8 3 3* 3 1*   CHLORIDE mmol/L 91* 90* 90* 96* 96* 98*   CO2 mmol/L >45* >45* >45* >45* >45* 42*   BUN mg/dL 90* 91* 100* 100* 99* 97*   CREATININE mg/dL 1 70* 1 75* 1 86* 2 00* 2 15* 2 08*   CALCIUM mg/dL 8 8 8 7 8 6 8 3 8 4 8 1*   MAGNESIUM mg/dL 1 9  --  2 1 1 9 2 1 1 7   PHOSPHORUS mg/dL  --   --  2 4* 2 8  --  4 1   ALK PHOS U/L  --   --  116 97  --   --    ALT U/L  --   --  18 13  --   --    AST U/L  --   --  16 20  --   --        Previous work up: please see previous notes

## 2020-01-24 LAB
ALBUMIN SERPL BCP-MCNC: 2.7 G/DL (ref 3.5–5)
ALP SERPL-CCNC: 109 U/L (ref 46–116)
ALT SERPL W P-5'-P-CCNC: 18 U/L (ref 12–78)
ANION GAP SERPL CALCULATED.3IONS-SCNC: 3 MMOL/L (ref 4–13)
ANION GAP SERPL CALCULATED.3IONS-SCNC: 3 MMOL/L (ref 4–13)
ARTERIAL PATENCY WRIST A: YES
AST SERPL W P-5'-P-CCNC: 15 U/L (ref 5–45)
ATRIAL RATE: 101 BPM
BASE EXCESS BLDA CALC-SCNC: 20.5 MMOL/L
BILIRUB SERPL-MCNC: 2.41 MG/DL (ref 0.2–1)
BUN SERPL-MCNC: 89 MG/DL (ref 5–25)
BUN SERPL-MCNC: 90 MG/DL (ref 5–25)
CALCIUM SERPL-MCNC: 9.1 MG/DL (ref 8.3–10.1)
CALCIUM SERPL-MCNC: 9.2 MG/DL (ref 8.3–10.1)
CHLORIDE SERPL-SCNC: 96 MMOL/L (ref 100–108)
CHLORIDE SERPL-SCNC: 99 MMOL/L (ref 100–108)
CO2 SERPL-SCNC: 42 MMOL/L (ref 21–32)
CO2 SERPL-SCNC: 43 MMOL/L (ref 21–32)
CREAT SERPL-MCNC: 1.71 MG/DL (ref 0.6–1.3)
CREAT SERPL-MCNC: 1.84 MG/DL (ref 0.6–1.3)
ERYTHROCYTE [DISTWIDTH] IN BLOOD BY AUTOMATED COUNT: 18.1 % (ref 11.6–15.1)
GFR SERPL CREATININE-BSD FRML MDRD: 30 ML/MIN/1.73SQ M
GFR SERPL CREATININE-BSD FRML MDRD: 33 ML/MIN/1.73SQ M
GLUCOSE SERPL-MCNC: 122 MG/DL (ref 65–140)
GLUCOSE SERPL-MCNC: 167 MG/DL (ref 65–140)
GLUCOSE SERPL-MCNC: 171 MG/DL (ref 65–140)
GLUCOSE SERPL-MCNC: 259 MG/DL (ref 65–140)
GLUCOSE SERPL-MCNC: 82 MG/DL (ref 65–140)
GLUCOSE SERPL-MCNC: 82 MG/DL (ref 65–140)
HCO3 BLDA-SCNC: 49.3 MMOL/L (ref 22–28)
HCT VFR BLD AUTO: 48.3 % (ref 34.8–46.1)
HGB BLD-MCNC: 14.1 G/DL (ref 11.5–15.4)
MAGNESIUM SERPL-MCNC: 2.3 MG/DL (ref 1.6–2.6)
MCH RBC QN AUTO: 29.7 PG (ref 26.8–34.3)
MCHC RBC AUTO-ENTMCNC: 29.2 G/DL (ref 31.4–37.4)
MCV RBC AUTO: 102 FL (ref 82–98)
NON VENT HFNC FIO2: 12
NON VENT TYPE HFNC: ABNORMAL
O2 CT BLDA-SCNC: 17.6 ML/DL (ref 16–23)
OXYHGB MFR BLDA: 84.2 % (ref 94–97)
PCO2 BLDA: 73.1 MM HG (ref 36–44)
PH BLDA: 7.45 [PH] (ref 7.35–7.45)
PLATELET # BLD AUTO: 128 THOUSANDS/UL (ref 149–390)
PMV BLD AUTO: 12.5 FL (ref 8.9–12.7)
PO2 BLDA: 49.7 MM HG (ref 75–129)
POTASSIUM SERPL-SCNC: 3.9 MMOL/L (ref 3.5–5.3)
POTASSIUM SERPL-SCNC: 4 MMOL/L (ref 3.5–5.3)
PROT SERPL-MCNC: 6.7 G/DL (ref 6.4–8.2)
QRS AXIS: 147 DEGREES
QRSD INTERVAL: 92 MS
QT INTERVAL: 333 MS
QTC INTERVAL: 432 MS
RBC # BLD AUTO: 4.74 MILLION/UL (ref 3.81–5.12)
SODIUM SERPL-SCNC: 142 MMOL/L (ref 136–145)
SODIUM SERPL-SCNC: 144 MMOL/L (ref 136–145)
SPECIMEN SOURCE: ABNORMAL
T WAVE AXIS: 36 DEGREES
VENTRICULAR RATE: 101 BPM
WBC # BLD AUTO: 8.28 THOUSAND/UL (ref 4.31–10.16)

## 2020-01-24 PROCEDURE — 94660 CPAP INITIATION&MGMT: CPT

## 2020-01-24 PROCEDURE — 93010 ELECTROCARDIOGRAM REPORT: CPT | Performed by: INTERNAL MEDICINE

## 2020-01-24 PROCEDURE — 80053 COMPREHEN METABOLIC PANEL: CPT | Performed by: NURSE PRACTITIONER

## 2020-01-24 PROCEDURE — 99233 SBSQ HOSP IP/OBS HIGH 50: CPT | Performed by: NURSE PRACTITIONER

## 2020-01-24 PROCEDURE — 93005 ELECTROCARDIOGRAM TRACING: CPT

## 2020-01-24 PROCEDURE — 82948 REAGENT STRIP/BLOOD GLUCOSE: CPT

## 2020-01-24 PROCEDURE — 80048 BASIC METABOLIC PNL TOTAL CA: CPT | Performed by: NURSE PRACTITIONER

## 2020-01-24 PROCEDURE — 99232 SBSQ HOSP IP/OBS MODERATE 35: CPT | Performed by: INTERNAL MEDICINE

## 2020-01-24 PROCEDURE — 94640 AIRWAY INHALATION TREATMENT: CPT

## 2020-01-24 PROCEDURE — 85027 COMPLETE CBC AUTOMATED: CPT | Performed by: NURSE PRACTITIONER

## 2020-01-24 PROCEDURE — 83735 ASSAY OF MAGNESIUM: CPT | Performed by: NURSE PRACTITIONER

## 2020-01-24 PROCEDURE — 82805 BLOOD GASES W/O2 SATURATION: CPT | Performed by: NURSE PRACTITIONER

## 2020-01-24 PROCEDURE — 94760 N-INVAS EAR/PLS OXIMETRY 1: CPT

## 2020-01-24 RX ORDER — ECHINACEA PURPUREA EXTRACT 125 MG
1 TABLET ORAL EVERY 2 HOUR PRN
Status: DISCONTINUED | OUTPATIENT
Start: 2020-01-24 | End: 2020-01-30 | Stop reason: HOSPADM

## 2020-01-24 RX ADMIN — IPRATROPIUM BROMIDE 0.5 MG: 0.5 SOLUTION RESPIRATORY (INHALATION) at 19:25

## 2020-01-24 RX ADMIN — WATER 10 ML: 1 INJECTION INTRAMUSCULAR; INTRAVENOUS; SUBCUTANEOUS at 21:18

## 2020-01-24 RX ADMIN — METOPROLOL TARTRATE 50 MG: 50 TABLET, FILM COATED ORAL at 08:10

## 2020-01-24 RX ADMIN — IPRATROPIUM BROMIDE 0.5 MG: 0.5 SOLUTION RESPIRATORY (INHALATION) at 15:00

## 2020-01-24 RX ADMIN — IPRATROPIUM BROMIDE 0.5 MG: 0.5 SOLUTION RESPIRATORY (INHALATION) at 07:59

## 2020-01-24 RX ADMIN — SILDENAFIL 40 MG: 20 TABLET ORAL at 16:59

## 2020-01-24 RX ADMIN — APIXABAN 5 MG: 5 TABLET, FILM COATED ORAL at 08:10

## 2020-01-24 RX ADMIN — INSULIN LISPRO 1 UNITS: 100 INJECTION, SOLUTION INTRAVENOUS; SUBCUTANEOUS at 12:04

## 2020-01-24 RX ADMIN — NICOTINE 7 MG: 7 PATCH TRANSDERMAL at 08:09

## 2020-01-24 RX ADMIN — ACETAZOLAMIDE 250 MG: 500 INJECTION, POWDER, LYOPHILIZED, FOR SOLUTION INTRAVENOUS at 10:55

## 2020-01-24 RX ADMIN — LEVALBUTEROL HYDROCHLORIDE 1.25 MG: 1.25 SOLUTION, CONCENTRATE RESPIRATORY (INHALATION) at 07:59

## 2020-01-24 RX ADMIN — NYSTATIN: 100000 POWDER TOPICAL at 09:00

## 2020-01-24 RX ADMIN — ACETAZOLAMIDE 250 MG: 500 INJECTION, POWDER, LYOPHILIZED, FOR SOLUTION INTRAVENOUS at 21:18

## 2020-01-24 RX ADMIN — QUETIAPINE FUMARATE 25 MG: 25 TABLET ORAL at 21:07

## 2020-01-24 RX ADMIN — APIXABAN 5 MG: 5 TABLET, FILM COATED ORAL at 17:31

## 2020-01-24 RX ADMIN — INSULIN LISPRO 2 UNITS: 100 INJECTION, SOLUTION INTRAVENOUS; SUBCUTANEOUS at 22:51

## 2020-01-24 RX ADMIN — SILDENAFIL 40 MG: 20 TABLET ORAL at 21:07

## 2020-01-24 RX ADMIN — SILDENAFIL 40 MG: 20 TABLET ORAL at 08:10

## 2020-01-24 RX ADMIN — LEVALBUTEROL HYDROCHLORIDE 1.25 MG: 1.25 SOLUTION, CONCENTRATE RESPIRATORY (INHALATION) at 19:25

## 2020-01-24 RX ADMIN — DIGOXIN 125 MCG: 125 TABLET ORAL at 08:10

## 2020-01-24 RX ADMIN — NYSTATIN: 100000 POWDER TOPICAL at 17:30

## 2020-01-24 RX ADMIN — LEVALBUTEROL HYDROCHLORIDE 1.25 MG: 1.25 SOLUTION, CONCENTRATE RESPIRATORY (INHALATION) at 15:00

## 2020-01-24 NOTE — PROGRESS NOTES
Cardiology Progress Note - Jose L Bowers 63 y o  female MRN: [id number]    Unit/Bed#: [facility] 517-01 Encounter: [id number]      Assessment & Plan:  Principal Problem:    Acute on chronic respiratory failure with hypoxia and hypercapnia (HCC)  -on chronic home O2  -CO2 persistently in the 70s on ABG  -started on Diamox, bicarb decreased on BMP to 43 today    HFpEF, LVEF 58%, LVIDd 5 3, RVIDd 4 8  - on torsemide 20 mg b i d  prior to admission  -down 58 lb from admission per chart documentation  [facility] 1/23 on sildenafil 40tid: RA: 8 mmHg, PA: 44/26/33 mmHg, PCWP: 9 mmHg, PA sat 67%, CO 5 3 l/min, PVR 4 5 DRUMMOND  - No further diuresis needed currently    Pulmonary hypertension  -likely WHO group 2 (diastolic HF) and group 3 (COPD/OHS), with phenotypic expression of group 1  -started on sildenafil this admission, currently on 40 mg t i d  Severe COPD/OHS  -per documentation requires 10 L nasal cannula home  -requiring BiPAP at night and high-flow nasal cannula during the day currently    Permanent atrial fibrillation  -anticoagulant Eliquis 5 mg b i d   -currently rate controlled on Lopressor 50 mg b i d  And digoxin 250 mcg daily  -patient is on metoprolol and Cardizem at home rate control normally    Type 2 diabetes mellitus without complication (HCC)    SAPNA (acute kidney injury) ([facility] [address] )    Acute on chronic diastolic congestive heart failure (HCC)    Pericardial effusion    Pulmonary hypertension (HCC)      Subjective:   Patient seen and examined  No significant events overnight  Patient is more alert and awake this morning a breakfast   She reports having some pain in her feet but otherwise reports feeling well  Also reports improvement in her breathing  Denies chest pain, abdominal pain, nausea, vomiting, fever, chills, headache, dizziness or other cardiac symptoms  Objective:     Vitals: Blood pressure 116/72, pulse (!) 127, temperature 98 °F (36 7 °C), temperature source Oral, resp   rate (!) 27, height 5' 4" (1 626 m), weight 101 kg (223 lb 12 3 oz), SpO2 98 %  , Body mass index is 38 41 kg/m² ,   Orthostatic Blood Pressures      Most Recent Value   Blood Pressure  116/72 filed at 01/24/2020 0810   Patient Position - Orthostatic VS  Lying filed at 01/23/2020 9461            Intake/Output Summary (Last 24 hours) at 1/24/2020 0850  Last data filed at 1/24/2020 0518  Gross per 24 hour   Intake 800 ml   Output 1576 ml   Net -776 ml           Physical Exam:    GEN: Earl Dobbs appears well, alert and oriented x 3, pleasant and cooperative   HEENT: anicteric, mucous membranes moist  NECK: + JVD, no carotid bruits   HEART: regular rhythm, normal S1 and S2, + systolic murmur   LUNGS: clear to auscultation bilaterally  ABDOMEN: normal bowel sounds, soft, no tenderness, no distention  EXTREMITIES: peripheral pulses normal; no clubbing, cyanosis, or edema  NEURO: no focal findings   SKIN: normal without suspicious lesions on exposed skin      Current Facility-Administered Medications:     acetaZOLAMIDE (DIAMOX) injection 250 mg, 250 mg, Intravenous, Q12H NEREYDA, MAYNOR Merlos, 250 mg at 01/23/20 1515    albuterol inhalation solution 2 5 mg, 2 5 mg, Nebulization, Q4H PRN, Makenna Marr MD    apixaban (ELIQUIS) tablet 5 mg, 5 mg, Oral, BID, William Smith PA-C, 5 mg at 01/24/20 0810    dextrose 50 % IV solution 25 mL, 25 mL, Intravenous, PRN, William Smith PA-C, 25 mL at 01/23/20 0758    digoxin (LANOXIN) tablet 125 mcg, 125 mcg, Oral, Daily, MAYNOR Quiroz, 125 mcg at 01/24/20 0810    insulin lispro (HumaLOG) 100 units/mL subcutaneous injection 1-5 Units, 1-5 Units, Subcutaneous, TID AC, 1 Units at 01/22/20 0902 **AND** Fingerstick Glucose (POCT), , , TID AC, William Smith PA-C    insulin lispro (HumaLOG) 100 units/mL subcutaneous injection 1-5 Units, 1-5 Units, Subcutaneous, HS, William Smith PA-C    ipratropium (ATROVENT) 0 02 % inhalation solution 0 5 mg, 0 5 mg, Nebulization, TID, William Smith PA-C, 0 5 mg at 01/24/20 0759    levalbuterol ([place]) inhalation solution 1 25 mg, 1 25 mg, Nebulization, TID, William Smith PA-C, 1 25 mg at 01/24/20 0759    metoprolol (LOPRESSOR) injection 5 mg, 5 mg, Intravenous, Q6H PRN, William Smith PA-C    metoprolol tartrate (LOPRESSOR) tablet 50 mg, 50 mg, Oral, Q12H [address], Kathlean Lob, CRNP, 50 mg at 01/24/20 0810    nicotine (NICODERM CQ) 7 mg/24hr TD 24 hr patch 7 mg, 7 mg, Transdermal, Daily, William Smith PA-C, 7 mg at 01/24/20 0809    nystatin (MYCOSTATIN) powder, , Topical, BID, William Smith PA-C    ondansetron (ZOFRAN) injection 4 mg, 4 mg, Intravenous, Q6H PRN, William Smith PA-C    QUEtiapine (SEROquel) tablet 25 mg, 25 mg, Oral, QPM, William Smith PA-C, 25 mg at 01/23/20 2113    sildenafil (REVATIO) tablet 40 mg, 40 mg, Oral, TID, William Smith PA-C, 40 mg at 01/24/20 0810    Labs & Results:    Lab Results   Component Value Date    TROPONINI 0 07 (H) 01/06/2020    TROPONINI 0 09 (H) 02/08/2019       Lab Results   Component Value Date    GLUCOSE 95 01/10/2020    CALCIUM 9 2 01/24/2020     05/20/2015    K 4 0 01/24/2020    CO2 43 (H) 01/24/2020    CL 96 (L) 01/24/2020    BUN 90 (H) 01/24/2020    CREATININE 1 71 (H) 01/24/2020       Lab Results   Component Value Date    WBC 8 28 01/24/2020    HGB 14 1 01/24/2020    HCT 48 3 (H) 01/24/2020     (H) 01/24/2020     (L) 01/24/2020           No results found for: CHOL  Lab Results   Component Value Date    HDL 32 (L) 02/09/2019    HDL 36 (L) 02/13/2018     Lab Results   Component Value Date    LDLCALC 32 02/09/2019    LDLCALC 86 02/13/2018     Lab Results   Component Value Date    TRIG 61 02/09/2019    TRIG 93 02/13/2018       Lab Results   Component Value Date    ALT 18 01/22/2020    AST 16 01/22/2020         EKG personally reviewed by )Kaylan Aquino MD  No acute changes TELE: no acute events overnight

## 2020-01-24 NOTE — PROGRESS NOTES
Daily Progress Note - Critical Care   Chandler Albrecht 63 y o  female MRN: [id number]  Unit/Bed#: [facility] 517-01 Encounter: [id number]        ----------------------------------------------------------------------------------------  HPI/24hr events:  60-year-old female with acute on chronic respiratory failure with hypoxia hypercapnia secondary to acute on chronic CHF and COPD exacerbation  Initially admitted to [facility] for 15 days for management of COPD exacerbation during which time echocardiogram was completed and found to have severe pulmonary hypertension  History transferred to [facility] for heart failure service consultation  Currently maintained on high-flow nasal cannula during the day BiPAP at night for goal SpO2 88%    Right heart catheterization completed 1/23/2020 - mild to moderate pulmonary hypertension  Maintain sldenafil 40 mg TID    ---------------------------------------------------------------------------------------  SUBJECTIVE  No acute overnight events  Offers no complaints  Denies chest pain/tendernes, SOB, abdominal pain, nausea or vomiting  Reports that she slept well and has appropriate appetite  Review of Systems   Constitutional: Negative  Respiratory: Negative for apnea, cough, choking, chest tightness, shortness of breath (Exertional), wheezing and stridor  Cardiovascular: Negative  Negative for chest pain, palpitations and leg swelling  Gastrointestinal: Negative  Musculoskeletal: Negative  Skin: Negative  All other systems reviewed and are negative  Review of systems was reviewed and negative unless stated above in HPI/24-hour events   ---------------------------------------------------------------------------------------  Assessment and Plan:    Neuro:   · Toxic metabolic encephalopathy vs ICU hypoactive delirium   ? Improving   ? CAM ICU   ? Promote sleep wake hygiene  ?  ABGs without evidence of decompensated respiratory acidosis (baseline hypercapnea)  ? Patient does have SAPNA with uremia, possibly contributing  ? Palliative care following given end stage disease processes, appreciate recommendations    § Tentative family meeting with palliative today  CV:   · Pulmonary hypertension   ? Heart failure team following, appreciate recommendations   ? S/p Right heart cath 1/23/2020 -  mild to moderate pulmonary hypertension  ? Continue sildenifil 40 TID  · Pericardial effusion  ? Small to moderate pericardial effusion on Echo 1/15, likely secondary to pulmonary hypertension   ? No tamponade physiology   · Atrial fibrillation, rate controlled   ? Continue eliquis  ? Continue digoxin   ? Metoprolol      Pulm:  · Acute on chronic hypoxemic, hypercapnic respiratory failure  ? HFNC during the day, wean as tolerated for goal Sp02>88%  ? Pulmonary hygiene  ? BiPAP HS 22/10  ? CO2 43 from 45 - continue acetazolamide 250 mg IV BID bicarb is improved  ? ABG now   ? CXR continues to show vascular congestion, patient diuresing well   · COPD, not in acute exacerbation   ? Continue neb treatments PRN  · RADHA  ? Not compliant with CPAP at home  ? 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 Assay: NEGATIVE, resulted 1/23   ? Platelets improving, 128 today from 106 yesterday  ? Continue eliquis for Afib       Endo:   · Type 2 DM2  ? SSI coverage  ? Goal -180  ID:   ? No active issues  ? Trend WBC and fever curve      MSK/Skin:   · Chronic venous stasis dermatitis of lower extremities  ?  Local wound care  ? OOB as tolerated        Disposition: Continue Critical Care  with goal to continue to optimize respiratory status and manage metabolic alkalosis/respiratory acidosis    Code Status: Level 1 - Full Code  ---------------------------------------------------------------------------------------  ICU CORE MEASURES    Prophylaxis   VTE Pharmacologic Prophylaxis: Apixaban (Eliquis)  VTE Mechanical Prophylaxis: sequential compression device  Stress Ulcer Prophylaxis: Prophylaxis Not Indicated     ABCDE Protocol (if indicated)  Plan to perform spontaneous awakening trial today? Not applicable  Plan to perform spontaneous breathing trial today? Not applicable  Obvious barriers to extubation? Not applicable  CAM-ICU: Negative    Invasive Devices Review  Invasive Devices     Peripheral Intravenous Line            Peripheral IV 20 Right Forearm 2 days          Drain            External Urinary Catheter 2 days              Can any invasive devices be discontinued today? No  ---------------------------------------------------------------------------------------  OBJECTIVE    Vitals   Vitals:    20 1018 20 1030 20 1047 20 1100   BP:    91/59   Pulse:    84   Resp:    (!) 27   Temp:   97 9 °F (36 6 °C)    TempSrc:   Oral    SpO2: (!) 72% 94%  95%   Weight:       Height:         Temp (24hrs), Av 5 °F (36 9 °C), Min:97 9 °F (36 6 °C), Max:99 2 °F (37 3 °C)  Current: Temperature: 97 9 °F (36 6 °C)  HR: 86  BP: 91/52  RR: 19  SpO2: 97%    Physical Exam   Constitutional: She is oriented to person, place, and time  She appears well-developed and well-nourished  She does not appear ill  HENT:   Head: Normocephalic  Mouth/Throat: Oropharynx is clear and moist    Eyes: Pupils are equal, round, and reactive to light  Conjunctivae are normal  Right eye exhibits no discharge  Left eye exhibits no discharge  Neck: Neck supple  No JVD present     Cardiovascular: Normal rate, normal heart sounds and intact distal pulses  Exam reveals no gallop and no friction rub  Pulses:       Radial pulses are 2+ on the right side, and 2+ on the left side  Dorsalis pedis pulses are 1+ on the right side, and 1+ on the left side  Pulmonary/Chest: Effort normal  No stridor  No respiratory distress  She has decreased breath sounds in the right lower field and the left lower field  She has no wheezes  Abdominal: Soft  Bowel sounds are normal    Musculoskeletal: Normal range of motion  She exhibits no edema, tenderness or deformity  Neurological: She is alert and oriented to person, place, and time  No cranial nerve deficit  Skin: Skin is warm and dry  Capillary refill takes less than 2 seconds  She is not diaphoretic  No erythema  Chronic lower extremity venous stasis   Psychiatric: She has a normal mood and affect  Nursing note and vitals reviewed          Respiratory:  SpO2: SpO2: 91 %, SpO2 Activity: SpO2 Activity: At Rest, SpO2 Device: O2 Device: High flow nasal cannula  O2 Flow Rate (L/min): 15 L/min    Invasive/non-invasive ventilation settings   Respiratory    Lab Data (Last 4 hours)    None         O2/Vent Data (Last 4 hours)      01/24 0759          Non-Invasive Ventilation Mode HFNC                   Laboratory and Diagnostics:  Results from last 7 days   Lab Units 01/24/20  0437 01/23/20  0450 01/22/20  0522 01/22/20  0014 01/20/20  0503 01/19/20  0439   WBC Thousand/uL 8 28 9 44 7 62 8 05 6 40 4 74   HEMOGLOBIN g/dL 14 1 15 0 14 6 15 2 14 1 14 2   HEMATOCRIT % 48 3* 50 6* 49 2* 50 2* 49 2* 48 4*   PLATELETS Thousands/uL 128* 106* 92* 86* 71* 66*   NEUTROS PCT %  --  77* 73 76*  --   --    MONOS PCT %  --  10 11 11  --   --      Results from last 7 days   Lab Units 01/24/20  0437 01/23/20  0450 01/22/20  0522 01/22/20  0014 01/21/20  0550 01/20/20  0503 01/19/20  0439 01/18/20  0510   SODIUM mmol/L 142 139 140 142 145 144 139 140   POTASSIUM mmol/L 4 0 3 7 3 4* 3 6 3 8 3 3* 3 1* 4 1   CHLORIDE mmol/L 96* 91* 90* 90* 96* 96* 98* 98*   CO2 mmol/L 43* >45* >45* >45* >45* >45* 42* 40*   ANION GAP mmol/L 3*  --   --   --   --   --  -1* 2*   BUN mg/dL 90* 90* 91* 100* 100* 99* 97* 92*   CREATININE mg/dL 1 71* 1 70* 1 75* 1 86* 2 00* 2 15* 2 08* 2 02*   CALCIUM mg/dL 9 2 8 8 8 7 8 6 8 3 8 4 8 1* 8 0*   GLUCOSE RANDOM mg/dL 82 89 86 113 102 115 104 102   ALT U/L  --   --   --  18 13  --   --   --    AST U/L  --   --   --  16 20  --   --   --    ALK PHOS U/L  --   --   --  116 97  --   --   --    ALBUMIN g/dL  --   --   --  2 7* 2 5*  --   --   --    TOTAL BILIRUBIN mg/dL  --   --   --  2 57* 2 29*  --   --   --      Results from last 7 days   Lab Units 01/24/20  0437 01/23/20  0450 01/22/20  0014 01/21/20  0550 01/20/20  0503 01/19/20  0439   MAGNESIUM mg/dL 2 3 1 9 2 1 1 9 2 1 1 7   PHOSPHORUS mg/dL  --   --  2 4* 2 8  --  4 1                   ABG:  Results from last 7 days   Lab Units 01/22/20  0614 01/22/20  0017   PH ART  7 492* 7 466*   PCO2 ART mm Hg 73 9* 79 5*   PO2 ART mm Hg 71 3* 87 2   HCO3 ART mmol/L 55 3* 56 0*   BASE EXC ART mmol/L 26 0 26 0   ABG SOURCE   --  Radial, Right     VBG:  Results from last 7 days   Lab Units 01/22/20  0017  01/20/20  1250   PH ELYSIA   --   --  7 387   PCO2 ELYSIA mm Hg  --   --  90 6*   PO2 ELYSIA mm Hg  --   --  35 5   HCO3 ELYSIA mmol/L  --   --  53 2*   BASE EXC ELYSIA mmol/L  --   --  21 8   ABG SOURCE  Radial, Right   < >  --     < > = values in this interval not displayed  Micro        EKG:atrial fibrillation   Imaging:   Xr Chest Portable    Result Date: 1/22/2020  Impression: Evidence of CHF is slightly worse than the prior study  Workstation performed: [id number]    I have personally reviewed pertinent reports  Intake and Output  I/O       01/22 0701 - 01/23 0700 01/23 0701 - 01/24 0700 01/24 0701 - 01/25 0700    P  O  1220 600 420    I V  (mL/kg)       IV Piggyback  200     Total Intake(mL/kg) 1220 (11 3) 800 (7 9) 420 (4 2)    Urine (mL/kg/hr) 4984 (1 9) 1576 (0 7) 200 (0 5)    Emesis/NG output 0      Stool 0 0     Total Output 4984 1576 200    Net -3764 -776 +220           Unmeasured Urine Occurrence  3 x 1 x    Unmeasured Stool Occurrence 1 x 1 x         UOP: 100-150 ml/hr     Height and Weights   Height: 5' 4" (162 6 cm)  IBW: 54 7 kg  Body mass index is 38 41 kg/m²  Weight (last 2 days)     Date/Time   Weight    01/24/20 0517   101 (223 77)    01/22/20 0555   108 (237 22)                Nutrition       Diet Orders   (From admission, onward)             Start     Ordered    01/23/20 0939  Diet Cardiovascular; Sodium 2 GM; Fluid Restriction 1500 ML  Diet effective now     Question Answer Comment   Diet Type Cardiovascular    Cardiac Sodium 2 GM    Other Restriction(s): Fluid Restriction 1500 ML    RD to adjust diet per protocol?  Yes        01/23/20 5441                   Active Medications  Scheduled Meds:  Current Facility-Administered Medications:  acetaZOLAMIDE 250 mg Intravenous Q12H [facility] MAYNOR Merlos   albuterol 2 5 mg Nebulization Q4H PRN Neftaly Gonzales MD   apixaban 5 mg Oral BID William Smith PA-C   dextrose 25 mL Intravenous PRN William Smith PA-C   digoxin 125 mcg Oral Daily MAYNOR Pablo   insulin lispro 1-5 Units Subcutaneous TID AC William Smith PA-C   insulin lispro 1-5 Units Subcutaneous HS William Smith PA-C   ipratropium 0 5 mg Nebulization TID William Smith PA-C   levalbuterol 1 25 mg Nebulization TID William Smith PA-C   metoprolol 5 mg Intravenous Q6H PRN William Smith PA-C   metoprolol tartrate 50 mg Oral Q12H [facility] MAYNOR Pablo   nicotine 7 mg Transdermal Daily William Smith PA-C   nystatin  Topical BID William Smith PA-C   ondansetron 4 mg Intravenous Q6H PRN William Smith PA-C   QUEtiapine 25 mg Oral QPM William Smith PA-C   sildenafil 40 mg Oral TID William Smith PA-C     Continuous Infusions:     PRN Meds: albuterol 2 5 mg Q4H PRN   dextrose 25 mL PRN   metoprolol 5 mg Q6H PRN   ondansetron 4 mg Q6H PRN       Allergies   Allergies   Allergen Reactions    Heparin      ---------------------------------------------------------------------------------------  Advance Directive and Living Will:      Power of :    POLST:    ---------------------------------------------------------------------------------------  Counseling / Coordination of Care  Total Critical Care time spent 30 minutes excluding procedures, teaching and family updates  AMYNOR Aleman      Portions of the record may have been created with voice recognition software  Occasional wrong word or "sound a like" substitutions may have occurred due to the inherent limitations of voice recognition software    Read the chart carefully and recognize, using context, where substitutions have occurred

## 2020-01-24 NOTE — TREATMENT PLAN
Family has confirmed 1pm mtg time  Pt continues to be inappropriate for advance decisions  Will await family involvement in pt's care before advising changes in plan  Pegge MD [facility]  Palliative and Supportive Care  Clinic/Answering Service: [phone]  You can find me on [employer]!

## 2020-01-24 NOTE — PROGRESS NOTES
NEPHROLOGY PROGRESS NOTE   Frederick Parr 63 y o  female MRN: [id number]  Unit/Bed#: [facility] 517-01 Encounter: [id number]      ASSESSMENT & PLAN    61-year-old female who originally presented to [facility] with shortness of breath   She has a history of diastolic congestive heart failure and pulmonary hypertension, COPD, and stage 3 chronic kidney disease with a baseline creatinine of 1 3-1 6, type 2 diabetes hypertension atrial fibrillation      1  Acute kidney injury present on admission suspecting cardiorenal syndrome/volume overload plus relative hypotension with previous Ace inhibition  -creatinine improved with Lasix drip, now with alkalosis Lasix drip on hold  -receiving acetazolamide 250 mg, is now getting 6 doses  -scheduled for a potential right heart catheterization  -overall from a renal standpoint stable  -urine output has been acceptable  -urinalysis from January 11 does show 1+ proteinuria and a specific gravity of 1 025     2  Electrolytes:  -hypokalemia in the setting of aggressive diuresis-continue repletion  -otherwise electrolytes stable     3  Acid/Base  -metabolic alkalosis/respiratory acidosis  -pH increased to 7 49  -continue acetazolamide 250 mg IV BID bicarb is improved     4  BP/HR/cardiovascular  -chronic hypertension in the setting of chronic kidney disease-now mildly hypotensive, will monitor with diuresis remains on metoprolol 50 mg every 12 hours  -pulmonary hypertension-on sildenafil  -atrial fibrillation-on digoxin and metoprolol for rate control, Eliquis for anticoagulation     5  Volume overload in the setting of cardiomyopathy, acute kidney injury on CKD  -clinically more euvolemic but still on high-flow nasal can  -monitor respiratory status  -right heart catheterization might be done     6  Anemia of chronic kidney disease  -hemoglobins remained stable     7  MBD  -hypophosphatemia  -can check PTH as an outpatient     8   Health Maintanance/Risk Reduction  -moderate glycemic control  -continue blood pressure control  -for right heart catheterization today     Overall from a renal standpoint the creatinine is stable close to baseline, her urine output is acceptable at this time does not need renal replacement therapy discussed with palliative care Dr Ronda Briceno, family meeting scheduled for later today to discuss goals of care includes patient's family in medical decision making    SUBJECTIVE:    Patient was seen today breathing is better sitting up in chair off high-flow nasal can  Urine output remained stable    OBJECTIVE:  Current Weight: Weight - Scale: 101 kg (223 lb 12 3 oz)  Vitals:    01/24/20 1100   BP: 91/59   Pulse: 84   Resp: (!) 27   Temp:    SpO2: 95%       Intake/Output Summary (Last 24 hours) at 1/24/2020 1103  Last data filed at 1/24/2020 1101  Gross per 24 hour   Intake 1220 ml   Output 1576 ml   Net -356 ml     Weight (last 2 days)     Date/Time   Weight    01/24/20 0517   101 (223 77)    01/22/20 0555   108 (237 22)              General: conscious, cooperative, in not acute distress  Eyes: conjunctivae pink, anicteric sclerae  ENT: lips and mucous membranes moist  Neck: supple, no JVD  Chest: clear breath sounds bilateral, no crackles, ronchus or wheezings  CVS: distinct S1 & S2, normal rate, regular rhythm  Abdomen: soft, non-tender, non-distended, normoactive bowel sounds  Extremities: no edema of both legs  Skin: no rash  Neuro: awake, alert, oriented      Medications:    Current Facility-Administered Medications:     acetaZOLAMIDE (DIAMOX) injection 250 mg, 250 mg, Intravenous, Q12H [facility], MAYNOR Merlos, 250 mg at 01/24/20 1055    albuterol inhalation solution 2 5 mg, 2 5 mg, Nebulization, Q4H PRN, Ingrid Burger MD    apixaban (ELIQUIS) tablet 5 mg, 5 mg, Oral, BID, William Smith PA-C, 5 mg at 01/24/20 0810    dextrose 50 % IV solution 25 mL, 25 mL, Intravenous, PRN, William Smith PA-C, 25 mL at 01/23/20 0758    digoxin (LANOXIN) tablet 125 mcg, 125 mcg, Oral, Daily, Tulio Sallies, CRNP, 125 mcg at 01/24/20 0810    insulin lispro (HumaLOG) 100 units/mL subcutaneous injection 1-5 Units, 1-5 Units, Subcutaneous, TID AC, 1 Units at 01/22/20 0902 **AND** Fingerstick Glucose (POCT), , , TID AC, William Smith PA-C    insulin lispro (HumaLOG) 100 units/mL subcutaneous injection 1-5 Units, 1-5 Units, Subcutaneous, HS, William Smith PA-C    ipratropium (ATROVENT) 0 02 % inhalation solution 0 5 mg, 0 5 mg, Nebulization, TID, William Smith PA-C, 0 5 mg at 01/24/20 0759    levalbuterol (XOPENEX) inhalation solution 1 25 mg, 1 25 mg, Nebulization, TID, William Smith PA-C, 1 25 mg at 01/24/20 0759    metoprolol (LOPRESSOR) injection 5 mg, 5 mg, Intravenous, Q6H PRN, William Smith PA-C    metoprolol tartrate (LOPRESSOR) tablet 50 mg, 50 mg, Oral, Q12H [address], Tulio Sallies, CRNP, 50 mg at 01/24/20 0810    nicotine (NICODERM CQ) 7 mg/24hr TD 24 hr patch 7 mg, 7 mg, Transdermal, Daily, William Smith PA-C, 7 mg at 01/24/20 0809    nystatin (MYCOSTATIN) powder, , Topical, BID, William Smith PA-C    ondansetron (ZOFRAN) injection 4 mg, 4 mg, Intravenous, Q6H PRN, William Smith PA-C    QUEtiapine (SEROquel) tablet 25 mg, 25 mg, Oral, QPM, William Smith PA-C, 25 mg at 01/23/20 2113    sildenafil (REVATIO) tablet 40 mg, 40 mg, Oral, TID, William Smiht PA-C, 40 mg at 01/24/20 0810    Invasive Devices:      Lab Results:   Results from last 7 days   Lab Units 01/24/20  0437 01/23/20  0450 01/22/20  0522 01/22/20  0014 01/21/20  0550 01/20/20  0503 01/19/20  0439   WBC Thousand/uL 8 28 9 44 7 62 8 05  --  6 40 4 74   HEMOGLOBIN g/dL 14 1 15 0 14 6 15 2  --  14 1 14 2   HEMATOCRIT % 48 3* 50 6* 49 2* 50 2*  --  49 2* 48 4*   PLATELETS Thousands/uL 128* 106* 92* 86*  --  71* 66*   POTASSIUM mmol/L 4 0 3 7 3 4* 3 6 3 8 3 3* 3 1*   CHLORIDE mmol/L 96* 91* 90* 90* 96* 96* 98*   CO2 mmol/L 43* >45* >45* >45* >45* >45* 42*   BUN mg/dL 90* 90* 91* 100* 100* 99* 97*   CREATININE mg/dL 1 71* 1 70* 1 75* 1 86* 2 00* 2 15* 2 08*   CALCIUM mg/dL 9 2 8 8 8 7 8 6 8 3 8 4 8 1*   MAGNESIUM mg/dL 2 3 1 9  --  2 1 1 9 2 1 1 7   PHOSPHORUS mg/dL  --   --   --  2 4* 2 8  --  4 1   ALK PHOS U/L  --   --   --  116 97  --   --    ALT U/L  --   --   --  18 13  --   --    AST U/L  --   --   --  16 20  --   --        Previous work up:  Please see previous notes

## 2020-01-24 NOTE — RESPIRATORY THERAPY NOTE
resp care      01/24/20 1157   Respiratory Assessment   Resp Comments Pt  satting well on HFNC  Pt  does fall asleep at times  Some concern of hypercarbia  But pt easily awakens  O2 decreased    Will continue to monitor

## 2020-01-25 ENCOUNTER — APPOINTMENT (INPATIENT)
Dept: RADIOLOGY | Facility: HOSPITAL | Age: 60
DRG: 286 | End: 2020-01-25
Payer: COMMERCIAL

## 2020-01-25 PROBLEM — D75.829 HEPARIN INDUCED THROMBOCYTOPENIA: Status: RESOLVED | Noted: 2020-01-18 | Resolved: 2020-01-25

## 2020-01-25 PROBLEM — D75.82 HEPARIN INDUCED THROMBOCYTOPENIA (HCC): Status: RESOLVED | Noted: 2020-01-18 | Resolved: 2020-01-25

## 2020-01-25 PROBLEM — E87.6 HYPOKALEMIA: Status: RESOLVED | Noted: 2020-01-20 | Resolved: 2020-01-25

## 2020-01-25 LAB
ANION GAP SERPL CALCULATED.3IONS-SCNC: 4 MMOL/L (ref 4–13)
ARTERIAL PATENCY WRIST A: YES
ATRIAL RATE: 92 BPM
BASE EXCESS BLDA CALC-SCNC: 15.9 MMOL/L
BUN SERPL-MCNC: 87 MG/DL (ref 5–25)
CALCIUM SERPL-MCNC: 9 MG/DL (ref 8.3–10.1)
CHLORIDE SERPL-SCNC: 100 MMOL/L (ref 100–108)
CO2 SERPL-SCNC: 41 MMOL/L (ref 21–32)
CREAT SERPL-MCNC: 1.65 MG/DL (ref 0.6–1.3)
ERYTHROCYTE [DISTWIDTH] IN BLOOD BY AUTOMATED COUNT: 18.3 % (ref 11.6–15.1)
GFR SERPL CREATININE-BSD FRML MDRD: 34 ML/MIN/1.73SQ M
GLUCOSE SERPL-MCNC: 114 MG/DL (ref 65–140)
GLUCOSE SERPL-MCNC: 118 MG/DL (ref 65–140)
GLUCOSE SERPL-MCNC: 123 MG/DL (ref 65–140)
GLUCOSE SERPL-MCNC: 130 MG/DL (ref 65–140)
GLUCOSE SERPL-MCNC: 72 MG/DL (ref 65–140)
HCO3 BLDA-SCNC: 42.6 MMOL/L (ref 22–28)
HCT VFR BLD AUTO: 47.5 % (ref 34.8–46.1)
HGB BLD-MCNC: 14.3 G/DL (ref 11.5–15.4)
MAGNESIUM SERPL-MCNC: 2.3 MG/DL (ref 1.6–2.6)
MCH RBC QN AUTO: 31.4 PG (ref 26.8–34.3)
MCHC RBC AUTO-ENTMCNC: 30.1 G/DL (ref 31.4–37.4)
MCV RBC AUTO: 104 FL (ref 82–98)
NASAL CANNULA: 15
O2 CT BLDA-SCNC: 17.6 ML/DL (ref 16–23)
OXYHGB MFR BLDA: 85.9 % (ref 94–97)
PCO2 BLDA: 58.8 MM HG (ref 36–44)
PH BLDA: 7.48 [PH] (ref 7.35–7.45)
PLATELET # BLD AUTO: 148 THOUSANDS/UL (ref 149–390)
PMV BLD AUTO: 12.5 FL (ref 8.9–12.7)
PO2 BLDA: 52.7 MM HG (ref 75–129)
POTASSIUM SERPL-SCNC: 3.5 MMOL/L (ref 3.5–5.3)
QRS AXIS: 203 DEGREES
QRSD INTERVAL: 92 MS
QT INTERVAL: 388 MS
QTC INTERVAL: 480 MS
RBC # BLD AUTO: 4.56 MILLION/UL (ref 3.81–5.12)
SODIUM SERPL-SCNC: 145 MMOL/L (ref 136–145)
SPECIMEN SOURCE: ABNORMAL
T WAVE AXIS: 86 DEGREES
VENTRICULAR RATE: 92 BPM
WBC # BLD AUTO: 7.55 THOUSAND/UL (ref 4.31–10.16)

## 2020-01-25 PROCEDURE — 94660 CPAP INITIATION&MGMT: CPT

## 2020-01-25 PROCEDURE — 82948 REAGENT STRIP/BLOOD GLUCOSE: CPT

## 2020-01-25 PROCEDURE — 80048 BASIC METABOLIC PNL TOTAL CA: CPT | Performed by: PHYSICIAN ASSISTANT

## 2020-01-25 PROCEDURE — 85027 COMPLETE CBC AUTOMATED: CPT | Performed by: PHYSICIAN ASSISTANT

## 2020-01-25 PROCEDURE — NC001 PR NO CHARGE: Performed by: EMERGENCY MEDICINE

## 2020-01-25 PROCEDURE — 83735 ASSAY OF MAGNESIUM: CPT | Performed by: PHYSICIAN ASSISTANT

## 2020-01-25 PROCEDURE — 93005 ELECTROCARDIOGRAM TRACING: CPT

## 2020-01-25 PROCEDURE — 36600 WITHDRAWAL OF ARTERIAL BLOOD: CPT

## 2020-01-25 PROCEDURE — 99232 SBSQ HOSP IP/OBS MODERATE 35: CPT | Performed by: INTERNAL MEDICINE

## 2020-01-25 PROCEDURE — 94640 AIRWAY INHALATION TREATMENT: CPT

## 2020-01-25 PROCEDURE — 94760 N-INVAS EAR/PLS OXIMETRY 1: CPT

## 2020-01-25 PROCEDURE — 99232 SBSQ HOSP IP/OBS MODERATE 35: CPT | Performed by: PHYSICIAN ASSISTANT

## 2020-01-25 PROCEDURE — 71045 X-RAY EXAM CHEST 1 VIEW: CPT

## 2020-01-25 PROCEDURE — 93010 ELECTROCARDIOGRAM REPORT: CPT | Performed by: INTERNAL MEDICINE

## 2020-01-25 PROCEDURE — 82805 BLOOD GASES W/O2 SATURATION: CPT | Performed by: NURSE PRACTITIONER

## 2020-01-25 RX ORDER — POTASSIUM CHLORIDE 14.9 MG/ML
20 INJECTION INTRAVENOUS ONCE
Status: COMPLETED | OUTPATIENT
Start: 2020-01-25 | End: 2020-01-25

## 2020-01-25 RX ORDER — MAGNESIUM SULFATE 1 G/100ML
1 INJECTION INTRAVENOUS ONCE
Status: COMPLETED | OUTPATIENT
Start: 2020-01-25 | End: 2020-01-25

## 2020-01-25 RX ORDER — TORSEMIDE 20 MG/1
20 TABLET ORAL
Status: DISCONTINUED | OUTPATIENT
Start: 2020-01-25 | End: 2020-01-26

## 2020-01-25 RX ORDER — METOPROLOL TARTRATE 50 MG/1
50 TABLET, FILM COATED ORAL EVERY 12 HOURS SCHEDULED
Status: DISCONTINUED | OUTPATIENT
Start: 2020-01-25 | End: 2020-01-30 | Stop reason: HOSPADM

## 2020-01-25 RX ADMIN — LEVALBUTEROL HYDROCHLORIDE 1.25 MG: 1.25 SOLUTION, CONCENTRATE RESPIRATORY (INHALATION) at 07:43

## 2020-01-25 RX ADMIN — SILDENAFIL 40 MG: 20 TABLET ORAL at 08:21

## 2020-01-25 RX ADMIN — LEVALBUTEROL HYDROCHLORIDE 1.25 MG: 1.25 SOLUTION, CONCENTRATE RESPIRATORY (INHALATION) at 20:09

## 2020-01-25 RX ADMIN — METOPROLOL TARTRATE 50 MG: 50 TABLET, FILM COATED ORAL at 21:23

## 2020-01-25 RX ADMIN — METOPROLOL TARTRATE 50 MG: 50 TABLET, FILM COATED ORAL at 08:20

## 2020-01-25 RX ADMIN — ACETAZOLAMIDE 250 MG: 500 INJECTION, POWDER, LYOPHILIZED, FOR SOLUTION INTRAVENOUS at 21:23

## 2020-01-25 RX ADMIN — APIXABAN 5 MG: 5 TABLET, FILM COATED ORAL at 18:14

## 2020-01-25 RX ADMIN — IPRATROPIUM BROMIDE 0.5 MG: 0.5 SOLUTION RESPIRATORY (INHALATION) at 13:21

## 2020-01-25 RX ADMIN — SILDENAFIL 40 MG: 20 TABLET ORAL at 21:23

## 2020-01-25 RX ADMIN — LEVALBUTEROL HYDROCHLORIDE 1.25 MG: 1.25 SOLUTION, CONCENTRATE RESPIRATORY (INHALATION) at 13:21

## 2020-01-25 RX ADMIN — WATER 10 ML: 1 INJECTION INTRAMUSCULAR; INTRAVENOUS; SUBCUTANEOUS at 21:24

## 2020-01-25 RX ADMIN — DIGOXIN 125 MCG: 125 TABLET ORAL at 08:21

## 2020-01-25 RX ADMIN — POTASSIUM CHLORIDE 20 MEQ: 14.9 INJECTION, SOLUTION INTRAVENOUS at 02:48

## 2020-01-25 RX ADMIN — MAGNESIUM SULFATE HEPTAHYDRATE 1 G: 1 INJECTION, SOLUTION INTRAVENOUS at 03:16

## 2020-01-25 RX ADMIN — IPRATROPIUM BROMIDE 0.5 MG: 0.5 SOLUTION RESPIRATORY (INHALATION) at 07:43

## 2020-01-25 RX ADMIN — QUETIAPINE FUMARATE 25 MG: 25 TABLET ORAL at 21:00

## 2020-01-25 RX ADMIN — SILDENAFIL 40 MG: 20 TABLET ORAL at 16:09

## 2020-01-25 RX ADMIN — ACETAZOLAMIDE 250 MG: 500 INJECTION, POWDER, LYOPHILIZED, FOR SOLUTION INTRAVENOUS at 08:22

## 2020-01-25 RX ADMIN — NYSTATIN: 100000 POWDER TOPICAL at 18:14

## 2020-01-25 RX ADMIN — NICOTINE 7 MG: 7 PATCH TRANSDERMAL at 08:18

## 2020-01-25 RX ADMIN — APIXABAN 5 MG: 5 TABLET, FILM COATED ORAL at 08:21

## 2020-01-25 RX ADMIN — TORSEMIDE 20 MG: 20 TABLET ORAL at 16:09

## 2020-01-25 RX ADMIN — Medication 1 SPRAY: at 04:09

## 2020-01-25 RX ADMIN — NYSTATIN: 100000 POWDER TOPICAL at 08:24

## 2020-01-25 RX ADMIN — IPRATROPIUM BROMIDE 0.5 MG: 0.5 SOLUTION RESPIRATORY (INHALATION) at 20:09

## 2020-01-25 NOTE — PROGRESS NOTES
PT refusing to get OOB for dinner  Encouraged patient to get OOB for dinner, risks and benefits explained  PT continues to refuse

## 2020-01-25 NOTE — ASSESSMENT & PLAN NOTE
· Secondary to cardiorenal syndrome/volume overload  · Torsemide 20mg bid   · Trend UO and serum creatinine   · Nephrology following

## 2020-01-25 NOTE — ASSESSMENT & PLAN NOTE
· Echo 1/15: Small to moderate concentric pericardial effusion without evidence of hemodynamic compromise

## 2020-01-25 NOTE — PROGRESS NOTES
Cardiology Progress Note - Shante Winters 63 y o  female MRN: [id number]    Unit/Bed#: [facility] 517-01 Encounter: [id number]      Assessment:  1  Pulmonary hypertension  - WHO group 2/3  2  Pericardial effusion   3  Acute on chronic respiratory failure with hypoxia and hypercapnia  4  Permanent atrial fibrillation   5  Acute on chronic diastolic CHF   6  Severe COPD/OHS  7  Morbid obesity - BMI 40  8  Benign essential hypertension   9  Dyslipidemia     Plan:  1  Volume status stable post diuresis, ? Weights  2  Resume torsemide at home dose - apparently non complaint   3  Continue sildenafil per heart failure   4  Rates controlled on current AV advid blocking regimen and digoxin   5  Continue AC with Eliquis   6  Digoxin level ok - 1/22     Subjective:   Patient seen and examined  No significant events overnight  Objective:     Vitals: Blood pressure 104/58, pulse 102, temperature 97 5 °F (36 4 °C), temperature source Oral, resp   rate (!) 27, height 5' 4" (1 626 m), weight 104 kg (229 lb 4 5 oz), SpO2 91 % , Body mass index is 39 36 kg/m² ,   Orthostatic Blood Pressures      Most Recent Value   Blood Pressure  104/58 filed at 01/25/2020 0900   Patient Position - Orthostatic VS  Lying filed at 01/23/2020 3663            Intake/Output Summary (Last 24 hours) at 1/25/2020 0950  Last data filed at 1/25/2020 0801  Gross per 24 hour   Intake 1199 ml   Output 1800 ml   Net -601 ml         Physical Exam:    GEN: Shante Winters appears well, alert and oriented x 3, pleasant and cooperative   HEENT: pupils equal, round, and reactive to light; extraocular muscles intact  NECK: supple, no carotid bruits   HEART: irregularly irregular, variable S1/S2, no murmur   LUNGS: decreased breath sounds bilaterally   ABDOMEN: normal bowel sounds, soft, no tenderness, no distention  EXTREMITIES: + edema, SCDs in place   NEURO: no focal findings   SKIN: normal without suspicious lesions on exposed skin    Medications:      Current Facility-Administered Medications:     acetaZOLAMIDE (DIAMOX) injection 250 mg, 250 mg, Intravenous, Q12H [address], Janey Chung, CRNP, 250 mg at 01/25/20 5065    albuterol inhalation solution 2 5 mg, 2 5 mg, Nebulization, Q4H PRN, Maria E Tirado MD    apixaban (ELIQUIS) tablet 5 mg, 5 mg, Oral, BID, William Smith PA-C, 5 mg at 01/25/20 0821    dextrose 50 % IV solution 25 mL, 25 mL, Intravenous, PRN, William Smith PA-C, 25 mL at 01/23/20 0758    digoxin (LANOXIN) tablet 125 mcg, 125 mcg, Oral, Daily, TulioMAYNOR Lares, 125 mcg at 01/25/20 9625    insulin lispro (HumaLOG) 100 units/mL subcutaneous injection 1-5 Units, 1-5 Units, Subcutaneous, TID AC, 1 Units at 01/24/20 1204 **AND** Fingerstick Glucose (POCT), , , TID AC, William Smith PA-C    insulin lispro (HumaLOG) 100 units/mL subcutaneous injection 1-5 Units, 1-5 Units, Subcutaneous, HS, William Smith PA-C, 2 Units at 01/24/20 2251    ipratropium (ATROVENT) 0 02 % inhalation solution 0 5 mg, 0 5 mg, Nebulization, TID, William Smith PA-C, 0 5 mg at 01/25/20 0743    levalbuterol (XOPENEX) inhalation solution 1 25 mg, 1 25 mg, Nebulization, TID, William Smith PA-C, 1 25 mg at 01/25/20 0743    metoprolol (LOPRESSOR) injection 5 mg, 5 mg, Intravenous, Q6H PRN, William Smith PA-C    metoprolol tartrate (LOPRESSOR) tablet 50 mg, 50 mg, Oral, Q12H Gabriella DAWN CRNP    nicotine (NICODERM CQ) 7 mg/24hr TD 24 hr patch 7 mg, 7 mg, Transdermal, Daily, William Smith PA-C, 7 mg at 01/25/20 0818    nystatin (MYCOSTATIN) powder, , Topical, BID, William Smith PA-C    ondansetron (ZOFRAN) injection 4 mg, 4 mg, Intravenous, Q6H PRN, William Smith PA-C    QUEtiapine (SEROquel) tablet 25 mg, 25 mg, Oral, QPM, William Smith PA-C, 25 mg at 01/24/20 2107    sildenafil (REVATIO) tablet 40 mg, 40 mg, Oral, TID, William CHASE IOANA Smith, 40 mg at 01/25/20 4547    sodium chloride (OCEAN) 0 65 % nasal spray 1 spray, 1 spray, Each Nare, Q2H PRN, Martin Morales PA-C, 1 spray at 01/25/20 0409     Labs & Results:        Results from last 7 days   Lab Units 01/25/20  0240 01/24/20  0437 01/23/20  0450   WBC Thousand/uL 7 55 8 28 9 44   HEMOGLOBIN g/dL 14 3 14 1 15 0   HEMATOCRIT % 47 5* 48 3* 50 6*   PLATELETS Thousands/uL 148* 128* 106*         Results from last 7 days   Lab Units 01/25/20  0240 01/24/20  1833 01/24/20  0437  01/22/20  0014 01/21/20  0550   POTASSIUM mmol/L 3 5 3 9 4 0   < > 3 6 3 8   CHLORIDE mmol/L 100 99* 96*   < > 90* 96*   CO2 mmol/L 41* 42* 43*   < > >45* >45*   BUN mg/dL 87* 89* 90*   < > 100* 100*   CREATININE mg/dL 1 65* 1 84* 1 71*   < > 1 86* 2 00*   CALCIUM mg/dL 9 0 9 1 9 2   < > 8 6 8 3   ALK PHOS U/L  --  109  --   --  116 97   ALT U/L  --  18  --   --  18 13   AST U/L  --  15  --   --  16 20    < > = values in this interval not displayed  Results from last 7 days   Lab Units 01/25/20  0240 01/24/20  0437 01/23/20  0450   MAGNESIUM mg/dL 2 3 2 3 1 9         Counseling / Coordination of Care  Total floor / unit time spent today 25 minutes  Greater than 50% of total time was spent with the patient and / or family counseling and / or coordination of care

## 2020-01-25 NOTE — ASSESSMENT & PLAN NOTE
· 1/23 Right heart cath: PA 44/26/33, PCWP 9, CO 5 3  · Sildenafil 40mg tid  · Torsemide 20mg bid   · HF team following

## 2020-01-25 NOTE — PROGRESS NOTES
Transfer Note - ICU/Stepdown Transfer to [facility]/MS pedro Green 63 y o  female MRN: [id number]  [address] [facility]   Unit/Bed#: [facility] 348-55 Encounter: [id number]    Code Status: Level 1 - Full Code    Reason for ICU/Stepdown admission: Acute on chronic respiratory failure with hypoxia and hypercapnia     Active problems:     * Acute on chronic respiratory failure with hypoxia and hypercapnia (HCC)  Assessment & Plan  · HFNC 40L 40%, titrate for O2 sat > 88%  · Bipap 20/10 @ hs   · Palliative care following    Acute on chronic diastolic congestive heart failure ([place] [address] )  Assessment & Plan  Wt Readings from Last 3 Encounters:   01/25/20 104 kg (229 lb 4 5 oz)   01/21/20 116 kg (255 lb 11 7 oz)   11/05/19 110 kg (243 lb 9 6 oz)     · Echo 1/15: Normal left ventricular systolic function with left ventricular hypertrophy  The right ventricle is significantly enlarged with significantly reduced right ventricular systolic function  · Continue daily weights  · Torsemide 20mg bid  · Metoprolol 50mg bid        Pericardial effusion  Assessment & Plan  · Echo 1/15: Small to moderate concentric pericardial effusion without evidence of hemodynamic compromise      COPD (chronic obstructive pulmonary disease) ([facility])  Assessment & Plan  · atrovent and xopenex q6h    Permanent atrial fibrillation  Assessment & Plan  · Continue metoprolol, eliquis, and digoxin     SAPNA (acute kidney injury) ([place] [address] )  Assessment & Plan  · Secondary to cardiorenal syndrome/volume overload  · Torsemide 20mg bid   · Trend UO and serum creatinine   · Nephrology following    Pulmonary hypertension ([place] [address] )  Assessment & Plan  · 1/23 Right heart cath: PA 44/26/33, PCWP 9, CO 5 3  · Sildenafil 40mg tid  · Torsemide 20mg bid   · HF team following    Nonsustained ventricular tachycardia (HCC)  Assessment & Plan  · Continue metoprolol  · Monitor rhythm on telemetry  · Keep K>4, and NN>5 4    Metabolic alkalosis  Assessment & Plan  · Improved with Diamox dosing (last dose today), repeat as needed    CKD (chronic kidney disease) stage 3, GFR 30-59 ml/min ([facility])  Assessment & Plan  · Plan as documented in SAPNA    Type 2 diabetes mellitus without complication [facility])  Assessment & Plan  Lab Results   Component Value Date    HGBA1C 5 8 04/11/2019       Recent Labs     01/24/20  1547 01/24/20  2058 01/25/20  0601 01/25/20  1125   POCGLU 122 259* 118 130       Blood Sugar Average: Last 72 hrs:  (P) 598 [id number]   · Continue SSI coverage    Depression  Assessment & Plan  · seroquel @          Consultants:   · HF, Nephrology, Palliative    History of Present Illness/Summary of clinical course: Per H&P by William Smith 1/21 0340: "63 y o  female with an extensive medical history congestive heart failure diastolic on home oxygen on 10 liters/minute morbid obesity COPD severe pulmonary hypertension was at [facility] for 15 days chronic hypoxic hypercapnic respiratory failure patient also has pulmonary hypertension per echo severe usually is at home on 10 L of nasal cannula and was at [facility] on high-flow and BiPAP to maintain her saturation  Per case management her oxygen requirements at home should be 3 L nasal cannula there is a history of noncompliance to medical treatment she does have a history of a fibrillation is on Eliquis for that  Patient was transferred to [place] for pulmonary hypertension and right catheterization and consult to heart failure "    Please refer to today's progress note for further clinical details  Recent or scheduled procedures: RHC 1/23:PA 44/26/33, PCWP 9, CO 5  Outstanding/pending diagnostics: none       Mobilization Plan: ambulate as tolerated  PT/OT consutled       Nutrition Plan: Cardiac diet     Discharge Plan: Patient should be ready for discharge to rehab after medical optimization      Specific Diagnosis Plan:    Heart Failure:  Diuresis plan: torsemide 20mg bid; Cardiology consult placed  yes      [  ] Family aware of transfer out of critical care: no   [  ] Home medications that are not reordered and reason why: Cardizem and Lisinopril-HR and BP well controlled on current regimen  Spoke with Dr Gómez Castaneda regarding transfer @ 1300  Patient accepted to their service      MAYNOR Griggs

## 2020-01-25 NOTE — ASSESSMENT & PLAN NOTE
Lab Results   Component Value Date    HGBA1C 5 8 04/11/2019       Recent Labs     01/24/20  1547 01/24/20 2058 01/25/20  0601 01/25/20  1125   POCGLU 122 259* 118 130       Blood Sugar Average: Last 72 hrs:  (P) 747 [id number]   · Continue SSI coverage

## 2020-01-25 NOTE — ASSESSMENT & PLAN NOTE
Wt Readings from Last 3 Encounters:   01/25/20 104 kg (229 lb 4 5 oz)   01/21/20 116 kg (255 lb 11 7 oz)   11/05/19 110 kg (243 lb 9 6 oz)     · Echo 1/15: Normal left ventricular systolic function with left ventricular hypertrophy  The right ventricle is significantly enlarged with significantly reduced right ventricular systolic function    · Continue daily weights  · Torsemide 20mg bid  · Metoprolol 50mg bid

## 2020-01-25 NOTE — QUICK NOTE
Called for 15 beat run of VT  Patient asymptomatic  No c/o of chest pain or SOB  Looks & feels comfortable on Bipap  /64, HR 90s (Afib), RR 20, 02 Sat 93% on Bipap 16/8 & 40%    Review of tele shows 15 BT run of wide complex tachycardia with rate  150 bpm    12 lead EKG shows no acute ischemic changes  'lytes drawn    KCl & MGS04 Rx'd  Will f/u on labs & monitor rhythm

## 2020-01-25 NOTE — RESPIRATORY THERAPY NOTE
resp      01/25/20 1238   Respiratory Assessment   Resp Comments HFNC titrated to 40% and 40L with sao2 of 97%  Will continue to monitor     Additional Assessments   SpO2 96 %

## 2020-01-25 NOTE — PROGRESS NOTES
NEPHROLOGY PROGRESS NOTE   Chandler Albrecht 63 y o  female MRN: [id number]  Unit/Bed#: [facility] 517-01 Encounter: [id number]      ASSESSMENT & PLAN    78-year-old female who originally presented to [facility] with shortness of breath   She has a history of diastolic congestive heart failure and pulmonary hypertension, COPD, and stage 3 chronic kidney disease with a baseline creatinine of 1 3-1 6, type 2 diabetes hypertension atrial fibrillation      1  Acute kidney injury present on admission suspecting cardiorenal syndrome/volume overload plus relative hypotension with previous Ace inhibition  -creatinine improved with Lasix drip, now with alkalosis Lasix drip on hold, oral torsemide started  -receiving acetazolamide 250 mg, 6 doses total  -overall from a renal standpoint stable  -urine output has been acceptable  -urinalysis from January 11 does show 1+ proteinuria and a specific gravity of 1 025     2  Electrolytes:  -hypokalemia in the setting of aggressive diuresis-continue aggressive repletion in the setting of acetazolamide use  -otherwise electrolytes stable     3  Acid/Base  -metabolic alkalosis/respiratory acidosis  -bicarb now improved down to 41     4  BP/HR/cardiovascular  -chronic hypertension in the setting of chronic kidney disease-now mildly hypotensive, will monitor with diuresis remains on metoprolol 50 mg every 12 hours  -pulmonary hypertension-on sildenafil  -atrial fibrillation-on digoxin and metoprolol for rate control, Eliquis for anticoagulation     5  Volume overload in the setting of cardiomyopathy, acute kidney injury on CKD  -clinically more euvolemic but still on high-flow nasal can  -monitor respiratory status  -right heart catheterization  elevated pa pressure     6  Anemia of chronic kidney disease  -hemoglobins remained stable     7  MBD  -hypophosphatemia  -can check PTH as an outpatient     8   Health Maintanance/Risk Reduction  -moderate glycemic control  -continue blood pressure control  -for right heart catheterization today       SUBJECTIVE:    Patient was seen today sitting up in chair remains on high-flow nasal cannula denies any chest pain or shortness of breath currently no fevers or chills urine output stable creatinine improved otherwise no acute events overnight    OBJECTIVE:  Current Weight: Weight - Scale: 104 kg (229 lb 4 5 oz)  Vitals:    01/25/20 0900   BP: 104/58   Pulse: 102   Resp: (!) 27   Temp:    SpO2: 91%       Intake/Output Summary (Last 24 hours) at 1/25/2020 1033  Last data filed at 1/25/2020 0801  Gross per 24 hour   Intake 1199 ml   Output 1800 ml   Net -601 ml     Weight (last 2 days)     Date/Time   Weight    01/25/20 0449   104 (229 28)    01/24/20 0517   101 (223 77)              General: conscious, cooperative, in no acute distress  Eyes: conjunctivae pink, anicteric sclerae  ENT: lips and mucous membranes moist, on high-flow nasal cannula  Neck: supple, no JVD  Chest:  Decreased breath sound bilateral, no crackles, ronchus or wheezings  CVS:  Mildly tachycardic  Abdomen: soft, non-tender, non-distended, normoactive bowel sounds  Extremities: no edema of both legs  Skin: no rash  Neuro: awake, alert, oriented  Psych:  Pleasant affect        Medications:    Current Facility-Administered Medications:     acetaZOLAMIDE (DIAMOX) injection 250 mg, 250 mg, Intravenous, Q12H Janey DAWN CRNP, 250 mg at 01/25/20 0221    albuterol inhalation solution 2 5 mg, 2 5 mg, Nebulization, Q4H PRN, Herlinda Munguia MD    apixaban (ELIQUIS) tablet 5 mg, 5 mg, Oral, BID, William Smith PA-C, 5 mg at 01/25/20 0821    dextrose 50 % IV solution 25 mL, 25 mL, Intravenous, PRN, William Smith PA-C, 25 mL at 01/23/20 0758    digoxin (LANOXIN) tablet 125 mcg, 125 mcg, Oral, Daily, MAYNOR Lara, 125 mcg at 01/25/20 7953    insulin lispro (HumaLOG) 100 units/mL subcutaneous injection 1-5 Units, 1-5 Units, Subcutaneous, TID AC, 1 Units at 01/24/20 1202 **AND** Fingerstick Glucose (POCT), , , TID AC, William Smith PA-C    insulin lispro (HumaLOG) 100 units/mL subcutaneous injection 1-5 Units, 1-5 Units, Subcutaneous, HS, William Smith PA-C, 2 Units at 01/24/20 2251    ipratropium (ATROVENT) 0 02 % inhalation solution 0 5 mg, 0 5 mg, Nebulization, TID, William Smith PA-C, 0 5 mg at 01/25/20 0743    levalbuterol (XOPENEX) inhalation solution 1 25 mg, 1 25 mg, Nebulization, TID, William Smith PA-C, 1 25 mg at 01/25/20 0743    metoprolol (LOPRESSOR) injection 5 mg, 5 mg, Intravenous, Q6H PRN, William Smith PA-C    metoprolol tartrate (LOPRESSOR) tablet 50 mg, 50 mg, Oral, Q12H NEREYDA, MAYNOR Strickland    nicotine (NICODERM CQ) 7 mg/24hr TD 24 hr patch 7 mg, 7 mg, Transdermal, Daily, William Smith PA-C, 7 mg at 01/25/20 0818    nystatin (MYCOSTATIN) powder, , Topical, BID, William Smith PA-C    ondansetron (ZOFRAN) injection 4 mg, 4 mg, Intravenous, Q6H PRN, William Smith PA-C    QUEtiapine (SEROquel) tablet 25 mg, 25 mg, Oral, QPM, William Smith PA-C, 25 mg at 01/24/20 2107    sildenafil (REVATIO) tablet 40 mg, 40 mg, Oral, TID, William Smith PA-C, 40 mg at 01/25/20 0821    sodium chloride (OCEAN) 0 65 % nasal spray 1 spray, 1 spray, Each Nare, Q2H PRN, Jamison Baez PA-C, 1 spray at 01/25/20 0409    torsemide (DEMADEX) tablet 20 mg, 20 mg, Oral, BID (diuretic), Shelton Macadam, DO    Invasive Devices:      Lab Results:   Results from last 7 days   Lab Units 01/25/20  0240 01/24/20  1833 01/24/20  0437 01/23/20  0450 01/22/20  0522 01/22/20  0014 01/21/20  0550  01/19/20  0439   WBC Thousand/uL 7 55  --  8 28 9 44 7 62 8 05  --    < > 4 74   HEMOGLOBIN g/dL 14 3  --  14 1 15 0 14 6 15 2  --    < > 14 2   HEMATOCRIT % 47 5*  --  48 3* 50 6* 49 2* 50 2*  --    < > 48 4*   PLATELETS Thousands/uL 148*  --  128* 106* 92* 86*  --    < > 66*   POTASSIUM mmol/L 3 5 3 9 4 0 3 7 3 4* 3 6 3 8   < > 3 1*   CHLORIDE mmol/L 100 99* 96* 91* 90* 90* 96*   < > 98*   CO2 mmol/L 41* 42* 43* >45* >45* >45* >45*   < > 42*   BUN mg/dL 87* 89* 90* 90* 91* 100* 100*   < > 97*   CREATININE mg/dL 1 65* 1 84* 1 71* 1 70* 1 75* 1 86* 2 00*   < > 2 08*   CALCIUM mg/dL 9 0 9 1 9 2 8 8 8 7 8 6 8 3   < > 8 1*   MAGNESIUM mg/dL 2 3  --  2 3 1 9  --  2 1 1 9   < > 1 7   PHOSPHORUS mg/dL  --   --   --   --   --  2 4* 2 8  --  4 1   ALK PHOS U/L  --  109  --   --   --  116 97  --   --    ALT U/L  --  18  --   --   --  18 13  --   --    AST U/L  --  15  --   --   --  16 20  --   --     < > = values in this interval not displayed         Previous work up:  Please see previous notes

## 2020-01-25 NOTE — SOCIAL WORK
LSW met with patient and son to discuss Palliative Care support  Both patient and son feel that this level of support is needed at this time  Patient's  passed away suddenly in 2016 from a heart attack  Patient acknowledges that since this time she has struggled to care for herself medically  Son was unaware of patient's current medical concern and is worried about her following this hospitalization  Son requests regular medical updates from the critical care team     Patient acknowledges that it is not in her best interest to return to her private residence  Patient states that there are bad memories within that home (patient found her  following his massive heart attack)  Following hospitalization, patient intends to move in with her son  Son lives in [place]  Son does intend to move to [facility] within the year  At this time, patient might be able to move in with her brother  Patient loves her three dogs and is saddened that her health has declined to the point of struggling to care for them  LSW spent a significant amount of time discussing limits on her care  Patient and son decide to continue the discussion and will inform the medical team when a decision in made  LSW will continue to follow as requested by the patient, family, and medical team     Counseling / Coordination of Care  Total floor / unit time spent today 60 minutes  Greater than 50% of total time was spent with the patient and / or family counseling and / or coordination of care   A description of the counseling / coordination of care: support

## 2020-01-25 NOTE — PLAN OF CARE
Problem: Prexisting or High Potential for Compromised Skin Integrity  Goal: Skin integrity is maintained or improved  Description  INTERVENTIONS:  - Identify patients at risk for skin breakdown  - Assess and monitor skin integrity  - Assess and monitor nutrition and hydration status  - Monitor labs   - Assess for incontinence   - Turn and reposition patient  - Assist with mobility/ambulation  - Relieve pressure over bony prominences  - Avoid friction and shearing  - Provide appropriate hygiene as needed including keeping skin clean and dry  - Evaluate need for skin moisturizer/barrier cream  - Collaborate with interdisciplinary team   - Patient/family teaching  - Consider wound care consult   Outcome: Progressing     Problem: Potential for Falls  Goal: Patient will remain free of falls  Description  INTERVENTIONS:  - Assess patient frequently for physical needs  -  Identify cognitive and physical deficits and behaviors that affect risk of falls    -  [place] fall precautions as indicated by assessment   - Educate patient/family on patient safety including physical limitations  - Instruct patient to call for assistance with activity based on assessment  - Modify environment to reduce risk of injury  - Consider OT/PT consult to assist with strengthening/mobility  Outcome: Progressing     Problem: CARDIOVASCULAR - ADULT  Goal: Maintains optimal cardiac output and hemodynamic stability  Description  INTERVENTIONS:  - Monitor I/O, vital signs and rhythm  - Monitor for S/S and trends of decreased cardiac output  - Administer and titrate ordered vasoactive medications to optimize hemodynamic stability  - Assess quality of pulses, skin color and temperature  - Assess for signs of decreased coronary artery perfusion  - Instruct patient to report change in severity of symptoms  Outcome: Progressing  Goal: Absence of cardiac dysrhythmias or at baseline rhythm  Description  INTERVENTIONS:  - Continuous cardiac monitoring, vital signs, obtain 12 lead EKG if ordered  - Administer antiarrhythmic and heart rate control medications as ordered  - Monitor electrolytes and administer replacement therapy as ordered  Outcome: Progressing     Problem: RESPIRATORY - ADULT  Goal: Achieves optimal ventilation and oxygenation  Description  INTERVENTIONS:  - Assess for changes in respiratory status  - Assess for changes in mentation and behavior  - Position to facilitate oxygenation and minimize respiratory effort  - Oxygen administered by appropriate delivery if ordered  - Initiate smoking cessation education as indicated  - Encourage broncho-pulmonary hygiene including cough, deep breathe, Incentive Spirometry  - Assess the need for suctioning and aspirate as needed  - Assess and instruct to report SOB or any respiratory difficulty  - Respiratory Therapy support as indicated  Outcome: Progressing     Problem: METABOLIC, FLUID AND ELECTROLYTES - ADULT  Goal: Electrolytes maintained within normal limits  Description  INTERVENTIONS:  - Monitor labs and assess patient for signs and symptoms of electrolyte imbalances  - Administer electrolyte replacement as ordered  - Monitor response to electrolyte replacements, including repeat lab results as appropriate  - Instruct patient on fluid and nutrition as appropriate  Outcome: Progressing  Goal: Fluid balance maintained  Description  INTERVENTIONS:  - Monitor labs   - Monitor I/O and WT  - Instruct patient on fluid and nutrition as appropriate  - Assess for signs & symptoms of volume excess or deficit  Outcome: Progressing     Problem: SKIN/TISSUE INTEGRITY - ADULT  Goal: Skin integrity remains intact  Description  INTERVENTIONS  - Identify patients at risk for skin breakdown  - Assess and monitor skin integrity  - Assess and monitor nutrition and hydration status  - Monitor labs (i e  albumin)  - Assess for incontinence   - Turn and reposition patient  - Assist with mobility/ambulation  - Relieve pressure over bony prominences  - Avoid friction and shearing  - Provide appropriate hygiene as needed including keeping skin clean and dry  - Evaluate need for skin moisturizer/barrier cream  - Collaborate with interdisciplinary team (i e  Nutrition, Rehabilitation, etc )   - Patient/family teaching  Outcome: Progressing     Problem: MUSCULOSKELETAL - ADULT  Goal: Maintain or return mobility to safest level of function  Description  INTERVENTIONS:  - Assess patient's ability to carry out ADLs; assess patient's baseline for ADL function and identify physical deficits which impact ability to perform ADLs (bathing, care of mouth/teeth, toileting, grooming, dressing, etc )  - Assess/evaluate cause of self-care deficits   - Assess range of motion  - Assess patient's mobility  - Assess patient's need for assistive devices and provide as appropriate  - Encourage maximum independence but intervene and supervise when necessary  - Involve family in performance of ADLs  - Assess for home care needs following discharge   - Consider OT consult to assist with ADL evaluation and planning for discharge  - Provide patient education as appropriate  Outcome: Progressing     Problem: Nutrition/Hydration-ADULT  Goal: Nutrient/Hydration intake appropriate for improving, restoring or maintaining nutritional needs  Description  Monitor and assess patient's nutrition/hydration status for malnutrition  Collaborate with interdisciplinary team and initiate plan and interventions as ordered  Monitor patient's weight and dietary intake as ordered or per policy  Utilize nutrition screening tool and intervene as necessary  Determine patient's food preferences and provide high-protein, high-caloric foods as appropriate       INTERVENTIONS:  - Monitor oral intake, urinary output, labs, and treatment plans  - Assess nutrition and hydration status and recommend course of action  - Evaluate amount of meals eaten  - Assist patient with eating if necessary   - Allow adequate time for meals  - Recommend/ encourage appropriate diets, oral nutritional supplements, and vitamin/mineral supplements  - Order, calculate, and assess calorie counts as needed  - Recommend, monitor, and adjust tube feedings and TPN/PPN based on assessed needs  - Assess need for intravenous fluids  - Provide specific nutrition/hydration education as appropriate  - Include patient/family/caregiver in decisions related to nutrition  Outcome: Progressing

## 2020-01-25 NOTE — PROGRESS NOTES
Daily Progress Note - Critical Care   Melquiades Green 63 y o  female MRN: [id number]  Unit/Bed#: [facility] 517-01 Encounter: [id number]        ----------------------------------------------------------------------------------------  HPI/24hr events: Hypoxia on HFNC last evening  Bipap initiated earlier than usual HS  15 BT VT this am (asymptomatic)  ---------------------------------------------------------------------------------------  SUBJECTIVE  No c/o pain or SOB  Tolerating po well  Review of Systems  Review of systems was reviewed and negative unless stated above in HPI/24-hour events   ---------------------------------------------------------------------------------------  Assessment and Plan:    Neuro:    Metabolic encephalopathy vs ICU delirium (improved)  o Continue Seroquel  o Maintain adequate 02 sats  o CAM ICU assessments      CV:    Pulmonary hypertension with RV dysfunction  o Continue Sildenafil  o Heart Failure team following  No further Lasix currently   Chronic Afib  - Continue Digoxin, Metoprolol & Eliquis   Pericardial effusion without tamponade  - No intervention planned  - Non-sustained VT without prolonged Qt   -Maintain adequate electrolyte levels    Pulm:  · Acute on chronic hypoxemic, hypercapnic respiratory failure  · Continue HFNC/ Mid Flow NC / Bipap as needed during the day  Bipap at HS  · Goal 02 sat 88%   COPD  o Continue bronchodilators  o Continue Diamox for metabolic alkalosis   RADHA  o Continue Bipap at HS and prn      GI:    No issues  o Continue diet as tolerated  o No PPI required    :    SAPNA on CKD  o Follow hourly urine outs and daily renal labs  o Lasix on hold from HF standpoint   Diamox continues      Heme/Onc:    Thrombocytopenia with positive HIT, Negative TREE  o Follow daily platelet ct   Eliquis for Afib      Endo:    Type 2 DM  o Continue Insulin coverage      ID:    No issues  o Trend temps, WBC      MSK/Skin:    Chronic venous stasis dermatitis BLE  o Continue local would care   Turn and reposition per protocol      Disposition: Continue Critical Care   Code Status: Level 1 - Full Code  ---------------------------------------------------------------------------------------  ICU CORE MEASURES    Prophylaxis   VTE Pharmacologic Prophylaxis: Apixaban (Eliquis)  VTE Mechanical Prophylaxis: sequential compression device  Stress Ulcer Prophylaxis: Prophylaxis Not Indicated     ABCDE Protocol (if indicated)  Plan to perform spontaneous awakening trial today? Not applicable  Plan to perform spontaneous breathing trial today? Not applicable  Obvious barriers to extubation? Not applicable  CAM-ICU: Negative    Invasive Devices Review  Invasive Devices     Peripheral Intravenous Line            Peripheral IV 20 Right Forearm 2 days          Drain            External Urinary Catheter 3 days              Can any invasive devices be discontinued today? No  ---------------------------------------------------------------------------------------  OBJECTIVE    Vitals   Vitals:    20 2200 20 2300 20 0000 20 0100   BP: 114/73 112/65 96/68 103/70   Pulse: 92 96 98 92   Resp: (!) 27 (!) 27 (!) 27 (!) 28   Temp:       TempSrc:       SpO2: 95% 93% 92% 93%   Weight:       Height:         Temp (24hrs), Av 1 °F (36 7 °C), Min:97 9 °F (36 6 °C), Max:98 3 °F (36 8 °C)  Current: Temperature: 98 3 °F (36 8 °C)  HR: 90  BP: 110/68  RR: 24  SpO2: 92    Physical Exam   Constitutional: No distress  Eyes: Pupils are equal, round, and reactive to light  No scleral icterus  Cardiovascular: Normal rate  Murmur heard  Irregular rhythm  4-9/6 Holosystolic murmur at apex   Pulmonary/Chest: No respiratory distress  She has wheezes  Inspiratory coarseness  Mild expiratory wheezes  No rhonchi  Musculoskeletal: She exhibits no edema  Neurological: She is alert  No cranial nerve deficit  Moves all 4 extremities equally  Skin: Skin is warm and dry  Psychiatric: She has a normal mood and affect           Respiratory:  SpO2: SpO2: 95 %  O2 Flow Rate (L/min): 12 L/min    Invasive/non-invasive ventilation settings   Respiratory    Lab Data (Last 4 hours)    None         O2/Vent Data (Last 4 hours)    None                Laboratory and Diagnostics:  Results from last 7 days   Lab Units 01/24/20  0437 01/23/20  0450 01/22/20  0522 01/22/20  0014 01/20/20  0503 01/19/20  0439   WBC Thousand/uL 8 28 9 44 7 62 8 05 6 40 4 74   HEMOGLOBIN g/dL 14 1 15 0 14 6 15 2 14 1 14 2   HEMATOCRIT % 48 3* 50 6* 49 2* 50 2* 49 2* 48 4*   PLATELETS Thousands/uL 128* 106* 92* 86* 71* 66*   NEUTROS PCT %  --  77* 73 76*  --   --    MONOS PCT %  --  10 11 11  --   --      Results from last 7 days   Lab Units 01/24/20  1833 01/24/20  0437 01/23/20  0450 01/22/20  0522 01/22/20  0014 01/21/20  0550 01/20/20  0503 01/19/20  0439 01/18/20  0510   SODIUM mmol/L 144 142 139 140 142 145 144 139 140   POTASSIUM mmol/L 3 9 4 0 3 7 3 4* 3 6 3 8 3 3* 3 1* 4 1   CHLORIDE mmol/L 99* 96* 91* 90* 90* 96* 96* 98* 98*   CO2 mmol/L 42* 43* >45* >45* >45* >45* >45* 42* 40*   ANION GAP mmol/L 3* 3*  --   --   --   --   --  -1* 2*   BUN mg/dL 89* 90* 90* 91* 100* 100* 99* 97* 92*   CREATININE mg/dL 1 84* 1 71* 1 70* 1 75* 1 86* 2 00* 2 15* 2 08* 2 02*   CALCIUM mg/dL 9 1 9 2 8 8 8 7 8 6 8 3 8 4 8 1* 8 0*   GLUCOSE RANDOM mg/dL 171* 82 89 86 113 102 115 104 102   ALT U/L 18  --   --   --  18 13  --   --   --    AST U/L 15  --   --   --  16 20  --   --   --    ALK PHOS U/L 109  --   --   --  116 97  --   --   --    ALBUMIN g/dL 2 7*  --   --   --  2 7* 2 5*  --   --   --    TOTAL BILIRUBIN mg/dL 2 41*  --   --   --  2 57* 2 29*  --   --   --      Results from last 7 days   Lab Units 01/24/20  0437 01/23/20  0450 01/22/20  0014 01/21/20  0550 01/20/20  0503 01/19/20  0439   MAGNESIUM mg/dL 2 3 1 9 2 1 1 9 2 1 1 7   PHOSPHORUS mg/dL  --   --  2 4* 2 8  --  4 1                   ABG:  Results from last 7 days Lab Units 01/24/20  1558   PH ART  7 447   PCO2 ART mm Hg 73 1*   PO2 ART mm Hg 49 7*   HCO3 ART mmol/L 49 3*   BASE EXC ART mmol/L 20 5   ABG SOURCE  Radial, Left     VBG:  Results from last 7 days   Lab Units 01/24/20  1558  01/20/20  1250   PH ELYSIA   --   --  7 387   PCO2 ELYSIA mm Hg  --   --  90 6*   PO2 ELYSIA mm Hg  --   --  35 5   HCO3 ELYSIA mmol/L  --   --  53 2*   BASE EXC ELYSIA mmol/L  --   --  21 8   ABG SOURCE  Radial, Left   < >  --     < > = values in this interval not displayed  Micro        EKG: Afib  Imaging:  I have personally reviewed pertinent reports  Intake and Output  I/O       01/23 0701 - 01/24 0700 01/24 0701 - 01/25 0700    P  O  600 1229    IV Piggyback 200     Total Intake(mL/kg) 800 (7 9) 1229 (12 2)    Urine (mL/kg/hr) 1576 (0 7) 1400 (0 6)    Stool 0 0    Total Output 1576 1400    Net -776 -171          Unmeasured Urine Occurrence 3 x 2 x    Unmeasured Stool Occurrence 1 x 3 x        UOP: 40 ml/hr     Height and Weights   Height: 5' 4" (162 6 cm)  IBW: 54 7 kg  Body mass index is 38 41 kg/m²  Weight (last 2 days)     Date/Time   Weight    01/24/20 0517   101 (223 77)                Nutrition       Diet Orders   (From admission, onward)             Start     Ordered    01/24/20 [facility]  Diet Cardiovascular; Sodium 2 GM; Fluid Restriction 1500 ML, Consistent Carbohydrate Diet Level 2 (5 carb servings/75 grams CHO/meal)  Diet effective now     Question Answer Comment   Diet Type Cardiovascular    Cardiac Sodium 2 GM    Other Restriction(s): Fluid Restriction 1500 ML    Other Restriction(s): Consistent Carbohydrate Diet Level 2 (5 carb servings/75 grams CHO/meal)    RD to adjust diet per protocol?  Yes        01/24/20 1911                    Active Medications  Scheduled Meds:  Current Facility-Administered Medications:  acetaZOLAMIDE 250 mg Intravenous Q12H [address] Janey MAYNOR Chung    albuterol 2 5 mg Nebulization Q4H PRN Francoise Post MD    apixaban 5 mg Oral BID William Simth, IOANA    dextrose 25 mL Intravenous PRN [facility] IOANA saunders    digoxin 125 mcg Oral Daily MAYNOR Sullivan    insulin lispro 1-5 Units Subcutaneous TID AC [facility]    insulin lispro 1-5 Units Subcutaneous HS [facility] IOANA Smith    ipratropium 0 5 mg Nebulization TID [facility] IOANA saunders    levalbuterol 1 25 mg Nebulization TID [facility]    magnesium sulfate 1 g Intravenous Once Marta Howard PA-C    metoprolol 5 mg Intravenous Q6H PRN [facility] IOANA Smith    metoprolol tartrate 50 mg Oral Q12H [address] MAYNOR Sullivan    nicotine 7 mg [facility]mal [facility]    nystatin  Topical BID [facility]    ond[facility]etron 4 mg Intravenous Q6H [facility]N [facility]    potassium chloride 20 mEq Intravenous Once Marta Howard PA-C Last Rate: 20 mEq (01/25/20 0248)   QUEtiapine 25 mg Oral QPM [facility] IOANA Smith    sildenafil 40 mg Oral TID [facility]    sodium chloride 1 spray Each Nare Q2H PRN Marta Howard PA-C      Continuous Infusions:     PRN Meds:     albuterol 2 5 mg Q4H PRN   dextrose 25 mL PRN   metoprolol 5 mg Q6H PRN   ondansetron 4 mg Q6H PRN   sodium chloride 1 spray Q2H PRN       Allergies   No Active Allergies  ---------------------------------------------------------------------------------------  Advance Directive and Living Will:      Power of :    POLST:    ---------------------------------------------------------------------------------------  Counseling / Coordination of Care  Total time spent today 35 minutes  Greater than 50% of total time was spent with the patient and / or family counseling and / or coordination of care  A description of the counseling / coordination of care: critical care time    Marta Howard PA-C      Portions of the record may have been created with voice recognition software    Occasional wrong word or "sound a like" substitutions may have occurred due to the inherent limitations of voice recognition software    Read the chart carefully and recognize, using context, where substitutions have occurred

## 2020-01-26 LAB
ANION GAP SERPL CALCULATED.3IONS-SCNC: 2 MMOL/L (ref 4–13)
BUN SERPL-MCNC: 82 MG/DL (ref 5–25)
CALCIUM SERPL-MCNC: 9.2 MG/DL (ref 8.3–10.1)
CHLORIDE SERPL-SCNC: 101 MMOL/L (ref 100–108)
CO2 SERPL-SCNC: 40 MMOL/L (ref 21–32)
CREAT SERPL-MCNC: 1.63 MG/DL (ref 0.6–1.3)
GFR SERPL CREATININE-BSD FRML MDRD: 34 ML/MIN/1.73SQ M
GLUCOSE SERPL-MCNC: 132 MG/DL (ref 65–140)
GLUCOSE SERPL-MCNC: 134 MG/DL (ref 65–140)
GLUCOSE SERPL-MCNC: 141 MG/DL (ref 65–140)
GLUCOSE SERPL-MCNC: 88 MG/DL (ref 65–140)
GLUCOSE SERPL-MCNC: 92 MG/DL (ref 65–140)
MAGNESIUM SERPL-MCNC: 2.6 MG/DL (ref 1.6–2.6)
PHOSPHATE SERPL-MCNC: 3.8 MG/DL (ref 2.7–4.5)
POTASSIUM SERPL-SCNC: 3.6 MMOL/L (ref 3.5–5.3)
SODIUM SERPL-SCNC: 143 MMOL/L (ref 136–145)

## 2020-01-26 PROCEDURE — 94760 N-INVAS EAR/PLS OXIMETRY 1: CPT

## 2020-01-26 PROCEDURE — 94640 AIRWAY INHALATION TREATMENT: CPT

## 2020-01-26 PROCEDURE — 83735 ASSAY OF MAGNESIUM: CPT | Performed by: NURSE PRACTITIONER

## 2020-01-26 PROCEDURE — 84100 ASSAY OF PHOSPHORUS: CPT | Performed by: NURSE PRACTITIONER

## 2020-01-26 PROCEDURE — 99232 SBSQ HOSP IP/OBS MODERATE 35: CPT | Performed by: INTERNAL MEDICINE

## 2020-01-26 PROCEDURE — 80048 BASIC METABOLIC PNL TOTAL CA: CPT | Performed by: NURSE PRACTITIONER

## 2020-01-26 PROCEDURE — 82948 REAGENT STRIP/BLOOD GLUCOSE: CPT

## 2020-01-26 RX ORDER — TORSEMIDE 20 MG/1
20 TABLET ORAL
Status: DISCONTINUED | OUTPATIENT
Start: 2020-01-26 | End: 2020-01-30 | Stop reason: HOSPADM

## 2020-01-26 RX ADMIN — METOPROLOL TARTRATE 50 MG: 50 TABLET, FILM COATED ORAL at 20:29

## 2020-01-26 RX ADMIN — NYSTATIN: 100000 POWDER TOPICAL at 17:02

## 2020-01-26 RX ADMIN — TORSEMIDE 20 MG: 20 TABLET ORAL at 16:49

## 2020-01-26 RX ADMIN — SILDENAFIL 40 MG: 20 TABLET ORAL at 20:28

## 2020-01-26 RX ADMIN — METOPROLOL TARTRATE 50 MG: 50 TABLET, FILM COATED ORAL at 08:15

## 2020-01-26 RX ADMIN — SILDENAFIL 40 MG: 20 TABLET ORAL at 08:16

## 2020-01-26 RX ADMIN — LEVALBUTEROL HYDROCHLORIDE 1.25 MG: 1.25 SOLUTION, CONCENTRATE RESPIRATORY (INHALATION) at 13:48

## 2020-01-26 RX ADMIN — NICOTINE 7 MG: 7 PATCH TRANSDERMAL at 08:17

## 2020-01-26 RX ADMIN — IPRATROPIUM BROMIDE 0.5 MG: 0.5 SOLUTION RESPIRATORY (INHALATION) at 07:15

## 2020-01-26 RX ADMIN — SILDENAFIL 40 MG: 20 TABLET ORAL at 16:49

## 2020-01-26 RX ADMIN — LEVALBUTEROL HYDROCHLORIDE 1.25 MG: 1.25 SOLUTION, CONCENTRATE RESPIRATORY (INHALATION) at 07:15

## 2020-01-26 RX ADMIN — TORSEMIDE 20 MG: 20 TABLET ORAL at 08:15

## 2020-01-26 RX ADMIN — Medication 1 SPRAY: at 17:02

## 2020-01-26 RX ADMIN — NYSTATIN: 100000 POWDER TOPICAL at 08:17

## 2020-01-26 RX ADMIN — APIXABAN 5 MG: 5 TABLET, FILM COATED ORAL at 08:16

## 2020-01-26 RX ADMIN — APIXABAN 5 MG: 5 TABLET, FILM COATED ORAL at 17:02

## 2020-01-26 RX ADMIN — DIGOXIN 125 MCG: 125 TABLET ORAL at 08:17

## 2020-01-26 RX ADMIN — IPRATROPIUM BROMIDE 0.5 MG: 0.5 SOLUTION RESPIRATORY (INHALATION) at 19:27

## 2020-01-26 RX ADMIN — QUETIAPINE FUMARATE 25 MG: 25 TABLET ORAL at 20:29

## 2020-01-26 RX ADMIN — IPRATROPIUM BROMIDE 0.5 MG: 0.5 SOLUTION RESPIRATORY (INHALATION) at 13:48

## 2020-01-26 RX ADMIN — LEVALBUTEROL HYDROCHLORIDE 1.25 MG: 1.25 SOLUTION, CONCENTRATE RESPIRATORY (INHALATION) at 19:27

## 2020-01-26 NOTE — ASSESSMENT & PLAN NOTE
Wt Readings from Last 3 Encounters:   01/26/20 103 kg (226 lb 6 6 oz)   01/21/20 116 kg (255 lb 11 7 oz)   11/05/19 110 kg (243 lb 9 6 oz)     Volume status stable  Continue torsemide 20 mg b i d   Echo 1/15 showed normal left ventricular systolic function with left ventricular hypertrophy    The right ventricle is significantly large with significantly reduced right ventricular systolic function  Monitor input and output  Daily weights

## 2020-01-26 NOTE — ASSESSMENT & PLAN NOTE
Echo on 01/15 showed small to moderate concentric pericardial effusion without evidence of hemodynamic compromise

## 2020-01-26 NOTE — ASSESSMENT & PLAN NOTE
Acute hypoxic and hypercapnic respiratory failure secondary to pulmonary hypertension due to group 2/3, acute on chronic diastolic heart failure, obesity hypoventilation syndrome, severe COPD  Volume status stable  Continue torsemide  Continue sildenafil  Intermediate high-flow, keep oxygen saturation above 88%  BiPAP HS 20/10

## 2020-01-26 NOTE — PLAN OF CARE
Problem: Prexisting or High Potential for Compromised Skin Integrity  Goal: Skin integrity is maintained or improved  Description  INTERVENTIONS:  - Identify patients at risk for skin breakdown  - Assess and monitor skin integrity  - Assess and monitor nutrition and hydration status  - Monitor labs   - Assess for incontinence   - Turn and reposition patient  - Assist with mobility/ambulation  - Relieve pressure over bony prominences  - Avoid friction and shearing  - Provide appropriate hygiene as needed including keeping skin clean and dry  - Evaluate need for skin moisturizer/barrier cream  - Collaborate with interdisciplinary team   - Patient/family teaching  - Consider wound care consult   Outcome: Progressing     Problem: Potential for Falls  Goal: Patient will remain free of falls  Description  INTERVENTIONS:  - Assess patient frequently for physical needs  -  Identify cognitive and physical deficits and behaviors that affect risk of falls    -  [place] fall precautions as indicated by assessment   - Educate patient/family on patient safety including physical limitations  - Instruct patient to call for assistance with activity based on assessment  - Modify environment to reduce risk of injury  - Consider OT/PT consult to assist with strengthening/mobility  Outcome: Progressing     Problem: RESPIRATORY - ADULT  Goal: Achieves optimal ventilation and oxygenation  Description  INTERVENTIONS:  - Assess for changes in respiratory status  - Assess for changes in mentation and behavior  - Position to facilitate oxygenation and minimize respiratory effort  - Oxygen administered by appropriate delivery if ordered  - Initiate smoking cessation education as indicated  - Encourage broncho-pulmonary hygiene including cough, deep breathe, Incentive Spirometry  - Assess the need for suctioning and aspirate as needed  - Assess and instruct to report SOB or any respiratory difficulty  - Respiratory Therapy support as indicated  Outcome: Progressing

## 2020-01-26 NOTE — ASSESSMENT & PLAN NOTE
Lab Results   Component Value Date    HGBA1C 5 8 04/11/2019       Recent Labs     01/25/20  2102 01/26/20  0619 01/26/20  1056 01/26/20  1644   POCGLU 123 92 132 134       Blood Sugar Average: Last 72 hrs:  (P) 127   Continue sliding scale

## 2020-01-26 NOTE — PROGRESS NOTES
NEPHROLOGY PROGRESS NOTE   Siomara Flor 63 y o  female MRN: [id number]  Unit/Bed#: [facility] 517-01 Encounter: [id number]      ASSESSMENT & PLAN    56-year-old female who originally presented to [facility] with shortness of breath   She has a history of diastolic congestive heart failure and pulmonary hypertension, COPD, and stage 3 chronic kidney disease with a baseline creatinine of 1 3-1 6, type 2 diabetes hypertension atrial fibrillation      1  Acute kidney injury present on admission suspecting cardiorenal syndrome/volume overload plus relative hypotension with previous Ace inhibition  -creatinine improved with Lasix drip, oral torsemide started  -received acetazolamide 250 mg, 6 doses total  -overall from a renal standpoint stable  -urine output has been acceptable  -urinalysis from January 11 does show 1+ proteinuria and a specific gravity of 1 025     2  Electrolytes:  -hypokalemia continue repletion as needed  -otherwise electrolytes stable     3  Acid/Base  -metabolic alkalosis/respiratory acidosis  -bicarb now improved      4  BP/HR/cardiovascular  -chronic hypertension in the setting of chronic kidney disease-stable will monitor with diuresis remains on metoprolol 50 mg every 12 hours  -pulmonary hypertension-on sildenafil  -atrial fibrillation-on digoxin and metoprolol for rate control, Eliquis for anticoagulation     5  Volume overload in the setting of cardiomyopathy, acute kidney injury on CKD  -clinically more euvolemic   -monitor respiratory status  -right heart catheterization  elevated pa pressure     6  Anemia of chronic kidney disease  -hemoglobins remained stable     7  MBD  -hypophosphatemia  -can check PTH as an outpatient     8   Health Maintanance/Risk Reduction  -moderate glycemic control  -continue blood pressure control  -for right heart catheterization today       SUBJECTIVE:    Patient was seen today denies any acute chest pain or shortness of breath no fevers or chills no nausea vomiting diarrhea sitting up urine output stable  Review of systems otherwise negative    OBJECTIVE:  Current Weight: Weight - Scale: 103 kg (226 lb 6 6 oz)  Vitals:    01/26/20 0817   BP:    Pulse: 77   Resp:    Temp:    SpO2:        Intake/Output Summary (Last 24 hours) at 1/26/2020 0935  Last data filed at 1/26/2020 0900  Gross per 24 hour   Intake 1138 ml   Output 1010 ml   Net 128 ml     Weight (last 2 days)     Date/Time   Weight    01/26/20 0600   103 (226 41)    01/25/20 0449   104 (229 28)    01/24/20 0517   101 (223 77)              General: conscious, cooperative, in not acute distress  Eyes: conjunctivae pink, anicteric sclerae  ENT: lips and mucous membranes moist  Neck: supple, no JVD  Chest:  Stable breath sounds bilaterally  CVS: distinct S1 & S2, normal rate, regular rhythm  Abdomen: soft, non-tender, non-distended, normoactive bowel sounds  Extremities: no edema of both legs  Skin: no rash  Neuro: awake, alert, oriented      Medications:    Current Facility-Administered Medications:     albuterol inhalation solution 2 5 mg, 2 5 mg, Nebulization, Q4H PRN, Gabriella Spironello V, CRNP    apixaban (ELIQUIS) tablet 5 mg, 5 mg, Oral, BID, Gabriella Spironello V, CRNP, 5 mg at 01/26/20 0816    dextrose 50 % IV solution 25 mL, 25 mL, Intravenous, PRN, Gabriella Spironello V, CRNP, 25 mL at 01/23/20 0758    digoxin (LANOXIN) tablet 125 mcg, 125 mcg, Oral, Daily, Gabriella Spironello V, CRNP, 125 mcg at 01/26/20 0817    insulin lispro (HumaLOG) 100 units/mL subcutaneous injection 1-5 Units, 1-5 Units, Subcutaneous, TID AC, 1 Units at 01/24/20 1204 **AND** Fingerstick Glucose (POCT), , , TID AC, Gabriella Spironello V, CRNP    insulin lispro (HumaLOG) 100 units/mL subcutaneous injection 1-5 Units, 1-5 Units, Subcutaneous, HS, Gabriella Spironello V, CRNP, 2 Units at 01/24/20 2251    ipratropium (ATROVENT) 0 02 % inhalation solution 0 5 mg, 0 5 mg, Nebulization, TID, Gabriella Simpsono V, BALJITNP, 0 5 mg at 01/26/20 0715    levalbuterol (XOPENEX) inhalation solution 1 25 mg, 1 25 mg, Nebulization, TID, Gabriella Spironello V, CRNP, 1 25 mg at 01/26/20 0715    metoprolol (LOPRESSOR) injection 5 mg, 5 mg, Intravenous, Q6H PRN, Gabriella Spironello V, CRNP    metoprolol tartrate (LOPRESSOR) tablet 50 mg, 50 mg, Oral, Q12H NEREYDA, Gabriella Spironello V, CRNP, 50 mg at 01/26/20 0815    nicotine (NICODERM CQ) 7 mg/24hr TD 24 hr patch 7 mg, 7 mg, Transdermal, Daily, Gabriella Spironello V, CRNP, 7 mg at 01/26/20 6429    nystatin (MYCOSTATIN) powder, , Topical, BID, Gabriella Spironello V, CRNP    ondansetron (ZOFRAN) injection 4 mg, 4 mg, Intravenous, Q6H PRN, Gabriella Spironello V, CRNP    QUEtiapine (SEROquel) tablet 25 mg, 25 mg, Oral, QPM, Gabriella Spironello V, CRNP, 25 mg at 01/25/20 2100    sildenafil (REVATIO) tablet 40 mg, 40 mg, Oral, TID, Gabriella Spironello V, CRNP, 40 mg at 01/26/20 0816    sodium chloride (OCEAN) 0 65 % nasal spray 1 spray, 1 spray, Each Nare, Q2H PRN, Gabriella Spironello V, CRNP, 1 spray at 01/25/20 0409    torsemide (DEMADEX) tablet 20 mg, 20 mg, Oral, BID (diuretic), Nimesh Moore MD     Lab Results:   Results from last 7 days   Lab Units 01/26/20  0548 01/25/20  0240 01/24/20  1833 01/24/20  0437 01/23/20  0450 01/22/20  0522 01/22/20  0014 01/21/20  0550   WBC Thousand/uL  --  7 55  --  8 28 9 44 7 62 8 05  --    HEMOGLOBIN g/dL  --  14 3  --  14 1 15 0 14 6 15 2  --    HEMATOCRIT %  --  47 5*  --  48 3* 50 6* 49 2* 50 2*  --    PLATELETS Thousands/uL  --  148*  --  128* 106* 92* 86*  --    POTASSIUM mmol/L 3 6 3 5 3 9 4 0 3 7 3 4* 3 6 3 8   CHLORIDE mmol/L 101 100 99* 96* 91* 90* 90* 96*   CO2 mmol/L 40* 41* 42* 43* >45* >45* >45* >45*   BUN mg/dL 82* 87* 89* 90* 90* 91* 100* 100*   CREATININE mg/dL 1 63* 1 65* 1 84* 1 71* 1 70* 1 75* 1 86* 2 00*   CALCIUM mg/dL 9 2 9 0 9 1 9 2 8 8 8 7 8 6 8 3   MAGNESIUM mg/dL 2 6 2 3  --  2 3 1 9  --  2 1 1 9   PHOSPHORUS mg/dL 3 8  --   --   -- --   --  2 4* 2 8   ALK PHOS U/L  --   --  109  --   --   --  116 97   ALT U/L  --   --  18  --   --   --  18 13   AST U/L  --   --  15  --   --   --  16 20       Previous work up:  Please see previous notes

## 2020-01-26 NOTE — PROGRESS NOTES
Cardiology Progress Note - Magaly Prakash 63 y o  female MRN: [id number]    Unit/Bed#: [facility] 517-01 Encounter: [id number]      Assessment:  1  Pulmonary hypertension  - WHO group 2/3  2  Pericardial effusion   3  Acute on chronic respiratory failure with hypoxia and hypercapnia  4  Permanent atrial fibrillation   5  Acute on chronic diastolic CHF   6  Severe COPD/OHS  7  Morbid obesity - BMI 40  8  Benign essential hypertension   9  Dyslipidemia     Plan:  1  Volume status stable post diuresis, ? Weights  2  Torsemide resumed at home dose 20 mg BID, renal function stable  3  Continue sildenafil per heart failure   4  Rates controlled on current AV david blocking regimen and digoxin   5  Continue AC with Eliquis   6  Digoxin level ok - 1/22     Subjective:   Patient seen and examined  No significant events overnight  Objective:     Vitals: Blood pressure 109/69, pulse 77, temperature 98 °F (36 7 °C), temperature source Oral, resp  rate (!) 27, height 5' 4" (1 626 m), weight 103 kg (226 lb 6 6 oz), SpO2 96 %  , Body mass index is 38 86 kg/m² ,   Orthostatic Blood Pressures      Most Recent Value   Blood Pressure  109/69 filed at 01/26/2020 0815   Patient Position - Orthostatic VS  Lying filed at 01/23/2020 2792            Intake/Output Summary (Last 24 hours) at 1/26/2020 0943  Last data filed at 1/26/2020 0900  Gross per 24 hour   Intake 1138 ml   Output 1010 ml   Net 128 ml         Physical Exam:    GEN: Magaly Prakash appears well, alert and oriented x 3, pleasant and cooperative   HEENT: pupils equal, round, and reactive to light; extraocular muscles intact  NECK: supple, no carotid bruits   HEART: irregularly irregular, variable S1/S2, no murmur   LUNGS:  Decreased breath sounds bilaterally  ABDOMEN: normal bowel sounds, soft, no tenderness, no distention  EXTREMITIES: no edema, SCDs in place   NEURO: no focal findings   SKIN: normal without suspicious lesions on exposed skin    Medications:      Current Facility-Administered Medications:     albuterol inhalation solution 2 5 mg, 2 5 mg, Nebulization, Q4H PRN, Gabriella Spironello V, CRNP    apixaban (ELIQUIS) tablet 5 mg, 5 mg, Oral, BID, Gabriella Spironello V, CRNP, 5 mg at 01/26/20 0816    dextrose 50 % IV solution 25 mL, 25 mL, Intravenous, PRN, Gabriella Spironello V, CRNP, 25 mL at 01/23/20 0758    digoxin (LANOXIN) tablet 125 mcg, 125 mcg, Oral, Daily, Gabriella Spironello V, CRNP, 125 mcg at 01/26/20 0817    insulin lispro (HumaLOG) 100 units/mL subcutaneous injection 1-5 Units, 1-5 Units, Subcutaneous, TID AC, 1 Units at 01/24/20 1204 **AND** Fingerstick Glucose (POCT), , , TID AC, Gabriella Spironello V, CRNP    insulin lispro (HumaLOG) 100 units/mL subcutaneous injection 1-5 Units, 1-5 Units, Subcutaneous, HS, Gabriella Spironello V, CRNP, 2 Units at 01/24/20 2251    ipratropium (ATROVENT) 0 02 % inhalation solution 0 5 mg, 0 5 mg, Nebulization, TID, Gabriella Spironello V, CRNP, 0 5 mg at 01/26/20 0715    levalbuterol (XOPENEX) inhalation solution 1 25 mg, 1 25 mg, Nebulization, TID, Gabriella Spironello V, CRNP, 1 25 mg at 01/26/20 0715    metoprolol (LOPRESSOR) injection 5 mg, 5 mg, Intravenous, Q6H PRN, Gabriella Spironello V, CRNP    metoprolol tartrate (LOPRESSOR) tablet 50 mg, 50 mg, Oral, Q12H NEREYDA, Gabriella Spironello V, CRNP, 50 mg at 01/26/20 0815    nicotine (NICODERM CQ) 7 mg/24hr TD 24 hr patch 7 mg, 7 mg, Transdermal, Daily, Gabriella Spironello V, CRNP, 7 mg at 01/26/20 2481    nystatin (MYCOSTATIN) powder, , Topical, BID, Gabriella Spironello V, CRNP    ondansetron (ZOFRAN) injection 4 mg, 4 mg, Intravenous, Q6H PRN, Gabriella Norton V, BALJITNP    QUEtiapine (SEROquel) tablet 25 mg, 25 mg, Oral, QPM, Gabriella Spironello V, CRNP, 25 mg at 01/25/20 2100    sildenafil (REVATIO) tablet 40 mg, 40 mg, Oral, TID, Gabriella Spironello V, CRNP, 40 mg at 01/26/20 0816    sodium chloride (OCEAN) 0 65 % nasal spray 1 spray, 1 spray, Each Nare, Q2H PRN, MAYNOR Strickland, 1 spray at 01/25/20 0409    torsemide (DEMADEX) tablet 20 mg, 20 mg, Oral, BID (diuretic), Aamir Martínez MD     Labs & Results:        Results from last 7 days   Lab Units 01/25/20  0240 01/24/20  0437 01/23/20  0450   WBC Thousand/uL 7 55 8 28 9 44   HEMOGLOBIN g/dL 14 3 14 1 15 0   HEMATOCRIT % 47 5* 48 3* 50 6*   PLATELETS Thousands/uL 148* 128* 106*         Results from last 7 days   Lab Units 01/26/20  0548 01/25/20  0240 01/24/20  1833  01/22/20  0014 01/21/20  0550   POTASSIUM mmol/L 3 6 3 5 3 9   < > 3 6 3 8   CHLORIDE mmol/L 101 100 99*   < > 90* 96*   CO2 mmol/L 40* 41* 42*   < > >45* >45*   BUN mg/dL 82* 87* 89*   < > 100* 100*   CREATININE mg/dL 1 63* 1 65* 1 84*   < > 1 86* 2 00*   CALCIUM mg/dL 9 2 9 0 9 1   < > 8 6 8 3   ALK PHOS U/L  --   --  109  --  116 97   ALT U/L  --   --  18  --  18 13   AST U/L  --   --  15  --  16 20    < > = values in this interval not displayed  Results from last 7 days   Lab Units 01/26/20  0548 01/25/20  0240 01/24/20  0437   MAGNESIUM mg/dL 2 6 2 3 2 3         Counseling / Coordination of Care  Total floor / unit time spent today 25 minutes  Greater than 50% of total time was spent with the patient and / or family counseling and / or coordination of care

## 2020-01-26 NOTE — PROGRESS NOTES
Progress Note - Naeem Linker 1960, 63 y o  female MRN: [id number]    Unit/Bed#: [facility] 517-01 Encounter: [id number]    Primary Care Provider: Leida Eisenberg MD   Date and time admitted to hospital: 1/21/2020 11:31 PM        * Acute on chronic respiratory failure with hypoxia and hypercapnia (HCC)  Assessment & Plan  Acute hypoxic and hypercapnic respiratory failure secondary to pulmonary hypertension due to group 2/3, acute on chronic diastolic heart failure, obesity hypoventilation syndrome, severe COPD  Volume status stable  Continue torsemide  Continue sildenafil  Intermediate high-flow, keep oxygen saturation above 88%  BiPAP HS 20/10    Acute on chronic diastolic congestive heart failure (HCC)  Assessment & Plan  Wt Readings from Last 3 Encounters:   01/26/20 103 kg (226 lb 6 6 oz)   01/21/20 116 kg (255 lb 11 7 oz)   11/05/19 110 kg (243 lb 9 6 oz)     Volume status stable  Continue torsemide 20 mg b i d   Echo 1/15 showed normal left ventricular systolic function with left ventricular hypertrophy  The right ventricle is significantly large with significantly reduced right ventricular systolic function  Monitor input and output  Daily weights        Pulmonary hypertension (HCC)  Assessment & Plan  Pulmonary hypertension WHO group 2/3  Continue sildenafil    Permanent atrial fibrillation  Assessment & Plan  Continue metoprolol, digoxin and Eliquis    Pericardial effusion  Assessment & Plan  Echo on 01/15 showed small to moderate concentric pericardial effusion without evidence of hemodynamic compromise    SAPNA (acute kidney injury) ([facility] [address] )  Assessment & Plan  Secondary to cardiorenal syndrome  Now improving  Continue torsemide 20 mg b i d    Nephro on board    Metabolic alkalosis  Assessment & Plan  Improved with Diamox    Depression  Assessment & Plan  Continue Seroquel HS    Nonsustained ventricular tachycardia (HCC)  Assessment & Plan  Continue metoprolol, continue telemetry  Monitor electrolytes    Type 2 diabetes mellitus without complication [facility])  Assessment & Plan  Lab Results   Component Value Date    HGBA1C 5 8 2019       Recent Labs     20  2102 20  0619 20  1056 20  1644   POCGLU 123 92 132 134       Blood Sugar Average: Last 72 hrs:  (P) 127   Continue sliding scale    COPD (chronic obstructive pulmonary disease) (HCC)  Assessment & Plan  Not in acute exacerbation continue breathing treatment        VTE Pharmacologic Prophylaxis:   Pharmacologic: Apixaban (Eliquis)  Mechanical VTE Prophylaxis in Place: Yes    Patient Centered Rounds: I have performed bedside rounds with nursing staff today  Discussions with Specialists or Other Care Team Provider:  Nephrology    Education and Discussions with Family / Patient:  Patient    Time Spent for Care: 30 minutes  More than 50% of total time spent on counseling and coordination of care as described above  Current Length of Stay: 5 day(s)    Current Patient Status: Inpatient   Certification Statement: The patient will continue to require additional inpatient hospital stay due to Optimizing cardiac respiratory status    Discharge Plan: To be determined    Code Status: Level 1 - Full Code      Subjective:   Patient was seen and evaluated bedside, she is feeling better, breathing improved    Objective:     Vitals:   Temp (24hrs), Av °F (36 7 °C), Min:97 6 °F (36 4 °C), Max:98 3 °F (36 8 °C)    Temp:  [97 6 °F (36 4 °C)-98 3 °F (36 8 °C)] 97 9 °F (36 6 °C)  HR:  [76-90] 88  Resp:  [23-34] 34  BP: ()/(54-70) 121/70  SpO2:  [87 %-96 %] 87 %  Body mass index is 38 86 kg/m²  Input and Output Summary (last 24 hours): Intake/Output Summary (Last 24 hours) at 2020 1829  Last data filed at 2020 1811  Gross per 24 hour   Intake 598 ml   Output 1257 ml   Net -659 ml       Physical Exam:     Physical Exam   Constitutional: She is oriented to person, place, and time     No distress, obese   HENT: Head: Atraumatic  Eyes: EOM are normal    Neck: Neck supple  Cardiovascular: Normal rate, regular rhythm and normal heart sounds  Exam reveals no gallop and no friction rub  No murmur heard  Pulmonary/Chest: Effort normal  No respiratory distress  She has no wheezes  Intermediate high-flow, decreased breath sounds bilaterally   Musculoskeletal: She exhibits no edema  Neurological: She is alert and oriented to person, place, and time  No cranial nerve deficit  Skin: Skin is warm and dry  Psychiatric: She has a normal mood and affect  Additional Data:     Labs:    Results from last 7 days   Lab Units 01/25/20  0240  01/23/20  0450   WBC Thousand/uL 7 55   < > 9 44   HEMOGLOBIN g/dL 14 3   < > 15 0   HEMATOCRIT % 47 5*   < > 50 6*   PLATELETS Thousands/uL 148*   < > 106*   NEUTROS PCT %  --   --  77*   LYMPHS PCT %  --   --  9*   MONOS PCT %  --   --  10   EOS PCT %  --   --  2    < > = values in this interval not displayed  Results from last 7 days   Lab Units 01/26/20  0548  01/24/20  1833   SODIUM mmol/L 143   < > 144   POTASSIUM mmol/L 3 6   < > 3 9   CHLORIDE mmol/L 101   < > 99*   CO2 mmol/L 40*   < > 42*   BUN mg/dL 82*   < > 89*   CREATININE mg/dL 1 63*   < > 1 84*   ANION GAP mmol/L 2*   < > 3*   CALCIUM mg/dL 9 2   < > 9 1   ALBUMIN g/dL  --   --  2 7*   TOTAL BILIRUBIN mg/dL  --   --  2 41*   ALK PHOS U/L  --   --  109   ALT U/L  --   --  18   AST U/L  --   --  15   GLUCOSE RANDOM mg/dL 88   < > 171*    < > = values in this interval not displayed  Results from last 7 days   Lab Units 01/26/20  1644 01/26/20  1056 01/26/20  0619 01/25/20  2102 01/25/20  1655 01/25/20  1125 01/25/20  0601 01/24/20  2058 01/24/20  1547 01/24/20  1046 01/24/20  0631 01/23/20  2117   POC GLUCOSE mg/dl 134 132 92 123 114 130 118 259* 122 167* 82 97                   * I Have Reviewed All Lab Data Listed Above  * Additional Pertinent Lab Tests Reviewed:  [address] Admission Reviewed    Imaging:    Imaging Reports Reviewed Today Include: all  Imaging Personally Reviewed by Myself Includes:      Recent Cultures (last 7 days):           Last 24 Hours Medication List:     Current Facility-Administered Medications:  albuterol 2 5 mg Nebulization Q4H PRN Gabriella Spironello V, CRNP   apixaban 5 mg Oral BID Gabriella Spironello V, CRNP   dextrose 25 mL Intravenous PRN Gabriella Spironello V, CRNP   digoxin 125 mcg Oral Daily Gabriella Spironello V, CRNP   insulin lispro 1-5 Units Subcutaneous TID AC Gabriella Spironello V, CRNP   insulin lispro 1-5 Units Subcutaneous HS Gabriella Spironello V, CRNP   ipratropium 0 5 mg Nebulization TID Gabriella Spironello V, CRNP   levalbuterol 1 25 mg Nebulization TID Gabriella Spironello V, CRNP   metoprolol 5 mg Intravenous Q6H PRN Gabriella Spironello V, CRNP   metoprolol tartrate 50 mg Oral Q12H [address] Gabriella Spironello V, CRNP   nicotine 7 mg Transdermal Daily Gabriella Spironello V, CRNP   nystatin  Topical BID Gabriella Spironello V, CRNP   ondansetron 4 mg Intravenous Q6H PRN Gabriella Spironello V, CRNP   QUEtiapine 25 mg Oral QPM Gabriella Spironello V, CRNP   sildenafil 40 mg Oral TID Gabriella Spironello V, CRNP   sodium chloride 1 spray Each Nare Q2H PRN Gabriella Spironello V, CRNP   torsemide 20 mg Oral BID (diuretic) Herminia Ramos MD        Today, Patient Was Seen By: Herminia Ramos MD    ** Please Note: Dictation voice to text software may have been used in the creation of this document   **

## 2020-01-27 LAB
ANION GAP SERPL CALCULATED.3IONS-SCNC: 4 MMOL/L (ref 4–13)
BASOPHILS # BLD AUTO: 0.12 THOUSANDS/ΜL (ref 0–0.1)
BASOPHILS NFR BLD AUTO: 2 % (ref 0–1)
BUN SERPL-MCNC: 75 MG/DL (ref 5–25)
CALCIUM SERPL-MCNC: 9 MG/DL (ref 8.3–10.1)
CHLORIDE SERPL-SCNC: 104 MMOL/L (ref 100–108)
CO2 SERPL-SCNC: 37 MMOL/L (ref 21–32)
CREAT SERPL-MCNC: 1.6 MG/DL (ref 0.6–1.3)
EOSINOPHIL # BLD AUTO: 0.44 THOUSAND/ΜL (ref 0–0.61)
EOSINOPHIL NFR BLD AUTO: 6 % (ref 0–6)
ERYTHROCYTE [DISTWIDTH] IN BLOOD BY AUTOMATED COUNT: 17.7 % (ref 11.6–15.1)
GFR SERPL CREATININE-BSD FRML MDRD: 35 ML/MIN/1.73SQ M
GLUCOSE SERPL-MCNC: 110 MG/DL (ref 65–140)
GLUCOSE SERPL-MCNC: 110 MG/DL (ref 65–140)
GLUCOSE SERPL-MCNC: 121 MG/DL (ref 65–140)
GLUCOSE SERPL-MCNC: 128 MG/DL (ref 65–140)
GLUCOSE SERPL-MCNC: 155 MG/DL (ref 65–140)
GLUCOSE SERPL-MCNC: 96 MG/DL (ref 65–140)
HCT VFR BLD AUTO: 49.6 % (ref 34.8–46.1)
HGB BLD-MCNC: 14.1 G/DL (ref 11.5–15.4)
IMM GRANULOCYTES # BLD AUTO: 0.03 THOUSAND/UL (ref 0–0.2)
IMM GRANULOCYTES NFR BLD AUTO: 0 % (ref 0–2)
LYMPHOCYTES # BLD AUTO: 0.95 THOUSANDS/ΜL (ref 0.6–4.47)
LYMPHOCYTES NFR BLD AUTO: 14 % (ref 14–44)
MCH RBC QN AUTO: 29.8 PG (ref 26.8–34.3)
MCHC RBC AUTO-ENTMCNC: 28.4 G/DL (ref 31.4–37.4)
MCV RBC AUTO: 105 FL (ref 82–98)
MONOCYTES # BLD AUTO: 1.02 THOUSAND/ΜL (ref 0.17–1.22)
MONOCYTES NFR BLD AUTO: 15 % (ref 4–12)
NEUTROPHILS # BLD AUTO: 4.47 THOUSANDS/ΜL (ref 1.85–7.62)
NEUTS SEG NFR BLD AUTO: 63 % (ref 43–75)
NRBC BLD AUTO-RTO: 0 /100 WBCS
PLATELET # BLD AUTO: 166 THOUSANDS/UL (ref 149–390)
PMV BLD AUTO: 13.3 FL (ref 8.9–12.7)
POTASSIUM SERPL-SCNC: 3.3 MMOL/L (ref 3.5–5.3)
RBC # BLD AUTO: 4.73 MILLION/UL (ref 3.81–5.12)
SODIUM SERPL-SCNC: 145 MMOL/L (ref 136–145)
WBC # BLD AUTO: 7.03 THOUSAND/UL (ref 4.31–10.16)

## 2020-01-27 PROCEDURE — 85025 COMPLETE CBC W/AUTO DIFF WBC: CPT | Performed by: INTERNAL MEDICINE

## 2020-01-27 PROCEDURE — 94640 AIRWAY INHALATION TREATMENT: CPT

## 2020-01-27 PROCEDURE — 80048 BASIC METABOLIC PNL TOTAL CA: CPT | Performed by: INTERNAL MEDICINE

## 2020-01-27 PROCEDURE — 94760 N-INVAS EAR/PLS OXIMETRY 1: CPT

## 2020-01-27 PROCEDURE — 99231 SBSQ HOSP IP/OBS SF/LOW 25: CPT | Performed by: INTERNAL MEDICINE

## 2020-01-27 PROCEDURE — 99233 SBSQ HOSP IP/OBS HIGH 50: CPT | Performed by: PHYSICIAN ASSISTANT

## 2020-01-27 PROCEDURE — 99232 SBSQ HOSP IP/OBS MODERATE 35: CPT | Performed by: INTERNAL MEDICINE

## 2020-01-27 PROCEDURE — 82948 REAGENT STRIP/BLOOD GLUCOSE: CPT

## 2020-01-27 RX ORDER — POTASSIUM CHLORIDE 20 MEQ/1
40 TABLET, EXTENDED RELEASE ORAL EVERY 4 HOURS
Status: COMPLETED | OUTPATIENT
Start: 2020-01-27 | End: 2020-01-27

## 2020-01-27 RX ADMIN — METOPROLOL TARTRATE 50 MG: 50 TABLET, FILM COATED ORAL at 20:18

## 2020-01-27 RX ADMIN — METOPROLOL TARTRATE 50 MG: 50 TABLET, FILM COATED ORAL at 09:22

## 2020-01-27 RX ADMIN — NYSTATIN: 100000 POWDER TOPICAL at 09:24

## 2020-01-27 RX ADMIN — LEVALBUTEROL HYDROCHLORIDE 1.25 MG: 1.25 SOLUTION, CONCENTRATE RESPIRATORY (INHALATION) at 12:49

## 2020-01-27 RX ADMIN — TORSEMIDE 20 MG: 20 TABLET ORAL at 09:21

## 2020-01-27 RX ADMIN — SILDENAFIL 40 MG: 20 TABLET ORAL at 09:20

## 2020-01-27 RX ADMIN — DIGOXIN 125 MCG: 125 TABLET ORAL at 09:21

## 2020-01-27 RX ADMIN — APIXABAN 5 MG: 5 TABLET, FILM COATED ORAL at 09:20

## 2020-01-27 RX ADMIN — SILDENAFIL 40 MG: 20 TABLET ORAL at 20:17

## 2020-01-27 RX ADMIN — SILDENAFIL 40 MG: 20 TABLET ORAL at 16:10

## 2020-01-27 RX ADMIN — LEVALBUTEROL HYDROCHLORIDE 1.25 MG: 1.25 SOLUTION, CONCENTRATE RESPIRATORY (INHALATION) at 07:05

## 2020-01-27 RX ADMIN — POTASSIUM CHLORIDE 40 MEQ: 1500 TABLET, EXTENDED RELEASE ORAL at 13:00

## 2020-01-27 RX ADMIN — NICOTINE 7 MG: 7 PATCH TRANSDERMAL at 09:23

## 2020-01-27 RX ADMIN — IPRATROPIUM BROMIDE 0.5 MG: 0.5 SOLUTION RESPIRATORY (INHALATION) at 07:06

## 2020-01-27 RX ADMIN — QUETIAPINE FUMARATE 25 MG: 25 TABLET ORAL at 20:17

## 2020-01-27 RX ADMIN — IPRATROPIUM BROMIDE 0.5 MG: 0.5 SOLUTION RESPIRATORY (INHALATION) at 19:18

## 2020-01-27 RX ADMIN — TORSEMIDE 20 MG: 20 TABLET ORAL at 16:10

## 2020-01-27 RX ADMIN — POTASSIUM CHLORIDE 40 MEQ: 1500 TABLET, EXTENDED RELEASE ORAL at 09:19

## 2020-01-27 RX ADMIN — APIXABAN 5 MG: 5 TABLET, FILM COATED ORAL at 17:43

## 2020-01-27 RX ADMIN — IPRATROPIUM BROMIDE 0.5 MG: 0.5 SOLUTION RESPIRATORY (INHALATION) at 12:49

## 2020-01-27 RX ADMIN — LEVALBUTEROL HYDROCHLORIDE 1.25 MG: 1.25 SOLUTION, CONCENTRATE RESPIRATORY (INHALATION) at 19:18

## 2020-01-27 NOTE — PLAN OF CARE
Problem: Prexisting or High Potential for Compromised Skin Integrity  Goal: Skin integrity is maintained or improved  Description  INTERVENTIONS:  - Identify patients at risk for skin breakdown  - Assess and monitor skin integrity  - Assess and monitor nutrition and hydration status  - Monitor labs   - Assess for incontinence   - Turn and reposition patient  - Assist with mobility/ambulation  - Relieve pressure over bony prominences  - Avoid friction and shearing  - Provide appropriate hygiene as needed including keeping skin clean and dry  - Evaluate need for skin moisturizer/barrier cream  - Collaborate with interdisciplinary team   - Patient/family teaching  - Consider wound care consult   Outcome: Progressing     Problem: Potential for Falls  Goal: Patient will remain free of falls  Description  INTERVENTIONS:  - Assess patient frequently for physical needs  -  Identify cognitive and physical deficits and behaviors that affect risk of falls    -  [place] fall precautions as indicated by assessment   - Educate patient/family on patient safety including physical limitations  - Instruct patient to call for assistance with activity based on assessment  - Modify environment to reduce risk of injury  - Consider OT/PT consult to assist with strengthening/mobility  Outcome: Progressing     Problem: CARDIOVASCULAR - ADULT  Goal: Maintains optimal cardiac output and hemodynamic stability  Description  INTERVENTIONS:  - Monitor I/O, vital signs and rhythm  - Monitor for S/S and trends of decreased cardiac output  - Administer and titrate ordered vasoactive medications to optimize hemodynamic stability  - Assess quality of pulses, skin color and temperature  - Assess for signs of decreased coronary artery perfusion  - Instruct patient to report change in severity of symptoms  Outcome: Progressing  Goal: Absence of cardiac dysrhythmias or at baseline rhythm  Description  INTERVENTIONS:  - Continuous cardiac monitoring, vital signs, obtain 12 lead EKG if ordered  - Administer antiarrhythmic and heart rate control medications as ordered  - Monitor electrolytes and administer replacement therapy as ordered  Outcome: Progressing     Problem: RESPIRATORY - ADULT  Goal: Achieves optimal ventilation and oxygenation  Description  INTERVENTIONS:  - Assess for changes in respiratory status  - Assess for changes in mentation and behavior  - Position to facilitate oxygenation and minimize respiratory effort  - Oxygen administered by appropriate delivery if ordered  - Initiate smoking cessation education as indicated  - Encourage broncho-pulmonary hygiene including cough, deep breathe, Incentive Spirometry  - Assess the need for suctioning and aspirate as needed  - Assess and instruct to report SOB or any respiratory difficulty  - Respiratory Therapy support as indicated  Outcome: Progressing     Problem: METABOLIC, FLUID AND ELECTROLYTES - ADULT  Goal: Electrolytes maintained within normal limits  Description  INTERVENTIONS:  - Monitor labs and assess patient for signs and symptoms of electrolyte imbalances  - Administer electrolyte replacement as ordered  - Monitor response to electrolyte replacements, including repeat lab results as appropriate  - Instruct patient on fluid and nutrition as appropriate  Outcome: Progressing  Goal: Fluid balance maintained  Description  INTERVENTIONS:  - Monitor labs   - Monitor I/O and WT  - Instruct patient on fluid and nutrition as appropriate  - Assess for signs & symptoms of volume excess or deficit  Outcome: Progressing     Problem: SKIN/TISSUE INTEGRITY - ADULT  Goal: Skin integrity remains intact  Description  INTERVENTIONS  - Identify patients at risk for skin breakdown  - Assess and monitor skin integrity  - Assess and monitor nutrition and hydration status  - Monitor labs (i e  albumin)  - Assess for incontinence   - Turn and reposition patient  - Assist with mobility/ambulation  - Relieve pressure over bony prominences  - Avoid friction and shearing  - Provide appropriate hygiene as needed including keeping skin clean and dry  - Evaluate need for skin moisturizer/barrier cream  - Collaborate with interdisciplinary team (i e  Nutrition, Rehabilitation, etc )   - Patient/family teaching  Outcome: Progressing     Problem: MUSCULOSKELETAL - ADULT  Goal: Maintain or return mobility to safest level of function  Description  INTERVENTIONS:  - Assess patient's ability to carry out ADLs; assess patient's baseline for ADL function and identify physical deficits which impact ability to perform ADLs (bathing, care of mouth/teeth, toileting, grooming, dressing, etc )  - Assess/evaluate cause of self-care deficits   - Assess range of motion  - Assess patient's mobility  - Assess patient's need for assistive devices and provide as appropriate  - Encourage maximum independence but intervene and supervise when necessary  - Involve family in performance of ADLs  - Assess for home care needs following discharge   - Consider OT consult to assist with ADL evaluation and planning for discharge  - Provide patient education as appropriate  Outcome: Progressing     Problem: Nutrition/Hydration-ADULT  Goal: Nutrient/Hydration intake appropriate for improving, restoring or maintaining nutritional needs  Description  Monitor and assess patient's nutrition/hydration status for malnutrition  Collaborate with interdisciplinary team and initiate plan and interventions as ordered  Monitor patient's weight and dietary intake as ordered or per policy  Utilize nutrition screening tool and intervene as necessary  Determine patient's food preferences and provide high-protein, high-caloric foods as appropriate       INTERVENTIONS:  - Monitor oral intake, urinary output, labs, and treatment plans  - Assess nutrition and hydration status and recommend course of action  - Evaluate amount of meals eaten  - Assist patient with eating if necessary   - Allow adequate time for meals  - Recommend/ encourage appropriate diets, oral nutritional supplements, and vitamin/mineral supplements  - Order, calculate, and assess calorie counts as needed  - Recommend, monitor, and adjust tube feedings and TPN/PPN based on assessed needs  - Assess need for intravenous fluids  - Provide specific nutrition/hydration education as appropriate  - Include patient/family/caregiver in decisions related to nutrition  Outcome: Progressing     Problem: GENITOURINARY - ADULT  Goal: Maintains or returns to baseline urinary function  Description  INTERVENTIONS:  - Assess urinary function  - Encourage oral fluids to ensure adequate hydration if ordered  - Administer IV fluids as ordered to ensure adequate hydration  - Administer ordered medications as needed  - Offer frequent toileting  - Follow urinary retention protocol if ordered  Outcome: Progressing

## 2020-01-27 NOTE — PROGRESS NOTES
NEPHROLOGY PROGRESS NOTE   Valeri Parker 63 y o  female MRN: [id number]  Unit/Bed#: [facility] 511-01 Encounter: [id number]      ASSESSMENT/PLAN:  1  Acute kidney injury, POA:  Most likely due to cardiorenal syndrome/volume overload and also had some relative hypotension with Ace inhibitor use  · Status post treatment with Lasix drip and now on oral torsemide 20 mg bid  · Renal ultrasound normal except for 5 mm right nonobstructing calculus  · UA:  Moderate blood, 1+ protein, 0-1 WBC, 10-20 RBC  · Repeat UA at steady state  · Creatinine today is stable at 1 6  · Check a m  BMP  2  CKD stage 3:  Baseline creatinine 1 3-1 6  Likely due to hypertension, diabetes and cardiorenal syndrome  3  Acute on chronic diastolic CHF/pulmonary hypertension: on torsemide as above and on sildenafil   · Weight down 2kg in past 24 hours   · Continue oral fluid restriction and low sodium diet   4  Metabolic alkalosis:  Bicarb improving to 37 today  Previously was on Diamox  · Replace potassium   5  Hypokalemia: K 3 3 today and getting k-dur 40meq x 2 today   6  Mild hypernatremia: would continue low sodium diet but increase fluid restriction to 1800cc/day   7  Pericardial effusion  8  Severe COPD        SUBJECTIVE:  Feeling a little better  She has no current chest pain or shortness of breath  She is eating and drinking okay  She was transferred out of the unit yesterday      OBJECTIVE:  Current Weight: Weight - Scale: 101 kg (221 lb 12 8 oz)  Vitals:    01/27/20 0707   BP: 127/78   Pulse: 76   Resp: 20   Temp: 97 7 °F (36 5 °C)   SpO2: 91%       Intake/Output Summary (Last 24 hours) at 1/27/2020 1005  Last data filed at 1/27/2020 0900  Gross per 24 hour   Intake 1260 ml   Output 907 ml   Net 353 ml       General:  No acute distress  Skin:  No rash  Eyes:  Sclerae anicteric  ENT:  Moist mucous membranes  Neck:  Trachea midline with no JVD  Chest:  Clear to auscultation bilaterally  CVS:  Regular rate and rhythm  Abdomen:  Soft, nontender, nondistended  Extremities:  No edema  Neuro:  Awake and alert  Psych:  Appropriate affect      Medications:  Scheduled Meds:  Current Facility-Administered Medications:  albuterol 2 5 mg Nebulization Q4H PRN Lauryn Woods MD   apixaban 5 mg Oral BID Lauryn Woods MD   dextrose 25 mL Intravenous PRN Lauryn Woods MD   digoxin 125 mcg Oral Daily Lauryn Woods MD   insulin lispro 1-5 Units Subcutaneous TID AC Lauryn Woods MD   insulin lispro 1-5 Units Subcutaneous HS Lauryn Woods MD   ipratropium 0 5 mg Nebulization TID Lauryn Woods MD   levalbuterol 1 25 mg Nebulization TID Lauryn Woods MD   metoprolol 5 mg Intravenous Q6H PRN Asia Potter MD   metoprolol tartrate 50 mg Oral Q12H [address] Lauryn Woods MD   nicotine 7 mg Transdermal Daily Lauryn Woods MD   nystatin  Topical BID Lauryn Woods MD   ondansetron 4 mg Intravenous Q6H PRN Lauryn Woods MD   potassium chloride 40 mEq Oral Q4H Deisy Zimmerman MD   QUEtiapine 25 mg Oral QPM Lauryn Woods MD   sildenafil 40 mg Oral TID Lauryn Woods MD   sodium chloride 1 spray Each Nare Q2H PRN Lauryn Woods MD   torsemide 20 mg Oral BID (diuretic) Lauryn Woods MD       PRN Meds:   albuterol    dextrose    metoprolol    ondansetron    sodium chloride    Laboratory Results:  Results from last 7 days   Lab Units 01/27/20  0433 01/26/20  0548 01/25/20  0240 01/24/20  1833 01/24/20  0437 01/23/20  0450 01/22/20  0522 01/22/20  0014 01/21/20  0550   WBC Thousand/uL 7 03  --  7 55  --  8 28 9 44 7 62 8 05  --    HEMOGLOBIN g/dL 14 1  --  14 3  --  14 1 15 0 14 6 15 2  --    HEMATOCRIT % 49 6*  --  47 5*  --  48 3* 50 6* 49 2* 50 2*  --    PLATELETS Thousands/uL 166  --  148*  --  128* 106* 92* 86*  --    SODIUM mmol/L 145 143 145 144 142 139 140 142 145   POTASSIUM mmol/L 3 3* 3 6 3 5 3 9 4 0 3 7 3 4* 3 6 3 8   CHLORIDE mmol/L 104 101 100 99* 96* 91* 90* 90* 96*   CO2 mmol/L 37* 40* 41* 42* 43* >45* >45* >45* >45*   BUN mg/dL 75* 82* 87* 89* 90* 90* 91* 100* 100*   CREATININE mg/dL 1 60* 1 63* 1 65* 1 84* 1 71* 1 70* 1 75* 1 86* 2 00*   CALCIUM mg/dL 9 0 9 2 9 0 9 1 9 2 8 8 8 7 8 6 8 3   MAGNESIUM mg/dL  --  2 6 2 3  --  2 3 1 9  --  2 1 1 9   PHOSPHORUS mg/dL  --  3 8  --   --   --   --   --  2 4* 2 8

## 2020-01-27 NOTE — PROGRESS NOTES
Teaching Physician Statement  I performed history and exam of patient  I discussed with the Cardiology Fellow  I agree with the [facility] documented findings and plan of care  I personally interviewed and examined patient, reviewed labs, telemetry, vitals, and relevant studies  Plan:  --Aggressive pulmonary toilet and ambulate as tolerated  --Continue O2 via NC to maintain O2 sat>92%; wean O2 as tolerated  --Continue sildenafil 40 mg PO q8hrs  --Diuretics per fellow note -> additional torsemide today given elevated JVP and +AJR  --Can consider addition of MRA prior to D/C for HFpEF and potential PH benefit  --Continue digoxin    Assessment:  # Pulmonary HTN w/ RV dysfunction:   Diag:  TTE 2/11/19:  RV dilated, PASP 60 mmHg    TTE 1/15/20  LVEF: normal  LVIDd: small  RV: severely dilated , hypokinetic  MR:  PASP: 80's  RVOT: not assessed  Other: large LA    CT Chest  1/14/20: main PA 6 5 cm    RHC 1/24/2020 on sildanefil 40 mg TID  RA: 8 mmHg  PA: 44/26/33 mmHg  PCWP: 9 mmHg  PA sat 67%  CO 5 3 l/min  PVR 4 5 DRUMMOND    Impression: Phenotypic PAH likely from out of proportion Group 2 PH (HFpEF) and type II lung disease (severe COPD) resulting in RV phenotype  TTE shows large LA (likely w/ LA compliance abnormality w/ AFib) and thickened LV w/ OHV, ? COPD,  RADHA  There is now RV-PA uncoupling w/ high PVR state  Now on sildenafil therapy started on this admission     Continues to have elevated BiV filling pressures on exam w/ +JVP ~10-12 cm w/ AJR to above earlobe    Therapeutic:  --Continue O2 via NC to maintain O2 sat>92%  --Continue sildenafil 40 mg PO q8hrs  --Diuretics per fellow note  --Can consider addition of MRA prior to D/C for HFpEF and potential PH benefit  --Continue digoxin    # Chronic HFpEF, Stage C  --Diuresed 58 lbs per chart since admit  --As above     # pericardial effusion: Likely 2/2 to chronically elevated PA, RA pressures and marker of severity of her PAH     # Severe COPD/ RADHA/ OHV syndrome  On high percentage oxygen at home  # Acute on chronic hypoxic respiratory failure with hypercapnia  CO2 persistently in the 70's on ABG  # Chronic Afib  # Obesity, BMI 42  # SAPNA on CKD  # med non compliance  # depression hx  # DM2  # HIT    Rest of plan per fellow note

## 2020-01-27 NOTE — PROGRESS NOTES
Progress note - Palliative and Supportive Care   Rui Woods 63 y o  female [id number]    Assessment:  Patient Active Problem List   Diagnosis    COPD [facility]chronic obstructive pulmonary disease) [facility] [address] [facility]    Permanent atrial fibrillation    Acute on chronic respiratory failure with hypoxia and hypercapnia (HCC)    Chronic venous stasis dermatitis of both lower extremities    Hypoalbuminemia    Type 2 diabetes mellitus without complication (HCC)    Non-rheumatic mitral regurgitation    Nonsustained ventricular tachycardia (HCC)    Obesity    Vitamin D deficiency    Depression    Dyslipidemia    Left ventricular hypertrophy    CKD (chronic kidney disease) stage 3, GFR 30-59 ml/min (HCC)    Chronic diastolic CH[facility] (congestive heart failure) ([facility])    Compression fracture of first lumbar vertebra (HCC)    SAPNA (acute kidney injury) ([facility] [address] )    Acute on chronic diastolic congestive heart failure (HCC)    Chronic renal impairment    Pericardial effusion    Pulmonary hypertension (HCC)    Benign hypertensive heart and kidney disease with CHF, NYHA class 2 and CKD stage 3 ([facility])    Metabolic alkalosis     Plan:  1  Symptom management - no needs at this time    2  Goals -    - Discussed code status at length, she clarifies that she does not want CPR or intubation, but would like to discuss this with her son tomorrow (at 12:30) until formally changing code status  - Aaliyah's goal is to regain as much functional independence as possible  She is willing to attend rehab upon discharge and would like to live with her son if possible  This will be short term until her moves to [place] in the coming months  If Bryn Mcnamara needs LTC in the future she is agreeable  She does not want to be on any level of life support including ventilation or artificial nutrition       3  Social support   - time spent providing supportive listening and answering questions to patient's satisfaction   - MICKY Goins following and to assist with social security disability application     Code Status: full - Level 1   Decisional apparatus:  Patient is competent on my exam today  If competence is lost, patient's substitute decision maker would default to adult son by [place]  Advance Directive / Living Will / POLST:  None completed    Interval history:       No events overnight  Patient was non-compliant with BiPAP last night  Fluid overload status continues to be optimized  She reports her work of breathing is significantly improved since admission, but is slightly worse than baseline  She denies pain  Appetite is good  Sleep pattern is difficult given interruptions and previously working 2nd shift  MEDICATIONS / ALLERGIES:     all current active meds have been reviewed    No Active Allergies    OBJECTIVE:    Physical Exam  Physical Exam   Constitutional: She is oriented to person, place, and time  She appears well-developed  HENT:   Head: Normocephalic and atraumatic  Eyes: Conjunctivae are normal    Cardiovascular: Normal rate  Pulmonary/Chest:   Mild increased work of breathing  10L via NC   Abdominal: She exhibits no distension  There is no guarding  Musculoskeletal: She exhibits edema  Neurological: She is alert and oriented to person, place, and time  Skin: Skin is warm and dry  Ecchymosis on RUE   Psychiatric: She has a normal mood and affect  Her behavior is normal  Judgment and thought content normal        Lab Results:   I have personally reviewed pertinent labs  , CBC:   Lab Results   Component Value Date    WBC 7 03 01/27/2020    HGB 14 1 01/27/2020    HCT 49 6 (H) 01/27/2020     (H) 01/27/2020     01/27/2020    MCH 29 8 01/27/2020    MCHC 28 4 (L) 01/27/2020    RDW 17 7 (H) 01/27/2020    MPV 13 3 (H) 01/27/2020    NRBC 0 01/27/2020   , CMP:   Lab Results   Component Value Date    SODIUM 145 01/27/2020    K 3 3 (L) 01/27/2020     01/27/2020    CO2 37 (H) 01/27/2020    BUN 75 (H) 01/27/2020 CREATININE 1 60 (H) 01/27/2020    CALCIUM 9 0 01/27/2020    EGFR 35 01/27/2020     Imaging Studies: reviewed pertinent studies  EKG, Pathology, and Other Studies: reviewed pertinent studies    Counseling / Coordination of Care  Total floor / unit time spent today 60+ minutes  Greater than 50% of total time was spent with the patient and / or family counseling and / or coordination of care  A description of the counseling / coordination of care: approximately 40 minutes spent with patient and LSW discussing goals of care, additional 20 minutes spent discussing with primary team, RN, and reviewing chart

## 2020-01-27 NOTE — UTILIZATION REVIEW
Continued Stay Review    Date: 1/27/2020 Monday                         Current Patient Class: INPATIENT     Current Level of Care: MED SURG     HPI: 63year old female initially admitted on  to [facility] on 1/6/20 2nd Acute on Chronic respiratory failure with hypoxia and hypercapnia and severe volume overload  Initial tx with BiPAP and Iv Lasix  On 1/10/20 - Transferred to ICU post Rapid response 2nd  Acute mental status change and respiratory acidosis  Remained  In  ICU 2nd decompensation with slow improvement  PER CARDIOLOGY AND RENAL 2ND SLOW IMPROVEMENT with ECHO revealing severe pulmonary hypertension - DECISION TO TRANSFER TO [facility] 1/21/20 AT 2350 FOR HEART FAILURE EVALUATION AND RIGHT HEART CATHETERIZATION ON 1/23/2020 PER HEART FAILURE     RIGHT HEART CATH 1/23/2020:  on sildanefil 40 mg TID  RA: 8 mmHg  PA: 44/26/33 mmHg  PCWP: 9 mmHg  PA sat 67%  CO 5 3 l/min  PVR 4 5 DRUMMOND     CURRENT ASSESSMENT/ PLAN PER TODAY'S   Principal Problem:    Acute on chronic respiratory failure with hypoxia and hypercapnia (HCC)  -on chronic home O2  -CO2 persistently in the 70s on ABG this admission  -started on Diamox, bicarb decreased on BMP to 43 today    HFpEF, LVEF 58%, LVIDd 5 3, RVIDd 4 8  -  currently on diuretic regimen of torsemide 20mg p o  b i d   -down 58 lb from admission per chart documentation  RHC 1/23 on sildenafil 40tid: RA: 8 mmHg, PA: 44/26/33 mmHg, PCWP: 9 mmHg, PA sat 67%, CO 5 3 l/min, PVR 4 5 DRUMMOND  -continue with sildenafil 40 mg t i d   And Lopressor 50 mg b i d   -placed nutritionist consult to educate about low-sodium diet and fluid restriction    Pulmonary hypertension  -likely WHO group 2 (diastolic HF) and group 3 (COPD/OHS), with phenotypic expression of group 1  -started on sildenafil this admission, currently on 40 mg t i d     Severe COPD/OHS  -per documentation requires 10 L nasal cannula home  -currently on her home 10L NC, on BiPAP at night    Permanent atrial fibrillation  -anticoagulant Eliquis 5 mg b i d   -currently rate controlled on Lopressor 50 mg b i d   And digoxin 125 mcg daily  -patient is on metoprolol and Cardizem at home rate control normally    Type 2 diabetes mellitus without complication (HCC)    SAPNA (acute kidney injury) ([facility] [address] )    Acute on chronic diastolic congestive heart failure (HCC)    Pericardial effusion    Pulmonary hypertension ([facility] )    Pertinent Labs/Diagnostic Results:   Results from last 7 days   Lab Units 01/27/20  0433 01/25/20  0240 01/24/20  0437 01/23/20  0450 01/22/20  0522   WBC Thousand/uL 7 03 7 55 8 28 9 44 7 62   HEMOGLOBIN g/dL 14 1 14 3 14 1 15 0 14 6   HEMATOCRIT % 49 6* 47 5* 48 3* 50 6* 49 2*   PLATELETS Thousands/uL 166 148* 128* 106* 92*   NEUTROS ABS Thousands/µL 4 47  --   --  7 34 5 52       Results from last 7 days   Lab Units 01/27/20  0433 01/26/20  0548 01/25/20  0240 01/24/20  1833 01/24/20  0437 01/23/20  0450  01/22/20  0014 01/21/20  0550   SODIUM mmol/L 145 143 145 144 142 139   < > 142 145   POTASSIUM mmol/L 3 3* 3 6 3 5 3 9 4 0 3 7   < > 3 6 3 8   CHLORIDE mmol/L 104 101 100 99* 96* 91*   < > 90* 96*   CO2 mmol/L 37* 40* 41* 42* 43* >45*   < > >45* >45*   ANION GAP mmol/L 4 2* 4 3* 3*  --   --   --   --    BUN mg/dL 75* 82* 87* 89* 90* 90*   < > 100* 100*   CREATININE mg/dL 1 60* 1 63* 1 65* 1 84* 1 71* 1 70*   < > 1 86* 2 00*   EGFR ml/min/1 73sq m 35 34 34 30 33 33   < > 29 27   CALCIUM mg/dL 9 0 9 2 9 0 9 1 9 2 8 8   < > 8 6 8 3   CALCIUM, IONIZED mmol/L  --   --   --   --   --  0 99*  --  0 99*  --    MAGNESIUM mg/dL  --  2 6 2 3  --  2 3 1 9  --  2 1 1 9   PHOSPHORUS mg/dL  --  3 8  --   --   --   --   --  2 4* 2 8     Results from last 7 days   Lab Units 01/24/20  1833 01/22/20  0014 01/21/20  0550   AST U/L 15 16 20   ALT U/L 18 18 13   ALK PHOS U/L 109 116 97   TOTAL PROTEIN g/dL 6 7 6 2* 6 0*   ALBUMIN g/dL 2 7* 2 7* 2 5*   TOTAL BILIRUBIN mg/dL 2 41* 2 57* 2 29*     Results from last 7 days   Lab Units 01/27/20  1041 01/27/20  0621 01/26/20  2109 01/26/20  1644 01/26/20  1056 01/26/20  0619 01/25/20  2102 01/25/20  1655 01/25/20  1125 01/25/20  0601   POC GLUCOSE mg/dl 121 96 141* 134 132 92 123 114 130 118       Results from last 7 days   Lab Units 01/25/20  0439 01/24/20  1558 01/22/20  0614 01/22/20  0017   PH ART  7 478* 7 447 7 492* 7 466*   PCO2 ART mm Hg 58 8* 73 1* 73 9* 79 5*   PO2 ART mm Hg 52 7* 49 7* 71 3* 87 2   HCO3 ART mmol/L 42 6* 49 3* 55 3* 56 0*   BASE EXC ART mmol/L 15 9 20 5 26 0 26 0   O2 CONTENT ART mL/dL 17 6 17 6 20 2 20 5   O2 HGB, ARTERIAL % 85 9* 84 2* 92 5* 94 5   ABG SOURCE  Radial, Right Radial, Left  --  Radial, Right   NON VENT TYPE HFNC   --  HFNC Flow  --  HFNC Flow   HFNC FLOW LPM   --   --   --  50     Vital Signs:   Blood pressure 127/78, pulse 76, temperature 97 7 °F (36 5 °C), temperature source Axillary, resp  rate 20, height 5' 4" (1 626 m), weight 101 kg (221 lb 12 8 oz), SpO2 91 % , Body mass index is 38 07 kg/m²  Orthostatic Blood Pressures      Most Recent Value   Blood Pressure  127/78 filed at 01/27/2020 0707   Patient Position - Orthostatic VS  Lying filed at 01/27/2020 0707     Respiratory Data st 48 hours)     01/26 1638 01/26 1651 01/26 1900 01/26 1927 01/26 2000 01/26 2205 01/27 0000 01/27 0400 01/27 0707 01/27 1154   Mode HFNC         HFNC   O2 Device    High fl    High fl    High fl    High fl    High fl    High fl    O2 Therapy    Oxygen     Oxygen     O2 Flow Rate (L/min) (L/min) 7 5 10 6 8 15 15 15 15 15 LHFNC     Intake/Output Summary (Last 24 hours) at 1/27/2020 1056  Gross per 24 hour   Intake 1260 ml   Output 907 ml   Net 353 ml     Diet Cardiovascular;  Sodium 2 GM; Consistent Carbohydrate Diet Level 2 (5 carb servings/75 grams CHO/meal), Fluid Restriction 1800 ML     Scheduled Medications:  apixaban 5 mg Oral BID   digoxin 125 mcg Oral Daily   insulin lispro 1-5 Units Subcutaneous TID AC   insulin lispro 1-5 Units Subcutaneous HS   ipratropium 0 5 mg Nebulization TID   levalbuterol 1 25 mg Nebulization TID   metoprolol tartrate 50 mg Oral Q12H NEREYDA   nicotine 7 mg Transdermal Daily   nystatin  Topical BID   QUEtiapine 25 mg Oral QPM   sildenafil 40 mg Oral TID   torsemide 20 mg Oral BID (diuretic)     PRN Meds:  albuterol 2 5 mg Nebulization Q4H PRN   dextrose 25 mL Intravenous PRN   metoprolol 5 mg Intravenous Q6H PRN   ondansetron 4 mg Intravenous Q6H PRN   sodium chloride 1 spray Each Nare Q2H PRN  GIVEN X 1     Discharge Plan:   TO BE DETERMINED  INPATIENT CASE MANAGEMENT FOLLOWING CLOSELY FOR ALL DISCHARGE NEEDS    Network Utilization Review Department  [email or web address]  ATTENTION: Please call with any questions or concerns to [phone] and carefully listen to the prompts so that you are directed to the right person  All voicemails are confidential   Joe Haddad all requests for admission clinical reviews, approved or denied determinations and any other requests to dedicated fax number below belonging to the campus where the patient is receiving treatment   List of dedicated fax numbers for the Facilities:  [address] DENIALS (Administrative/Medical Necessity) [phone]   [address] [place] [address] (Maternity/NICU/Pediatrics) [phone]   Deneise Notice [phone]   Shelby Greenfield [phone]   Ronnie Wyatt [phone]   Robinson Alvarez [phone]   [address] [facility] [phone]   [facility]  [phone]   [address] [place] [address], [address]  [address] [facility] [address] [facility] [phone]

## 2020-01-27 NOTE — PROGRESS NOTES
Cardiology Progress Note - Shante Winters 63 y o  female MRN: [id number]    Unit/Bed#: [facility] 511-01 Encounter: [id number]      Assessment & Plan:  Principal Problem:    Acute on chronic respiratory failure with hypoxia and hypercapnia (HCC)  -on chronic home O2  -CO2 persistently in the 70s on ABG this admission  -started on Diamox, bicarb decreased on BMP to 43 today    HFpEF, LVEF 58%, LVIDd 5 3, RVIDd 4 8  -  currently on her home diuretic regimen of torsemide 20mg p o  b i d   -down 58 lb from admission per chart documentation  [facility] 1/23 on sildenafil 40tid: RA: 8 mmHg, PA: 44/26/33 mmHg, PCWP: 9 mmHg, PA sat 67%, CO 5 3 l/min, PVR 4 5 DRUMMOND  -continue with sildenafil 40 mg t i d  And Lopressor 50 mg b i d   -placed nutritionist consult to educate about low-sodium diet and fluid restriction    Pulmonary hypertension  -likely WHO group 2 (diastolic HF) and group 3 (COPD/OHS), with phenotypic expression of group 1  -started on sildenafil this admission, currently on 40 mg t i d  Severe COPD/OHS  -per documentation requires 10 L nasal cannula home  -currently on her home 10L NC, on BiPAP at night    Permanent atrial fibrillation  -anticoagulant Eliquis 5 mg b i d   -currently rate controlled on Lopressor 50 mg b i d  And digoxin 125 mcg daily  -patient is on metoprolol and Cardizem at home rate control normally    Type 2 diabetes mellitus without complication (HCC)    SAPNA (acute kidney injury) ([facility] [address] )    Acute on chronic diastolic congestive heart failure (HCC)    Pericardial effusion    Pulmonary hypertension (HCC)      Subjective:   Patient seen and examined  No significant events overnight  She reports feeling well today and has no complaints  Denies chest pain, abdominal pain, nausea, vomiting, fever, chills, headache, dizziness or other cardiac symptoms  Objective:     Vitals: Blood pressure 127/78, pulse 76, temperature 97 7 °F (36 5 °C), temperature source Axillary, resp   rate 20, height 5' 4" (1 626 m), weight 101 kg (221 lb 12 8 oz), SpO2 91 % , Body mass index is 38 07 kg/m² ,   Orthostatic Blood Pressures      Most Recent Value   Blood Pressure  127/78 filed at 01/27/2020 0707   Patient Position - Orthostatic VS  Lying filed at 01/27/2020 4566            Intake/Output Summary (Last 24 hours) at 1/27/2020 1056  Last data filed at 1/27/2020 0900  Gross per 24 hour   Intake 1260 ml   Output 907 ml   Net 353 ml           Physical Exam:    GEN: Doug Francois appears well, alert and oriented x 3, pleasant and cooperative   HEENT: anicteric, mucous membranes moist  NECK: + JVD, no carotid bruits   HEART: regular rhythm, normal S1 and S2, + systolic murmur   LUNGS: clear to auscultation bilaterally  ABDOMEN: normal bowel sounds, soft, no tenderness, no distention  EXTREMITIES: peripheral pulses normal; no clubbing, cyanosis, or edema  NEURO: no focal findings   SKIN: normal without suspicious lesions on exposed skin      Current Facility-Administered Medications:     albuterol inhalation solution 2 5 mg, 2 5 mg, Nebulization, Q4H PRN, Cecilia Douglas MD    apixaban (ELIQUIS) tablet 5 mg, 5 mg, Oral, BID, Cecilia Douglas MD, 5 mg at 01/27/20 0920    dextrose 50 % IV solution 25 mL, 25 mL, Intravenous, PRN, Cecilia Douglas MD, 25 mL at 01/23/20 0758    digoxin (LANOXIN) tablet 125 mcg, 125 mcg, Oral, Daily, Cecilia Douglas MD, 125 mcg at 01/27/20 0921    insulin lispro (HumaLOG) 100 units/mL subcutaneous injection 1-5 Units, 1-5 Units, Subcutaneous, TID AC, 1 Units at 01/24/20 1204 **AND** Fingerstick Glucose (POCT), , , TID ACAsia MD    insulin lispro (HumaLOG) 100 units/mL subcutaneous injection 1-5 Units, 1-5 Units, Subcutaneous, HS, Asia Potter MD, 2 Units at 01/24/20 2251    ipratropium (ATROVENT) 0 02 % inhalation solution 0 5 mg, 0 5 mg, Nebulization, TID, Asia Potter MD, 0 5 mg at 01/27/20 0706    levalbuterol (XOPENEX) inhalation solution 1 25 mg, 1 25 mg, Nebulization, TID, Cecilia Douglas MD, 1 25 mg at 01/27/20 0705    metoprolol (LOPRESSOR) injection 5 mg, 5 mg, Intravenous, Q6H PRN, Cecilia Douglas MD    metoprolol tartrate (LOPRESSOR) tablet 50 mg, 50 mg, Oral, Q12H [address], Asia Potter MD, 50 mg at 01/27/20 0922    nicotine (NICODERM CQ) 7 mg/24hr TD 24 hr patch 7 mg, 7 mg, Transdermal, Daily, Asia Potter MD, 7 mg at 01/27/20 0887    nystatin (MYCOSTATIN) powder, , Topical, BID, Asia Potter MD    ondansetron (ZOFRAN) injection 4 mg, 4 mg, Intravenous, Q6H PRN, Cecilia Douglas MD    potassium chloride (K-DUR,KLOR-CON) CR tablet 40 mEq, 40 mEq, Oral, Q4H, Amandeep Mullins MD, 40 mEq at 01/27/20 0919    QUEtiapine (SEROquel) tablet 25 mg, 25 mg, Oral, QPM, Asia Potter MD, 25 mg at 01/26/20 2029    sildenafil (REVATIO) tablet 40 mg, 40 mg, Oral, TID, Asia Potter MD, 40 mg at 01/27/20 0920    sodium chloride (OCEAN) 0 65 % nasal spray 1 spray, 1 spray, Each Nare, Q2H PRN, Cecilia Douglas MD, 1 spray at 01/26/20 1702    torsemide (DEMADEX) tablet 20 mg, 20 mg, Oral, BID (diuretic), Cecilia Douglas MD, 20 mg at 01/27/20 0921    Labs & Results:    Lab Results   Component Value Date    TROPONINI 0 07 (H) 01/06/2020    TROPONINI 0 09 (H) 02/08/2019       Lab Results   Component Value Date    GLUCOSE 95 01/10/2020    CALCIUM 9 0 01/27/2020     05/20/2015    K 3 3 (L) 01/27/2020    CO2 37 (H) 01/27/2020     01/27/2020    BUN 75 (H) 01/27/2020    CREATININE 1 60 (H) 01/27/2020       Lab Results   Component Value Date    WBC 7 03 01/27/2020    HGB 14 1 01/27/2020    HCT 49 6 (H) 01/27/2020     (H) 01/27/2020     01/27/2020           No results found for: CHOL  Lab Results   Component Value Date    HDL 32 (L) 02/09/2019    HDL 36 (L) 02/13/2018     Lab Results   Component Value Date    LDLCALC 32 02/09/2019    LDLCALC 86 02/13/2018     Lab Results   Component Value Date    TRIG 61 02/09/2019 TRIG 93 02/13/2018       Lab Results   Component Value Date    ALT 18 01/24/2020    AST 15 01/24/2020         EKG personally reviewed by )Laurita Montenegro MD  No acute changes   TELE: no acute events overnight

## 2020-01-28 PROBLEM — N18.30 ACUTE RENAL FAILURE SUPERIMPOSED ON STAGE 3 CHRONIC KIDNEY DISEASE (HCC): Status: ACTIVE | Noted: 2020-01-07

## 2020-01-28 LAB
ANION GAP SERPL CALCULATED.3IONS-SCNC: 5 MMOL/L (ref 4–13)
BASOPHILS # BLD AUTO: 0.13 THOUSANDS/ΜL (ref 0–0.1)
BASOPHILS NFR BLD AUTO: 2 % (ref 0–1)
BUN SERPL-MCNC: 68 MG/DL (ref 5–25)
CALCIUM SERPL-MCNC: 8.8 MG/DL (ref 8.3–10.1)
CHLORIDE SERPL-SCNC: 108 MMOL/L (ref 100–108)
CO2 SERPL-SCNC: 32 MMOL/L (ref 21–32)
CREAT SERPL-MCNC: 1.58 MG/DL (ref 0.6–1.3)
EOSINOPHIL # BLD AUTO: 0.43 THOUSAND/ΜL (ref 0–0.61)
EOSINOPHIL NFR BLD AUTO: 7 % (ref 0–6)
ERYTHROCYTE [DISTWIDTH] IN BLOOD BY AUTOMATED COUNT: 17.8 % (ref 11.6–15.1)
GFR SERPL CREATININE-BSD FRML MDRD: 36 ML/MIN/1.73SQ M
GLUCOSE SERPL-MCNC: 104 MG/DL (ref 65–140)
GLUCOSE SERPL-MCNC: 116 MG/DL (ref 65–140)
GLUCOSE SERPL-MCNC: 130 MG/DL (ref 65–140)
GLUCOSE SERPL-MCNC: 89 MG/DL (ref 65–140)
GLUCOSE SERPL-MCNC: 99 MG/DL (ref 65–140)
HCT VFR BLD AUTO: 48.2 % (ref 34.8–46.1)
HGB BLD-MCNC: 13.9 G/DL (ref 11.5–15.4)
IMM GRANULOCYTES # BLD AUTO: 0.03 THOUSAND/UL (ref 0–0.2)
IMM GRANULOCYTES NFR BLD AUTO: 1 % (ref 0–2)
LYMPHOCYTES # BLD AUTO: 0.89 THOUSANDS/ΜL (ref 0.6–4.47)
LYMPHOCYTES NFR BLD AUTO: 14 % (ref 14–44)
MCH RBC QN AUTO: 30.4 PG (ref 26.8–34.3)
MCHC RBC AUTO-ENTMCNC: 28.8 G/DL (ref 31.4–37.4)
MCV RBC AUTO: 106 FL (ref 82–98)
MONOCYTES # BLD AUTO: 1.04 THOUSAND/ΜL (ref 0.17–1.22)
MONOCYTES NFR BLD AUTO: 16 % (ref 4–12)
NEUTROPHILS # BLD AUTO: 4.08 THOUSANDS/ΜL (ref 1.85–7.62)
NEUTS SEG NFR BLD AUTO: 60 % (ref 43–75)
NRBC BLD AUTO-RTO: 0 /100 WBCS
PLATELET # BLD AUTO: 169 THOUSANDS/UL (ref 149–390)
PMV BLD AUTO: 13.2 FL (ref 8.9–12.7)
POTASSIUM SERPL-SCNC: 4 MMOL/L (ref 3.5–5.3)
RBC # BLD AUTO: 4.57 MILLION/UL (ref 3.81–5.12)
SODIUM SERPL-SCNC: 145 MMOL/L (ref 136–145)
WBC # BLD AUTO: 6.6 THOUSAND/UL (ref 4.31–10.16)

## 2020-01-28 PROCEDURE — 97112 NEUROMUSCULAR REEDUCATION: CPT

## 2020-01-28 PROCEDURE — 97116 GAIT TRAINING THERAPY: CPT

## 2020-01-28 PROCEDURE — 99232 SBSQ HOSP IP/OBS MODERATE 35: CPT | Performed by: INTERNAL MEDICINE

## 2020-01-28 PROCEDURE — 97530 THERAPEUTIC ACTIVITIES: CPT

## 2020-01-28 PROCEDURE — 82948 REAGENT STRIP/BLOOD GLUCOSE: CPT

## 2020-01-28 PROCEDURE — 85025 COMPLETE CBC W/AUTO DIFF WBC: CPT | Performed by: INTERNAL MEDICINE

## 2020-01-28 PROCEDURE — 99231 SBSQ HOSP IP/OBS SF/LOW 25: CPT | Performed by: INTERNAL MEDICINE

## 2020-01-28 PROCEDURE — 94640 AIRWAY INHALATION TREATMENT: CPT

## 2020-01-28 PROCEDURE — 80048 BASIC METABOLIC PNL TOTAL CA: CPT | Performed by: PHYSICIAN ASSISTANT

## 2020-01-28 PROCEDURE — 94760 N-INVAS EAR/PLS OXIMETRY 1: CPT

## 2020-01-28 PROCEDURE — 97535 SELF CARE MNGMENT TRAINING: CPT

## 2020-01-28 RX ADMIN — QUETIAPINE FUMARATE 25 MG: 25 TABLET ORAL at 20:07

## 2020-01-28 RX ADMIN — SILDENAFIL 40 MG: 20 TABLET ORAL at 08:04

## 2020-01-28 RX ADMIN — IPRATROPIUM BROMIDE 0.5 MG: 0.5 SOLUTION RESPIRATORY (INHALATION) at 07:38

## 2020-01-28 RX ADMIN — SILDENAFIL 40 MG: 20 TABLET ORAL at 17:24

## 2020-01-28 RX ADMIN — METOPROLOL TARTRATE 50 MG: 50 TABLET, FILM COATED ORAL at 20:07

## 2020-01-28 RX ADMIN — IPRATROPIUM BROMIDE 0.5 MG: 0.5 SOLUTION RESPIRATORY (INHALATION) at 19:31

## 2020-01-28 RX ADMIN — LEVALBUTEROL HYDROCHLORIDE 1.25 MG: 1.25 SOLUTION, CONCENTRATE RESPIRATORY (INHALATION) at 07:37

## 2020-01-28 RX ADMIN — APIXABAN 5 MG: 5 TABLET, FILM COATED ORAL at 08:05

## 2020-01-28 RX ADMIN — NYSTATIN: 100000 POWDER TOPICAL at 08:05

## 2020-01-28 RX ADMIN — NICOTINE 7 MG: 7 PATCH TRANSDERMAL at 08:04

## 2020-01-28 RX ADMIN — METOPROLOL TARTRATE 50 MG: 50 TABLET, FILM COATED ORAL at 08:04

## 2020-01-28 RX ADMIN — LEVALBUTEROL HYDROCHLORIDE 1.25 MG: 1.25 SOLUTION, CONCENTRATE RESPIRATORY (INHALATION) at 19:31

## 2020-01-28 RX ADMIN — TORSEMIDE 20 MG: 20 TABLET ORAL at 08:05

## 2020-01-28 RX ADMIN — APIXABAN 5 MG: 5 TABLET, FILM COATED ORAL at 17:24

## 2020-01-28 RX ADMIN — DIGOXIN 125 MCG: 125 TABLET ORAL at 08:04

## 2020-01-28 RX ADMIN — SILDENAFIL 40 MG: 20 TABLET ORAL at 20:07

## 2020-01-28 RX ADMIN — IPRATROPIUM BROMIDE 0.5 MG: 0.5 SOLUTION RESPIRATORY (INHALATION) at 13:25

## 2020-01-28 RX ADMIN — TORSEMIDE 20 MG: 20 TABLET ORAL at 17:25

## 2020-01-28 RX ADMIN — LEVALBUTEROL HYDROCHLORIDE 1.25 MG: 1.25 SOLUTION, CONCENTRATE RESPIRATORY (INHALATION) at 13:25

## 2020-01-28 NOTE — PROGRESS NOTES
NEPHROLOGY PROGRESS NOTE   Uyen Moreno 63 y o  female MRN: [id number]  Unit/Bed#: [facility] 511-01 Encounter: [id number]      ASSESSMENT/PLAN:  1  Acute kidney injury, POA:  Most likely due to cardiorenal syndrome/volume overload and also had some relative hypotension with Ace inhibitor use  · Status post treatment with Lasix drip and now on oral torsemide 20 mg bid  · Renal ultrasound normal except for 5 mm right nonobstructing calculus  · UA:  Moderate blood, 1+ protein, 0-1 WBC, 10-20 RBC  · Repeat UA once she is at steady state  · Creatinine today is stable at 1 6  · Check a m  BMP  2  CKD stage 3:  Baseline creatinine 1 3-1 6  Likely due to hypertension, diabetes and cardiorenal syndrome  3  Acute on chronic diastolic CHF/pulmonary hypertension: on torsemide as above and on sildenafil   · Weight with large change today but was standing scale and previously bedscale   · Continue oral fluid restriction and low sodium diet   4  Metabolic alkalosis:  Bicarb improving to 32 today  Previously was on Diamox  · Replace potassium   5  Hypokalemia:  Resolved with replacement  6  Mild hypernatremia: would continue low sodium diet but increase fluid restriction to 2000cc/day which is what she will be d/c on per discussion with heart failure team   7  Pericardial effusion  8  Severe COPD        SUBJECTIVE:  Feeling better  She has been walking with PT in the hallway  Oxygen requirements improving      OBJECTIVE:  Current Weight: Weight - Scale: 95 6 kg (210 lb 12 2 oz)  Vitals:    01/28/20 0800   BP:    Pulse:    Resp:    Temp:    SpO2: 92%       Intake/Output Summary (Last 24 hours) at 1/28/2020 1013  Last data filed at 1/28/2020 0900  Gross per 24 hour   Intake 940 ml   Output 927 ml   Net 13 ml       General:  No acute distress  Skin:  No rash  Eyes:  Sclerae anicteric  ENT:  Moist mucous membranes  Neck:  Trachea midline   Chest:  Clear to auscultation bilaterally  CVS:  Regular rate and rhythm  Abdomen:  Soft, nontender, nondistended  Extremities:  trace edema  Neuro:  Awake and alert  Psych:  Appropriate affect      Medications:  Scheduled Meds:    Current Facility-Administered Medications:  albuterol 2 5 mg Nebulization Q4H PRN Kyle Ruff MD   apixaban 5 mg Oral BID Kyle Ruff MD   dextrose 25 mL Intravenous PRN Kyle Ruff MD   digoxin 125 mcg Oral Daily Kyle Ruff MD   insulin lispro 1-5 Units Subcutaneous TID AC Kyle Ruff MD   insulin lispro 1-5 Units Subcutaneous HS Kyle Ruff MD   ipratropium 0 5 mg Nebulization TID Kyle Ruff MD   levalbuterol 1 25 mg Nebulization TID Kyle Ruff MD   metoprolol 5 mg Intravenous Q6H PRN Kyle Ruff MD   metoprolol tartrate 50 mg Oral Q12H [address] Kyle Ruff MD   nicotine 7 mg Transdermal Daily Kyle Ruff MD   nystatin  Topical BID Kyle Ruff MD   ondansetron 4 mg Intravenous Q6H PRN Kyle Ruff MD   QUEtiapine 25 mg Oral QPM Kyle Ruff MD   sildenafil 40 mg Oral TID Kyle Ruff MD   sodium chloride 1 spray Each Nare Q2H PRN Kyle Ruff MD   torsemide 20 mg Oral BID (diuretic) Kyle Ruff MD       PRN Meds:   albuterol    dextrose    metoprolol    ondansetron    sodium chloride    Laboratory Results:  Results from last 7 days   Lab Units 01/28/20  0445 01/27/20  0433 01/26/20  0548 01/25/20  0240 01/24/20  1833 01/24/20  0437 01/23/20  0450 01/22/20  0522 01/22/20  0014   WBC Thousand/uL 6 60 7 03  --  7 55  --  8 28 9 44 7 62 8 05   HEMOGLOBIN g/dL 13 9 14 1  --  14 3  --  14 1 15 0 14 6 15 2   HEMATOCRIT % 48 2* 49 6*  --  47 5*  --  48 3* 50 6* 49 2* 50 2*   PLATELETS Thousands/uL 169 166  --  148*  --  128* 106* 92* 86*   SODIUM mmol/L 145 145 143 145 144 142 139 140 142   POTASSIUM mmol/L 4 0 3 3* 3 6 3 5 3 9 4 0 3 7 3 4* 3 6   CHLORIDE mmol/L 108 104 101 100 99* 96* 91* 90* 90*   CO2 mmol/L 32 37* 40* 41* 42* 43* >45* >45* >45*   BUN mg/dL 68* 75* 82* 87* 89* 90* 90* 91* 100*   CREATININE mg/dL 1 58* 1 60* 1 63* 1 65* 1 84* 1 71* 1 70* 1 75* 1 86*   CALCIUM mg/dL 8 8 9 0 9 2 9 0 9 1 9 2 8 8 8 7 8 6   MAGNESIUM mg/dL  --   --  2 6 2 3  --  2 3 1 9  --  2 1   PHOSPHORUS mg/dL  --   --  3 8  --   --   --   --   --  2 4*

## 2020-01-28 NOTE — ASSESSMENT & PLAN NOTE
Wt Readings from Last 3 Encounters:   01/27/20 101 kg (221 lb 12 8 oz)   01/21/20 116 kg (255 lb 11 7 oz)   11/05/19 110 kg (243 lb 9 6 oz)     Volume status stable  Continue torsemide 20 mg b i d   Echo 1/15 showed normal left ventricular systolic function with left ventricular hypertrophy    The right ventricle is significantly large with significantly reduced right ventricular systolic function  Monitor input and output  Daily weights

## 2020-01-28 NOTE — PLAN OF CARE
Problem: OCCUPATIONAL THERAPY ADULT  Goal: Performs self-care activities at highest level of function for planned discharge setting  See evaluation for individualized goals  Description  Treatment Interventions: ADL retraining, Functional transfer training, UE strengthening/ROM, Endurance training, Cognitive reorientation, Patient/family training, Equipment evaluation/education, Fine motor coordination activities, Compensatory technique education, Activityengagement, Energy conservation          See flowsheet documentation for full assessment, interventions and recommendations  Outcome: Progressing  Note:   Limitation: Decreased ADL status, Decreased UE strength, Decreased Safe judgement during ADL, Decreased cognition, Decreased endurance, Decreased fine motor control, Decreased high-level ADLs  Prognosis: Fair  Assessment: Patient participated in Skilled OT session this date with interventions consisting of ADL re training with the use of correct body mechnaics, Energy Conservation techniques, deep breathing technique, safety awareness and fall prevention techniques, one handed dressing technique, increase dynamic sit/ stand balance during functional activity  and increase OOB/ sitting tolerance   Patient agreeable to OT treatment session, upon arrival patient was found seated OOB to Chair  Pt participated in functional transfers, self care tasks while seated in chair, standing tolerance during functional tasks, and energy conservation education  Please refer to chart for functional levels  Patient requiring frequent re direction, verbal cues for safety, verbal cues for correct technique, verbal cues for pacing thru activity steps, cognitive assistance to anticipate next step, one step directives and frequent rest periods  Patient continues to be functioning below baseline level, occupational performance remains limited secondary to factors listed above and increased risk for falls and injury     From OT standpoint, recommendation at time of d/c would be Short Term Rehab  Patient to benefit from continued Occupational Therapy treatment while in the hospital to address deficits as defined above and maximize level of functional independence with ADLs and functional mobility        OT Discharge Recommendation: Short Term Rehab  OT - OK to Discharge: Yes(when medically cleared)

## 2020-01-28 NOTE — ASSESSMENT & PLAN NOTE
Lab Results   Component Value Date    HGBA1C 5 8 04/11/2019       Recent Labs     01/26/20  2109 01/27/20  0621 01/27/20  1041 01/27/20  1704   POCGLU 141* 96 121 128       Blood Sugar Average: Last 72 hrs:  (P) 130 6   Continue sliding scale

## 2020-01-28 NOTE — ASSESSMENT & PLAN NOTE
Acute hypoxic and hypercapnic respiratory failure secondary to pulmonary hypertension due to group 2/3, acute on chronic diastolic heart failure, obesity hypoventilation syndrome, severe COPD  On chronic home O2  CO2 persistently in the 70s on ABG this admission  Continue torsemide and sildenafil  Intermediate high-flow, keep oxygen saturation above 88%  BiPAP HS 20/10

## 2020-01-28 NOTE — OCCUPATIONAL THERAPY NOTE
Occupational Therapy Treatment Note      Tegan Burt    1/28/2020    Principal Problem:    Acute on chronic respiratory failure with hypoxia and hypercapnia (HCC)  Active Problems:    COPD (chronic obstructive pulmonary disease) (HCC)    Permanent atrial fibrillation    Type 2 diabetes mellitus without complication (HCC)    Nonsustained ventricular tachycardia (HCC)    Obesity    Depression    Dyslipidemia    CKD (chronic kidney disease) stage 3, GFR 30-59 ml/min (HCC)    SAPNA (acute kidney injury) ([facility] [address] )    Acute on chronic diastolic congestive heart failure (HCC)    Pericardial effusion    Pulmonary hypertension (HCC)    Metabolic alkalosis      Past Medical History:   Diagnosis Date    [facility] RVR [facility]     [facility]ch[facility] obstructive [facility] disease) [facility] )     Diabetes type 2, uncontrolled ([facility] )     Encephalopathy acute 1/11/2020    Hypertension     SIRS (systemic inflammatory response syndrome) ([facility] ) 1/7/2020       Past Surgical History:   Procedure Laterality Date    HERNIA REPAIR      HYSTERECTOMY      LAPAROSCOPIC CHOLECYSTECTOMY          01/28/20 1133   Restrictions/Precautions   Weight Bearing Precautions Per Order No   Other Precautions Multiple lines;Telemetry;O2;Fall Risk;Pain   Lifestyle   Autonomy Pt reports being IND w/ all ADLS and IADLS; ambulates w/ SPC for community mobility PTA   Reciprocal Relationships Pt lives alone  Pt reports limited social support  Service to Others Pt did not answer   Intrinsic Gratification Pt reports enjoying being IND and being home   Pain Assessment   Pain Assessment No/denies pain   Pain Score No Pain   ADL   Where Assessed Chair   Grooming Assistance 5  Supervision/Setup   Grooming Deficit Setup;Verbal cueing;Supervision/safety; Increased time to complete;Wash/dry hands; Wash/dry face   UB Bathing Assistance 4  Minimal Assistance   UB Bathing Deficit Setup;Verbal cueing;Supervision/safety; Increased time to complete   UB Bathing Comments Pt required A to wash her back  LB Bathing Assistance 3  Moderate Assistance   LB Bathing Deficit Setup;Verbal cueing;Supervision/safety; Increased time to complete; Buttocks; Perineal area   LB Bathing Comments Pt required A for roldan/buttock hygiene while in stance w/ Min A for steadying balance w/ unilat support of RW     UB Dressing Assistance 4  Minimal Assistance   UB Dressing Deficit Setup;Verbal cueing;Supervision/safety; Increased time to complete;Pull around back; Fasteners   UB Dressing Comments [place]/donning gown   LB Dressing Assistance 3  Moderate Assistance   LB Dressing Deficit Steadying;Setup;Supervision/safety;Verbal cueing; Increased time to complete; Don/doff R sock; Don/doff L sock   LB Dressing Comments Pt able to doff B/L socks at S level, however required A to don B/L socks while seated in chair  Toileting Comments Pt denies need   Bed Mobility   Supine to Sit Unable to assess   Sit to Supine Unable to assess   Additional Comments Pt seated OOB in chair upon OT arrival  Pt seated in chair w/ all needs in reach s/p OT session  Transfers   Sit to Stand 4  Minimal assistance   Additional items Assist x 1; Increased time required;Verbal cues;Armrests   Stand to Sit 4  Minimal assistance   Additional items Assist x 1; Increased time required;Verbal cues;Armrests   Additional Comments Transfers w/ RW  VC and TC required for hand placement and safety   Functional Mobility   Additional Comments Pt refusing further mobiltiy at this time stating "those boys from PT tired me out"   Cognition   Overall Cognitive Status [facility]   Arousal/Participation Alert; Cooperative;Lethargic   Attention Attends with cues to redirect   Orientation Level Oriented to person;Oriented to place; Disoriented to time;Disoriented to situation   Memory Decreased recall of precautions;Decreased recall of recent events   Following Commands Follows one step commands with increased time or repetition   Comments Pt presents pleasant and cooperative to participate in therapy this day  Pt required increased encouragement to participate in therapy and for increased independence w/ self care tasks  Pt required VC and TC for safety awareness at redirection to task at times  Activity Tolerance   Activity Tolerance Patient limited by fatigue;Treatment limited secondary to medical complications (Comment)  (SOB/ROMERO)   Medical Staff Made Aware RN Sarah cleared Pt for OT session   Assessment   Assessment Patient participated in Skilled OT session this date with interventions consisting of ADL re training with the use of correct body mechnaics, Energy Conservation techniques, deep breathing technique, safety awareness and fall prevention techniques, one handed dressing technique, increase dynamic sit/ stand balance during functional activity  and increase OOB/ sitting tolerance   Patient agreeable to OT treatment session, upon arrival patient was found seated OOB to Chair  Pt participated in functional transfers, self care tasks while seated in chair, standing tolerance during functional tasks, and energy conservation education  Please refer to chart for functional levels  Patient requiring frequent re direction, verbal cues for safety, verbal cues for correct technique, verbal cues for pacing thru activity steps, cognitive assistance to anticipate next step, one step directives and frequent rest periods  Patient continues to be functioning below baseline level, occupational performance remains limited secondary to factors listed above and increased risk for falls and injury  From OT standpoint, recommendation at time of d/c would be Short Term Rehab  Patient to benefit from continued Occupational Therapy treatment while in the hospital to address deficits as defined above and maximize level of functional independence with ADLs and functional mobility      Plan   Treatment Interventions ADL retraining;Functional transfer training;UE strengthening/ROM; Endurance training;Cognitive reorientation;Patient/family training;Equipment evaluation/education; Compensatory technique education; Activityengagement; Energy conservation   Goal Expiration Date 02/01/20   OT Treatment Day 4   OT Frequency 3-5x/wk   Recommendation   OT Discharge Recommendation Short Term Rehab   OT - OK to Discharge Yes  (when medically cleared)   Modified [place] Scale   Modified [place] Scale 4         Felice Malave MS, OTR/L

## 2020-01-28 NOTE — SOCIAL WORK
LSW met with patient and son  LSW assisted them with information regarding filing for SSDI, short/long term disability through employer, MA/SSI benefits, and the current recommendation of short term rehabilitation  [facility] gave patient and son a list of STR options  Son identified any of the locations within 10 miles of [place]  [facility] informed  Patient did discuss with her son he wishes to be DNR/DNI and also to never have a feeding tube placed  Son intends to visit again on Friday if patient is still admitted  LSW will be available on Friday if needed  Counseling / Coordination of Care  Total floor / unit time spent today 60 minutes  Greater than 50% of total time was spent with the patient and / or family counseling and / or coordination of care   A description of the counseling / coordination of care: support

## 2020-01-28 NOTE — PHYSICAL THERAPY NOTE
Physical Therapy Progress Note     01/28/20 0940   Pain Assessment   Pain Assessment No/denies pain   Pain Score No Pain   Restrictions/Precautions   Other Precautions Telemetry;O2;Fall Risk;Multiple lines   Subjective   Subjective The pt  states that she is feeling a little better today  She was pleased to be able to use the toilet instead of the bedpan, and to walk a little  Transfers   Sit to Stand 4  Minimal assistance   Additional items Assist x 1   Stand to Sit 4  Minimal assistance   Additional items Assist x 1   Ambulation/Elevation   Gait pattern Excessively slow; Short stride; Inconsistent parvez; Step to;Decreased foot clearance   Gait Assistance 4  Minimal assist   Additional items Assist x 1;Verbal cues   Assistive Device Rolling walker   Distance 7 feet x 2, 23 feet  Balance   Static Sitting Fair +   Dynamic Sitting Fair   Static Standing Fair -   Ambulatory Poor +   Activity Tolerance   Activity Tolerance Patient tolerated treatment well;Patient limited by fatigue   Nurse Made Aware Sarah RN  Assessment   Prognosis Good   Problem List Decreased strength;Decreased endurance; Impaired balance;Decreased mobility; Decreased coordination;Decreased cognition; Impaired judgement;Decreased safety awareness;Pain   Assessment The pt  has improved mobility as she required less assistance, was able to perform more activities, and ambulate farther than the past session  She does continue to require extended time as well as frequent rests as she fatigues easily  She was able to manuever in closed-in spaces such as the bathroom as well as around obstacles within the room and hallway  She does continue to remain markedly limited from her baseline, and she will benefit from continued physical therapy in order to maximize her independence  Barriers to Discharge Decreased caregiver support; Inaccessible home environment   Goals   Patient Goals To regain her strength, and independence     STG Expiration Date 02/05/20 PT Treatment Day 2   Plan   Treatment/Interventions Functional transfer training;LE strengthening/ROM; Therapeutic exercise; Endurance training;Patient/family training;Bed mobility;Gait training   Progress Progressing toward goals   PT Frequency   (3-5x a week )   Recommendation   Recommendation Post acute IP rehab   Equipment Recommended Baldev Henson PTA

## 2020-01-28 NOTE — PLAN OF CARE
Problem: PHYSICAL THERAPY ADULT  Goal: Performs mobility at highest level of function for planned discharge setting  See evaluation for individualized goals  Description  Treatment/Interventions: Functional transfer training, LE strengthening/ROM, Elevations, Therapeutic exercise, Endurance training, Patient/family training, Equipment eval/education, Bed mobility, Gait training, Spoke to nursing, Spoke to case management  Equipment Recommended: Walker(SIRI)       See flowsheet documentation for full assessment, interventions and recommendations  Outcome: Progressing  Note:   Prognosis: Good  Problem List: Decreased strength, Decreased endurance, Impaired balance, Decreased mobility, Decreased coordination, Decreased cognition, Impaired judgement, Decreased safety awareness, Pain  Assessment: The pt  has improved mobility as she required less assistance, was able to perform more activities, and ambulate farther than the past session  She does continue to require extended time as well as frequent rests as she fatigues easily  She was able to manuever in closed-in spaces such as the bathroom as well as around obstacles within the room and hallway  She does continue to remain markedly limited from her baseline, and she will benefit from continued physical therapy in order to maximize her independence  Barriers to Discharge: Decreased caregiver support, Inaccessible home environment     Recommendation: Post acute IP rehab     PT - OK to Discharge: Yes    See flowsheet documentation for full assessment

## 2020-01-28 NOTE — SOCIAL WORK
Met with patient and her son, Iveth Hammer, to discuss discharge needs  A post acute care recommendation was made by your care team for STR  Discussed Freedom of Choice with both patient and caregiver  List of facilities given to both patient and caregiver via in person  both patient and caregiver aware the list is custom filtered for them by zip code location and that [facility] post acute providers are designated  Patient requests to go to facility in [facility] to be near her son, Gunnar Booth [phone], [facility], [address] [place] [address]  They would prefer to have patient go to facility within 10 miles of son's home  [facility] referral were made to snf's  Patient and son are aware she will need to go to facility in-network with her insurance  They were given an snf list for facilities in patient's home area to review as a back up plan

## 2020-01-28 NOTE — PROGRESS NOTES
Progress Note - Rosa Maria Garcia 1960, 63 y o  female MRN: [id number]    Unit/Bed#: [address] 511-01 Encounter: [id number]    Primary Care Provider: Carmen Winters MD   Date and time admitted to hospital: 1/21/2020 11:31 PM        * Acute on chronic respiratory failure with hypoxia and hypercapnia (HCC)  Assessment & Plan  Acute hypoxic and hypercapnic respiratory failure secondary to pulmonary hypertension due to group 2/3, acute on chronic diastolic heart failure, obesity hypoventilation syndrome, severe COPD  On chronic home O2  CO2 persistently in the 70s on ABG this admission  Continue torsemide and sildenafil  Intermediate high-flow, keep oxygen saturation above 88%  BiPAP HS 20/10    Acute on chronic diastolic congestive heart failure (HCC)  Assessment & Plan  Wt Readings from Last 3 Encounters:   01/28/20 95 6 kg (210 lb 12 2 oz)   01/21/20 116 kg (255 lb 11 7 oz)   11/05/19 110 kg (243 lb 9 6 oz)     Volume status stable, negative fluid balance  Continue torsemide 20 mg b i d   Echo 1/15 showed normal left ventricular systolic function with left ventricular hypertrophy  The right ventricle is significantly large with significantly reduced right ventricular systolic function  Monitor input and output  Daily weights        Pulmonary hypertension (HCC)  Assessment & Plan  Pulmonary hypertension WHO group 2/3  Continue sildenafil    Acute renal failure superimposed on stage 3 chronic kidney disease ([facility] [address] )  Assessment & Plan  Secondary to cardiorenal syndrome  Now improving  Continue torsemide 20 mg b i d    Nephro on board    Depression  Assessment & Plan  Continue Seroquel HS    Permanent atrial fibrillation  Assessment & Plan  Continue metoprolol, digoxin and Eliquis    COPD (chronic obstructive pulmonary disease) ([facility])  Assessment & Plan  Severe COPD, no exacerbation  On home oxygen  Continue with bronchodilator         VTE Pharmacologic Prophylaxis:   Pharmacologic: Apixaban (Eliquis)  Mechanical VTE Prophylaxis in Place: Yes    Patient Centered Rounds: I have performed bedside rounds with nursing staff today  Discussions with Specialists or Other Care Team Provider:  Cardiology     Education and Discussions with Family / Patient:  Patient    Time Spent for Care: 30 minutes  More than 50% of total time spent on counseling and coordination of care as described above  Current Length of Stay: 7 day(s)    Current Patient Status: Inpatient   Certification Statement: The patient will continue to require additional inpatient hospital stay due to Above    Discharge Plan / Estimated Discharge Date:  Rehab soon    Code Status: Level 3 - DNAR and DNI      Subjective:   Patient seen and examined  Comfortable sitting in chair  Denied chest pain  No event overnight  Oxygen requirement improving    Objective:     Vitals:   Temp (24hrs), Av 9 °F (36 6 °C), Min:97 5 °F (36 4 °C), Max:98 2 °F (36 8 °C)    Temp:  [97 5 °F (36 4 °C)-98 2 °F (36 8 °C)] 97 5 °F (36 4 °C)  HR:  [64-92] 79  Resp:  [18-20] 20  BP: (119-128)/(69-83) 124/83  SpO2:  [92 %-99 %] 94 %  Body mass index is 36 18 kg/m²  Input and Output Summary (last 24 hours):        Intake/Output Summary (Last 24 hours) at 2020 1536  Last data filed at 2020 1400  Gross per 24 hour   Intake 960 ml   Output 925 ml   Net 35 ml       Physical Exam:     Physical Exam  Patient is awake alert in no acute distress  Lung with decreased breath sounds bilateral  Heart positive V3-O0 systolic murmur  Abdomen soft nontender  Lower extremities no edema    Additional Data:     Labs:    Results from last 7 days   Lab Units 20  044   WBC Thousand/uL 6 60   HEMOGLOBIN g/dL 13 9   HEMATOCRIT % 48 2*   PLATELETS Thousands/uL 169   NEUTROS PCT % 60   LYMPHS PCT % 14   MONOS PCT % 16*   EOS PCT % 7*     Results from last 7 days   Lab Units 205  20  1833   POTASSIUM mmol/L 4 0   < > 3 9   CHLORIDE mmol/L 108   < > 99*   CO2 mmol/L 32   < > 42*   BUN mg/dL 68*   < > 89*   CREATININE mg/dL 1 58*   < > 1 84*   CALCIUM mg/dL 8 8   < > 9 1   ALK PHOS U/L  --   --  109   ALT U/L  --   --  18   AST U/L  --   --  15    < > = values in this interval not displayed  * I Have Reviewed All Lab Data Listed Above  * Additional Pertinent Lab Tests Reviewed: [address] Admission Reviewed    Imaging:    Imaging Reports Reviewed Today Include:   Imaging Personally Reviewed by Myself Includes:      Recent Cultures (last 7 days):           Last 24 Hours Medication List:     Current Facility-Administered Medications:  albuterol 2 5 mg Nebulization Q4H PRN Cale Cuellar MD   apixaban 5 mg Oral BID Cale Cuellar MD   dextrose 25 mL Intravenous PRN Cale Cuellar MD   digoxin 125 mcg Oral Daily Cale Cuellar MD   insulin lispro 1-5 Units Subcutaneous TID AC Cale Cuellar MD   insulin lispro 1-5 Units Subcutaneous HS Cale Cuellar MD   ipratropium 0 5 mg Nebulization TID Asia Potter MD   levalbuterol 1 25 mg Nebulization TID Cale Cuellar MD   metoprolol 5 mg Intravenous Q6H PRN Cale Cuellar MD   metoprolol tartrate 50 mg Oral Q12H [address] Cale Cuellar MD   nicotine 7 mg Transdermal Daily Cale Cuellar MD   nystatin  Topical BID Cale Cuellar MD   ondansetron 4 mg Intravenous Q6H PRN Cale Cuellar MD   QUEtiapine 25 mg Oral QPM Cale Cuellar MD   sildenafil 40 mg Oral TID Cale Cuellar MD   sodium chloride 1 spray Each Nare Q2H PRN Cale Cuellar MD   torsemide 20 mg Oral BID (diuretic) Cale Cuellar MD        Today, Patient Was Seen By: Yazan Cantor DO    ** Please Note: This note has been constructed using a voice recognition system   **

## 2020-01-28 NOTE — SOCIAL WORK
LSW joined Danitza SWAIN) to provide emotional support to patient  LSW will return on Tuesday at 12:30 to meet with patient and son to discuss the following:  - applying for social security disability  - patient's wishes regarding life sustaining medical interventions  - options following hospitalization (rehabilitation) and living arrangements    Counseling / Coordination of Care  Total floor / unit time spent today 45 minutes  Greater than 50% of total time was spent with the patient and / or family counseling and / or coordination of care   A description of the counseling / coordination of care: support and coordination

## 2020-01-28 NOTE — ASSESSMENT & PLAN NOTE
Wt Readings from Last 3 Encounters:   01/28/20 95 6 kg (210 lb 12 2 oz)   01/21/20 116 kg (255 lb 11 7 oz)   11/05/19 110 kg (243 lb 9 6 oz)     Volume status stable, negative fluid balance  Continue torsemide 20 mg b i d   Echo 1/15 showed normal left ventricular systolic function with left ventricular hypertrophy    The right ventricle is significantly large with significantly reduced right ventricular systolic function  Monitor input and output  Daily weights

## 2020-01-28 NOTE — PROGRESS NOTES
Cardiology Progress Note - Dawna Hoyt 63 y o  female MRN: [id number]    Unit/Bed#: [facility] 511-01 Encounter: [id number]      Assessment & Plan:  Principal Problem:    Acute on chronic respiratory failure with hypoxia and hypercapnia (HCC)  -per chart patient is on 10L NC at home, on 6L NC at rest in room   -CO2 persistently in the 70s on ABG this admission  -continue with BiPAP at night    HFpEF, LVEF 58%, LVIDd 5 3, RVIDd 4 8  -  currently on her home diuretic regimen of torsemide 20mg p o  b i d   RHC 1/23 on sildenafil 40tid: RA: 8 mmHg, PA: 44/26/33 mmHg, PCWP: 9 mmHg, PA sat 67%, CO 5 3 l/min, PVR 4 5 DRUMMOND  -continue with sildenafil 40 mg t i d  And Lopressor 50 mg b i d   -placed nutritionist consult to educate about low-sodium diet and fluid restriction    Pulmonary hypertension  -likely WHO group 2 (diastolic HF) and group 3 (COPD/OHS), with phenotypic expression of group 1  -started on sildenafil this admission, currently on 40 mg t i d  Severe COPD/OHS  -per documentation requires 10 L nasal cannula home  -currently on 6L NC, on BiPAP at night    Permanent atrial fibrillation  -anticoagulant Eliquis 5 mg b i d   -currently rate controlled on Lopressor 50 mg b i d  And digoxin 125 mcg daily  -patient is on metoprolol and Cardizem at home rate control normally    Type 2 diabetes mellitus without complication (HCC)    SAPNA (acute kidney injury) ([facility] [address] )    Acute on chronic diastolic congestive heart failure (HCC)    Pericardial effusion    Pulmonary hypertension (HCC)      Subjective:   Patient seen and examined  No significant events overnight  She reports feeling little tired this morning but otherwise feels well and has no complaints  Denies chest pain, abdominal pain, nausea, vomiting, fever, chills, headache, dizziness or other cardiac symptoms  Objective:     Vitals: Blood pressure 124/83, pulse 79, temperature 97 5 °F (36 4 °C), temperature source Oral, resp   rate 20, height 5' 4" (1 626 m), weight 95 6 kg (210 lb 12 2 oz), SpO2 92 %  , Body mass index is 36 18 kg/m² ,   Orthostatic Blood Pressures      Most Recent Value   Blood Pressure  124/83 filed at 01/28/2020 9823   Patient Position - Orthostatic VS  Lying filed at 01/28/2020 0352            Intake/Output Summary (Last 24 hours) at 1/28/2020 1025  Last data filed at 1/28/2020 0900  Gross per 24 hour   Intake 940 ml   Output 927 ml   Net 13 ml           Physical Exam:    GEN: Sury Cr appears well, alert and oriented x 3, pleasant and cooperative   HEENT: anicteric, mucous membranes moist  NECK: no JVD, no carotid bruits   HEART: regular rhythm, normal S1 and S2, + systolic murmur   LUNGS: clear to auscultation bilaterally  ABDOMEN: normal bowel sounds, soft, no tenderness, no distention  EXTREMITIES: peripheral pulses normal; no clubbing, cyanosis, or edema  NEURO: no focal findings   SKIN: normal without suspicious lesions on exposed skin      Current Facility-Administered Medications:     albuterol inhalation solution 2 5 mg, 2 5 mg, Nebulization, Q4H PRN, Noble Syed MD    apixaban (ELIQUIS) tablet 5 mg, 5 mg, Oral, BID, Noble Syed MD, 5 mg at 01/28/20 0805    dextrose 50 % IV solution 25 mL, 25 mL, Intravenous, PRN, Noble Syed MD, 25 mL at 01/23/20 0758    digoxin (LANOXIN) tablet 125 mcg, 125 mcg, Oral, Daily, Noble Syed MD, 125 mcg at 01/28/20 0804    insulin lispro (HumaLOG) 100 units/mL subcutaneous injection 1-5 Units, 1-5 Units, Subcutaneous, TID AC, 1 Units at 01/24/20 1204 **AND** Fingerstick Glucose (POCT), , , TID ACAsia MD    insulin lispro (HumaLOG) 100 units/mL subcutaneous injection 1-5 Units, 1-5 Units, Subcutaneous, HS, Asia Potter MD, 2 Units at 01/24/20 2251    ipratropium (ATROVENT) 0 02 % inhalation solution 0 5 mg, 0 5 mg, Nebulization, TID, Asia Potter MD, 0 5 mg at 01/28/20 0738    levalbuterol (XOPENEX) inhalation solution 1 25 mg, 1 25 mg, Nebulization, TID, Jeanne Singh MD, 1 25 mg at 01/28/20 0737    metoprolol (LOPRESSOR) injection 5 mg, 5 mg, Intravenous, Q6H PRN, Jeanne Singh MD    metoprolol tartrate (LOPRESSOR) tablet 50 mg, 50 mg, Oral, Q12H [address], Asia Potter MD, 50 mg at 01/28/20 0804    nicotine (NICODERM CQ) 7 mg/24hr TD 24 hr patch 7 mg, 7 mg, Transdermal, Daily, Asia Potter MD, 7 mg at 01/28/20 0804    nystatin (MYCOSTATIN) powder, , Topical, BID, Asia Potter MD    ondansetron (ZOFRAN) injection 4 mg, 4 mg, Intravenous, Q6H PRN, Jeanne Singh MD    QUEtiapine (SEROquel) tablet 25 mg, 25 mg, Oral, QPM, Asia Potter MD, 25 mg at 01/27/20 2017    sildenafil (REVATIO) tablet 40 mg, 40 mg, Oral, TID, Jeanne Singh MD, 40 mg at 01/28/20 0804    sodium chloride (OCEAN) 0 65 % nasal spray 1 spray, 1 spray, Each Nare, Q2H PRN, Jeanne Singh MD, 1 spray at 01/26/20 1702    torsemide (DEMADEX) tablet 20 mg, 20 mg, Oral, BID (diuretic), Jeanne Singh MD, 20 mg at 01/28/20 0805    Labs & Results:    Lab Results   Component Value Date    TROPONINI 0 07 (H) 01/06/2020    TROPONINI 0 09 (H) 02/08/2019       Lab Results   Component Value Date    GLUCOSE 95 01/10/2020    CALCIUM 8 8 01/28/2020     05/20/2015    K 4 0 01/28/2020    CO2 32 01/28/2020     01/28/2020    BUN 68 (H) 01/28/2020    CREATININE 1 58 (H) 01/28/2020       Lab Results   Component Value Date    WBC 6 60 01/28/2020    HGB 13 9 01/28/2020    HCT 48 2 (H) 01/28/2020     (H) 01/28/2020     01/28/2020           No results found for: CHOL  Lab Results   Component Value Date    HDL 32 (L) 02/09/2019    HDL 36 (L) 02/13/2018     Lab Results   Component Value Date    LDLCALC 32 02/09/2019    LDLCALC 86 02/13/2018     Lab Results   Component Value Date    TRIG 61 02/09/2019    TRIG 93 02/13/2018       Lab Results   Component Value Date    ALT 18 01/24/2020    AST 15 01/24/2020         EKG personally reviewed by )Lynette Thompson MD  No acute changes   TELE: no acute events overnight

## 2020-01-28 NOTE — PLAN OF CARE
Problem: Prexisting or High Potential for Compromised Skin Integrity  Goal: Skin integrity is maintained or improved  Description  INTERVENTIONS:  - Identify patients at risk for skin breakdown  - Assess and monitor skin integrity  - Assess and monitor nutrition and hydration status  - Monitor labs   - Assess for incontinence   - Turn and reposition patient  - Assist with mobility/ambulation  - Relieve pressure over bony prominences  - Avoid friction and shearing  - Provide appropriate hygiene as needed including keeping skin clean and dry  - Evaluate need for skin moisturizer/barrier cream  - Collaborate with interdisciplinary team   - Patient/family teaching  - Consider wound care consult   Outcome: Progressing     Problem: Potential for Falls  Goal: Patient will remain free of falls  Description  INTERVENTIONS:  - Assess patient frequently for physical needs  -  Identify cognitive and physical deficits and behaviors that affect risk of falls    -  [place] fall precautions as indicated by assessment   - Educate patient/family on patient safety including physical limitations  - Instruct patient to call for assistance with activity based on assessment  - Modify environment to reduce risk of injury  - Consider OT/PT consult to assist with strengthening/mobility  Outcome: Progressing     Problem: CARDIOVASCULAR - ADULT  Goal: Maintains optimal cardiac output and hemodynamic stability  Description  INTERVENTIONS:  - Monitor I/O, vital signs and rhythm  - Monitor for S/S and trends of decreased cardiac output  - Administer and titrate ordered vasoactive medications to optimize hemodynamic stability  - Assess quality of pulses, skin color and temperature  - Assess for signs of decreased coronary artery perfusion  - Instruct patient to report change in severity of symptoms  Outcome: Progressing  Goal: Absence of cardiac dysrhythmias or at baseline rhythm  Description  INTERVENTIONS:  - Continuous cardiac monitoring, vital signs, obtain 12 lead EKG if ordered  - Administer antiarrhythmic and heart rate control medications as ordered  - Monitor electrolytes and administer replacement therapy as ordered  Outcome: Progressing     Problem: RESPIRATORY - ADULT  Goal: Achieves optimal ventilation and oxygenation  Description  INTERVENTIONS:  - Assess for changes in respiratory status  - Assess for changes in mentation and behavior  - Position to facilitate oxygenation and minimize respiratory effort  - Oxygen administered by appropriate delivery if ordered  - Initiate smoking cessation education as indicated  - Encourage broncho-pulmonary hygiene including cough, deep breathe, Incentive Spirometry  - Assess the need for suctioning and aspirate as needed  - Assess and instruct to report SOB or any respiratory difficulty  - Respiratory Therapy support as indicated  Outcome: Progressing     Problem: METABOLIC, FLUID AND ELECTROLYTES - ADULT  Goal: Electrolytes maintained within normal limits  Description  INTERVENTIONS:  - Monitor labs and assess patient for signs and symptoms of electrolyte imbalances  - Administer electrolyte replacement as ordered  - Monitor response to electrolyte replacements, including repeat lab results as appropriate  - Instruct patient on fluid and nutrition as appropriate  Outcome: Progressing  Goal: Fluid balance maintained  Description  INTERVENTIONS:  - Monitor labs   - Monitor I/O and WT  - Instruct patient on fluid and nutrition as appropriate  - Assess for signs & symptoms of volume excess or deficit  Outcome: Progressing     Problem: SKIN/TISSUE INTEGRITY - ADULT  Goal: Skin integrity remains intact  Description  INTERVENTIONS  - Identify patients at risk for skin breakdown  - Assess and monitor skin integrity  - Assess and monitor nutrition and hydration status  - Monitor labs (i e  albumin)  - Assess for incontinence   - Turn and reposition patient  - Assist with mobility/ambulation  - Relieve pressure over bony prominences  - Avoid friction and shearing  - Provide appropriate hygiene as needed including keeping skin clean and dry  - Evaluate need for skin moisturizer/barrier cream  - Collaborate with interdisciplinary team (i e  Nutrition, Rehabilitation, etc )   - Patient/family teaching  Outcome: Progressing     Problem: MUSCULOSKELETAL - ADULT  Goal: Maintain or return mobility to safest level of function  Description  INTERVENTIONS:  - Assess patient's ability to carry out ADLs; assess patient's baseline for ADL function and identify physical deficits which impact ability to perform ADLs (bathing, care of mouth/teeth, toileting, grooming, dressing, etc )  - Assess/evaluate cause of self-care deficits   - Assess range of motion  - Assess patient's mobility  - Assess patient's need for assistive devices and provide as appropriate  - Encourage maximum independence but intervene and supervise when necessary  - Involve family in performance of ADLs  - Assess for home care needs following discharge   - Consider OT consult to assist with ADL evaluation and planning for discharge  - Provide patient education as appropriate  Outcome: Progressing     Problem: Nutrition/Hydration-ADULT  Goal: Nutrient/Hydration intake appropriate for improving, restoring or maintaining nutritional needs  Description  Monitor and assess patient's nutrition/hydration status for malnutrition  Collaborate with interdisciplinary team and initiate plan and interventions as ordered  Monitor patient's weight and dietary intake as ordered or per policy  Utilize nutrition screening tool and intervene as necessary  Determine patient's food preferences and provide high-protein, high-caloric foods as appropriate       INTERVENTIONS:  - Monitor oral intake, urinary output, labs, and treatment plans  - Assess nutrition and hydration status and recommend course of action  - Evaluate amount of meals eaten  - Assist patient with eating if necessary   - Allow adequate time for meals  - Recommend/ encourage appropriate diets, oral nutritional supplements, and vitamin/mineral supplements  - Order, calculate, and assess calorie counts as needed  - Recommend, monitor, and adjust tube feedings and TPN/PPN based on assessed needs  - Assess need for intravenous fluids  - Provide specific nutrition/hydration education as appropriate  - Include patient/family/caregiver in decisions related to nutrition  Outcome: Progressing     Problem: GENITOURINARY - ADULT  Goal: Maintains or returns to baseline urinary function  Description  INTERVENTIONS:  - Assess urinary function  - Encourage oral fluids to ensure adequate hydration if ordered  - Administer IV fluids as ordered to ensure adequate hydration  - Administer ordered medications as needed  - Offer frequent toileting  - Follow urinary retention protocol if ordered  Outcome: Progressing

## 2020-01-28 NOTE — PROGRESS NOTES
Progress Note - [place] Knee 1960, 63 y o  female MRN: [id number]    Unit/Bed#: [address] 511-01 Encounter: [id number]    Primary Care Provider: Alexis Styles MD   Date and time admitted to hospital: 1/21/2020 11:31 PM        * Acute on chronic respiratory failure with hypoxia and hypercapnia (HCC)  Assessment & Plan  Acute hypoxic and hypercapnic respiratory failure secondary to pulmonary hypertension due to group 2/3, acute on chronic diastolic heart failure, obesity hypoventilation syndrome, severe COPD  Volume status stable  Continue torsemide  Continue sildenafil  Intermediate high-flow, keep oxygen saturation above 88%  BiPAP HS 20/10    Acute on chronic diastolic congestive heart failure (HCC)  Assessment & Plan  Wt Readings from Last 3 Encounters:   01/27/20 101 kg (221 lb 12 8 oz)   01/21/20 116 kg (255 lb 11 7 oz)   11/05/19 110 kg (243 lb 9 6 oz)     Volume status stable  Continue torsemide 20 mg b i d   Echo 1/15 showed normal left ventricular systolic function with left ventricular hypertrophy  The right ventricle is significantly large with significantly reduced right ventricular systolic function  Monitor input and output  Daily weights        Pulmonary hypertension (HCC)  Assessment & Plan  Pulmonary hypertension WHO group 2/3  Continue sildenafil    Permanent atrial fibrillation  Assessment & Plan  Continue metoprolol, digoxin and Eliquis    Pericardial effusion  Assessment & Plan  Echo on 01/15 showed small to moderate concentric pericardial effusion without evidence of hemodynamic compromise    SAPNA (acute kidney injury) ([facility] [address] )  Assessment & Plan  Secondary to cardiorenal syndrome  Now improving  Continue torsemide 20 mg b i d    Nephro on board    Metabolic alkalosis  Assessment & Plan  Improved with Diamox    Depression  Assessment & Plan  Continue Seroquel HS    Nonsustained ventricular tachycardia (HCC)  Assessment & Plan  Continue metoprolol, continue telemetry  Monitor electrolytes    Type 2 diabetes mellitus without complication [facility])  Assessment & Plan  Lab Results   Component Value Date    HGBA1C 5 8 2019       Recent Labs     20  2109 20  0621 20  1041 20  1704   POCGLU 141* 96 121 128       Blood Sugar Average: Last 72 hrs:  (P) 130 6   Continue sliding scale    COPD (chronic obstructive pulmonary disease) (HCC)  Assessment & Plan  Not in acute exacerbation continue breathing treatment        VTE Pharmacologic Prophylaxis:   Pharmacologic: Apixaban (Eliquis)  Mechanical VTE Prophylaxis in Place: Yes    Patient Centered Rounds: I have performed bedside rounds with nursing staff today  Discussions with Specialists or Other Care Team Provider:  Nephrology    Education and Discussions with Family / Patient:     Time Spent for Care: 30 minutes  More than 50% of total time spent on counseling and coordination of care as described above  Current Length of Stay: 6 day(s)    Current Patient Status: Inpatient   Certification Statement: The patient will continue to require additional inpatient hospital stay due to Optimizing respiratory and cardiac status    Discharge Plan: To be determined    Code Status: Level 1 - Full Code      Subjective:   Patient was seen and evaluated bedside, she states her breathing improved    Objective:     Vitals:   Temp (24hrs), Av 8 °F (36 6 °C), Min:97 4 °F (36 3 °C), Max:98 1 °F (36 7 °C)    Temp:  [97 4 °F (36 3 °C)-98 1 °F (36 7 °C)] 97 9 °F (36 6 °C)  HR:  [76-89] 88  Resp:  [18-20] 18  BP: (120-149)/(69-82) 128/77  SpO2:  [90 %-98 %] 95 %  Body mass index is 38 07 kg/m²  Input and Output Summary (last 24 hours): Intake/Output Summary (Last 24 hours) at 2020 1940  Last data filed at 2020 1237  Gross per 24 hour   Intake 1000 ml   Output 352 ml   Net 648 ml       Physical Exam:     Physical Exam   Constitutional: She is oriented to person, place, and time  She appears well-developed  Obese, no acute distress   HENT:   Head: Normocephalic and atraumatic  Eyes: EOM are normal    Neck: Neck supple  Cardiovascular: Normal rate and regular rhythm  Exam reveals no gallop and no friction rub  Murmur heard  Pulmonary/Chest: Effort normal and breath sounds normal  No respiratory distress  She has no wheezes  Abdominal: Soft  Bowel sounds are normal  She exhibits no distension  There is no tenderness  Musculoskeletal: She exhibits no edema  Neurological: She is alert and oriented to person, place, and time  No cranial nerve deficit  Skin: Skin is warm and dry  Psychiatric: She has a normal mood and affect  Additional Data:     Labs:    Results from last 7 days   Lab Units 01/27/20  0433   WBC Thousand/uL 7 03   HEMOGLOBIN g/dL 14 1   HEMATOCRIT % 49 6*   PLATELETS Thousands/uL 166   NEUTROS PCT % 63   LYMPHS PCT % 14   MONOS PCT % 15*   EOS PCT % 6     Results from last 7 days   Lab Units 01/27/20  0433  01/24/20  1833   SODIUM mmol/L 145   < > 144   POTASSIUM mmol/L 3 3*   < > 3 9   CHLORIDE mmol/L 104   < > 99*   CO2 mmol/L 37*   < > 42*   BUN mg/dL 75*   < > 89*   CREATININE mg/dL 1 60*   < > 1 84*   ANION GAP mmol/L 4   < > 3*   CALCIUM mg/dL 9 0   < > 9 1   ALBUMIN g/dL  --   --  2 7*   TOTAL BILIRUBIN mg/dL  --   --  2 41*   ALK PHOS U/L  --   --  109   ALT U/L  --   --  18   AST U/L  --   --  15   GLUCOSE RANDOM mg/dL 110   < > 171*    < > = values in this interval not displayed  Results from last 7 days   Lab Units 01/27/20  1704 01/27/20  1041 01/27/20  0621 01/26/20  2109 01/26/20  1644 01/26/20  1056 01/26/20  0619 01/25/20  2102 01/25/20  1655 01/25/20  1125 01/25/20  0601 01/24/20  2058   POC GLUCOSE mg/dl 128 121 96 141* 134 132 92 123 114 130 118 259*                   * I Have Reviewed All Lab Data Listed Above  * Additional Pertinent Lab Tests Reviewed:  All Labs For Current Hospital Admission Reviewed    Imaging:    Imaging Reports Reviewed Today Include: all  Imaging Personally Reviewed by Myself Includes:      Recent Cultures (last 7 days):           Last 24 Hours Medication List:     Current Facility-Administered Medications:  albuterol 2 5 mg Nebulization Q4H PRN Lilly Smiley MD   apixaban 5 mg Oral BID Lilly Smiley MD   dextrose 25 mL Intravenous PRN Lilly Smiley MD   digoxin 125 mcg Oral Daily Lilly Smiley MD   insulin lispro 1-5 Units Subcutaneous TID AC Lilly Smiley MD   insulin lispro 1-5 Units Subcutaneous HS Lilly Smiley MD   ipratropium 0 5 mg Nebulization TID Asia Potter MD   levalbuterol 1 25 mg Nebulization TID Lilly Smiley MD   metoprolol 5 mg Intravenous Q6H PRN Lilly Smiley MD   metoprolol tartrate 50 mg Oral Q12H [address] Lilly Smiley MD   nicotine 7 mg Transdermal Daily Lilly Smiley MD   nystatin  Topical BID Lilly Smiley MD   ondansetron 4 mg Intravenous Q6H PRN Lilly Smiley MD   QUEtiapine 25 mg Oral QPM Lilly Smiley MD   sildenafil 40 mg Oral TID Lilly Smiley MD   sodium chloride 1 spray Each Nare Q2H PRN Lilly Smiley MD   torsemide 20 mg Oral BID (diuretic) Lilly Smiley MD        Today, Patient Was Seen By: Lilly Smiley MD    ** Please Note: Dictation voice to text software may have been used in the creation of this document   **

## 2020-01-29 ENCOUNTER — TELEPHONE (OUTPATIENT)
Dept: CARDIOLOGY CLINIC | Facility: CLINIC | Age: 60
End: 2020-01-29

## 2020-01-29 LAB
ANION GAP SERPL CALCULATED.3IONS-SCNC: 7 MMOL/L (ref 4–13)
BUN SERPL-MCNC: 69 MG/DL (ref 5–25)
CALCIUM SERPL-MCNC: 9.2 MG/DL (ref 8.3–10.1)
CHLORIDE SERPL-SCNC: 108 MMOL/L (ref 100–108)
CO2 SERPL-SCNC: 30 MMOL/L (ref 21–32)
CREAT SERPL-MCNC: 1.49 MG/DL (ref 0.6–1.3)
ERYTHROCYTE [DISTWIDTH] IN BLOOD BY AUTOMATED COUNT: 17.7 % (ref 11.6–15.1)
GFR SERPL CREATININE-BSD FRML MDRD: 38 ML/MIN/1.73SQ M
GLUCOSE SERPL-MCNC: 105 MG/DL (ref 65–140)
GLUCOSE SERPL-MCNC: 119 MG/DL (ref 65–140)
GLUCOSE SERPL-MCNC: 131 MG/DL (ref 65–140)
GLUCOSE SERPL-MCNC: 147 MG/DL (ref 65–140)
GLUCOSE SERPL-MCNC: 85 MG/DL (ref 65–140)
HCT VFR BLD AUTO: 49 % (ref 34.8–46.1)
HGB BLD-MCNC: 14.3 G/DL (ref 11.5–15.4)
MCH RBC QN AUTO: 30.3 PG (ref 26.8–34.3)
MCHC RBC AUTO-ENTMCNC: 29.2 G/DL (ref 31.4–37.4)
MCV RBC AUTO: 104 FL (ref 82–98)
PLATELET # BLD AUTO: 183 THOUSANDS/UL (ref 149–390)
PMV BLD AUTO: 12.2 FL (ref 8.9–12.7)
POTASSIUM SERPL-SCNC: 4.1 MMOL/L (ref 3.5–5.3)
RBC # BLD AUTO: 4.72 MILLION/UL (ref 3.81–5.12)
SODIUM SERPL-SCNC: 145 MMOL/L (ref 136–145)
WBC # BLD AUTO: 6.84 THOUSAND/UL (ref 4.31–10.16)

## 2020-01-29 PROCEDURE — 97116 GAIT TRAINING THERAPY: CPT

## 2020-01-29 PROCEDURE — 99231 SBSQ HOSP IP/OBS SF/LOW 25: CPT | Performed by: INTERNAL MEDICINE

## 2020-01-29 PROCEDURE — 99232 SBSQ HOSP IP/OBS MODERATE 35: CPT | Performed by: INTERNAL MEDICINE

## 2020-01-29 PROCEDURE — 97530 THERAPEUTIC ACTIVITIES: CPT

## 2020-01-29 PROCEDURE — 94640 AIRWAY INHALATION TREATMENT: CPT

## 2020-01-29 PROCEDURE — 94760 N-INVAS EAR/PLS OXIMETRY 1: CPT

## 2020-01-29 PROCEDURE — 80048 BASIC METABOLIC PNL TOTAL CA: CPT | Performed by: PHYSICIAN ASSISTANT

## 2020-01-29 PROCEDURE — 82948 REAGENT STRIP/BLOOD GLUCOSE: CPT

## 2020-01-29 PROCEDURE — 85027 COMPLETE CBC AUTOMATED: CPT | Performed by: PHYSICIAN ASSISTANT

## 2020-01-29 RX ADMIN — NICOTINE 7 MG: 7 PATCH TRANSDERMAL at 08:18

## 2020-01-29 RX ADMIN — LEVALBUTEROL HYDROCHLORIDE 1.25 MG: 1.25 SOLUTION, CONCENTRATE RESPIRATORY (INHALATION) at 13:50

## 2020-01-29 RX ADMIN — NYSTATIN: 100000 POWDER TOPICAL at 08:18

## 2020-01-29 RX ADMIN — SILDENAFIL 40 MG: 20 TABLET ORAL at 20:55

## 2020-01-29 RX ADMIN — NYSTATIN 1 APPLICATION: 100000 POWDER TOPICAL at 17:36

## 2020-01-29 RX ADMIN — SILDENAFIL 40 MG: 20 TABLET ORAL at 16:25

## 2020-01-29 RX ADMIN — TORSEMIDE 20 MG: 20 TABLET ORAL at 08:16

## 2020-01-29 RX ADMIN — SILDENAFIL 40 MG: 20 TABLET ORAL at 08:16

## 2020-01-29 RX ADMIN — LEVALBUTEROL HYDROCHLORIDE 1.25 MG: 1.25 SOLUTION, CONCENTRATE RESPIRATORY (INHALATION) at 07:48

## 2020-01-29 RX ADMIN — METOPROLOL TARTRATE 50 MG: 50 TABLET, FILM COATED ORAL at 20:55

## 2020-01-29 RX ADMIN — TORSEMIDE 20 MG: 20 TABLET ORAL at 16:25

## 2020-01-29 RX ADMIN — APIXABAN 5 MG: 5 TABLET, FILM COATED ORAL at 08:16

## 2020-01-29 RX ADMIN — IPRATROPIUM BROMIDE 0.5 MG: 0.5 SOLUTION RESPIRATORY (INHALATION) at 07:48

## 2020-01-29 RX ADMIN — QUETIAPINE FUMARATE 25 MG: 25 TABLET ORAL at 20:55

## 2020-01-29 RX ADMIN — DIGOXIN 125 MCG: 125 TABLET ORAL at 08:16

## 2020-01-29 RX ADMIN — METOPROLOL TARTRATE 50 MG: 50 TABLET, FILM COATED ORAL at 08:15

## 2020-01-29 RX ADMIN — LEVALBUTEROL HYDROCHLORIDE 1.25 MG: 1.25 SOLUTION, CONCENTRATE RESPIRATORY (INHALATION) at 20:05

## 2020-01-29 RX ADMIN — APIXABAN 5 MG: 5 TABLET, FILM COATED ORAL at 17:35

## 2020-01-29 RX ADMIN — IPRATROPIUM BROMIDE 0.5 MG: 0.5 SOLUTION RESPIRATORY (INHALATION) at 20:05

## 2020-01-29 RX ADMIN — IPRATROPIUM BROMIDE 0.5 MG: 0.5 SOLUTION RESPIRATORY (INHALATION) at 13:50

## 2020-01-29 NOTE — PLAN OF CARE
Problem: Prexisting or High Potential for Compromised Skin Integrity  Goal: Skin integrity is maintained or improved  Description  INTERVENTIONS:  - Identify patients at risk for skin breakdown  - Assess and monitor skin integrity  - Assess and monitor nutrition and hydration status  - Monitor labs   - Assess for incontinence   - Turn and reposition patient  - Assist with mobility/ambulation  - Relieve pressure over bony prominences  - Avoid friction and shearing  - Provide appropriate hygiene as needed including keeping skin clean and dry  - Evaluate need for skin moisturizer/barrier cream  - Collaborate with interdisciplinary team   - Patient/family teaching  - Consider wound care consult   Outcome: Progressing     Problem: Potential for Falls  Goal: Patient will remain free of falls  Description  INTERVENTIONS:  - Assess patient frequently for physical needs  -  Identify cognitive and physical deficits and behaviors that affect risk of falls    -  [place] fall precautions as indicated by assessment   - Educate patient/family on patient safety including physical limitations  - Instruct patient to call for assistance with activity based on assessment  - Modify environment to reduce risk of injury  - Consider OT/PT consult to assist with strengthening/mobility  Outcome: Progressing     Problem: CARDIOVASCULAR - ADULT  Goal: Maintains optimal cardiac output and hemodynamic stability  Description  INTERVENTIONS:  - Monitor I/O, vital signs and rhythm  - Monitor for S/S and trends of decreased cardiac output  - Administer and titrate ordered vasoactive medications to optimize hemodynamic stability  - Assess quality of pulses, skin color and temperature  - Assess for signs of decreased coronary artery perfusion  - Instruct patient to report change in severity of symptoms  Outcome: Progressing  Goal: Absence of cardiac dysrhythmias or at baseline rhythm  Description  INTERVENTIONS:  - Continuous cardiac monitoring, vital signs, obtain 12 lead EKG if ordered  - Administer antiarrhythmic and heart rate control medications as ordered  - Monitor electrolytes and administer replacement therapy as ordered  Outcome: Progressing     Problem: METABOLIC, FLUID AND ELECTROLYTES - ADULT  Goal: Electrolytes maintained within normal limits  Description  INTERVENTIONS:  - Monitor labs and assess patient for signs and symptoms of electrolyte imbalances  - Administer electrolyte replacement as ordered  - Monitor response to electrolyte replacements, including repeat lab results as appropriate  - Instruct patient on fluid and nutrition as appropriate  Outcome: Progressing  Goal: Fluid balance maintained  Description  INTERVENTIONS:  - Monitor labs   - Monitor I/O and WT  - Instruct patient on fluid and nutrition as appropriate  - Assess for signs & symptoms of volume excess or deficit  Outcome: Progressing     Problem: SKIN/TISSUE INTEGRITY - ADULT  Goal: Skin integrity remains intact  Description  INTERVENTIONS  - Identify patients at risk for skin breakdown  - Assess and monitor skin integrity  - Assess and monitor nutrition and hydration status  - Monitor labs (i e  albumin)  - Assess for incontinence   - Turn and reposition patient  - Assist with mobility/ambulation  - Relieve pressure over bony prominences  - Avoid friction and shearing  - Provide appropriate hygiene as needed including keeping skin clean and dry  - Evaluate need for skin moisturizer/barrier cream  - Collaborate with interdisciplinary team (i e  Nutrition, Rehabilitation, etc )   - Patient/family teaching  Outcome: Progressing     Problem: MUSCULOSKELETAL - ADULT  Goal: Maintain or return mobility to safest level of function  Description  INTERVENTIONS:  - Assess patient's ability to carry out ADLs; assess patient's baseline for ADL function and identify physical deficits which impact ability to perform ADLs (bathing, care of mouth/teeth, toileting, grooming, dressing, etc )  - Assess/evaluate cause of self-care deficits   - Assess range of motion  - Assess patient's mobility  - Assess patient's need for assistive devices and provide as appropriate  - Encourage maximum independence but intervene and supervise when necessary  - Involve family in performance of ADLs  - Assess for home care needs following discharge   - Consider OT consult to assist with ADL evaluation and planning for discharge  - Provide patient education as appropriate  Outcome: Progressing     Problem: Nutrition/Hydration-ADULT  Goal: Nutrient/Hydration intake appropriate for improving, restoring or maintaining nutritional needs  Description  Monitor and assess patient's nutrition/hydration status for malnutrition  Collaborate with interdisciplinary team and initiate plan and interventions as ordered  Monitor patient's weight and dietary intake as ordered or per policy  Utilize nutrition screening tool and intervene as necessary  Determine patient's food preferences and provide high-protein, high-caloric foods as appropriate       INTERVENTIONS:  - Monitor oral intake, urinary output, labs, and treatment plans  - Assess nutrition and hydration status and recommend course of action  - Evaluate amount of meals eaten  - Assist patient with eating if necessary   - Allow adequate time for meals  - Recommend/ encourage appropriate diets, oral nutritional supplements, and vitamin/mineral supplements  - Order, calculate, and assess calorie counts as needed  - Recommend, monitor, and adjust tube feedings and TPN/PPN based on assessed needs  - Assess need for intravenous fluids  - Provide specific nutrition/hydration education as appropriate  - Include patient/family/caregiver in decisions related to nutrition  Outcome: Progressing     Problem: GENITOURINARY - ADULT  Goal: Maintains or returns to baseline urinary function  Description  INTERVENTIONS:  - Assess urinary function  - Encourage oral fluids to ensure adequate hydration if ordered  - Administer IV fluids as ordered to ensure adequate hydration  - Administer ordered medications as needed  - Offer frequent toileting  - Follow urinary retention protocol if ordered  Outcome: Progressing     Problem: RESPIRATORY - ADULT  Goal: Achieves optimal ventilation and oxygenation  Description  INTERVENTIONS:  - Assess for changes in respiratory status  - Assess for changes in mentation and behavior  - Position to facilitate oxygenation and minimize respiratory effort  - Oxygen administered by appropriate delivery if ordered  - Initiate smoking cessation education as indicated  - Encourage broncho-pulmonary hygiene including cough, deep breathe, Incentive Spirometry  - Assess the need for suctioning and aspirate as needed  - Assess and instruct to report SOB or any respiratory difficulty  - Respiratory Therapy support as indicated  Outcome: Not Progressing

## 2020-01-29 NOTE — UTILIZATION REVIEW
Continued Stay Review    Date: 01/29/2020                         Current Patient Class: Inpatient  Current Level of Care: Med/Surg    HPI:59 y o  female initially admitted on 01/21/2020    Assessment/Plan: Continue torsemide and sildenafil  On nasal cannula,  keep oxygen saturation above 88%  BiPAP HS 20/10  Monitor I/O  Daily weights  O2 @ 3L via NC  Pertinent Labs/Diagnostic Results:   Results from last 7 days   Lab Units 01/29/20  0516 01/28/20  0445 01/27/20  0433 01/25/20  0240 01/24/20  0437 01/23/20  0450   WBC Thousand/uL 6 84 6 60 7 03 7 55 8 28 9 44   HEMOGLOBIN g/dL 14 3 13 9 14 1 14 3 14 1 15 0   HEMATOCRIT % 49 0* 48 2* 49 6* 47 5* 48 3* 50 6*   PLATELETS Thousands/uL 183 169 166 148* 128* 106*   NEUTROS ABS Thousands/µL  --  4 08 4 47  --   --  7 34     Results from last 7 days   Lab Units 01/29/20  0516 01/28/20 0445 01/27/20  0433 01/26/20  0548 01/25/20  0240  01/24/20  0437 01/23/20  0450   SODIUM mmol/L 145 145 145 143 145   < > 142 139   POTASSIUM mmol/L 4 1 4 0 3 3* 3 6 3 5   < > 4 0 3 7   CHLORIDE mmol/L 108 108 104 101 100   < > 96* 91*   CO2 mmol/L 30 32 37* 40* 41*   < > 43* >45*   ANION GAP mmol/L 7 5 4 2* 4   < > 3*  --    BUN mg/dL 69* 68* 75* 82* 87*   < > 90* 90*   CREATININE mg/dL 1 49* 1 58* 1 60* 1 63* 1 65*   < > 1 71* 1 70*   EGFR ml/min/1 73sq m 38 36 35 34 34   < > 33 33   CALCIUM mg/dL 9 2 8 8 9 0 9 2 9 0   < > 9 2 8 8   CALCIUM, IONIZED mmol/L  --   --   --   --   --   --   --  0 99*   MAGNESIUM mg/dL  --   --   --  2 6 2 3  --  2 3 1 9   PHOSPHORUS mg/dL  --   --   --  3 8  --   --   --   --     < > = values in this interval not displayed       Results from last 7 days   Lab Units 01/24/20  1833   AST U/L 15   ALT U/L 18   ALK PHOS U/L 109   TOTAL PROTEIN g/dL 6 7   ALBUMIN g/dL 2 7*   TOTAL BILIRUBIN mg/dL 2 41*     Results from last 7 days   Lab Units 01/29/20  1056 01/29/20  0641 01/28/20  2116 01/28/20  1628 01/28/20  1119 01/28/20  0710 01/27/20  2238 01/27/20  2106 01/27/20  1704 01/27/20  1041   POC GLUCOSE mg/dl 147* 105 130 116 104 89 110 155* 128 121     Results from last 7 days   Lab Units 01/29/20  0516 01/28/20  0445 01/27/20  0433 01/26/20  0548 01/25/20  0240 01/24/20  1833 01/24/20  0437 01/23/20  0450   GLUCOSE RANDOM mg/dL 85 99 110 88 72 171* 82 89      Results from last 7 days   Lab Units 01/25/20  0439 01/24/20  1558   PH ART  7 478* 7 447   PCO2 ART mm Hg 58 8* 73 1*   PO2 ART mm Hg 52 7* 49 7*   HCO3 ART mmol/L 42 6* 49 3*   BASE EXC ART mmol/L 15 9 20 5   O2 CONTENT ART mL/dL 17 6 17 6   O2 HGB, ARTERIAL % 85 9* 84 2*   ABG SOURCE  Radial, Right Radial, Left   NON VENT TYPE HFNC   --  HFNC Flow     Vital Signs: /76 (BP Location: Right arm)   Pulse 75   Temp 97 7 °F (36 5 °C) (Oral)   Resp 18   Ht 5' 4" (1 626 m)   Wt 96 6 kg (212 lb 15 4 oz)   SpO2 96%   BMI 36 56 kg/m²     Medications:   Scheduled Medications:  Medications:  apixaban 5 mg Oral BID   digoxin 125 mcg Oral Daily   insulin lispro 1-5 Units Subcutaneous TID AC   insulin lispro 1-5 Units Subcutaneous HS   ipratropium 0 5 mg Nebulization TID   levalbuterol 1 25 mg Nebulization TID   metoprolol tartrate 50 mg Oral Q12H NEREYDA   nicotine 7 mg Transdermal Daily   nystatin  Topical BID   QUEtiapine 25 mg Oral QPM   sildenafil 40 mg Oral TID   torsemide 20 mg Oral BID (diuretic)   Continuous IV Infusions:   PRN Meds:  albuterol 2 5 mg Nebulization Q4H PRN   dextrose 25 mL Intravenous PRN   metoprolol 5 mg Intravenous Q6H PRN   ondansetron 4 mg Intravenous Q6H PRN   sodium chloride 1 spray Each Nare Q2H PRN     Discharge Plan: TBD    Network Utilization Review Department  [email or web address]  ATTENTION: Please call with any questions or concerns to [phone] and carefully listen to the prompts so that you are directed to the right person   All voicemails are confidential   Na [place] all requests for admission clinical reviews, approved or denied determinations and any other requests to dedicated fax number below belonging to the campus where the patient is receiving treatment   List of dedicated fax numbers for the Facilities:  [address] DENIALS (Administrative/Medical Necessity) [phone]   [address]  (Maternity/NICU/Pediatrics) [phone]   Pau Weber [phone]   Ronaldo Hurd [phone]   [facility] [phone]   Kavon Jones [phone]   [address] [place] [address] [phone]   [facility]  [phone]   [address] [facility], [facility]  [address] [facility] [address] [facility] [phone]

## 2020-01-29 NOTE — ASSESSMENT & PLAN NOTE
Acute hypoxic and hypercapnic respiratory failure secondary to pulmonary hypertension group 2/3, acute on chronic diastolic heart failure, obesity hypoventilation syndrome, severe COPD  On chronic home O2 3L/min  CO2 persistently in the 70s on ABG this admission  Continue torsemide and sildenafil  On nasal cannula,  keep oxygen saturation above 88%  BiPAP HS 20/10

## 2020-01-29 NOTE — RESTORATIVE TECHNICIAN NOTE
Restorative Specialist Mobility Note       Activity: Ambulate in posada     Assistive Device: Front wheel walker        Repositioned: Up in chair, Sitting

## 2020-01-29 NOTE — PHYSICAL THERAPY NOTE
PHYSICAL THERAPY NOTE      Patient Name: Vickie Genao  ROWVV'Y Date: 1/29/2020 01/29/20 1242   Pain Assessment   Pain Assessment 0-10   Pain Score No Pain   Restrictions/Precautions   Weight Bearing Precautions Per Order No   Other Precautions Multiple lines;Telemetry;O2;Fall Risk   General   Chart Reviewed Yes   Response to Previous Treatment Patient with no complaints from previous session  Family/Caregiver Present No   Cognition   Overall Cognitive Status WFL   Arousal/Participation Alert; Cooperative   Attention Attends with cues to redirect   Orientation Level Oriented X4   Memory Decreased recall of precautions   Following Commands Follows one step commands with increased time or repetition   Subjective   Subjective Pt seated OOB and requesting to use the restroom  Pt defers further mobility after using the restroom   Bed Mobility   Sit to Supine 4  Minimal assistance   Additional items Assist x 1; Increased time required;Verbal cues;LE management   Additional Comments Pt seated OOB upon PT arrival  Pt requesting to return supine at end of session   Transfers   Sit to Stand 4  Minimal assistance   Additional items Assist x 1; Increased time required;Verbal cues   Stand to Sit 4  Minimal assistance   Additional items Assist x 1; Increased time required;Verbal cues   Toilet transfer 4  Minimal assistance   Additional items Assist x 1;Commode   Additional Comments Transfers w/ RW; VCs required for safe hand placement   Ambulation/Elevation   Gait pattern Excessively slow; Short stride; Foward flexed; Improper Weight shift;Decreased foot clearance   Gait Assistance 4  Minimal assist   Additional items Assist x 1;Verbal cues   Assistive Device Rolling walker   Distance 10ft X2   Stair Management Assistance Not tested   Balance   Static Sitting Fair +   Dynamic Sitting Fair   Static Standing Fair -   Dynamic Standing Poor + Ambulatory Poor +   Endurance Deficit   Endurance Deficit Yes   Endurance Deficit Description fatigue, weakness   Activity Tolerance   Activity Tolerance Patient limited by fatigue   Nurse Made Aware RN cleared pt for therapy   Assessment   Prognosis Good   Problem List Decreased strength;Decreased endurance; Impaired balance;Decreased mobility; Decreased coordination;Decreased cognition; Impaired judgement;Decreased safety awareness   Assessment Pt presents with decreased mobility, strength, balance, and activity tolerance  Pt demonstrated ability to perform bed mobility at [address] [place] [address] and functional transfers at [address] [place] [address]  Pt ambulated 10 ft to restroom with RW at [address] [place] [address]  Pt performed toilet transfer at [address] [place] [address] from commode  Pt declining further ambulation after using restroom at this time requesting to return back to bed  Pt continues to benefit from skilled therapy to maximize functional independence  Recommendation at this time is rehab  Pt would benefit from continued ambulation, functional transfers, strength, balance, and activity tolerance   Goals   Patient Goals to walk better   [facility] Expiration Date 02/05/20   PT Treatment Day 3   Plan   Treatment/Interventions Functional transfer training;LE strengthening/ROM; Endurance training;Patient/family training;Equipment eval/education; Bed mobility;Gait training;Spoke to nursing   Progress Progressing toward goals   PT Frequency   (3-5x/week)   Recommendation   Recommendation Post acute IP rehab   Equipment Recommended Walker   PT - OK to Discharge Yes   Additional Comments when medically cleared to rehab     Katie Mcginnis, PT, DPT

## 2020-01-29 NOTE — SOCIAL WORK
CM contacted the following transportation companies to find BLS transportation from [facility] to [facility] this evening or tomorrow morning, each said they had no availability:  Ria Furth, evon, [address], [address] [facility]  CM contacted [address] [place] [address] who had transportation available for tomorrow afternoon at 2:30 PM      CM attempted to call pt's son, per pt request, and left a voicemail informing of transportation time  Mayda Ashton made aware via [employer] [place]  Pt's physician aware

## 2020-01-29 NOTE — PROGRESS NOTES
Cardiology Progress Note - Sriram Cobb 63 y o  female MRN: [id number]    Unit/Bed#: [facility] 511-01 Encounter: [id number]      Assessment & Plan:  Principal Problem:    Acute on chronic respiratory failure with hypoxia and hypercapnia (HCC)  -per chart patient is on 10L NC at home, on 6L NC at rest today  -CO2 persistently in the 70s on ABG this admission  -continue with BiPAP at night    HFpEF, LVEF 58%, LVIDd 5 3, RVIDd 4 8  - currently on her home diuretic regimen of torsemide 20mg p o  b i d   RHC 1/23 on sildenafil 40tid: RA: 8 mmHg, PA: 44/26/33 mmHg, PCWP: 9 mmHg, PA sat 67%, CO 5 3 l/min, PVR 4 5 DRUMMOND  -continue with sildenafil 40 mg t i d  And Lopressor 50 mg b i d   -placed nutritionist consult to educate about low-sodium diet and fluid restriction    Pulmonary hypertension  -likely WHO group 2 (diastolic HF) and group 3 (COPD/OHS), with phenotypic expression of group 1  -started on sildenafil this admission, currently on 40 mg t i d  Severe COPD/OHS  -per documentation requires 10 L nasal cannula home  -currently on 6L NC, on BiPAP at night    Permanent atrial fibrillation  -anticoagulant Eliquis 5 mg b i d   -currently rate controlled on Lopressor 50 mg b i d  And digoxin 125 mcg daily  -patient is on metoprolol and Cardizem at home rate control normally    Type 2 diabetes mellitus without complication (HCC)    SAPNA (acute kidney injury) ([facility] [address] )    Acute on chronic diastolic congestive heart failure (HCC)    Pericardial effusion    Pulmonary hypertension ([facility] [address] )    Patient remains stable from a heart failure standpoint  Heart failure team will sign off today  Please call with any questions  Subjective:   Patient seen and examined  No significant events overnight  She reports feeling well this morning and has no complaints  Denies chest pain, abdominal pain, nausea, vomiting, fever, chills, headache, dizziness or other cardiac symptoms      Objective:     Vitals: Blood pressure 121/76, pulse 75, temperature 97 7 °F (36 5 °C), temperature source Oral, resp  rate 18, height 5' 4" (1 626 m), weight 96 6 kg (212 lb 15 4 oz), SpO2 96 %  , Body mass index is 36 56 kg/m² ,   Orthostatic Blood Pressures      Most Recent Value   Blood Pressure  121/76 filed at 01/29/2020 0700   Patient Position - Orthostatic VS  Lying filed at 01/29/2020 0700            Intake/Output Summary (Last 24 hours) at 1/29/2020 1140  Last data filed at 1/29/2020 9849  Gross per 24 hour   Intake 655 ml   Output 1250 ml   Net -595 ml           Physical Exam:    GEN: Jaky Anne appears well, alert and oriented x 3, pleasant and cooperative   HEENT: anicteric, mucous membranes moist  NECK: no JVD, no carotid bruits   HEART: regular rhythm, normal S1 and S2, + systolic murmur   LUNGS: clear to auscultation bilaterally  ABDOMEN: normal bowel sounds, soft, no tenderness, no distention  EXTREMITIES: peripheral pulses normal; no clubbing, cyanosis, or edema  NEURO: no focal findings   SKIN: normal without suspicious lesions on exposed skin      Current Facility-Administered Medications:     albuterol inhalation solution 2 5 mg, 2 5 mg, Nebulization, Q4H PRN, Alexis Vizcarra MD    apixaban (ELIQUIS) tablet 5 mg, 5 mg, Oral, BID, Alexis Vizcarra MD, 5 mg at 01/29/20 0816    dextrose 50 % IV solution 25 mL, 25 mL, Intravenous, PRN, Alexis Vizcarra MD, 25 mL at 01/23/20 0758    digoxin (LANOXIN) tablet 125 mcg, 125 mcg, Oral, Daily, Alexis Vizcarra MD, 125 mcg at 01/29/20 0816    insulin lispro (HumaLOG) 100 units/mL subcutaneous injection 1-5 Units, 1-5 Units, Subcutaneous, TID AC, 1 Units at 01/24/20 1204 **AND** Fingerstick Glucose (POCT), , , TID AC, Asia Potter MD    insulin lispro (HumaLOG) 100 units/mL subcutaneous injection 1-5 Units, 1-5 Units, Subcutaneous, HS, Asia Potter MD, 2 Units at 01/24/20 2251    ipratropium (ATROVENT) 0 02 % inhalation solution 0 5 mg, 0 5 mg, Nebulization, TID, Asia Potter, MD, 0 5 mg at 01/29/20 0748    levalbuterol (Elham Louder) inhalation solution 1 25 mg, 1 25 mg, Nebulization, TID, Elana Hammer MD, 1 25 mg at 01/29/20 0748    metoprolol (LOPRESSOR) injection 5 mg, 5 mg, Intravenous, Q6H PRN, Elana Hammer MD    metoprolol tartrate (LOPRESSOR) tablet 50 mg, 50 mg, Oral, Q12H [facility], Elana Hammer MD, 50 mg at 01/29/20 0815    nicotine (NICODERM CQ) 7 mg/24hr TD 24 hr patch 7 mg, 7 mg, Transdermal, Daily, Elana Hammer MD, 7 mg at 01/29/20 0818    nystatin (MYCOSTATIN) powder, , Topical, BID, Elana Hammer MD    ondansetron (ZOFRAN) injection 4 mg, 4 mg, Intravenous, Q6H PRN, Elana Hammer MD    QUEtiapine (SEROquel) tablet 25 mg, 25 mg, Oral, QPM, Elana Hammer MD, 25 mg at 01/28/20 2007    sildenafil (REVATIO) tablet 40 mg, 40 mg, Oral, TID, Elana Hammer MD, 40 mg at 01/29/20 0816    sodium chloride (OCEAN) 0 65 % nasal spray 1 spray, 1 spray, Each Nare, Q2H PRN, Elana Hammer MD, 1 spray at 01/26/20 1702    torsemide (DEMADEX) tablet 20 mg, 20 mg, Oral, BID (diuretic), Elana Hammer MD, 20 mg at 01/29/20 0816    Labs & Results:    Lab Results   Component Value Date    TROPONINI 0 07 (H) 01/06/2020    TROPONINI 0 09 (H) 02/08/2019       Lab Results   Component Value Date    GLUCOSE 95 01/10/2020    CALCIUM 9 2 01/29/2020     05/20/2015    K 4 1 01/29/2020    CO2 30 01/29/2020     01/29/2020    BUN 69 (H) 01/29/2020    CREATININE 1 49 (H) 01/29/2020       Lab Results   Component Value Date    WBC 6 84 01/29/2020    HGB 14 3 01/29/2020    HCT 49 0 (H) 01/29/2020     (H) 01/29/2020     01/29/2020           No results found for: CHOL  Lab Results   Component Value Date    HDL 32 (L) 02/09/2019    HDL 36 (L) 02/13/2018     Lab Results   Component Value Date    LDLCALC 32 02/09/2019    [facility] 86 02/13/2018     Lab Results   Component Value Date    TRIG 61 02/09/2019    TRIG 93 02/13/2018       Lab Results Component Value Date    ALT 18 01/24/2020    AST 15 01/24/2020         EKG personally reviewed by )Arnulfo Pate MD  No acute changes   TELE: no acute events overnight

## 2020-01-29 NOTE — SOCIAL WORK
KAIT received call from Avani with [facility] ([phone]) who stated that there is a bed available at their [address] [place] [address] location for pt  KAIT spoke with pt regarding same  Pt stated that, although she was hoping for a facility a bit closer to her son, she will accept the bed  CM let Avani from [facility] know, she will initiate insurance authorization  She also stated that she will need a script for the bipap  KAIT messaged physician to inform of same  CM left voicemail for pt's son per pt's request informing him of the d/c plan

## 2020-01-29 NOTE — PROGRESS NOTES
NEPHROLOGY PROGRESS NOTE   Maynor Lara 63 y o  female MRN: [id number]  Unit/Bed#: [facility] 511-01 Encounter: [id number]      ASSESSMENT/PLAN:  1  Acute kidney injury, POA:  Most likely due to cardiorenal syndrome/volume overload and also had some relative hypotension with Ace inhibitor use  · Status post treatment with Lasix drip and now on oral torsemide 20 mg bid  · Renal ultrasound normal except for 5 mm right nonobstructing calculus  · UA:  Moderate blood, 1+ protein, 0-1 WBC, 10-20 RBC  · Repeat UA once she is at steady state  · Creatinine today continues to improve down to 1 49  · Check a m  BMP  2  CKD stage 3:  Baseline creatinine 1 3-1 6  Likely due to hypertension, diabetes and cardiorenal syndrome  3  Acute on chronic diastolic CHF/pulmonary hypertension: on torsemide as above and on sildenafil   · Continue oral fluid restriction and low sodium diet   4  Hypokalemia:  Resolved with replacement  5  Mild hypernatremia: would continue low sodium diet and fluid restriction 2000cc/day which was increased yesterday   6  Pericardial effusion: diuretics as above   7  Severe COPD    Dispo: would be ok to d/c from a renal standpoint  Case management working on SNF placement     SUBJECTIVE:  Continues to feel little better each day  She is hoping to go to rehab soon    No current chest pain or shortness breath    OBJECTIVE:  Current Weight: Weight - Scale: 96 6 kg (212 lb 15 4 oz)  Vitals:    01/29/20 0700   BP: 121/76   Pulse: 75   Resp: 18   Temp: 97 7 °F (36 5 °C)   SpO2: 96%       Intake/Output Summary (Last 24 hours) at 1/29/2020 1002  Last data filed at 1/29/2020 5910  Gross per 24 hour   Intake 655 ml   Output 1250 ml   Net -595 ml       General:  No acute distress  Skin:  No rash  Eyes:  Sclerae anicteric  ENT:  Moist mucous membranes  Neck:  Trachea midline   Chest:  Clear to auscultation bilaterally  CVS:  Regular rate and rhythm  Abdomen:  Soft, nontender, nondistended  Extremities:  No edema  Neuro: Awake and alert  Psych:  Appropriate affect      Medications:  Scheduled Meds:    Current Facility-Administered Medications:  albuterol 2 5 mg Nebulization Q4H PRN Zack Breen MD   apixaban 5 mg Oral BID Zack Breen MD   dextrose 25 mL Intravenous PRN Zack Breen MD   digoxin 125 mcg Oral Daily Zack Breen MD   insulin lispro 1-5 Units Subcutaneous TID AC Zack Breen MD   insulin lispro 1-5 Units Subcutaneous HS Zack Breen MD   ipratropium 0 5 mg Nebulization TID Zack Breen MD   levalbuterol 1 25 mg Nebulization TID Zakc Breen MD   metoprolol 5 mg Intravenous Q6H PRN Zack Breen MD   metoprolol tartrate 50 mg Oral Q12H [address] Zack Breen MD   nicotine 7 mg Transdermal Daily Zack Breen MD   nystatin  Topical BID Zack Breen MD   ondansetron 4 mg Intravenous Q6H PRN Zack Breen MD   QUEtiapine 25 mg Oral QPM Zack Breen MD   sildenafil 40 mg Oral TID Zack Breen MD   sodium chloride 1 spray Each Nare Q2H PRN Zack Breen MD   torsemide 20 mg Oral BID (diuretic) Zack Breen MD       PRN Meds:   albuterol    dextrose    metoprolol    ondansetron    sodium chloride    Laboratory Results:  Results from last 7 days   Lab Units 01/29/20  0516 01/28/20  0445 01/27/20  0433 01/26/20  0548 01/25/20  0240 01/24/20  1833 01/24/20  0437 01/23/20  0450   WBC Thousand/uL 6 84 6 60 7 03  --  7 55  --  8 28 9 44   HEMOGLOBIN g/dL 14 3 13 9 14 1  --  14 3  --  14 1 15 0   HEMATOCRIT % 49 0* 48 2* 49 6*  --  47 5*  --  48 3* 50 6*   PLATELETS Thousands/uL 183 169 166  --  148*  --  128* 106*   SODIUM mmol/L 145 145 145 143 145 144 142 139   POTASSIUM mmol/L 4 1 4 0 3 3* 3 6 3 5 3 9 4 0 3 7   CHLORIDE mmol/L 108 108 104 101 100 99* 96* 91*   CO2 mmol/L 30 32 37* 40* 41* 42* 43* >45*   BUN mg/dL 69* 68* 75* 82* 87* 89* 90* 90*   CREATININE mg/dL 1 49* 1 58* 1 60* 1 63* 1 65* 1 84* 1 71* 1 70*   CALCIUM mg/dL 9 2 8 8 9 0 9 2 9 0 9 1 9 2 8 8   MAGNESIUM mg/dL  --   --   --  2 6 2 3  --  2 3 1 9   PHOSPHORUS mg/dL  --   --   --  3 8  --   --   --   --

## 2020-01-29 NOTE — SOCIAL WORK
Cm received message from susan Varma that lilliam have received auth for pt to go to [address] [place] [address]

## 2020-01-29 NOTE — TRANSPORTATION MEDICAL NECESSITY
Section I - General Information    Name of Patient: Maynor Lara                 : 1960    Medicare #: [id number]  Transport Date: 20 (PCS is valid for round trips on this date and for all repetitive trips in the 60-day range as noted below )  Origin: [address]: [facility] ([address], [facility], [address])    Is the pt's stay covered under Medicare Part A (PPS/DRG)   []     Closest appropriate facility? If no, why is transport to more distant facility required? Yes  If hospice pt, is this transport related to pt's terminal illness? NA       Section II - Medical Necessity Questionnaire  Ambulance transportation is medically necessary only if other means of transport are contraindicated or would be potentially harmful to the patient  To meet this requirement, the patient must either be "bed confined" or suffer from a condition such that transport by means other than ambulance is contraindicated by the patient's condition  The following questions must be answered by the medical professional signing below for this form to be valid:    1)  Describe the MEDICAL CONDITION (physical and/or mental) of this patient AT [address] [place] [address] that requires the patient to be transported in an ambulance and why transport by other means is contraindicated by the patient's condition: 6L 02, respiratory failure  2) Is the patient "bed confined" as defined below? Yes  To be "be confined" the patient must satisfy all three of the following conditions: (1) unable to get up from bed without Assistance; AND (2) unable to ambulate; AND (3) unable to sit in a chair or wheelchair  3) Can this patient safely be transported by car or wheelchair van (i e , seated during transport without a medical attendant or monitoring)?    No    4) In addition to completing questions 1-3 above, please check any of the following conditions that apply*:   *Note: supporting documentation for any boxes checked must be maintained in the patient's medical records  If hosp-hosp transfer, describe services needed at 2nd facility not available at 1st facility? Medical attendant required   Requires oxygen-unable to self administer  Unable to tolerate seated position for time needed to transport       Section III - Signature of Physician or Healthcare Professional  I certify that the above information is true and correct based on my evaluation of this patient, and represent that the patient requires transport by ambulance and that other forms of transport are contraindicated  I understand that this information will be used by the Centers for Medicare and Medicaid Services (CMS) to support the determination of medical necessity for ambulance services, and I represent that I have personal knowledge of the patient's condition at time of transport  []  If this box is checked, I also certify that the patient is physically or mentally incapable of signing the ambulance service's claim and that the institution with which I am affiliated has furnished care, services, or assistance to the patient  My signature below is made on behalf of the patient pursuant to 42 CFR §424 36(b)(4)  In accordance with 42 CFR §424 37, the specific reason(s) that the patient is physically or mentally incapable of signing the claim form is as follows:  Huma Chol of Physician* or Healthcare Professional______________________________________________________________  Signature Date 01/29/20 (For scheduled repetitive transports, this form is not valid for transports performed more than 60 days after this date)    Printed Name & Credentials of Physician or Healthcare Professional (MD, DO, RN, etc )______CHRISTINE Watson__________________________  *Form must be signed by patient's attending physician for scheduled, repetitive transports   For non-repetitive, unscheduled ambulance transports, if unable to obtain the signature of the attending physician, any of the following may sign (choose appropriate option below)  [] Physician Assistant []  Clinical Nurse Specialist []  Registered Nurse  []  Nurse Practitioner  [x] Discharge Planner

## 2020-01-29 NOTE — ASSESSMENT & PLAN NOTE
Wt Readings from Last 3 Encounters:   01/29/20 96 6 kg (212 lb 15 4 oz)   01/21/20 116 kg (255 lb 11 7 oz)   11/05/19 110 kg (243 lb 9 6 oz)     Volume status stable, negative fluid balance  Continue torsemide 20 mg b i d   Echo 1/15 showed normal left ventricular systolic function with left ventricular hypertrophy    The right ventricle is significantly large with significantly reduced right ventricular systolic function  Monitor input and output  Daily weights

## 2020-01-29 NOTE — PLAN OF CARE
Problem: PHYSICAL THERAPY ADULT  Goal: Performs mobility at highest level of function for planned discharge setting  See evaluation for individualized goals  Description  Treatment/Interventions: Functional transfer training, LE strengthening/ROM, Elevations, Therapeutic exercise, Endurance training, Patient/family training, Equipment eval/education, Bed mobility, Gait training, Spoke to nursing, Spoke to case management  Equipment Recommended: Walker(RW)       See flowsheet documentation for full assessment, interventions and recommendations  Outcome: Progressing  Note:   Prognosis: Good  Problem List: Decreased strength, Decreased endurance, Impaired balance, Decreased mobility, Decreased coordination, Decreased cognition, Impaired judgement, Decreased safety awareness  Assessment: Pt presents with decreased mobility, strength, balance, and activity tolerance  Pt demonstrated ability to perform bed mobility at [address] [place] [address] and functional transfers at [address] [place] [address]  Pt ambulated 10 ft to restroom with RW at [address] [place] [address]  Pt performed toilet transfer at [address] [place] [address] from commode  Pt declining further ambulation after using restroom at this time requesting to return back to bed  Pt continues to benefit from skilled therapy to maximize functional independence  Recommendation at this time is rehab  Pt would benefit from continued ambulation, functional transfers, strength, balance, and activity tolerance  Barriers to Discharge: Decreased caregiver support, Inaccessible home environment     Recommendation: Post acute IP rehab     PT - OK to Discharge: Yes    See flowsheet documentation for full assessment

## 2020-01-30 VITALS
SYSTOLIC BLOOD PRESSURE: 126 MMHG | WEIGHT: 212.74 LBS | HEART RATE: 71 BPM | RESPIRATION RATE: 17 BRPM | TEMPERATURE: 97.6 F | BODY MASS INDEX: 36.32 KG/M2 | HEIGHT: 64 IN | OXYGEN SATURATION: 91 % | DIASTOLIC BLOOD PRESSURE: 74 MMHG

## 2020-01-30 LAB
GLUCOSE SERPL-MCNC: 101 MG/DL (ref 65–140)
GLUCOSE SERPL-MCNC: 79 MG/DL (ref 65–140)

## 2020-01-30 PROCEDURE — 99239 HOSP IP/OBS DSCHRG MGMT >30: CPT | Performed by: PHYSICIAN ASSISTANT

## 2020-01-30 PROCEDURE — 82948 REAGENT STRIP/BLOOD GLUCOSE: CPT

## 2020-01-30 RX ORDER — SILDENAFIL CITRATE 20 MG/1
40 TABLET ORAL 3 TIMES DAILY
Qty: 90 TABLET | Refills: 0 | Status: SHIPPED | OUTPATIENT
Start: 2020-01-30 | End: 2020-02-03 | Stop reason: SDUPTHER

## 2020-01-30 RX ORDER — DIGOXIN 125 MCG
125 TABLET ORAL DAILY
Qty: 30 TABLET | Refills: 0 | Status: SHIPPED | OUTPATIENT
Start: 2020-01-30 | End: 2020-02-25 | Stop reason: SDUPTHER

## 2020-01-30 RX ORDER — METOPROLOL TARTRATE 50 MG/1
50 TABLET, FILM COATED ORAL EVERY 12 HOURS SCHEDULED
Qty: 60 TABLET | Refills: 0 | Status: CANCELLED | OUTPATIENT
Start: 2020-01-30

## 2020-01-30 RX ORDER — METOPROLOL TARTRATE 50 MG/1
50 TABLET, FILM COATED ORAL EVERY 12 HOURS SCHEDULED
Qty: 60 TABLET | Refills: 0 | Status: SHIPPED | OUTPATIENT
Start: 2020-01-30 | End: 2020-02-25 | Stop reason: SDUPTHER

## 2020-01-30 RX ORDER — QUETIAPINE FUMARATE 25 MG/1
25 TABLET, FILM COATED ORAL EVERY EVENING
Qty: 30 TABLET | Refills: 0 | Status: SHIPPED | OUTPATIENT
Start: 2020-01-30 | End: 2020-03-25

## 2020-01-30 RX ORDER — OXYCODONE HYDROCHLORIDE 5 MG/1
5 TABLET ORAL EVERY 4 HOURS PRN
Qty: 30 TABLET | Refills: 0 | Status: SHIPPED | OUTPATIENT
Start: 2020-01-30 | End: 2020-02-09

## 2020-01-30 RX ADMIN — SILDENAFIL 40 MG: 20 TABLET ORAL at 10:42

## 2020-01-30 RX ADMIN — NYSTATIN: 100000 POWDER TOPICAL at 10:43

## 2020-01-30 RX ADMIN — LEVALBUTEROL HYDROCHLORIDE 1.25 MG: 1.25 SOLUTION, CONCENTRATE RESPIRATORY (INHALATION) at 08:29

## 2020-01-30 RX ADMIN — APIXABAN 5 MG: 5 TABLET, FILM COATED ORAL at 10:42

## 2020-01-30 RX ADMIN — METOPROLOL TARTRATE 50 MG: 50 TABLET, FILM COATED ORAL at 10:41

## 2020-01-30 RX ADMIN — IPRATROPIUM BROMIDE 0.5 MG: 0.5 SOLUTION RESPIRATORY (INHALATION) at 08:29

## 2020-01-30 RX ADMIN — TORSEMIDE 20 MG: 20 TABLET ORAL at 10:41

## 2020-01-30 RX ADMIN — NICOTINE 7 MG: 7 PATCH TRANSDERMAL at 10:42

## 2020-01-30 RX ADMIN — DIGOXIN 125 MCG: 125 TABLET ORAL at 10:43

## 2020-01-30 NOTE — ASSESSMENT & PLAN NOTE
Wt Readings from Last 3 Encounters:   01/30/20 96 5 kg (212 lb 11 9 oz)   01/21/20 116 kg (255 lb 11 7 oz)   11/05/19 110 kg (243 lb 9 6 oz)     Volume status stable, negative fluid balance  Cardiology following, appreciate recommendations   Continue torsemide 20 mg b i d   Echo 1/15 showed normal left ventricular systolic function with left ventricular hypertrophy    The right ventricle is significantly large with significantly reduced right ventricular systolic function  Monitor input and output  Daily weights

## 2020-01-30 NOTE — SOCIAL WORK
Call received from [facility] to request that transport be changed to 12:00 today  Informed RN who will speak to SLIM and the patient  Sent message to SNF

## 2020-01-30 NOTE — ASSESSMENT & PLAN NOTE
Lab Results   Component Value Date    HGBA1C 5 8 04/11/2019       Recent Labs     01/29/20  1056 01/29/20  1628 01/29/20 2058 01/30/20  0606   POCGLU 147* 119 131 79       Blood Sugar Average: Last 72 hrs:  (P) 116 [id number]     Encouraged diet and lifestyle modifications  Not on antihyperglycemic therapy as outpatient   Follow up with PCP

## 2020-01-30 NOTE — ASSESSMENT & PLAN NOTE
Secondary to cardiorenal syndrome  Improving, creatinine back to baseline  Continue torsemide 20 mg b i d    Nephrology following

## 2020-01-30 NOTE — DISCHARGE SUMMARY
Discharge- Frederick Parr 1960, 63 y o  female MRN: [id number]  Unit/Bed#: -01 Encounter: [id number]  Primary Care Provider: Nancy Ortiz MD   Date and time admitted to hospital: 1/21/2020 11:31 PM    * Acute on chronic respiratory failure with hypoxia and hypercapnia (HCC)  Assessment & Plan  Acute hypoxic and hypercapnic respiratory failure secondary to pulmonary hypertension group 2/3, acute on chronic diastolic heart failure, obesity hypoventilation syndrome, severe COPD  On chronic home O2 3L/min  CO2 persistently in the 70s on ABG this admission  Continue torsemide and sildenafil  On nasal cannula,  keep oxygen saturation above 88%  BiPAP HS 20/10    Acute on chronic diastolic congestive heart failure (HCC)  Assessment & Plan  Wt Readings from Last 3 Encounters:   01/30/20 96 5 kg (212 lb 11 9 oz)   01/21/20 116 kg (255 lb 11 7 oz)   11/05/19 110 kg (243 lb 9 6 oz)     Volume status stable, negative fluid balance  Cardiology following, appreciate recommendations   Continue torsemide 20 mg b i d   Echo 1/15 showed normal left ventricular systolic function with left ventricular hypertrophy  The right ventricle is significantly large with significantly reduced right ventricular systolic function  Monitor input and output  Daily weights    Acute renal failure superimposed on stage 3 chronic kidney disease ([address] )  Assessment & Plan  Secondary to cardiorenal syndrome  Improving, creatinine back to baseline  Continue torsemide 20 mg b i d    Nephrology following     Pulmonary hypertension [facility])  Assessment & Plan  Pulmonary hypertension WHO group 2/3  Continue sildenafil    Pericardial effusion  Assessment & Plan  Echo on 01/15 showed small to moderate concentric pericardial effusion without evidence of hemodynamic compromise    Nonsustained ventricular tachycardia (HCC)  Assessment & Plan  Continue metoprolol, continue telemetry  Monitor electrolytes    Permanent atrial fibrillation  Assessment & Plan  Continue metoprolol, digoxin and Eliquis    COPD (chronic obstructive pulmonary disease) ([facility])  Assessment & Plan  Severe COPD, no exacerbation  On home oxygen  Continue with bronchodilator    Type 2 diabetes mellitus without complication [facility])  Assessment & Plan  Lab Results   Component Value Date    HGBA1C 5 8 04/11/2019       Recent Labs     01/29/20  1056 01/29/20  1628 01/29/20  2058 01/30/20  0606   POCGLU 147* 119 131 79       Blood Sugar Average: Last 72 hrs:  (P) 116 [id number]     Encouraged diet and lifestyle modifications  Not on antihyperglycemic therapy as outpatient   Follow up with PCP    Discharging Physician / Practitioner: Yolanda Mallory PA-C  PCP: Nancy Ortiz MD  Admission Date:   Admission Orders (From admission, onward)     Ordered        01/21/20 2350  Inpatient Admission  Once                   Discharge Date: 01/30/20    Resolved Problems  Date Reviewed: 1/30/2020    None          Consultations During Hospital Stay:  · Cardiology  · Palliative  · Nephrology    Procedures Performed:   · Right heart catheterization     Significant Findings / Test Results:   · Portable chest x-ray 01/22/2020:  Evidence of CHF is slightly worse than prior study  · Portable chest x-ray 01/26/2020:  Slight improvement in congestive heart failure  · Right heart catheterization 01/23/2020:   RA: 8 mmHg  PA: 44/26/33 mmHg  PCWP: 9 mmHg  PA sat 67%  CO 5 3 l/min  PVR 4 5 DRUMMOND    Incidental Findings:   · none    Test Results Pending at Discharge (will require follow up):    · None     Outpatient Tests Requested:  · Outpatient follow-up with cardiology  · Outpatient follow-up with PCP    Complications:  None    Reason for Admission:  [place] [facility] [place] hypertension and heart failure evaluation    Hospital Course:     Frederick Parr is a 63 y o  female patient who originally presented to the hospital on 1/21/2020 as a transfer [facility] [place] right heart catheterization and heart failure evaluation  Patient was admitted under critical care  She was initiated on sildenafil 40 mg t i d  She was transferred on Lasix drip which was subsequently transitioned to scheduled IV diuresis  Right heart catheterization revealed moderate to severe pulmonary hypertension  Volume status remained stable status post IV diuresis and she was transitioned to home dose torsemide 20 mg b i d  Her rate controlled on current AV david blocking regimen and digoxin  Palliative followed along for his goals discussion and social support  Patient was evaluated by physical therapy who determined she is appropriate for acute inpatient rehab  Patient is to follow-up with heart failure as an outpatient as well as her PCP in 1-2 weeks  Please see above list of diagnoses and related plan for additional information  Please refer to discharge summary from [facility] dated 01/21/2020 for further details regarding admission  Condition at Discharge: fair     Discharge Day Visit / Exam:     Subjective:  Patient offers no complaint, states she is tired and is going to rest until time for discharge  Vitals: Blood Pressure: 126/74 (01/30/20 0713)  Pulse: 71 (01/30/20 0713)  Temperature: 97 6 °F (36 4 °C) (01/30/20 0713)  Temp Source: Oral (01/30/20 0713)  Respirations: 17 (01/30/20 0713)  Height: 5' 4" (162 6 cm) (01/21/20 2344)  Weight - Scale: 96 5 kg (212 lb 11 9 oz) (01/30/20 0535)  SpO2: 91 % (01/30/20 0713)     Exam:   Physical Exam   Constitutional: She is oriented to person, place, and time  Vital signs are normal  She appears well-developed and well-nourished  No distress  Patient sitting upright in chair, no acute distress   Nasal cannula in pace    Cardiovascular: Normal rate, regular rhythm and intact distal pulses  Murmur heard  Pulmonary/Chest: Effort normal and breath sounds normal  No respiratory distress  She has no wheezes  She has no rales     Abdominal: Soft  Bowel sounds are normal  She exhibits no distension  There is no tenderness  Musculoskeletal: She exhibits no edema  Neurological: She is alert and oriented to person, place, and time  Skin: Skin is warm and dry  She is not diaphoretic  No erythema  Ecchymosis R forearm  Scabbed lesions on L forearm, no bleeding      Discussion with Family: patient declined     Discharge instructions/Information to patient and family:   See after visit summary for information provided to patient and family  Provisions for Follow-Up Care:  See after visit summary for information related to follow-up care and any pertinent home health orders  Disposition:     Elyssa Bob Dino at [facility] care    For Discharges to [facility] SNF:   · Not Applicable to this Patient - Not Applicable to this Patient    Planned Readmission: no     Discharge Statement:  I spent 90 minutes discharging the patient  This time was spent on the day of discharge  I had direct contact with the patient on the day of discharge  Greater than 50% of the total time was spent examining patient, answering all patient questions, arranging and discussing plan of care with patient as well as directly providing post-discharge instructions  Additional time then spent on discharge activities  Discharge Medications:  See after visit summary for reconciled discharge medications provided to patient and family        ** Please Note: This note has been constructed using a voice recognition system **

## 2020-01-31 ENCOUNTER — TRANSITIONAL CARE MANAGEMENT (OUTPATIENT)
Dept: INTERNAL MEDICINE CLINIC | Facility: CLINIC | Age: 60
End: 2020-01-31

## 2020-01-31 NOTE — UTILIZATION REVIEW
Notification of Discharge  This is a Notification of Discharge from our facility [address]  Please be advised that this patient has been discharge from our facility  Below you will find the admission and discharge date and time including the patients disposition  PRESENTATION DATE: 1/21/2020 11:31 PM  OBS ADMISSION DATE:   IP ADMISSION DATE: 1/21/20 2331   DISCHARGE DATE: 1/30/2020  1:36 PM  DISPOSITION: Non [facility] SN[facility]/TCU/SNU Non [facility] SNF/[facility]/SNU   Admission Orders listed below:  Admission Orders (From admission, onward)     Ordered        01/21/20 2350  Inpatient Admission  Once                   Please contact the UR Department if additional information is required to close this patient's authorization/case  [address] Utilization Review Department  Main: [phone] x carefully listen to the prompts  All voicemails are confidential   [email or web address]  Send all requests for admission clinical reviews, approved or denied determinations and any other requests to dedicated fax number below belonging to the campus where the patient is receiving treatment   List of dedicated fax numbers:  [address] [place] [address] DENIALS (Administrative/Medical Necessity) [phone]   [address]  (Maternity/NICU/Pediatrics) [phone]   [facility] [phone]     [address] [phone]   [facility] [phone]   [address] [phone]   [facility]  [phone]   [address] [facility] [address]  [address] [facility] [address] [facility] [phone]

## 2020-02-03 DIAGNOSIS — I27.20 PULMONARY HYPERTENSION (HCC): ICD-10-CM

## 2020-02-03 RX ORDER — SILDENAFIL CITRATE 20 MG/1
40 TABLET ORAL 3 TIMES DAILY
Qty: 90 TABLET | Refills: 6 | Status: SHIPPED | OUTPATIENT
Start: 2020-02-03 | End: 2020-02-19

## 2020-02-05 ENCOUNTER — TELEPHONE (OUTPATIENT)
Dept: CARDIOLOGY CLINIC | Facility: CLINIC | Age: 60
End: 2020-02-05

## 2020-02-05 NOTE — TELEPHONE ENCOUNTER
Called patient to reschedule appt the with Kaley MCGUIRE 2/10/2020 with one of [facility] heart failure specialists, no answer, left message for pt to call back

## 2020-02-13 ENCOUNTER — TRANSITIONAL CARE MANAGEMENT (OUTPATIENT)
Dept: INTERNAL MEDICINE CLINIC | Facility: CLINIC | Age: 60
End: 2020-02-13

## 2020-02-19 DIAGNOSIS — I27.20 PULMONARY HYPERTENSION (HCC): ICD-10-CM

## 2020-02-19 RX ORDER — SILDENAFIL CITRATE 20 MG/1
40 TABLET ORAL 3 TIMES DAILY
Qty: 180 TABLET | Refills: 6 | Status: SHIPPED | OUTPATIENT
Start: 2020-02-19 | End: 2020-03-23 | Stop reason: SDUPTHER

## 2020-02-25 ENCOUNTER — OFFICE VISIT (OUTPATIENT)
Dept: INTERNAL MEDICINE CLINIC | Facility: CLINIC | Age: 60
End: 2020-02-25
Payer: COMMERCIAL

## 2020-02-25 ENCOUNTER — OFFICE VISIT (OUTPATIENT)
Dept: CARDIOLOGY CLINIC | Facility: CLINIC | Age: 60
End: 2020-02-25
Payer: COMMERCIAL

## 2020-02-25 VITALS
WEIGHT: 212 LBS | HEART RATE: 68 BPM | OXYGEN SATURATION: 90 % | SYSTOLIC BLOOD PRESSURE: 102 MMHG | DIASTOLIC BLOOD PRESSURE: 76 MMHG | BODY MASS INDEX: 36.19 KG/M2 | HEIGHT: 64 IN | TEMPERATURE: 98.4 F

## 2020-02-25 VITALS
DIASTOLIC BLOOD PRESSURE: 82 MMHG | BODY MASS INDEX: 36.06 KG/M2 | RESPIRATION RATE: 18 BRPM | HEIGHT: 64 IN | WEIGHT: 211.2 LBS | SYSTOLIC BLOOD PRESSURE: 112 MMHG | HEART RATE: 73 BPM

## 2020-02-25 DIAGNOSIS — I50.33 ACUTE ON CHRONIC DIASTOLIC CONGESTIVE HEART FAILURE (HCC): ICD-10-CM

## 2020-02-25 DIAGNOSIS — I50.32 CHRONIC DIASTOLIC CHF (CONGESTIVE HEART FAILURE) (HCC): Primary | ICD-10-CM

## 2020-02-25 DIAGNOSIS — N18.30 BENIGN HYPERTENSIVE HEART AND KIDNEY DISEASE WITH CHF, NYHA CLASS 2 AND CKD STAGE 3 (HCC): ICD-10-CM

## 2020-02-25 DIAGNOSIS — E78.5 DYSLIPIDEMIA: Chronic | ICD-10-CM

## 2020-02-25 DIAGNOSIS — J96.22 ACUTE ON CHRONIC RESPIRATORY FAILURE WITH HYPOXIA AND HYPERCAPNIA (HCC): Primary | ICD-10-CM

## 2020-02-25 DIAGNOSIS — I48.91 ATRIAL FIBRILLATION, UNSPECIFIED TYPE (HCC): ICD-10-CM

## 2020-02-25 DIAGNOSIS — J96.21 ACUTE ON CHRONIC RESPIRATORY FAILURE WITH HYPOXIA AND HYPERCAPNIA (HCC): Primary | ICD-10-CM

## 2020-02-25 DIAGNOSIS — F17.200 SMOKING: ICD-10-CM

## 2020-02-25 DIAGNOSIS — N18.30 CKD (CHRONIC KIDNEY DISEASE) STAGE 3, GFR 30-59 ML/MIN (HCC): ICD-10-CM

## 2020-02-25 DIAGNOSIS — I48.21 PERMANENT ATRIAL FIBRILLATION (HCC): ICD-10-CM

## 2020-02-25 DIAGNOSIS — I13.0 BENIGN HYPERTENSIVE HEART AND KIDNEY DISEASE WITH CHF, NYHA CLASS 2 AND CKD STAGE 3 (HCC): ICD-10-CM

## 2020-02-25 DIAGNOSIS — I10 ESSENTIAL HYPERTENSION: ICD-10-CM

## 2020-02-25 DIAGNOSIS — I27.20 PULMONARY HYPERTENSION (HCC): ICD-10-CM

## 2020-02-25 DIAGNOSIS — I31.3 PERICARDIAL EFFUSION: ICD-10-CM

## 2020-02-25 PROCEDURE — 93000 ELECTROCARDIOGRAM COMPLETE: CPT | Performed by: INTERNAL MEDICINE

## 2020-02-25 PROCEDURE — 99214 OFFICE O/P EST MOD 30 MIN: CPT | Performed by: INTERNAL MEDICINE

## 2020-02-25 PROCEDURE — 1036F TOBACCO NON-USER: CPT | Performed by: INTERNAL MEDICINE

## 2020-02-25 PROCEDURE — 99495 TRANSJ CARE MGMT MOD F2F 14D: CPT | Performed by: INTERNAL MEDICINE

## 2020-02-25 PROCEDURE — 3079F DIAST BP 80-89 MM HG: CPT | Performed by: INTERNAL MEDICINE

## 2020-02-25 PROCEDURE — 3074F SYST BP LT 130 MM HG: CPT | Performed by: INTERNAL MEDICINE

## 2020-02-25 PROCEDURE — 1111F DSCHRG MED/CURRENT MED MERGE: CPT | Performed by: INTERNAL MEDICINE

## 2020-02-25 PROCEDURE — 3008F BODY MASS INDEX DOCD: CPT | Performed by: INTERNAL MEDICINE

## 2020-02-25 PROCEDURE — 4010F ACE/ARB THERAPY RXD/TAKEN: CPT | Performed by: INTERNAL MEDICINE

## 2020-02-25 PROCEDURE — 3066F NEPHROPATHY DOC TX: CPT | Performed by: INTERNAL MEDICINE

## 2020-02-25 RX ORDER — METOPROLOL TARTRATE 50 MG/1
50 TABLET, FILM COATED ORAL EVERY 12 HOURS SCHEDULED
Qty: 60 TABLET | Refills: 5 | Status: SHIPPED | OUTPATIENT
Start: 2020-02-25 | End: 2020-03-23 | Stop reason: SDUPTHER

## 2020-02-25 RX ORDER — LISINOPRIL 40 MG/1
40 TABLET ORAL DAILY
Qty: 30 TABLET | Refills: 5 | Status: SHIPPED | OUTPATIENT
Start: 2020-02-25 | End: 2020-03-23 | Stop reason: SDUPTHER

## 2020-02-25 RX ORDER — TORSEMIDE 20 MG/1
20 TABLET ORAL 2 TIMES DAILY
Qty: 60 TABLET | Refills: 5 | Status: SHIPPED | OUTPATIENT
Start: 2020-02-25 | End: 2020-03-23 | Stop reason: SDUPTHER

## 2020-02-25 RX ORDER — DIGOXIN 125 MCG
125 TABLET ORAL DAILY
Qty: 30 TABLET | Refills: 5 | Status: SHIPPED | OUTPATIENT
Start: 2020-02-25 | End: 2020-02-25 | Stop reason: SDUPTHER

## 2020-02-25 RX ORDER — DIGOXIN 125 MCG
125 TABLET ORAL DAILY
Qty: 30 TABLET | Refills: 5 | Status: SHIPPED | OUTPATIENT
Start: 2020-02-25 | End: 2020-04-05

## 2020-02-25 RX ORDER — METOPROLOL TARTRATE 50 MG/1
50 TABLET, FILM COATED ORAL EVERY 12 HOURS SCHEDULED
Qty: 60 TABLET | Refills: 5 | Status: SHIPPED | OUTPATIENT
Start: 2020-02-25 | End: 2020-02-25 | Stop reason: SDUPTHER

## 2020-02-25 NOTE — PROGRESS NOTES
[facility]      NAME: Boston Sargent  AGE: 63 y o  SEX: female  : 1960   MRN: [id number]    DATE: 2020  TIME: 4:32 PM    Assessment and Plan   1  Acute on chronic respiratory failure with hypoxia and hypercapnia (HCC)  This is much improved since the patient's hospitalization  She will continue her current therapy  2  Acute on chronic diastolic congestive heart failure ([address] )  The patient appears to be euvolemic  She will maintain her current therapy  3  Pulmonary hypertension ([facility] )  The patient is currently on sildenafil  She is currently taking 20 mg 3 times a day although most of her paperwork reflects a dose of 40 mg 3 times a day  She will consult with Dr Trisha Ramos about this later today  4  CKD (chronic kidney disease) stage 3, GFR 30-59 ml/min ([facility])  Currently stable  5  Dyslipidemia  On dietary therapy alone at present  6  Permanent atrial fibrillation  On metoprolol and digoxin for rate control  On apixaban for stroke prophylaxis  The patient is doing significantly better  Her respiratory status is much improved  Her fluid status is improved  Her functional status is improving  She was advised that she should go on disability by her cardiologist during her most recent hospitalization  Considering the magnitude of her illness, this seems very reasonable  She will maintain her current medications for now  Return to office in:  1 month    Chief Complaint     Chief Complaint   Patient presents with    Transition of Care Management       History of Present Illness     The patient returned to the office for re-evaluation after a recent hospitalization at [place] [address], transferred [facility], and rehabilitation at [facility]  She was admitted with acute on chronic respiratory failure and congestive heart failure  She was in the ICU initially  She was briefly transferred to the medical-surgical unit    Unfortunately, she developed worsening respiratory failure and had to be taken back to ICU  She was later transferred to [facility] for heart failure evaluation  She was found to have significant pulmonary hypertension complicating her chronic diastolic congestive heart failure  She was started on sildenafil  She was then sent to [facility] for re[facility]  She has been home about 10 days  She is doing much better  Her weight is down  She is breathing better  She is getting around better  She denies any chest pain, orthopnea, or PND  She is tolerating her current medications  The following portions of the patient's history were reviewed and updated as appropriate: allergies, current medications, past family history, past medical history, past social history, past surgical history and problem list     Review of Systems   Review of Systems   Constitutional: Negative  HENT: Negative for congestion, ear pain, postnasal drip, rhinorrhea, sore throat and trouble swallowing  Eyes: Negative for pain, discharge, redness and visual disturbance  Respiratory: Negative for cough, shortness of breath and wheezing  Cardiovascular: Negative  Gastrointestinal: Negative  Endocrine: Negative  Genitourinary: Negative for difficulty urinating, dysuria, frequency, hematuria and urgency  Musculoskeletal: Negative for arthralgias, gait problem, joint swelling and myalgias  Skin: Negative for rash  Neurological: Negative for dizziness, speech difficulty, weakness, light-headedness, numbness and headaches  Hematological: Negative  Psychiatric/Behavioral: Negative for confusion, decreased concentration, dysphoric mood and sleep disturbance  The patient is not nervous/anxious          Active Problem List     Patient Active Problem List   Diagnosis    COPD (chronic obstructive pulmonary disease) ([facility] )    Permanent atrial fibrillation    Acute on chronic respiratory failure with hypoxia and hypercapnia ([facility])  Chronic venous stasis dermatitis of both lower extremities    Hypoalbuminemia    Type 2 diabetes mellitus without complication (HCC)    Non-rheumatic mitral regurgitation    Nonsustained ventricular tachycardia (HCC)    Obesity    Vitamin D deficiency    Depression    Dyslipidemia    Left ventricular hypertrophy    CKD (chronic kidney disease) stage 3, GFR 30-59 ml/min (HCC)    Chronic diastolic CHF (congestive heart failure) (HCC)    Compression fracture of first lumbar vertebra (HCC)    Acute renal failure superimposed on stage 3 chronic kidney disease (HCC)    Acute on chronic diastolic congestive heart failure (HCC)    Pericardial effusion    Pulmonary hypertension (HCC)    Metabolic alkalosis       Objective   /76 (BP Location: Left arm, Patient Position: Sitting, Cuff Size: Standard)   Pulse 68   Temp 98 4 °F (36 9 °C) (Tympanic)   Ht 5' 4" (1 626 m)   Wt 96 2 kg (212 lb)   SpO2 90%   BMI 36 39 kg/m²     Physical Exam   Constitutional: She is oriented to person, place, and time  She appears well-developed  No distress  Obese   HENT:   Head: Normocephalic and atraumatic  Neck: Neck supple  No JVD present  No tracheal deviation present  No thyromegaly present  Cardiovascular: Normal rate, normal heart sounds and intact distal pulses  Exam reveals no gallop and no friction rub  No murmur heard  Irregular rhythm   Pulmonary/Chest: Effort normal and breath sounds normal  She has no wheezes  She has no rales  She exhibits no tenderness  Abdominal: Soft  Bowel sounds are normal  She exhibits no distension and no mass  There is no tenderness  There is no rebound  Musculoskeletal: Normal range of motion  She exhibits no edema or tenderness  Lymphadenopathy:     She has no cervical adenopathy  Neurological: She is alert and oriented to person, place, and time  Skin: Skin is warm and dry  Psychiatric: She has a normal mood and affect   Her behavior is normal  Judgment and thought content normal        Pertinent Laboratory/Diagnostic Studies:  No results displayed because visit has over 200 results  No results displayed because visit has over 200 results                Current Medications     Current Outpatient Medications:     albuterol (PROVENTIL HFA,VENTOLIN HFA) 90 mcg/act inhaler, Inhale 2 puffs every 4 (four) hours as needed for wheezing, Disp: 1 Inhaler, Rfl: 3    apixaban (ELIQUIS) 5 mg, Take 1 tablet (5 mg total) by mouth 2 (two) times a day, Disp: 60 tablet, Rfl: 4    digoxin (LANOXIN) 0 125 mg tablet, Take 1 tablet (125 mcg total) by mouth daily, Disp: 30 tablet, Rfl: 0    lisinopril (ZESTRIL) 40 mg tablet, Take 1 tablet (40 mg total) by mouth daily, Disp: 30 tablet, Rfl: 5    metoprolol tartrate (LOPRESSOR) 50 mg tablet, Take 1 tablet (50 mg total) by mouth every 12 (twelve) hours, Disp: 60 tablet, Rfl: 0    QUEtiapine (SEROquel) 25 mg tablet, Take 1 tablet (25 mg total) by mouth every evening, Disp: 30 tablet, Rfl: 0    sildenafil (REVATIO) 20 mg tablet, Take 2 tablets (40 mg total) by mouth 3 (three) times a day, Disp: 180 tablet, Rfl: 6    torsemide (DEMADEX) 20 mg tablet, Take 1 tablet (20 mg total) by mouth 2 (two) times a day, Disp: 60 tablet, Rfl: 3      Pat Troy MD

## 2020-02-25 NOTE — PROGRESS NOTES
Cardiology Follow Up    Shelly Gan  1960  [id number]  [address] [facility] [address]  [phone]    Reason for visit: FU for chronic diastolic CHF, pulmonary hypertension felt to be somewhat independent of lung and heart disease, small pericardial effusion, chronic kidney disease, mild mitral regurgitation hypertension  Patient also has chronic atrial fibrillation      1  Chronic diastolic CHF (congestive heart failure) ([facility])  POCT ECG   2  Pulmonary hypertension ([address] )     3  Pericardial effusion     4  Dyslipidemia     5  Benign hypertensive heart and kidney disease with CHF, NYHA class 2 and CKD stage 3 ([facility])         Interval History: The patient was admitted to [facility] in January for CHF  She was noncompliant with meds    She was transferred to [facility] for CHF evaluation    It was felt her elevated PA pressures were in part independent of CHF and lung disease  Nelida Serum Her PAP pressures were not all that elevated on some readings and I wonder if this is true    She is now taking her diuretics and her breathing is improved  She does wear oxygen intermittently    She states her edema is better  She does get some lightheadedness with change in position    She denies palpitations or anginal chest pain       Patient Active Problem List   Diagnosis    [facility]chronic obstructive pulmonary disease) [facility]    Permanent atrial fibrillation    Acute on chronic respiratory failure with hypoxia and hypercapnia ([facility])    Chronic venous stasis dermatitis of both lower extremities    Hypoalbuminemia    Type 2 diabetes mellitus without complication ([facility])    Non-rheumatic mitral regurgitation    Nonsustained ventricular tachycardia ([facility])    Obesity    Vitamin D deficiency    Depression    Dyslipidemia    Left ventricular hypertrophy    CKD (chronic kidney disease) stage 3, GFR 30-59 ml/min ([facility])    Chronic diastolic CHF (congestive heart failure) (HCC)    Compression fracture of first lumbar vertebra (HCC)    Acute renal failure superimposed on stage 3 chronic kidney disease (HCC)    Pericardial effusion    Pulmonary hypertension (HCC)    Metabolic alkalosis     Past Medical History:   Diagnosis Date    Atrial fibrillation with RVR (HCC)     COPD (chronic obstructive pulmonary disease) ([place] [address] )     Diabetes type 2, uncontrolled ([place] [address] )     Encephalopathy acute 1/11/2020    Hypertension     SIRS (systemic inflammatory response syndrome) ([place] [address] ) 1/7/2020     Social History     Socioeconomic History    Marital status:       Spouse name: Not on file    Number of children: Not on file    Years of education: Not on file    Highest education level: Not on file   Occupational History     Employer: [employer] Occupation:      Comment: WORKING FULL TIME   Social Needs    Financial resource strain: Not on file    Food insecurity:     Worry: Not on file     Inability: Not on file    Transportation needs:     Medical: Not on file     Non-medical: Not on file   Tobacco Use    Smoking status: Former Smoker     Packs/day: 0 00     Years: 43 00     Pack years: 0 00     Types: Cigarettes    Smokeless tobacco: Never Used    Tobacco comment: quit 01/06/2020   Substance and Sexual Activity    Alcohol use: Not Currently     Comment: socially, NO ALCOHOL USE    Drug use: Not Currently     Types: Marijuana    Sexual activity: Not on file   Lifestyle    Physical activity:     Days per week: Not on file     Minutes per session: Not on file    Stress: Not on file   Relationships    Social connections:     Talks on phone: Not on file     Gets together: Not on file     Attends Buddhism service: Not on file     Active member of club or organization: Not on file     Attends meetings of clubs or organizations: Not on file     Relationship status: Not on file    Intimate partner violence:     Fear of current or ex partner: Not on file     Emotionally abused: Not on file     Physically abused: Not on file     Forced sexual activity: Not on file   Other Topics Concern    Not on file   Social History Narrative    NO LIVING WILL      Family History   Problem Relation Age of Onset    Diabetes type II Mother     No Known Problems Father         She reports not knowing much about her father   Cancer Family     Diabetes Family     Hypertension Family     Stroke Family      Past Surgical History:   Procedure Laterality Date    HERNIA REPAIR      HYSTERECTOMY      LAPAROSCOPIC CHOLECYSTECTOMY         Current Outpatient Medications:     albuterol (PROVENTIL HFA,VENTOLIN HFA) 90 mcg/act inhaler, Inhale 2 puffs every 4 (four) hours as needed for wheezing, Disp: 1 Inhaler, Rfl: 3    apixaban (ELIQUIS) 5 mg, Take 1 tablet (5 mg total) by mouth 2 (two) times a day, Disp: 60 tablet, Rfl: 4    digoxin (LANOXIN) 0 125 mg tablet, Take 1 tablet (125 mcg total) by mouth daily, Disp: 30 tablet, Rfl: 0    lisinopril (ZESTRIL) 40 mg tablet, Take 1 tablet (40 mg total) by mouth daily, Disp: 30 tablet, Rfl: 5    metoprolol tartrate (LOPRESSOR) 50 mg tablet, Take 1 tablet (50 mg total) by mouth every 12 (twelve) hours, Disp: 60 tablet, Rfl: 0    nicotine (NICODERM CQ) 7 mg/24hr TD 24 hr patch, Place 1 patch on the skin every 24 hours, Disp: 28 patch, Rfl: 0    QUEtiapine (SEROquel) 25 mg tablet, Take 1 tablet (25 mg total) by mouth every evening, Disp: 30 tablet, Rfl: 0    sildenafil (REVATIO) 20 mg tablet, Take 2 tablets (40 mg total) by mouth 3 (three) times a day, Disp: 180 tablet, Rfl: 6    torsemide (DEMADEX) 20 mg tablet, Take 1 tablet (20 mg total) by mouth 2 (two) times a day, Disp: 60 tablet, Rfl: 3  No Known Allergies      Review of Systems:  Review of Systems   Constitutional: Positive for fatigue  Negative for activity change, appetite change and unexpected weight change     Respiratory: Positive for shortness of breath  Negative for cough, chest tightness and wheezing  Cardiovascular: Negative for chest pain, palpitations and leg swelling  Gastrointestinal: Positive for diarrhea  Negative for abdominal pain, blood in stool and constipation  Genitourinary: Positive for frequency  Negative for dysuria, hematuria and urgency  Musculoskeletal: Positive for arthralgias, back pain and gait problem  Negative for joint swelling  Neurological: Positive for dizziness and light-headedness  Negative for syncope, speech difficulty and headaches  Psychiatric/Behavioral: Negative for agitation, behavioral problems, confusion and decreased concentration  Physical Exam:  Vitals:    02/25/20 1711   BP: 112/82   Pulse: 73   Resp: 18   Weight: 95 8 kg (211 lb 3 2 oz)   Height: 5' 4" (1 626 m)       Physical Exam   Constitutional: She is oriented to person, place, and time  She appears well-developed and well-nourished  No distress  Obese     HENT:   Head: Normocephalic and atraumatic  Mouth/Throat: Oropharynx is clear and moist  No oropharyngeal exudate  Eyes: Conjunctivae are normal  No scleral icterus  Neck: Neck supple  Normal carotid pulses and no JVD present  Carotid bruit is not present  No thyromegaly present  Cardiovascular: Normal rate  An irregularly irregular rhythm present  Exam reveals no gallop and no friction rub  Murmur heard  Crescendo decrescendo systolic (basal) murmur is present with a grade of 2/6  No diastolic murmur is present  Pulses:       Dorsalis pedis pulses are 1+ on the right side, and 1+ on the left side  Posterior tibial pulses are 2+ on the right side, and 2+ on the left side  Pulmonary/Chest: She has decreased breath sounds  She has no wheezes  She has no rhonchi  She has no rales  Abdominal: Soft  She exhibits no mass  There is no hepatosplenomegaly  There is no tenderness     Musculoskeletal: She exhibits edema (1+ LE edema with venous stasis changes) and deformity (Kyphosis)  She exhibits no tenderness  Neurological: She is alert and oriented to person, place, and time  She has normal strength  No cranial nerve deficit or sensory deficit  Skin: Skin is warm and dry  No rash noted  No erythema  No pallor  Psychiatric: She has a normal mood and affect  Her behavior is normal  Judgment and thought content normal        Discussion/Summary:  1  Chronic diastolic heart failure  Appears compensated on torsemide 40 mg daily  Continue same  2  Pulmonary hypertension  Thought to be independent pulmonary and cardiac disease however see above comments regarding very numbers  These numbers were obtained before patient was effectively out heart failure  Will continue revatio at 20 mg TID and assess pulmonary pressures by echo in the future  3  Permanent atrial fibrillation  Rate well controlled on digoxin and metoprolol  Will check digoxin level with upcoming BMP  Patient on Eliquis for stroke prophylaxis  4  Pericardial effusion  Small  Likely related to biventricular failure  Should not be problematic now that patient is diuresed  5  Hyperlipidemia  No longer on lipid lowering medicine  Will hold for now  6  Hypertension associated with renal disease  Blood pressure low normal on metoprolol and lisinopril  Check BW next week      FU 3 months      Marysol Gaston MD

## 2020-03-12 NOTE — ASSESSMENT & PLAN NOTE
· Rate is controlled      · Continue PO cardizem and metoprolol  · Cont heparin infusion for now as may requires procedures which would be difficult in the setting of her Eliquis use Continue Regimen: Valtrex 1gm TID\\nTriamcinolone 0.1% BID\\nDoxycycline monohydrate 100mg BID\\nHydrocortisone 2.5% QD Detail Level: Zone Samples Given: Epiceram full size

## 2020-03-23 DIAGNOSIS — I13.0 BENIGN HYPERTENSIVE HEART AND KIDNEY DISEASE WITH CHF, NYHA CLASS 2 AND CKD STAGE 3 (HCC): ICD-10-CM

## 2020-03-23 DIAGNOSIS — I27.20 PULMONARY HYPERTENSION (HCC): ICD-10-CM

## 2020-03-23 DIAGNOSIS — I48.21 PERMANENT ATRIAL FIBRILLATION (HCC): ICD-10-CM

## 2020-03-23 DIAGNOSIS — N18.30 BENIGN HYPERTENSIVE HEART AND KIDNEY DISEASE WITH CHF, NYHA CLASS 2 AND CKD STAGE 3 (HCC): ICD-10-CM

## 2020-03-23 DIAGNOSIS — I50.32 CHRONIC DIASTOLIC CHF (CONGESTIVE HEART FAILURE) (HCC): ICD-10-CM

## 2020-03-24 RX ORDER — LISINOPRIL 40 MG/1
40 TABLET ORAL DAILY
Qty: 30 TABLET | Refills: 5 | Status: SHIPPED | OUTPATIENT
Start: 2020-03-24 | End: 2020-06-03 | Stop reason: DRUGHIGH

## 2020-03-24 RX ORDER — SILDENAFIL CITRATE 20 MG/1
40 TABLET ORAL 3 TIMES DAILY
Qty: 180 TABLET | Refills: 6 | Status: SHIPPED | OUTPATIENT
Start: 2020-03-24 | End: 2020-03-25

## 2020-03-24 RX ORDER — METOPROLOL TARTRATE 50 MG/1
50 TABLET, FILM COATED ORAL EVERY 12 HOURS SCHEDULED
Qty: 60 TABLET | Refills: 5 | Status: SHIPPED | OUTPATIENT
Start: 2020-03-24 | End: 2020-10-22

## 2020-03-24 RX ORDER — TORSEMIDE 20 MG/1
20 TABLET ORAL 2 TIMES DAILY
Qty: 60 TABLET | Refills: 5 | Status: SHIPPED | OUTPATIENT
Start: 2020-03-24 | End: 2020-06-03

## 2020-03-24 NOTE — ASSESSMENT & PLAN NOTE
Yes I think he should do a telehealth visit via video if at all possible. Thanks. · Continue nebulized bronchodilators  No indication for steroids    · Not on maintenance inhalers at home  · Will need outpatient follow up

## 2020-03-25 ENCOUNTER — TELEMEDICINE (OUTPATIENT)
Dept: INTERNAL MEDICINE CLINIC | Facility: CLINIC | Age: 60
End: 2020-03-25
Payer: COMMERCIAL

## 2020-03-25 DIAGNOSIS — E11.9 TYPE 2 DIABETES MELLITUS WITHOUT COMPLICATION, WITHOUT LONG-TERM CURRENT USE OF INSULIN (HCC): ICD-10-CM

## 2020-03-25 DIAGNOSIS — J96.11 CHRONIC RESPIRATORY FAILURE WITH HYPOXIA AND HYPERCAPNIA (HCC): ICD-10-CM

## 2020-03-25 DIAGNOSIS — I27.20 PULMONARY HYPERTENSION (HCC): ICD-10-CM

## 2020-03-25 DIAGNOSIS — N18.30 CKD (CHRONIC KIDNEY DISEASE) STAGE 3, GFR 30-59 ML/MIN (HCC): ICD-10-CM

## 2020-03-25 DIAGNOSIS — I50.32 CHRONIC DIASTOLIC CHF (CONGESTIVE HEART FAILURE) (HCC): Primary | ICD-10-CM

## 2020-03-25 DIAGNOSIS — I48.21 PERMANENT ATRIAL FIBRILLATION (HCC): ICD-10-CM

## 2020-03-25 DIAGNOSIS — F32.A DEPRESSION, UNSPECIFIED DEPRESSION TYPE: Chronic | ICD-10-CM

## 2020-03-25 DIAGNOSIS — J44.9 CHRONIC OBSTRUCTIVE PULMONARY DISEASE, UNSPECIFIED COPD TYPE (HCC): ICD-10-CM

## 2020-03-25 DIAGNOSIS — J96.12 CHRONIC RESPIRATORY FAILURE WITH HYPOXIA AND HYPERCAPNIA (HCC): ICD-10-CM

## 2020-03-25 PROBLEM — N17.9 ACUTE RENAL FAILURE SUPERIMPOSED ON STAGE 3 CHRONIC KIDNEY DISEASE (HCC): Status: RESOLVED | Noted: 2020-01-07 | Resolved: 2020-03-25

## 2020-03-25 PROCEDURE — 99214 OFFICE O/P EST MOD 30 MIN: CPT | Performed by: INTERNAL MEDICINE

## 2020-03-25 RX ORDER — SILDENAFIL CITRATE 20 MG/1
20 TABLET ORAL 3 TIMES DAILY
Qty: 180 TABLET | Refills: 6
Start: 2020-03-25 | End: 2021-01-21 | Stop reason: SDUPTHER

## 2020-03-25 RX ORDER — QUETIAPINE FUMARATE 25 MG/1
25 TABLET, FILM COATED ORAL
Qty: 30 TABLET | Refills: 0
Start: 2020-03-25 | End: 2020-05-12 | Stop reason: ALTCHOICE

## 2020-03-25 NOTE — PROGRESS NOTES
Virtual Regular Visit    Problems addressed during this visit:    1  Chronic diastolic congestive heart failure  This seems to be adequately compensated at present    2  Pulmonary hypertension  The patient is tolerating sildenafil 20 mg 3 times daily    3  Permanent atrial fibrillation  The patient is on Eliquis for stroke prophylaxis  She is on metoprolol for rate control  4  COPD  Seemingly stable at present    5  Chronic hypoxic respiratory failure  Symptomatically, the patient is doing well  She is using oxygen intermittently  6  Type 2 diabetes   The patient remains on dietary therapy  Hemoglobin A1c will be checked when it becomes feasible    7  Ankle pain, probable gout  This resolved after a short course of oral steroids  No specific intervention or further evaluation is planned at the moment    8  Depression  Much improved  The patient is on quetiapine which she has been using only for sleep  This has been infrequent  Overall, it sounds like the patient is doing well  She is currently living with her son and Andry Loving  She intends to return to [facility] when the current corona virus epidemic is controlled  She feels well enough that she is hoping to return to work part-time  I encouraged her to continue to watch her diet  She will continue her current medications  She had been on a nicotine patch but is not using this any longer  She has been successful in abstaining from tobacco          Reason for visit is follow-up of multiple medical problems    Encounter provider Bo Nina MD    Provider located at [facility] [address]    After connecting through [employer], the patient was identified by name and date of birth   Jose Lalejandro Bowers was informed that this is a telemedicine visit and that the visit is being conducted through [address] [place] [address] and patient was informed that this is not a secure, HIPAA-complaint platform  she agrees to proceed  which may not be secure and therefore, might not be HIPAA-compliant  My office door was closed  No one else was in the room  She acknowledged consent and understanding of privacy and security of the video platform  The patient has agreed to participate and understands they can discontinue the visit at any time  Subjective  Mara Agustin is a 63 y o  female with a history of chronic diastolic congestive heart failure, COPD, pulmonary hypertension, chronic hypoxic respiratory failure, permanent atrial fibrillation, and type 2 diabetes  She was hospitalized in January and was quite ill  She [facility] for evaluation by the heart failure service  She was started on sildenafil for pulmonary hypertension  She has been taking this at a dose of 20 mg 3 times daily  She was markedly fluid overloaded and was successfully diuresed  Since her last visit here she has been doing pretty well  She has been living with her son in [place]  She has had no chest pain, shortness of breath, PND, orthopnea, or edema  She thinks she has gained some weight  Her activity is restricted because of our current quarantine conditions  She thinks that she has been eating too much considering her level of activity  She has no cough, wheezing, or sputum production  She is tolerating her medications well        Past Medical History:   Diagnosis Date    Atrial fibrillation with RVR ([place] [address] )     [facility]) [facility]     Diabetes type 2, uncontrolled ([place] [address] )     Encephalopathy acute 1/11/2020    Hypertension     SIRS (systemic inflammatory response syndrome) ([place] [address] ) 1/7/2020       Past Surgical History:   Procedure Laterality Date    HERNIA REPAIR      HYSTERECTOMY      LAPAROSCOPIC CHOLECYSTECTOMY         Current Outpatient Medications   Medication Sig Dispense Refill    albuterol (PROVENTIL HFA,VENTOLIN HFA) 90 mcg/act inhaler Inhale 2 puffs every 4 (four) hours as needed for wheezing 1 Inhaler 3    apixaban (ELIQUIS) 5 mg Take 1 tablet (5 mg total) by mouth 2 (two) times a day 60 tablet 5    digoxin (LANOXIN) 0 125 mg tablet Take 1 tablet (125 mcg total) by mouth daily 30 tablet 5    lisinopril (ZESTRIL) 40 mg tablet Take 1 tablet (40 mg total) by mouth daily 30 tablet 5    metoprolol tartrate (LOPRESSOR) 50 mg tablet Take 1 tablet (50 mg total) by mouth every 12 (twelve) hours 60 tablet 5    QUEtiapine (SEROquel) 25 mg tablet Take 1 tablet (25 mg total) by mouth every evening 30 tablet 0    sildenafil (REVATIO) 20 mg tablet Take 1 tablet (20 mg total) by mouth 3 (three) times a day 180 tablet 6    torsemide (DEMADEX) 20 mg tablet Take 1 tablet (20 mg total) by mouth 2 (two) times a day 60 tablet 5     No current facility-administered medications for this visit  No Known Allergies    Review of Systems   Constitutional: Negative  HENT: Negative for congestion, ear pain, postnasal drip, rhinorrhea, sore throat and trouble swallowing  Eyes: Negative for pain, discharge, redness and visual disturbance  Respiratory: Negative for cough, shortness of breath and wheezing  Cardiovascular: Negative  Gastrointestinal: Negative  Endocrine: Negative  Genitourinary: Negative for difficulty urinating, dysuria, frequency, hematuria and urgency  Musculoskeletal: Negative for arthralgias, gait problem, joint swelling and myalgias  Skin: Negative for rash  Neurological: Negative for dizziness, speech difficulty, weakness, light-headedness, numbness and headaches  Hematological: Negative  Psychiatric/Behavioral: Negative for confusion, decreased concentration, dysphoric mood and sleep disturbance  The patient is not nervous/anxious  I spent 20 minutes with the patient during this visit

## 2020-04-04 DIAGNOSIS — I48.21 PERMANENT ATRIAL FIBRILLATION (HCC): ICD-10-CM

## 2020-04-05 RX ORDER — DIGOXIN 125 MCG
TABLET ORAL
Qty: 90 TABLET | Refills: 2 | Status: SHIPPED | OUTPATIENT
Start: 2020-04-05 | End: 2021-01-21

## 2020-05-12 ENCOUNTER — TELEMEDICINE (OUTPATIENT)
Dept: INTERNAL MEDICINE CLINIC | Facility: CLINIC | Age: 60
End: 2020-05-12
Payer: COMMERCIAL

## 2020-05-12 DIAGNOSIS — E78.5 DYSLIPIDEMIA: Chronic | ICD-10-CM

## 2020-05-12 DIAGNOSIS — N18.30 CKD (CHRONIC KIDNEY DISEASE) STAGE 3, GFR 30-59 ML/MIN (HCC): ICD-10-CM

## 2020-05-12 DIAGNOSIS — I50.32 CHRONIC DIASTOLIC CHF (CONGESTIVE HEART FAILURE) (HCC): ICD-10-CM

## 2020-05-12 DIAGNOSIS — J44.9 CHRONIC OBSTRUCTIVE PULMONARY DISEASE, UNSPECIFIED COPD TYPE (HCC): ICD-10-CM

## 2020-05-12 DIAGNOSIS — E11.9 TYPE 2 DIABETES MELLITUS WITHOUT COMPLICATION, WITHOUT LONG-TERM CURRENT USE OF INSULIN (HCC): ICD-10-CM

## 2020-05-12 DIAGNOSIS — I48.21 PERMANENT ATRIAL FIBRILLATION (HCC): ICD-10-CM

## 2020-05-12 DIAGNOSIS — J96.11 CHRONIC RESPIRATORY FAILURE WITH HYPOXIA AND HYPERCAPNIA (HCC): Primary | ICD-10-CM

## 2020-05-12 DIAGNOSIS — J96.12 CHRONIC RESPIRATORY FAILURE WITH HYPOXIA AND HYPERCAPNIA (HCC): Primary | ICD-10-CM

## 2020-05-12 DIAGNOSIS — I27.20 PULMONARY HYPERTENSION (HCC): ICD-10-CM

## 2020-05-12 PROCEDURE — 99442 PR PHYS/QHP TELEPHONE EVALUATION 11-20 MIN: CPT | Performed by: INTERNAL MEDICINE

## 2020-06-03 ENCOUNTER — APPOINTMENT (OUTPATIENT)
Dept: LAB | Facility: HOSPITAL | Age: 60
End: 2020-06-03
Attending: INTERNAL MEDICINE
Payer: COMMERCIAL

## 2020-06-03 ENCOUNTER — OFFICE VISIT (OUTPATIENT)
Dept: INTERNAL MEDICINE CLINIC | Facility: CLINIC | Age: 60
End: 2020-06-03
Payer: COMMERCIAL

## 2020-06-03 VITALS
DIASTOLIC BLOOD PRESSURE: 70 MMHG | SYSTOLIC BLOOD PRESSURE: 96 MMHG | BODY MASS INDEX: 36.37 KG/M2 | HEIGHT: 64 IN | OXYGEN SATURATION: 97 % | TEMPERATURE: 98.7 F | WEIGHT: 213 LBS | HEART RATE: 99 BPM

## 2020-06-03 DIAGNOSIS — I27.20 PULMONARY HYPERTENSION (HCC): ICD-10-CM

## 2020-06-03 DIAGNOSIS — J96.12 CHRONIC RESPIRATORY FAILURE WITH HYPOXIA AND HYPERCAPNIA (HCC): ICD-10-CM

## 2020-06-03 DIAGNOSIS — E66.2 CLASS 2 OBESITY WITH ALVEOLAR HYPOVENTILATION, SERIOUS COMORBIDITY, AND BODY MASS INDEX (BMI) OF 36.0 TO 36.9 IN ADULT (HCC): Chronic | ICD-10-CM

## 2020-06-03 DIAGNOSIS — E11.9 TYPE 2 DIABETES MELLITUS WITHOUT COMPLICATION, WITHOUT LONG-TERM CURRENT USE OF INSULIN (HCC): Primary | ICD-10-CM

## 2020-06-03 DIAGNOSIS — J96.11 CHRONIC RESPIRATORY FAILURE WITH HYPOXIA AND HYPERCAPNIA (HCC): ICD-10-CM

## 2020-06-03 DIAGNOSIS — I48.21 PERMANENT ATRIAL FIBRILLATION (HCC): ICD-10-CM

## 2020-06-03 DIAGNOSIS — I50.32 CHRONIC DIASTOLIC CHF (CONGESTIVE HEART FAILURE) (HCC): ICD-10-CM

## 2020-06-03 DIAGNOSIS — N18.30 CKD (CHRONIC KIDNEY DISEASE) STAGE 3, GFR 30-59 ML/MIN (HCC): ICD-10-CM

## 2020-06-03 DIAGNOSIS — S32.010A COMPRESSION FRACTURE OF L1 VERTEBRA, INITIAL ENCOUNTER (HCC): ICD-10-CM

## 2020-06-03 DIAGNOSIS — J44.9 CHRONIC OBSTRUCTIVE PULMONARY DISEASE, UNSPECIFIED COPD TYPE (HCC): ICD-10-CM

## 2020-06-03 LAB
ALBUMIN SERPL BCP-MCNC: 3.5 G/DL (ref 3.5–5)
ALP SERPL-CCNC: 121 U/L (ref 46–116)
ALT SERPL W P-5'-P-CCNC: 18 U/L (ref 12–78)
ANION GAP SERPL CALCULATED.3IONS-SCNC: 7 MMOL/L (ref 4–13)
AST SERPL W P-5'-P-CCNC: 16 U/L (ref 5–45)
BILIRUB SERPL-MCNC: 1.04 MG/DL (ref 0.2–1)
BUN SERPL-MCNC: 73 MG/DL (ref 5–25)
CALCIUM SERPL-MCNC: 9.1 MG/DL (ref 8.3–10.1)
CHLORIDE SERPL-SCNC: 102 MMOL/L (ref 100–108)
CHOLEST SERPL-MCNC: 207 MG/DL (ref 50–200)
CO2 SERPL-SCNC: 30 MMOL/L (ref 21–32)
CREAT SERPL-MCNC: 2.53 MG/DL (ref 0.6–1.3)
DIGOXIN SERPL-MCNC: 1.1 NG/ML (ref 0.8–2)
ERYTHROCYTE [DISTWIDTH] IN BLOOD BY AUTOMATED COUNT: 14 % (ref 11.6–15.1)
EST. AVERAGE GLUCOSE BLD GHB EST-MCNC: 111 MG/DL
GFR SERPL CREATININE-BSD FRML MDRD: 20 ML/MIN/1.73SQ M
GLUCOSE SERPL-MCNC: 124 MG/DL (ref 65–140)
HBA1C MFR BLD: 5.5 %
HCT VFR BLD AUTO: 50.2 % (ref 34.8–46.1)
HDLC SERPL-MCNC: 35 MG/DL
HGB BLD-MCNC: 16.1 G/DL (ref 11.5–15.4)
LDLC SERPL CALC-MCNC: 126 MG/DL (ref 0–100)
MCH RBC QN AUTO: 32.5 PG (ref 26.8–34.3)
MCHC RBC AUTO-ENTMCNC: 32.1 G/DL (ref 31.4–37.4)
MCV RBC AUTO: 101 FL (ref 82–98)
NONHDLC SERPL-MCNC: 172 MG/DL
NT-PROBNP SERPL-MCNC: 4564 PG/ML
PLATELET # BLD AUTO: 246 THOUSANDS/UL (ref 149–390)
PMV BLD AUTO: 11.4 FL (ref 8.9–12.7)
POTASSIUM SERPL-SCNC: 4.3 MMOL/L (ref 3.5–5.3)
PROT SERPL-MCNC: 7.9 G/DL (ref 6.4–8.2)
RBC # BLD AUTO: 4.96 MILLION/UL (ref 3.81–5.12)
SL AMB POCT HEMOGLOBIN AIC: 5.2 (ref ?–6.5)
SODIUM SERPL-SCNC: 139 MMOL/L (ref 136–145)
TRIGL SERPL-MCNC: 229 MG/DL
WBC # BLD AUTO: 11.45 THOUSAND/UL (ref 4.31–10.16)

## 2020-06-03 PROCEDURE — 1036F TOBACCO NON-USER: CPT | Performed by: INTERNAL MEDICINE

## 2020-06-03 PROCEDURE — 3078F DIAST BP <80 MM HG: CPT | Performed by: INTERNAL MEDICINE

## 2020-06-03 PROCEDURE — 83036 HEMOGLOBIN GLYCOSYLATED A1C: CPT | Performed by: INTERNAL MEDICINE

## 2020-06-03 PROCEDURE — 85027 COMPLETE CBC AUTOMATED: CPT | Performed by: INTERNAL MEDICINE

## 2020-06-03 PROCEDURE — 99214 OFFICE O/P EST MOD 30 MIN: CPT | Performed by: INTERNAL MEDICINE

## 2020-06-03 PROCEDURE — 83880 ASSAY OF NATRIURETIC PEPTIDE: CPT | Performed by: INTERNAL MEDICINE

## 2020-06-03 PROCEDURE — 3074F SYST BP LT 130 MM HG: CPT | Performed by: INTERNAL MEDICINE

## 2020-06-03 PROCEDURE — 80162 ASSAY OF DIGOXIN TOTAL: CPT

## 2020-06-03 PROCEDURE — 3044F HG A1C LEVEL LT 7.0%: CPT | Performed by: INTERNAL MEDICINE

## 2020-06-03 PROCEDURE — 80053 COMPREHEN METABOLIC PANEL: CPT | Performed by: INTERNAL MEDICINE

## 2020-06-03 PROCEDURE — 36415 COLL VENOUS BLD VENIPUNCTURE: CPT | Performed by: INTERNAL MEDICINE

## 2020-06-03 PROCEDURE — 80061 LIPID PANEL: CPT | Performed by: INTERNAL MEDICINE

## 2020-06-03 PROCEDURE — 3066F NEPHROPATHY DOC TX: CPT | Performed by: INTERNAL MEDICINE

## 2020-06-03 RX ORDER — TORSEMIDE 20 MG/1
20 TABLET ORAL DAILY
Qty: 60 TABLET | Refills: 5
Start: 2020-06-03

## 2020-06-10 ENCOUNTER — TELEMEDICINE (OUTPATIENT)
Dept: INTERNAL MEDICINE CLINIC | Facility: CLINIC | Age: 60
End: 2020-06-10
Payer: COMMERCIAL

## 2020-06-10 DIAGNOSIS — J96.11 CHRONIC RESPIRATORY FAILURE WITH HYPOXIA AND HYPERCAPNIA (HCC): ICD-10-CM

## 2020-06-10 DIAGNOSIS — J96.12 CHRONIC RESPIRATORY FAILURE WITH HYPOXIA AND HYPERCAPNIA (HCC): ICD-10-CM

## 2020-06-10 DIAGNOSIS — J44.9 CHRONIC OBSTRUCTIVE PULMONARY DISEASE, UNSPECIFIED COPD TYPE (HCC): ICD-10-CM

## 2020-06-10 DIAGNOSIS — N18.30 CKD (CHRONIC KIDNEY DISEASE) STAGE 3, GFR 30-59 ML/MIN (HCC): Primary | ICD-10-CM

## 2020-06-10 DIAGNOSIS — I50.32 CHRONIC DIASTOLIC CHF (CONGESTIVE HEART FAILURE) (HCC): ICD-10-CM

## 2020-06-10 LAB
BUN SERPL-MCNC: 50 MG/DL (ref 7–25)
BUN/CREAT SERPL: 31 (CALC) (ref 6–22)
CALCIUM SERPL-MCNC: 9.1 MG/DL (ref 8.6–10.4)
CHLORIDE SERPL-SCNC: 110 MMOL/L (ref 98–110)
CO2 SERPL-SCNC: 26 MMOL/L (ref 20–32)
CREAT SERPL-MCNC: 1.61 MG/DL (ref 0.5–1.05)
GLUCOSE SERPL-MCNC: 84 MG/DL (ref 65–99)
POTASSIUM SERPL-SCNC: 4.7 MMOL/L (ref 3.5–5.3)
SL AMB EGFR AFRICAN AMERICAN: 40 ML/MIN/1.73M2
SL AMB EGFR NON AFRICAN AMERICAN: 35 ML/MIN/1.73M2
SODIUM SERPL-SCNC: 143 MMOL/L (ref 135–146)

## 2020-06-10 PROCEDURE — 99442 PR PHYS/QHP TELEPHONE EVALUATION 11-20 MIN: CPT | Performed by: INTERNAL MEDICINE

## 2020-06-10 PROCEDURE — 4010F ACE/ARB THERAPY RXD/TAKEN: CPT | Performed by: INTERNAL MEDICINE

## 2020-06-10 RX ORDER — LISINOPRIL 20 MG/1
20 TABLET ORAL DAILY
Start: 2020-06-10

## 2020-06-11 ENCOUNTER — TELEPHONE (OUTPATIENT)
Dept: CARDIOLOGY CLINIC | Facility: CLINIC | Age: 60
End: 2020-06-11

## 2020-06-12 ENCOUNTER — OFFICE VISIT (OUTPATIENT)
Dept: CARDIOLOGY CLINIC | Facility: CLINIC | Age: 60
End: 2020-06-12
Payer: COMMERCIAL

## 2020-06-12 VITALS
WEIGHT: 219 LBS | SYSTOLIC BLOOD PRESSURE: 100 MMHG | DIASTOLIC BLOOD PRESSURE: 70 MMHG | HEIGHT: 64 IN | BODY MASS INDEX: 37.39 KG/M2 | HEART RATE: 86 BPM | TEMPERATURE: 100 F | OXYGEN SATURATION: 97 %

## 2020-06-12 DIAGNOSIS — I31.3 PERICARDIAL EFFUSION: ICD-10-CM

## 2020-06-12 DIAGNOSIS — E78.5 DYSLIPIDEMIA: Chronic | ICD-10-CM

## 2020-06-12 DIAGNOSIS — I48.21 PERMANENT ATRIAL FIBRILLATION (HCC): ICD-10-CM

## 2020-06-12 DIAGNOSIS — I12.9 HYPERTENSION WITH RENAL DISEASE: ICD-10-CM

## 2020-06-12 DIAGNOSIS — I50.32 CHRONIC DIASTOLIC CHF (CONGESTIVE HEART FAILURE) (HCC): Primary | ICD-10-CM

## 2020-06-12 DIAGNOSIS — I27.20 PULMONARY HYPERTENSION (HCC): ICD-10-CM

## 2020-06-12 PROCEDURE — 1036F TOBACCO NON-USER: CPT | Performed by: INTERNAL MEDICINE

## 2020-06-12 PROCEDURE — 3066F NEPHROPATHY DOC TX: CPT | Performed by: INTERNAL MEDICINE

## 2020-06-12 PROCEDURE — 3008F BODY MASS INDEX DOCD: CPT | Performed by: INTERNAL MEDICINE

## 2020-06-12 PROCEDURE — 3044F HG A1C LEVEL LT 7.0%: CPT | Performed by: INTERNAL MEDICINE

## 2020-06-12 PROCEDURE — 3078F DIAST BP <80 MM HG: CPT | Performed by: INTERNAL MEDICINE

## 2020-06-12 PROCEDURE — 3074F SYST BP LT 130 MM HG: CPT | Performed by: INTERNAL MEDICINE

## 2020-06-12 PROCEDURE — 99214 OFFICE O/P EST MOD 30 MIN: CPT | Performed by: INTERNAL MEDICINE

## 2020-09-30 ENCOUNTER — OFFICE VISIT (OUTPATIENT)
Dept: INTERNAL MEDICINE CLINIC | Facility: CLINIC | Age: 60
End: 2020-09-30
Payer: COMMERCIAL

## 2020-09-30 VITALS
WEIGHT: 236 LBS | HEART RATE: 89 BPM | BODY MASS INDEX: 40.29 KG/M2 | TEMPERATURE: 97.8 F | HEIGHT: 64 IN | OXYGEN SATURATION: 94 % | DIASTOLIC BLOOD PRESSURE: 104 MMHG | SYSTOLIC BLOOD PRESSURE: 160 MMHG

## 2020-09-30 DIAGNOSIS — J96.11 CHRONIC RESPIRATORY FAILURE WITH HYPOXIA AND HYPERCAPNIA (HCC): ICD-10-CM

## 2020-09-30 DIAGNOSIS — Z23 FLU VACCINE NEED: ICD-10-CM

## 2020-09-30 DIAGNOSIS — E11.9 TYPE 2 DIABETES MELLITUS WITHOUT COMPLICATION, WITHOUT LONG-TERM CURRENT USE OF INSULIN (HCC): ICD-10-CM

## 2020-09-30 DIAGNOSIS — N18.30 CKD (CHRONIC KIDNEY DISEASE) STAGE 3, GFR 30-59 ML/MIN (HCC): ICD-10-CM

## 2020-09-30 DIAGNOSIS — M65.331 TRIGGER MIDDLE FINGER OF RIGHT HAND: ICD-10-CM

## 2020-09-30 DIAGNOSIS — J96.12 CHRONIC RESPIRATORY FAILURE WITH HYPOXIA AND HYPERCAPNIA (HCC): ICD-10-CM

## 2020-09-30 DIAGNOSIS — I10 ESSENTIAL HYPERTENSION: ICD-10-CM

## 2020-09-30 DIAGNOSIS — M19.041 ARTHRITIS OF RIGHT HAND: Primary | ICD-10-CM

## 2020-09-30 DIAGNOSIS — I48.21 PERMANENT ATRIAL FIBRILLATION (HCC): ICD-10-CM

## 2020-09-30 DIAGNOSIS — Z23 NEED FOR INFLUENZA VACCINATION: ICD-10-CM

## 2020-09-30 DIAGNOSIS — I50.32 CHRONIC DIASTOLIC CHF (CONGESTIVE HEART FAILURE) (HCC): ICD-10-CM

## 2020-09-30 DIAGNOSIS — I27.20 PULMONARY HYPERTENSION (HCC): ICD-10-CM

## 2020-09-30 PROCEDURE — 3080F DIAST BP >= 90 MM HG: CPT | Performed by: INTERNAL MEDICINE

## 2020-09-30 PROCEDURE — 99214 OFFICE O/P EST MOD 30 MIN: CPT | Performed by: INTERNAL MEDICINE

## 2020-09-30 PROCEDURE — 90662 IIV NO PRSV INCREASED AG IM: CPT | Performed by: INTERNAL MEDICINE

## 2020-09-30 PROCEDURE — 1036F TOBACCO NON-USER: CPT | Performed by: INTERNAL MEDICINE

## 2020-09-30 PROCEDURE — 90471 IMMUNIZATION ADMIN: CPT | Performed by: INTERNAL MEDICINE

## 2020-09-30 RX ORDER — METHYLPREDNISOLONE 4 MG/1
TABLET ORAL
Qty: 1 EACH | Refills: 0 | Status: SHIPPED | OUTPATIENT
Start: 2020-09-30

## 2020-09-30 NOTE — PROGRESS NOTES
[facility]      NAME: Eriberto Most  AGE: 62 y o  SEX: female  : 1960   MRN: [id number]    DATE: 2020  TIME: 2:24 PM    Assessment and Plan   1  Arthritis of right hand  I suspect that this is either osteoarthritis or gout  Patient will be treated with a short course of prednisone  Hand x-ray in uric acid level will be obtained  Hand surgery evaluation has been requested  - Uric acid  - XR hand 3+ vw right; Future  - methylPREDNISolone 4 MG tablet therapy pack; Use as directed on package  Dispense: 1 each; Refill: 0  - Ambulatory referral to Hand Surgery; Future    2  Chronic respiratory failure with hypoxia and hypercapnia (HCC)  Stable on current oxygen  3  Type 2 diabetes mellitus without complication, without long-term current use of insulin (HCC)  Well controlled  Due for hemoglobin A1c   - CBC  - Comprehensive metabolic panel  - Hemoglobin A1C  - Lipid panel    4  Essential hypertension  The patient's pressure is a bit elevated today  This is likely related to some ibuprofen use recently  5  Permanent atrial fibrillation  On metoprolol for rate control  On apixaban for stroke prophylaxis  6  Chronic diastolic CHF (congestive heart failure) ([facility])  Clinically, not in overt heart failure  However the patient's weight is up  BNP will be checked with other lab work  - Magnesium  - NT-BNP PRO    7  Pulmonary hypertension (HCC)  Continue supplemental oxygen and suldenafil    8  CKD (chronic kidney disease) stage 3, GFR 30-59 ml/min (HCC)  The patient's last creatinine was stable  This will be recheck  9  Trigger middle finger of right hand  - Ambulatory referral to Hand Surgery; Future    The patient's right hand is her major active problem at the moment  Evaluation and plan is as above  Her more chronic issues of congestive heart failure, chronic respiratory failure, pulmonary hypertension, etcetera appear to be pretty stable at present    She will continue her usual medications  Return to office in:  6 weeks    Chief Complaint     Chief Complaint   Patient presents with    Follow-up    Hand Pain     right hand pain since 07/2020       History of Present Illness     The patient returned to the office for re-evaluation of hypertension, hypercholesterolemia, type 2 diabetes, chronic atrial fibrillation, pulmonary hypertension, chronic hypoxic respiratory failure, and COPD  Since her last visit she has developed pain and swelling in her right hand  She has had no trauma  She was treated with antibiotics for presumed cellulitis without improvement  She is not able to make a closed fist   She also has developed triggering of her middle finger on the right hand  She has no problems with her other joints  She is having no chest pain, shortness of breath, palpitations, or dizziness  She is tolerating her medications  The following portions of the patient's history were reviewed and updated as appropriate: allergies, current medications, past family history, past medical history, past social history, past surgical history and problem list     Review of Systems   Review of Systems   Constitutional: Negative  HENT: Negative for congestion, ear pain, postnasal drip, rhinorrhea, sore throat and trouble swallowing  Eyes: Negative for pain, discharge, redness and visual disturbance  Respiratory: Negative for cough, shortness of breath and wheezing  Cardiovascular: Negative  Gastrointestinal: Negative  Endocrine: Negative  Genitourinary: Negative for difficulty urinating, dysuria, frequency, hematuria and urgency  Musculoskeletal: Positive for arthralgias and joint swelling  Negative for gait problem and myalgias  Skin: Negative for rash  Neurological: Negative for dizziness, speech difficulty, weakness, light-headedness, numbness and headaches  Hematological: Negative      Psychiatric/Behavioral: Negative for confusion, decreased concentration, dysphoric mood and sleep disturbance  The patient is not nervous/anxious  Active Problem List     Patient Active Problem List   Diagnosis    COPD [facility]chronic obstructive pulmonary disease) ([facility] [address] )    Essential hypertension    Permanent atrial fibrillation    Chronic respiratory failure with hypoxia and hypercapnia (HCC)    Chronic venous stasis dermatitis of both lower extremities    Hypoalbuminemia    Type 2 diabetes mellitus without complication (HCC)    Non-rheumatic mitral regurgitation    Nonsustained ventricular tachycardia (HCC)    Obesity    Vitamin D deficiency    Depression    Dyslipidemia    Left ventricular hypertrophy    CKD (chronic kidney disease) stage 3, GFR 30-59 ml/min (HCC)    Chronic diastolic CHF (congestive heart failure) (HCC)    Compression fracture of first lumbar vertebra (HCC)    Pericardial effusion    Pulmonary hypertension (HCC)    Metabolic alkalosis    Arthritis of right hand    Trigger middle finger of right hand       Objective   BP (!) 160/104 (BP Location: Left arm, Patient Position: Sitting, Cuff Size: Large)   Pulse 89   Temp 97 8 °F (36 6 °C) (Tympanic)   Ht 5' 4" (1 626 m)   Wt 107 kg (236 lb)   SpO2 94%   BMI 40 51 kg/m²     Physical Exam  Constitutional:       General: She is not in acute distress  Appearance: She is well-developed  She is obese  HENT:      Head: Normocephalic and atraumatic  Neck:      Musculoskeletal: Neck supple  Thyroid: No thyromegaly  Vascular: No JVD  Trachea: No tracheal deviation  Cardiovascular:      Rate and Rhythm: Normal rate  Rhythm irregular  Heart sounds: Normal heart sounds  No murmur  No friction rub  No gallop  Pulmonary:      Effort: Pulmonary effort is normal       Breath sounds: Normal breath sounds  No wheezing or rales  Chest:      Chest wall: No tenderness  Abdominal:      General: Bowel sounds are normal  There is no distension        Palpations: Abdomen is soft  There is no mass  Tenderness: There is no abdominal tenderness  There is no rebound  Musculoskeletal: Normal range of motion  General: No tenderness  Lymphadenopathy:      Cervical: No cervical adenopathy  Skin:     General: Skin is warm and dry  Neurological:      Mental Status: She is alert and oriented to person, place, and time  Psychiatric:         Mood and Affect: Mood normal          Behavior: Behavior normal          Thought Content: Thought content normal          Judgment: Judgment normal          Pertinent Laboratory/Diagnostic Studies:  No visits with results within 3 Month(s) from this visit     Latest known visit with results is:   Orders Only on 06/09/2020   Component Date Value Ref Range Status    Glucose, Random 06/09/2020 84  65 - 99 mg/dL Final    BUN 06/09/2020 50* 7 - 25 mg/dL Final    Creatinine 06/09/2020 1 61* 0 50 - 1 05 mg/dL Final    eGFR Non African American 06/09/2020 35* > OR = 60 mL/min/1 73m2 Final    eGFR African American 06/09/2020 40* > OR = 60 mL/min/1 73m2 Final    SL AMB BUN/CREATININE RATIO 06/09/2020 31* 6 - 22 (calc) Final    Sodium 06/09/2020 143  135 - 146 mmol/L Final    Potassium 06/09/2020 4 7  3 5 - 5 3 mmol/L Final    Chloride 06/09/2020 110  98 - 110 mmol/L Final    CO2 06/09/2020 26  20 - 32 mmol/L Final    Calcium 06/09/2020 9 1  8 6 - 10 4 mg/dL Final           Current Medications     Current Outpatient Medications:     albuterol (PROVENTIL HFA,VENTOLIN HFA) 90 mcg/act inhaler, Inhale 2 puffs every 4 (four) hours as needed for wheezing, Disp: 1 Inhaler, Rfl: 3    apixaban (ELIQUIS) 5 mg, Take 1 tablet (5 mg total) by mouth 2 (two) times a day, Disp: 60 tablet, Rfl: 5    digoxin (LANOXIN) 0 125 mg tablet, TAKE 1 TABLET BY MOUTH EVERY DAY, Disp: 90 tablet, Rfl: 2    lisinopril (ZESTRIL) 20 mg tablet, Take 1 tablet (20 mg total) by mouth daily, Disp: , Rfl:     metoprolol tartrate (LOPRESSOR) 50 mg tablet, Take 1 tablet (50 mg total) by mouth every 12 (twelve) hours, Disp: 60 tablet, Rfl: 5    sildenafil (REVATIO) 20 mg tablet, Take 1 tablet (20 mg total) by mouth 3 (three) times a day, Disp: 180 tablet, Rfl: 6    torsemide (DEMADEX) 20 mg tablet, Take 1 tablet (20 mg total) by mouth daily, Disp: 60 tablet, Rfl: 5    methylPREDNISolone 4 MG tablet therapy pack, Use as directed on package, Disp: 1 each, Rfl: 0      Lisseth De La Rosa MD

## 2020-10-21 DIAGNOSIS — I13.0 BENIGN HYPERTENSIVE HEART AND KIDNEY DISEASE WITH CHF, NYHA CLASS 2 AND CKD STAGE 3 (HCC): ICD-10-CM

## 2020-10-21 DIAGNOSIS — N18.30 BENIGN HYPERTENSIVE HEART AND KIDNEY DISEASE WITH CHF, NYHA CLASS 2 AND CKD STAGE 3 (HCC): ICD-10-CM

## 2020-10-21 DIAGNOSIS — I48.21 PERMANENT ATRIAL FIBRILLATION (HCC): ICD-10-CM

## 2020-10-21 PROCEDURE — 3066F NEPHROPATHY DOC TX: CPT | Performed by: INTERNAL MEDICINE

## 2020-10-22 RX ORDER — METOPROLOL TARTRATE 50 MG/1
50 TABLET, FILM COATED ORAL EVERY 12 HOURS SCHEDULED
Qty: 60 TABLET | Refills: 11 | Status: SHIPPED | OUTPATIENT
Start: 2020-10-22 | End: 2021-11-07

## 2020-12-14 PROBLEM — I12.9 HYPERTENSION WITH RENAL DISEASE: Status: ACTIVE | Noted: 2020-12-14

## 2020-12-18 DIAGNOSIS — I48.21 PERMANENT ATRIAL FIBRILLATION (HCC): ICD-10-CM

## 2020-12-18 RX ORDER — APIXABAN 5 MG/1
TABLET, FILM COATED ORAL
Qty: 60 TABLET | Refills: 2 | Status: SHIPPED | OUTPATIENT
Start: 2020-12-18 | End: 2021-04-24

## 2021-01-21 DIAGNOSIS — I27.20 PULMONARY HYPERTENSION (HCC): ICD-10-CM

## 2021-01-21 DIAGNOSIS — I48.21 PERMANENT ATRIAL FIBRILLATION (HCC): ICD-10-CM

## 2021-01-21 RX ORDER — DIGOXIN 125 MCG
TABLET ORAL
Qty: 90 TABLET | Refills: 1 | Status: SHIPPED | OUTPATIENT
Start: 2021-01-21

## 2021-01-21 RX ORDER — SILDENAFIL CITRATE 20 MG/1
20 TABLET ORAL 3 TIMES DAILY
Qty: 180 TABLET | Refills: 1 | Status: SHIPPED | OUTPATIENT
Start: 2021-01-21

## 2021-01-27 ENCOUNTER — TELEPHONE (OUTPATIENT)
Dept: CARDIOLOGY CLINIC | Facility: CLINIC | Age: 61
End: 2021-01-27

## 2021-01-27 NOTE — TELEPHONE ENCOUNTER
Pt called stating need prior auth on ordered medication of sildenafil 20 mg   Was not ordered by our office was ordered by Dr Yuri Dickerson on 1/21/21  Pt will call Dr Yuri Dickerson office

## 2021-02-23 NOTE — ASSESSMENT & PLAN NOTE
Virtual Brief Visit  It was my intent to perform this visit via video technology but the patient was not able to do a video connection so the visit was completed via audio telephone only  Assessment/Plan:    Problem List Items Addressed This Visit        Cardiovascular and Mediastinum    Atrial fibrillation ([address] )      Other Visit Diagnoses     Chronic ischemic right MCA stroke    -  Primary    Mixed hyperlipidemia        Cough in adult        Relevant Orders    Multiplex COVID19, Influenza A/B, RSV PCR, [facility] - Collected at [facility]    Flu-like symptoms        Relevant Orders    Multiplex COVID19, Influenza A/B, RSV PCR, [facility] - Collected at [facility]        Patient is compliant with AC, AP and lipitor  He has had no stroke like symptoms since hospital discharge  Today he has flu/common cold symptoms that need to be excluded for Covid  Referring him to get tested  As for stroke, he needs to resume healthier diet, exercise and medication compliance  Reason for visit is   Chief Complaint   Patient presents with    Neurologic Problem        Encounter provider Ming Perez MD    Provider located at [address] [place] [id number] [phone]    Recent Visits  No visits were found meeting these conditions  Showing recent visits within past 7 days and meeting all other requirements     Today's Visits  Date Type Provider Dept   02/23/21 Office Visit Ming Perez MD [facility]   Showing today's visits and meeting all other requirements     Future Appointments  No visits were found meeting these conditions  Showing future appointments within next 150 days and meeting all other requirements        After connecting through telephone, the patient was identified by name and date of birth   Goldie Heidi was informed that this is a telemedicine visit and that the visit is being conducted through · Wound care, podiatry and dermatology evaluated  · Initially concern for cellulitis of left posterior leg and right anterior leg, now suspected not true cellulitis  · Wound cultures obtained by dermatology growing multiple organisms- felt to be skin colonization  · ID consulted and recommended stopping ABX  · S/p 5 days of IV ancef and 2 days ceftriaxone  · BLE elevation, edema control is imperative  · ELIN analysis shows within healing potential however given cool to touch and faint pulses obtain full arterial study telephone  My office door was closed  No one else was in the room  He acknowledged consent and understanding of privacy and security of the platform  The patient has agreed to participate and understands he can discontinue the visit at any time  Patient is aware this is a billable service  Subjective/HPI    Jeff Felton is a 60 y o  male with PMH of stroke is being followed up  Patient was admitted in early Jan for slurred speech  He was found to have acute right MCA ischemic stroke and active A fib  He was supposed to be on [facility] however for some reason it was d/c  Patient was also found to have right M2/M3 occlusion  He was started on eliquis, lipitor 80mg and aspirin 81mg for the occlusion  Patient's symptoms had nearly completely resolved  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  His work required him to not drive for a year  Since he didn't have symptoms that would keep him from driving we couldn't agree to this rule  Patient eventually received disability by his cardiologist for A fib  Today he has no stroke symptoms  He however has complaint of cough, congestion, fatigue for past week  He has been taking otc cough medicine but sxs are not going away  For this reason, we had to switch his visit to virtual one  Denies fever          Past Medical History:   Diagnosis Date    Atrial fibrillation (HCC)     Hypertension     Paroxysmal atrial fibrillation (HCC)     Valvular heart disease        Past Surgical History:   Procedure Laterality Date    HERNIA REPAIR      KNEE SURGERY         Current Outpatient Medications   Medication Sig Dispense Refill    apixaban (ELIQUIS) 5 mg Take 1 tablet (5 mg total) by mouth 2 (two) times a day 180 tablet 3    aspirin 81 mg chewable tablet Chew 1 tablet (81 mg total) daily 30 tablet 0    atorvastatin (LIPITOR) 80 mg tablet Take 1 tablet (80 mg total) by mouth daily with dinner 30 tablet 0    metoprolol tartrate (LOPRESSOR) 25 mg tablet Take 1 tablet (25 mg total) by mouth every 12 (twelve) hours 90 tablet 2    metoprolol tartrate (LOPRESSOR) 50 mg tablet Take 1 tablet (50 mg total) by mouth daily in the early morning 90 tablet 2     No current facility-administered medications for this visit  No Known Allergies    Review of Systems   Constitutional: Positive for fatigue  HENT: Positive for congestion and sore throat  Respiratory: Positive for cough  All other systems reviewed and are negative  There were no vitals filed for this visit  I spent 30 minutes with patient today in which greater than 50% of the time was spent in counseling/coordination of care regarding his condition and plan  VIRTUAL VISIT DISCLAIMER    Masood Rosario acknowledges that he has consented to an online visit or consultation  He understands that the online visit is based solely on information provided by him, and that, in the absence of a face-to-face physical evaluation by the physician, the diagnosis he receives is both limited and provisional in terms of accuracy and completeness  This is not intended to replace a full medical face-to-face evaluation by the physician  Masood Rosario understands and accepts these terms

## 2021-04-24 DIAGNOSIS — I48.21 PERMANENT ATRIAL FIBRILLATION (HCC): ICD-10-CM

## 2021-04-24 RX ORDER — APIXABAN 5 MG/1
TABLET, FILM COATED ORAL
Qty: 60 TABLET | Refills: 2 | Status: SHIPPED | OUTPATIENT
Start: 2021-04-24 | End: 2021-07-22

## 2021-06-20 NOTE — NURSING NOTE
Letter by Linette Mensah RN at      Author: Linette Mensah RN Service: -- Author Type: --    Filed:  Encounter Date: 9/25/2020 Status: (Other)         Canelo Cook  [address]  [place] [address]      September 25, 2020      Dear Mr. Cook,    RE: Remote Results    We are writing to you regarding your recent Remote Pacemaker check from home. Your transmission was received successfully. Battery status is satisfactory at this time.     Your results are within normal limits.    Your next device appointment will be a remote check on 12/28/2020; this will occur automatically.    To schedule or reschedule, please call [phone] and press 1.    NOTE: If you would like to do an extra transmission, please call [phone] and press 3 to speak to a nurse BEFORE transmitting. This ensures that the Device Clinic staff is aware of the reason you are sending a transmission, and can follow-up with you after it has been reviewed.    We will be checking your implanted device from home (remotely) every three months unless otherwise instructed. We will need to see you in the clinic at least once a year. You may need to be seen in the clinic sooner depending on the results of your check.    Please be aware:    The follow-up schedule is like a Physician prescription.    Your remote monitor is paired to your specific implanted device.      Sincerely,    [facility]        PCA obtained blood glucose via glucometer at 2121 and informed nurse of glucose reading being <20  Pt has increased lethargy, but is A&Ox4  Administered 1 Amp IV dextrose at 2126  Rechecked blood sugar at 2149 with a second critical low value of 34  Pt continues to display lethargy and diaphoresis  Administered 2nd Amp IV dextrose at 2154  Paged RRNP with orders to provide pt with 1 apple juice (aware of 1000 ml fluid restriction) and lselie crackers and to recheck blood sugar in 15 mins  Blood sugar recheck at 2214 was 62  Paged RRNP with orders to check blood sugars q1 hr  Rechecked blood sugar at 2306 with result of 21  Administered 3rd amp of IV dextrose at 2310  Paged RRNP with orders to initiate dextrose 10% infusion at 50 ml/hr  Pt is on telemetry  Will continue rechecking blood sugars q1 hr and closely monitoring the pt

## 2021-07-12 ENCOUNTER — VBI (OUTPATIENT)
Dept: ADMINISTRATIVE | Facility: OTHER | Age: 61
End: 2021-07-12

## 2021-11-08 ENCOUNTER — TELEPHONE (OUTPATIENT)
Dept: CARDIOLOGY CLINIC | Facility: CLINIC | Age: 61
End: 2021-11-08

## 2022-01-12 NOTE — PROGRESS NOTES
Called and introduced myself and my services to Emily Lopezner  She did not feel she needed the assist but asked for me to call her in the morning time because she is very relaxed from taking her back spasm medication right now  Will try again during morning hours  Pt eval and treat/yes

## 2022-08-24 ENCOUNTER — TELEPHONE (OUTPATIENT)
Dept: INTERNAL MEDICINE CLINIC | Facility: CLINIC | Age: 62
End: 2022-08-24

## 2022-10-31 NOTE — PROGRESS NOTES
Progress Note - Cardiology   Maynor Lara 63 y o  female MRN: [id number]  Unit/Bed#: ICU 06 Encounter: [id number]      Assessment/Recommendations/Discussion:   1  Permanent atrial fibrillation with rates still in the 80-90s  2  Acute on chronic heart failure with preserved ejection fraction 58%  3  Cor pulmonale from chronic respiratory failure and pulmonary hypertension, est PASP by echo 52mmHg  4  Acute on chronic respiratory failure with hypercapnia  5  Patient alert an oriented  6  Hypertension  7  Diabetes mellitus type 2  8  Morbid obesity  9  Both pulmonary congestion and peripheral edema  10  Compression fracture of the 1st lumbar vertebrae   11  Acute on Chronic kidney disease, stage III, improving  12  Pericardial effusion noted on CT scan     PLAN   Afib Hrs are controlled  · continue diltiazem and Lopressor for rate control   Continue lasix gtt per nephrology  Follow Is and Os, weights, renal fxn   Eliquis for AC  · Continue to work to wean off of high-flow oxygen back to baseline 10 L   She is non compliant at baseline and I suspect that once we do get her euvolemic, she will be noncompliant with bipap, and meds, and decompensate again relatively quickly   She does not want further therapy for her severe pulmonary HTN, and this will not get better   At this point afib heart rates are stable  Nephrology directing lasix gtt / diuresis, Cardiology will follow peripherally, please call with any questions  Subjective:   HPI  Still on lasix gtt, creat stable  Afib Hrs controlled      Review of Systems: As noted in HPI  Rest of ROS is negative      Vitals:   BP 97/75   Pulse 78   Temp 97 5 °F (36 4 °C) (Temporal)   Resp (!) 29   Ht 5' 5" (1 651 m)   Wt 131 kg (288 lb 12 8 oz)   SpO2 92%   BMI 48 06 kg/m²   Vitals:    01/14/20 0547 01/15/20 0600   Weight: 131 kg (289 lb 11 oz) 131 kg (288 lb 12 8 oz)       Intake/Output Summary (Last 24 hours) at 1/18/2020 0305  Last data filed at 1/17/2020 1800  Gross per 24 hour   Intake 53 ml   Output 1150 ml   Net -1097 ml       Physical Exam:  General appearance: alert and oriented, in no acute distress, obese female  Head: Normocephalic, without obvious abnormality, atraumatic  Eyes: conjunctivae/corneas clear  Anicteric  Neck: no adenopathy, no carotid bruit, no JVD  Lungs: decreased breath sounds bilaterally  Heart: regular rate and rhythm, S1, S2 normal, no murmur, no click, rub or gallop  Abdomen: obese, soft, non-tender; bowel sounds normal; no masses,  no organomegaly  Extremities: extremities normal, warm and well-perfused; no cyanosis, clubbing, BL edema  Skin: Skin color, texture, turgor normal  No rashes or lesions     TELEMETRY: afib with rates 80s-90s  Lab Results:  Results from last 7 days   Lab Units 01/16/20  0427   WBC Thousand/uL 7 05   HEMOGLOBIN g/dL 15 0   HEMATOCRIT % 49 9*   PLATELETS Thousands/uL 60*     Results from last 7 days   Lab Units 01/18/20  0510  01/16/20  0427   POTASSIUM mmol/L 4 1   < > 3 7   CHLORIDE mmol/L 98*   < > 100   CO2 mmol/L 40*   < > 36*   BUN mg/dL 92*   < > 80*   CREATININE mg/dL 2 02*   < > 2 19*   CALCIUM mg/dL 8 0*   < > 8 0*   ALK PHOS U/L  --   --  84   ALT U/L  --   --  8*   AST U/L  --   --  11    < > = values in this interval not displayed       Results from last 7 days   Lab Units 01/18/20  0510   POTASSIUM mmol/L 4 1   CHLORIDE mmol/L 98*   CO2 mmol/L 40*   BUN mg/dL 92*   CREATININE mg/dL 2 02*   CALCIUM mg/dL 8 0*           Medications:    Current Facility-Administered Medications:     apixaban (ELIQUIS) tablet 5 mg, 5 mg, Oral, BID, Luli K MAYNOR Kirby, 5 mg at 01/17/20 1718    dextrose 50 % IV solution 25 mL, 25 mL, Intravenous, PRN, MAYNOR Soto    diltiazem (CARDIZEM CD) 24 hr capsule 120 mg, 120 mg, Oral, Daily, IvonMAYNOR Tomlinson, 120 mg at 01/17/20 0947    furosemide (LASIX) 500 mg infusion 50 mL, 40 mg/hr, Intravenous, Continuous, Kashmir Linda, CRNP, Last Rate: 4 mL/hr at 01/18/20 0712, 40 mg/hr at 01/18/20 0185    ipratropium (ATROVENT) 0 02 % inhalation solution 0 5 mg, 0 5 mg, Nebulization, TID, Perri Pancake Bilofsky, CRNP, 0 5 mg at 01/18/20 0725    levalbuterol (XOPENEX) inhalation solution 1 25 mg, 1 25 mg, Nebulization, TID, Perri Pancake Bilofsky, CRNP, 1 25 mg at 01/18/20 0725    metoprolol (LOPRESSOR) injection 5 mg, 5 mg, Intravenous, Q6H PRN, Perri Pancake Bilofsky, CRNP    metoprolol succinate (TOPROL-XL) 24 hr tablet 100 mg, 100 mg, Oral, Daily, Luli K Bilofsky, CRNP, 100 mg at 01/17/20 0948    nicotine (NICODERM CQ) 14 mg/24hr TD 24 hr patch 1 patch, 1 patch, Transdermal, Daily, Perri Pancake Bilofsky, CRNP, 1 patch at 01/17/20 0933    nystatin (MYCOSTATIN) powder, , Topical, BID, Perri Pancake Bilofsky, CRNP, 1 application at 93/30/02 1718    ondansetron (ZOFRAN) injection 4 mg, 4 mg, Intravenous, Q6H PRN, Ferrell Craver Spatzer, CRNP, 4 mg at 01/17/20 1587    This note was completed in part utilizing M-Modal Fluency Direct Software  Grammatical errors, random word insertions, spelling mistakes, and incomplete sentences may be an occasional consequence of this system secondary to software limitations, ambient noise, and hardware issues  If you have any questions or concerns about the content, text, or information contained within the body of this dictation, please contact the provider for clarification      Alfredo Montana DO, [facility] [place]  1/18/2020 9:18 AM Render Risk Assessment In Note?: yes Comment: improving - no longer symptomatic and eyebrows show regrowth of hairs. No erythema on exam today and asymptomatic so hold off on iLK today. Continue topicals and recheck in 5 mos - patient to call if scalp becomes symptomatic and topicals unable to control this. Detail Level: Zone

## 2024-01-02 NOTE — ASSESSMENT & PLAN NOTE
· Improving  · Room air saturation during admission 72%  · ABG shows hypercapnia and the patient has hemoconcentration, elevated CO2 and hemoglobin chronically suggest degree of chronic hypercapnia  · Seen by Pulmonary  Refuses BiPAP  changed to oral diuretics from today  · Qualifies for home O2   3 L at rest and 6 L on ambulation    ·   Patient did not want to wear BIPAP at night, explained that she is risking becoming hypercapnic and risking  going into respiratory failure [facility]

## 2024-02-08 NOTE — PROGRESS NOTES
[facility] [place]      NAME: Rosi Watts  AGE: 65 y o  SEX: female  : 1960   MRN: [id number]    DATE: 2018  TIME: 5:25 PM    Assessment and Plan   1  Chronic obstructive pulmonary disease, unspecified COPD type ([facility] )   the patient's COPD seems to be minimally symptomatic  Unfortunately, she continues to smoke  - albuterol (PROVENTIL HFA,VENTOLIN HFA) 90 mcg/act inhaler; Inhale 2 puffs every 4 (four) hours as needed for wheezing  Dispense: 1 Inhaler; Refill: 3    2  Uncontrolled type 2 diabetes mellitus without complication, without long-term current use of insulin ([facility] )   currently on dietary control  No recent hemoglobin A1c  - CBC  - Comprehensive metabolic panel  - Hemoglobin A1c    3  Essential hypertension   adequately controlled    4  Atrial fibrillation with RVR ([facility])   rate control is satisfactory  She is on Eliquis for stroke prophylaxis    5  Acute on chronic diastolic congestive heart failure ([facility] )   the patient has mild edema but does not appear to be in overt heart failure  She will be seeing Dr Clarence Patel within 2 weeks  6  Other depression    The patient is pretty depressed  She is not suicidal   She has not been enthusiastic about antidepressants in the past but feels that she should be taking something now  She will be started on sertraline 50 mg  - sertraline (ZOLOFT) 50 mg tablet; Take 1 tablet (50 mg total) by mouth daily  Dispense: 30 tablet; Refill: 1          Return to office in:  1 month    Chief Complaint    overdue follow-up, and depression    History of Present Illness      the patient returned to the office after a long hiatus  She has a history of congestive heart failure, atrial fibrillation, hypertension, and COPD  She has type 2 diabetes  She has been feeling depressed of late  Her energy has been low  She has no interest in her usual activities  Her appetite is diminished    She is not suicidal   She denies chest pain, shortness of breath, PND, or orthopnea  She has mild edema  She has no problems with her current medications of which she is aware  The following portions of the patient's history were reviewed and updated as appropriate: allergies, current medications, past family history, past medical history, past social history, past surgical history and problem list     Review of Systems   Review of Systems   Constitutional: Negative  HENT: Negative for congestion, ear pain, postnasal drip, rhinorrhea, sore throat and trouble swallowing  Eyes: Negative for pain, discharge, redness and visual disturbance  Respiratory: Negative for cough, shortness of breath and wheezing  Cardiovascular: Negative  Gastrointestinal: Negative  Endocrine: Negative  Genitourinary: Negative for difficulty urinating, dysuria, frequency, hematuria and urgency  Musculoskeletal: Negative for arthralgias, gait problem, joint swelling and myalgias  Skin: Negative for rash  Neurological: Negative for dizziness, speech difficulty, weakness, light-headedness, numbness and headaches  Hematological: Negative  Psychiatric/Behavioral: Positive for dysphoric mood  Negative for confusion, decreased concentration, sleep disturbance and suicidal ideas  The patient is nervous/anxious  Active Problem List     Patient Active Problem List   Diagnosis    COPD (chronic obstructive pulmonary disease) ([facility] )    Essential hypertension    [facility] fibrillation with RVR ([facility])    CHF (congestive heart failure) ([facility])    Acute respiratory failure with hypoxia ([facility])    Hypoalbuminemia    Hypertension    Diabetes type 2, uncontrolled ([facility])       Objective   /82 (BP Location: Left arm, Patient Position: Sitting, Cuff Size: Large)   Pulse 104   Temp 98 4 °F (36 9 °C) (Tympanic)   Resp 16   Ht 5' 4" (1 626 m)   Wt 127 kg (279 lb 12 8 oz)   BMI 48 03 kg/m²     Physical Exam   Constitutional: She is oriented to person, place, and time   She appears well-developed  No distress  Obese   HENT:   Head: Normocephalic and atraumatic  Neck: Neck supple  No JVD present  No tracheal deviation present  No thyromegaly present  Cardiovascular: Normal rate, normal heart sounds and intact distal pulses  Exam reveals no gallop and no friction rub  No murmur heard  Irregular rhythm   Pulmonary/Chest: Effort normal and breath sounds normal  She has no wheezes  She has no rales  She exhibits no tenderness  Abdominal: Soft  Bowel sounds are normal  She exhibits no distension and no mass  There is no tenderness  There is no rebound  Musculoskeletal: Normal range of motion  She exhibits no edema or tenderness  Lymphadenopathy:     She has no cervical adenopathy  Neurological: She is alert and oriented to person, place, and time  Skin: Skin is warm and dry  Psychiatric:   The patient's affect is blunted    Her thought is normal         Current Medications     Current Outpatient Prescriptions:     albuterol (PROVENTIL HFA,VENTOLIN HFA) 90 mcg/act inhaler, Inhale 2 puffs every 4 (four) hours as needed for wheezing, Disp: 1 Inhaler, Rfl: 3    diltiazem (CARDIZEM CD) 120 mg 24 hr capsule, 120 mg daily, Disp: , Rfl: 5    ELIQUIS 5 MG, Take 5 mg by mouth 2 (two) times a day , Disp: , Rfl:     furosemide (LASIX) 40 mg tablet, Take 40 mg by mouth daily, Disp: , Rfl: 0    lisinopril (ZESTRIL) 40 mg tablet, Take 40 mg by mouth daily, Disp: , Rfl:     metoprolol succinate (TOPROL-XL) 100 mg 24 hr tablet, , Disp: , Rfl:     sertraline (ZOLOFT) 50 mg tablet, Take 1 tablet (50 mg total) by mouth daily, Disp: 30 tablet, Rfl: 1    Health Maintenance     Health Maintenance   Topic Date Due    HIV SCREENING  1960    Hepatitis C Screening  1960    MAMMOGRAM  1960    COLONOSCOPY  1960    OPHTHALMOLOGY EXAM  03/29/1971    Depression Screening PHQ-9  03/29/1973    PAP SMEAR  03/29/1979    DTaP,Tdap,and Td Vaccines (2 - Td) 09/08/2015  Diabetic Foot Exam  03/12/2016    Lung Cancer Screening  03/29/2016    URINE MICROALBUMIN  05/20/2016    HEMOGLOBIN A1C  05/19/2017    INFLUENZA VACCINE  09/01/2017    CREATININE LEVEL  11/28/2017    PNEUMOCOCCAL POLYSACCHARIDE VACCINE AGE 2-64 HIGH RISK  Completed     Immunization History   Administered Date(s) Administered    Influenza Quadrivalent, 6-35 Months IM 09/30/2016    Pneumococcal Conjugate 13-Valent 12/09/2016    Pneumococcal Polysaccharide PPV23 03/12/2015    Tdap 08/11/2015       Josiephine Lundborg, MD 15

## 2024-02-12 NOTE — PLAN OF CARE
Problem: INFECTION - ADULT  Goal: Absence or prevention of progression during hospitalization  Description  INTERVENTIONS:  - Assess and monitor for signs and symptoms of infection  - Monitor lab/diagnostic results  - Monitor all insertion sites, i e  indwelling lines, tubes, and drains  - Monitor endotracheal if appropriate and nasal secretions for changes in amount and color  - [place] appropriate cooling/warming therapies per order  - Administer medications as ordered  - Instruct and encourage patient and family to use good hand hygiene technique  - Identify and instruct in appropriate isolation precautions for identified infection/condition  Outcome: Progressing     Problem: Potential for Falls  Goal: Patient will remain free of falls  Description  INTERVENTIONS:  - Assess patient frequently for physical needs  -  Identify cognitive and physical deficits and behaviors that affect risk of falls    -  [place] fall precautions as indicated by assessment   - Educate patient/family on patient safety including physical limitations  - Instruct patient to call for assistance with activity based on assessment  - Modify environment to reduce risk of injury  - Consider OT/PT consult to assist with strengthening/mobility  Outcome: Progressing     Problem: PAIN - ADULT  Goal: Verbalizes/displays adequate comfort level or baseline comfort level  Description  Interventions:  - Encourage patient to monitor pain and request assistance  - Assess pain using appropriate pain scale  - Administer analgesics based on type and severity of pain and evaluate response  - Implement non-pharmacological measures as appropriate and evaluate response  - Consider cultural and social influences on pain and pain management  - Notify physician/advanced practitioner if interventions unsuccessful or patient reports new pain  Outcome: Progressing [facility] w/ in-house PT services

## 2024-05-01 NOTE — ASSESSMENT & PLAN NOTE
Secondary to cardiorenal syndrome  Improving  Continue torsemide 20 mg b i d    Nephro on board DISPLAY PLAN FREE TEXT

## 2024-09-10 NOTE — ASSESSMENT & PLAN NOTE
· Rate is controlled    Continue PO cardizem and metoprolol  · Cont eliquis for [facility] [[email or web address]] : CSI - right knee pain

## 2024-12-06 NOTE — ADDENDUM NOTE
Addended by: Derrick Wiley on: 2/25/2020 06:37 PM     Modules accepted: Orders
Quality 226: Preventive Care And Screening: Tobacco Use: Screening And Cessation Intervention: Patient screened for tobacco use and is an ex/non-smoker
Detail Level: Detailed
